# Patient Record
Sex: FEMALE | Race: WHITE | NOT HISPANIC OR LATINO
[De-identification: names, ages, dates, MRNs, and addresses within clinical notes are randomized per-mention and may not be internally consistent; named-entity substitution may affect disease eponyms.]

---

## 2017-08-18 PROBLEM — Z00.00 ENCOUNTER FOR PREVENTIVE HEALTH EXAMINATION: Noted: 2017-08-18

## 2017-09-12 ENCOUNTER — RECORD ABSTRACTING (OUTPATIENT)
Age: 52
End: 2017-09-12

## 2017-09-12 DIAGNOSIS — L40.50 ARTHROPATHIC PSORIASIS, UNSPECIFIED: ICD-10-CM

## 2017-09-12 DIAGNOSIS — I82.629 ACUTE EMBOLISM AND THROMBOSIS OF DEEP VEINS OF UNSPECIFIED UPPER EXTREMITY: ICD-10-CM

## 2017-09-12 DIAGNOSIS — Z86.79 PERSONAL HISTORY OF OTHER DISEASES OF THE CIRCULATORY SYSTEM: ICD-10-CM

## 2017-09-12 DIAGNOSIS — Z85.3 PERSONAL HISTORY OF MALIGNANT NEOPLASM OF BREAST: ICD-10-CM

## 2017-09-20 ENCOUNTER — APPOINTMENT (OUTPATIENT)
Dept: GYNECOLOGIC ONCOLOGY | Facility: CLINIC | Age: 52
End: 2017-09-20
Payer: COMMERCIAL

## 2017-09-20 VITALS
WEIGHT: 154 LBS | HEART RATE: 80 BPM | HEIGHT: 60 IN | BODY MASS INDEX: 30.23 KG/M2 | SYSTOLIC BLOOD PRESSURE: 130 MMHG | DIASTOLIC BLOOD PRESSURE: 70 MMHG

## 2017-09-20 DIAGNOSIS — Z15.01 GENETIC SUSCEPTIBILITY TO MALIGNANT NEOPLASM OF BREAST: ICD-10-CM

## 2017-09-20 DIAGNOSIS — Z78.9 OTHER SPECIFIED HEALTH STATUS: ICD-10-CM

## 2017-09-20 DIAGNOSIS — Z15.02 GENETIC SUSCEPTIBILITY TO MALIGNANT NEOPLASM OF BREAST: ICD-10-CM

## 2017-09-20 DIAGNOSIS — Z87.898 PERSONAL HISTORY OF OTHER SPECIFIED CONDITIONS: ICD-10-CM

## 2017-09-20 DIAGNOSIS — C50.911 MALIGNANT NEOPLASM OF UNSPECIFIED SITE OF RIGHT FEMALE BREAST: ICD-10-CM

## 2017-09-20 DIAGNOSIS — J44.9 CHRONIC OBSTRUCTIVE PULMONARY DISEASE, UNSPECIFIED: ICD-10-CM

## 2017-09-20 DIAGNOSIS — F17.200 NICOTINE DEPENDENCE, UNSPECIFIED, UNCOMPLICATED: ICD-10-CM

## 2017-09-20 DIAGNOSIS — C50.912 MALIGNANT NEOPLASM OF UNSPECIFIED SITE OF RIGHT FEMALE BREAST: ICD-10-CM

## 2017-09-20 PROCEDURE — 99244 OFF/OP CNSLTJ NEW/EST MOD 40: CPT

## 2017-09-20 RX ORDER — MULTIVITAMIN
TABLET ORAL
Refills: 0 | Status: ACTIVE | COMMUNITY

## 2017-09-20 RX ORDER — IVERMECTIN 3 MG/1
TABLET ORAL
Refills: 0 | Status: ACTIVE | COMMUNITY

## 2017-09-20 RX ORDER — CHROMIUM 200 MCG
TABLET ORAL
Refills: 0 | Status: ACTIVE | COMMUNITY

## 2017-09-20 RX ORDER — FOLIC ACID 1 MG/1
1 TABLET ORAL
Qty: 30 | Refills: 0 | Status: ACTIVE | COMMUNITY
Start: 2017-05-15

## 2017-09-20 RX ORDER — EXEMESTANE 25 MG/1
25 TABLET ORAL
Refills: 0 | Status: ACTIVE | COMMUNITY

## 2017-09-20 RX ORDER — FEXOFENADINE HCL 60 MG
CAPSULE ORAL
Refills: 0 | Status: ACTIVE | COMMUNITY

## 2017-09-20 RX ORDER — NYSTATIN 100000 [USP'U]/ML
100000 SUSPENSION ORAL
Qty: 200 | Refills: 0 | Status: ACTIVE | COMMUNITY
Start: 2017-06-13

## 2017-09-20 RX ORDER — MONTELUKAST 10 MG/1
10 TABLET, FILM COATED ORAL
Qty: 30 | Refills: 0 | Status: ACTIVE | COMMUNITY
Start: 2017-06-13

## 2017-09-20 RX ORDER — PROCHLORPERAZINE MALEATE 10 MG/1
10 TABLET ORAL
Qty: 90 | Refills: 0 | Status: ACTIVE | COMMUNITY
Start: 2016-11-16

## 2017-09-20 RX ORDER — APREMILAST 30 MG/1
30 TABLET, FILM COATED ORAL
Refills: 0 | Status: ACTIVE | COMMUNITY

## 2017-09-20 RX ORDER — ALPRAZOLAM 0.25 MG/1
0.25 TABLET ORAL
Qty: 60 | Refills: 0 | Status: ACTIVE | COMMUNITY
Start: 2017-04-25

## 2017-09-21 LAB — HPV HIGH+LOW RISK DNA PNL CVX: NEGATIVE

## 2017-09-26 LAB — CYTOLOGY CVX/VAG DOC THIN PREP: NORMAL

## 2018-03-23 ENCOUNTER — OTHER (OUTPATIENT)
Age: 53
End: 2018-03-23

## 2018-07-27 PROBLEM — Z78.9 ALCOHOL USE: Status: ACTIVE | Noted: 2017-09-20

## 2019-03-17 ENCOUNTER — INPATIENT (INPATIENT)
Facility: HOSPITAL | Age: 54
LOS: 11 days | Discharge: HOME | DRG: 191 | End: 2019-03-29
Attending: INTERNAL MEDICINE | Admitting: INTERNAL MEDICINE
Payer: COMMERCIAL

## 2019-03-17 VITALS
TEMPERATURE: 98 F | HEART RATE: 86 BPM | WEIGHT: 164.91 LBS | RESPIRATION RATE: 18 BRPM | DIASTOLIC BLOOD PRESSURE: 86 MMHG | HEIGHT: 60 IN | SYSTOLIC BLOOD PRESSURE: 128 MMHG | OXYGEN SATURATION: 100 %

## 2019-03-17 DIAGNOSIS — Z90.13 ACQUIRED ABSENCE OF BILATERAL BREASTS AND NIPPLES: Chronic | ICD-10-CM

## 2019-03-17 DIAGNOSIS — Z90.10 ACQUIRED ABSENCE OF UNSPECIFIED BREAST AND NIPPLE: Chronic | ICD-10-CM

## 2019-03-17 DIAGNOSIS — K70.10 ALCOHOLIC HEPATITIS WITHOUT ASCITES: ICD-10-CM

## 2019-03-17 DIAGNOSIS — C50.919 MALIGNANT NEOPLASM OF UNSPECIFIED SITE OF UNSPECIFIED FEMALE BREAST: ICD-10-CM

## 2019-03-17 DIAGNOSIS — R06.02 SHORTNESS OF BREATH: ICD-10-CM

## 2019-03-17 DIAGNOSIS — F10.929 ALCOHOL USE, UNSPECIFIED WITH INTOXICATION, UNSPECIFIED: ICD-10-CM

## 2019-03-17 DIAGNOSIS — L40.9 PSORIASIS, UNSPECIFIED: ICD-10-CM

## 2019-03-17 DIAGNOSIS — D69.6 THROMBOCYTOPENIA, UNSPECIFIED: ICD-10-CM

## 2019-03-17 DIAGNOSIS — Z29.9 ENCOUNTER FOR PROPHYLACTIC MEASURES, UNSPECIFIED: ICD-10-CM

## 2019-03-17 DIAGNOSIS — H10.9 UNSPECIFIED CONJUNCTIVITIS: ICD-10-CM

## 2019-03-17 DIAGNOSIS — E87.1 HYPO-OSMOLALITY AND HYPONATREMIA: ICD-10-CM

## 2019-03-17 DIAGNOSIS — J44.1 CHRONIC OBSTRUCTIVE PULMONARY DISEASE WITH (ACUTE) EXACERBATION: ICD-10-CM

## 2019-03-17 LAB
ALBUMIN SERPL ELPH-MCNC: 3.8 G/DL — SIGNIFICANT CHANGE UP (ref 3.3–5)
ALBUMIN SERPL ELPH-MCNC: 4.1 G/DL — SIGNIFICANT CHANGE UP (ref 3.3–5)
ALP SERPL-CCNC: 108 U/L — SIGNIFICANT CHANGE UP (ref 40–120)
ALP SERPL-CCNC: 110 U/L — SIGNIFICANT CHANGE UP (ref 40–120)
ALT FLD-CCNC: 219 U/L — HIGH (ref 10–45)
ALT FLD-CCNC: 235 U/L — HIGH (ref 10–45)
ANION GAP SERPL CALC-SCNC: 21 MMOL/L — HIGH (ref 5–17)
ANION GAP SERPL CALC-SCNC: 22 MMOL/L — HIGH (ref 5–17)
ANISOCYTOSIS BLD QL: SLIGHT — SIGNIFICANT CHANGE UP
APTT BLD: 29.6 SEC — SIGNIFICANT CHANGE UP (ref 27.5–36.3)
AST SERPL-CCNC: 354 U/L — HIGH (ref 10–40)
AST SERPL-CCNC: 363 U/L — HIGH (ref 10–40)
BASE EXCESS BLDV CALC-SCNC: -1.5 MMOL/L — SIGNIFICANT CHANGE UP (ref -2–2)
BASE EXCESS BLDV CALC-SCNC: 1.3 MMOL/L — SIGNIFICANT CHANGE UP (ref -2–2)
BASOPHILS # BLD AUTO: 0 K/UL — SIGNIFICANT CHANGE UP (ref 0–0.2)
BASOPHILS NFR BLD AUTO: 0.7 % — SIGNIFICANT CHANGE UP (ref 0–2)
BILIRUB SERPL-MCNC: 1.3 MG/DL — HIGH (ref 0.2–1.2)
BILIRUB SERPL-MCNC: 1.3 MG/DL — HIGH (ref 0.2–1.2)
BUN SERPL-MCNC: 4 MG/DL — LOW (ref 7–23)
BUN SERPL-MCNC: 4 MG/DL — LOW (ref 7–23)
CA-I SERPL-SCNC: 0.97 MMOL/L — LOW (ref 1.12–1.3)
CA-I SERPL-SCNC: 0.99 MMOL/L — LOW (ref 1.12–1.3)
CALCIUM SERPL-MCNC: 8.2 MG/DL — LOW (ref 8.4–10.5)
CALCIUM SERPL-MCNC: 8.8 MG/DL — SIGNIFICANT CHANGE UP (ref 8.4–10.5)
CHLORIDE BLDV-SCNC: 93 MMOL/L — LOW (ref 96–108)
CHLORIDE BLDV-SCNC: 95 MMOL/L — LOW (ref 96–108)
CHLORIDE SERPL-SCNC: 83 MMOL/L — LOW (ref 96–108)
CHLORIDE SERPL-SCNC: 89 MMOL/L — LOW (ref 96–108)
CO2 BLDV-SCNC: 25 MMOL/L — SIGNIFICANT CHANGE UP (ref 22–30)
CO2 BLDV-SCNC: 27 MMOL/L — SIGNIFICANT CHANGE UP (ref 22–30)
CO2 SERPL-SCNC: 20 MMOL/L — LOW (ref 22–31)
CO2 SERPL-SCNC: 21 MMOL/L — LOW (ref 22–31)
CREAT SERPL-MCNC: 0.44 MG/DL — LOW (ref 0.5–1.3)
CREAT SERPL-MCNC: 0.48 MG/DL — LOW (ref 0.5–1.3)
EOSINOPHIL # BLD AUTO: 0.2 K/UL — SIGNIFICANT CHANGE UP (ref 0–0.5)
EOSINOPHIL NFR BLD AUTO: 3.1 % — SIGNIFICANT CHANGE UP (ref 0–6)
ETHANOL SERPL-MCNC: 271 MG/DL — HIGH (ref 0–10)
GAS PNL BLDV: 125 MMOL/L — LOW (ref 136–145)
GAS PNL BLDV: 126 MMOL/L — LOW (ref 136–145)
GAS PNL BLDV: SIGNIFICANT CHANGE UP
GLUCOSE BLDV-MCNC: 91 MG/DL — SIGNIFICANT CHANGE UP (ref 70–99)
GLUCOSE BLDV-MCNC: 91 MG/DL — SIGNIFICANT CHANGE UP (ref 70–99)
GLUCOSE SERPL-MCNC: 93 MG/DL — SIGNIFICANT CHANGE UP (ref 70–99)
GLUCOSE SERPL-MCNC: 93 MG/DL — SIGNIFICANT CHANGE UP (ref 70–99)
HCO3 BLDV-SCNC: 24 MMOL/L — SIGNIFICANT CHANGE UP (ref 21–29)
HCO3 BLDV-SCNC: 26 MMOL/L — SIGNIFICANT CHANGE UP (ref 21–29)
HCT VFR BLD CALC: 47.9 % — HIGH (ref 34.5–45)
HCT VFR BLDA CALC: 49 % — SIGNIFICANT CHANGE UP (ref 39–50)
HCT VFR BLDA CALC: 50 % — SIGNIFICANT CHANGE UP (ref 39–50)
HGB BLD CALC-MCNC: 15.9 G/DL — HIGH (ref 11.5–15.5)
HGB BLD CALC-MCNC: 16.5 G/DL — HIGH (ref 11.5–15.5)
HGB BLD-MCNC: 16.4 G/DL — HIGH (ref 11.5–15.5)
INR BLD: 0.89 RATIO — SIGNIFICANT CHANGE UP (ref 0.88–1.16)
LACTATE BLDV-MCNC: 3.4 MMOL/L — HIGH (ref 0.7–2)
LACTATE BLDV-MCNC: 3.6 MMOL/L — HIGH (ref 0.7–2)
LYMPHOCYTES # BLD AUTO: 0.7 K/UL — LOW (ref 1–3.3)
LYMPHOCYTES # BLD AUTO: 13.3 % — SIGNIFICANT CHANGE UP (ref 13–44)
MACROCYTES BLD QL: SIGNIFICANT CHANGE UP
MCHC RBC-ENTMCNC: 34.3 GM/DL — SIGNIFICANT CHANGE UP (ref 32–36)
MCHC RBC-ENTMCNC: 38.4 PG — HIGH (ref 27–34)
MCV RBC AUTO: 112 FL — HIGH (ref 80–100)
MONOCYTES # BLD AUTO: 0.6 K/UL — SIGNIFICANT CHANGE UP (ref 0–0.9)
MONOCYTES NFR BLD AUTO: 11.1 % — SIGNIFICANT CHANGE UP (ref 2–14)
NEUTROPHILS # BLD AUTO: 3.6 K/UL — SIGNIFICANT CHANGE UP (ref 1.8–7.4)
NEUTROPHILS NFR BLD AUTO: 71.9 % — SIGNIFICANT CHANGE UP (ref 43–77)
NT-PROBNP SERPL-SCNC: 47 PG/ML — SIGNIFICANT CHANGE UP (ref 0–300)
PCO2 BLDV: 43 MMHG — SIGNIFICANT CHANGE UP (ref 35–50)
PCO2 BLDV: 43 MMHG — SIGNIFICANT CHANGE UP (ref 35–50)
PH BLDV: 7.36 — SIGNIFICANT CHANGE UP (ref 7.35–7.45)
PH BLDV: 7.4 — SIGNIFICANT CHANGE UP (ref 7.35–7.45)
PLAT MORPH BLD: NORMAL — SIGNIFICANT CHANGE UP
PLATELET # BLD AUTO: 105 K/UL — LOW (ref 150–400)
PO2 BLDV: 87 MMHG — HIGH (ref 25–45)
PO2 BLDV: 96 MMHG — HIGH (ref 25–45)
POTASSIUM BLDV-SCNC: 4 MMOL/L — SIGNIFICANT CHANGE UP (ref 3.5–5.3)
POTASSIUM BLDV-SCNC: 4.3 MMOL/L — SIGNIFICANT CHANGE UP (ref 3.5–5.3)
POTASSIUM SERPL-MCNC: 4.3 MMOL/L — SIGNIFICANT CHANGE UP (ref 3.5–5.3)
POTASSIUM SERPL-MCNC: 4.4 MMOL/L — SIGNIFICANT CHANGE UP (ref 3.5–5.3)
POTASSIUM SERPL-SCNC: 4.3 MMOL/L — SIGNIFICANT CHANGE UP (ref 3.5–5.3)
POTASSIUM SERPL-SCNC: 4.4 MMOL/L — SIGNIFICANT CHANGE UP (ref 3.5–5.3)
PROT SERPL-MCNC: 7.1 G/DL — SIGNIFICANT CHANGE UP (ref 6–8.3)
PROT SERPL-MCNC: 7.3 G/DL — SIGNIFICANT CHANGE UP (ref 6–8.3)
PROTHROM AB SERPL-ACNC: 10.1 SEC — SIGNIFICANT CHANGE UP (ref 10–12.9)
RBC # BLD: 4.27 M/UL — SIGNIFICANT CHANGE UP (ref 3.8–5.2)
RBC # FLD: 12.7 % — SIGNIFICANT CHANGE UP (ref 10.3–14.5)
RBC BLD AUTO: ABNORMAL
SAO2 % BLDV: 95 % — HIGH (ref 67–88)
SAO2 % BLDV: 97 % — HIGH (ref 67–88)
SODIUM SERPL-SCNC: 126 MMOL/L — LOW (ref 135–145)
SODIUM SERPL-SCNC: 130 MMOL/L — LOW (ref 135–145)
TROPONIN T, HIGH SENSITIVITY RESULT: 18 NG/L — SIGNIFICANT CHANGE UP (ref 0–51)
WBC # BLD: 5.1 K/UL — SIGNIFICANT CHANGE UP (ref 3.8–10.5)
WBC # FLD AUTO: 5.1 K/UL — SIGNIFICANT CHANGE UP (ref 3.8–10.5)

## 2019-03-17 PROCEDURE — 93010 ELECTROCARDIOGRAM REPORT: CPT | Mod: NC

## 2019-03-17 PROCEDURE — 99223 1ST HOSP IP/OBS HIGH 75: CPT

## 2019-03-17 PROCEDURE — 71275 CT ANGIOGRAPHY CHEST: CPT | Mod: 26

## 2019-03-17 PROCEDURE — 71045 X-RAY EXAM CHEST 1 VIEW: CPT | Mod: 26

## 2019-03-17 PROCEDURE — 99285 EMERGENCY DEPT VISIT HI MDM: CPT | Mod: 25

## 2019-03-17 RX ORDER — SODIUM CHLORIDE 9 MG/ML
1000 INJECTION INTRAMUSCULAR; INTRAVENOUS; SUBCUTANEOUS ONCE
Qty: 0 | Refills: 0 | Status: COMPLETED | OUTPATIENT
Start: 2019-03-17 | End: 2019-03-17

## 2019-03-17 RX ORDER — NICOTINE POLACRILEX 2 MG
1 GUM BUCCAL DAILY
Qty: 0 | Refills: 0 | Status: DISCONTINUED | OUTPATIENT
Start: 2019-03-17 | End: 2019-03-29

## 2019-03-17 RX ORDER — MONTELUKAST 4 MG/1
10 TABLET, CHEWABLE ORAL DAILY
Qty: 0 | Refills: 0 | Status: DISCONTINUED | OUTPATIENT
Start: 2019-03-17 | End: 2019-03-29

## 2019-03-17 RX ORDER — IPRATROPIUM/ALBUTEROL SULFATE 18-103MCG
3 AEROSOL WITH ADAPTER (GRAM) INHALATION ONCE
Qty: 0 | Refills: 0 | Status: COMPLETED | OUTPATIENT
Start: 2019-03-17 | End: 2019-03-17

## 2019-03-17 RX ORDER — POLYMYXIN B SULF/TRIMETHOPRIM 10000-1/ML
1 DROPS OPHTHALMIC (EYE) EVERY 6 HOURS
Qty: 0 | Refills: 0 | Status: COMPLETED | OUTPATIENT
Start: 2019-03-17 | End: 2019-03-22

## 2019-03-17 RX ORDER — THIAMINE MONONITRATE (VIT B1) 100 MG
250 TABLET ORAL DAILY
Qty: 0 | Refills: 0 | Status: COMPLETED | OUTPATIENT
Start: 2019-03-17 | End: 2019-03-20

## 2019-03-17 RX ORDER — CHOLECALCIFEROL (VITAMIN D3) 125 MCG
2000 CAPSULE ORAL DAILY
Qty: 0 | Refills: 0 | Status: DISCONTINUED | OUTPATIENT
Start: 2019-03-17 | End: 2019-03-29

## 2019-03-17 RX ORDER — BUDESONIDE AND FORMOTEROL FUMARATE DIHYDRATE 160; 4.5 UG/1; UG/1
2 AEROSOL RESPIRATORY (INHALATION)
Qty: 0 | Refills: 0 | Status: DISCONTINUED | OUTPATIENT
Start: 2019-03-17 | End: 2019-03-29

## 2019-03-17 RX ORDER — FOLIC ACID 0.8 MG
1 TABLET ORAL DAILY
Qty: 0 | Refills: 0 | Status: DISCONTINUED | OUTPATIENT
Start: 2019-03-17 | End: 2019-03-29

## 2019-03-17 RX ORDER — POLYMYXIN B SULF/TRIMETHOPRIM 10000-1/ML
1 DROPS OPHTHALMIC (EYE) ONCE
Qty: 0 | Refills: 0 | Status: COMPLETED | OUTPATIENT
Start: 2019-03-17 | End: 2019-03-17

## 2019-03-17 RX ORDER — PROCHLORPERAZINE MALEATE 5 MG
10 TABLET ORAL DAILY
Qty: 0 | Refills: 0 | Status: DISCONTINUED | OUTPATIENT
Start: 2019-03-17 | End: 2019-03-29

## 2019-03-17 RX ORDER — SODIUM CHLORIDE 9 MG/ML
1000 INJECTION INTRAMUSCULAR; INTRAVENOUS; SUBCUTANEOUS
Qty: 0 | Refills: 0 | Status: COMPLETED | OUTPATIENT
Start: 2019-03-17 | End: 2019-03-18

## 2019-03-17 RX ORDER — EXEMESTANE 25 MG/1
25 TABLET, SUGAR COATED ORAL DAILY
Qty: 0 | Refills: 0 | Status: DISCONTINUED | OUTPATIENT
Start: 2019-03-17 | End: 2019-03-29

## 2019-03-17 RX ORDER — IPRATROPIUM/ALBUTEROL SULFATE 18-103MCG
3 AEROSOL WITH ADAPTER (GRAM) INHALATION EVERY 6 HOURS
Qty: 0 | Refills: 0 | Status: DISCONTINUED | OUTPATIENT
Start: 2019-03-17 | End: 2019-03-29

## 2019-03-17 RX ORDER — THIAMINE MONONITRATE (VIT B1) 100 MG
250 TABLET ORAL DAILY
Qty: 0 | Refills: 0 | Status: DISCONTINUED | OUTPATIENT
Start: 2019-03-17 | End: 2019-03-17

## 2019-03-17 RX ADMIN — Medication 1 MILLIGRAM(S): at 19:55

## 2019-03-17 RX ADMIN — Medication 50 MILLIGRAM(S): at 17:46

## 2019-03-17 RX ADMIN — Medication 3 MILLILITER(S): at 14:47

## 2019-03-17 RX ADMIN — Medication 125 MILLIGRAM(S): at 14:47

## 2019-03-17 RX ADMIN — Medication 1 DROP(S): at 17:47

## 2019-03-17 RX ADMIN — SODIUM CHLORIDE 1000 MILLILITER(S): 9 INJECTION INTRAMUSCULAR; INTRAVENOUS; SUBCUTANEOUS at 14:52

## 2019-03-17 RX ADMIN — Medication 3 MILLILITER(S): at 23:09

## 2019-03-17 RX ADMIN — Medication 4 MILLIGRAM(S): at 23:08

## 2019-03-17 RX ADMIN — SODIUM CHLORIDE 1000 MILLILITER(S): 9 INJECTION INTRAMUSCULAR; INTRAVENOUS; SUBCUTANEOUS at 17:40

## 2019-03-17 RX ADMIN — Medication 1 DROP(S): at 23:11

## 2019-03-17 RX ADMIN — Medication 3 MILLILITER(S): at 14:50

## 2019-03-17 NOTE — H&P ADULT - NSICDXPASTMEDICALHX_GEN_ALL_CORE_FT
PAST MEDICAL HISTORY:  Brain aneurysm with clips    Breast cancer     Breast cancer, stage 3     Psoriasis     Psoriasis     RA (rheumatoid arthritis)

## 2019-03-17 NOTE — PATIENT PROFILE ADULT - DOES PATIENT HAVE ADVANCE DIRECTIVE
No order needed for Flu Clinic visit  Patient is scheduled appropriately    Shefali Rolle, 01/09/18, 9:18 AM yes

## 2019-03-17 NOTE — ED PROVIDER NOTE - CARE PLAN
Principal Discharge DX:	Breast cancer, stage 3 Principal Discharge DX:	SOB (shortness of breath)  Secondary Diagnosis:	Hyponatremia  Secondary Diagnosis:	Transaminitis  Secondary Diagnosis:	Alcohol abuse

## 2019-03-17 NOTE — ED ADULT NURSE NOTE - OBJECTIVE STATEMENT
Patient is a 52 y female presenting to the ED via EMS with c/o difficulty breathing for 2 days. Pt A&Ox4. Pt reports difficulty breathing that is constant for 2 days. Patient is a 52 y female presenting to the ED via EMS with c/o difficulty breathing for 2 days. Pt A&Ox4. Pt reports difficulty breathing that is constant for 2 days. Patient's  reports the pt has not eaten for a week and has had generalized weakness for 2 weeks. Pt reports productive cough with clear sputum. Pt pmh of COPD, breast cancer, psoriasis. Pt continues to smoke cigarettes. Pt reports drinking 6 beers this morning. Pt denies chest pain, fever/chills, burning upon urination.  Gross neuro intact. Normal heart sounds S1, S2 heard upon auscultation. Pt displays red rashes spread through out body.

## 2019-03-17 NOTE — ED PROVIDER NOTE - PMH
Brain aneurysm  with clips  Breast cancer    Breast cancer, stage 3    Psoriasis    Psoriasis    RA (rheumatoid arthritis)

## 2019-03-17 NOTE — H&P ADULT - ATTENDING COMMENTS
Care to be assumed by Dr. Canelo Bonds at 8 am.  This patient was assigned to me by the hospitalist in charge; my involvement in this case has consisted of the initial history, physical, chart review, and management plan.  This patient was previously unknown to me.

## 2019-03-17 NOTE — ED PROVIDER NOTE - CLINICAL SUMMARY MEDICAL DECISION MAKING FREE TEXT BOX
54 y old morbidly obese f with a history of breast ca status post bilateral mastectomy ,not on chemo for the last 4 y ,heavy smoker and alcoholic last drink yesterday (6-7drinks per day) ,brought by a  failure to thrive ,not eating or drinking ,very SOB ,No fever or chills ,decling walking because of fatigue and weakness. Looks very unkempt, bilateral bacterial conjunctivitis, dry skin, dirty nails. concern for PE and abn electrolytes. will obtain bloodwork, IV hydration, CTA and admission to the hospital.ZR

## 2019-03-17 NOTE — H&P ADULT - HISTORY OF PRESENT ILLNESS
Pt's son, Laci Milligan returning call to [facility] 54 F PMH stage 3 Bilateral Breast CA on Herceptin/Aromasin, RA, Psoriasis on Otezla, remote Brain Aneurysm s/p Clip in 1988,  Rt Jugular DVT, Catheter related MSSA Bacteremia from [place] in July 2015, ETOH abuse, COPD, p/w dypsnea.     VS: 99.2, 96, 127/67, 19, 96% 3LNC. Labs: no leukocytosis, mild thrombocytopenia, chronic macrocytosis, coags wnl, cmp with hypoNa 126 repeat 130, transaminitis with normal alkp. HST 18, probnp 47. Lactate 3.6 repeat 3.4. ETOH 271.  CXR prelim clear lungs. CTA chest no PE, +severe emphysema. In ER pt received polytrim opthalmic for suspected bacterial conjunctivitis, 2L IVF, duonebs, librium 50 x 1, ativan 1 x 1, and solumedrol 125 x 1 prior to medicine team involvement. 54 F PMH stage 3 Bilateral Breast CA on Herceptin/Aromasin, RA, Psoriasis on Otezla, remote Brain Aneurysm s/p Clip in 1988,  Rt Jugular DVT, Catheter related MSSA Bacteremia from [place] in July 2015, ETOH abuse, COPD, p/w dypsnea. Acute on chronic dyspnea, progressive x 2 wks. +cough +inc white sputum, +wheezing +alvarez +sob at rest. +inc inhaler usage in last 2 wks. Tried to tolerate it but today it became unbearable. Denies cp/f/c/n/v/d/dysuria, denies LE edema but + orthopnea. Pt also endorses drinking 10 beers daily, today thus far has had 6 beers unsure exactly when last drink was. Has not gone a single day without drinking in many years. Current 2 pack/day smoker prior even heavier use. Pt states she is mostly bedbound from copd but not on home o2.  helps her with some ADLs.     VS: 99.2, 96, 127/67, 19, 96% 3LNC. Labs: no leukocytosis, mild thrombocytopenia, chronic macrocytosis, coags wnl, cmp with hypoNa 126 repeat 130, transaminitis with normal alkp. HST 18, probnp 47. Lactate 3.6 repeat 3.4. ETOH 271.  CXR prelim clear lungs. CTA chest no PE, +severe emphysema. In ER pt received polytrim opthalmic for suspected bacterial conjunctivitis, 2L IVF, duonebs, librium 50 x 1, ativan 1 x 1, and solumedrol 125 x 1 prior to medicine team involvement. 54 F PMH stage 3 Bilateral Breast CA on Aromasin, RA, Psoriasis previously on Otezla, remote Brain Aneurysm s/p Clip in 1988,  Rt Jugular DVT, Catheter related MSSA Bacteremia from [place] in July 2015, ETOH abuse, COPD, p/w dypsnea. Acute on chronic dyspnea, progressive x 2 wks. +cough +inc white sputum, +wheezing +alvaerz +sob at rest. +inc inhaler usage in last 2 wks. Tried to tolerate it but today it became unbearable. +dec po intake x 5 days. Denies cp/f/c/n/v/d/dysuria, denies LE edema but + orthopnea. Pt denies abd pain, hematochezia/melena/hematemesis/hematuria/bruising. Pt also endorses drinking 10 beers daily, today thus far has had 6 beers unsure exactly when last drink was. Has not gone a single day without drinking in many years. Current 2 pack/day smoker prior even heavier use. Pt states she is mostly bedbound from copd but not on home o2.  helps her with some ADLs.  Call placed to  [facility].  He confirms above details including etoh hx. No hx of withdrawal, no seizures, no intubations.     VS: 99.2, 96, 127/67, 19, 96% 3LNC. Labs: no leukocytosis, mild thrombocytopenia, chronic macrocytosis, coags wnl, cmp with hypoNa 126 repeat 130, transaminitis with normal alkp. HST 18, probnp 47. Lactate 3.6 repeat 3.4. ETOH 271.  CXR prelim clear lungs. CTA chest no PE, +severe emphysema. In ER pt received polytrim opthalmic for suspected bacterial conjunctivitis, 2L IVF, duonebs, librium 50 x 1, ativan 1 x 1, and solumedrol 125 x 1 prior to medicine team involvement.

## 2019-03-17 NOTE — H&P ADULT - NSHPSOCIALHISTORY_GEN_ALL_CORE
Social History:    Marital Status:  ( x  )    (   ) Single    (   )    (  )   Occupation: retired   Lives with: (  ) alone  (  ) children   (  x) spouse   (  ) parents  (  ) other    Substance Use (street drugs): ( x ) never used  (  ) other:  Tobacco Usage:  (   ) never smoked   (   ) former smoker   (   x) current smoker  (  >80   ) pack year  (        ) last cigarette date  Alcohol Usage: daily 10 beers longest she has gone without is few hours

## 2019-03-17 NOTE — H&P ADULT - ASSESSMENT
54 F PMH stage 3 Bilateral Breast CA on Aromasin, RA, Psoriasis previously on Otezla, remote Brain Aneurysm s/p Clip in 1988,  Rt Jugular DVT, Catheter related MSSA Bacteremia from [place] in July 2015, ETOH abuse, COPD, p/w dypsnea, f/w COPD exacerbation, alcohol intoxication with high risk for withdrawal, hypovolemic hyponatremia in setting of poor PO intake and dehydration, and b/l conjunctivitis presumed bacterial in nature.

## 2019-03-17 NOTE — H&P ADULT - NSICDXPASTSURGICALHX_GEN_ALL_CORE_FT
PAST SURGICAL HISTORY:  Brain aneurysm 1988 two clips    History of modified radical mastectomy of both breasts     S/P bilateral mastectomy

## 2019-03-17 NOTE — H&P ADULT - NSICDXPROBLEM_GEN_ALL_CORE_FT
PROBLEM DIAGNOSES  Problem: COPD exacerbation  Assessment and Plan: -dyspnea, cough, inc sputum, wheezing, inc inhaler usage; CT confirms severe centrilobular emphysema  -s/p nebs, solumedrol 125 x 1  -start solumedrol IV 20 q8  -pt on stiolto at home; can bring in home med. For now, will start symbicort bid  -duoneb atc  -c/w singulair  -pt with multiple abx allergies (PCN, amox, levaquin) for now will hold off of empiric abx for copd exac  -check RVP  -CTA reviewed, no PE  -cigarette cessation education provided. Pt amenable to nicotine patch for now  -sup O2 prn to keep o2 sats 88-92%.  may need to be eval for home o2 on this admission, recommend documenting ambulatory sat off Oxygen in sunrise when able  -Pulm eval in am with Dr. Mansfield, defer to day attending to arrange.     Problem: Bacterial conjunctivitis  Assessment and Plan: ordered for polytrim opthalmic by ER  -c/w drops for now q6 x 5 days  -consider optho eval in am if worsening    Problem: Hyponatremia  Assessment and Plan: -na 126 on admission, s/p 2L IVF uptrended to 130, appropriate response  -suspect hypovolemic hyponatremia given dehydration on exam, dec PO intake  -c/w maintenance IVF 75 cc/hr x 12 hours and reasses, trend bmp      Problem: Alcohol intoxication  Assessment and Plan: -daily 10 beers drinker confirmed by , longest etoh free period few hours, and etoh level in , high risk for withdrawal  -received PO librium 50 x1 and ativan 1 x1 in ER  -start standing ativan taper, ciwa protocol, prn ativan (noted etoh hepatitis on admission so will avoid librium)  -thiamine, folate, MVI  -social work  -fall precautions, aspiration precautions    Problem: Alcoholic hepatitis  Assessment and Plan: -c/w mgt of etoh intoxication/withdrawal risk as noted above  -mild TTP over RUQ/RLQ  -Cristian DF -7.4, good prognosis  -ABIC score 6.46 low risk  -etoh cessation education provided    Problem: Psoriasis  Assessment and Plan: -chronic, now off otezla, in the middle of searching for new rheumatologists per   -consider wound care eval    Problem: Breast cancer  Assessment and Plan: c/w examestane    Problem: Thrombocytopenia  Assessment and Plan: mild, no s/s of bleeding per hx or exam, hgb stable  -monitor counts for now    Problem: Prophylactic measure  Assessment and Plan: -mild thrombocytopenia on admission; temporarily hold pharm vte ppx, start scd for now.

## 2019-03-17 NOTE — H&P ADULT - NSHPLABSRESULTS_GEN_ALL_CORE
Labs personally reviewed  no leukocytosis, mild thrombocytopenia, chronic macrocytosis, coags wnl, cmp with hypoNa 126 repeat 130, transaminitis with normal alkp. HST 18, probnp 47. Lactate 3.6 repeat 3.4. ETOH 271.   Imaging personally reviewed  CXR prelim clear lungs. CTA chest no PE, +severe emphysema.  EKG personally reviewed NSR +TWI V1-V3 apparently new

## 2019-03-17 NOTE — ED ADULT NURSE REASSESSMENT NOTE - NS ED NURSE REASSESS COMMENT FT1
Patient resting comfortably. VSS. Pt reports improvement of breathing. plan of care discussed. safety and comfort measures maintained.

## 2019-03-17 NOTE — ED PROVIDER NOTE - OBJECTIVE STATEMENT
55 y/o female hx of stage 3 Bilateral  Breast CA not currently on treatment, RA, Psoriasis  remote Brain Aneurysm s/p Clip in 1988,  COPD who presents to the ED with her  for cough and SOB. patients  reports she has been refusing to eat or take meds for about a week and she has been so weak he cant get her in to see her pmd. patient endorsing constant sob, not on home O2, + cough and congestion. + chills without objective fever.    pmd- june  onc- chandok 53 y/o female hx of stage 3 Bilateral  Breast CA not currently on treatment, RA, Psoriasis  remote Brain Aneurysm s/p Clip in 1988,  COPD who presents to the ED with her  for cough and SOB. patients  reports she has been refusing to eat or take meds for about a week and she has been so weak he cant get her in to see her pmd. patient endorsing constant sob, not on home O2, + cough and congestion. + chills without objective fever.    pmd- june  david malik at [facility] 53 y/o female hx of stage 3 Bilateral  Breast CA not currently on treatment, RA, Psoriasis  remote Brain Aneurysm s/p Clip in 1988, Etoh abuse - had 6 drinks today (no hx of withdrawl) COPD who presents to the ED with her  for cough and SOB. patients  reports she has been refusing to eat or take meds for about a week and she has been so weak he cant get her in to see her pmd. patient endorsing constant sob, not on home O2, + cough and congestion. + chills without objective fever.     pmd- june  nancy- king at [facility]

## 2019-03-17 NOTE — H&P ADULT - NSHPREVIEWOFSYSTEMS_GEN_ALL_CORE
REVIEW OF SYSTEMS:  CONSTITUTIONAL: +weakness. No fevers. No chills. No weight loss. poor appetite.  EYES: No visual changes. +b/l eye redness and discharge, No eye pain.  ENT: No hearing difficulty. No vertigo. No dysphagia. No Sinusitis/rhinorrhea.  NECK: No pain. No stiffness/rigidity.  CARDIAC: No chest pain. No palpitations.  RESPIRATORY: +cough. +SOB. No hemoptysis.  GASTROINTESTINAL: No abdominal pain. No nausea. No vomiting. No hematemesis. No diarrhea. No constipation. No melena. No hematochezia.  GENITOURINARY: No dysuria. No frequency. No hesitancy. No hematuria.  NEUROLOGICAL: No numbness. No focal weakness. No incontinence. No headache.  BACK: No back pain.  EXTREMITIES: No lower extremity edema. Full ROM.  SKIN: No rashes. No itching. No other lesions.  PSYCHIATRIC: No depression. No anxiety. No SI/HI.  ALLERGIC: No lip swelling. No hives.  All other review of systems is negative unless indicated above.

## 2019-03-18 LAB
ALBUMIN SERPL ELPH-MCNC: 3.5 G/DL — SIGNIFICANT CHANGE UP (ref 3.3–5)
ALBUMIN SERPL ELPH-MCNC: 3.6 G/DL — SIGNIFICANT CHANGE UP (ref 3.3–5)
ALP SERPL-CCNC: 95 U/L — SIGNIFICANT CHANGE UP (ref 40–120)
ALP SERPL-CCNC: 97 U/L — SIGNIFICANT CHANGE UP (ref 40–120)
ALT FLD-CCNC: 191 U/L — HIGH (ref 10–45)
ALT FLD-CCNC: 191 U/L — HIGH (ref 10–45)
ANION GAP SERPL CALC-SCNC: 22 MMOL/L — HIGH (ref 5–17)
ANION GAP SERPL CALC-SCNC: 22 MMOL/L — HIGH (ref 5–17)
AST SERPL-CCNC: 261 U/L — HIGH (ref 10–40)
AST SERPL-CCNC: 263 U/L — HIGH (ref 10–40)
BASOPHILS # BLD AUTO: 0 K/UL — SIGNIFICANT CHANGE UP (ref 0–0.2)
BASOPHILS NFR BLD AUTO: 0 % — SIGNIFICANT CHANGE UP (ref 0–2)
BILIRUB DIRECT SERPL-MCNC: 0.6 MG/DL — HIGH (ref 0–0.2)
BILIRUB INDIRECT FLD-MCNC: 0.6 MG/DL — SIGNIFICANT CHANGE UP (ref 0.2–1)
BILIRUB SERPL-MCNC: 1.1 MG/DL — SIGNIFICANT CHANGE UP (ref 0.2–1.2)
BILIRUB SERPL-MCNC: 1.2 MG/DL — SIGNIFICANT CHANGE UP (ref 0.2–1.2)
BUN SERPL-MCNC: 7 MG/DL — SIGNIFICANT CHANGE UP (ref 7–23)
BUN SERPL-MCNC: 7 MG/DL — SIGNIFICANT CHANGE UP (ref 7–23)
CALCIUM SERPL-MCNC: 8.2 MG/DL — LOW (ref 8.4–10.5)
CALCIUM SERPL-MCNC: 8.3 MG/DL — LOW (ref 8.4–10.5)
CHLORIDE SERPL-SCNC: 92 MMOL/L — LOW (ref 96–108)
CHLORIDE SERPL-SCNC: 93 MMOL/L — LOW (ref 96–108)
CO2 SERPL-SCNC: 17 MMOL/L — LOW (ref 22–31)
CO2 SERPL-SCNC: 18 MMOL/L — LOW (ref 22–31)
CREAT SERPL-MCNC: 0.55 MG/DL — SIGNIFICANT CHANGE UP (ref 0.5–1.3)
CREAT SERPL-MCNC: 0.56 MG/DL — SIGNIFICANT CHANGE UP (ref 0.5–1.3)
EOSINOPHIL # BLD AUTO: 0 K/UL — SIGNIFICANT CHANGE UP (ref 0–0.5)
EOSINOPHIL NFR BLD AUTO: 0 % — SIGNIFICANT CHANGE UP (ref 0–6)
GLUCOSE SERPL-MCNC: 106 MG/DL — HIGH (ref 70–99)
GLUCOSE SERPL-MCNC: 107 MG/DL — HIGH (ref 70–99)
HCT VFR BLD CALC: 42.5 % — SIGNIFICANT CHANGE UP (ref 34.5–45)
HCV AB S/CO SERPL IA: 0.13 S/CO — SIGNIFICANT CHANGE UP (ref 0–0.79)
HCV AB SERPL-IMP: SIGNIFICANT CHANGE UP
HGB BLD-MCNC: 14.4 G/DL — SIGNIFICANT CHANGE UP (ref 11.5–15.5)
LYMPHOCYTES # BLD AUTO: 0.27 K/UL — LOW (ref 1–3.3)
LYMPHOCYTES # BLD AUTO: 4 % — LOW (ref 13–44)
MAGNESIUM SERPL-MCNC: 1.6 MG/DL — SIGNIFICANT CHANGE UP (ref 1.6–2.6)
MAGNESIUM SERPL-MCNC: 1.6 MG/DL — SIGNIFICANT CHANGE UP (ref 1.6–2.6)
MCHC RBC-ENTMCNC: 33.9 GM/DL — SIGNIFICANT CHANGE UP (ref 32–36)
MCHC RBC-ENTMCNC: 37.6 PG — HIGH (ref 27–34)
MCV RBC AUTO: 111 FL — HIGH (ref 80–100)
MONOCYTES # BLD AUTO: 0.62 K/UL — SIGNIFICANT CHANGE UP (ref 0–0.9)
MONOCYTES NFR BLD AUTO: 9 % — SIGNIFICANT CHANGE UP (ref 2–14)
NEUTROPHILS # BLD AUTO: 5.97 K/UL — SIGNIFICANT CHANGE UP (ref 1.8–7.4)
NEUTROPHILS NFR BLD AUTO: 87 % — HIGH (ref 43–77)
PHOSPHATE SERPL-MCNC: 3.8 MG/DL — SIGNIFICANT CHANGE UP (ref 2.5–4.5)
PHOSPHATE SERPL-MCNC: 3.8 MG/DL — SIGNIFICANT CHANGE UP (ref 2.5–4.5)
PLATELET # BLD AUTO: 94 K/UL — LOW (ref 150–400)
POTASSIUM SERPL-MCNC: 4.8 MMOL/L — SIGNIFICANT CHANGE UP (ref 3.5–5.3)
POTASSIUM SERPL-MCNC: 4.9 MMOL/L — SIGNIFICANT CHANGE UP (ref 3.5–5.3)
POTASSIUM SERPL-SCNC: 4.8 MMOL/L — SIGNIFICANT CHANGE UP (ref 3.5–5.3)
POTASSIUM SERPL-SCNC: 4.9 MMOL/L — SIGNIFICANT CHANGE UP (ref 3.5–5.3)
PROT SERPL-MCNC: 6.7 G/DL — SIGNIFICANT CHANGE UP (ref 6–8.3)
PROT SERPL-MCNC: 6.8 G/DL — SIGNIFICANT CHANGE UP (ref 6–8.3)
RAPID RVP RESULT: SIGNIFICANT CHANGE UP
RBC # BLD: 3.83 M/UL — SIGNIFICANT CHANGE UP (ref 3.8–5.2)
RBC # FLD: 13 % — SIGNIFICANT CHANGE UP (ref 10.3–14.5)
SODIUM SERPL-SCNC: 131 MMOL/L — LOW (ref 135–145)
SODIUM SERPL-SCNC: 133 MMOL/L — LOW (ref 135–145)
WBC # BLD: 6.86 K/UL — SIGNIFICANT CHANGE UP (ref 3.8–10.5)
WBC # FLD AUTO: 6.86 K/UL — SIGNIFICANT CHANGE UP (ref 3.8–10.5)

## 2019-03-18 PROCEDURE — 94010 BREATHING CAPACITY TEST: CPT | Mod: 26

## 2019-03-18 RX ADMIN — Medication 20 MILLIGRAM(S): at 06:47

## 2019-03-18 RX ADMIN — Medication 3 MILLILITER(S): at 17:51

## 2019-03-18 RX ADMIN — Medication 102.5 MILLIGRAM(S): at 13:51

## 2019-03-18 RX ADMIN — Medication 20 MILLIGRAM(S): at 22:54

## 2019-03-18 RX ADMIN — Medication 4 MILLIGRAM(S): at 17:49

## 2019-03-18 RX ADMIN — Medication 3 MILLILITER(S): at 23:00

## 2019-03-18 RX ADMIN — Medication 1 TABLET(S): at 13:31

## 2019-03-18 RX ADMIN — BUDESONIDE AND FORMOTEROL FUMARATE DIHYDRATE 2 PUFF(S): 160; 4.5 AEROSOL RESPIRATORY (INHALATION) at 18:07

## 2019-03-18 RX ADMIN — Medication 4 MILLIGRAM(S): at 10:46

## 2019-03-18 RX ADMIN — Medication 4 MILLIGRAM(S): at 03:32

## 2019-03-18 RX ADMIN — BUDESONIDE AND FORMOTEROL FUMARATE DIHYDRATE 2 PUFF(S): 160; 4.5 AEROSOL RESPIRATORY (INHALATION) at 08:39

## 2019-03-18 RX ADMIN — SODIUM CHLORIDE 75 MILLILITER(S): 9 INJECTION INTRAMUSCULAR; INTRAVENOUS; SUBCUTANEOUS at 00:03

## 2019-03-18 RX ADMIN — Medication 1 MILLIGRAM(S): at 13:32

## 2019-03-18 RX ADMIN — Medication 3 MILLILITER(S): at 13:30

## 2019-03-18 RX ADMIN — Medication 2000 UNIT(S): at 13:31

## 2019-03-18 RX ADMIN — EXEMESTANE 25 MILLIGRAM(S): 25 TABLET, SUGAR COATED ORAL at 13:31

## 2019-03-18 RX ADMIN — Medication 20 MILLIGRAM(S): at 13:30

## 2019-03-18 RX ADMIN — Medication 4 MILLIGRAM(S): at 06:47

## 2019-03-18 RX ADMIN — Medication 3 MILLIGRAM(S): at 22:54

## 2019-03-18 RX ADMIN — SODIUM CHLORIDE 75 MILLILITER(S): 9 INJECTION INTRAMUSCULAR; INTRAVENOUS; SUBCUTANEOUS at 13:51

## 2019-03-18 RX ADMIN — Medication 1 PATCH: at 13:31

## 2019-03-18 RX ADMIN — Medication 3 MILLILITER(S): at 03:16

## 2019-03-18 RX ADMIN — Medication 4 MILLIGRAM(S): at 14:11

## 2019-03-18 RX ADMIN — MONTELUKAST 10 MILLIGRAM(S): 4 TABLET, CHEWABLE ORAL at 13:31

## 2019-03-18 RX ADMIN — Medication 10 MILLIGRAM(S): at 13:31

## 2019-03-18 RX ADMIN — Medication 1 DROP(S): at 13:52

## 2019-03-18 RX ADMIN — Medication 1 DROP(S): at 17:51

## 2019-03-18 RX ADMIN — Medication 1 PATCH: at 19:45

## 2019-03-18 NOTE — PROGRESS NOTE ADULT - SUBJECTIVE AND OBJECTIVE BOX
Patient is a 54y old  Female who presents with a chief complaint of copd exacerbation, etoh intoxication (17 Mar 2019 20:33)      INTERVAL HPI/OVERNIGHT EVENTS:    Medications:MEDICATIONS  (STANDING):  ALBUTerol/ipratropium for Nebulization 3 milliLiter(s) Nebulizer every 6 hours  buDESOnide 160 MICROgram(s)/formoterol 4.5 MICROgram(s) Inhaler 2 Puff(s) Inhalation two times a day  cholecalciferol 2000 Unit(s) Oral daily  exemestane 25 milliGRAM(s) Oral daily  folic acid 1 milliGRAM(s) Oral daily  LORazepam     Tablet   Oral   LORazepam     Tablet 4 milliGRAM(s) Oral every 4 hours  LORazepam     Tablet 3 milliGRAM(s) Oral every 4 hours  methylPREDNISolone sodium succinate Injectable 20 milliGRAM(s) IV Push every 8 hours  montelukast 10 milliGRAM(s) Oral daily  multivitamin 1 Tablet(s) Oral daily  nicotine - 21 mG/24Hr(s) Patch 1 patch Transdermal daily  prochlorperazine   Tablet 10 milliGRAM(s) Oral daily  sodium chloride 0.9%. 1000 milliLiter(s) (75 mL/Hr) IV Continuous <Continuous>  thiamine IVPB 250 milliGRAM(s) IV Intermittent daily  trimethoprim/polymyxin Solution 1 Drop(s) Both EYES every 6 hours    MEDICATIONS  (PRN):  LORazepam   Injectable 2 milliGRAM(s) IV Push every 1 hour PRN Symptom-triggered: each CIWA -Ar score 8 or GREATER      Allergies: Allergies    amoxicillin (Anaphylaxis)  Levaquin (Other; Anaphylaxis)  Levaquin (Unknown)  penicillin (Anaphylaxis)  penicillins (Anaphylaxis)    Intolerances          FAMILY HISTORY:  No pertinent family history in first degree relatives  No pertinent family history in first degree relatives        PAST MEDICAL & SURGICAL HISTORY:  Prophylactic measure  Breast cancer  Brain aneurysm: with clips  Psoriasis  RA (rheumatoid arthritis)  Psoriasis  Breast cancer, stage 3  History of modified radical mastectomy of both breasts  S/P bilateral mastectomy  Brain aneurysm: 1988 two clips      REVIEW OF SYSTEMS:  CONSTITUTIONAL: weak  EYES: No eye pain, visual disturbances, or discharge  ENMT:  No difficulty hearing, tinnitus, vertigo; No sinus or throat pain  NECK: No pain or stiffness  BREASTS: No pain, masses, or nipple discharge  RESPIRATORY: cough / sob better   CARDIOVASCULAR: No chest pain, palpitations, dizziness,     GASTROINTESTINAL: No abdominal or epigastric pain. No nausea, vomiting, or hematemesis; No diarrhea or constipation. No melena or hematochezia.  GENITOURINARY: No dysuria, frequency, hematuria, or incontinence  NEUROLOGICAL: No headaches,       HEME/LYMPH: No easy bruising, or bleeding gums  ALLERY AND IMMUNOLOGIC: No hives or eczema    T(C): 37.3 (03-18-19 @ 03:15), Max: 37.4 (03-17-19 @ 22:50)  HR: 100 (03-18-19 @ 06:44) (86 - 101)  BP: 120/72 (03-18-19 @ 06:44) (114/83 - 147/83)  RR: 20 (03-18-19 @ 06:44) (18 - 23)  SpO2: 95% (03-18-19 @ 06:44) (94% - 100%)  Wt(kg): --Vital Signs Last 24 Hrs  T(C): 37.3 (18 Mar 2019 03:15), Max: 37.4 (17 Mar 2019 22:50)  T(F): 99.1 (18 Mar 2019 03:15), Max: 99.3 (17 Mar 2019 22:50)  HR: 100 (18 Mar 2019 06:44) (86 - 101)  BP: 120/72 (18 Mar 2019 06:44) (114/83 - 147/83)  BP(mean): --  RR: 20 (18 Mar 2019 06:44) (18 - 23)  SpO2: 95% (18 Mar 2019 06:44) (94% - 100%)  I&O's Summary      PHYSICAL EXAM:  HEAD:  Atraumatic, Normocephalic  EYES: EOMI, PERRLA, conjunctiva and sclera clear  ENMT: No tonsillar erythema, exudates, or enlargement;   NECK: Supple, No JVD, Normal thyroid  NERVOUS SYSTEM:  Alert & Oriented    Motor Strength 5/5 B/L upper and lower extremities; DTRs 2+ intact and symmetric  CHEST/LUNG: b/l ronchi   HEART: Regular rate and rhythm; No murmurs, rubs, or gallops  ABDOMEN: Soft, Nontender, Nondistended; Bowel sounds present  EXTREMITIES: +psoriatic lesions       Consultant(s) Notes Reviewed:  [x ] YES  [ ] NO  Care Discussed with Consultants/Other Providerscpk [ x] YES  [ ] NO    LABS:                    CBC Full  -  ( 17 Mar 2019 14:56 )  WBC Count : 5.1 K/uL  Hemoglobin : 16.4 g/dL  Hematocrit : 47.9 %  Platelet Count - Automated : 105 K/uL  Mean Cell Volume : 112.0 fl  Mean Cell Hemoglobin : 38.4 pg  Mean Cell Hemoglobin Concentration : 34.3 gm/dL  Auto Neutrophil # : 3.6 K/uL  Auto Lymphocyte # : 0.7 K/uL  Auto Monocyte # : 0.6 K/uL  Auto Eosinophil # : 0.2 K/uL  Auto Basophil # : 0.0 K/uL  Auto Neutrophil % : 71.9 %  Auto Lymphocyte % : 13.3 %  Auto Monocyte % : 11.1 %  Auto Eosinophil % : 3.1 %  Auto Basophil % : 0.7 %      03-18    131<L>  |  92<L>  |  7   ----------------------------<  106<H>  4.8   |  17<L>  |  0.55    Ca    8.2<L>      18 Mar 2019 06:27  Phos  3.8     03-18  Mg     1.6     03-18    TPro  6.7  /  Alb  3.5  /  TBili  1.1  /  DBili  0.6<H>  /  AST  261<H>  /  ALT  191<H>  /  AlkPhos  95  03-18          PT/INR - ( 17 Mar 2019 14:56 )   PT: 10.1 sec;   INR: 0.89 ratio         PTT - ( 17 Mar 2019 14:56 )  PTT:29.6 sec  RADIOLOGY & ADDITIONAL TESTS:    Imaging Personally Reviewed:  [ ] YES  [ ] NO

## 2019-03-18 NOTE — CONSULT NOTE ADULT - ASSESSMENT
Imp:  55 yo female with moderate-severe psoriasis; previously treated with Otezla which helped; has recently run out of the drug.  Now a/w COPD exacerbation    Rec:  Otezla not on formulary at hospital.  Can be restarted as outpatient; -would wait until respiratory status controlled to restart as the drug can cause nausea  Can use topical steroids for psoriasis in the interim; would defer to derm as to best topicals due to facial involvement

## 2019-03-18 NOTE — PROGRESS NOTE ADULT - ASSESSMENT
54 Female w/ PMH stage 3 Bilateral Breast CA on Aromasin, RA, Psoriasis previously on Otezla, remote Brain Aneurysm s/p Clip in 1988,  Rt Jugular DVT, Catheter related MSSA Bacteremia from [place] in July 2015, ETOH abuse, COPD, p/w dypsnea, f/w COPD exacerbation, alcohol intoxication with  risk for withdrawal, hypovolemic hyponatremia in setting of poor PO intake and dehydration, and b/l conjunctivitis presumed bacterial in nature.     Problem: COPD exacerbation   solumedrol IV 20 q8   symbicort bid  -duoneb atc  -c/w singulair  -pt with multiple abx allergies    -CTA reviewed, no PE  -cigarette cessation education provided. Pt amenable to nicotine patch   -sup O2 prn to keep o2 sats 88-92%.   -Pulm eval     Problem: Bacterial conjunctivitis  -c/w drops for now q6 x 5 days    Problem: Hyponatremia  improved   -suspect hypovolemic hyponatremia given dehydration   -c/w maintenance IVF       Problem: Alcohol intoxication  etoh abuse      standing ativan taper, ciwa protocol, prn ativan (noted etoh hepatitis on admission so will avoid librium)  -thiamine, folate, MVI  -social work  -fall precautions, aspiration precautions    : Alcoholic hepatitis  -etoh cessation education provided    : Psoriasis  rheum eval       Problem: Breast cancer  Assessment and Plan: c/w examestane    Problem: Thrombocytopenia  -monitor counts for now

## 2019-03-18 NOTE — CONSULT NOTE ADULT - SUBJECTIVE AND OBJECTIVE BOX
Patient is a 54y old  Female who presents with a chief complaint of copd exacerbation, etoh intoxication (18 Mar 2019 10:13)      HPI:  54 F PMH stage 3 Bilateral Breast CA on Aromasin, RA, Psoriasis previously on Otezla, remote Brain Aneurysm s/p Clip in 1988,  Rt Jugular DVT, Catheter related MSSA Bacteremia from [place] in July 2015, ETOH abuse, COPD, p/w dypsnea. Acute on chronic dyspnea, progressive x 2 wks. +cough +inc white sputum, +wheezing +alvarez +sob at rest. +inc inhaler usage in last 2 wks. Tried to tolerate it but today it became unbearable. +dec po intake x 5 days. Denies cp/f/c/n/v/d/dysuria, denies LE edema but + orthopnea. Pt denies abd pain, hematochezia/melena/hematemesis/hematuria/bruising. Pt also endorses drinking 10 beers daily, today thus far has had 6 beers unsure exactly when last drink was. Has not gone a single day without drinking in many years. Current 2 pack/day smoker prior even heavier use. Pt states she is mostly bedbound from copd but not on home o2.  helps her with some ADLs.  Call placed to  [facility] for collateral.  He confirms above details including etoh hx. No hx of withdrawal, no seizures, no intubations.     VS: 99.2, 96, 127/67, 19, 96% 3LNC. Labs: no leukocytosis, mild thrombocytopenia, chronic macrocytosis, coags wnl, cmp with hypoNa 126 repeat 130, transaminitis with normal alkp. HST 18, probnp 47. Lactate 3.6 repeat 3.4. ETOH 271.  CXR prelim clear lungs. CTA chest no PE, +severe emphysema. In ER pt received polytrim opthalmic for suspected bacterial conjunctivitis, 2L IVF, duonebs, librium 50 x 1, ativan 1 x 1, and solumedrol 125 x 1 prior to medicine team involvement. (17 Mar 2019 20:33)    pt seen in ED. dyspnea stable, occ cough, no chest pain, no n/v/abd pain, no leg pain, no bleeding; pt awake and oriented but minimally answering questions. ROS limited.       PAST MEDICAL & SURGICAL HISTORY:  Prophylactic measure  Breast cancer  Brain aneurysm: with clips  Psoriasis  RA (rheumatoid arthritis)  Psoriasis  Breast cancer, stage 3  History of modified radical mastectomy of both breasts  S/P bilateral mastectomy  Brain aneurysm: 1988 two clips      SOCIAL HISTORY:  Smoking - +   Alcohol - +  Drugs - No drug use  lives with     FAMILY HISTORY:  No pertinent family history in first degree relatives  No pertinent family history in first degree relatives      MEDICATIONS  (STANDING):  ALBUTerol/ipratropium for Nebulization 3 milliLiter(s) Nebulizer every 6 hours  buDESOnide 160 MICROgram(s)/formoterol 4.5 MICROgram(s) Inhaler 2 Puff(s) Inhalation two times a day  cholecalciferol 2000 Unit(s) Oral daily  exemestane 25 milliGRAM(s) Oral daily  folic acid 1 milliGRAM(s) Oral daily  LORazepam     Tablet   Oral   LORazepam     Tablet 4 milliGRAM(s) Oral every 4 hours  LORazepam     Tablet 3 milliGRAM(s) Oral every 4 hours  methylPREDNISolone sodium succinate Injectable 20 milliGRAM(s) IV Push every 8 hours  montelukast 10 milliGRAM(s) Oral daily  multivitamin 1 Tablet(s) Oral daily  nicotine - 21 mG/24Hr(s) Patch 1 patch Transdermal daily  prochlorperazine   Tablet 10 milliGRAM(s) Oral daily  thiamine IVPB 250 milliGRAM(s) IV Intermittent daily  trimethoprim/polymyxin Solution 1 Drop(s) Both EYES every 6 hours    MEDICATIONS  (PRN):  LORazepam   Injectable 2 milliGRAM(s) IV Push every 1 hour PRN Symptom-triggered: each CIWA -Ar score 8 or GREATER      Allergies    amoxicillin (Anaphylaxis)  Levaquin (Other; Anaphylaxis)  Levaquin (Unknown)  penicillin (Anaphylaxis)  penicillins (Anaphylaxis)    Intolerances        Vital Signs Last 24 Hrs  T(C): 37.2 (18 Mar 2019 12:06), Max: 37.4 (17 Mar 2019 22:50)  T(F): 99 (18 Mar 2019 12:06), Max: 99.3 (17 Mar 2019 22:50)  HR: 99 (18 Mar 2019 12:06) (87 - 101)  BP: 131/79 (18 Mar 2019 12:06) (117/71 - 147/83)  BP(mean): --  RR: 19 (18 Mar 2019 12:06) (18 - 23)  SpO2: 93% (18 Mar 2019 12:06) (93% - 97%)    PHYSICAL EXAM  General: adult in NAD, poorly kept  HEENT: clear oropharynx, anicteric sclera, pink conjunctiva  Neck: supple  CV: normal S1/S2   Lungs: decreased BS, +wheeze  Abdomen: soft, +distended, nontender, positive bowel sounds  Ext: no clubbing cyanosis or edema  Skin: + plaques UE and LE  Lymph Nodes: No LAD in axillae, groin, neck  Neuro: alert and oriented X  3    LABS:                          14.4   6.86  )-----------( 94       ( 18 Mar 2019 08:30 )             42.5         Mean Cell Volume : 111.0 fl  Mean Cell Hemoglobin : 37.6 pg  Mean Cell Hemoglobin Concentration : 33.9 gm/dL  Auto Neutrophil # : 5.97 K/uL  Auto Lymphocyte # : 0.27 K/uL  Auto Monocyte # : 0.62 K/uL  Auto Eosinophil # : 0.00 K/uL  Auto Basophil # : 0.00 K/uL  Auto Neutrophil % : 87.0 %  Auto Lymphocyte % : 4.0 %  Auto Monocyte % : 9.0 %  Auto Eosinophil % : 0.0 %  Auto Basophil % : 0.0 %      Serial CBC's  03-18 @ 08:30  Hct-42.5 / Hgb-14.4 / Plat-94 / RBC-3.83 / WBC-6.86  Serial CBC's  03-17 @ 14:56  Hct-47.9 / Hgb-16.4 / Plat-105 / RBC-4.27 / WBC-5.1      03-18    131<L>  |  92<L>  |  7   ----------------------------<  106<H>  4.8   |  17<L>  |  0.55    Ca    8.2<L>      18 Mar 2019 06:27  Phos  3.8     03-18  Mg     1.6     03-18    TPro  6.7  /  Alb  3.5  /  TBili  1.1  /  DBili  0.6<H>  /  AST  261<H>  /  ALT  191<H>  /  AlkPhos  95  03-18      PT/INR - ( 17 Mar 2019 14:56 )   PT: 10.1 sec;   INR: 0.89 ratio         PTT - ( 17 Mar 2019 14:56 )  PTT:29.6 sec        Radiology:    < from: CT Angio Chest w/ IV Cont (03.17.19 @ 16:25) >  IMPRESSION:     No pulmonary embolism.    Severe centrilobular emphysema.

## 2019-03-18 NOTE — CONSULT NOTE ADULT - SUBJECTIVE AND OBJECTIVE BOX
HISTORY OF PRESENT ILLNESS:  53 yo female with h/o COPD, Breast CA, BRCA +, psoriasis.  Admitted for COPD exac.    Has been treated with Otezla by Dr. Dowling; ran out recently.  No current joint pains; active psoriatic rash    PAST MEDICAL & SURGICAL HISTORY:  Prophylactic measure  Breast cancer  Brain aneurysm: with clips  Psoriasis  RA (rheumatoid arthritis)  Psoriasis  Breast cancer, stage 3  History of modified radical mastectomy of both breasts  S/P bilateral mastectomy  Brain aneurysm: 1988 two clips        MEDICATIONS  (STANDING):  ALBUTerol/ipratropium for Nebulization 3 milliLiter(s) Nebulizer every 6 hours  buDESOnide 160 MICROgram(s)/formoterol 4.5 MICROgram(s) Inhaler 2 Puff(s) Inhalation two times a day  cholecalciferol 2000 Unit(s) Oral daily  exemestane 25 milliGRAM(s) Oral daily  folic acid 1 milliGRAM(s) Oral daily  LORazepam     Tablet   Oral   LORazepam     Tablet 3 milliGRAM(s) Oral every 4 hours  methylPREDNISolone sodium succinate Injectable 20 milliGRAM(s) IV Push every 8 hours  montelukast 10 milliGRAM(s) Oral daily  multivitamin 1 Tablet(s) Oral daily  nicotine - 21 mG/24Hr(s) Patch 1 patch Transdermal daily  prochlorperazine   Tablet 10 milliGRAM(s) Oral daily  thiamine IVPB 250 milliGRAM(s) IV Intermittent daily  trimethoprim/polymyxin Solution 1 Drop(s) Both EYES every 6 hours    MEDICATIONS  (PRN):  LORazepam   Injectable 2 milliGRAM(s) IV Push every 1 hour PRN Symptom-triggered: each CIWA -Ar score 8 or GREATER      Allergies    amoxicillin (Anaphylaxis)  Levaquin (Other; Anaphylaxis)  Levaquin (Unknown)  penicillin (Anaphylaxis)  penicillins (Anaphylaxis)    Intolerances        PERTINENT MEDICATION HISTORY:    SOCIAL HISTORY:    FAMILY HISTORY:  No pertinent family history in first degree relatives  No pertinent family history in first degree relatives      Vital Signs Last 24 Hrs  T(C): 36.6 (18 Mar 2019 19:47), Max: 37.6 (18 Mar 2019 15:30)  T(F): 97.9 (18 Mar 2019 19:47), Max: 99.7 (18 Mar 2019 15:30)  HR: 99 (18 Mar 2019 19:47) (94 - 101)  BP: 122/75 (18 Mar 2019 19:47) (117/71 - 147/83)  BP(mean): --  RR: 18 (18 Mar 2019 19:47) (18 - 22)  SpO2: 95% (18 Mar 2019 19:47) (93% - 97%)    Daily     Daily     PHYSICAL EXAM:      Constitutional:  Fair appearing; seen in hallway bed    Eyes:  EOMI    ENMT:    Neck:  supple    Back:  non painful    Respiratory:  on o2 via NC    Gastrointestinal:  abd soft nt, nd, no masses    Extremities:      Neurological:    Skin:  +scaly lesions on face, feet, legs, arms    Lymph Nodes:    Musculoskeletal:  No active synovitis    Psychiatric:  alert, awake, responds to questions, though sometimes goes off topic      LABS:                        14.4   6.86  )-----------( 94       ( 18 Mar 2019 08:30 )             42.5     03-18    131<L>  |  92<L>  |  7   ----------------------------<  106<H>  4.8   |  17<L>  |  0.55    Ca    8.2<L>      18 Mar 2019 06:27  Phos  3.8     03-18  Mg     1.6     03-18    TPro  6.7  /  Alb  3.5  /  TBili  1.1  /  DBili  0.6<H>  /  AST  261<H>  /  ALT  191<H>  /  AlkPhos  95  03-18    PT/INR - ( 17 Mar 2019 14:56 )   PT: 10.1 sec;   INR: 0.89 ratio         PTT - ( 17 Mar 2019 14:56 )  PTT:29.6 sec      RADIOLOGY & ADDITIONAL STUDIES:

## 2019-03-18 NOTE — CONSULT NOTE ADULT - ASSESSMENT
54 F PMH stage 3 Bilateral Breast CA on Aromasin, RA, Psoriasis previously on Otezla, remote Brain Aneurysm s/p Clip in 1988,  Rt Jugular DVT, Catheter related MSSA Bacteremia from [place] in July 2015, ETOH abuse, COPD, p/w dypsnea, ETOH intoxication    #Breast cancer- bilateral breast cancer, ER+/NE+/Her2 equivocal on Left; ER+/NE+/Her - on right; s/p taxotere/carbo/perjeta/herceptin x 3 cycles, s/p bilateral mastectomies 9/2015  - last PET scan 11/2017 negative  - s/p Herceptin, completed 3/2017; on arimidex, with side effects, changed to aromasin 1/2016- continue  - Ca 27.29 slightly increased as outpatient- for PET scan as outpatient  - BRCA +- aware needs oopherectomy    #Thrombocytopenia- likely secondary to ETOH; hep C negative  - coags nl, no evidence of DIC  - monitor    #Psoriasis- previous on Otezla    # COPD exacerbation- on steroids/nebs    #bacterial conjunctivitis- on eye drops    #ETOH abuse/intoxication/hepatitis- on withdrawal protocol; LFTs increased; on folic acid/thiamine        d/w  bedside

## 2019-03-19 DIAGNOSIS — F17.200 NICOTINE DEPENDENCE, UNSPECIFIED, UNCOMPLICATED: ICD-10-CM

## 2019-03-19 DIAGNOSIS — F10.10 ALCOHOL ABUSE, UNCOMPLICATED: ICD-10-CM

## 2019-03-19 DIAGNOSIS — J44.1 CHRONIC OBSTRUCTIVE PULMONARY DISEASE WITH (ACUTE) EXACERBATION: ICD-10-CM

## 2019-03-19 LAB
ALBUMIN SERPL ELPH-MCNC: 3.7 G/DL — SIGNIFICANT CHANGE UP (ref 3.3–5)
ALP SERPL-CCNC: 88 U/L — SIGNIFICANT CHANGE UP (ref 40–120)
ALT FLD-CCNC: 159 U/L — HIGH (ref 10–45)
ANION GAP SERPL CALC-SCNC: 20 MMOL/L — HIGH (ref 5–17)
AST SERPL-CCNC: 183 U/L — HIGH (ref 10–40)
BILIRUB SERPL-MCNC: 1.6 MG/DL — HIGH (ref 0.2–1.2)
BUN SERPL-MCNC: 14 MG/DL — SIGNIFICANT CHANGE UP (ref 7–23)
CALCIUM SERPL-MCNC: 9 MG/DL — SIGNIFICANT CHANGE UP (ref 8.4–10.5)
CHLORIDE SERPL-SCNC: 90 MMOL/L — LOW (ref 96–108)
CO2 SERPL-SCNC: 21 MMOL/L — LOW (ref 22–31)
CREAT SERPL-MCNC: 0.52 MG/DL — SIGNIFICANT CHANGE UP (ref 0.5–1.3)
GLUCOSE SERPL-MCNC: 158 MG/DL — HIGH (ref 70–99)
HCT VFR BLD CALC: 43.4 % — SIGNIFICANT CHANGE UP (ref 34.5–45)
HGB BLD-MCNC: 14.5 G/DL — SIGNIFICANT CHANGE UP (ref 11.5–15.5)
MAGNESIUM SERPL-MCNC: 1.8 MG/DL — SIGNIFICANT CHANGE UP (ref 1.6–2.6)
MCHC RBC-ENTMCNC: 33.4 GM/DL — SIGNIFICANT CHANGE UP (ref 32–36)
MCHC RBC-ENTMCNC: 37.4 PG — HIGH (ref 27–34)
MCV RBC AUTO: 111.9 FL — HIGH (ref 80–100)
PHOSPHATE SERPL-MCNC: 2.5 MG/DL — SIGNIFICANT CHANGE UP (ref 2.5–4.5)
PLATELET # BLD AUTO: 102 K/UL — LOW (ref 150–400)
POTASSIUM SERPL-MCNC: 4.4 MMOL/L — SIGNIFICANT CHANGE UP (ref 3.5–5.3)
POTASSIUM SERPL-SCNC: 4.4 MMOL/L — SIGNIFICANT CHANGE UP (ref 3.5–5.3)
PROT SERPL-MCNC: 6.8 G/DL — SIGNIFICANT CHANGE UP (ref 6–8.3)
RBC # BLD: 3.88 M/UL — SIGNIFICANT CHANGE UP (ref 3.8–5.2)
RBC # FLD: 13.2 % — SIGNIFICANT CHANGE UP (ref 10.3–14.5)
SODIUM SERPL-SCNC: 131 MMOL/L — LOW (ref 135–145)
WBC # BLD: 7.26 K/UL — SIGNIFICANT CHANGE UP (ref 3.8–10.5)
WBC # FLD AUTO: 7.26 K/UL — SIGNIFICANT CHANGE UP (ref 3.8–10.5)

## 2019-03-19 RX ORDER — AZITHROMYCIN 500 MG/1
500 TABLET, FILM COATED ORAL EVERY 24 HOURS
Qty: 0 | Refills: 0 | Status: COMPLETED | OUTPATIENT
Start: 2019-03-20 | End: 2019-03-22

## 2019-03-19 RX ORDER — AZITHROMYCIN 500 MG/1
500 TABLET, FILM COATED ORAL ONCE
Qty: 0 | Refills: 0 | Status: COMPLETED | OUTPATIENT
Start: 2019-03-19 | End: 2019-03-19

## 2019-03-19 RX ORDER — AZITHROMYCIN 500 MG/1
TABLET, FILM COATED ORAL
Qty: 0 | Refills: 0 | Status: COMPLETED | OUTPATIENT
Start: 2019-03-19 | End: 2019-03-23

## 2019-03-19 RX ORDER — HEPARIN SODIUM 5000 [USP'U]/ML
5000 INJECTION INTRAVENOUS; SUBCUTANEOUS EVERY 12 HOURS
Qty: 0 | Refills: 0 | Status: DISCONTINUED | OUTPATIENT
Start: 2019-03-19 | End: 2019-03-29

## 2019-03-19 RX ADMIN — Medication 3 MILLIGRAM(S): at 06:32

## 2019-03-19 RX ADMIN — AZITHROMYCIN 250 MILLIGRAM(S): 500 TABLET, FILM COATED ORAL at 15:45

## 2019-03-19 RX ADMIN — Medication 1 DROP(S): at 12:34

## 2019-03-19 RX ADMIN — Medication 3 MILLILITER(S): at 12:33

## 2019-03-19 RX ADMIN — Medication 1 PATCH: at 12:39

## 2019-03-19 RX ADMIN — Medication 1 DROP(S): at 06:34

## 2019-03-19 RX ADMIN — Medication 1 DROP(S): at 18:57

## 2019-03-19 RX ADMIN — EXEMESTANE 25 MILLIGRAM(S): 25 TABLET, SUGAR COATED ORAL at 12:33

## 2019-03-19 RX ADMIN — BUDESONIDE AND FORMOTEROL FUMARATE DIHYDRATE 2 PUFF(S): 160; 4.5 AEROSOL RESPIRATORY (INHALATION) at 06:32

## 2019-03-19 RX ADMIN — Medication 1 PATCH: at 07:30

## 2019-03-19 RX ADMIN — Medication 3 MILLIGRAM(S): at 15:46

## 2019-03-19 RX ADMIN — Medication 1 DROP(S): at 00:19

## 2019-03-19 RX ADMIN — Medication 1 PATCH: at 12:33

## 2019-03-19 RX ADMIN — BUDESONIDE AND FORMOTEROL FUMARATE DIHYDRATE 2 PUFF(S): 160; 4.5 AEROSOL RESPIRATORY (INHALATION) at 18:56

## 2019-03-19 RX ADMIN — Medication 3 MILLIGRAM(S): at 19:00

## 2019-03-19 RX ADMIN — MONTELUKAST 10 MILLIGRAM(S): 4 TABLET, CHEWABLE ORAL at 12:34

## 2019-03-19 RX ADMIN — Medication 2000 UNIT(S): at 12:33

## 2019-03-19 RX ADMIN — Medication 102.5 MILLIGRAM(S): at 15:45

## 2019-03-19 RX ADMIN — Medication 20 MILLIGRAM(S): at 22:15

## 2019-03-19 RX ADMIN — Medication 10 MILLIGRAM(S): at 12:34

## 2019-03-19 RX ADMIN — Medication 3 MILLILITER(S): at 06:32

## 2019-03-19 RX ADMIN — Medication 1 MILLIGRAM(S): at 12:34

## 2019-03-19 RX ADMIN — Medication 20 MILLIGRAM(S): at 15:46

## 2019-03-19 RX ADMIN — Medication 20 MILLIGRAM(S): at 06:33

## 2019-03-19 RX ADMIN — Medication 3 MILLIGRAM(S): at 02:08

## 2019-03-19 RX ADMIN — Medication 1 TABLET(S): at 12:33

## 2019-03-19 RX ADMIN — Medication 2 MILLIGRAM(S): at 22:16

## 2019-03-19 RX ADMIN — Medication 1 PATCH: at 19:15

## 2019-03-19 RX ADMIN — Medication 3 MILLILITER(S): at 18:56

## 2019-03-19 RX ADMIN — Medication 3 MILLIGRAM(S): at 10:09

## 2019-03-19 RX ADMIN — HEPARIN SODIUM 5000 UNIT(S): 5000 INJECTION INTRAVENOUS; SUBCUTANEOUS at 18:56

## 2019-03-19 NOTE — PHYSICAL THERAPY INITIAL EVALUATION ADULT - IMPAIRMENTS CONTRIBUTING IMPAIRED BED MOBILITY, REHAB EVAL
impaired postural control/cognition/decreased strength/de creased  endurance , sat x  7 min retropulsed mod-min of1 mostly mod ;work on wt shift and balance feet support on floor pt using ue's to support w/ vc 's/decreased flexibility/impaired balance

## 2019-03-19 NOTE — CONSULT NOTE ADULT - NSICDXPROBLEM_GEN_ALL_CORE_FT
PROBLEM DIAGNOSES  Problem: COPD exacerbation  Recommendation: -Keep sp02>88% on supplemental oxygen   -Azithromycin 500mg qd x3 days   -c/w Solumedrol 20mg q8, still bronchospastic   -Duoneb q6  -Symbicort BID   -Check VBG in AM  -No evidence of CO2 retention at this time     Problem: ETOH abuse  Recommendation: Ativan taper for ETOH withdrawl     Problem: Current smoker  Recommendation: Current 2ppd smoker, previously was heavier user  -Nicotine patch 21mg

## 2019-03-19 NOTE — PROGRESS NOTE ADULT - SUBJECTIVE AND OBJECTIVE BOX
Podiatry pager #: [phone]/ [phone]    Patient is a 54y old  Female who presents with a chief complaint of copd exacerbation, etoh intoxication (19 Mar 2019 14:06) podiatry consulted for thickened painful toenails      HPI:  54 F PMH stage 3 Bilateral Breast CA on Aromasin, RA, Psoriasis previously on Otezla, remote Brain Aneurysm s/p Clip in 1988,  Rt Jugular DVT, Catheter related MSSA Bacteremia from [place] in July 2015, ETOH abuse, COPD, p/w dypsnea. Acute on chronic dyspnea, progressive x 2 wks. +cough +inc white sputum, +wheezing +alvarez +sob at rest. +inc inhaler usage in last 2 wks. Tried to tolerate it but today it became unbearable. +dec po intake x 5 days. Denies cp/f/c/n/v/d/dysuria, denies LE edema but + orthopnea. Pt denies abd pain, hematochezia/melena/hematemesis/hematuria/bruising. Pt also endorses drinking 10 beers daily, today thus far has had 6 beers unsure exactly when last drink was. Has not gone a single day without drinking in many years. Current 2 pack/day smoker prior even heavier use. Pt states she is mostly bedbound from copd but not on home o2.  helps her with some ADLs.  Call placed to  [facility] for collateral.  He confirms above details including etoh hx. No hx of withdrawal, no seizures, no intubations.     VS: 99.2, 96, 127/67, 19, 96% 3LNC. Labs: no leukocytosis, mild thrombocytopenia, chronic macrocytosis, coags wnl, cmp with hypoNa 126 repeat 130, transaminitis with normal alkp. HST 18, probnp 47. Lactate 3.6 repeat 3.4. ETOH 271.  CXR prelim clear lungs. CTA chest no PE, +severe emphysema. In ER pt received polytrim opthalmic for suspected bacterial conjunctivitis, 2L IVF, duonebs, librium 50 x 1, ativan 1 x 1, and solumedrol 125 x 1 prior to medicine team involvement. (17 Mar 2019 20:33)      PAST MEDICAL & SURGICAL HISTORY:  Prophylactic measure  Breast cancer  Brain aneurysm: with clips  Psoriasis  RA (rheumatoid arthritis)  Psoriasis  Breast cancer, stage 3  History of modified radical mastectomy of both breasts  S/P bilateral mastectomy  Brain aneurysm: 1988 two clips      MEDICATIONS  (STANDING):  ALBUTerol/ipratropium for Nebulization 3 milliLiter(s) Nebulizer every 6 hours  azithromycin  IVPB      buDESOnide 160 MICROgram(s)/formoterol 4.5 MICROgram(s) Inhaler 2 Puff(s) Inhalation two times a day  cholecalciferol 2000 Unit(s) Oral daily  exemestane 25 milliGRAM(s) Oral daily  folic acid 1 milliGRAM(s) Oral daily  heparin  Injectable 5000 Unit(s) SubCutaneous every 12 hours  LORazepam     Tablet   Oral   LORazepam     Tablet 3 milliGRAM(s) Oral every 4 hours  LORazepam     Tablet 2 milliGRAM(s) Oral every 4 hours  methylPREDNISolone sodium succinate Injectable 20 milliGRAM(s) IV Push every 8 hours  montelukast 10 milliGRAM(s) Oral daily  multivitamin 1 Tablet(s) Oral daily  nicotine - 21 mG/24Hr(s) Patch 1 patch Transdermal daily  prochlorperazine   Tablet 10 milliGRAM(s) Oral daily  thiamine IVPB 250 milliGRAM(s) IV Intermittent daily  trimethoprim/polymyxin Solution 1 Drop(s) Both EYES every 6 hours    MEDICATIONS  (PRN):  LORazepam   Injectable 2 milliGRAM(s) IV Push every 1 hour PRN Symptom-triggered: each CIWA -Ar score 8 or GREATER      Allergies    amoxicillin (Anaphylaxis)  Levaquin (Other; Anaphylaxis)  Levaquin (Unknown)  penicillin (Anaphylaxis)  penicillins (Anaphylaxis)    Intolerances        VITALS:    Vital Signs Last 24 Hrs  T(C): 37 (19 Mar 2019 13:35), Max: 37 (19 Mar 2019 13:35)  T(F): 98.6 (19 Mar 2019 13:35), Max: 98.6 (19 Mar 2019 13:35)  HR: 103 (19 Mar 2019 13:35) (87 - 103)  BP: 151/89 (19 Mar 2019 13:35) (113/69 - 156/95)  BP(mean): --  RR: 18 (19 Mar 2019 13:35) (18 - 20)  SpO2: 93% (19 Mar 2019 13:35) (93% - 98%)    LABS:                          14.5   7.26  )-----------( 102      ( 19 Mar 2019 08:46 )             43.4       03-19    131<L>  |  90<L>  |  14  ----------------------------<  158<H>  4.4   |  21<L>  |  0.52    Ca    9.0      19 Mar 2019 05:55  Phos  2.5     03-19  Mg     1.8     03-19    TPro  6.8  /  Alb  3.7  /  TBili  1.6<H>  /  DBili  x   /  AST  183<H>  /  ALT  159<H>  /  AlkPhos  88  03-19      CAPILLARY BLOOD GLUCOSE              LOWER EXTREMITY PHYSICAL EXAM:    Vasular: DP/PT 1_/4, B/L, CFT <_2 seconds B/L, Temperature gradient _wnl, B/L.   Neuro: Epicritic sensation intact_ to the level of _toes, B/L.  Musculoskeletal/Ortho:  Skin: Dystrophic/brittle/hypertrophic toenails with subungal debris x10. ingrown bilateral hallux nails  Wound #1: Multiple psoriatic scales/patches bilateral lower extremity, no secondary acute inflammation  No ulcerations or clinical signs of cellulitis    RADIOLOGY & ADDITIONAL STUDIES:

## 2019-03-19 NOTE — PROGRESS NOTE ADULT - SUBJECTIVE AND OBJECTIVE BOX
CHIEF COMPLAINT:Patient is a 54y old  Female who presents with a chief complaint of copd exacerbation, etoh intoxication (18 Mar 2019 20:01)    	        PAST MEDICAL & SURGICAL HISTORY:  Prophylactic measure  Breast cancer  Brain aneurysm: with clips  Psoriasis  RA (rheumatoid arthritis)  Psoriasis  Breast cancer, stage 3  History of modified radical mastectomy of both breasts  S/P bilateral mastectomy  Brain aneurysm: 1988 two clips          REVIEW OF SYSTEMS:  CONSTITUTIONAL: agitated at times  EYES: No eye pain, visual disturbances, or discharge  NECK: No pain or stiffness  RESPIRATORY: some cough / sob    CARDIOVASCULAR: No chest pain, palpitations, passing out, dizziness,   GASTROINTESTINAL: No abdominal or epigastric pain. No nausea, vomiting, or hematemesis; No diarrhea or constipation. No melena or hematochezia.  GENITOURINARY: No dysuria, frequency, hematuria, or incontinence  NEUROLOGICAL: No headaches      Medications:  MEDICATIONS  (STANDING):  ALBUTerol/ipratropium for Nebulization 3 milliLiter(s) Nebulizer every 6 hours  buDESOnide 160 MICROgram(s)/formoterol 4.5 MICROgram(s) Inhaler 2 Puff(s) Inhalation two times a day  cholecalciferol 2000 Unit(s) Oral daily  exemestane 25 milliGRAM(s) Oral daily  folic acid 1 milliGRAM(s) Oral daily  LORazepam     Tablet   Oral   LORazepam     Tablet 3 milliGRAM(s) Oral every 4 hours  LORazepam     Tablet 2 milliGRAM(s) Oral every 4 hours  methylPREDNISolone sodium succinate Injectable 20 milliGRAM(s) IV Push every 8 hours  montelukast 10 milliGRAM(s) Oral daily  multivitamin 1 Tablet(s) Oral daily  nicotine - 21 mG/24Hr(s) Patch 1 patch Transdermal daily  prochlorperazine   Tablet 10 milliGRAM(s) Oral daily  thiamine IVPB 250 milliGRAM(s) IV Intermittent daily  trimethoprim/polymyxin Solution 1 Drop(s) Both EYES every 6 hours    MEDICATIONS  (PRN):  LORazepam   Injectable 2 milliGRAM(s) IV Push every 1 hour PRN Symptom-triggered: each CIWA -Ar score 8 or GREATER    	    PHYSICAL EXAM:  T(C): 36.4 (03-19-19 @ 05:30), Max: 37.6 (03-18-19 @ 15:30)  HR: 99 (03-19-19 @ 05:30) (87 - 99)  BP: 153/85 (03-19-19 @ 05:30) (113/69 - 153/85)  RR: 20 (03-19-19 @ 05:30) (18 - 20)  SpO2: 96% (03-19-19 @ 05:30) (93% - 98%)  Wt(kg): --  I&O's Summary    18 Mar 2019 07:01  -  19 Mar 2019 07:00  --------------------------------------------------------  IN: 175 mL / OUT: 0 mL / NET: 175 mL        Appearance: Normal	  HEENT:   Normal oral mucosa, PERRL, EOMI	  Lymphatic: No lymphadenopathy  Cardiovascular: Normal S1 S2, No JVD,   Respiratory: few ronchi   Gastrointestinal:  Soft, Non-tender, + BS	  Skin: psoriatic lesions noted   Neurologic: Non-focal  Extremities: Normal range of motion, No clubbing, cyanosis   edema   Vascular: Peripheral pulses palpable   pvd     TELEMETRY: 	    ECG:  	  RADIOLOGY:  OTHER: 	  	  LABS:	 	    CARDIAC MARKERS:                                14.4   6.86  )-----------( 94       ( 18 Mar 2019 08:30 )             42.5     03-19    131<L>  |  90<L>  |  14  ----------------------------<  158<H>  4.4   |  21<L>  |  0.52    Ca    9.0      19 Mar 2019 05:55  Phos  2.5     03-19  Mg     1.8     03-19    TPro  6.8  /  Alb  3.7  /  TBili  1.6<H>  /  DBili  x   /  AST  183<H>  /  ALT  159<H>  /  AlkPhos  88  03-19    proBNP:   Lipid Profile:   HgA1c:   TSH:

## 2019-03-19 NOTE — PROGRESS NOTE ADULT - SUBJECTIVE AND OBJECTIVE BOX
Chief Complaint: fu    History of Present Illness: "not doing well", no specific complaints- poor historian,  no f/c, no cp, stable dyspnea, stable cough, no n/v/abd pain, no back pain, no bleeding, poor appetite      MEDICATIONS  (STANDING):  ALBUTerol/ipratropium for Nebulization 3 milliLiter(s) Nebulizer every 6 hours  azithromycin  IVPB 500 milliGRAM(s) IV Intermittent once  azithromycin  IVPB      buDESOnide 160 MICROgram(s)/formoterol 4.5 MICROgram(s) Inhaler 2 Puff(s) Inhalation two times a day  cholecalciferol 2000 Unit(s) Oral daily  exemestane 25 milliGRAM(s) Oral daily  folic acid 1 milliGRAM(s) Oral daily  heparin  Injectable 5000 Unit(s) SubCutaneous every 12 hours  LORazepam     Tablet   Oral   LORazepam     Tablet 3 milliGRAM(s) Oral every 4 hours  LORazepam     Tablet 2 milliGRAM(s) Oral every 4 hours  methylPREDNISolone sodium succinate Injectable 20 milliGRAM(s) IV Push every 8 hours  montelukast 10 milliGRAM(s) Oral daily  multivitamin 1 Tablet(s) Oral daily  nicotine - 21 mG/24Hr(s) Patch 1 patch Transdermal daily  prochlorperazine   Tablet 10 milliGRAM(s) Oral daily  thiamine IVPB 250 milliGRAM(s) IV Intermittent daily  trimethoprim/polymyxin Solution 1 Drop(s) Both EYES every 6 hours    MEDICATIONS  (PRN):  LORazepam   Injectable 2 milliGRAM(s) IV Push every 1 hour PRN Symptom-triggered: each CIWA -Ar score 8 or GREATER      Allergies    amoxicillin (Anaphylaxis)  Levaquin (Other; Anaphylaxis)  Levaquin (Unknown)  penicillin (Anaphylaxis)  penicillins (Anaphylaxis)    Intolerances        Vital Signs Last 24 Hrs  T(C): 36.6 (19 Mar 2019 09:25), Max: 37.6 (18 Mar 2019 15:30)  T(F): 97.9 (19 Mar 2019 09:25), Max: 99.7 (18 Mar 2019 15:30)  HR: 90 (19 Mar 2019 10:18) (87 - 99)  BP: 156/95 (19 Mar 2019 10:18) (113/69 - 156/95)  BP(mean): --  RR: 20 (19 Mar 2019 10:18) (18 - 20)  SpO2: 93% (19 Mar 2019 10:18) (93% - 98%)    PHYSICAL EXAM  General: adult in NAD, poorly kept  HEENT: clear oropharynx, anicteric sclera, conjunctiva injected  Neck: supple  CV: normal S1/S2   Lungs: decreased BS  Abdomen: soft non-tender non-distended, positive bowel sounds  Ext: sl edema  Skin: psoriatic patches  Lymph Nodes: No LAD in axillae, groin, neck  Neuro: alert and oriented X 3    LABS:                          14.5   7.26  )-----------( 102      ( 19 Mar 2019 08:46 )             43.4         Mean Cell Volume : 111.9 fl  Mean Cell Hemoglobin : 37.4 pg  Mean Cell Hemoglobin Concentration : 33.4 gm/dL  Auto Neutrophil # : x  Auto Lymphocyte # : x  Auto Monocyte # : x  Auto Eosinophil # : x  Auto Basophil # : x  Auto Neutrophil % : x  Auto Lymphocyte % : x  Auto Monocyte % : x  Auto Eosinophil % : x  Auto Basophil % : x      Serial CBC's  03-19 @ 08:46  Hct-43.4 / Hgb-14.5 / Plat-102 / RBC-3.88 / WBC-7.26  Serial CBC's  03-18 @ 08:30  Hct-42.5 / Hgb-14.4 / Plat-94 / RBC-3.83 / WBC-6.86  Serial CBC's  03-17 @ 14:56  Hct-47.9 / Hgb-16.4 / Plat-105 / RBC-4.27 / WBC-5.1      03-19    131<L>  |  90<L>  |  14  ----------------------------<  158<H>  4.4   |  21<L>  |  0.52    Ca    9.0      19 Mar 2019 05:55  Phos  2.5     03-19  Mg     1.8     03-19    TPro  6.8  /  Alb  3.7  /  TBili  1.6<H>  /  DBili  x   /  AST  183<H>  /  ALT  159<H>  /  AlkPhos  88  03-19      PT/INR - ( 17 Mar 2019 14:56 )   PT: 10.1 sec;   INR: 0.89 ratio         PTT - ( 17 Mar 2019 14:56 )  PTT:29.6 sec                Radiology:

## 2019-03-19 NOTE — PROGRESS NOTE ADULT - ASSESSMENT
Assessment/Plan;    --Onychomycosis/psoriasis bilateral lower extremity    --Aseptic debridemont of mycotic/psoriatic nails x10 with betadine applied  --Continue with psoriatic current management  --recommend continued podiatric outpatient routine foot care  --thank you for consult

## 2019-03-19 NOTE — PROGRESS NOTE ADULT - ASSESSMENT
54 F PMH stage 3 Bilateral Breast CA on Aromasin, RA, Psoriasis previously on Otezla, remote Brain Aneurysm s/p Clip in 1988,  Rt Jugular DVT, Catheter related MSSA Bacteremia from [place] in July 2015, ETOH abuse, COPD, p/w dypsnea, ETOH intoxication    #Breast cancer- bilateral breast cancer, ER+/SC+/Her2 equivocal on Left; ER+/SC+/Her - on right; s/p taxotere/carbo/perjeta/herceptin x 3 cycles, s/p bilateral mastectomies 9/2015  - last PET scan 11/2017 negative  - s/p Herceptin, completed 3/2017; on arimidex, with side effects, changed to aromasin 1/2016- continue Aromasin  - Ca 27.29 slightly increased as outpatient- for PET scan as outpatient  - BRCA +- aware needs oopherectomy    #Thrombocytopenia- likely secondary to ETOH; hep C negative  - coags nl, no evidence of DIC  - monitor, plt slightly improved    #Psoriasis- previous on Otezla, Rheum following    #COPD exacerbation- on steroids/nebs    #bacterial conjunctivitis- improving, on eye drops    #ETOH abuse/intoxication/hepatitis- on withdrawal protocol; LFTs increased; on folic acid/thiamine  - monitor LFTs

## 2019-03-19 NOTE — PHYSICAL THERAPY INITIAL EVALUATION ADULT - IMPAIRMENTS FOUND, PT EVAL
muscle strength/aerobic capacity/endurance gait, locomotion, and balance/muscle strength/on 3 L nc O2/cognitive impairment/aerobic capacity/endurance/ventilation and respiration/gas exchange

## 2019-03-19 NOTE — CONSULT NOTE ADULT - ASSESSMENT
53 y/o F with PMH of COPD-emphysema (oxygen dependent-3L NC), current smoker (100 pack yrs), stage 3 Bilateral Breast CA on Aromasin, RA, Psoriasis previously on Otezla, remote Brain Aneurysm s/p Clip in 1988,  Rt Jugular DVT, Catheter related MSSA Bacteremia from [facility] in July 2015, ETOH abuse, p/w dypsnea. Acute on chronic dyspnea, progressive x 2 wks. Admitted with COPD exacerbation. Currently on 3L NC, states dyspnea improved from admission though still appears dyspneic. Exp wheeze on exam. Yellow/brown sputum per staff. CTA chest with no evidence of PNA, no PE.

## 2019-03-19 NOTE — PHYSICAL THERAPY INITIAL EVALUATION ADULT - DISCHARGE DISPOSITION, PT EVAL
tbd ONCE FUNCTIONAL EVAL CAN BE COMPLETED rehabilitation facility/tbd ONCE FUNCTIONAL EVAL CAN BE COMPLETED; PT recommend suabcute rehab to increase functional mobility m, strength , balance , endurance , fall prevention education continued

## 2019-03-19 NOTE — PHYSICAL THERAPY INITIAL EVALUATION ADULT - IMPAIRMENTS CONTRIBUTING TO GAIT DEVIATIONS, PT EVAL
impaired balance/decreased flexibility/decreased endurance TSE on 3 L nc o2 deyanira Hogue present and observe ; pt return to bed w/ assist/impaired postural control/decreased strength

## 2019-03-19 NOTE — PROGRESS NOTE ADULT - ASSESSMENT
54 Female w/ PMH stage 3 Bilateral Breast CA on Aromasin, RA, Psoriasis previously on Otezla, remote Brain Aneurysm s/p Clip in 1988,  Rt Jugular DVT, Catheter related MSSA Bacteremia from [facility] [place] in July 2015, ETOH abuse, COPD, p/w dypsnea, f/w COPD exacerbation, alcohol intoxication with  risk for withdrawal, hypovolemic hyponatremia in setting of poor PO intake and dehydration, and b/l conjunctivitis presumed bacterial in nature.     Problem: COPD exacerbation   solumedrol IV 20 q8   symbicort bid  -duoneb atc  -c/w singulair  -pt with multiple abx allergies  pulm eval     -CTA reviewed, no PE  -cigarette cessation education provided.  -sup O2 prn to keep o2 sats 88-92%.       Problem: Bacterial conjunctivitis  -c/w drops for now q6 x 5 days    Problem: Hyponatremia  -suspect hypovolemic hyponatremia given dehydration   -c/w maintenance IVF       Problem: Alcohol intoxication  etoh abuse      standing ativan taper, ciwa protocol, prn ativan (noted etoh hepatitis on admission so will avoid librium)  -thiamine, folate, MVI  -social work  -fall precautions, aspiration precautions    : Alcoholic hepatitis  -etoh cessation education provided    : Psoriasis  rheum eval noted      Problem: Breast cancer  Assessment and Plan: c/w examestane  heme/onc eval noted     Problem: Thrombocytopenia  -monitor counts for now  stable

## 2019-03-19 NOTE — PHYSICAL THERAPY INITIAL EVALUATION ADULT - PERTINENT HX OF CURRENT PROBLEM, REHAB EVAL
54 Female w/ PMH stage 3 Bilateral Breast CA on Aromasin, RA, Psoriasis previously on Otezla, remote Brain Aneurysm s/p Clip in 1988,  Rt Jugular DVT, Catheter related MSSA Bacteremia from [facility] [place] in July 2015, ETOH abuse, COPD, p/w dypsnea, f/w COPD exacerbation, alcohol intoxication with  risk for withdrawal, hypovolemic hyponatremia in setting of poor PO intake and dehydration, and b/l conjunctivitis presumed bacterial in nature

## 2019-03-19 NOTE — PHYSICAL THERAPY INITIAL EVALUATION ADULT - ADDITIONAL COMMENTS
pt lives in house steps? , with spouse Wilbur [phone] ; spouse works 4 am to 12 pm ; smoke 2 ppd current and drinks 10 beers a day per pt (has not gone  a day in many yrs w/o a drink ) ; pt walks w/ a rolling walker short distances in home independent per pt ; spouse assist w/ bathing and dressing  has shower chair and  grab bars in shower , has w/c longer distances

## 2019-03-19 NOTE — CONSULT NOTE ADULT - SUBJECTIVE AND OBJECTIVE BOX
PULMONARY CONSULT    HPI: 53 y/o F with PMH of COPD-emphysema (oxygen dependent-3L NC), current smoker (80 pack yrs), stage 3 Bilateral Breast CA on Aromasin, RA, Psoriasis previously on Otezla, remote Brain Aneurysm s/p Clip in 1988,  Rt Jugular DVT, Catheter related MSSA Bacteremia from [facility] [place] in July 2015, ETOH abuse, p/w dypsnea. Acute on chronic dyspnea, progressive x 2 wks. Admitted with COPD exacerbation. Currently on 3L NC, states dyspnea improved from discharge. Still with exp wheeze. Yellow/brown sputum per staff. CTA chest with no evidence of PNA, no PE.       PAST MEDICAL & SURGICAL HISTORY:  Prophylactic measure  Breast cancer  Brain aneurysm: with clips  Psoriasis  RA (rheumatoid arthritis)  Psoriasis  Breast cancer, stage 3  History of modified radical mastectomy of both breasts  S/P bilateral mastectomy  Brain aneurysm: 1988 two clips    Allergies    amoxicillin (Anaphylaxis)  Levaquin (Other; Anaphylaxis)  Levaquin (Unknown)  penicillin (Anaphylaxis)  penicillins (Anaphylaxis)    Intolerances      FAMILY HISTORY:  No pertinent family history in first degree relatives  No pertinent family history in first degree relatives    Social history: Current smoker, 2ppd  80 pack yrs    Review of Systems:  CONSTITUTIONAL: No fever, chills, or fatigue  EYES: No eye pain, visual disturbances, or discharge  ENMT:  No difficulty hearing, tinnitus, vertigo; No sinus or throat pain  NECK: No pain or stiffness  RESPIRATORY: Per above  CARDIOVASCULAR: No chest pain, palpitations, dizziness, or leg swelling  GASTROINTESTINAL: No abdominal or epigastric pain. No nausea, vomiting, or hematemesis; No diarrhea or constipation. No melena or hematochezia.  GENITOURINARY: No dysuria, frequency, hematuria, or incontinence  NEUROLOGICAL: No headaches, memory loss, loss of strength, numbness, or tremors  SKIN: No itching, burning, rashes, or lesions   MUSCULOSKELETAL: No joint pain or swelling; No muscle, back, or extremity pain  PSYCHIATRIC: No depression, anxiety, mood swings, or difficulty sleeping      Medications:  MEDICATIONS  (STANDING):  ALBUTerol/ipratropium for Nebulization 3 milliLiter(s) Nebulizer every 6 hours  azithromycin  IVPB 500 milliGRAM(s) IV Intermittent once  azithromycin  IVPB      buDESOnide 160 MICROgram(s)/formoterol 4.5 MICROgram(s) Inhaler 2 Puff(s) Inhalation two times a day  cholecalciferol 2000 Unit(s) Oral daily  exemestane 25 milliGRAM(s) Oral daily  folic acid 1 milliGRAM(s) Oral daily  heparin  Injectable 5000 Unit(s) SubCutaneous every 12 hours  LORazepam     Tablet   Oral   LORazepam     Tablet 3 milliGRAM(s) Oral every 4 hours  LORazepam     Tablet 2 milliGRAM(s) Oral every 4 hours  methylPREDNISolone sodium succinate Injectable 20 milliGRAM(s) IV Push every 8 hours  montelukast 10 milliGRAM(s) Oral daily  multivitamin 1 Tablet(s) Oral daily  nicotine - 21 mG/24Hr(s) Patch 1 patch Transdermal daily  prochlorperazine   Tablet 10 milliGRAM(s) Oral daily  thiamine IVPB 250 milliGRAM(s) IV Intermittent daily  trimethoprim/polymyxin Solution 1 Drop(s) Both EYES every 6 hours    MEDICATIONS  (PRN):  LORazepam   Injectable 2 milliGRAM(s) IV Push every 1 hour PRN Symptom-triggered: each CIWA -Ar score 8 or GREATER            Vital Signs Last 24 Hrs  T(C): 36.6 (19 Mar 2019 09:25), Max: 37.6 (18 Mar 2019 15:30)  T(F): 97.9 (19 Mar 2019 09:25), Max: 99.7 (18 Mar 2019 15:30)  HR: 90 (19 Mar 2019 10:18) (87 - 99)  BP: 156/95 (19 Mar 2019 10:18) (113/69 - 156/95)  BP(mean): --  RR: 20 (19 Mar 2019 10:18) (18 - 20)  SpO2: 93% (19 Mar 2019 10:18) (93% - 98%) on 3L NC      VBG pH 7.36 03-17 @ 16:57  VBG pCO2 43 03-17 @ 16:57  VBG O2 sat 95 03-17 @ 16:57  VBG lactate 3.4 03-17 @ 16:57  VBG pH 7.40 03-17 @ 14:56  VBG pCO2 43 03-17 @ 14:56  VBG O2 sat 97 03-17 @ 14:56  VBG lactate 3.6 03-17 @ 14:56        03-18 @ 07:01  -  03-19 @ 07:00  --------------------------------------------------------  IN: 175 mL / OUT: 0 mL / NET: 175 mL          LABS:                        14.5   7.26  )-----------( 102      ( 19 Mar 2019 08:46 )             43.4     03-19    131<L>  |  90<L>  |  14  ----------------------------<  158<H>  4.4   |  21<L>  |  0.52    Ca    9.0      19 Mar 2019 05:55  Phos  2.5     03-19  Mg     1.8     03-19    TPro  6.8  /  Alb  3.7  /  TBili  1.6<H>  /  DBili  x   /  AST  183<H>  /  ALT  159<H>  /  AlkPhos  88  03-19          CAPILLARY BLOOD GLUCOSE        PT/INR - ( 17 Mar 2019 14:56 )   PT: 10.1 sec;   INR: 0.89 ratio         PTT - ( 17 Mar 2019 14:56 )  PTT:29.6 sec      Serum Pro-Brain Natriuretic Peptide: 47 pg/mL (03-17-19 @ 14:56)              CULTURES: (if applicable)  RVP: Negative         Physical Examination:    General: No acute distress.      HEENT: Pupils equal, reactive to light.  Symmetric.    PULM: Exp wheeze bilaterally     CVS: S1, S2    ABD: Soft, nondistended, nontender, normoactive bowel sounds, no masses    EXT: No edema, nontender    SKIN: Warm and well perfused, no rashes noted.    NEURO: Alert, oriented, interactive, nonfocal    RADIOLOGY REVIEWED  CTA chest: < from: CT Angio Chest w/ IV Cont (03.17.19 @ 16:25) >  FINDINGS:    LUNGS AND LARGE AIRWAYS: Patent central airways. Severe centrilobular   emphysema. The previous noted right lower lobe nodule is no longer   present.  PLEURA: No pleural effusion.  VESSELS: Good opacification of the pulmonary arterial tree. No pulmonary   embolus. Mildly dilated main pulmonary artery measuring 3.3 cm. Findings   may represent pulmonary arterial hypertension  HEART: Cardiomegaly. No pericardial effusion.  MEDIASTINUM AND CHESTER: No lymphadenopathy.  CHEST WALL AND LOWER NECK: Within normal limits.  VISUALIZED UPPER ABDOMEN: Hepatic steatosis. Small hiatal hernia.  BONES: Within normal limits.    IMPRESSION:     No pulmonary embolism.    Severe centrilobular emphysema.    < end of copied text >

## 2019-03-19 NOTE — PHYSICAL THERAPY INITIAL EVALUATION ADULT - GENERAL OBSERVATIONS, REHAB EVAL
pt received in bed pt reports feeling  SOB on 3 L nc O2 pt pulse ox 89 % then deep breaths increase to 92 % ;pt rn [place]  present and aware ; pt with patches of psoriatic arthritis diffuse all over body ; toe nails long ; + diaper

## 2019-03-20 ENCOUNTER — TRANSCRIPTION ENCOUNTER (OUTPATIENT)
Age: 54
End: 2019-03-20

## 2019-03-20 LAB
BASE EXCESS BLDV CALC-SCNC: 3.9 MMOL/L — HIGH (ref -2–2)
CO2 BLDV-SCNC: 29 MMOL/L — SIGNIFICANT CHANGE UP (ref 22–30)
HCO3 BLDV-SCNC: 28 MMOL/L — SIGNIFICANT CHANGE UP (ref 21–29)
PCO2 BLDV: 40 MMHG — SIGNIFICANT CHANGE UP (ref 35–50)
PH BLDV: 7.46 — HIGH (ref 7.35–7.45)
PO2 BLDV: 94 MMHG — HIGH (ref 25–45)
SAO2 % BLDV: 98 % — HIGH (ref 67–88)

## 2019-03-20 PROCEDURE — 99223 1ST HOSP IP/OBS HIGH 75: CPT | Mod: GC

## 2019-03-20 RX ADMIN — Medication 1 PATCH: at 12:44

## 2019-03-20 RX ADMIN — Medication 2 MILLIGRAM(S): at 02:23

## 2019-03-20 RX ADMIN — Medication 1 MILLIGRAM(S): at 12:44

## 2019-03-20 RX ADMIN — Medication 1 TABLET(S): at 12:44

## 2019-03-20 RX ADMIN — Medication 1 MILLIGRAM(S): at 22:00

## 2019-03-20 RX ADMIN — Medication 20 MILLIGRAM(S): at 22:01

## 2019-03-20 RX ADMIN — Medication 2 MILLIGRAM(S): at 18:07

## 2019-03-20 RX ADMIN — Medication 2000 UNIT(S): at 12:44

## 2019-03-20 RX ADMIN — Medication 1 DROP(S): at 17:37

## 2019-03-20 RX ADMIN — EXEMESTANE 25 MILLIGRAM(S): 25 TABLET, SUGAR COATED ORAL at 12:44

## 2019-03-20 RX ADMIN — BUDESONIDE AND FORMOTEROL FUMARATE DIHYDRATE 2 PUFF(S): 160; 4.5 AEROSOL RESPIRATORY (INHALATION) at 17:37

## 2019-03-20 RX ADMIN — Medication 3 MILLILITER(S): at 12:45

## 2019-03-20 RX ADMIN — HEPARIN SODIUM 5000 UNIT(S): 5000 INJECTION INTRAVENOUS; SUBCUTANEOUS at 06:33

## 2019-03-20 RX ADMIN — Medication 1 DROP(S): at 06:33

## 2019-03-20 RX ADMIN — Medication 1 PATCH: at 07:00

## 2019-03-20 RX ADMIN — Medication 3 MILLILITER(S): at 17:37

## 2019-03-20 RX ADMIN — HEPARIN SODIUM 5000 UNIT(S): 5000 INJECTION INTRAVENOUS; SUBCUTANEOUS at 17:37

## 2019-03-20 RX ADMIN — AZITHROMYCIN 250 MILLIGRAM(S): 500 TABLET, FILM COATED ORAL at 14:12

## 2019-03-20 RX ADMIN — Medication 2 MILLIGRAM(S): at 10:11

## 2019-03-20 RX ADMIN — Medication 1 DROP(S): at 12:44

## 2019-03-20 RX ADMIN — Medication 3 MILLILITER(S): at 00:00

## 2019-03-20 RX ADMIN — Medication 1 PATCH: at 19:15

## 2019-03-20 RX ADMIN — Medication 10 MILLIGRAM(S): at 12:43

## 2019-03-20 RX ADMIN — Medication 1 DROP(S): at 00:00

## 2019-03-20 RX ADMIN — Medication 20 MILLIGRAM(S): at 06:31

## 2019-03-20 RX ADMIN — Medication 1 DROP(S): at 23:50

## 2019-03-20 RX ADMIN — Medication 2 MILLIGRAM(S): at 14:12

## 2019-03-20 RX ADMIN — BUDESONIDE AND FORMOTEROL FUMARATE DIHYDRATE 2 PUFF(S): 160; 4.5 AEROSOL RESPIRATORY (INHALATION) at 06:30

## 2019-03-20 RX ADMIN — MONTELUKAST 10 MILLIGRAM(S): 4 TABLET, CHEWABLE ORAL at 12:44

## 2019-03-20 RX ADMIN — Medication 3 MILLILITER(S): at 23:50

## 2019-03-20 RX ADMIN — Medication 3 MILLILITER(S): at 06:29

## 2019-03-20 RX ADMIN — Medication 20 MILLIGRAM(S): at 14:12

## 2019-03-20 RX ADMIN — Medication 102.5 MILLIGRAM(S): at 12:45

## 2019-03-20 RX ADMIN — Medication 2 MILLIGRAM(S): at 06:32

## 2019-03-20 NOTE — PROGRESS NOTE ADULT - PROBLEM SELECTOR PLAN 1
-Keep sp02>88% on supplemental oxygen, attempt to titrate off  -Azithromycin 500mg qd x3 days   -c/w Solumedrol 20mg q8, likely downgrade to prednisone tomorrow  -Duoneb q6  -Symbicort BID   -No evidence of CO2 retention on VBG

## 2019-03-20 NOTE — PROGRESS NOTE ADULT - SUBJECTIVE AND OBJECTIVE BOX
CHIEF COMPLAINT:Patient is a 54y old  Female who presents with a chief complaint of copd exacerbation, etoh intoxication (19 Mar 2019 18:25)    	        PAST MEDICAL & SURGICAL HISTORY:  Prophylactic measure  Breast cancer  Brain aneurysm: with clips  Psoriasis  RA (rheumatoid arthritis)  Psoriasis  Breast cancer, stage 3  History of modified radical mastectomy of both breasts  S/P bilateral mastectomy  Brain aneurysm: 1988 two clips          REVIEW OF SYSTEMS:  CONSTITUTIONAL: feeling better   EYES: No eye pain,   NECK: No pain or stiffness  RESPIRATORY: less sob / and cough   CARDIOVASCULAR: No chest pain, palpitations, passing out, dizziness,   GASTROINTESTINAL: No abdominal or epigastric pain. No nausea, vomiting, or hematemesis; No diarrhea or constipation. No melena or hematochezia.  GENITOURINARY: No dysuria, frequency, hematuria, or incontinence  NEUROLOGICAL: No headaches,     Medications:  MEDICATIONS  (STANDING):  ALBUTerol/ipratropium for Nebulization 3 milliLiter(s) Nebulizer every 6 hours  azithromycin  IVPB 500 milliGRAM(s) IV Intermittent every 24 hours  azithromycin  IVPB      buDESOnide 160 MICROgram(s)/formoterol 4.5 MICROgram(s) Inhaler 2 Puff(s) Inhalation two times a day  cholecalciferol 2000 Unit(s) Oral daily  exemestane 25 milliGRAM(s) Oral daily  folic acid 1 milliGRAM(s) Oral daily  heparin  Injectable 5000 Unit(s) SubCutaneous every 12 hours  LORazepam     Tablet   Oral   LORazepam     Tablet 2 milliGRAM(s) Oral every 4 hours  LORazepam     Tablet 1 milliGRAM(s) Oral every 4 hours  methylPREDNISolone sodium succinate Injectable 20 milliGRAM(s) IV Push every 8 hours  montelukast 10 milliGRAM(s) Oral daily  multivitamin 1 Tablet(s) Oral daily  nicotine - 21 mG/24Hr(s) Patch 1 patch Transdermal daily  prochlorperazine   Tablet 10 milliGRAM(s) Oral daily  thiamine IVPB 250 milliGRAM(s) IV Intermittent daily  trimethoprim/polymyxin Solution 1 Drop(s) Both EYES every 6 hours    MEDICATIONS  (PRN):  LORazepam   Injectable 2 milliGRAM(s) IV Push every 1 hour PRN Symptom-triggered: each CIWA -Ar score 8 or GREATER    	    PHYSICAL EXAM:  T(C): 36.4 (03-20-19 @ 10:07), Max: 37.1 (03-20-19 @ 01:00)  HR: 91 (03-20-19 @ 10:07) (91 - 105)  BP: 138/90 (03-20-19 @ 10:07) (134/80 - 166/90)  RR: 18 (03-20-19 @ 10:07) (18 - 20)  SpO2: 96% (03-20-19 @ 10:07) (92% - 96%)  Wt(kg): --  I&O's Summary    19 Mar 2019 07:01  -  20 Mar 2019 07:00  --------------------------------------------------------  IN: 670 mL / OUT: 0 mL / NET: 670 mL        Appearance: Normal	  HEENT:   Normal oral mucosa, PERRL, EOMI	  Lymphatic: No lymphadenopathy  Cardiovascular: Normal S1 S2, No JVD, No murmurs,  Respiratory: dec bs  Psychiatry: A & O   Gastrointestinal:  Soft, Non-tender, + BS	  Skin: psoriasis extremities   Neurologic: Non-focal  Extremities: Normal range of motion, No clubbing, cyanosis or edema  Vascular: Peripheral pulses palpable    TELEMETRY: 	    ECG:  	  RADIOLOGY:  OTHER: 	  	  LABS:	 	    CARDIAC MARKERS:                                14.5   7.26  )-----------( 102      ( 19 Mar 2019 08:46 )             43.4     03-19    131<L>  |  90<L>  |  14  ----------------------------<  158<H>  4.4   |  21<L>  |  0.52    Ca    9.0      19 Mar 2019 05:55  Phos  2.5     03-19  Mg     1.8     03-19    TPro  6.8  /  Alb  3.7  /  TBili  1.6<H>  /  DBili  x   /  AST  183<H>  /  ALT  159<H>  /  AlkPhos  88  03-19    proBNP:   Lipid Profile:   HgA1c:   TSH:

## 2019-03-20 NOTE — CONSULT NOTE ADULT - SUBJECTIVE AND OBJECTIVE BOX
HPI:  55 y/o F h/o stage III breast CA, chronic psoriasis previously on Otezla as outpatient (now off for past 1.5 months), COPD, and EtOH abuse admitted for COPD exacerbation. Derm consulted for management of psoriasis while inpatient. No h/o joint disease. Previously on topicals w/o significant improvement.       PAST MEDICAL & SURGICAL HISTORY:  Prophylactic measure  Breast cancer  Brain aneurysm: with clips  Psoriasis  RA (rheumatoid arthritis)  Psoriasis  Breast cancer, stage 3  History of modified radical mastectomy of both breasts  S/P bilateral mastectomy  Brain aneurysm: 1988 two clips      REVIEW OF SYSTEMS    General: no fevers/chills, no lethargy	    Skin/Breast: see HPI  	  Ophthalmologic: no eye pain or change in vision  	  ENMT: no dysphagia or change in hearing    Respiratory and Thorax: no SOB or cough  	  Cardiovascular: no palpitations or chest pain    Gastrointestinal: no abdominal pain or blood in stool     Genitourinary: no dysuria or frequency    Musculoskeletal: no joint pains    Neurological: no weakness, numbness , or tingling    MEDICATIONS  (STANDING):  ALBUTerol/ipratropium for Nebulization 3 milliLiter(s) Nebulizer every 6 hours  azithromycin  IVPB 500 milliGRAM(s) IV Intermittent every 24 hours  azithromycin  IVPB      buDESOnide 160 MICROgram(s)/formoterol 4.5 MICROgram(s) Inhaler 2 Puff(s) Inhalation two times a day  cholecalciferol 2000 Unit(s) Oral daily  exemestane 25 milliGRAM(s) Oral daily  folic acid 1 milliGRAM(s) Oral daily  heparin  Injectable 5000 Unit(s) SubCutaneous every 12 hours  LORazepam     Tablet   Oral   LORazepam     Tablet 1 milliGRAM(s) Oral every 4 hours  methylPREDNISolone sodium succinate Injectable 20 milliGRAM(s) IV Push every 8 hours  montelukast 10 milliGRAM(s) Oral daily  multivitamin 1 Tablet(s) Oral daily  nicotine - 21 mG/24Hr(s) Patch 1 patch Transdermal daily  prochlorperazine   Tablet 10 milliGRAM(s) Oral daily  trimethoprim/polymyxin Solution 1 Drop(s) Both EYES every 6 hours    MEDICATIONS  (PRN):  LORazepam   Injectable 2 milliGRAM(s) IV Push every 1 hour PRN Symptom-triggered: each CIWA -Ar score 8 or GREATER      Allergies    amoxicillin (Anaphylaxis)  Levaquin (Other; Anaphylaxis)  Levaquin (Unknown)  penicillin (Anaphylaxis)  penicillins (Anaphylaxis)    Intolerances        SOCIAL HISTORY:  endorses drinking 10 beers daily, today thus far has had 6 beers unsure exactly when last drink was. Has not gone a single day without drinking in many years. Current 2 pack/day smoker prior even heavier use.    FAMILY HISTORY:  No pertinent family history in first degree relatives  No pertinent family history in first degree relatives      Vital Signs Last 24 Hrs  T(C): 36.7 (20 Mar 2019 18:01), Max: 37.1 (20 Mar 2019 01:00)  T(F): 98 (20 Mar 2019 18:01), Max: 98.8 (20 Mar 2019 01:00)  HR: 93 (20 Mar 2019 18:01) (90 - 105)  BP: 134/87 (20 Mar 2019 18:01) (121/75 - 166/90)  BP(mean): --  RR: 18 (20 Mar 2019 18:01) (18 - 20)  SpO2: 96% (20 Mar 2019 18:01) (92% - 96%)    PHYSICAL EXAM:     The patient was alert and oriented X 3, well nourished, and in no  apparent distress.  OP showed no ulcerations  There was no visible lymphadenopathy.  Conjunctiva were non injected  There was no clubbing or edema of extremities.  The scalp, hair, face, eyebrows, lips, OP, neck, chest, back,   extremities X 4, nails were examined.  There was no hyperhidrosis or bromhidrosis.    Of note on skin exam:   - scattered erythematous plaques w/ fine scale: scalp, forehead, extensor extremities (~5-10% BSA)    LABS:                        14.5   7.26  )-----------( 102      ( 19 Mar 2019 08:46 )             43.4     03-19    131<L>  |  90<L>  |  14  ----------------------------<  158<H>  4.4   |  21<L>  |  0.52    Ca    9.0      19 Mar 2019 05:55  Phos  2.5     03-19  Mg     1.8     03-19    TPro  6.8  /  Alb  3.7  /  TBili  1.6<H>  /  DBili  x   /  AST  183<H>  /  ALT  159<H>  /  AlkPhos  88  03-19          RADIOLOGY & ADDITIONAL STUDIES:

## 2019-03-20 NOTE — DISCHARGE NOTE PROVIDER - NSDCQMCOGNITION_NEU_ALL_CORE
Difficulty concentrating/Difficulty making decisions Difficulty making decisions/No difficulties/Difficulty concentrating

## 2019-03-20 NOTE — DISCHARGE NOTE PROVIDER - CARE PROVIDERS DIRECT ADDRESSES
,DirectAddress_Unknown,DirectAddress_Unknown ,DirectAddress_Unknown,DirectAddress_Unknown,DirectAddress_Unknown ,DirectAddress_Unknown,DirectAddress_Unknown,[email or web address]

## 2019-03-20 NOTE — PROGRESS NOTE ADULT - ASSESSMENT
54 Female w/ PMH stage 3 Bilateral Breast CA on Aromasin, RA, Psoriasis previously on Otezla, remote Brain Aneurysm s/p Clip in 1988,  Rt Jugular DVT, Catheter related MSSA Bacteremia from [place] in July 2015, ETOH abuse, COPD, p/w dypsnea, f/w COPD exacerbation, alcohol intoxication with  risk for withdrawal, hypovolemic hyponatremia in setting of poor PO intake and dehydration, and b/l conjunctivitis presumed bacterial in nature.     Problem: COPD exacerbation   solumedrol as per pulm    symbicort   -duoneb atc  -c/w singulair  -pt with multiple abx allergies  pulm eval noted    -CTA reviewed, no PE  -cigarette cessation education provided.  -sup O2 prn to keep o2 sats 88-92%.       Problem: Bacterial conjunctivitis  -c/w drops for now q6    Problem: Hyponatremia  -suspect hypovolemic hyponatremia given dehydration   -c/w maintenance IVF       Problem: Alcohol intoxication  etoh abuse       ativan taper, ciwa protocol, prn ativan (noted etoh hepatitis on admission so will avoid librium)  mental status better   -thiamine, folate, MVI  -social work  -fall precautions, aspiration precautions    : Alcoholic hepatitis  -etoh cessation education provided    : Psoriasis  rheum eval noted      Problem: Breast cancer  Assessment and Plan: c/w examestane  heme/onc eval noted     Problem: Thrombocytopenia  -monitor counts for now  stable

## 2019-03-20 NOTE — DISCHARGE NOTE PROVIDER - HOSPITAL COURSE
54 Female w/ PMH stage 3 Bilateral Breast CA on Aromasin, RA, Psoriasis previously on Otezla, remote Brain Aneurysm s/p Clip in 1988,  Rt Jugular DVT, Catheter related MSSA Bacteremia from [place] in July 2015, ETOH abuse, COPD, p/w dypsnea, f/w COPD exacerbation, alcohol intoxication with  risk for withdrawal, hypovolemic hyponatremia in setting of poor PO intake and dehydration, and b/l conjunctivitis presumed bacterial in nature.      solumedrol as per pulm      symbicort     -duoneb atc    -c/w singulair    -pt with multiple abx allergies    pulm eval noted    CTA reviewed, no PE    -cigarette cessation education provided.    -sup O2 prn to keep o2 sats 88-92%.      Bacterial conjunctivitis    -c/w drops for now q6     Hyponatremia    -suspect hypovolemic hyponatremia given dehydration     -c/w maintenance IVF      Alcohol intoxication    etoh abuse     ativan taper, ciwa protocol, prn ativan (noted etoh hepatitis on admissi 54 Female w/ PMH stage 3 Bilateral Breast CA on Aromasin, RA, Psoriasis previously on Otezla, remote Brain Aneurysm s/p Clip in 1988,  Rt Jugular DVT, Catheter related MSSA Bacteremia from [facility] [place] in July 2015, ETOH abuse, COPD, p/w dypsnea, f/w COPD exacerbation, alcohol intoxication with  risk for withdrawal, hypovolemic hyponatremia in setting of poor PO intake and dehydration, and b/l conjunctivitis presumed bacterial in nature.      seen by pulmonary/hematology; steroid tapering as pulmonary.    CTA reviewed, no PE    -cigarette cessation education provided.    -sup O2 prn to keep o2 sats 88-92%.      Bacterial conjunctivitis- treatment with eye drops.    - Hyponatremia suspect hypovolemic hyponatremia given dehydration     -     Alcohol intoxication    etoh abuse     ativan taper, wa protocol, -stable    seen by GI for abnormal liver function tests- Hepatic steatosis on US abdomen 54 Female w/ PMH stage 3 Bilateral Breast CA on Aromasin, RA, Psoriasis previously on Otezla, remote Brain Aneurysm s/p Clip in 1988,  Rt Jugular DVT, Catheter related MSSA Bacteremia from [facility] [place] in July 2015, ETOH abuse, COPD, p/w dypsnea, f/w COPD exacerbation, alcohol intoxication with  risk for withdrawal, hypovolemic hyponatremia in setting of poor PO intake and dehydration, and b/l conjunctivitis presumed bacterial in nature.      seen by pulmonary/hematology; steroid tapering as pulmonary. follow up with your pulmonary doctor as outpatient.    CTA reviewed, no PE    -cigarette cessation education provided.    -supplemental Oxygen 2 liter per minute  to keep o2 sats 88-92%.      Bacterial conjunctivitis- treatment with eye drops.    - Hyponatremia suspect hypovolemic hyponatremia given dehydration - resolved.    -Alcohol intoxication, etoh abuse ;stable    seen by GI for abnormal liver function tests- Hepatic steatosis on US abdomen 54 Female w/ PMH stage 3 Bilateral Breast CA on Aromasin, RA, Psoriasis previously on Otezla, remote Brain Aneurysm s/p Clip in 1988,  Rt Jugular DVT, Catheter related MSSA Bacteremia from [facility] Port in July 2015, ETOH abuse, COPD, p/w dypsnea, f/w COPD exacerbation, alcohol intoxication with  risk for withdrawal, hypovolemic hyponatremia in setting of poor PO intake and dehydration, and b/l conjunctivitis presumed bacterial in nature.      seen by pulmonary/hematology; steroid tapering as pulmonary. follow up with your pulmonary doctor as outpatient.    CTA reviewed, no PE    -cigarette cessation education provided.    -supplemental Oxygen 2 liter per minute  to keep o2 sats 88-92%.      Bacterial conjunctivitis- treatment with eye drops.    - Hyponatremia suspect hypovolemic hyponatremia given dehydration - resolved.    -Alcohol intoxication, etoh abuse ;stable    seen by GI for abnormal liver function tests- Hepatic steatosis on US abdomen    follow up with your pulmonary MD/ rheumatology/hem oncology as outpatient.

## 2019-03-20 NOTE — CONSULT NOTE ADULT - ASSESSMENT
# Plaque psoriasis  Previously on Otezla, now off for past 1-2 months. ~5-10% BSA w/ nail involvement  - while admitted, would start triamcinolone 0.1% ointment BID to affected areas on body (avoid face, armpits, groin)  - start clobetasol 0.05% solution BID to scalp  - gentle skin care w/ Vaseline or Aquaphor  - management after d/c per her outpatient providers # Plaque psoriasis  Previously on Otezla, now off for past 1-2 months. ~5-10% BSA w/ nail involvement. Psoriasis likely exacerbated by chronic EtOH use.   - while admitted, would start triamcinolone 0.1% ointment BID to affected areas on body (avoid face, armpits, groin)  - start clobetasol 0.05% solution BID to scalp  - gentle skin care w/ Vaseline or Aquaphor  - management after d/c per her outpatient providers # Plaque psoriasis  Previously on Otezla, now off for past 1-2 months. ~5-10% BSA w/ nail involvement. Psoriasis likely exacerbated by chronic EtOH use.   - while admitted, would start triamcinolone 0.1% ointment BID to affected areas on body (avoid face, armpits, groin)  - start clobetasol 0.05% solution BID to scalp (if not on formulary start fluocinonide solution BID)  - gentle skin care w/ Vaseline or Aquaphor  - management after d/c per her outpatient providers # Plaque psoriasis, moderate to severe  Previously on Otezla, now off for past 1-2 months.     pt's psoriasis requires systemic treatment. topicals will be of limited efficacy.     - while admitted, would start triamcinolone 0.1% ointment BID to affected areas on body (avoid face, armpits, groin)  - start clobetasol 0.05% solution BID to scalp (if not on formulary start fluocinonide solution BID)  - gentle skin care w/ Vaseline or Aquaphor  - management after d/c per her outpatient providers

## 2019-03-20 NOTE — DISCHARGE NOTE PROVIDER - CARE PROVIDER_API CALL
Goldberg, Avram Z (MD)  Internal Medicine; Rheumatology  [address] [facility], [address]  [place] [address]  Phone: [phone]  Fax: [phone]  Follow Up Time:     Juan Diego Del Rio)  Critical Care Medicine  [address] [facility] [address]  [place] [address]  Phone: [phone]  Fax: [phone]  Follow Up Time: Goldberg, Avram Z (MD)  Internal Medicine; Rheumatology  [address] [facility], [address]  [place] [address]  Phone: [phone]  Fax: [phone]  Follow Up Time:     Junior Mansfield)  Critical Care Medicine; Internal Medicine; Pulmonary Disease  [address] [facility], [address]  [place] [address]  Phone: [phone]  Fax: [phone]  Follow Up Time: Goldberg, Avram Z (MD)  Internal Medicine; Rheumatology  [address] [facility], [address]  [place] [address]  Phone: [phone]  Fax: [phone]  Follow Up Time:     Junior Mansfield)  Critical Care Medicine; Internal Medicine; Pulmonary Disease  [address] [facility], [address]  [place] [address]  Phone: [phone]  Fax: [phone]  Follow Up Time:     ENOCH Dowling)  Internal Medicine; Rheumatology  [address] [place] [address]  Phone: [phone]  Fax: [phone]  Follow Up Time: Junior Mansfield)  Critical Care Medicine; Internal Medicine; Pulmonary Disease  [address] [facility], [address]  [place] [address]  Phone: [phone]  Fax: [phone]  Follow Up Time: 1 week    ENOCH Dowling)  Internal Medicine; Rheumatology  [address] [place] [address]  Phone: [phone]  Fax: [phone]  Follow Up Time: 1 week    Madai Hurt)  Hematology; Medical Oncology  [address] [facility] [address]  [place] [address]  Phone: [phone]  Fax: [phone]  Follow Up Time:

## 2019-03-20 NOTE — CONSULT NOTE ADULT - ATTENDING COMMENTS
pt seen and examined  smoking cessation advised  pt states feeling better  less sob    Assessment   Acute on chronic COPD exacerbation  Active nicotine and ETOH use      Plan  steroids, nebs, abx  nasal canula o2 to maintain sat >90%  PT   OOB  smoking cessation advised  nicotine patch
I have seen and evaluated the patient. I agree with findings and plan as discussed.

## 2019-03-20 NOTE — DISCHARGE NOTE PROVIDER - PROVIDER TOKENS
PROVIDER:[TOKEN:[[id number]]],PROVIDER:[TOKEN:[[id number]]] PROVIDER:[TOKEN:[[id number]]],PROVIDER:[TOKEN:[[id number]]] PROVIDER:[TOKEN:[[id number]]],PROVIDER:[TOKEN:[[id number]]],PROVIDER:[TOKEN:[[id number]]] PROVIDER:[TOKEN:[[id number]],FOLLOWUP:[1 week]],PROVIDER:[TOKEN:[[id number]],FOLLOWUP:[1 week]],PROVIDER:[TOKEN:[[id number]]]

## 2019-03-20 NOTE — PROGRESS NOTE ADULT - SUBJECTIVE AND OBJECTIVE BOX
Follow-up Pulm Progress Note    No new respiratory events overnight.  Denies SOB/CP.   Dyspnea is improved  98% on 3L NC    Medications:  MEDICATIONS  (STANDING):  ALBUTerol/ipratropium for Nebulization 3 milliLiter(s) Nebulizer every 6 hours  azithromycin  IVPB 500 milliGRAM(s) IV Intermittent every 24 hours  azithromycin  IVPB      buDESOnide 160 MICROgram(s)/formoterol 4.5 MICROgram(s) Inhaler 2 Puff(s) Inhalation two times a day  cholecalciferol 2000 Unit(s) Oral daily  exemestane 25 milliGRAM(s) Oral daily  folic acid 1 milliGRAM(s) Oral daily  heparin  Injectable 5000 Unit(s) SubCutaneous every 12 hours  LORazepam     Tablet   Oral   LORazepam     Tablet 2 milliGRAM(s) Oral every 4 hours  LORazepam     Tablet 1 milliGRAM(s) Oral every 4 hours  methylPREDNISolone sodium succinate Injectable 20 milliGRAM(s) IV Push every 8 hours  montelukast 10 milliGRAM(s) Oral daily  multivitamin 1 Tablet(s) Oral daily  nicotine - 21 mG/24Hr(s) Patch 1 patch Transdermal daily  prochlorperazine   Tablet 10 milliGRAM(s) Oral daily  trimethoprim/polymyxin Solution 1 Drop(s) Both EYES every 6 hours    MEDICATIONS  (PRN):  LORazepam   Injectable 2 milliGRAM(s) IV Push every 1 hour PRN Symptom-triggered: each CIWA -Ar score 8 or GREATER          Vital Signs Last 24 Hrs  T(C): 36.5 (20 Mar 2019 14:10), Max: 37.1 (20 Mar 2019 01:00)  T(F): 97.7 (20 Mar 2019 14:10), Max: 98.8 (20 Mar 2019 01:00)  HR: 95 (20 Mar 2019 14:10) (90 - 105)  BP: 121/75 (20 Mar 2019 14:10) (121/75 - 166/90)  BP(mean): --  RR: 18 (20 Mar 2019 14:10) (18 - 20)  SpO2: 95% (20 Mar 2019 14:10) (92% - 96%) on 3L NC      VBG pH 7.46 03-20 @ 08:46    VBG pCO2 40 03-20 @ 08:46    VBG O2 sat 98 03-20 @ 08:46    VBG lactate -- 03-20 @ 08:46      03-19 @ 07:01  -  03-20 @ 07:00  --------------------------------------------------------  IN: 670 mL / OUT: 0 mL / NET: 670 mL          LABS:                        14.5   7.26  )-----------( 102      ( 19 Mar 2019 08:46 )             43.4     03-19    131<L>  |  90<L>  |  14  ----------------------------<  158<H>  4.4   |  21<L>  |  0.52    Ca    9.0      19 Mar 2019 05:55  Phos  2.5     03-19  Mg     1.8     03-19    TPro  6.8  /  Alb  3.7  /  TBili  1.6<H>  /  DBili  x   /  AST  183<H>  /  ALT  159<H>  /  AlkPhos  88  03-19          CAPILLARY BLOOD GLUCOSE              Serum Pro-Brain Natriuretic Peptide: 47 pg/mL (03-17-19 @ 14:56)            Physical Examination:  PULM: Exp wheeze bilaterally, improved from yesterday   CVS: S1, S2 heard    RADIOLOGY REVIEWED  CTA chest: < from: CT Angio Chest w/ IV Cont (03.17.19 @ 16:25) >  FINDINGS:    LUNGS AND LARGE AIRWAYS: Patent central airways. Severe centrilobular   emphysema. The previous noted right lower lobe nodule is no longer   present.  PLEURA: No pleural effusion.  VESSELS: Good opacification of the pulmonary arterial tree. No pulmonary   embolus. Mildly dilated main pulmonary artery measuring 3.3 cm. Findings   may represent pulmonary arterial hypertension  HEART: Cardiomegaly. No pericardial effusion.  MEDIASTINUM AND CHESTER: No lymphadenopathy.  CHEST WALL AND LOWER NECK: Within normal limits.  VISUALIZED UPPER ABDOMEN: Hepatic steatosis. Small hiatal hernia.  BONES: Within normal limits.    IMPRESSION:     No pulmonary embolism.    Severe centrilobular emphysema.    < end of copied text >

## 2019-03-21 DIAGNOSIS — F05 DELIRIUM DUE TO KNOWN PHYSIOLOGICAL CONDITION: ICD-10-CM

## 2019-03-21 DIAGNOSIS — R50.9 FEVER, UNSPECIFIED: ICD-10-CM

## 2019-03-21 LAB
ALBUMIN SERPL ELPH-MCNC: 3.3 G/DL — SIGNIFICANT CHANGE UP (ref 3.3–5)
ALP SERPL-CCNC: 75 U/L — SIGNIFICANT CHANGE UP (ref 40–120)
ALT FLD-CCNC: 159 U/L — HIGH (ref 10–45)
ANION GAP SERPL CALC-SCNC: 15 MMOL/L — SIGNIFICANT CHANGE UP (ref 5–17)
ANISOCYTOSIS BLD QL: SLIGHT — SIGNIFICANT CHANGE UP
AST SERPL-CCNC: 165 U/L — HIGH (ref 10–40)
BASOPHILS # BLD AUTO: 0.01 K/UL — SIGNIFICANT CHANGE UP (ref 0–0.2)
BASOPHILS NFR BLD AUTO: 0.1 % — SIGNIFICANT CHANGE UP (ref 0–2)
BILIRUB SERPL-MCNC: 1.5 MG/DL — HIGH (ref 0.2–1.2)
BUN SERPL-MCNC: 15 MG/DL — SIGNIFICANT CHANGE UP (ref 7–23)
CALCIUM SERPL-MCNC: 9 MG/DL — SIGNIFICANT CHANGE UP (ref 8.4–10.5)
CHLORIDE SERPL-SCNC: 91 MMOL/L — LOW (ref 96–108)
CO2 SERPL-SCNC: 26 MMOL/L — SIGNIFICANT CHANGE UP (ref 22–31)
CREAT SERPL-MCNC: 0.52 MG/DL — SIGNIFICANT CHANGE UP (ref 0.5–1.3)
EOSINOPHIL # BLD AUTO: 0.01 K/UL — SIGNIFICANT CHANGE UP (ref 0–0.5)
EOSINOPHIL NFR BLD AUTO: 0.1 % — SIGNIFICANT CHANGE UP (ref 0–6)
GLUCOSE SERPL-MCNC: 126 MG/DL — HIGH (ref 70–99)
HCT VFR BLD CALC: 43.4 % — SIGNIFICANT CHANGE UP (ref 34.5–45)
HGB BLD-MCNC: 14.8 G/DL — SIGNIFICANT CHANGE UP (ref 11.5–15.5)
IMM GRANULOCYTES NFR BLD AUTO: 1 % — SIGNIFICANT CHANGE UP (ref 0–1.5)
LYMPHOCYTES # BLD AUTO: 0.61 K/UL — LOW (ref 1–3.3)
LYMPHOCYTES # BLD AUTO: 8.6 % — LOW (ref 13–44)
MACROCYTES BLD QL: SLIGHT — SIGNIFICANT CHANGE UP
MANUAL SMEAR VERIFICATION: SIGNIFICANT CHANGE UP
MCHC RBC-ENTMCNC: 34.1 GM/DL — SIGNIFICANT CHANGE UP (ref 32–36)
MCHC RBC-ENTMCNC: 38 PG — HIGH (ref 27–34)
MCV RBC AUTO: 111.6 FL — HIGH (ref 80–100)
MONOCYTES # BLD AUTO: 0.9 K/UL — SIGNIFICANT CHANGE UP (ref 0–0.9)
MONOCYTES NFR BLD AUTO: 12.7 % — SIGNIFICANT CHANGE UP (ref 2–14)
NEUTROPHILS # BLD AUTO: 5.49 K/UL — SIGNIFICANT CHANGE UP (ref 1.8–7.4)
NEUTROPHILS NFR BLD AUTO: 77.5 % — HIGH (ref 43–77)
PLAT MORPH BLD: NORMAL — SIGNIFICANT CHANGE UP
PLATELET # BLD AUTO: 131 K/UL — LOW (ref 150–400)
POTASSIUM SERPL-MCNC: 4.1 MMOL/L — SIGNIFICANT CHANGE UP (ref 3.5–5.3)
POTASSIUM SERPL-SCNC: 4.1 MMOL/L — SIGNIFICANT CHANGE UP (ref 3.5–5.3)
PROT SERPL-MCNC: 6.2 G/DL — SIGNIFICANT CHANGE UP (ref 6–8.3)
RAPID RVP RESULT: SIGNIFICANT CHANGE UP
RBC # BLD: 3.89 M/UL — SIGNIFICANT CHANGE UP (ref 3.8–5.2)
RBC # FLD: 12.7 % — SIGNIFICANT CHANGE UP (ref 10.3–14.5)
RBC BLD AUTO: ABNORMAL
SODIUM SERPL-SCNC: 132 MMOL/L — LOW (ref 135–145)
WBC # BLD: 7.09 K/UL — SIGNIFICANT CHANGE UP (ref 3.8–10.5)
WBC # FLD AUTO: 7.09 K/UL — SIGNIFICANT CHANGE UP (ref 3.8–10.5)

## 2019-03-21 PROCEDURE — 71045 X-RAY EXAM CHEST 1 VIEW: CPT | Mod: 26

## 2019-03-21 RX ORDER — DOCUSATE SODIUM 100 MG
100 CAPSULE ORAL
Qty: 0 | Refills: 0 | Status: DISCONTINUED | OUTPATIENT
Start: 2019-03-21 | End: 2019-03-29

## 2019-03-21 RX ORDER — SENNA PLUS 8.6 MG/1
2 TABLET ORAL AT BEDTIME
Qty: 0 | Refills: 0 | Status: DISCONTINUED | OUTPATIENT
Start: 2019-03-21 | End: 2019-03-29

## 2019-03-21 RX ORDER — FLUOCINONIDE/EMOLLIENT BASE 0.05 %
1 CREAM (GRAM) TOPICAL EVERY 12 HOURS
Qty: 0 | Refills: 0 | Status: DISCONTINUED | OUTPATIENT
Start: 2019-03-21 | End: 2019-03-29

## 2019-03-21 RX ORDER — POLYETHYLENE GLYCOL 3350 17 G/17G
17 POWDER, FOR SOLUTION ORAL DAILY
Qty: 0 | Refills: 0 | Status: DISCONTINUED | OUTPATIENT
Start: 2019-03-21 | End: 2019-03-29

## 2019-03-21 RX ADMIN — BUDESONIDE AND FORMOTEROL FUMARATE DIHYDRATE 2 PUFF(S): 160; 4.5 AEROSOL RESPIRATORY (INHALATION) at 17:59

## 2019-03-21 RX ADMIN — Medication 1 DROP(S): at 12:18

## 2019-03-21 RX ADMIN — Medication 20 MILLIGRAM(S): at 13:52

## 2019-03-21 RX ADMIN — Medication 3 MILLILITER(S): at 12:17

## 2019-03-21 RX ADMIN — Medication 1 APPLICATION(S): at 18:00

## 2019-03-21 RX ADMIN — AZITHROMYCIN 250 MILLIGRAM(S): 500 TABLET, FILM COATED ORAL at 12:17

## 2019-03-21 RX ADMIN — Medication 1 PATCH: at 12:16

## 2019-03-21 RX ADMIN — EXEMESTANE 25 MILLIGRAM(S): 25 TABLET, SUGAR COATED ORAL at 12:57

## 2019-03-21 RX ADMIN — Medication 100 MILLIGRAM(S): at 17:59

## 2019-03-21 RX ADMIN — Medication 3 MILLILITER(S): at 06:39

## 2019-03-21 RX ADMIN — Medication 1 MILLIGRAM(S): at 10:19

## 2019-03-21 RX ADMIN — Medication 1 MILLIGRAM(S): at 06:38

## 2019-03-21 RX ADMIN — Medication 10 MILLIGRAM(S): at 12:57

## 2019-03-21 RX ADMIN — Medication 3 MILLILITER(S): at 17:59

## 2019-03-21 RX ADMIN — Medication 1 APPLICATION(S): at 06:41

## 2019-03-21 RX ADMIN — MONTELUKAST 10 MILLIGRAM(S): 4 TABLET, CHEWABLE ORAL at 12:17

## 2019-03-21 RX ADMIN — Medication 1 APPLICATION(S): at 09:19

## 2019-03-21 RX ADMIN — BUDESONIDE AND FORMOTEROL FUMARATE DIHYDRATE 2 PUFF(S): 160; 4.5 AEROSOL RESPIRATORY (INHALATION) at 06:40

## 2019-03-21 RX ADMIN — Medication 20 MILLIGRAM(S): at 06:39

## 2019-03-21 RX ADMIN — SENNA PLUS 2 TABLET(S): 8.6 TABLET ORAL at 21:26

## 2019-03-21 RX ADMIN — Medication 30 MILLIGRAM(S): at 17:59

## 2019-03-21 RX ADMIN — HEPARIN SODIUM 5000 UNIT(S): 5000 INJECTION INTRAVENOUS; SUBCUTANEOUS at 17:59

## 2019-03-21 RX ADMIN — Medication 2000 UNIT(S): at 12:17

## 2019-03-21 RX ADMIN — Medication 1 PATCH: at 07:36

## 2019-03-21 RX ADMIN — Medication 1 MILLIGRAM(S): at 13:51

## 2019-03-21 RX ADMIN — POLYETHYLENE GLYCOL 3350 17 GRAM(S): 17 POWDER, FOR SOLUTION ORAL at 12:18

## 2019-03-21 RX ADMIN — Medication 1 DROP(S): at 18:00

## 2019-03-21 RX ADMIN — HEPARIN SODIUM 5000 UNIT(S): 5000 INJECTION INTRAVENOUS; SUBCUTANEOUS at 06:40

## 2019-03-21 RX ADMIN — Medication 1 MILLIGRAM(S): at 01:54

## 2019-03-21 RX ADMIN — Medication 1 MILLIGRAM(S): at 17:59

## 2019-03-21 RX ADMIN — Medication 1 MILLIGRAM(S): at 12:17

## 2019-03-21 RX ADMIN — Medication 1 PATCH: at 20:30

## 2019-03-21 RX ADMIN — Medication 1 TABLET(S): at 12:17

## 2019-03-21 RX ADMIN — Medication 1 DROP(S): at 06:40

## 2019-03-21 NOTE — PROGRESS NOTE ADULT - ASSESSMENT
55 y/o F with PMH of COPD-emphysema (oxygen dependent-3L NC), current smoker (100 pack yrs), stage 3 Bilateral Breast CA on Aromasin, RA, Psoriasis previously on Otezla, remote Brain Aneurysm s/p Clip in 1988,  Rt Jugular DVT, Catheter related MSSA Bacteremia from [place] in July 2015, ETOH abuse, p/w dypsnea. Acute on chronic dyspnea, progressive x 2 wks. Admitted with COPD exacerbation. Currently on 3L NC, states dyspnea improved from admission though still appears dyspneic. Exp wheeze on exam. Yellow/brown sputum per staff. CTA chest with no evidence of PNA, no PE.

## 2019-03-21 NOTE — BEHAVIORAL HEALTH ASSESSMENT NOTE - NSBHCHARTREVIEWVS_PSY_A_CORE FT
Vital Signs Last 24 Hrs  T(C): 36.6 (21 Mar 2019 13:49), Max: 37.6 (21 Mar 2019 01:15)  T(F): 97.9 (21 Mar 2019 13:49), Max: 99.7 (21 Mar 2019 01:15)  HR: 101 (21 Mar 2019 13:49) (88 - 101)  BP: 129/81 (21 Mar 2019 13:49) (121/66 - 134/87)  BP(mean): --  RR: 18 (21 Mar 2019 13:49) (18 - 18)  SpO2: 92% (21 Mar 2019 13:49) (92% - 100%)

## 2019-03-21 NOTE — BEHAVIORAL HEALTH ASSESSMENT NOTE - CASE SUMMARY
Pt. was interviewed with MsBlanca Amado. Pt. was superficially cooperative, was not able to appreciate the risk of going home, she was delusional about her  bringing a female and cheating on her int he backroom in her presence. She does not have capacity to refuse going to rehab.

## 2019-03-21 NOTE — BEHAVIORAL HEALTH ASSESSMENT NOTE - NSBHCHARTREVIEWLAB_PSY_A_CORE FT
14.8   7.09  )-----------( 131      ( 21 Mar 2019 10:16 )             43.4     03-21    132<L>  |  91<L>  |  15  ----------------------------<  126<H>  4.1   |  26  |  0.52    Ca    9.0      21 Mar 2019 07:45    TPro  6.2  /  Alb  3.3  /  TBili  1.5<H>  /  DBili  x   /  AST  165<H>  /  ALT  159<H>  /  AlkPhos  75  03-21

## 2019-03-21 NOTE — BEHAVIORAL HEALTH ASSESSMENT NOTE - AXIS III
stage 3 Bilateral Breast CA on Aromasin, RA, Psoriasis previously on Otezla, remote Brain Aneurysm s/p Clip in 1988,  Rt Jugular DVT, Catheter related MSSA Bacteremia from [place] in July 2015, ETOH abuse, COPD

## 2019-03-21 NOTE — BEHAVIORAL HEALTH ASSESSMENT NOTE - RISK ASSESSMENT
Risk factors: acute/chronic medical issues, active substance abuse, chronic pain, not receiving treatment, psychosis     Protective factors: no current SIIP/HIIP, no h/o SA/SIB, no h/o psych admissions, no access to weapons, no psychosis, engaged in work or school,  domiciled, intact marriage, social supports, positive therapeutic relationship    Overall, pt is a low risk of harm to self/others and does not require psychiatric admission for safety and stabilization.

## 2019-03-21 NOTE — PROGRESS NOTE ADULT - SUBJECTIVE AND OBJECTIVE BOX
CHIEF COMPLAINT:Patient is a 54y old  Female who presents with a chief complaint of copd exacerbation, etoh intoxication (20 Mar 2019 19:56)    	        PAST MEDICAL & SURGICAL HISTORY:  Prophylactic measure  Breast cancer  Brain aneurysm: with clips  Psoriasis  RA (rheumatoid arthritis)  Psoriasis  Breast cancer, stage 3  History of modified radical mastectomy of both breasts  S/P bilateral mastectomy  Brain aneurysm: 1988 two clips          REVIEW OF SYSTEMS:  CONSTITUTIONAL:feels better overall  EYES: No eye pain, visual disturbances, or discharge  NECK: No pain or stiffness  RESPIRATORY: less sob / cough   CARDIOVASCULAR: No chest pain, palpitations, passing out, dizziness,  GASTROINTESTINAL: No abdominal or epigastric pain. No nausea, vomiting, or hematemesis; No diarrhea or constipation. No melena or hematochezia.  GENITOURINARY: No dysuria, frequency, hematuria, or incontinence  NEUROLOGICAL: No headaches,     Medications:  MEDICATIONS  (STANDING):  ALBUTerol/ipratropium for Nebulization 3 milliLiter(s) Nebulizer every 6 hours  azithromycin  IVPB 500 milliGRAM(s) IV Intermittent every 24 hours  azithromycin  IVPB      buDESOnide 160 MICROgram(s)/formoterol 4.5 MICROgram(s) Inhaler 2 Puff(s) Inhalation two times a day  cholecalciferol 2000 Unit(s) Oral daily  docusate sodium 100 milliGRAM(s) Oral two times a day  exemestane 25 milliGRAM(s) Oral daily  fluocinonide 0.05% Solution 1 Application(s) Topical every 12 hours  folic acid 1 milliGRAM(s) Oral daily  heparin  Injectable 5000 Unit(s) SubCutaneous every 12 hours  LORazepam     Tablet   Oral   LORazepam     Tablet 1 milliGRAM(s) Oral every 4 hours  methylPREDNISolone sodium succinate Injectable 20 milliGRAM(s) IV Push every 8 hours  montelukast 10 milliGRAM(s) Oral daily  multivitamin 1 Tablet(s) Oral daily  nicotine - 21 mG/24Hr(s) Patch 1 patch Transdermal daily  polyethylene glycol 3350 17 Gram(s) Oral daily  prochlorperazine   Tablet 10 milliGRAM(s) Oral daily  senna 2 Tablet(s) Oral at bedtime  triamcinolone 0.1% Ointment 1 Application(s) Topical every 12 hours  trimethoprim/polymyxin Solution 1 Drop(s) Both EYES every 6 hours    MEDICATIONS  (PRN):  LORazepam   Injectable 2 milliGRAM(s) IV Push every 1 hour PRN Symptom-triggered: each CIWA -Ar score 8 or GREATER    	    PHYSICAL EXAM:  T(C): 36.8 (03-21-19 @ 05:00), Max: 37.6 (03-21-19 @ 01:15)  HR: 88 (03-21-19 @ 05:00) (88 - 96)  BP: 121/66 (03-21-19 @ 05:00) (121/66 - 134/87)  RR: 18 (03-21-19 @ 05:00) (18 - 18)  SpO2: 99% (03-21-19 @ 05:00) (95% - 100%)  Wt(kg): --  I&O's Summary    20 Mar 2019 07:01  -  21 Mar 2019 07:00  --------------------------------------------------------  IN: 950 mL / OUT: 0 mL / NET: 950 mL        Appearance: Normal	  HEENT:   Normal oral mucosa, PERRL, EOMI	  Lymphatic: No lymphadenopathy  Cardiovascular: Normal S1 S2, No JVD, No murmurs,   Respiratory:  dec bs   Psychiatry: A & O  Gastrointestinal:  Soft, Non-tender, + BS	  Skin: psoriatic lesions   Neurologic: Non-focal  Extremities: Normal range of motion, No clubbing, cyanosis  Vascular: Peripheral pulses palpable     TELEMETRY: 	    ECG:  	  RADIOLOGY:  OTHER: 	  	  LABS:	 	    CARDIAC MARKERS:                                14.8   7.09  )-----------( x        ( 21 Mar 2019 10:16 )             43.4     03-21    132<L>  |  91<L>  |  15  ----------------------------<  126<H>  4.1   |  26  |  0.52    Ca    9.0      21 Mar 2019 07:45    TPro  6.2  /  Alb  3.3  /  TBili  1.5<H>  /  DBili  x   /  AST  165<H>  /  ALT  159<H>  /  AlkPhos  75  03-21    proBNP:   Lipid Profile:   HgA1c:   TSH:

## 2019-03-21 NOTE — PROGRESS NOTE ADULT - ASSESSMENT
54 Female w/ PMH stage 3 Bilateral Breast CA on Aromasin, RA, Psoriasis previously on Otezla, remote Brain Aneurysm s/p Clip in 1988,  Rt Jugular DVT, Catheter related MSSA Bacteremia from [place] in July 2015, ETOH abuse, COPD, p/w dypsnea, f/w COPD exacerbation, alcohol intoxication with  risk for withdrawal, hypovolemic hyponatremia in setting of poor PO intake and dehydration, and b/l conjunctivitis presumed bacterial in nature.     Problem: COPD exacerbation   solumedrol as per pulm   change to po today    symbicort   -duoneb atc  -c/w singulair  -pt with multiple abx allergies  pulm f/u    -CTA reviewed, no PE  -cigarette cessation education provided.  -sup O2 prn to keep o2 sats 88-92%.       Problem: Bacterial conjunctivitis  -c/w drops for now q6    Problem: Hyponatremia better        Problem: Alcohol intoxication  etoh abuse       ativan taper, ciwa protocol, prn ativan (noted etoh hepatitis on admission so will avoid librium)  mental status better   -thiamine, folate, MVI  -social work  -fall precautions, aspiration precautions    : Alcoholic hepatitis  -etoh cessation education provided    : Psoriasis  rheum eval noted      Problem: Breast cancer  Assessment and Plan: c/w examestane  heme/onc eval noted     Problem: Thrombocytopenia better  -monitor counts for now  stable   rehab planning

## 2019-03-21 NOTE — BEHAVIORAL HEALTH ASSESSMENT NOTE - NSBHSUICPROTECTFACT_PSY_A_CORE
Identifies reasons for living/Supportive social network or family/Positive therapeutic relationships

## 2019-03-21 NOTE — PROGRESS NOTE ADULT - PROBLEM SELECTOR PLAN 2
-Keep sp02>88% on supplemental oxygen, attempt to titrate off  -Azithromycin 500mg qd x3 days   -c/w Solumedrol 20mg q8  -Duoneb q6  -Symbicort BID   -No evidence of CO2 retention on VBG

## 2019-03-21 NOTE — BEHAVIORAL HEALTH ASSESSMENT NOTE - NSBHCHARTREVIEWINVESTIGATE_PSY_A_CORE FT
< from: 12 Lead ECG (03.17.19 @ 13:48) >      Ventricular Rate 87 BPM    Atrial Rate 87 BPM    P-R Interval 114 ms    QRS Duration 90 ms    Q-T Interval 394 ms    QTC Calculation(Bezet) 474 ms    P Axis 79 degrees    R Axis 73 degrees    T Axis 73 degrees    Diagnosis Line NORMAL SINUS RHYTHM  NORMAL ECG    Confirmed by ATTENDING, ED ([id number]),  KATHY KIDD ([id number]) on 3/18/2019 4:35:20 AM    < end of copied text >

## 2019-03-21 NOTE — PROGRESS NOTE ADULT - SUBJECTIVE AND OBJECTIVE BOX
Follow-up Pulm Progress Note    Feels worse today, c/o HA, nausea  Low grade temp 37.8 groin-refusing rectal temp  more dyspneic   sounds more congested       Medications:  MEDICATIONS  (STANDING):  ALBUTerol/ipratropium for Nebulization 3 milliLiter(s) Nebulizer every 6 hours  azithromycin  IVPB 500 milliGRAM(s) IV Intermittent every 24 hours  azithromycin  IVPB      buDESOnide 160 MICROgram(s)/formoterol 4.5 MICROgram(s) Inhaler 2 Puff(s) Inhalation two times a day  cholecalciferol 2000 Unit(s) Oral daily  docusate sodium 100 milliGRAM(s) Oral two times a day  exemestane 25 milliGRAM(s) Oral daily  fluocinonide 0.05% Solution 1 Application(s) Topical every 12 hours  folic acid 1 milliGRAM(s) Oral daily  heparin  Injectable 5000 Unit(s) SubCutaneous every 12 hours  LORazepam     Tablet   Oral   LORazepam     Tablet 1 milliGRAM(s) Oral every 4 hours  methylPREDNISolone sodium succinate Injectable 20 milliGRAM(s) IV Push every 8 hours  montelukast 10 milliGRAM(s) Oral daily  multivitamin 1 Tablet(s) Oral daily  nicotine - 21 mG/24Hr(s) Patch 1 patch Transdermal daily  polyethylene glycol 3350 17 Gram(s) Oral daily  prochlorperazine   Tablet 10 milliGRAM(s) Oral daily  senna 2 Tablet(s) Oral at bedtime  triamcinolone 0.1% Ointment 1 Application(s) Topical every 12 hours  trimethoprim/polymyxin Solution 1 Drop(s) Both EYES every 6 hours    MEDICATIONS  (PRN):  LORazepam   Injectable 2 milliGRAM(s) IV Push every 1 hour PRN Symptom-triggered: each CIWA -Ar score 8 or GREATER          Vital Signs Last 24 Hrs  T(C): 36.8 (21 Mar 2019 05:00), Max: 37.6 (21 Mar 2019 01:15)  T(F): 98.2 (21 Mar 2019 05:00), Max: 99.7 (21 Mar 2019 01:15)  HR: 88 (21 Mar 2019 05:00) (88 - 96)  BP: 121/66 (21 Mar 2019 05:00) (121/66 - 134/87)  BP(mean): --  RR: 18 (21 Mar 2019 05:00) (18 - 18)  SpO2: 99% (21 Mar 2019 05:00) (95% - 100%) on 2L NC      VBG pH 7.46 03-20 @ 08:46    VBG pCO2 40 03-20 @ 08:46    VBG O2 sat 98 03-20 @ 08:46    VBG lactate -- 03-20 @ 08:46      03-20 @ 07:01  -  03-21 @ 07:00  --------------------------------------------------------  IN: 950 mL / OUT: 0 mL / NET: 950 mL          LABS:                        14.8   7.09  )-----------( x        ( 21 Mar 2019 10:16 )             43.4     03-21    132<L>  |  91<L>  |  15  ----------------------------<  126<H>  4.1   |  26  |  0.52    Ca    9.0      21 Mar 2019 07:45    TPro  6.2  /  Alb  3.3  /  TBili  1.5<H>  /  DBili  x   /  AST  165<H>  /  ALT  159<H>  /  AlkPhos  75  03-21          CAPILLARY BLOOD GLUCOSE          Physical Examination:  PULM: Rhonchi bilaterally   CVS: S1, S2 heard    RADIOLOGY REVIEWED  CTA chest: < from: CT Angio Chest w/ IV Cont (03.17.19 @ 16:25) >  FINDINGS:    LUNGS AND LARGE AIRWAYS: Patent central airways. Severe centrilobular   emphysema. The previous noted right lower lobe nodule is no longer   present.  PLEURA: No pleural effusion.  VESSELS: Good opacification of the pulmonary arterial tree. No pulmonary   embolus. Mildly dilated main pulmonary artery measuring 3.3 cm. Findings   may represent pulmonary arterial hypertension  HEART: Cardiomegaly. No pericardial effusion.  MEDIASTINUM AND CHESTER: No lymphadenopathy.  CHEST WALL AND LOWER NECK: Within normal limits.  VISUALIZED UPPER ABDOMEN: Hepatic steatosis. Small hiatal hernia.  BONES: Within normal limits.    IMPRESSION:     No pulmonary embolism.    Severe centrilobular emphysema.    < end of copied text >

## 2019-03-21 NOTE — BEHAVIORAL HEALTH ASSESSMENT NOTE - SUMMARY
55 y/o  female, no children, previously worked as a , domiciled in a private residence in [place] with her  with PPHx of unspecified anxiety d/o (is prescribed Xanax y her oncologist), no inpatient psychiatric hospitalizations, no SA/SIB, substance abuse significant for daily etoh (drinks 1 10-12pack beer per day), no h/o complicated withdrawals, or DTs, with PMHx of stage 3 Bilateral Breast CA on Aromasin, RA, Psoriasis previously on Otezla, remote Brain Aneurysm s/p Clip in 1988,  Rt Jugular DVT, Catheter related MSSA Bacteremia from [place] in July 2015, ETOH abuse, COPD, p/w dyspnea, admitted to medical service for management of CHF exacerbation.  Psychiatry consulted to assess patient's capacity to make decisions regarding her discharge planning.  Patient seen and evaluated, awake and alert, oriented x 3, unable to recall last 3 [place] Presidents, only able to perform 1/3 word recall, inattentive during interview, at times confabulating.  Patient endorsing paranoia that her spouse is having an affair on her, states that "I heard them having sex in the back room."  Denies S/H/I/I/P.  Initially refusing to go to subacute rehab, when informed about the risk/benefits of gong home and not going to [facility], is able to repeat back the risk benefits explained, and superficially agrees to go to rehab tomorrow, then stating she is tired and doesn't feel well and closes her eyes.  Patient unable to have appropriate reasoning and appreciation for her treatment team's recommendations to go to rehab and how it applies to her, and has not consistently communicated her choice.  Patient does not have capacity to participate in discharge planning.

## 2019-03-21 NOTE — BEHAVIORAL HEALTH ASSESSMENT NOTE - HPI (INCLUDE ILLNESS QUALITY, SEVERITY, DURATION, TIMING, CONTEXT, MODIFYING FACTORS, ASSOCIATED SIGNS AND SYMPTOMS)
53 y/o  female, no children, previously worked as a , domiciled in a private residence in [place] with her  with PPHx of unspecified anxiety d/o (is prescribed Xanax y her oncologist), no inpatient psychiatric hospitalizations, no SA/SIB, substance abuse significant for daily etoh (drinks 1 10-12pack beer per day), no h/o complicated withdrawals, or DTs, with PMHx of stage 3 Bilateral Breast CA on Aromasin, RA, Psoriasis previously on Otezla, remote Brain Aneurysm s/p Clip in 1988,  Rt Jugular DVT, Catheter related MSSA Bacteremia from [place] in July 2015, ETOH abuse, COPD, p/w dyspnea, admitted to medical service for management of CHF exacerbation.  Psychiatry consulted to assess patient's capacity to make decisions regarding her discharge planning.  Patient seen and evaluated, awake and alert, oriented to person, place ([place]) time (March 21, 2019), able to spell WORLD backwards, only able to perform 1/3 word recall, attention is impaired, at times confabulating.  Patient reports having been in the hospital for the last 2 weeks "because I was drinking and smoking too much."  States she had drank 10 beers prior to admission to the hospital, states she drinks this amount on a daily basis.  Denies any h/o of complicated withdrawals from etoh, or DTs, denies ever seeking treatment for her alcohol use.  Denies h/o ever seeing a psychiatrist, denies h/o SA/SIB, denies use of any other illicit substances aside from alcohol.  Denies S/H/I/I/P, A/V/H, reports delusion that her  is having an affair on her "and that I heard them having sex in the back room."  Reports that  has not come to see in her in hospital for the last several days.  Reports she suffers from anxiety and that she is prescribed Xanax 0.25mg po prn bid by her oncologist, reports taking it daily.  Denies worsening depression.  Patient initially states not wanting to go the physical rehab, states she wants to go home and that "my  can help me at home."  When patient is explained the risk/benefits of returning to her home and not going to [facility], is superficially able to restate the risk/benefit and later agrees that she "will go to rehab tomorrow."  During this time patient is reports she is tired and doesn't feel well and wants to rest.  With patient's permission spoke with her  Wilbur Harmon, who reports that patient has never seen a psychiatrist, states that she does take Xanax for anxiety which is prescribed by her oncologist Dr. Garcia.  Reports that patient drinks a 12 pack of beer daily, reports she has never had any complicated withdrawal symptoms or DTs.  Denies use of any other substances, He reports he has concerns for patient's safety, reports patient has not endorsed feelings of suicidality.  Denies patient has access to guns at home.  Reports at baseline, patient is oriented x 3, however states while in the hospital has found her at times to be more confused.  He reports a previous hospitalization where patient was having visual hallucinations, seeing bugs crawling on the walls.  Reports patient had these same hallucinations earlier on during this admission.  Denies patient is paranoid or has endorsed any delusions. 55 y/o  female, no children, previously worked as a , domiciled in a private residence in [place] with her  with PPHx of unspecified anxiety d/o (is prescribed Xanax y her oncologist), no inpatient psychiatric hospitalizations, no SA/SIB, substance abuse significant for daily etoh (drinks 1 10-12pack beer per day), no h/o complicated withdrawals, or DTs, with PMHx of stage 3 Bilateral Breast CA on Aromasin, RA, Psoriasis previously on Otezla, remote Brain Aneurysm s/p Clip in 1988,  Rt Jugular DVT, Catheter related MSSA Bacteremia from [place] in July 2015, ETOH abuse, COPD, p/w dyspnea, admitted to medical service for management of CHF exacerbation.  Psychiatry consulted to assess patient's capacity to make decisions regarding her discharge planning.  Patient seen and evaluated, awake and alert, oriented to person, place ([place]) time (March 21, 2019), able to spell WORLD backwards, only able to perform 1/3 word recall, attention is impaired, at times confabulating.  Patient reports having been in the hospital for the last 2 weeks "because I was drinking and smoking too much."  States she had drank 10 beers prior to admission to the hospital, states she drinks this amount on a daily basis.  Denies any h/o of complicated withdrawals from etoh, or DTs, denies ever seeking treatment for her alcohol use.  Denies h/o ever seeing a psychiatrist, denies h/o SA/SIB, denies use of any other illicit substances aside from alcohol.  Denies S/H/I/I/P, A/V/H, reports delusion that her  is having an affair on her "and that I heard them having sex in the back room."  Reports that  has not come to see in her in hospital for the last several days.  Reports she suffers from anxiety and that she is prescribed Xanax 0.25mg po prn bid by her oncologist, reports taking it daily.  Denies worsening depression.  Patient initially states not wanting to go the physical rehab, states she wants to go home and that "my  can help me at home."  When patient is explained the risk/benefits of returning to her home and not going to [facility], is superficially able to restate the risk/benefit and later agrees that she "will go to rehab tomorrow."  During this time patient is reports she is tired and doesn't feel well and wants to rest.  With patient's permission spoke with her  Wilbur Harmon, who reports that patient has never seen a psychiatrist, states that she does take Xanax for anxiety which is prescribed by her oncologist Dr. Garcia.  Reports that patient drinks a 12 pack of beer daily, reports she has never had any complicated withdrawal symptoms or DTs.  Denies use of any other substances, He reports he has concerns for patient's safety, reports patient has not endorsed feelings of suicidality.  Denies patient has access to guns at home.  Reports at baseline, patient is oriented x 3, however states while in the hospital has found her at times to be more confused.  He reports a previous hospitalization where patient was having visual hallucinations, seeing bugs crawling on the walls.  Reports patient had these same hallucinations earlier on during this admission.  Denies patient is paranoid or has endorsed any delusions.  [facility] reference # [id number]

## 2019-03-22 RX ADMIN — Medication 1 MILLIGRAM(S): at 18:23

## 2019-03-22 RX ADMIN — Medication 1 DROP(S): at 18:24

## 2019-03-22 RX ADMIN — Medication 3 MILLILITER(S): at 22:18

## 2019-03-22 RX ADMIN — Medication 1 PATCH: at 13:14

## 2019-03-22 RX ADMIN — Medication 1 MILLIGRAM(S): at 05:23

## 2019-03-22 RX ADMIN — AZITHROMYCIN 250 MILLIGRAM(S): 500 TABLET, FILM COATED ORAL at 13:11

## 2019-03-22 RX ADMIN — Medication 30 MILLIGRAM(S): at 18:24

## 2019-03-22 RX ADMIN — HEPARIN SODIUM 5000 UNIT(S): 5000 INJECTION INTRAVENOUS; SUBCUTANEOUS at 05:24

## 2019-03-22 RX ADMIN — HEPARIN SODIUM 5000 UNIT(S): 5000 INJECTION INTRAVENOUS; SUBCUTANEOUS at 18:24

## 2019-03-22 RX ADMIN — Medication 30 MILLIGRAM(S): at 05:26

## 2019-03-22 RX ADMIN — POLYETHYLENE GLYCOL 3350 17 GRAM(S): 17 POWDER, FOR SOLUTION ORAL at 13:13

## 2019-03-22 RX ADMIN — Medication 1 PATCH: at 19:00

## 2019-03-22 RX ADMIN — Medication 1 DROP(S): at 00:00

## 2019-03-22 RX ADMIN — BUDESONIDE AND FORMOTEROL FUMARATE DIHYDRATE 2 PUFF(S): 160; 4.5 AEROSOL RESPIRATORY (INHALATION) at 05:24

## 2019-03-22 RX ADMIN — Medication 1 PATCH: at 13:12

## 2019-03-22 RX ADMIN — Medication 1 APPLICATION(S): at 05:26

## 2019-03-22 RX ADMIN — MONTELUKAST 10 MILLIGRAM(S): 4 TABLET, CHEWABLE ORAL at 13:13

## 2019-03-22 RX ADMIN — Medication 1 TABLET(S): at 13:12

## 2019-03-22 RX ADMIN — Medication 1 APPLICATION(S): at 18:24

## 2019-03-22 RX ADMIN — Medication 2000 UNIT(S): at 13:12

## 2019-03-22 RX ADMIN — Medication 1 DROP(S): at 05:25

## 2019-03-22 RX ADMIN — BUDESONIDE AND FORMOTEROL FUMARATE DIHYDRATE 2 PUFF(S): 160; 4.5 AEROSOL RESPIRATORY (INHALATION) at 18:23

## 2019-03-22 RX ADMIN — Medication 3 MILLILITER(S): at 13:11

## 2019-03-22 RX ADMIN — Medication 3 MILLILITER(S): at 05:23

## 2019-03-22 RX ADMIN — Medication 1 DROP(S): at 13:13

## 2019-03-22 RX ADMIN — SENNA PLUS 2 TABLET(S): 8.6 TABLET ORAL at 22:18

## 2019-03-22 RX ADMIN — Medication 1 PATCH: at 07:42

## 2019-03-22 RX ADMIN — Medication 3 MILLILITER(S): at 18:23

## 2019-03-22 RX ADMIN — Medication 100 MILLIGRAM(S): at 05:24

## 2019-03-22 RX ADMIN — Medication 1 MILLIGRAM(S): at 13:12

## 2019-03-22 RX ADMIN — Medication 10 MILLIGRAM(S): at 13:13

## 2019-03-22 RX ADMIN — Medication 1 APPLICATION(S): at 18:25

## 2019-03-22 RX ADMIN — Medication 100 MILLIGRAM(S): at 18:23

## 2019-03-22 RX ADMIN — EXEMESTANE 25 MILLIGRAM(S): 25 TABLET, SUGAR COATED ORAL at 13:13

## 2019-03-22 RX ADMIN — Medication 1 APPLICATION(S): at 05:25

## 2019-03-22 NOTE — PROGRESS NOTE ADULT - SUBJECTIVE AND OBJECTIVE BOX
CHIEF COMPLAINT:Patient is a 54y old  Female who presents with a chief complaint of copd exacerbation, etoh intoxication (21 Mar 2019 13:17)    	        PAST MEDICAL & SURGICAL HISTORY:  Prophylactic measure  Breast cancer  Brain aneurysm: with clips  Psoriasis  RA (rheumatoid arthritis)  Psoriasis  Breast cancer, stage 3  History of modified radical mastectomy of both breasts  S/P bilateral mastectomy  Brain aneurysm: 1988 two clips          REVIEW OF SYSTEMS:  CONSTITUTIONAL: No fever, weight loss, or fatigue  EYES: No eye pain, visual disturbances, or discharge  NECK: No pain or stiffness  RESPIRATORY: No cough, wheezing, chills or hemoptysis; No Shortness of Breath  CARDIOVASCULAR: No chest pain, palpitations, passing out, dizziness, or leg swelling  GASTROINTESTINAL: No abdominal or epigastric pain. No nausea, vomiting, or hematemesis; No diarrhea or constipation. No melena or hematochezia.  GENITOURINARY: No dysuria, frequency, hematuria, or incontinence  NEUROLOGICAL: No headaches, memory loss, loss of strength, numbness, or tremors  SKIN: No itching, burning, rashes, or lesions   LYMPH Nodes: No enlarged glands  ENDOCRINE: No heat or cold intolerance; No hair loss  MUSCULOSKELETAL: No joint pain or swelling; No muscle, back, or extremity pain    Medications:  MEDICATIONS  (STANDING):  ALBUTerol/ipratropium for Nebulization 3 milliLiter(s) Nebulizer every 6 hours  azithromycin  IVPB 500 milliGRAM(s) IV Intermittent every 24 hours  azithromycin  IVPB      buDESOnide 160 MICROgram(s)/formoterol 4.5 MICROgram(s) Inhaler 2 Puff(s) Inhalation two times a day  cholecalciferol 2000 Unit(s) Oral daily  docusate sodium 100 milliGRAM(s) Oral two times a day  exemestane 25 milliGRAM(s) Oral daily  fluocinonide 0.05% Solution 1 Application(s) Topical every 12 hours  folic acid 1 milliGRAM(s) Oral daily  heparin  Injectable 5000 Unit(s) SubCutaneous every 12 hours  LORazepam     Tablet   Oral   LORazepam     Tablet 1 milliGRAM(s) Oral every 12 hours  montelukast 10 milliGRAM(s) Oral daily  multivitamin 1 Tablet(s) Oral daily  nicotine - 21 mG/24Hr(s) Patch 1 patch Transdermal daily  polyethylene glycol 3350 17 Gram(s) Oral daily  predniSONE   Tablet 30 milliGRAM(s) Oral two times a day  prochlorperazine   Tablet 10 milliGRAM(s) Oral daily  senna 2 Tablet(s) Oral at bedtime  triamcinolone 0.1% Ointment 1 Application(s) Topical every 12 hours  trimethoprim/polymyxin Solution 1 Drop(s) Both EYES every 6 hours    MEDICATIONS  (PRN):  LORazepam   Injectable 2 milliGRAM(s) IV Push every 1 hour PRN Symptom-triggered: each CIWA -Ar score 8 or GREATER    	    PHYSICAL EXAM:  T(C): 36.6 (03-22-19 @ 04:36), Max: 37.1 (03-21-19 @ 16:34)  HR: 90 (03-22-19 @ 04:36) (90 - 103)  BP: 119/78 (03-22-19 @ 04:36) (111/75 - 138/92)  RR: 18 (03-22-19 @ 04:36) (18 - 20)  SpO2: 95% (03-22-19 @ 04:36) (92% - 97%)  Wt(kg): --  I&O's Summary    21 Mar 2019 07:01  -  22 Mar 2019 07:00  --------------------------------------------------------  IN: 420 mL / OUT: 0 mL / NET: 420 mL        Appearance: Normal	  HEENT:   Normal oral mucosa, PERRL, EOMI	  Lymphatic: No lymphadenopathy  Cardiovascular: Normal S1 S2, No JVD, No murmurs, No edema  Respiratory: Lungs clear to auscultation	  Psychiatry: A & O x 3, Mood & affect appropriate  Gastrointestinal:  Soft, Non-tender, + BS	  Skin: No rashes, No ecchymoses, No cyanosis	  Neurologic: Non-focal  Extremities: Normal range of motion, No clubbing, cyanosis or edema  Vascular: Peripheral pulses palpable 2+ bilaterally    TELEMETRY: 	    ECG:  	  RADIOLOGY:  OTHER: 	  	  LABS:	 	    CARDIAC MARKERS:                                14.8   7.09  )-----------( 131      ( 21 Mar 2019 10:16 )             43.4     03-21    132<L>  |  91<L>  |  15  ----------------------------<  126<H>  4.1   |  26  |  0.52    Ca    9.0      21 Mar 2019 07:45    TPro  6.2  /  Alb  3.3  /  TBili  1.5<H>  /  DBili  x   /  AST  165<H>  /  ALT  159<H>  /  AlkPhos  75  03-21    proBNP:   Lipid Profile:   HgA1c:   TSH: CHIEF COMPLAINT:Patient is a 54y old  Female who presents with a chief complaint of copd exacerbation, etoh intoxication (21 Mar 2019 13:17)    	        PAST MEDICAL & SURGICAL HISTORY:  Prophylactic measure  Breast cancer  Brain aneurysm: with clips  Psoriasis  RA (rheumatoid arthritis)  Psoriasis  Breast cancer, stage 3  History of modified radical mastectomy of both breasts  S/P bilateral mastectomy  Brain aneurysm: 1988 two clips          REVIEW OF SYSTEMS:  CONSTITUTIONAL: feels better  EYES: No eye pain, visual disturbances, or discharge  NECK: No pain or stiffness  RESPIRATORY: dec sob  CARDIOVASCULAR: No chest pain, palpitations, passing out, dizziness,   GASTROINTESTINAL: No abdominal or epigastric pain. No nausea, vomiting, or hematemesis; No diarrhea or constipation. No melena or hematochezia.  GENITOURINARY: No dysuria, frequency, hematuria, or incontinence  NEUROLOGICAL: No headaches,  Medications:  MEDICATIONS  (STANDING):  ALBUTerol/ipratropium for Nebulization 3 milliLiter(s) Nebulizer every 6 hours  azithromycin  IVPB 500 milliGRAM(s) IV Intermittent every 24 hours  azithromycin  IVPB      buDESOnide 160 MICROgram(s)/formoterol 4.5 MICROgram(s) Inhaler 2 Puff(s) Inhalation two times a day  cholecalciferol 2000 Unit(s) Oral daily  docusate sodium 100 milliGRAM(s) Oral two times a day  exemestane 25 milliGRAM(s) Oral daily  fluocinonide 0.05% Solution 1 Application(s) Topical every 12 hours  folic acid 1 milliGRAM(s) Oral daily  heparin  Injectable 5000 Unit(s) SubCutaneous every 12 hours  LORazepam     Tablet   Oral   LORazepam     Tablet 1 milliGRAM(s) Oral every 12 hours  montelukast 10 milliGRAM(s) Oral daily  multivitamin 1 Tablet(s) Oral daily  nicotine - 21 mG/24Hr(s) Patch 1 patch Transdermal daily  polyethylene glycol 3350 17 Gram(s) Oral daily  predniSONE   Tablet 30 milliGRAM(s) Oral two times a day  prochlorperazine   Tablet 10 milliGRAM(s) Oral daily  senna 2 Tablet(s) Oral at bedtime  triamcinolone 0.1% Ointment 1 Application(s) Topical every 12 hours  trimethoprim/polymyxin Solution 1 Drop(s) Both EYES every 6 hours    MEDICATIONS  (PRN):  LORazepam   Injectable 2 milliGRAM(s) IV Push every 1 hour PRN Symptom-triggered: each CIWA -Ar score 8 or GREATER    	    PHYSICAL EXAM:  T(C): 36.6 (03-22-19 @ 04:36), Max: 37.1 (03-21-19 @ 16:34)  HR: 90 (03-22-19 @ 04:36) (90 - 103)  BP: 119/78 (03-22-19 @ 04:36) (111/75 - 138/92)  RR: 18 (03-22-19 @ 04:36) (18 - 20)  SpO2: 95% (03-22-19 @ 04:36) (92% - 97%)  Wt(kg): --  I&O's Summary    21 Mar 2019 07:01  -  22 Mar 2019 07:00  --------------------------------------------------------  IN: 420 mL / OUT: 0 mL / NET: 420 mL        Appearance: Normal	  HEENT:   Normal oral mucosa, PERRL, EOMI	  Lymphatic: No lymphadenopathy  Cardiovascular: Normal S1 S2, No JVD,   Respiratory: dec bs   Psychiatry: A & O x 3, Mood & affect appropriate  Gastrointestinal:  Soft, Non-tender, + BS	  Skin: psoriasis  ext remities  Neurologic: Non-focal  Extremities: Normal range of motion, No clubbing, cyanosis or edema  Vascular: Peripheral pulses palpable    TELEMETRY: 	    ECG:  	  RADIOLOGY:  OTHER: 	  	  LABS:	 	    CARDIAC MARKERS:                                14.8   7.09  )-----------( 131      ( 21 Mar 2019 10:16 )             43.4     03-21    132<L>  |  91<L>  |  15  ----------------------------<  126<H>  4.1   |  26  |  0.52    Ca    9.0      21 Mar 2019 07:45    TPro  6.2  /  Alb  3.3  /  TBili  1.5<H>  /  DBili  x   /  AST  165<H>  /  ALT  159<H>  /  AlkPhos  75  03-21    proBNP:   Lipid Profile:   HgA1c:   TSH:

## 2019-03-22 NOTE — PROGRESS NOTE ADULT - ASSESSMENT
54 F PMH stage 3 Bilateral Breast CA on Aromasin, RA, Psoriasis previously on Otezla, remote Brain Aneurysm s/p Clip in 1988,  Rt Jugular DVT, Catheter related MSSA Bacteremia from [place] in July 2015, ETOH abuse, COPD, p/w dypsnea, ETOH intoxication    #Breast cancer- bilateral breast cancer, ER+/FL+/Her2 equivocal on Left; ER+/FL+/Her - on right; s/p taxotere/carbo/perjeta/herceptin x 3 cycles, s/p bilateral mastectomies 9/2015  - last PET scan 11/2017 negative  - s/p Herceptin, completed 3/2017; on arimidex, with side effects, changed to aromasin 1/2016- continue Aromasin  - Ca 27.29 slightly increased as outpatient- for PET scan as outpatient  - BRCA +- aware needs oopherectomy    #Thrombocytopenia- likely secondary to ETOH; hep C negative  - coags nl, no evidence of DIC  - monitor, plt improved to 131K on 3/21    #Psoriasis- previous on Otezla, Rheum following    #COPD exacerbation- on steroids/nebs  - RVP negative  - Pulmonary following    #bacterial conjunctivitis- improving, on eye drops    #ETOH abuse/intoxication/hepatitis- on withdrawal protocol; LFTs increased; on folic acid/thiamine  - monitor LFTs- improving

## 2019-03-22 NOTE — PROGRESS NOTE ADULT - PROBLEM SELECTOR PLAN 2
-Keep sp02>88% on supplemental oxygen, attempt to titrate off  -Azithromycin 500mg qd x3 days   -c/w Solumedrol 20mg q8  -Duoneb q6  -Symbicort BID   -No evidence of CO2 retention on VBG Smoking cessation advised  -Nicotine patch 21mg

## 2019-03-22 NOTE — PROGRESS NOTE ADULT - PROBLEM SELECTOR PLAN 1
Low grade temp, c/o HA, nausea, worsening dyspnea, sounds more congested  -Check CXR  -Check RVP -Keep sp02>88% on supplemental oxygen, attempt to titrate off  -d/c Azithromycin  -Prednisone 30mg BID, will taper slowly  -Duoneb q6  -Symbicort BID   -No evidence of CO2 retention on VBG  -d/c planning to [facility]

## 2019-03-22 NOTE — PROGRESS NOTE ADULT - SUBJECTIVE AND OBJECTIVE BOX
Chief Complaint: fu    History of Present Illness: no f/c, +dyspnea, still with productive cough, no n/v/abd pain, poor appetite, +weakness, no bleeding, no leg pain      MEDICATIONS  (STANDING):  ALBUTerol/ipratropium for Nebulization 3 milliLiter(s) Nebulizer every 6 hours  buDESOnide 160 MICROgram(s)/formoterol 4.5 MICROgram(s) Inhaler 2 Puff(s) Inhalation two times a day  cholecalciferol 2000 Unit(s) Oral daily  docusate sodium 100 milliGRAM(s) Oral two times a day  exemestane 25 milliGRAM(s) Oral daily  fluocinonide 0.05% Solution 1 Application(s) Topical every 12 hours  folic acid 1 milliGRAM(s) Oral daily  heparin  Injectable 5000 Unit(s) SubCutaneous every 12 hours  LORazepam     Tablet   Oral   LORazepam     Tablet 1 milliGRAM(s) Oral every 12 hours  montelukast 10 milliGRAM(s) Oral daily  multivitamin 1 Tablet(s) Oral daily  nicotine - 21 mG/24Hr(s) Patch 1 patch Transdermal daily  polyethylene glycol 3350 17 Gram(s) Oral daily  predniSONE   Tablet 30 milliGRAM(s) Oral two times a day  prochlorperazine   Tablet 10 milliGRAM(s) Oral daily  senna 2 Tablet(s) Oral at bedtime  triamcinolone 0.1% Ointment 1 Application(s) Topical every 12 hours  trimethoprim/polymyxin Solution 1 Drop(s) Both EYES every 6 hours    MEDICATIONS  (PRN):  LORazepam   Injectable 2 milliGRAM(s) IV Push every 1 hour PRN Symptom-triggered: each CIWA -Ar score 8 or GREATER      Allergies    amoxicillin (Anaphylaxis)  Levaquin (Other; Anaphylaxis)  Levaquin (Unknown)  penicillin (Anaphylaxis)  penicillins (Anaphylaxis)    Intolerances        Vital Signs Last 24 Hrs  T(C): 36.6 (22 Mar 2019 13:12), Max: 37.1 (21 Mar 2019 16:34)  T(F): 97.8 (22 Mar 2019 13:12), Max: 98.7 (21 Mar 2019 16:34)  HR: 100 (22 Mar 2019 13:12) (90 - 103)  BP: 153/94 (22 Mar 2019 13:12) (111/75 - 153/94)  BP(mean): --  RR: 18 (22 Mar 2019 13:12) (18 - 20)  SpO2: 93% (22 Mar 2019 13:12) (93% - 97%)    PHYSICAL EXAM  General: adult in NAD  HEENT: clear oropharynx, anicteric sclera, pink conjunctiva  Neck: supple  CV: normal S1/S2   Lungs: decreased BS  Abdomen: soft non-tender non-distended,  positive bowel sounds  Ext: no clubbing cyanosis or edema  Skin: no rashes and no petechiae  Lymph Nodes: No LAD in axillae, groin, neck  Neuro: lethargic but oriented x 3 and answering questions, no focal deficits    LABS:                          14.8   7.09  )-----------( 131      ( 21 Mar 2019 10:16 )             43.4         Mean Cell Volume : 111.6 fl  Mean Cell Hemoglobin : 38.0 pg  Mean Cell Hemoglobin Concentration : 34.1 gm/dL  Auto Neutrophil # : 5.49 K/uL  Auto Lymphocyte # : 0.61 K/uL  Auto Monocyte # : 0.90 K/uL  Auto Eosinophil # : 0.01 K/uL  Auto Basophil # : 0.01 K/uL  Auto Neutrophil % : 77.5 %  Auto Lymphocyte % : 8.6 %  Auto Monocyte % : 12.7 %  Auto Eosinophil % : 0.1 %  Auto Basophil % : 0.1 %      Serial CBC's  03-21 @ 10:16  Hct-43.4 / Hgb-14.8 / Plat-131 / RBC-3.89 / WBC-7.09  Serial CBC's  03-19 @ 08:46  Hct-43.4 / Hgb-14.5 / Plat-102 / RBC-3.88 / WBC-7.26      03-21    132<L>  |  91<L>  |  15  ----------------------------<  126<H>  4.1   |  26  |  0.52    Ca    9.0      21 Mar 2019 07:45    TPro  6.2  /  Alb  3.3  /  TBili  1.5<H>  /  DBili  x   /  AST  165<H>  /  ALT  159<H>  /  AlkPhos  75  03-21                      Radiology:

## 2019-03-22 NOTE — PROGRESS NOTE ADULT - SUBJECTIVE AND OBJECTIVE BOX
Follow-up Pulm Progress Note    No new respiratory events overnight.  Denies SOB/CP.   90% on RA  96% on 2L NC    Medications:  MEDICATIONS  (STANDING):  ALBUTerol/ipratropium for Nebulization 3 milliLiter(s) Nebulizer every 6 hours  buDESOnide 160 MICROgram(s)/formoterol 4.5 MICROgram(s) Inhaler 2 Puff(s) Inhalation two times a day  cholecalciferol 2000 Unit(s) Oral daily  docusate sodium 100 milliGRAM(s) Oral two times a day  exemestane 25 milliGRAM(s) Oral daily  fluocinonide 0.05% Solution 1 Application(s) Topical every 12 hours  folic acid 1 milliGRAM(s) Oral daily  heparin  Injectable 5000 Unit(s) SubCutaneous every 12 hours  LORazepam     Tablet   Oral   LORazepam     Tablet 1 milliGRAM(s) Oral every 12 hours  montelukast 10 milliGRAM(s) Oral daily  multivitamin 1 Tablet(s) Oral daily  nicotine - 21 mG/24Hr(s) Patch 1 patch Transdermal daily  polyethylene glycol 3350 17 Gram(s) Oral daily  predniSONE   Tablet 30 milliGRAM(s) Oral two times a day  prochlorperazine   Tablet 10 milliGRAM(s) Oral daily  senna 2 Tablet(s) Oral at bedtime  triamcinolone 0.1% Ointment 1 Application(s) Topical every 12 hours  trimethoprim/polymyxin Solution 1 Drop(s) Both EYES every 6 hours    MEDICATIONS  (PRN):  LORazepam   Injectable 2 milliGRAM(s) IV Push every 1 hour PRN Symptom-triggered: each CIWA -Ar score 8 or GREATER          Vital Signs Last 24 Hrs  T(C): 36.6 (22 Mar 2019 13:12), Max: 37.1 (21 Mar 2019 16:34)  T(F): 97.8 (22 Mar 2019 13:12), Max: 98.7 (21 Mar 2019 16:34)  HR: 100 (22 Mar 2019 13:12) (90 - 103)  BP: 153/94 (22 Mar 2019 13:12) (111/75 - 153/94)  BP(mean): --  RR: 18 (22 Mar 2019 13:12) (18 - 20)  SpO2: 93% (22 Mar 2019 13:12) (93% - 97%) on 2L NC          03-21 @ 07:01  -  03-22 @ 07:00  --------------------------------------------------------  IN: 420 mL / OUT: 0 mL / NET: 420 mL          LABS:                        14.8   7.09  )-----------( 131      ( 21 Mar 2019 10:16 )             43.4     03-21    132<L>  |  91<L>  |  15  ----------------------------<  126<H>  4.1   |  26  |  0.52    Ca    9.0      21 Mar 2019 07:45    TPro  6.2  /  Alb  3.3  /  TBili  1.5<H>  /  DBili  x   /  AST  165<H>  /  ALT  159<H>  /  AlkPhos  75  03-21            Physical Examination:  PULM: Forced exp wheeze, clears with cough   CVS: S1, S2 heard    RADIOLOGY REVIEWED  CXR: Grossly clear

## 2019-03-22 NOTE — PROGRESS NOTE ADULT - ASSESSMENT
54 Female w/ PMH stage 3 Bilateral Breast CA on Aromasin, RA, Psoriasis previously on Otezla, remote Brain Aneurysm s/p Clip in 1988,  Rt Jugular DVT, Catheter related MSSA Bacteremia from [place] in July 2015, ETOH abuse, COPD, p/w dypsnea, f/w COPD exacerbation, alcohol intoxication with  risk for withdrawal, hypovolemic hyponatremia in setting of poor PO intake and dehydration, and b/l conjunctivitis presumed bacterial in nature.     Problem: COPD exacerbation   solumedrol as per pulm   changed  to po    symbicort   -duoneb atc  -c/w singulair  -pt with multiple abx allergies  pulm f/u    -CTA reviewed, no PE  -cigarette cessation education provided.  -sup O2 prn to keep o2 sats 88-92%.       Problem: Bacterial conjunctivitis  -c/w drops for now q6    Problem: Hyponatremia  stable        Problem: Alcohol intoxication  etoh abuse      , ciwa protocol, prn ativan (noted etoh hepatitis on admission so will avoid librium)  mental status better   -thiamine, folate, MVI  -social work  -fall precautions, aspiration precautions    : Alcoholic hepatitis  -etoh cessation education provided    : Psoriasis  rheum eval noted  derm noted      Problem: Breast cancer  Assessment and Plan: c/w examestane  heme/onc eval noted     Problem: Thrombocytopenia better  -monitor counts for now  stable   rehab planning

## 2019-03-23 LAB
ALBUMIN SERPL ELPH-MCNC: 3.5 G/DL — SIGNIFICANT CHANGE UP (ref 3.3–5)
ALP SERPL-CCNC: 67 U/L — SIGNIFICANT CHANGE UP (ref 40–120)
ALT FLD-CCNC: 214 U/L — HIGH (ref 10–45)
ANION GAP SERPL CALC-SCNC: 15 MMOL/L — SIGNIFICANT CHANGE UP (ref 5–17)
AST SERPL-CCNC: 171 U/L — HIGH (ref 10–40)
BILIRUB SERPL-MCNC: 1.3 MG/DL — HIGH (ref 0.2–1.2)
BUN SERPL-MCNC: 18 MG/DL — SIGNIFICANT CHANGE UP (ref 7–23)
CALCIUM SERPL-MCNC: 9.4 MG/DL — SIGNIFICANT CHANGE UP (ref 8.4–10.5)
CHLORIDE SERPL-SCNC: 91 MMOL/L — LOW (ref 96–108)
CO2 SERPL-SCNC: 28 MMOL/L — SIGNIFICANT CHANGE UP (ref 22–31)
CREAT SERPL-MCNC: 0.56 MG/DL — SIGNIFICANT CHANGE UP (ref 0.5–1.3)
GLUCOSE SERPL-MCNC: 111 MG/DL — HIGH (ref 70–99)
POTASSIUM SERPL-MCNC: 4.5 MMOL/L — SIGNIFICANT CHANGE UP (ref 3.5–5.3)
POTASSIUM SERPL-SCNC: 4.5 MMOL/L — SIGNIFICANT CHANGE UP (ref 3.5–5.3)
PROT SERPL-MCNC: 6.3 G/DL — SIGNIFICANT CHANGE UP (ref 6–8.3)
SODIUM SERPL-SCNC: 134 MMOL/L — LOW (ref 135–145)

## 2019-03-23 RX ORDER — ALPRAZOLAM 0.25 MG
0.25 TABLET ORAL ONCE
Qty: 0 | Refills: 0 | Status: DISCONTINUED | OUTPATIENT
Start: 2019-03-23 | End: 2019-03-23

## 2019-03-23 RX ADMIN — Medication 1 APPLICATION(S): at 17:44

## 2019-03-23 RX ADMIN — Medication 1 PATCH: at 11:45

## 2019-03-23 RX ADMIN — Medication 30 MILLIGRAM(S): at 05:48

## 2019-03-23 RX ADMIN — Medication 100 MILLIGRAM(S): at 05:48

## 2019-03-23 RX ADMIN — Medication 3 MILLILITER(S): at 11:42

## 2019-03-23 RX ADMIN — Medication 3 MILLILITER(S): at 05:48

## 2019-03-23 RX ADMIN — HEPARIN SODIUM 5000 UNIT(S): 5000 INJECTION INTRAVENOUS; SUBCUTANEOUS at 17:45

## 2019-03-23 RX ADMIN — HEPARIN SODIUM 5000 UNIT(S): 5000 INJECTION INTRAVENOUS; SUBCUTANEOUS at 05:48

## 2019-03-23 RX ADMIN — Medication 1 PATCH: at 20:35

## 2019-03-23 RX ADMIN — Medication 1 MILLIGRAM(S): at 11:43

## 2019-03-23 RX ADMIN — Medication 1 TABLET(S): at 11:43

## 2019-03-23 RX ADMIN — Medication 30 MILLIGRAM(S): at 17:45

## 2019-03-23 RX ADMIN — Medication 2000 UNIT(S): at 11:43

## 2019-03-23 RX ADMIN — Medication 10 MILLIGRAM(S): at 11:43

## 2019-03-23 RX ADMIN — EXEMESTANE 25 MILLIGRAM(S): 25 TABLET, SUGAR COATED ORAL at 11:43

## 2019-03-23 RX ADMIN — Medication 0.25 MILLIGRAM(S): at 01:19

## 2019-03-23 RX ADMIN — Medication 1 APPLICATION(S): at 05:47

## 2019-03-23 RX ADMIN — Medication 1 PATCH: at 07:25

## 2019-03-23 RX ADMIN — Medication 1 PATCH: at 11:43

## 2019-03-23 RX ADMIN — Medication 3 MILLILITER(S): at 17:45

## 2019-03-23 RX ADMIN — Medication 3 MILLILITER(S): at 23:09

## 2019-03-23 RX ADMIN — Medication 1 APPLICATION(S): at 05:48

## 2019-03-23 RX ADMIN — MONTELUKAST 10 MILLIGRAM(S): 4 TABLET, CHEWABLE ORAL at 11:43

## 2019-03-23 RX ADMIN — BUDESONIDE AND FORMOTEROL FUMARATE DIHYDRATE 2 PUFF(S): 160; 4.5 AEROSOL RESPIRATORY (INHALATION) at 17:44

## 2019-03-23 RX ADMIN — BUDESONIDE AND FORMOTEROL FUMARATE DIHYDRATE 2 PUFF(S): 160; 4.5 AEROSOL RESPIRATORY (INHALATION) at 05:48

## 2019-03-23 RX ADMIN — POLYETHYLENE GLYCOL 3350 17 GRAM(S): 17 POWDER, FOR SOLUTION ORAL at 11:43

## 2019-03-23 NOTE — PROGRESS NOTE ADULT - SUBJECTIVE AND OBJECTIVE BOX
CHIEF COMPLAINT:Patient is a 54y old  Female who presents with a chief complaint of copd exacerbation, etoh intoxication (22 Mar 2019 14:51)    	        PAST MEDICAL & SURGICAL HISTORY:  Prophylactic measure  Breast cancer  Brain aneurysm: with clips  Psoriasis  RA (rheumatoid arthritis)  Psoriasis  Breast cancer, stage 3  History of modified radical mastectomy of both breasts  S/P bilateral mastectomy  Brain aneurysm: 1988 two clips          REVIEW OF SYSTEMS:  CONSTITUTIONAL: feels better overall  EYES: No eye pain, visual disturbances, or discharge  NECK: No pain or stiffness  RESPIRATORY:  dec cough and sob  CARDIOVASCULAR: No chest pain, palpitations, passing out, dizziness,  GASTROINTESTINAL: No abdominal or epigastric pain. No nausea, vomiting, or hematemesis; No diarrhea or constipation. No melena or hematochezia.  GENITOURINARY: No dysuria, frequency, hematuria, or incontinence  NEUROLOGICAL: No headaches,     Medications:  MEDICATIONS  (STANDING):  ALBUTerol/ipratropium for Nebulization 3 milliLiter(s) Nebulizer every 6 hours  buDESOnide 160 MICROgram(s)/formoterol 4.5 MICROgram(s) Inhaler 2 Puff(s) Inhalation two times a day  cholecalciferol 2000 Unit(s) Oral daily  docusate sodium 100 milliGRAM(s) Oral two times a day  exemestane 25 milliGRAM(s) Oral daily  fluocinonide 0.05% Solution 1 Application(s) Topical every 12 hours  folic acid 1 milliGRAM(s) Oral daily  heparin  Injectable 5000 Unit(s) SubCutaneous every 12 hours  montelukast 10 milliGRAM(s) Oral daily  multivitamin 1 Tablet(s) Oral daily  nicotine - 21 mG/24Hr(s) Patch 1 patch Transdermal daily  polyethylene glycol 3350 17 Gram(s) Oral daily  predniSONE   Tablet 30 milliGRAM(s) Oral two times a day  prochlorperazine   Tablet 10 milliGRAM(s) Oral daily  senna 2 Tablet(s) Oral at bedtime  triamcinolone 0.1% Ointment 1 Application(s) Topical every 12 hours    MEDICATIONS  (PRN):  LORazepam   Injectable 2 milliGRAM(s) IV Push every 1 hour PRN Symptom-triggered: each CIWA -Ar score 8 or GREATER    	    PHYSICAL EXAM:  T(C): 36.9 (03-23-19 @ 04:59), Max: 37.4 (03-23-19 @ 00:44)  HR: 80 (03-23-19 @ 04:59) (80 - 100)  BP: 127/77 (03-23-19 @ 04:59) (127/77 - 156/99)  RR: 18 (03-23-19 @ 04:59) (18 - 18)  SpO2: 96% (03-23-19 @ 04:59) (93% - 97%)  Wt(kg): --  I&O's Summary    22 Mar 2019 07:01  -  23 Mar 2019 07:00  --------------------------------------------------------  IN: 730 mL / OUT: 0 mL / NET: 730 mL        Appearance: Normal	  HEENT:   Normal oral mucosa, PERRL, EOMI	  Lymphatic: No lymphadenopathy  Cardiovascular: Normal S1 S2, No JVD, No murmurs,  Respiratory:  dec bs   Psychiatry: A & O   Gastrointestinal:  Soft, Non-tender, + BS	  Skin: +psoriasis lesions 	  Neurologic: Non-focal  Extremities: Normal range of motion, No clubbing, cyanosis     Vascular: Peripheral pulses palpable     TELEMETRY: 	    ECG:  	  RADIOLOGY:  OTHER: 	  	  LABS:	 	    CARDIAC MARKERS:            03-23    134<L>  |  91<L>  |  18  ----------------------------<  111<H>  4.5   |  28  |  0.56    Ca    9.4      23 Mar 2019 07:44    TPro  6.3  /  Alb  3.5  /  TBili  1.3<H>  /  DBili  x   /  AST  171<H>  /  ALT  214<H>  /  AlkPhos  67  03-23    proBNP:   Lipid Profile:   HgA1c:   TSH:

## 2019-03-23 NOTE — PROGRESS NOTE ADULT - ASSESSMENT
54 Female w/ PMH stage 3 Bilateral Breast CA on Aromasin, RA, Psoriasis previously on Otezla, remote Brain Aneurysm s/p Clip in 1988,  Rt Jugular DVT, Catheter related MSSA Bacteremia from [place] in July 2015, ETOH abuse, COPD, p/w dypsnea, f/w COPD exacerbation, alcohol intoxication with  risk for withdrawal, hypovolemic hyponatremia in setting of poor PO intake and dehydration, and b/l conjunctivitis presumed bacterial in nature.     Problem: COPD exacerbation  po prednisone    symbicort   -duoneb   -c/w pulm meds   -pt with multiple abx allergies  pulm f/unoted    -CTA reviewed, no PE  -cigarette cessation education provided.  -sup O2 prn to keep o2 sats 88-92%.       Problem: Bacterial conjunctivitis  s/p drops    Problem: Hyponatremia  stable        Problem: Alcohol intoxication  etoh abuse      , ciwa protocol, prn ativan (noted etoh hepatitis on admission so will avoid librium)  mental status better   -thiamine, folate, MVI  -social work  -fall precautions, aspiration precautions    : Alcoholic hepatitis  -etoh cessation education provided    : Psoriasis  rheum eval noted  derm noted      Problem: Breast cancer  Assessment and Plan: c/w examestane  heme/onc eval noted     Problem: Thrombocytopenia better  -monitor counts for now  stable   rehab planning

## 2019-03-23 NOTE — CHART NOTE - NSCHARTNOTEFT_GEN_A_CORE
Notified by RN for anxiety. Patient seen and examined at the bedside. Lying in bed in NAD. Pt is on CIWA protocol, score has been 0-2. CIWA @ 1:10 was 1 for mild anxiety. VSS. Denies HA, lightheadedness, N/V, tactile, visual, auditory disturbances. Unremarkable physical exam. Reviewed I-STOP, pt 's on Xanax 0.25mg BID. Will give Xanax 0.25mg x 1. Will continue to closely monitor clinical status and vitals.     Mady Colon PA-C Notified by RN for anxiety. Patient seen and examined at the bedside. Lying in bed in NAD. Pt is on CIWA protocol, score has been 0-2. CIWA @ 1:10 was 1 for mild anxiety. VSS. Denies HA, lightheadedness, N/V, CP, SOB, tactile, visual, auditory disturbances. Unremarkable physical exam. Reviewed I-STOP, pt 's on Xanax 0.25mg BID. Will give Xanax 0.25mg x 1. Will continue to closely monitor clinical status and vitals.     Mady Colon PA-C Notified by RN for anxiety. Patient seen and examined at the bedside. Lying in bed in NAD. Pt is on CIWA protocol, score has been 0-2. CIWA @ 1:10 was 1 for mild anxiety. VSS. Denies HA, lightheadedness, N/V, CP, SOB, tactile, visual, auditory disturbances. Reviewed I-STOP, pt 's on Xanax 0.25mg BID. Will give Xanax 0.25mg x 1. Will continue to closely monitor clinical status and vitals.     Mady Colon PA-C

## 2019-03-24 DIAGNOSIS — R94.5 ABNORMAL RESULTS OF LIVER FUNCTION STUDIES: ICD-10-CM

## 2019-03-24 LAB
ANION GAP SERPL CALC-SCNC: 14 MMOL/L — SIGNIFICANT CHANGE UP (ref 5–17)
BUN SERPL-MCNC: 22 MG/DL — SIGNIFICANT CHANGE UP (ref 7–23)
CALCIUM SERPL-MCNC: 9.5 MG/DL — SIGNIFICANT CHANGE UP (ref 8.4–10.5)
CHLORIDE SERPL-SCNC: 94 MMOL/L — LOW (ref 96–108)
CO2 SERPL-SCNC: 26 MMOL/L — SIGNIFICANT CHANGE UP (ref 22–31)
CREAT SERPL-MCNC: 0.57 MG/DL — SIGNIFICANT CHANGE UP (ref 0.5–1.3)
GLUCOSE SERPL-MCNC: 110 MG/DL — HIGH (ref 70–99)
HCT VFR BLD CALC: 44 % — SIGNIFICANT CHANGE UP (ref 34.5–45)
HGB BLD-MCNC: 14.9 G/DL — SIGNIFICANT CHANGE UP (ref 11.5–15.5)
MCHC RBC-ENTMCNC: 33.9 GM/DL — SIGNIFICANT CHANGE UP (ref 32–36)
MCHC RBC-ENTMCNC: 37.7 PG — HIGH (ref 27–34)
MCV RBC AUTO: 111.4 FL — HIGH (ref 80–100)
PLATELET # BLD AUTO: 209 K/UL — SIGNIFICANT CHANGE UP (ref 150–400)
POTASSIUM SERPL-MCNC: 4.3 MMOL/L — SIGNIFICANT CHANGE UP (ref 3.5–5.3)
POTASSIUM SERPL-SCNC: 4.3 MMOL/L — SIGNIFICANT CHANGE UP (ref 3.5–5.3)
RBC # BLD: 3.95 M/UL — SIGNIFICANT CHANGE UP (ref 3.8–5.2)
RBC # FLD: 12.6 % — SIGNIFICANT CHANGE UP (ref 10.3–14.5)
SODIUM SERPL-SCNC: 134 MMOL/L — LOW (ref 135–145)
WBC # BLD: 6.68 K/UL — SIGNIFICANT CHANGE UP (ref 3.8–10.5)
WBC # FLD AUTO: 6.68 K/UL — SIGNIFICANT CHANGE UP (ref 3.8–10.5)

## 2019-03-24 RX ADMIN — Medication 1 PATCH: at 08:12

## 2019-03-24 RX ADMIN — Medication 1 APPLICATION(S): at 17:10

## 2019-03-24 RX ADMIN — Medication 1 PATCH: at 19:16

## 2019-03-24 RX ADMIN — Medication 1 APPLICATION(S): at 06:14

## 2019-03-24 RX ADMIN — Medication 1 MILLIGRAM(S): at 11:34

## 2019-03-24 RX ADMIN — POLYETHYLENE GLYCOL 3350 17 GRAM(S): 17 POWDER, FOR SOLUTION ORAL at 11:34

## 2019-03-24 RX ADMIN — Medication 3 MILLILITER(S): at 17:10

## 2019-03-24 RX ADMIN — HEPARIN SODIUM 5000 UNIT(S): 5000 INJECTION INTRAVENOUS; SUBCUTANEOUS at 17:10

## 2019-03-24 RX ADMIN — Medication 1 PATCH: at 11:34

## 2019-03-24 RX ADMIN — Medication 1 APPLICATION(S): at 06:15

## 2019-03-24 RX ADMIN — MONTELUKAST 10 MILLIGRAM(S): 4 TABLET, CHEWABLE ORAL at 11:34

## 2019-03-24 RX ADMIN — Medication 3 MILLILITER(S): at 06:16

## 2019-03-24 RX ADMIN — Medication 1 PATCH: at 11:35

## 2019-03-24 RX ADMIN — BUDESONIDE AND FORMOTEROL FUMARATE DIHYDRATE 2 PUFF(S): 160; 4.5 AEROSOL RESPIRATORY (INHALATION) at 17:10

## 2019-03-24 RX ADMIN — Medication 30 MILLIGRAM(S): at 17:10

## 2019-03-24 RX ADMIN — Medication 10 MILLIGRAM(S): at 11:33

## 2019-03-24 RX ADMIN — Medication 3 MILLILITER(S): at 11:34

## 2019-03-24 RX ADMIN — EXEMESTANE 25 MILLIGRAM(S): 25 TABLET, SUGAR COATED ORAL at 11:34

## 2019-03-24 RX ADMIN — BUDESONIDE AND FORMOTEROL FUMARATE DIHYDRATE 2 PUFF(S): 160; 4.5 AEROSOL RESPIRATORY (INHALATION) at 06:16

## 2019-03-24 RX ADMIN — Medication 2000 UNIT(S): at 11:33

## 2019-03-24 RX ADMIN — HEPARIN SODIUM 5000 UNIT(S): 5000 INJECTION INTRAVENOUS; SUBCUTANEOUS at 06:19

## 2019-03-24 RX ADMIN — Medication 30 MILLIGRAM(S): at 06:13

## 2019-03-24 RX ADMIN — Medication 1 TABLET(S): at 11:34

## 2019-03-24 RX ADMIN — Medication 3 MILLILITER(S): at 23:55

## 2019-03-24 NOTE — PROGRESS NOTE ADULT - ASSESSMENT
54 Female w/ PMH stage 3 Bilateral Breast CA on Aromasin, RA, Psoriasis previously on Otezla, remote Brain Aneurysm s/p Clip in 1988,  Rt Jugular DVT, Catheter related MSSA Bacteremia from [place] in July 2015, ETOH abuse, COPD, p/w dypsnea, f/w COPD exacerbation, alcohol intoxication with  risk for withdrawal, hypovolemic hyponatremia in setting of poor PO intake and dehydration, and b/l conjunctivitis presumed bacterial in nature.     Problem: COPD exacerbation  po prednisone    symbicort   -duoneb   -c/w pulm meds   pulm f/unoted    -CTA reviewed, no PE  -cigarette cessation education provided.  -sup O2 prn to keep o2 sats 88-92%.       Problem: Bacterial conjunctivitis  s/p drops    Problem: Hyponatremia  stable        Problem: Alcohol intoxication  etoh abuse      , ciwa protocol, prn ativan (noted etoh hepatitis on admission so will avoid librium)  mental status better   -thiamine, folate, MVI  -social work  -fall precautions, aspiration precautions    : Alcoholic hepatitis  -etoh cessation education provided      lfts higher  check abd sono  gi eval      : Psoriasis  rheum eval noted  derm noted      Problem: Breast cancer  Assessment and Plan: c/w examestane  heme/onc eval noted     Problem: Thrombocytopenia stable  -monitor counts for now  stable   rehab planning

## 2019-03-24 NOTE — CONSULT NOTE ADULT - REASON FOR ADMISSION
copd exacerbation, etoh intoxication

## 2019-03-24 NOTE — CONSULT NOTE ADULT - PROBLEM SELECTOR RECOMMENDATION 9
Multi factorial, on presentation they are consistent with ETOH hepatitis, apperas to have resolved now consisted with Parenchymal disease ( fatty liver)  abd ultrasound  Hep B panel, AFP  monitor LFT's    discuss with NP

## 2019-03-24 NOTE — PROGRESS NOTE ADULT - SUBJECTIVE AND OBJECTIVE BOX
CHIEF COMPLAINT:Patient is a 54y old  Female who presents with a chief complaint of copd exacerbation, etoh intoxication (23 Mar 2019 10:28)    	        PAST MEDICAL & SURGICAL HISTORY:  Prophylactic measure  Breast cancer  Brain aneurysm: with clips  Psoriasis  RA (rheumatoid arthritis)  Psoriasis  Breast cancer, stage 3  History of modified radical mastectomy of both breasts  S/P bilateral mastectomy  Brain aneurysm: 1988 two clips          REVIEW OF SYSTEMS:  CONSTITUTIONAL: feels better overall  EYES: No eye pain, visual disturbances, or discharge  NECK: No pain or stiffness  RESPIRATORY: dec sob  and cough   CARDIOVASCULAR: No chest pain, palpitations, passing out, dizziness,  GASTROINTESTINAL: No abdominal or epigastric pain. No nausea, vomiting, or hematemesis; No diarrhea or constipation. No melena or hematochezia.  GENITOURINARY: No dysuria, frequency, hematuria, or incontinence  NEUROLOGICAL: No headaches,     Medications:  MEDICATIONS  (STANDING):  ALBUTerol/ipratropium for Nebulization 3 milliLiter(s) Nebulizer every 6 hours  buDESOnide 160 MICROgram(s)/formoterol 4.5 MICROgram(s) Inhaler 2 Puff(s) Inhalation two times a day  cholecalciferol 2000 Unit(s) Oral daily  docusate sodium 100 milliGRAM(s) Oral two times a day  exemestane 25 milliGRAM(s) Oral daily  fluocinonide 0.05% Solution 1 Application(s) Topical every 12 hours  folic acid 1 milliGRAM(s) Oral daily  heparin  Injectable 5000 Unit(s) SubCutaneous every 12 hours  montelukast 10 milliGRAM(s) Oral daily  multivitamin 1 Tablet(s) Oral daily  nicotine - 21 mG/24Hr(s) Patch 1 patch Transdermal daily  polyethylene glycol 3350 17 Gram(s) Oral daily  predniSONE   Tablet 30 milliGRAM(s) Oral two times a day  prochlorperazine   Tablet 10 milliGRAM(s) Oral daily  senna 2 Tablet(s) Oral at bedtime  triamcinolone 0.1% Ointment 1 Application(s) Topical every 12 hours    MEDICATIONS  (PRN):  LORazepam   Injectable 2 milliGRAM(s) IV Push every 1 hour PRN Symptom-triggered: each CIWA -Ar score 8 or GREATER    	    PHYSICAL EXAM:  T(C): 36.6 (03-24-19 @ 06:12), Max: 37.1 (03-23-19 @ 11:46)  HR: 77 (03-24-19 @ 06:12) (77 - 95)  BP: 128/82 (03-24-19 @ 06:12) (123/80 - 136/88)  RR: 17 (03-24-19 @ 06:12) (17 - 18)  SpO2: 96% (03-24-19 @ 06:12) (93% - 96%)  Wt(kg): --  I&O's Summary    23 Mar 2019 07:01  -  24 Mar 2019 07:00  --------------------------------------------------------  IN: 655 mL / OUT: 0 mL / NET: 655 mL        Appearance: Normal	  HEENT:   Normal oral mucosa, PERRL, EOMI	  Lymphatic: No lymphadenopathy  Cardiovascular: Normal S1 S2, No JVD, No murmurs,   Respiratory: dec bs   Psychiatry: A & O x 3,  Gastrointestinal:  Soft, Non-tender, + BS	  Skin: psoriatic lesions   Neurologic: Non-focal  Extremities: Normal range of motion, No clubbing, cyanosis   Vascular: Peripheral pulses palpable     TELEMETRY: 	    ECG:  	  RADIOLOGY:  OTHER: 	  	  LABS:	 	    CARDIAC MARKERS:            03-24    134<L>  |  94<L>  |  22  ----------------------------<  110<H>  4.3   |  26  |  0.57    Ca    9.5      24 Mar 2019 07:26    TPro  6.3  /  Alb  3.5  /  TBili  1.3<H>  /  DBili  x   /  AST  171<H>  /  ALT  214<H>  /  AlkPhos  67  03-23    proBNP:   Lipid Profile:   HgA1c:   TSH:

## 2019-03-25 LAB
AFP-TM SERPL-MCNC: 4.5 NG/ML — SIGNIFICANT CHANGE UP
ALBUMIN SERPL ELPH-MCNC: 3.4 G/DL — SIGNIFICANT CHANGE UP (ref 3.3–5)
ALP SERPL-CCNC: 65 U/L — SIGNIFICANT CHANGE UP (ref 40–120)
ALT FLD-CCNC: 284 U/L — HIGH (ref 10–45)
AST SERPL-CCNC: 172 U/L — HIGH (ref 10–40)
BILIRUB DIRECT SERPL-MCNC: 0.3 MG/DL — HIGH (ref 0–0.2)
BILIRUB INDIRECT FLD-MCNC: 1 MG/DL — SIGNIFICANT CHANGE UP (ref 0.2–1)
BILIRUB SERPL-MCNC: 1.3 MG/DL — HIGH (ref 0.2–1.2)
HBV CORE AB SER-ACNC: SIGNIFICANT CHANGE UP
HBV SURFACE AB SER-ACNC: SIGNIFICANT CHANGE UP
HBV SURFACE AG SER-ACNC: SIGNIFICANT CHANGE UP
PROT SERPL-MCNC: 6.5 G/DL — SIGNIFICANT CHANGE UP (ref 6–8.3)

## 2019-03-25 PROCEDURE — 76700 US EXAM ABDOM COMPLETE: CPT | Mod: 26

## 2019-03-25 RX ORDER — PANTOPRAZOLE SODIUM 20 MG/1
40 TABLET, DELAYED RELEASE ORAL
Qty: 0 | Refills: 0 | Status: DISCONTINUED | OUTPATIENT
Start: 2019-03-25 | End: 2019-03-29

## 2019-03-25 RX ADMIN — Medication 1 PATCH: at 11:20

## 2019-03-25 RX ADMIN — EXEMESTANE 25 MILLIGRAM(S): 25 TABLET, SUGAR COATED ORAL at 12:45

## 2019-03-25 RX ADMIN — HEPARIN SODIUM 5000 UNIT(S): 5000 INJECTION INTRAVENOUS; SUBCUTANEOUS at 05:15

## 2019-03-25 RX ADMIN — BUDESONIDE AND FORMOTEROL FUMARATE DIHYDRATE 2 PUFF(S): 160; 4.5 AEROSOL RESPIRATORY (INHALATION) at 19:42

## 2019-03-25 RX ADMIN — Medication 10 MILLIGRAM(S): at 12:45

## 2019-03-25 RX ADMIN — Medication 1 APPLICATION(S): at 17:51

## 2019-03-25 RX ADMIN — Medication 3 MILLILITER(S): at 17:51

## 2019-03-25 RX ADMIN — Medication 1 APPLICATION(S): at 05:17

## 2019-03-25 RX ADMIN — Medication 2000 UNIT(S): at 12:45

## 2019-03-25 RX ADMIN — Medication 30 MILLIGRAM(S): at 05:15

## 2019-03-25 RX ADMIN — Medication 1 APPLICATION(S): at 17:50

## 2019-03-25 RX ADMIN — Medication 1 PATCH: at 12:42

## 2019-03-25 RX ADMIN — Medication 3 MILLILITER(S): at 05:17

## 2019-03-25 RX ADMIN — Medication 1 PATCH: at 19:00

## 2019-03-25 RX ADMIN — BUDESONIDE AND FORMOTEROL FUMARATE DIHYDRATE 2 PUFF(S): 160; 4.5 AEROSOL RESPIRATORY (INHALATION) at 05:16

## 2019-03-25 RX ADMIN — Medication 3 MILLILITER(S): at 12:42

## 2019-03-25 RX ADMIN — HEPARIN SODIUM 5000 UNIT(S): 5000 INJECTION INTRAVENOUS; SUBCUTANEOUS at 17:51

## 2019-03-25 RX ADMIN — MONTELUKAST 10 MILLIGRAM(S): 4 TABLET, CHEWABLE ORAL at 12:46

## 2019-03-25 RX ADMIN — Medication 1 TABLET(S): at 12:46

## 2019-03-25 RX ADMIN — Medication 1 MILLIGRAM(S): at 12:46

## 2019-03-25 NOTE — PROGRESS NOTE ADULT - PROBLEM SELECTOR PLAN 1
-Keep sp02>88% on supplemental oxygen  -Prednisone taper as outlined   -Start Protonix daily for PUD ppx while on steroids   -Duoneb q6  -Symbicort BID   -No objection to d/c planning to [facility] from pulm perspective  -f/u with pulmonary as outpt after rehab   -d/c planning to [facility]

## 2019-03-25 NOTE — PROGRESS NOTE ADULT - SUBJECTIVE AND OBJECTIVE BOX
CHIEF COMPLAINT:Patient is a 54y old  Female who presents with a chief complaint of copd exacerbation, etoh intoxication (25 Mar 2019 11:10)    	        PAST MEDICAL & SURGICAL HISTORY:  Prophylactic measure  Breast cancer  Brain aneurysm: with clips  Psoriasis  RA (rheumatoid arthritis)  Psoriasis  Breast cancer, stage 3  History of modified radical mastectomy of both breasts  S/P bilateral mastectomy  Brain aneurysm: 1988 two clips          REVIEW OF SYSTEMS:  CONSTITUTIONAL: feels better overall   EYES: No eye pain, visual disturbances, or discharge  NECK: No pain or stiffness  RESPIRATORY:dec cough and sob  CARDIOVASCULAR: No chest pain, palpitations, passing out, dizziness,  GASTROINTESTINAL: No abdominal or epigastric pain. No nausea, vomiting, or hematemesis; No diarrhea or constipation. No melena or hematochezia.  GENITOURINARY: No dysuria, frequency, hematuria, or incontinence  NEUROLOGICAL: No headaches,    Medications:  MEDICATIONS  (STANDING):  ALBUTerol/ipratropium for Nebulization 3 milliLiter(s) Nebulizer every 6 hours  buDESOnide 160 MICROgram(s)/formoterol 4.5 MICROgram(s) Inhaler 2 Puff(s) Inhalation two times a day  cholecalciferol 2000 Unit(s) Oral daily  docusate sodium 100 milliGRAM(s) Oral two times a day  exemestane 25 milliGRAM(s) Oral daily  fluocinonide 0.05% Solution 1 Application(s) Topical every 12 hours  folic acid 1 milliGRAM(s) Oral daily  heparin  Injectable 5000 Unit(s) SubCutaneous every 12 hours  montelukast 10 milliGRAM(s) Oral daily  multivitamin 1 Tablet(s) Oral daily  nicotine - 21 mG/24Hr(s) Patch 1 patch Transdermal daily  pantoprazole    Tablet 40 milliGRAM(s) Oral before breakfast  polyethylene glycol 3350 17 Gram(s) Oral daily  predniSONE   Tablet 40 milliGRAM(s) Oral daily  predniSONE   Tablet   Oral   prochlorperazine   Tablet 10 milliGRAM(s) Oral daily  senna 2 Tablet(s) Oral at bedtime  triamcinolone 0.1% Ointment 1 Application(s) Topical every 12 hours    MEDICATIONS  (PRN):    	    PHYSICAL EXAM:  T(C): 36.6 (03-25-19 @ 05:13), Max: 37.1 (03-24-19 @ 19:44)  HR: 74 (03-25-19 @ 05:13) (74 - 84)  BP: 124/78 (03-25-19 @ 05:13) (122/80 - 138/82)  RR: 17 (03-25-19 @ 05:13) (17 - 18)  SpO2: 94% (03-25-19 @ 05:13) (93% - 94%)  Wt(kg): --  I&O's Summary    24 Mar 2019 07:01  -  25 Mar 2019 07:00  --------------------------------------------------------  IN: 1060 mL / OUT: 750 mL / NET: 310 mL    25 Mar 2019 07:01  -  25 Mar 2019 11:42  --------------------------------------------------------  IN: 0 mL / OUT: 200 mL / NET: -200 mL        Appearance: Normal	  HEENT:   Normal oral mucosa, PERRL, EOMI	  Lymphatic: No lymphadenopathy  Cardiovascular: Normal S1 S2, No JVD, No murmurs,   Respiratory: dec bs   Gastrointestinal:  Soft, Non-tender, + BS	  Skin: psoriatic lesions   Neurologic: Non-focal  Extremities: Normal range of motion, No clubbing, cyanosis or edema  Vascular: Peripheral pulses palpable 2+ bilaterally    TELEMETRY: 	    ECG:  	  RADIOLOGY:  OTHER: 	  	  LABS:	 	    CARDIAC MARKERS:                                14.9   6.68  )-----------( 209      ( 24 Mar 2019 10:02 )             44.0     03-24    134<L>  |  94<L>  |  22  ----------------------------<  110<H>  4.3   |  26  |  0.57    Ca    9.5      24 Mar 2019 07:26    TPro  6.5  /  Alb  3.4  /  TBili  1.3<H>  /  DBili  0.3<H>  /  AST  172<H>  /  ALT  284<H>  /  AlkPhos  65  03-25    proBNP:   Lipid Profile:   HgA1c:   TSH:

## 2019-03-25 NOTE — PROGRESS NOTE ADULT - SUBJECTIVE AND OBJECTIVE BOX
INTERVAL HPI/OVERNIGHT EVENTS:    MEDICATIONS  (STANDING):  ALBUTerol/ipratropium for Nebulization 3 milliLiter(s) Nebulizer every 6 hours  buDESOnide 160 MICROgram(s)/formoterol 4.5 MICROgram(s) Inhaler 2 Puff(s) Inhalation two times a day  cholecalciferol 2000 Unit(s) Oral daily  docusate sodium 100 milliGRAM(s) Oral two times a day  exemestane 25 milliGRAM(s) Oral daily  fluocinonide 0.05% Solution 1 Application(s) Topical every 12 hours  folic acid 1 milliGRAM(s) Oral daily  heparin  Injectable 5000 Unit(s) SubCutaneous every 12 hours  montelukast 10 milliGRAM(s) Oral daily  multivitamin 1 Tablet(s) Oral daily  nicotine - 21 mG/24Hr(s) Patch 1 patch Transdermal daily  polyethylene glycol 3350 17 Gram(s) Oral daily  predniSONE   Tablet 30 milliGRAM(s) Oral two times a day  prochlorperazine   Tablet 10 milliGRAM(s) Oral daily  senna 2 Tablet(s) Oral at bedtime  triamcinolone 0.1% Ointment 1 Application(s) Topical every 12 hours    MEDICATIONS  (PRN):      Allergies    amoxicillin (Anaphylaxis)  Levaquin (Other; Anaphylaxis)  Levaquin (Unknown)  penicillin (Anaphylaxis)  penicillins (Anaphylaxis)    Intolerances            PHYSICAL EXAM:   Vital Signs:  Vital Signs Last 24 Hrs  T(C): 36.6 (25 Mar 2019 05:13), Max: 37.1 (24 Mar 2019 19:44)  T(F): 97.8 (25 Mar 2019 05:13), Max: 98.7 (24 Mar 2019 19:44)  HR: 74 (25 Mar 2019 05:13) (74 - 84)  BP: 124/78 (25 Mar 2019 05:13) (122/80 - 138/82)  BP(mean): --  RR: 17 (25 Mar 2019 05:13) (17 - 18)  SpO2: 94% (25 Mar 2019 05:13) (93% - 94%)  Daily     Daily     GENERAL:  no distress  HEENT:  NC/AT,  anicteric  CHEST:  some wheezing  HEART:  Regular rhythm  ABDOMEN:  Soft, non-tender, bowel sounds present,   EXTEREMITIES:  no edema      LABS:                        14.9   6.68  )-----------( 209      ( 24 Mar 2019 10:02 )             44.0     03-24    134<L>  |  94<L>  |  22  ----------------------------<  110<H>  4.3   |  26  |  0.57    Ca    9.5      24 Mar 2019 07:26    TPro  6.5  /  Alb  3.4  /  TBili  1.3<H>  /  DBili  0.3<H>  /  AST  172<H>  /  ALT  284<H>  /  AlkPhos  65  03-25          RADIOLOGY & ADDITIONAL TESTS:

## 2019-03-25 NOTE — PROGRESS NOTE ADULT - SUBJECTIVE AND OBJECTIVE BOX
Follow-up Pulm Progress Note    No new respiratory events overnight  Dyspnea with exertion  97% on 3L NC    Medications:  MEDICATIONS  (STANDING):  ALBUTerol/ipratropium for Nebulization 3 milliLiter(s) Nebulizer every 6 hours  buDESOnide 160 MICROgram(s)/formoterol 4.5 MICROgram(s) Inhaler 2 Puff(s) Inhalation two times a day  cholecalciferol 2000 Unit(s) Oral daily  docusate sodium 100 milliGRAM(s) Oral two times a day  exemestane 25 milliGRAM(s) Oral daily  fluocinonide 0.05% Solution 1 Application(s) Topical every 12 hours  folic acid 1 milliGRAM(s) Oral daily  heparin  Injectable 5000 Unit(s) SubCutaneous every 12 hours  montelukast 10 milliGRAM(s) Oral daily  multivitamin 1 Tablet(s) Oral daily  nicotine - 21 mG/24Hr(s) Patch 1 patch Transdermal daily  pantoprazole    Tablet 40 milliGRAM(s) Oral before breakfast  polyethylene glycol 3350 17 Gram(s) Oral daily  predniSONE   Tablet   Oral   prochlorperazine   Tablet 10 milliGRAM(s) Oral daily  senna 2 Tablet(s) Oral at bedtime  triamcinolone 0.1% Ointment 1 Application(s) Topical every 12 hours    MEDICATIONS  (PRN):          Vital Signs Last 24 Hrs  T(C): 36.6 (25 Mar 2019 05:13), Max: 37.1 (24 Mar 2019 19:44)  T(F): 97.8 (25 Mar 2019 05:13), Max: 98.7 (24 Mar 2019 19:44)  HR: 74 (25 Mar 2019 05:13) (74 - 84)  BP: 124/78 (25 Mar 2019 05:13) (122/80 - 138/82)  BP(mean): --  RR: 17 (25 Mar 2019 05:13) (17 - 18)  SpO2: 94% (25 Mar 2019 05:13) (93% - 94%) on 3L NC          03-24 @ 07:01  -  03-25 @ 07:00  --------------------------------------------------------  IN: 1060 mL / OUT: 750 mL / NET: 310 mL          LABS:                        14.9   6.68  )-----------( 209      ( 24 Mar 2019 10:02 )             44.0     03-24    134<L>  |  94<L>  |  22  ----------------------------<  110<H>  4.3   |  26  |  0.57    Ca    9.5      24 Mar 2019 07:26    TPro  6.5  /  Alb  3.4  /  TBili  1.3<H>  /  DBili  0.3<H>  /  AST  172<H>  /  ALT  284<H>  /  AlkPhos  65  03-25    Physical Examination:  PULM: Decreased BS   CVS: S1, S2 heard    RADIOLOGY REVIEWED  CT chest: < from: CT Angio Chest w/ IV Cont (03.17.19 @ 16:25) >    FINDINGS:    LUNGS AND LARGE AIRWAYS: Patent central airways. Severe centrilobular   emphysema. The previous noted right lower lobe nodule is no longer   present.  PLEURA: No pleural effusion.  VESSELS: Good opacification of the pulmonary arterial tree. No pulmonary   embolus. Mildly dilated main pulmonary artery measuring 3.3 cm. Findings   may represent pulmonary arterial hypertension  HEART: Cardiomegaly. No pericardial effusion.  MEDIASTINUM AND CHESTER: No lymphadenopathy.  CHEST WALL AND LOWER NECK: Within normal limits.  VISUALIZED UPPER ABDOMEN: Hepatic steatosis. Small hiatal hernia.  BONES: Within normal limits.    IMPRESSION:     No pulmonary embolism.    Severe centrilobular emphysema.    < end of copied text >

## 2019-03-26 DIAGNOSIS — J43.9 EMPHYSEMA, UNSPECIFIED: ICD-10-CM

## 2019-03-26 DIAGNOSIS — R09.02 HYPOXEMIA: ICD-10-CM

## 2019-03-26 RX ADMIN — Medication 1 MILLIGRAM(S): at 12:38

## 2019-03-26 RX ADMIN — Medication 1 APPLICATION(S): at 18:05

## 2019-03-26 RX ADMIN — EXEMESTANE 25 MILLIGRAM(S): 25 TABLET, SUGAR COATED ORAL at 12:37

## 2019-03-26 RX ADMIN — Medication 1 APPLICATION(S): at 05:29

## 2019-03-26 RX ADMIN — Medication 1 PATCH: at 07:25

## 2019-03-26 RX ADMIN — Medication 3 MILLILITER(S): at 00:01

## 2019-03-26 RX ADMIN — Medication 1 APPLICATION(S): at 18:04

## 2019-03-26 RX ADMIN — Medication 1 PATCH: at 12:37

## 2019-03-26 RX ADMIN — POLYETHYLENE GLYCOL 3350 17 GRAM(S): 17 POWDER, FOR SOLUTION ORAL at 12:37

## 2019-03-26 RX ADMIN — BUDESONIDE AND FORMOTEROL FUMARATE DIHYDRATE 2 PUFF(S): 160; 4.5 AEROSOL RESPIRATORY (INHALATION) at 05:29

## 2019-03-26 RX ADMIN — Medication 10 MILLIGRAM(S): at 12:37

## 2019-03-26 RX ADMIN — Medication 3 MILLILITER(S): at 10:23

## 2019-03-26 RX ADMIN — Medication 1 TABLET(S): at 12:38

## 2019-03-26 RX ADMIN — HEPARIN SODIUM 5000 UNIT(S): 5000 INJECTION INTRAVENOUS; SUBCUTANEOUS at 05:30

## 2019-03-26 RX ADMIN — Medication 3 MILLILITER(S): at 23:25

## 2019-03-26 RX ADMIN — Medication 3 MILLILITER(S): at 18:03

## 2019-03-26 RX ADMIN — Medication 100 MILLIGRAM(S): at 05:30

## 2019-03-26 RX ADMIN — Medication 1 PATCH: at 12:41

## 2019-03-26 RX ADMIN — HEPARIN SODIUM 5000 UNIT(S): 5000 INJECTION INTRAVENOUS; SUBCUTANEOUS at 18:03

## 2019-03-26 RX ADMIN — Medication 1 APPLICATION(S): at 05:28

## 2019-03-26 RX ADMIN — Medication 1 PATCH: at 19:00

## 2019-03-26 RX ADMIN — BUDESONIDE AND FORMOTEROL FUMARATE DIHYDRATE 2 PUFF(S): 160; 4.5 AEROSOL RESPIRATORY (INHALATION) at 18:03

## 2019-03-26 RX ADMIN — Medication 2000 UNIT(S): at 12:38

## 2019-03-26 RX ADMIN — SENNA PLUS 2 TABLET(S): 8.6 TABLET ORAL at 21:46

## 2019-03-26 RX ADMIN — Medication 40 MILLIGRAM(S): at 05:33

## 2019-03-26 RX ADMIN — PANTOPRAZOLE SODIUM 40 MILLIGRAM(S): 20 TABLET, DELAYED RELEASE ORAL at 06:15

## 2019-03-26 RX ADMIN — Medication 3 MILLILITER(S): at 05:29

## 2019-03-26 RX ADMIN — Medication 100 MILLIGRAM(S): at 18:04

## 2019-03-26 RX ADMIN — MONTELUKAST 10 MILLIGRAM(S): 4 TABLET, CHEWABLE ORAL at 12:38

## 2019-03-26 NOTE — PROGRESS NOTE ADULT - PROBLEM SELECTOR PLAN 2
-Keep sp02>88% on supplemental oxygen  -Prednisone taper as outlined   -Start Protonix daily for PUD ppx while on steroids   -Duoneb q6  -Symbicort BID   -f/u with pulmonary as outpt in 2 weeks

## 2019-03-26 NOTE — PROGRESS NOTE ADULT - PROBLEM SELECTOR PLAN 1
2nd COPD/emphysema  -Keep sp02>88% on supplemental oxygen  -Will need oxygen set up for home, hypoxia documented above

## 2019-03-26 NOTE — PROGRESS NOTE ADULT - SUBJECTIVE AND OBJECTIVE BOX
Patient is a 54y old  Female who presents with a chief complaint of copd exacerbation, etoh intoxication (26 Mar 2019 12:07)    Coverage for Dr. Canelo Bonds   SUBJECTIVE / OVERNIGHT EVENTS: Comfortable without new complaints.   Review of Systems  chest pain no  palpitations no  sob no  nausea no  headache no    MEDICATIONS  (STANDING):  ALBUTerol/ipratropium for Nebulization 3 milliLiter(s) Nebulizer every 6 hours  buDESOnide 160 MICROgram(s)/formoterol 4.5 MICROgram(s) Inhaler 2 Puff(s) Inhalation two times a day  cholecalciferol 2000 Unit(s) Oral daily  docusate sodium 100 milliGRAM(s) Oral two times a day  exemestane 25 milliGRAM(s) Oral daily  fluocinonide 0.05% Solution 1 Application(s) Topical every 12 hours  folic acid 1 milliGRAM(s) Oral daily  heparin  Injectable 5000 Unit(s) SubCutaneous every 12 hours  montelukast 10 milliGRAM(s) Oral daily  multivitamin 1 Tablet(s) Oral daily  nicotine - 21 mG/24Hr(s) Patch 1 patch Transdermal daily  pantoprazole    Tablet 40 milliGRAM(s) Oral before breakfast  polyethylene glycol 3350 17 Gram(s) Oral daily  predniSONE   Tablet 40 milliGRAM(s) Oral daily  predniSONE   Tablet   Oral   prochlorperazine   Tablet 10 milliGRAM(s) Oral daily  senna 2 Tablet(s) Oral at bedtime  triamcinolone 0.1% Ointment 1 Application(s) Topical every 12 hours    MEDICATIONS  (PRN):      Vital Signs Last 24 Hrs  T(C): 36.6 (26 Mar 2019 10:16), Max: 36.7 (25 Mar 2019 19:33)  T(F): 97.9 (26 Mar 2019 10:16), Max: 98.1 (26 Mar 2019 09:52)  HR: 95 (26 Mar 2019 11:00) (81 - 97)  BP: 137/91 (26 Mar 2019 11:00) (126/75 - 162/89)  BP(mean): --  RR: 20 (26 Mar 2019 15:50) (18 - 20)  SpO2: 86% (26 Mar 2019 15:50) (86% - 94%)    PHYSICAL EXAM:  GENERAL: NAD, well-developed  HEAD:  Atraumatic, Normocephalic  EYES: EOMI, PERRLA, conjunctiva and sclera clear  NECK: Supple, No JVD  CHEST/LUNG: Clear to auscultation bilaterally; No wheeze  HEART: Regular rate and rhythm; No murmurs, rubs, or gallops  ABDOMEN: Soft, Nontender, Nondistended; Bowel sounds present  EXTREMITIES:  2+ Peripheral Pulses, No clubbing, cyanosis, or edema  PSYCH: AAOx3  NEUROLOGY: non-focal  SKIN: No rashes or lesions    LABS:        TPro  6.5  /  Alb  3.4  /  TBili  1.3<H>  /  DBili  0.3<H>  /  AST  172<H>  /  ALT  284<H>  /  AlkPhos  65  03-25                RADIOLOGY & ADDITIONAL TESTS:    Imaging Personally Reviewed:    Consultant(s) Notes Reviewed:      Care Discussed with Consultants/Other Providers:

## 2019-03-26 NOTE — PROGRESS NOTE ADULT - ASSESSMENT
54 Female w/ PMH stage 3 Bilateral Breast CA on Aromasin, RA, Psoriasis previously on Otezla, remote Brain Aneurysm s/p Clip in 1988,  Rt Jugular DVT, Catheter related MSSA Bacteremia from [place] in July 2015, ETOH abuse, COPD, p/w dypsnea, f/w COPD exacerbation, alcohol intoxication with  risk for withdrawal, hypovolemic hyponatremia in setting of poor PO intake and dehydration, and b/l conjunctivitis presumed bacterial in nature.     Problem: COPD exacerbation  po prednisone w/ taper as per pulm   -c/w pulm meds   pulm f/unoted    -CTA reviewed, no PE  -cigarette cessation education provided.  -sup O2 prn to keep o2 sats 88-92%.       Problem: Bacterial conjunctivitis  s/p drops    Problem: Hyponatremia  stable        Problem: Alcohol intoxication  etoh abuse     mental status better   -thiamine, folate, MVI  -social work  -fall precautions, aspiration precautions    : Alcoholic hepatitis  -etoh cessation education provided      lfts higher  gi eval noted       : Psoriasis  rheum eval noted  derm noted      Problem: Breast cancer  Assessment and Plan: c/w examestane  heme/onc eval noted     Problem: Thrombocytopenia stable  -monitor counts for now  stable   rehab planning   Medically stable.   Jean Meyer MD pager [phone]

## 2019-03-26 NOTE — PROGRESS NOTE ADULT - SUBJECTIVE AND OBJECTIVE BOX
INTERVAL HPI/OVERNIGHT EVENTS:  resting comfortable anxious to go to rehab    MEDICATIONS  (STANDING):  ALBUTerol/ipratropium for Nebulization 3 milliLiter(s) Nebulizer every 6 hours  buDESOnide 160 MICROgram(s)/formoterol 4.5 MICROgram(s) Inhaler 2 Puff(s) Inhalation two times a day  cholecalciferol 2000 Unit(s) Oral daily  docusate sodium 100 milliGRAM(s) Oral two times a day  exemestane 25 milliGRAM(s) Oral daily  fluocinonide 0.05% Solution 1 Application(s) Topical every 12 hours  folic acid 1 milliGRAM(s) Oral daily  heparin  Injectable 5000 Unit(s) SubCutaneous every 12 hours  montelukast 10 milliGRAM(s) Oral daily  multivitamin 1 Tablet(s) Oral daily  nicotine - 21 mG/24Hr(s) Patch 1 patch Transdermal daily  pantoprazole    Tablet 40 milliGRAM(s) Oral before breakfast  polyethylene glycol 3350 17 Gram(s) Oral daily  predniSONE   Tablet 40 milliGRAM(s) Oral daily  predniSONE   Tablet   Oral   prochlorperazine   Tablet 10 milliGRAM(s) Oral daily  senna 2 Tablet(s) Oral at bedtime  triamcinolone 0.1% Ointment 1 Application(s) Topical every 12 hours    MEDICATIONS  (PRN):      Allergies    amoxicillin (Anaphylaxis)  Levaquin (Other; Anaphylaxis)  Levaquin (Unknown)  penicillin (Anaphylaxis)  penicillins (Anaphylaxis)    Intolerances            PHYSICAL EXAM:   Vital Signs:  Vital Signs Last 24 Hrs  T(C): 36.3 (26 Mar 2019 05:27), Max: 36.7 (25 Mar 2019 19:33)  T(F): 97.4 (26 Mar 2019 05:27), Max: 98 (25 Mar 2019 19:33)  HR: 90 (26 Mar 2019 05:27) (86 - 99)  BP: 162/89 (26 Mar 2019 05:27) (126/75 - 162/89)  BP(mean): --  RR: 18 (26 Mar 2019 05:27) (18 - 20)  SpO2: 93% (26 Mar 2019 05:27) (90% - 95%)  Daily     Daily     GENERAL:  no distress  HEENT:  NC/AT,  anicteric  CHEST:  breath sounds clear  HEART:  Regular rhythm  ABDOMEN:  Soft, non-tender, bowel sounds present,                     no signs of chronic liver disease  EXTEREMITIES:  no edema      LABS:                        14.9   6.68  )-----------( 209      ( 24 Mar 2019 10:02 )             44.0         TPro  6.5  /  Alb  3.4  /  TBili  1.3<H>  /  DBili  0.3<H>  /  AST  172<H>  /  ALT  284<H>  /  AlkPhos  65  03-25          RADIOLOGY & ADDITIONAL TESTS:

## 2019-03-26 NOTE — PROGRESS NOTE ADULT - I WAS PHYSICALLY PRESENT FOR THE KEY PORTIONS OF THE EVALUATION AND MANAGEMENT (E/M) SERVICE PROVIDED.  I AGREE WITH THE ABOVE HISTORY, PHYSICAL, AND PLAN WHICH I HAVE REVIEWED AND EDITED WHERE APPROPRIATE
Telephone Encounter by Giovana Alarcon Statement Selected

## 2019-03-26 NOTE — PROGRESS NOTE ADULT - SUBJECTIVE AND OBJECTIVE BOX
Follow-up Pulm Progress Note    No new respiratory events overnight.  Denies SOB/CP.   87% on RA at rest  95% on 2L NC at rest  93% on 2L NC with ambulation     Medications:  MEDICATIONS  (STANDING):  ALBUTerol/ipratropium for Nebulization 3 milliLiter(s) Nebulizer every 6 hours  buDESOnide 160 MICROgram(s)/formoterol 4.5 MICROgram(s) Inhaler 2 Puff(s) Inhalation two times a day  cholecalciferol 2000 Unit(s) Oral daily  docusate sodium 100 milliGRAM(s) Oral two times a day  exemestane 25 milliGRAM(s) Oral daily  fluocinonide 0.05% Solution 1 Application(s) Topical every 12 hours  folic acid 1 milliGRAM(s) Oral daily  heparin  Injectable 5000 Unit(s) SubCutaneous every 12 hours  montelukast 10 milliGRAM(s) Oral daily  multivitamin 1 Tablet(s) Oral daily  nicotine - 21 mG/24Hr(s) Patch 1 patch Transdermal daily  pantoprazole    Tablet 40 milliGRAM(s) Oral before breakfast  polyethylene glycol 3350 17 Gram(s) Oral daily  predniSONE   Tablet 40 milliGRAM(s) Oral daily  predniSONE   Tablet   Oral   prochlorperazine   Tablet 10 milliGRAM(s) Oral daily  senna 2 Tablet(s) Oral at bedtime  triamcinolone 0.1% Ointment 1 Application(s) Topical every 12 hours    MEDICATIONS  (PRN):          Vital Signs Last 24 Hrs  T(C): 36.6 (26 Mar 2019 10:16), Max: 36.7 (25 Mar 2019 19:33)  T(F): 97.9 (26 Mar 2019 10:16), Max: 98.1 (26 Mar 2019 09:52)  HR: 95 (26 Mar 2019 11:00) (81 - 99)  BP: 137/91 (26 Mar 2019 11:00) (126/75 - 162/89)  BP(mean): --  RR: 18 (26 Mar 2019 11:00) (18 - 20)  SpO2: 94% (26 Mar 2019 11:00) (90% - 94%) on 2L NC          03-25 @ 07:01  -  03-26 @ 07:00  --------------------------------------------------------  IN: 960 mL / OUT: 751 mL / NET: 209 mL          LABS:        TPro  6.5  /  Alb  3.4  /  TBili  1.3<H>  /  DBili  0.3<H>  /  AST  172<H>  /  ALT  284<H>  /  AlkPhos  65  03-25          Physical Examination:  PULM: Decreased BS throughout   CVS: S1, S2 heard    RADIOLOGY REVIEWED  CTA chest: < from: CT Angio Chest w/ IV Cont (03.17.19 @ 16:25) >  FINDINGS:    LUNGS AND LARGE AIRWAYS: Patent central airways. Severe centrilobular   emphysema. The previous noted right lower lobe nodule is no longer   present.  PLEURA: No pleural effusion.  VESSELS: Good opacification of the pulmonary arterial tree. No pulmonary   embolus. Mildly dilated main pulmonary artery measuring 3.3 cm. Findings   may represent pulmonary arterial hypertension  HEART: Cardiomegaly. No pericardial effusion.  MEDIASTINUM AND CHESTER: No lymphadenopathy.  CHEST WALL AND LOWER NECK: Within normal limits.  VISUALIZED UPPER ABDOMEN: Hepatic steatosis. Small hiatal hernia.  BONES: Within normal limits.    IMPRESSION:     No pulmonary embolism.    Severe centrilobular emphysema.    < end of copied text >

## 2019-03-27 RX ORDER — PANTOPRAZOLE SODIUM 20 MG/1
1 TABLET, DELAYED RELEASE ORAL
Qty: 0 | Refills: 0 | DISCHARGE
Start: 2019-03-27

## 2019-03-27 RX ORDER — ALPRAZOLAM 0.25 MG
0.25 TABLET ORAL ONCE
Qty: 0 | Refills: 0 | Status: DISCONTINUED | OUTPATIENT
Start: 2019-03-27 | End: 2019-03-27

## 2019-03-27 RX ORDER — ALPRAZOLAM 0.25 MG
0.25 TABLET ORAL
Qty: 0 | Refills: 0 | Status: DISCONTINUED | OUTPATIENT
Start: 2019-03-27 | End: 2019-03-29

## 2019-03-27 RX ORDER — SENNA PLUS 8.6 MG/1
2 TABLET ORAL
Qty: 0 | Refills: 0 | DISCHARGE
Start: 2019-03-27

## 2019-03-27 RX ORDER — POLYETHYLENE GLYCOL 3350 17 G/17G
17 POWDER, FOR SOLUTION ORAL
Qty: 0 | Refills: 0 | DISCHARGE
Start: 2019-03-27

## 2019-03-27 RX ORDER — FLUOCINONIDE/EMOLLIENT BASE 0.05 %
1 CREAM (GRAM) TOPICAL
Qty: 0 | Refills: 0 | DISCHARGE
Start: 2019-03-27

## 2019-03-27 RX ORDER — DOCUSATE SODIUM 100 MG
1 CAPSULE ORAL
Qty: 0 | Refills: 0 | DISCHARGE
Start: 2019-03-27

## 2019-03-27 RX ORDER — IPRATROPIUM/ALBUTEROL SULFATE 18-103MCG
3 AEROSOL WITH ADAPTER (GRAM) INHALATION
Qty: 120 | Refills: 0
Start: 2019-03-27 | End: 2019-04-25

## 2019-03-27 RX ORDER — NICOTINE POLACRILEX 2 MG
1 GUM BUCCAL
Qty: 0 | Refills: 0 | DISCHARGE
Start: 2019-03-27

## 2019-03-27 RX ADMIN — BUDESONIDE AND FORMOTEROL FUMARATE DIHYDRATE 2 PUFF(S): 160; 4.5 AEROSOL RESPIRATORY (INHALATION) at 05:54

## 2019-03-27 RX ADMIN — Medication 1 APPLICATION(S): at 17:19

## 2019-03-27 RX ADMIN — Medication 3 MILLILITER(S): at 05:54

## 2019-03-27 RX ADMIN — Medication 1 PATCH: at 12:07

## 2019-03-27 RX ADMIN — Medication 1 PATCH: at 19:29

## 2019-03-27 RX ADMIN — EXEMESTANE 25 MILLIGRAM(S): 25 TABLET, SUGAR COATED ORAL at 13:21

## 2019-03-27 RX ADMIN — Medication 1 APPLICATION(S): at 08:12

## 2019-03-27 RX ADMIN — Medication 1 APPLICATION(S): at 05:54

## 2019-03-27 RX ADMIN — Medication 3 MILLILITER(S): at 12:49

## 2019-03-27 RX ADMIN — Medication 1 PATCH: at 12:49

## 2019-03-27 RX ADMIN — Medication 0.25 MILLIGRAM(S): at 17:16

## 2019-03-27 RX ADMIN — Medication 1 APPLICATION(S): at 17:18

## 2019-03-27 RX ADMIN — Medication 1 MILLIGRAM(S): at 12:49

## 2019-03-27 RX ADMIN — Medication 10 MILLIGRAM(S): at 12:49

## 2019-03-27 RX ADMIN — Medication 0.25 MILLIGRAM(S): at 22:49

## 2019-03-27 RX ADMIN — HEPARIN SODIUM 5000 UNIT(S): 5000 INJECTION INTRAVENOUS; SUBCUTANEOUS at 17:17

## 2019-03-27 RX ADMIN — Medication 3 MILLILITER(S): at 22:49

## 2019-03-27 RX ADMIN — Medication 2000 UNIT(S): at 12:50

## 2019-03-27 RX ADMIN — Medication 1 TABLET(S): at 12:49

## 2019-03-27 RX ADMIN — Medication 3 MILLILITER(S): at 17:17

## 2019-03-27 RX ADMIN — MONTELUKAST 10 MILLIGRAM(S): 4 TABLET, CHEWABLE ORAL at 12:50

## 2019-03-27 RX ADMIN — Medication 40 MILLIGRAM(S): at 05:55

## 2019-03-27 RX ADMIN — BUDESONIDE AND FORMOTEROL FUMARATE DIHYDRATE 2 PUFF(S): 160; 4.5 AEROSOL RESPIRATORY (INHALATION) at 17:17

## 2019-03-27 RX ADMIN — PANTOPRAZOLE SODIUM 40 MILLIGRAM(S): 20 TABLET, DELAYED RELEASE ORAL at 06:00

## 2019-03-27 RX ADMIN — HEPARIN SODIUM 5000 UNIT(S): 5000 INJECTION INTRAVENOUS; SUBCUTANEOUS at 05:55

## 2019-03-27 RX ADMIN — Medication 100 MILLIGRAM(S): at 05:55

## 2019-03-27 NOTE — PROGRESS NOTE ADULT - ASSESSMENT
54 Female w/ PMH stage 3 Bilateral Breast CA on Aromasin, RA, Psoriasis previously on Otezla, remote Brain Aneurysm s/p Clip in 1988,  Rt Jugular DVT, Catheter related MSSA Bacteremia from [place] in July 2015, ETOH abuse, COPD, p/w dypsnea, f/w COPD exacerbation, alcohol intoxication with  risk for withdrawal, hypovolemic hyponatremia in setting of poor PO intake and dehydration, and b/l conjunctivitis presumed bacterial in nature.     Problem: COPD exacerbation  po prednisone w/ taper as per pulm   -c/w pulm meds   pulm f/u noted    -CTA reviewed, no PE  -cigarette cessation education provided.  -sup O2 prn to keep o2 sats 88-92%.       Problem: Bacterial conjunctivitis  s/p drops    Problem: Hyponatremia  stable        Problem: Alcohol intoxication  etoh abuse     mental status better   -thiamine, folate, MVI  -social work  -fall precautions, aspiration precautions    : Alcoholic hepatitis  -etoh cessation education provided  - follow LFT      lfts higher  gi eval noted       : Psoriasis  rheum eval noted  derm noted      Problem: Breast cancer  Assessment and Plan: c/w examestane  heme/onc eval noted     Problem: Thrombocytopenia stable  -monitor counts for now  stable     DCP in progress    Medically stable.     Jean Meyer MD pager [phone]

## 2019-03-27 NOTE — PROGRESS NOTE ADULT - PROBLEM SELECTOR PLAN 2
-Keep sp02>88% on supplemental oxygen  -Prednisone taper as outlined   -Start Protonix daily for PUD ppx while on steroids   -Duoneb q6  -d/c on Stiolto (home med) and Pulmicort BID  -f/u with pulmonary as outpt in 2 weeks

## 2019-03-27 NOTE — PROGRESS NOTE ADULT - PROBLEM SELECTOR PLAN 1
2nd COPD/emphysema  -Keep sp02>88% on supplemental oxygen  -Oxygen set up for home  -d/c planning for home

## 2019-03-27 NOTE — PROGRESS NOTE ADULT - SUBJECTIVE AND OBJECTIVE BOX
Patient is a 54y old  Female who presents with a chief complaint of copd exacerbation, etoh intoxication (27 Mar 2019 15:23)    Coverage for Dr. Canelo Bonds   SUBJECTIVE / OVERNIGHT EVENTS: No new complaints.   Review of Systems  chest pain no  palpitations no  sob no  nausea no  headache no    MEDICATIONS  (STANDING):  ALBUTerol/ipratropium for Nebulization 3 milliLiter(s) Nebulizer every 6 hours  ALPRAZolam 0.25 milliGRAM(s) Oral two times a day  buDESOnide 160 MICROgram(s)/formoterol 4.5 MICROgram(s) Inhaler 2 Puff(s) Inhalation two times a day  cholecalciferol 2000 Unit(s) Oral daily  docusate sodium 100 milliGRAM(s) Oral two times a day  exemestane 25 milliGRAM(s) Oral daily  fluocinonide 0.05% Solution 1 Application(s) Topical every 12 hours  folic acid 1 milliGRAM(s) Oral daily  heparin  Injectable 5000 Unit(s) SubCutaneous every 12 hours  montelukast 10 milliGRAM(s) Oral daily  multivitamin 1 Tablet(s) Oral daily  nicotine - 21 mG/24Hr(s) Patch 1 patch Transdermal daily  pantoprazole    Tablet 40 milliGRAM(s) Oral before breakfast  polyethylene glycol 3350 17 Gram(s) Oral daily  predniSONE   Tablet 40 milliGRAM(s) Oral daily  predniSONE   Tablet   Oral   prochlorperazine   Tablet 10 milliGRAM(s) Oral daily  senna 2 Tablet(s) Oral at bedtime  triamcinolone 0.1% Ointment 1 Application(s) Topical every 12 hours    MEDICATIONS  (PRN):      Vital Signs Last 24 Hrs  T(C): 36.8 (27 Mar 2019 12:02), Max: 36.8 (27 Mar 2019 12:02)  T(F): 98.3 (27 Mar 2019 12:02), Max: 98.3 (27 Mar 2019 12:02)  HR: 82 (27 Mar 2019 12:02) (82 - 89)  BP: 131/85 (27 Mar 2019 12:02) (131/85 - 143/89)  BP(mean): --  RR: 20 (27 Mar 2019 12:02) (18 - 20)  SpO2: 92% (27 Mar 2019 12:02) (89% - 97%)    PHYSICAL EXAM:  GENERAL: NAD, well-developed  HEAD:  Atraumatic, Normocephalic  EYES: EOMI, PERRLA, conjunctiva and sclera clear  NECK: Supple, No JVD  CHEST/LUNG: Clear to auscultation bilaterally; No wheeze  HEART: Regular rate and rhythm; No murmurs, rubs, or gallops  ABDOMEN: Soft, Nontender, Nondistended; Bowel sounds present  EXTREMITIES:  2+ Peripheral Pulses, No clubbing, cyanosis, or edema  PSYCH: AAOx3  NEUROLOGY: non-focal  SKIN: No rashes or lesions    LABS:                      RADIOLOGY & ADDITIONAL TESTS:    Imaging Personally Reviewed:    Consultant(s) Notes Reviewed:      Care Discussed with Consultants/Other Providers:

## 2019-03-28 LAB
ALBUMIN SERPL ELPH-MCNC: 3.6 G/DL — SIGNIFICANT CHANGE UP (ref 3.3–5)
ALP SERPL-CCNC: 60 U/L — SIGNIFICANT CHANGE UP (ref 40–120)
ALT FLD-CCNC: 355 U/L — HIGH (ref 10–45)
ANION GAP SERPL CALC-SCNC: 14 MMOL/L — SIGNIFICANT CHANGE UP (ref 5–17)
AST SERPL-CCNC: 144 U/L — HIGH (ref 10–40)
BILIRUB DIRECT SERPL-MCNC: 0.5 MG/DL — HIGH (ref 0–0.2)
BILIRUB INDIRECT FLD-MCNC: 1 MG/DL — SIGNIFICANT CHANGE UP (ref 0.2–1)
BILIRUB SERPL-MCNC: 1.5 MG/DL — HIGH (ref 0.2–1.2)
BUN SERPL-MCNC: 20 MG/DL — SIGNIFICANT CHANGE UP (ref 7–23)
CALCIUM SERPL-MCNC: 9.5 MG/DL — SIGNIFICANT CHANGE UP (ref 8.4–10.5)
CHLORIDE SERPL-SCNC: 96 MMOL/L — SIGNIFICANT CHANGE UP (ref 96–108)
CO2 SERPL-SCNC: 26 MMOL/L — SIGNIFICANT CHANGE UP (ref 22–31)
CREAT SERPL-MCNC: 0.55 MG/DL — SIGNIFICANT CHANGE UP (ref 0.5–1.3)
GLUCOSE SERPL-MCNC: 128 MG/DL — HIGH (ref 70–99)
HCT VFR BLD CALC: 43.8 % — SIGNIFICANT CHANGE UP (ref 34.5–45)
HGB BLD-MCNC: 14.8 G/DL — SIGNIFICANT CHANGE UP (ref 11.5–15.5)
MCHC RBC-ENTMCNC: 33.8 GM/DL — SIGNIFICANT CHANGE UP (ref 32–36)
MCHC RBC-ENTMCNC: 37.5 PG — HIGH (ref 27–34)
MCV RBC AUTO: 110.9 FL — HIGH (ref 80–100)
PLATELET # BLD AUTO: 259 K/UL — SIGNIFICANT CHANGE UP (ref 150–400)
POTASSIUM SERPL-MCNC: 3.6 MMOL/L — SIGNIFICANT CHANGE UP (ref 3.5–5.3)
POTASSIUM SERPL-SCNC: 3.6 MMOL/L — SIGNIFICANT CHANGE UP (ref 3.5–5.3)
PROT SERPL-MCNC: 6.4 G/DL — SIGNIFICANT CHANGE UP (ref 6–8.3)
RBC # BLD: 3.95 M/UL — SIGNIFICANT CHANGE UP (ref 3.8–5.2)
RBC # FLD: 12.2 % — SIGNIFICANT CHANGE UP (ref 10.3–14.5)
SODIUM SERPL-SCNC: 136 MMOL/L — SIGNIFICANT CHANGE UP (ref 135–145)
WBC # BLD: 8.98 K/UL — SIGNIFICANT CHANGE UP (ref 3.8–10.5)
WBC # FLD AUTO: 8.98 K/UL — SIGNIFICANT CHANGE UP (ref 3.8–10.5)

## 2019-03-28 RX ADMIN — BUDESONIDE AND FORMOTEROL FUMARATE DIHYDRATE 2 PUFF(S): 160; 4.5 AEROSOL RESPIRATORY (INHALATION) at 06:15

## 2019-03-28 RX ADMIN — Medication 40 MILLIGRAM(S): at 06:16

## 2019-03-28 RX ADMIN — HEPARIN SODIUM 5000 UNIT(S): 5000 INJECTION INTRAVENOUS; SUBCUTANEOUS at 06:16

## 2019-03-28 RX ADMIN — PANTOPRAZOLE SODIUM 40 MILLIGRAM(S): 20 TABLET, DELAYED RELEASE ORAL at 06:15

## 2019-03-28 RX ADMIN — Medication 1 PATCH: at 10:14

## 2019-03-28 RX ADMIN — Medication 3 MILLILITER(S): at 17:57

## 2019-03-28 RX ADMIN — Medication 2000 UNIT(S): at 12:22

## 2019-03-28 RX ADMIN — Medication 1 MILLIGRAM(S): at 12:24

## 2019-03-28 RX ADMIN — Medication 1 PATCH: at 12:23

## 2019-03-28 RX ADMIN — Medication 3 MILLILITER(S): at 06:16

## 2019-03-28 RX ADMIN — Medication 3 MILLILITER(S): at 23:06

## 2019-03-28 RX ADMIN — Medication 1 APPLICATION(S): at 17:57

## 2019-03-28 RX ADMIN — Medication 1 PATCH: at 20:14

## 2019-03-28 RX ADMIN — Medication 1 APPLICATION(S): at 06:18

## 2019-03-28 RX ADMIN — Medication 100 MILLIGRAM(S): at 06:15

## 2019-03-28 RX ADMIN — EXEMESTANE 25 MILLIGRAM(S): 25 TABLET, SUGAR COATED ORAL at 12:22

## 2019-03-28 RX ADMIN — Medication 10 MILLIGRAM(S): at 12:22

## 2019-03-28 RX ADMIN — HEPARIN SODIUM 5000 UNIT(S): 5000 INJECTION INTRAVENOUS; SUBCUTANEOUS at 17:57

## 2019-03-28 RX ADMIN — Medication 0.25 MILLIGRAM(S): at 10:13

## 2019-03-28 RX ADMIN — Medication 1 PATCH: at 12:00

## 2019-03-28 RX ADMIN — BUDESONIDE AND FORMOTEROL FUMARATE DIHYDRATE 2 PUFF(S): 160; 4.5 AEROSOL RESPIRATORY (INHALATION) at 17:56

## 2019-03-28 RX ADMIN — Medication 0.25 MILLIGRAM(S): at 21:14

## 2019-03-28 RX ADMIN — Medication 3 MILLILITER(S): at 12:22

## 2019-03-28 RX ADMIN — MONTELUKAST 10 MILLIGRAM(S): 4 TABLET, CHEWABLE ORAL at 12:21

## 2019-03-28 RX ADMIN — Medication 1 TABLET(S): at 12:21

## 2019-03-28 NOTE — PROGRESS NOTE ADULT - ASSESSMENT
54 Female w/ PMH stage 3 Bilateral Breast CA on Aromasin, RA, Psoriasis previously on Otezla, remote Brain Aneurysm s/p Clip in 1988,  Rt Jugular DVT, Catheter related MSSA Bacteremia from [place] in July 2015, ETOH abuse, COPD, p/w dypsnea, f/w COPD exacerbation, alcohol intoxication with  risk for withdrawal, hypovolemic hyponatremia in setting of poor PO intake and dehydration, and b/l conjunctivitis presumed bacterial in nature.     Problem: COPD exacerbation  po prednisone w/ taper as per pulm   -c/w pulm meds   pulm f/u noted  -cigarette cessation education provided.  -sup O2 prn to keep o2 sats 88-92%.       Problem: Bacterial conjunctivitis  s/p drops    Problem: Hyponatremia  stable    Problem: Alcohol intoxication  etoh abuse     mental status better   -thiamine, folate, MVI  -social work  -fall precautions, aspiration precautions    : Alcoholic hepatitis  -etoh cessation education provided  - follow LFT      lfts higher  gi eval noted       : Psoriasis  rheum eval noted  derm noted      Problem: Breast cancer  Assessment and Plan: c/w examestane  heme/onc follow    Problem: Thrombocytopenia stable  -monitor counts for now  stable     DCP in progress home.    Medically stable.     Jean Meyer MD pager [phone]

## 2019-03-28 NOTE — DIETITIAN INITIAL EVALUATION ADULT. - OTHER INFO
Patient seen for length of stay. Per RN patient is not eating, however pt states she only didn't eat breakfast because of a "bad dream". pat receiving ensure 2 daily. Revire of current weight 178lbs show increase, but states she alvarez

## 2019-03-28 NOTE — PROGRESS NOTE ADULT - SUBJECTIVE AND OBJECTIVE BOX
Patient is a 54y old  Female who presents with a chief complaint of copd exacerbation, etoh intoxication (28 Mar 2019 12:31)    Coverage for Dr. Canelo Bonds   SUBJECTIVE / OVERNIGHT EVENTS: No new complaints.   Review of Systems  chest pain no  palpitations no  sob no  nausea no  headache no    MEDICATIONS  (STANDING):  ALBUTerol/ipratropium for Nebulization 3 milliLiter(s) Nebulizer every 6 hours  ALPRAZolam 0.25 milliGRAM(s) Oral two times a day  buDESOnide 160 MICROgram(s)/formoterol 4.5 MICROgram(s) Inhaler 2 Puff(s) Inhalation two times a day  cholecalciferol 2000 Unit(s) Oral daily  docusate sodium 100 milliGRAM(s) Oral two times a day  exemestane 25 milliGRAM(s) Oral daily  fluocinonide 0.05% Solution 1 Application(s) Topical every 12 hours  folic acid 1 milliGRAM(s) Oral daily  heparin  Injectable 5000 Unit(s) SubCutaneous every 12 hours  montelukast 10 milliGRAM(s) Oral daily  multivitamin 1 Tablet(s) Oral daily  nicotine - 21 mG/24Hr(s) Patch 1 patch Transdermal daily  pantoprazole    Tablet 40 milliGRAM(s) Oral before breakfast  polyethylene glycol 3350 17 Gram(s) Oral daily  predniSONE   Tablet 40 milliGRAM(s) Oral daily  predniSONE   Tablet   Oral   prochlorperazine   Tablet 10 milliGRAM(s) Oral daily  senna 2 Tablet(s) Oral at bedtime  triamcinolone 0.1% Ointment 1 Application(s) Topical every 12 hours    MEDICATIONS  (PRN):      Vital Signs Last 24 Hrs  T(C): 37 (28 Mar 2019 10:42), Max: 37 (28 Mar 2019 10:42)  T(F): 98.6 (28 Mar 2019 10:42), Max: 98.6 (28 Mar 2019 10:42)  HR: 89 (28 Mar 2019 10:42) (86 - 92)  BP: 128/89 (28 Mar 2019 10:42) (128/81 - 139/86)  BP(mean): --  RR: 18 (28 Mar 2019 10:42) (18 - 19)  SpO2: 94% (28 Mar 2019 10:42) (94% - 95%)    PHYSICAL EXAM:  GENERAL: NAD, well-developed  HEAD:  Atraumatic, Normocephalic  EYES: EOMI, PERRLA, conjunctiva and sclera clear  NECK: Supple, No JVD  CHEST/LUNG: Clear to auscultation bilaterally; No wheeze  HEART: Regular rate and rhythm; No murmurs, rubs, or gallops  ABDOMEN: Soft, Nontender, Nondistended; Bowel sounds present  EXTREMITIES:  2+ Peripheral Pulses, No clubbing, cyanosis, or edema  PSYCH: AAOx3  NEUROLOGY: non-focal  SKIN: No rashes or lesions    LABS:                        14.8   8.98  )-----------( 259      ( 28 Mar 2019 09:32 )             43.8     03-28    136  |  96  |  20  ----------------------------<  128<H>  3.6   |  26  |  0.55    Ca    9.5      28 Mar 2019 07:14    TPro  6.4  /  Alb  3.6  /  TBili  1.5<H>  /  DBili  0.5<H>  /  AST  144<H>  /  ALT  355<H>  /  AlkPhos  60  03-28                RADIOLOGY & ADDITIONAL TESTS:    Imaging Personally Reviewed:    Consultant(s) Notes Reviewed:      Care Discussed with Consultants/Other Providers:

## 2019-03-28 NOTE — PROGRESS NOTE ADULT - SUBJECTIVE AND OBJECTIVE BOX
Follow-up Pulm Progress Note    No new respiratory events overnight.  Denies SOB/CP.   95% on 3L NC    Medications:  MEDICATIONS  (STANDING):  ALBUTerol/ipratropium for Nebulization 3 milliLiter(s) Nebulizer every 6 hours  ALPRAZolam 0.25 milliGRAM(s) Oral two times a day  buDESOnide 160 MICROgram(s)/formoterol 4.5 MICROgram(s) Inhaler 2 Puff(s) Inhalation two times a day  cholecalciferol 2000 Unit(s) Oral daily  docusate sodium 100 milliGRAM(s) Oral two times a day  exemestane 25 milliGRAM(s) Oral daily  fluocinonide 0.05% Solution 1 Application(s) Topical every 12 hours  folic acid 1 milliGRAM(s) Oral daily  heparin  Injectable 5000 Unit(s) SubCutaneous every 12 hours  montelukast 10 milliGRAM(s) Oral daily  multivitamin 1 Tablet(s) Oral daily  nicotine - 21 mG/24Hr(s) Patch 1 patch Transdermal daily  pantoprazole    Tablet 40 milliGRAM(s) Oral before breakfast  polyethylene glycol 3350 17 Gram(s) Oral daily  predniSONE   Tablet 40 milliGRAM(s) Oral daily  predniSONE   Tablet   Oral   prochlorperazine   Tablet 10 milliGRAM(s) Oral daily  senna 2 Tablet(s) Oral at bedtime  triamcinolone 0.1% Ointment 1 Application(s) Topical every 12 hours    MEDICATIONS  (PRN):          Vital Signs Last 24 Hrs  T(C): 36.9 (28 Mar 2019 04:32), Max: 36.9 (28 Mar 2019 04:32)  T(F): 98.5 (28 Mar 2019 04:32), Max: 98.5 (28 Mar 2019 04:32)  HR: 89 (28 Mar 2019 10:42) (86 - 92)  BP: 128/89 (28 Mar 2019 10:42) (128/81 - 139/86)  BP(mean): --  RR: 18 (28 Mar 2019 10:42) (18 - 19)  SpO2: 94% (28 Mar 2019 10:42) (94% - 95%) on 3L NC          03-27 @ 07:01  -  03-28 @ 07:00  --------------------------------------------------------  IN: 590 mL / OUT: 300 mL / NET: 290 mL          LABS:                        14.8   8.98  )-----------( 259      ( 28 Mar 2019 09:32 )             43.8     03-28    136  |  96  |  20  ----------------------------<  128<H>  3.6   |  26  |  0.55    Ca    9.5      28 Mar 2019 07:14    TPro  6.4  /  Alb  3.6  /  TBili  1.5<H>  /  DBili  0.5<H>  /  AST  144<H>  /  ALT  355<H>  /  AlkPhos  60  03-28          CAPILLARY BLOOD GLUCOSE        Physical Examination:  PULM: Decreased BS bilaterally   CVS: S1, S2 heard    RADIOLOGY REVIEWED  CTA chest: < from: CT Angio Chest w/ IV Cont (03.17.19 @ 16:25) >    FINDINGS:    LUNGS AND LARGE AIRWAYS: Patent central airways. Severe centrilobular   emphysema. The previous noted right lower lobe nodule is no longer   present.  PLEURA: No pleural effusion.  VESSELS: Good opacification of the pulmonary arterial tree. No pulmonary   embolus. Mildly dilated main pulmonary artery measuring 3.3 cm. Findings   may represent pulmonary arterial hypertension  HEART: Cardiomegaly. No pericardial effusion.  MEDIASTINUM AND CHESTER: No lymphadenopathy.  CHEST WALL AND LOWER NECK: Within normal limits.  VISUALIZED UPPER ABDOMEN: Hepatic steatosis. Small hiatal hernia.  BONES: Within normal limits.    IMPRESSION:     No pulmonary embolism.    Severe centrilobular emphysema.    < end of copied text >

## 2019-03-28 NOTE — DIETITIAN INITIAL EVALUATION ADULT. - ENERGY NEEDS
Ht 5'0"    lbs   BMI= 32.2 54 Female w/ PMH stage 3 Bilateral Breast CA on Aromasin, RA, Psoriasis previously on Otezla, remote Brain Aneurysm s/p Clip in 1988,  Rt Jugular DVT, Catheter related MSSA Bacteremia from [place] in July 2015, ETOH abuse, COPD, p/w dypsnea, f/w COPD exacerbation, alcohol intoxication with  risk for withdrawal, hypovolemic hyponatremia in setting of poor PO intake and dehydration, and b/l conjunctivitis presumed bacterial in nature.

## 2019-03-29 ENCOUNTER — TRANSCRIPTION ENCOUNTER (OUTPATIENT)
Age: 54
End: 2019-03-29

## 2019-03-29 VITALS
HEART RATE: 85 BPM | TEMPERATURE: 98 F | RESPIRATION RATE: 18 BRPM | DIASTOLIC BLOOD PRESSURE: 82 MMHG | OXYGEN SATURATION: 96 % | SYSTOLIC BLOOD PRESSURE: 131 MMHG

## 2019-03-29 PROCEDURE — 83605 ASSAY OF LACTIC ACID: CPT

## 2019-03-29 PROCEDURE — 85610 PROTHROMBIN TIME: CPT

## 2019-03-29 PROCEDURE — 93005 ELECTROCARDIOGRAM TRACING: CPT

## 2019-03-29 PROCEDURE — 83735 ASSAY OF MAGNESIUM: CPT

## 2019-03-29 PROCEDURE — 83880 ASSAY OF NATRIURETIC PEPTIDE: CPT

## 2019-03-29 PROCEDURE — 94010 BREATHING CAPACITY TEST: CPT

## 2019-03-29 PROCEDURE — 87798 DETECT AGENT NOS DNA AMP: CPT

## 2019-03-29 PROCEDURE — 94640 AIRWAY INHALATION TREATMENT: CPT

## 2019-03-29 PROCEDURE — 76700 US EXAM ABDOM COMPLETE: CPT

## 2019-03-29 PROCEDURE — 86706 HEP B SURFACE ANTIBODY: CPT

## 2019-03-29 PROCEDURE — 71275 CT ANGIOGRAPHY CHEST: CPT

## 2019-03-29 PROCEDURE — 96374 THER/PROPH/DIAG INJ IV PUSH: CPT

## 2019-03-29 PROCEDURE — 87340 HEPATITIS B SURFACE AG IA: CPT

## 2019-03-29 PROCEDURE — 84484 ASSAY OF TROPONIN QUANT: CPT

## 2019-03-29 PROCEDURE — 82105 ALPHA-FETOPROTEIN SERUM: CPT

## 2019-03-29 PROCEDURE — 85027 COMPLETE CBC AUTOMATED: CPT

## 2019-03-29 PROCEDURE — 80048 BASIC METABOLIC PNL TOTAL CA: CPT

## 2019-03-29 PROCEDURE — 82803 BLOOD GASES ANY COMBINATION: CPT

## 2019-03-29 PROCEDURE — 84295 ASSAY OF SERUM SODIUM: CPT

## 2019-03-29 PROCEDURE — 97116 GAIT TRAINING THERAPY: CPT

## 2019-03-29 PROCEDURE — 97162 PT EVAL MOD COMPLEX 30 MIN: CPT

## 2019-03-29 PROCEDURE — 84100 ASSAY OF PHOSPHORUS: CPT

## 2019-03-29 PROCEDURE — 87581 M.PNEUMON DNA AMP PROBE: CPT

## 2019-03-29 PROCEDURE — 85014 HEMATOCRIT: CPT

## 2019-03-29 PROCEDURE — 97110 THERAPEUTIC EXERCISES: CPT

## 2019-03-29 PROCEDURE — 80053 COMPREHEN METABOLIC PANEL: CPT

## 2019-03-29 PROCEDURE — 86704 HEP B CORE ANTIBODY TOTAL: CPT

## 2019-03-29 PROCEDURE — 87633 RESP VIRUS 12-25 TARGETS: CPT

## 2019-03-29 PROCEDURE — 71045 X-RAY EXAM CHEST 1 VIEW: CPT

## 2019-03-29 PROCEDURE — 87486 CHLMYD PNEUM DNA AMP PROBE: CPT

## 2019-03-29 PROCEDURE — 80076 HEPATIC FUNCTION PANEL: CPT

## 2019-03-29 PROCEDURE — 82947 ASSAY GLUCOSE BLOOD QUANT: CPT

## 2019-03-29 PROCEDURE — 82435 ASSAY OF BLOOD CHLORIDE: CPT

## 2019-03-29 PROCEDURE — 85730 THROMBOPLASTIN TIME PARTIAL: CPT

## 2019-03-29 PROCEDURE — 97530 THERAPEUTIC ACTIVITIES: CPT

## 2019-03-29 PROCEDURE — 80307 DRUG TEST PRSMV CHEM ANLYZR: CPT

## 2019-03-29 PROCEDURE — 84132 ASSAY OF SERUM POTASSIUM: CPT

## 2019-03-29 PROCEDURE — 86803 HEPATITIS C AB TEST: CPT

## 2019-03-29 PROCEDURE — 82330 ASSAY OF CALCIUM: CPT

## 2019-03-29 PROCEDURE — 99285 EMERGENCY DEPT VISIT HI MDM: CPT | Mod: 25

## 2019-03-29 RX ORDER — NICOTINE POLACRILEX 2 MG
1 GUM BUCCAL
Qty: 30 | Refills: 0
Start: 2019-03-29 | End: 2019-04-27

## 2019-03-29 RX ADMIN — Medication 2000 UNIT(S): at 13:14

## 2019-03-29 RX ADMIN — Medication 1 MILLIGRAM(S): at 13:15

## 2019-03-29 RX ADMIN — MONTELUKAST 10 MILLIGRAM(S): 4 TABLET, CHEWABLE ORAL at 13:15

## 2019-03-29 RX ADMIN — Medication 1 PATCH: at 13:14

## 2019-03-29 RX ADMIN — Medication 3 MILLILITER(S): at 05:10

## 2019-03-29 RX ADMIN — HEPARIN SODIUM 5000 UNIT(S): 5000 INJECTION INTRAVENOUS; SUBCUTANEOUS at 05:10

## 2019-03-29 RX ADMIN — Medication 3 MILLILITER(S): at 13:15

## 2019-03-29 RX ADMIN — PANTOPRAZOLE SODIUM 40 MILLIGRAM(S): 20 TABLET, DELAYED RELEASE ORAL at 05:12

## 2019-03-29 RX ADMIN — Medication 10 MILLIGRAM(S): at 13:15

## 2019-03-29 RX ADMIN — Medication 1 TABLET(S): at 13:15

## 2019-03-29 RX ADMIN — BUDESONIDE AND FORMOTEROL FUMARATE DIHYDRATE 2 PUFF(S): 160; 4.5 AEROSOL RESPIRATORY (INHALATION) at 05:10

## 2019-03-29 RX ADMIN — Medication 1 PATCH: at 05:17

## 2019-03-29 RX ADMIN — Medication 40 MILLIGRAM(S): at 05:10

## 2019-03-29 RX ADMIN — Medication 0.25 MILLIGRAM(S): at 09:51

## 2019-03-29 RX ADMIN — Medication 1 PATCH: at 05:11

## 2019-03-29 RX ADMIN — Medication 1 APPLICATION(S): at 05:10

## 2019-03-29 RX ADMIN — EXEMESTANE 25 MILLIGRAM(S): 25 TABLET, SUGAR COATED ORAL at 13:15

## 2019-03-29 NOTE — PROGRESS NOTE ADULT - PROVIDER SPECIALTY LIST ADULT
Gastroenterology
Gastroenterology
Heme/Onc
Internal Medicine
Podiatry
Pulmonology
Heme/Onc
Heme/Onc
Pulmonology

## 2019-03-29 NOTE — PROGRESS NOTE ADULT - SUBJECTIVE AND OBJECTIVE BOX
Follow-up Pulm Progress Note    No new respiratory events overnight.  Denies SOB/CP.   95% on 3L NC    Medications:  MEDICATIONS  (STANDING):  ALBUTerol/ipratropium for Nebulization 3 milliLiter(s) Nebulizer every 6 hours  ALPRAZolam 0.25 milliGRAM(s) Oral two times a day  buDESOnide 160 MICROgram(s)/formoterol 4.5 MICROgram(s) Inhaler 2 Puff(s) Inhalation two times a day  cholecalciferol 2000 Unit(s) Oral daily  docusate sodium 100 milliGRAM(s) Oral two times a day  exemestane 25 milliGRAM(s) Oral daily  fluocinonide 0.05% Solution 1 Application(s) Topical every 12 hours  folic acid 1 milliGRAM(s) Oral daily  heparin  Injectable 5000 Unit(s) SubCutaneous every 12 hours  montelukast 10 milliGRAM(s) Oral daily  multivitamin 1 Tablet(s) Oral daily  nicotine - 21 mG/24Hr(s) Patch 1 patch Transdermal daily  pantoprazole    Tablet 40 milliGRAM(s) Oral before breakfast  polyethylene glycol 3350 17 Gram(s) Oral daily  predniSONE   Tablet 40 milliGRAM(s) Oral daily  predniSONE   Tablet   Oral   prochlorperazine   Tablet 10 milliGRAM(s) Oral daily  senna 2 Tablet(s) Oral at bedtime  triamcinolone 0.1% Ointment 1 Application(s) Topical every 12 hours    MEDICATIONS  (PRN):          Vital Signs Last 24 Hrs  T(C): 36.7 (29 Mar 2019 05:08), Max: 36.7 (29 Mar 2019 05:08)  T(F): 98 (29 Mar 2019 05:08), Max: 98 (29 Mar 2019 05:08)  HR: 80 (29 Mar 2019 05:08) (80 - 90)  BP: 143/90 (29 Mar 2019 05:08) (125/81 - 143/90)  BP(mean): --  RR: 17 (29 Mar 2019 05:08) (17 - 18)  SpO2: 94% (29 Mar 2019 05:08) (94% - 97%) on 3L NC          03-28 @ 07:01  -  03-29 @ 07:00  --------------------------------------------------------  IN: 960 mL / OUT: 400 mL / NET: 560 mL          LABS:                        14.8   8.98  )-----------( 259      ( 28 Mar 2019 09:32 )             43.8     03-28    136  |  96  |  20  ----------------------------<  128<H>  3.6   |  26  |  0.55    Ca    9.5      28 Mar 2019 07:14    TPro  6.4  /  Alb  3.6  /  TBili  1.5<H>  /  DBili  0.5<H>  /  AST  144<H>  /  ALT  355<H>  /  AlkPhos  60  03-28              Physical Examination:  PULM: Decreased BS at bases  CVS: S1, S2 heard    RADIOLOGY REVIEWED  CTA chest: < from: CT Angio Chest w/ IV Cont (03.17.19 @ 16:25) >  FINDINGS:    LUNGS AND LARGE AIRWAYS: Patent central airways. Severe centrilobular   emphysema. The previous noted right lower lobe nodule is no longer   present.  PLEURA: No pleural effusion.  VESSELS: Good opacification of the pulmonary arterial tree. No pulmonary   embolus. Mildly dilated main pulmonary artery measuring 3.3 cm. Findings   may represent pulmonary arterial hypertension  HEART: Cardiomegaly. No pericardial effusion.  MEDIASTINUM AND CHESTER: No lymphadenopathy.  CHEST WALL AND LOWER NECK: Within normal limits.  VISUALIZED UPPER ABDOMEN: Hepatic steatosis. Small hiatal hernia.  BONES: Within normal limits.    IMPRESSION:     No pulmonary embolism.    Severe centrilobular emphysema.    < end of copied text >

## 2019-03-29 NOTE — DISCHARGE NOTE NURSING/CASE MANAGEMENT/SOCIAL WORK - NSDCDPATPORTLINK_GEN_ALL_CORE
You can access the [facility], offered by [facility], by registering with the following website: [email or web address]

## 2019-03-29 NOTE — PROGRESS NOTE ADULT - REASON FOR ADMISSION
copd exacerbation, etoh intoxication

## 2019-03-29 NOTE — CHART NOTE - NSCHARTNOTEFT_GEN_A_CORE
follow up- Asked by attending to discharge patient home with home care; discussed discharge medication/follow up; per SW, oxygen and home care arrangement is done;   Gabriella Coreas(NP)  [facility], [phone]

## 2019-03-29 NOTE — PROGRESS NOTE ADULT - SUBJECTIVE AND OBJECTIVE BOX
CHIEF COMPLAINT:Patient is a 54y old  Female who presents with a chief complaint of copd exacerbation, etoh intoxication (29 Mar 2019 10:43)    	        PAST MEDICAL & SURGICAL HISTORY:  Prophylactic measure  Breast cancer  Brain aneurysm: with clips  Psoriasis  RA (rheumatoid arthritis)  Psoriasis  Breast cancer, stage 3  History of modified radical mastectomy of both breasts  S/P bilateral mastectomy  Brain aneurysm: 1988 two clips          REVIEW OF SYSTEMS:  CONSTITUTIONAL: No fever, weight loss, or fatigue  EYES: No eye pain, visual disturbances, or discharge  NECK: No pain or stiffness  RESPIRATORY: No cough, wheezing, chills or hemoptysis; No Shortness of Breath  CARDIOVASCULAR: No chest pain, palpitations, passing out, dizziness, or leg swelling  GASTROINTESTINAL: No abdominal or epigastric pain. No nausea, vomiting, or hematemesis; No diarrhea or constipation. No melena or hematochezia.  GENITOURINARY: No dysuria, frequency, hematuria, or incontinence  NEUROLOGICAL: No headaches,     Medications:  MEDICATIONS  (STANDING):  ALBUTerol/ipratropium for Nebulization 3 milliLiter(s) Nebulizer every 6 hours  ALPRAZolam 0.25 milliGRAM(s) Oral two times a day  buDESOnide 160 MICROgram(s)/formoterol 4.5 MICROgram(s) Inhaler 2 Puff(s) Inhalation two times a day  cholecalciferol 2000 Unit(s) Oral daily  docusate sodium 100 milliGRAM(s) Oral two times a day  exemestane 25 milliGRAM(s) Oral daily  fluocinonide 0.05% Solution 1 Application(s) Topical every 12 hours  folic acid 1 milliGRAM(s) Oral daily  heparin  Injectable 5000 Unit(s) SubCutaneous every 12 hours  montelukast 10 milliGRAM(s) Oral daily  multivitamin 1 Tablet(s) Oral daily  nicotine - 21 mG/24Hr(s) Patch 1 patch Transdermal daily  pantoprazole    Tablet 40 milliGRAM(s) Oral before breakfast  polyethylene glycol 3350 17 Gram(s) Oral daily  predniSONE   Tablet 40 milliGRAM(s) Oral daily  predniSONE   Tablet   Oral   prochlorperazine   Tablet 10 milliGRAM(s) Oral daily  senna 2 Tablet(s) Oral at bedtime  triamcinolone 0.1% Ointment 1 Application(s) Topical every 12 hours    MEDICATIONS  (PRN):    	    PHYSICAL EXAM:  T(C): 36.7 (03-29-19 @ 05:08), Max: 36.7 (03-29-19 @ 05:08)  HR: 80 (03-29-19 @ 05:08) (80 - 90)  BP: 143/90 (03-29-19 @ 05:08) (125/81 - 143/90)  RR: 17 (03-29-19 @ 05:08) (17 - 18)  SpO2: 94% (03-29-19 @ 05:08) (94% - 97%)  Wt(kg): --  I&O's Summary    28 Mar 2019 07:01  -  29 Mar 2019 07:00  --------------------------------------------------------  IN: 960 mL / OUT: 400 mL / NET: 560 mL        Appearance: Normal	  HEENT:   Normal oral mucosa, PERRL, EOMI	  Lymphatic: No lymphadenopathy  Cardiovascular: Normal S1 S2, No JVD, No murmurs, No edema  Respiratory:   dec bs   Psychiatry: A & O x 3,  Gastrointestinal:  Soft, Non-tender, + BS	  Skin: psoriatic lesions noted   Neurologic: Non-focal  Extremities: Normal range of motion, No clubbing, cyanosis or edema  Vascular: Peripheral pulses palpable 2+ bilaterally    TELEMETRY: 	    ECG:  	  RADIOLOGY:  OTHER: 	  	  LABS:	 	    CARDIAC MARKERS:                                14.8   8.98  )-----------( 259      ( 28 Mar 2019 09:32 )             43.8     03-28    136  |  96  |  20  ----------------------------<  128<H>  3.6   |  26  |  0.55    Ca    9.5      28 Mar 2019 07:14    TPro  6.4  /  Alb  3.6  /  TBili  1.5<H>  /  DBili  0.5<H>  /  AST  144<H>  /  ALT  355<H>  /  AlkPhos  60  03-28    proBNP:   Lipid Profile:   HgA1c:   TSH:

## 2019-03-29 NOTE — PROGRESS NOTE ADULT - PROBLEM SELECTOR PLAN 2
-Keep sp02>88% on supplemental oxygen  -Prednisone taper as outlined   -Protonix daily for PUD ppx while on steroids   -Duoneb q6 PRN  -d/c on Stiolto (home med) and Pulmicort BID  -f/u with pulmonary as outpt in 2 weeks

## 2019-03-29 NOTE — PROGRESS NOTE ADULT - ASSESSMENT
54 F PMH stage 3 Bilateral Breast CA on Aromasin, RA, Psoriasis previously on Otezla, remote Brain Aneurysm s/p Clip in 1988,  Rt Jugular DVT, Catheter related MSSA Bacteremia from [place] in July 2015, ETOH abuse, COPD, p/w dypsnea, ETOH intoxication    #Breast cancer- bilateral breast cancer, ER+/SC+/Her2 equivocal on Left; ER+/SC+/Her - on right; s/p taxotere/carbo/perjeta/herceptin x 3 cycles, s/p bilateral mastectomies 9/2015  - last PET scan 11/2017 negative  - s/p Herceptin, completed 3/2017; on arimidex, with side effects, changed to aromasin 1/2016- continue Aromasin  - Ca 27.29 slightly increased as outpatient- for PET scan as outpatient  - BRCA +- aware needs oopherectomy    #Thrombocytopenia- likely secondary to ETOH; hep C negative  - coags nl, no evidence of DIC  - platelets now normalized    #Psoriasis- previous on Otezla, Rheum following    #COPD exacerbation- on steroids/nebs  - RVP negative  - Pulmonary following    #bacterial conjunctivitis- improved, s/p eye drops    #ETOH abuse/intoxication/hepatitis- on withdrawal protocol; LFTs increased; on folic acid/thiamine  - us with hepatic steatosis; GI following      DC planning per Medicine  will Fu with Dr. Hurt as outpatient

## 2019-03-29 NOTE — PROGRESS NOTE ADULT - ASSESSMENT
54 Female w/ PMH stage 3 Bilateral Breast CA on Aromasin, RA, Psoriasis previously on Otezla, remote Brain Aneurysm s/p Clip in 1988,  Rt Jugular DVT, Catheter related MSSA Bacteremia from [place] in July 2015, ETOH abuse, COPD, p/w dypsnea, f/w COPD exacerbation, alcohol intoxication with  risk for withdrawal, hypovolemic hyponatremia in setting of poor PO intake and dehydration, and b/l conjunctivitis presumed bacterial in nature.     Problem: COPD exacerbation  po prednisone w/ taper as per pulm   -c/w pulm meds   pulm f/u noted  -cigarette cessation education provided.  -sup O2 prn       Problem: Bacterial conjunctivitis  s/p drops    Problem: Hyponatremia  stable    Problem: Alcohol intoxication  etoh abuse     mental status improved  -thiamine, folate, MVI  -social work      : Alcoholic hepatitis  -etoh cessation education provided  - follow LFT  sono neg for obstruction        : Psoriasis  rheum eval noted  derm noted      Problem: Breast cancer  Assessment and Plan: c/w examestane  heme/onc follow    Problem: Thrombocytopenia stable  -monitor counts for now  stable     DCP in progress home.    Medically stable.

## 2019-03-29 NOTE — PROGRESS NOTE ADULT - SUBJECTIVE AND OBJECTIVE BOX
Chief Complaint: fu    History of Present Illness: feeling better; still with cough but improved, denies dyspnea, no chest pain, no n/v/abd pain, no bleeding, +weakness, no leg pain      MEDICATIONS  (STANDING):  ALBUTerol/ipratropium for Nebulization 3 milliLiter(s) Nebulizer every 6 hours  ALPRAZolam 0.25 milliGRAM(s) Oral two times a day  buDESOnide 160 MICROgram(s)/formoterol 4.5 MICROgram(s) Inhaler 2 Puff(s) Inhalation two times a day  cholecalciferol 2000 Unit(s) Oral daily  docusate sodium 100 milliGRAM(s) Oral two times a day  exemestane 25 milliGRAM(s) Oral daily  fluocinonide 0.05% Solution 1 Application(s) Topical every 12 hours  folic acid 1 milliGRAM(s) Oral daily  heparin  Injectable 5000 Unit(s) SubCutaneous every 12 hours  montelukast 10 milliGRAM(s) Oral daily  multivitamin 1 Tablet(s) Oral daily  nicotine - 21 mG/24Hr(s) Patch 1 patch Transdermal daily  pantoprazole    Tablet 40 milliGRAM(s) Oral before breakfast  polyethylene glycol 3350 17 Gram(s) Oral daily  predniSONE   Tablet 40 milliGRAM(s) Oral daily  predniSONE   Tablet   Oral   prochlorperazine   Tablet 10 milliGRAM(s) Oral daily  senna 2 Tablet(s) Oral at bedtime  triamcinolone 0.1% Ointment 1 Application(s) Topical every 12 hours    MEDICATIONS  (PRN):      Allergies    amoxicillin (Anaphylaxis)  Levaquin (Other; Anaphylaxis)  Levaquin (Unknown)  penicillin (Anaphylaxis)  penicillins (Anaphylaxis)    Intolerances        Vital Signs Last 24 Hrs  T(C): 36.7 (29 Mar 2019 08:55), Max: 36.7 (29 Mar 2019 05:08)  T(F): 98.1 (29 Mar 2019 08:55), Max: 98.1 (29 Mar 2019 08:55)  HR: 85 (29 Mar 2019 08:55) (80 - 90)  BP: 131/82 (29 Mar 2019 08:55) (125/81 - 143/90)  BP(mean): --  RR: 18 (29 Mar 2019 08:55) (17 - 18)  SpO2: 96% (29 Mar 2019 08:55) (94% - 97%)    PHYSICAL EXAM  General: adult in NAD  HEENT: clear oropharynx, anicteric sclera, pink conjunctiva, conjunctival injection resolved  Neck: supple  CV: normal S1/S2   Lungs: decreased BS  Abdomen: soft non-tender obese positive bowel sounds  Ext: no clubbing cyanosis or edema  Skin: no rashes and no petechiae, psoriatic plaques improved  Lymph Nodes: No LAD in axillae, groin, neck  Neuro: alert and oriented X 3, no focal deficits    LABS:                          14.8   8.98  )-----------( 259      ( 28 Mar 2019 09:32 )             43.8         Mean Cell Volume : 110.9 fl  Mean Cell Hemoglobin : 37.5 pg  Mean Cell Hemoglobin Concentration : 33.8 gm/dL  Auto Neutrophil # : x  Auto Lymphocyte # : x  Auto Monocyte # : x  Auto Eosinophil # : x  Auto Basophil # : x  Auto Neutrophil % : x  Auto Lymphocyte % : x  Auto Monocyte % : x  Auto Eosinophil % : x  Auto Basophil % : x      Serial CBC's  03-28 @ 09:32  Hct-43.8 / Hgb-14.8 / Plat-259 / RBC-3.95 / WBC-8.98      03-28    136  |  96  |  20  ----------------------------<  128<H>  3.6   |  26  |  0.55    Ca    9.5      28 Mar 2019 07:14    TPro  6.4  /  Alb  3.6  /  TBili  1.5<H>  /  DBili  0.5<H>  /  AST  144<H>  /  ALT  355<H>  /  AlkPhos  60  03-28                      Radiology:

## 2019-10-03 ENCOUNTER — INPATIENT (INPATIENT)
Facility: HOSPITAL | Age: 54
LOS: 6 days | Discharge: HOME | DRG: 190 | End: 2019-10-10
Attending: INTERNAL MEDICINE | Admitting: INTERNAL MEDICINE
Payer: COMMERCIAL

## 2019-10-03 VITALS
OXYGEN SATURATION: 100 % | HEART RATE: 93 BPM | SYSTOLIC BLOOD PRESSURE: 141 MMHG | DIASTOLIC BLOOD PRESSURE: 86 MMHG | WEIGHT: 149.91 LBS | RESPIRATION RATE: 20 BRPM | TEMPERATURE: 98 F

## 2019-10-03 DIAGNOSIS — L40.9 PSORIASIS, UNSPECIFIED: ICD-10-CM

## 2019-10-03 DIAGNOSIS — F41.9 ANXIETY DISORDER, UNSPECIFIED: ICD-10-CM

## 2019-10-03 DIAGNOSIS — Z29.9 ENCOUNTER FOR PROPHYLACTIC MEASURES, UNSPECIFIED: ICD-10-CM

## 2019-10-03 DIAGNOSIS — J44.1 CHRONIC OBSTRUCTIVE PULMONARY DISEASE WITH (ACUTE) EXACERBATION: ICD-10-CM

## 2019-10-03 DIAGNOSIS — Z90.13 ACQUIRED ABSENCE OF BILATERAL BREASTS AND NIPPLES: Chronic | ICD-10-CM

## 2019-10-03 DIAGNOSIS — J44.9 CHRONIC OBSTRUCTIVE PULMONARY DISEASE, UNSPECIFIED: ICD-10-CM

## 2019-10-03 DIAGNOSIS — F10.239 ALCOHOL DEPENDENCE WITH WITHDRAWAL, UNSPECIFIED: ICD-10-CM

## 2019-10-03 DIAGNOSIS — Z90.10 ACQUIRED ABSENCE OF UNSPECIFIED BREAST AND NIPPLE: Chronic | ICD-10-CM

## 2019-10-03 DIAGNOSIS — R79.89 OTHER SPECIFIED ABNORMAL FINDINGS OF BLOOD CHEMISTRY: ICD-10-CM

## 2019-10-03 DIAGNOSIS — C50.919 MALIGNANT NEOPLASM OF UNSPECIFIED SITE OF UNSPECIFIED FEMALE BREAST: ICD-10-CM

## 2019-10-03 DIAGNOSIS — E87.1 HYPO-OSMOLALITY AND HYPONATREMIA: ICD-10-CM

## 2019-10-03 LAB
ALBUMIN SERPL ELPH-MCNC: 4.3 G/DL — SIGNIFICANT CHANGE UP (ref 3.3–5)
ALBUMIN SERPL ELPH-MCNC: 4.7 G/DL — SIGNIFICANT CHANGE UP (ref 3.3–5)
ALP SERPL-CCNC: 62 U/L — SIGNIFICANT CHANGE UP (ref 40–120)
ALP SERPL-CCNC: 68 U/L — SIGNIFICANT CHANGE UP (ref 40–120)
ALT FLD-CCNC: 137 U/L — HIGH (ref 10–45)
ALT FLD-CCNC: 152 U/L — HIGH (ref 10–45)
ANION GAP SERPL CALC-SCNC: 22 MMOL/L — HIGH (ref 5–17)
ANION GAP SERPL CALC-SCNC: 25 MMOL/L — HIGH (ref 5–17)
AST SERPL-CCNC: 239 U/L — HIGH (ref 10–40)
AST SERPL-CCNC: 281 U/L — HIGH (ref 10–40)
BASOPHILS # BLD AUTO: 0.02 K/UL — SIGNIFICANT CHANGE UP (ref 0–0.2)
BASOPHILS NFR BLD AUTO: 0.4 % — SIGNIFICANT CHANGE UP (ref 0–2)
BILIRUB SERPL-MCNC: 1.3 MG/DL — HIGH (ref 0.2–1.2)
BILIRUB SERPL-MCNC: 1.3 MG/DL — HIGH (ref 0.2–1.2)
BUN SERPL-MCNC: <4 MG/DL — LOW (ref 7–23)
BUN SERPL-MCNC: <4 MG/DL — LOW (ref 7–23)
CALCIUM SERPL-MCNC: 8.8 MG/DL — SIGNIFICANT CHANGE UP (ref 8.4–10.5)
CALCIUM SERPL-MCNC: 9.2 MG/DL — SIGNIFICANT CHANGE UP (ref 8.4–10.5)
CHLORIDE SERPL-SCNC: 82 MMOL/L — LOW (ref 96–108)
CHLORIDE SERPL-SCNC: 86 MMOL/L — LOW (ref 96–108)
CO2 SERPL-SCNC: 20 MMOL/L — LOW (ref 22–31)
CO2 SERPL-SCNC: 23 MMOL/L — SIGNIFICANT CHANGE UP (ref 22–31)
CREAT SERPL-MCNC: 0.38 MG/DL — LOW (ref 0.5–1.3)
CREAT SERPL-MCNC: 0.39 MG/DL — LOW (ref 0.5–1.3)
EOSINOPHIL # BLD AUTO: 0.01 K/UL — SIGNIFICANT CHANGE UP (ref 0–0.5)
EOSINOPHIL NFR BLD AUTO: 0.2 % — SIGNIFICANT CHANGE UP (ref 0–6)
GAS PNL BLDV: SIGNIFICANT CHANGE UP
GAS PNL BLDV: SIGNIFICANT CHANGE UP
GLUCOSE SERPL-MCNC: 89 MG/DL — SIGNIFICANT CHANGE UP (ref 70–99)
GLUCOSE SERPL-MCNC: 94 MG/DL — SIGNIFICANT CHANGE UP (ref 70–99)
HCT VFR BLD CALC: 36.5 % — SIGNIFICANT CHANGE UP (ref 34.5–45)
HGB BLD-MCNC: 12.7 G/DL — SIGNIFICANT CHANGE UP (ref 11.5–15.5)
IMM GRANULOCYTES NFR BLD AUTO: 0.4 % — SIGNIFICANT CHANGE UP (ref 0–1.5)
LACTATE SERPL-SCNC: 8.2 MMOL/L — CRITICAL HIGH (ref 0.7–2)
LYMPHOCYTES # BLD AUTO: 0.59 K/UL — LOW (ref 1–3.3)
LYMPHOCYTES # BLD AUTO: 11 % — LOW (ref 13–44)
MAGNESIUM SERPL-MCNC: 1.4 MG/DL — LOW (ref 1.6–2.6)
MCHC RBC-ENTMCNC: 34.8 GM/DL — SIGNIFICANT CHANGE UP (ref 32–36)
MCHC RBC-ENTMCNC: 34.8 PG — HIGH (ref 27–34)
MCV RBC AUTO: 100 FL — SIGNIFICANT CHANGE UP (ref 80–100)
MONOCYTES # BLD AUTO: 0.35 K/UL — SIGNIFICANT CHANGE UP (ref 0–0.9)
MONOCYTES NFR BLD AUTO: 6.6 % — SIGNIFICANT CHANGE UP (ref 2–14)
NEUTROPHILS # BLD AUTO: 4.35 K/UL — SIGNIFICANT CHANGE UP (ref 1.8–7.4)
NEUTROPHILS NFR BLD AUTO: 81.4 % — HIGH (ref 43–77)
NRBC # BLD: 0 /100 WBCS — SIGNIFICANT CHANGE UP (ref 0–0)
NT-PROBNP SERPL-SCNC: 55 PG/ML — SIGNIFICANT CHANGE UP (ref 0–300)
PHOSPHATE SERPL-MCNC: 3 MG/DL — SIGNIFICANT CHANGE UP (ref 2.5–4.5)
PLATELET # BLD AUTO: 134 K/UL — LOW (ref 150–400)
POTASSIUM SERPL-MCNC: 4.4 MMOL/L — SIGNIFICANT CHANGE UP (ref 3.5–5.3)
POTASSIUM SERPL-MCNC: 4.5 MMOL/L — SIGNIFICANT CHANGE UP (ref 3.5–5.3)
POTASSIUM SERPL-SCNC: 4.4 MMOL/L — SIGNIFICANT CHANGE UP (ref 3.5–5.3)
POTASSIUM SERPL-SCNC: 4.5 MMOL/L — SIGNIFICANT CHANGE UP (ref 3.5–5.3)
PROT SERPL-MCNC: 6.9 G/DL — SIGNIFICANT CHANGE UP (ref 6–8.3)
PROT SERPL-MCNC: 7.5 G/DL — SIGNIFICANT CHANGE UP (ref 6–8.3)
RAPID RVP RESULT: SIGNIFICANT CHANGE UP
RBC # BLD: 3.65 M/UL — LOW (ref 3.8–5.2)
RBC # FLD: 12.9 % — SIGNIFICANT CHANGE UP (ref 10.3–14.5)
SODIUM SERPL-SCNC: 127 MMOL/L — LOW (ref 135–145)
SODIUM SERPL-SCNC: 131 MMOL/L — LOW (ref 135–145)
TROPONIN T, HIGH SENSITIVITY RESULT: 18 NG/L — SIGNIFICANT CHANGE UP (ref 0–51)
WBC # BLD: 5.34 K/UL — SIGNIFICANT CHANGE UP (ref 3.8–10.5)
WBC # FLD AUTO: 5.34 K/UL — SIGNIFICANT CHANGE UP (ref 3.8–10.5)

## 2019-10-03 PROCEDURE — 99285 EMERGENCY DEPT VISIT HI MDM: CPT

## 2019-10-03 PROCEDURE — 71045 X-RAY EXAM CHEST 1 VIEW: CPT | Mod: 26

## 2019-10-03 PROCEDURE — 99223 1ST HOSP IP/OBS HIGH 75: CPT

## 2019-10-03 PROCEDURE — 71275 CT ANGIOGRAPHY CHEST: CPT | Mod: 26

## 2019-10-03 PROCEDURE — 93010 ELECTROCARDIOGRAM REPORT: CPT | Mod: NC

## 2019-10-03 RX ORDER — TIOTROPIUM BROMIDE AND OLODATEROL 3.124; 2.736 UG/1; UG/1
2 SPRAY, METERED RESPIRATORY (INHALATION) DAILY
Refills: 0 | Status: DISCONTINUED | OUTPATIENT
Start: 2019-10-03 | End: 2019-10-04

## 2019-10-03 RX ORDER — THIAMINE MONONITRATE (VIT B1) 100 MG
100 TABLET ORAL DAILY
Refills: 0 | Status: DISCONTINUED | OUTPATIENT
Start: 2019-10-03 | End: 2019-10-10

## 2019-10-03 RX ORDER — MAGNESIUM SULFATE 500 MG/ML
2 VIAL (ML) INJECTION ONCE
Refills: 0 | Status: COMPLETED | OUTPATIENT
Start: 2019-10-03 | End: 2019-10-03

## 2019-10-03 RX ORDER — SENNA PLUS 8.6 MG/1
2 TABLET ORAL AT BEDTIME
Refills: 0 | Status: DISCONTINUED | OUTPATIENT
Start: 2019-10-03 | End: 2019-10-10

## 2019-10-03 RX ORDER — SODIUM CHLORIDE 9 MG/ML
1000 INJECTION INTRAMUSCULAR; INTRAVENOUS; SUBCUTANEOUS ONCE
Refills: 0 | Status: COMPLETED | OUTPATIENT
Start: 2019-10-03 | End: 2019-10-03

## 2019-10-03 RX ORDER — SODIUM CHLORIDE 9 MG/ML
1000 INJECTION, SOLUTION INTRAVENOUS ONCE
Refills: 0 | Status: COMPLETED | OUTPATIENT
Start: 2019-10-03 | End: 2019-10-03

## 2019-10-03 RX ORDER — DOCUSATE SODIUM 100 MG
100 CAPSULE ORAL
Refills: 0 | Status: DISCONTINUED | OUTPATIENT
Start: 2019-10-03 | End: 2019-10-10

## 2019-10-03 RX ORDER — MONTELUKAST 4 MG/1
10 TABLET, CHEWABLE ORAL DAILY
Refills: 0 | Status: DISCONTINUED | OUTPATIENT
Start: 2019-10-03 | End: 2019-10-10

## 2019-10-03 RX ORDER — ALPRAZOLAM 0.25 MG
0.25 TABLET ORAL THREE TIMES A DAY
Refills: 0 | Status: DISCONTINUED | OUTPATIENT
Start: 2019-10-03 | End: 2019-10-03

## 2019-10-03 RX ORDER — PROCHLORPERAZINE MALEATE 5 MG
10 TABLET ORAL DAILY
Refills: 0 | Status: DISCONTINUED | OUTPATIENT
Start: 2019-10-03 | End: 2019-10-10

## 2019-10-03 RX ORDER — FOLIC ACID 0.8 MG
1 TABLET ORAL DAILY
Refills: 0 | Status: DISCONTINUED | OUTPATIENT
Start: 2019-10-03 | End: 2019-10-10

## 2019-10-03 RX ORDER — CHOLECALCIFEROL (VITAMIN D3) 125 MCG
2000 CAPSULE ORAL DAILY
Refills: 0 | Status: DISCONTINUED | OUTPATIENT
Start: 2019-10-03 | End: 2019-10-10

## 2019-10-03 RX ORDER — IPRATROPIUM/ALBUTEROL SULFATE 18-103MCG
3 AEROSOL WITH ADAPTER (GRAM) INHALATION EVERY 6 HOURS
Refills: 0 | Status: DISCONTINUED | OUTPATIENT
Start: 2019-10-03 | End: 2019-10-04

## 2019-10-03 RX ORDER — IPRATROPIUM/ALBUTEROL SULFATE 18-103MCG
3 AEROSOL WITH ADAPTER (GRAM) INHALATION ONCE
Refills: 0 | Status: COMPLETED | OUTPATIENT
Start: 2019-10-03 | End: 2019-10-03

## 2019-10-03 RX ORDER — PANTOPRAZOLE SODIUM 20 MG/1
40 TABLET, DELAYED RELEASE ORAL
Refills: 0 | Status: DISCONTINUED | OUTPATIENT
Start: 2019-10-03 | End: 2019-10-10

## 2019-10-03 RX ORDER — SODIUM CHLORIDE 9 MG/ML
1000 INJECTION INTRAMUSCULAR; INTRAVENOUS; SUBCUTANEOUS
Refills: 0 | Status: DISCONTINUED | OUTPATIENT
Start: 2019-10-03 | End: 2019-10-10

## 2019-10-03 RX ORDER — ALPRAZOLAM 0.25 MG
1 TABLET ORAL
Qty: 0 | Refills: 0 | DISCHARGE

## 2019-10-03 RX ADMIN — SODIUM CHLORIDE 1000 MILLILITER(S): 9 INJECTION, SOLUTION INTRAVENOUS at 19:17

## 2019-10-03 RX ADMIN — SODIUM CHLORIDE 75 MILLILITER(S): 9 INJECTION INTRAMUSCULAR; INTRAVENOUS; SUBCUTANEOUS at 23:00

## 2019-10-03 RX ADMIN — SODIUM CHLORIDE 1000 MILLILITER(S): 9 INJECTION INTRAMUSCULAR; INTRAVENOUS; SUBCUTANEOUS at 21:48

## 2019-10-03 RX ADMIN — Medication 3 MILLILITER(S): at 20:37

## 2019-10-03 RX ADMIN — Medication 3 MILLILITER(S): at 19:01

## 2019-10-03 RX ADMIN — Medication 25 MILLIGRAM(S): at 19:18

## 2019-10-03 RX ADMIN — Medication 50 GRAM(S): at 23:00

## 2019-10-03 RX ADMIN — Medication 125 MILLIGRAM(S): at 19:19

## 2019-10-03 RX ADMIN — Medication 2 MILLIGRAM(S): at 21:20

## 2019-10-03 RX ADMIN — Medication 3 MILLILITER(S): at 22:59

## 2019-10-03 RX ADMIN — Medication 0.25 MILLIGRAM(S): at 19:47

## 2019-10-03 NOTE — H&P ADULT - PROBLEM SELECTOR PLAN 5
Patient with alcohol abuse and on chronic benzo  high risk for withdrawal  c/w CIWA protocol  c/w IVF  thiamine/folate  will start ativan taper for now  hold home xanax Likely due to dehydration and/or beer potomania  monitor BMP closely  c/w gentle hydration for now  check urine lytes

## 2019-10-03 NOTE — H&P ADULT - NSICDXPASTMEDICALHX_GEN_ALL_CORE_FT
PAST MEDICAL HISTORY:  Brain aneurysm with clips    Breast cancer     Breast cancer, stage 3     Prophylactic measure     Psoriasis     Psoriasis     RA (rheumatoid arthritis)

## 2019-10-03 NOTE — ED PROVIDER NOTE - PHYSICAL EXAMINATION
General:  NAD  HEENT: pupils equal and reactive, normal oropharynx, normal external ears bilaterally   Cardiac: RRR  Resp: Poor air movement bl  Abd: soft, nontender, nondistended, normoactive bowel sounds  : no CVA tenderness  Neuro: Moving all extremities  Skin:  normal color for race

## 2019-10-03 NOTE — ED ADULT NURSE REASSESSMENT NOTE - NS ED NURSE REASSESS COMMENT FT1
Report received from SANNA QUINTANILLA. Pt resting in bed, NAD noted. VSS. Awaiting bed assignment. Safety maintained at all times, bed in lowest position, call bell in reach. Will continue to monitor closely.

## 2019-10-03 NOTE — H&P ADULT - NSHPPHYSICALEXAM_GEN_ALL_CORE
PHYSICAL EXAM:  Vital Signs Last 24 Hrs  T(C): 36.7 (10-03-19 @ 21:24)  T(F): 98 (10-03-19 @ 21:24), Max: 98 (10-03-19 @ 16:25)  HR: 105 (10-03-19 @ 21:24) (91 - 105)  BP: 118/65 (10-03-19 @ 21:24)  BP(mean): --  RR: 18 (10-03-19 @ 21:24) (18 - 20)  SpO2: 100% (10-03-19 @ 21:24) (100% - 100%)  Wt(kg): --    Constitutional: NAD, awake and alert, appears anxious  EYES: EOMI  ENT:  Normal Hearing, no tonsillar exudates   Neck: Soft and supple, No JVD  Lungs: Breath sounds are clear bilaterally, No wheezing, rales or rhonchi  Heart: S1 and S2, regular rate and rhythm, + Murmurs, no gallops or rubs  Abdomen: Bowel Sounds present, soft, nontender, nondistended, no guarding, no rebound  Extremities: No cyanosis or clubbing; warm to touch  Vascular: 2+ peripheral pulses lower ex  Neurological: A/O x 3, no focal deficits  Musculoskeletal: 5/5 strength b/l upper and lower extremities  Skin: No rashes  Psych: no depression or anhedonia  HEME: no bruises, no nose bleeds

## 2019-10-03 NOTE — ED PROVIDER NOTE - OBJECTIVE STATEMENT
hx taken from old chart  3/19  54 Female w/ PMH stage 3 Bilateral Breast CA on Aromasin, RA, Psoriasis previously on Otezla, remote Brain Aneurysm s/p Clip in 1988,  Rt Jugular DVT, Catheter related MSSA Bacteremia from [place] in July 2015, ETOH abuse, COPD, p/w dyspnea, f/w COPD exacerbation, alcohol intoxication with  risk for withdrawal, hypovolemic hyponatremia in setting of poor PO intake and dehydration, had neg CTA for PE 54 Female w/ PMH stage 3 Bilateral Breast CA on Aromasin, RA, Psoriasis previously on Otezla, remote Brain Aneurysm s/p Clip in 1988,  Rt Jugular DVT 4 years ago denies being on blood thinners now, ETOH abuse, COPD, p/w dyspnea, f/w COPD exacerbation, alcohol abuse, had neg CTA for PE pw cc of SOB    Few days of SOB, has been coughing w/ productive pheglm however not increased amoutn does not smell worse. 3L O2 24/7 baseline unchanged. No F/C. More SOB now then a week ago with decreased exercise tolerance. No CP. Denies cardiac hx. Fell out of bed this AM and fell on shoulder denies head trauma or LOC. Denies any cancer now.   No recent car rides or flights recently  Last alcohol - 2-3 shots of scotch and 2-3 beers/daily, denies any issues or shaking when stopping drink, last drink was this morning.  Meds: 54 Female w/ PMH stage 3 Bilateral Breast CA on Aromasin, RA, Psoriasis previously on Otezla, remote Brain Aneurysm s/p Clip in 1988,  Rt Jugular DVT 4 years ago denies being on blood thinners now, ETOH abuse, COPD, p/w dyspnea, f/w COPD exacerbation, alcohol abuse, had neg CTA for PE pw cc of SOB    Few days of SOB, has been coughing w/ productive pheglm however not increased amount does not smell worse. 3L O2 24/7 baseline unchanged. No F/C. More SOB now then a week ago with decreased exercise tolerance. No CP. Denies cardiac hx. Fell out of bed this AM and fell on shoulder denies head trauma or LOC. Denies any cancer now.   No recent car rides or flights recently  Last alcohol - 2-3 shots of scotch and 2-3 beers/daily, denies any issues or shaking when stopping drink, last drink was this morning.    ALLERGIES: penicillin, Levoquin 54 Female w/ PMH stage 3 Bilateral Breast CA on Aromasin, RA, Psoriasis previously on Otezla, remote Brain Aneurysm s/p Clip in 1988,  Rt Jugular DVT 4 years ago denies being on blood thinners now, ETOH abuse, COPD, p/w dyspnea, f/w COPD exacerbation, alcohol abuse, had neg CTA for PE pw cc of SOB    Few days of SOB, has been coughing w/ productive phlegm however not increased amount does not smell worse. 3L O2 24/7 baseline unchanged. No F/C. More SOB now then a week ago with decreased exercise tolerance. No CP. Denies cardiac hx. Fell out of bed this AM and fell on shoulder denies head trauma or LOC. Denies any cancer now.   No recent car rides or flights recently  Last alcohol - 2-3 shots of scotch and 2-3 beers/daily, denies any issues or shaking when stopping drink, last drink was this morning.    PCP is Dr. Jacob Medina  Meds: Alprazolam TID, enaloprist, Nebulizers, singular, Aromasin, compazine  ALLERGIES: penicillin, Levoquin

## 2019-10-03 NOTE — H&P ADULT - PROBLEM SELECTOR PLAN 1
Patient with dyspnea, likely COPD exacerbation, currently not wheezing  CTA negative for PE, no obvious infiltrate  no fever, no leukocytosis, no worsening cough  hold off abx  c/w duonebs around the clock  c/w stiolto for now (confirm home med)  s/p IV solumedrol 125mg in ED; will start patient on solumedrol 20mg q8h for now

## 2019-10-03 NOTE — ED PROVIDER NOTE - CARE PLAN
Principal Discharge DX:	Dyspnea Principal Discharge DX:	COPD (chronic obstructive pulmonary disease)

## 2019-10-03 NOTE — ED ADULT NURSE NOTE - OBJECTIVE STATEMENT
55 yo presents to the ED from home. A&Ox4 by EMS c/o SOB. history of COPD, on home O2, reports worsening SOB x 2 days. denies CP. reports persistent cough x "a while" with white phlegm. denies fever, chills. PMH of stage 3 Bilateral Breast CA s/p bilateral mastectomy on Aromasin, RA, Psoriasis previously on Otezla, remote Brain Aneurysm s/p Clip in 1988,  Rt Jugular DVT, Catheter related MSSA Bacteremia from [place] in July 2015, ETOH abuse, COPD. last drink was this AM, drinks usually 3 shots of scotch per day. denies any alc withdrawal symptoms.

## 2019-10-03 NOTE — H&P ADULT - PROBLEM SELECTOR PLAN 3
Likely due to albuterol  no signs of sepsis, no signs of end organ damage  will continue to trend  c/w gentle IV fluid for now Likely due to albuterol and liver dysfunction  no signs of sepsis, no signs of end organ damage  septic work up  will continue to trend  c/w gentle IV fluid for now Patient with alcohol abuse and on chronic benzo  high risk for withdrawal  c/w CIWA protocol  c/w IVF  thiamine/folate  will start ativan taper for now  hold home xanax

## 2019-10-03 NOTE — ED ADULT NURSE NOTE - NSIMPLEMENTINTERV_GEN_ALL_ED
Implemented All Fall Risk Interventions:  [place] to call system. Call bell, personal items and telephone within reach. Instruct patient to call for assistance. Room bathroom lighting operational. Non-slip footwear when patient is off stretcher. Physically safe environment: no spills, clutter or unnecessary equipment. Stretcher in lowest position, wheels locked, appropriate side rails in place. Provide visual cue, wrist band, yellow gown, etc. Monitor gait and stability. Monitor for mental status changes and reorient to person, place, and time. Review medications for side effects contributing to fall risk. Reinforce activity limits and safety measures with patient and family.

## 2019-10-03 NOTE — H&P ADULT - ATTENDING COMMENTS
Patient assigned to me by night hospitalist in charge for management and care for patient for this evening only. Care to be resumed by day hospitalist (Dr. Groves) in the morning and thereafter.     Patient's care rendered on 10/03/19 at 9PM.      I was physically present for the key portions of the evaluation and management (E/M) service provided.  I agree with the above history, physical, and plan which I have reviewed and edited where appropriate.     Plan discussed with Patient, RN,  [place].

## 2019-10-03 NOTE — ED PROVIDER NOTE - PMH
Brain aneurysm  with clips  Breast cancer    Breast cancer, stage 3    Prophylactic measure    Psoriasis    Psoriasis    RA (rheumatoid arthritis)

## 2019-10-03 NOTE — H&P ADULT - ASSESSMENT
53 yo female with PMH of stage 3 breast cancer on aromasin, RA, Psoriasis on Otezla, brain aneurysm, s/p clip 1988, right jugular DVT, Catheter related MSSA bacteremia 2015,  ETOH abuse, COPD on oxygen presents here with dyspnea

## 2019-10-03 NOTE — ED PROVIDER NOTE - CLINICAL SUMMARY MEDICAL DECISION MAKING FREE TEXT BOX
54 Female w/ PMH stage 3 Bilateral Breast CA on Aromasin, RA, Psoriasis previously on Otezla, remote Brain Aneurysm s/p Clip in 1988,  Rt Jugular DVT 4 years ago denies being on blood thinners now, ETOH abuse, COPD, p/w dyspnea, f/w COPD exacerbation, alcohol abuse, had neg CTA for PE pw cc of SOB.   hx of DVT now with increasing SOB, sedentary at baseline concern for PE, in addition concern for PNA vs other lung etiology will get CTA, labs, due to  trop/ekg, reassess 54 Female  with multiple medical issues came in with increase dyspnea  concern for PNA vs other lung etiology PE ,had neg CTA in 3/2019 ,pt is sedentary concern for PE  will get CTA, labs, due to  trop/ekg, Chest xray and ECG , reassess   [facility] 54 Female  with multiple medical issues came in with increase dyspnea  concern for PNA vs other lung etiology PE ,had neg CTA in 3/2019 ,pt is sedentary concern for PE  also hs of alcoholism last drink today couple of h ago ,started to shake ,will star CIWA ,librium   will get CTA, labs, due to  trop/ekg, Chest xray and ECG  admission [facility]

## 2019-10-03 NOTE — ED ADULT NURSE REASSESSMENT NOTE - NS ED NURSE REASSESS COMMENT FT1
pt refusing rectal temp. MD Harrison made aware. MD states that pt does not need rectal temp any longer. order DC.

## 2019-10-03 NOTE — H&P ADULT - PROBLEM SELECTOR PLAN 7
Likely due to dehydration and/or beer potomania  monitor BMP closely  c/w gentle hydration for now  check urine lytes Likely due to albuterol and liver dysfunction  no signs of sepsis, no signs of end organ damage  septic work up  will continue to trend  c/w gentle IV fluid for now

## 2019-10-03 NOTE — H&P ADULT - NSHPLABSRESULTS_GEN_ALL_CORE
Labs personally reviewed:                          12.7   5.34  )-----------( 134      ( 03 Oct 2019 18:38 )             36.5     10-03    127<L>  |  82<L>  |  <4<L>  ----------------------------<  89  4.5   |  23  |  0.39<L>    Ca    9.2      03 Oct 2019 18:38    TPro  7.5  /  Alb  4.7  /  TBili  1.3<H>  /  DBili  x   /  AST  281<H>  /  ALT  152<H>  /  AlkPhos  68  10-03        LIVER FUNCTIONS - ( 03 Oct 2019 18:38 )  Alb: 4.7 g/dL / Pro: 7.5 g/dL / ALK PHOS: 68 U/L / ALT: 152 U/L / AST: 281 U/L / GGT: x               CAPILLARY BLOOD GLUCOSE          Imaging:  CXR personally reviewed: no focal opacity  CTA chest reviewed: ?right base opacity    EKG personally reviewed: nsr, no acute st changes

## 2019-10-03 NOTE — ED PROVIDER NOTE - NS ED ROS FT
CONSTITUTIONAL: No fevers, no chills  Eyes: No vision changes  Cardiovascular: No Chest pain  Respiratory: +SOB  Gastrointestinal: No n/v/d, no abd pain  Genitourinary: no dysuria, no hematuria  SKIN: no rashes.  NEURO: no headache, no weakness or numbness  PSYCHIATRIC: no known mental health issues.

## 2019-10-03 NOTE — H&P ADULT - NSHPREVIEWOFSYSTEMS_GEN_ALL_CORE
CONSTITUTIONAL: lethargic, no fever, chills  EYES/ENT: No visual changes;  No dysphagia  NECK: No pain or stiffness  RESPIRATORY: cough w/white sputum (chronic)  CARDIOVASCULAR: No chest pain or palpitations; No lower extremity edema  EXTREMITIES: no le edema, cyanosis, clubbing  MUSCULOSKELETAL: no joint pain, swelling  GASTROINTESTINAL: No abdominal or epigastric pain. No nausea, vomiting, or hematemesis; No diarrhea or constipation. No melena or hematochezia.  BACK: No back pain  GENITOURINARY: No dysuria, frequency or hematuria  NEUROLOGICAL: No numbness or weakness  SKIN: +psoriasis  PSYCH: +anxiety  All other review of systems is negative unless indicated above.

## 2019-10-03 NOTE — CHART NOTE - NSCHARTNOTEFT_GEN_A_CORE
Reference #: [id number]    Others' Prescriptions  Patient Name:	Em Harmon	YOB: 1965  Address:	[address] [place] [address]	Sex:	Female  Rx Written	Rx Dispensed	Drug	Quantity	Days Supply	Prescriber Name  09/09/2019	09/10/2019	alprazolam 0.25 mg tablet	90	30	Florinda, Jacob QUINTANILLA MD  08/08/2019	08/10/2019	alprazolam 0.25 mg tablet	90	30	Florinda, Jacob QUINTANILLA MD  07/08/2019	07/09/2019	alprazolam 0.25 mg tablet	90	30	Florinda, Jacob QUINTANILLA MD  06/10/2019	06/11/2019	alprazolam 0.25 mg tablet	90	30	Florinda, Jacob QUINTANILLA MD  05/10/2019	05/11/2019	alprazolam 0.25 mg tablet	90	30	Florinda, Jacob QUINTANILLA MD  04/08/2019	04/09/2019	alprazolam 0.25 mg tablet	90	30	Florinda, Jacob QUINTANILLA MD  02/20/2019	02/20/2019	alprazolam 0.25 mg tablet	60	30	[place], Madai G  01/17/2019	01/19/2019	alprazolam 0.25 mg tablet	60	30	RoniCherise marroquin NP  12/19/2018	12/20/2018	alprazolam 0.25 mg tablet	60	30	RoniCherise marroquin NP  11/21/2018	11/22/2018	alprazolam 0.25 mg tablet	60	30	Javier Adan DO  10/24/2018	10/25/2018	alprazolam 0.25 mg tablet	60	30	Javier Adan DO

## 2019-10-03 NOTE — H&P ADULT - HISTORY OF PRESENT ILLNESS
55 yo female with PMH of stage 3 breast cancer on aromasin, RA, Psoriasis previously on Otezla, brain aneurysm, s/p clip 1988, right jugular DVT, ETOH abuse, COPD presents here with dyspnea. 53 yo female with PMH of stage 3 breast cancer on aromasin, RA, Psoriasis previously on Otezla, brain aneurysm, s/p clip 1988, right jugular DVT, Catheter related MSSA bacteremia 2015,  ETOH abuse, COPD presents here with dyspnea.      Patient was admitted in March for SOB and alcohol intoxication. 53 yo female with PMH of stage 3 breast cancer on aromasin, RA, Psoriasis on Otezla, brain aneurysm, s/p clip 1988, right jugular DVT, Catheter related MSSA bacteremia 2015,  ETOH abuse, COPD on oxygen presents here with dyspnea.  Patient states that she has been very lethargic.  She has also been dyspneic at rest and with minimal activities.  She has cough with white mucous, but that is not new for her.  At home she has been using nebulizers without much improvement.  She denies any fever, chills, nausea or vomiting.  Denies any GI or  symptoms.  Denies any sick contact or recent travel.      Patient was admitted in March for SOB and alcohol intoxication.  She was treated with steroid taper and ativan taper.  At home she drinks 2-3 shots of scotch and "lotta" beer.  Last drink was this morning.  Denies any previous withdrawals. During my interview, she is shaking a lot, but both patient and  states that she does that very often.  At home she is on xanax for anxiety.  Currently denies any headache.  Denies any hallucination.

## 2019-10-04 DIAGNOSIS — K70.10 ALCOHOLIC HEPATITIS WITHOUT ASCITES: ICD-10-CM

## 2019-10-04 DIAGNOSIS — F10.230 ALCOHOL DEPENDENCE WITH WITHDRAWAL, UNCOMPLICATED: ICD-10-CM

## 2019-10-04 PROBLEM — Z29.9 ENCOUNTER FOR PROPHYLACTIC MEASURES, UNSPECIFIED: Chronic | Status: ACTIVE | Noted: 2019-03-17

## 2019-10-04 LAB
ALBUMIN SERPL ELPH-MCNC: 4.2 G/DL — SIGNIFICANT CHANGE UP (ref 3.3–5)
ALBUMIN SERPL ELPH-MCNC: 4.2 G/DL — SIGNIFICANT CHANGE UP (ref 3.3–5)
ALP SERPL-CCNC: 58 U/L — SIGNIFICANT CHANGE UP (ref 40–120)
ALP SERPL-CCNC: 60 U/L — SIGNIFICANT CHANGE UP (ref 40–120)
ALT FLD-CCNC: 126 U/L — HIGH (ref 10–45)
ALT FLD-CCNC: 129 U/L — HIGH (ref 10–45)
AMMONIA BLD-MCNC: 32 UMOL/L — SIGNIFICANT CHANGE UP (ref 11–55)
ANION GAP SERPL CALC-SCNC: 14 MMOL/L — SIGNIFICANT CHANGE UP (ref 5–17)
ANION GAP SERPL CALC-SCNC: 25 MMOL/L — HIGH (ref 5–17)
ANION GAP SERPL CALC-SCNC: 27 MMOL/L — HIGH (ref 5–17)
APPEARANCE UR: ABNORMAL
APTT BLD: 25.3 SEC — LOW (ref 27.5–36.3)
AST SERPL-CCNC: 189 U/L — HIGH (ref 10–40)
AST SERPL-CCNC: 202 U/L — HIGH (ref 10–40)
BACTERIA # UR AUTO: ABNORMAL
BASE EXCESS BLDA CALC-SCNC: -6.8 MMOL/L — LOW (ref -2–2)
BASE EXCESS BLDV CALC-SCNC: 4.7 MMOL/L — HIGH (ref -2–2)
BASOPHILS # BLD AUTO: 0 K/UL — SIGNIFICANT CHANGE UP (ref 0–0.2)
BASOPHILS NFR BLD AUTO: 0 % — SIGNIFICANT CHANGE UP (ref 0–2)
BILIRUB DIRECT SERPL-MCNC: 0.3 MG/DL — HIGH (ref 0–0.2)
BILIRUB INDIRECT FLD-MCNC: 0.8 MG/DL — SIGNIFICANT CHANGE UP (ref 0.2–1)
BILIRUB SERPL-MCNC: 1 MG/DL — SIGNIFICANT CHANGE UP (ref 0.2–1.2)
BILIRUB SERPL-MCNC: 1.1 MG/DL — SIGNIFICANT CHANGE UP (ref 0.2–1.2)
BILIRUB UR-MCNC: NEGATIVE — SIGNIFICANT CHANGE UP
BUN SERPL-MCNC: 8 MG/DL — SIGNIFICANT CHANGE UP (ref 7–23)
BUN SERPL-MCNC: <4 MG/DL — LOW (ref 7–23)
BUN SERPL-MCNC: <4 MG/DL — LOW (ref 7–23)
CA-I SERPL-SCNC: 1.08 MMOL/L — LOW (ref 1.12–1.3)
CALCIUM SERPL-MCNC: 7.9 MG/DL — LOW (ref 8.4–10.5)
CALCIUM SERPL-MCNC: 8.3 MG/DL — LOW (ref 8.4–10.5)
CALCIUM SERPL-MCNC: 8.8 MG/DL — SIGNIFICANT CHANGE UP (ref 8.4–10.5)
CHLORIDE BLDV-SCNC: 97 MMOL/L — SIGNIFICANT CHANGE UP (ref 96–108)
CHLORIDE SERPL-SCNC: 91 MMOL/L — LOW (ref 96–108)
CHLORIDE SERPL-SCNC: 92 MMOL/L — LOW (ref 96–108)
CHLORIDE SERPL-SCNC: 94 MMOL/L — LOW (ref 96–108)
CO2 BLDA-SCNC: 19 MMOL/L — LOW (ref 22–30)
CO2 BLDV-SCNC: 29 MMOL/L — SIGNIFICANT CHANGE UP (ref 22–30)
CO2 SERPL-SCNC: 16 MMOL/L — LOW (ref 22–31)
CO2 SERPL-SCNC: 17 MMOL/L — LOW (ref 22–31)
CO2 SERPL-SCNC: 28 MMOL/L — SIGNIFICANT CHANGE UP (ref 22–31)
COLOR SPEC: YELLOW — SIGNIFICANT CHANGE UP
CREAT ?TM UR-MCNC: 45 MG/DL — SIGNIFICANT CHANGE UP
CREAT SERPL-MCNC: 0.43 MG/DL — LOW (ref 0.5–1.3)
CREAT SERPL-MCNC: 0.43 MG/DL — LOW (ref 0.5–1.3)
CREAT SERPL-MCNC: 0.51 MG/DL — SIGNIFICANT CHANGE UP (ref 0.5–1.3)
CRP SERPL-MCNC: 0.45 MG/DL — HIGH (ref 0–0.4)
DIFF PNL FLD: ABNORMAL
EOSINOPHIL # BLD AUTO: 0 K/UL — SIGNIFICANT CHANGE UP (ref 0–0.5)
EOSINOPHIL NFR BLD AUTO: 0 % — SIGNIFICANT CHANGE UP (ref 0–6)
EPI CELLS # UR: 1 — SIGNIFICANT CHANGE UP
ERYTHROCYTE [SEDIMENTATION RATE] IN BLOOD: 20 MM/HR — SIGNIFICANT CHANGE UP (ref 0–20)
GAS PNL BLDV: 129 MMOL/L — LOW (ref 135–145)
GAS PNL BLDV: SIGNIFICANT CHANGE UP
GAS PNL BLDV: SIGNIFICANT CHANGE UP
GLUCOSE BLDV-MCNC: 144 MG/DL — HIGH (ref 70–99)
GLUCOSE SERPL-MCNC: 144 MG/DL — HIGH (ref 70–99)
GLUCOSE SERPL-MCNC: 149 MG/DL — HIGH (ref 70–99)
GLUCOSE SERPL-MCNC: 151 MG/DL — HIGH (ref 70–99)
GLUCOSE UR QL: NEGATIVE — SIGNIFICANT CHANGE UP
HCG SERPL-ACNC: <2 MIU/ML — SIGNIFICANT CHANGE UP
HCO3 BLDA-SCNC: 18 MMOL/L — LOW (ref 21–29)
HCO3 BLDV-SCNC: 28 MMOL/L — SIGNIFICANT CHANGE UP (ref 21–29)
HCT VFR BLD CALC: 34.2 % — LOW (ref 34.5–45)
HCT VFR BLDA CALC: 36 % — LOW (ref 39–50)
HGB BLD CALC-MCNC: 11.5 G/DL — SIGNIFICANT CHANGE UP (ref 11.5–15.5)
HGB BLD-MCNC: 11.3 G/DL — LOW (ref 11.5–15.5)
HYALINE CASTS # UR AUTO: 0 /LPF — SIGNIFICANT CHANGE UP (ref 0–7)
INR BLD: 0.9 RATIO — SIGNIFICANT CHANGE UP (ref 0.88–1.16)
KETONES UR-MCNC: ABNORMAL
LACTATE BLDV-MCNC: 2.7 MMOL/L — HIGH (ref 0.7–2)
LACTATE BLDV-MCNC: 8.8 MMOL/L — CRITICAL HIGH (ref 0.7–2)
LACTATE SERPL-SCNC: 8.8 MMOL/L — CRITICAL HIGH (ref 0.7–2)
LACTATE SERPL-SCNC: 9.6 MMOL/L — CRITICAL HIGH (ref 0.7–2)
LEUKOCYTE ESTERASE UR-ACNC: ABNORMAL
LIDOCAIN IGE QN: 19 U/L — SIGNIFICANT CHANGE UP (ref 7–60)
LYMPHOCYTES # BLD AUTO: 0.13 K/UL — LOW (ref 1–3.3)
LYMPHOCYTES # BLD AUTO: 4.3 % — LOW (ref 13–44)
MAGNESIUM SERPL-MCNC: 2 MG/DL — SIGNIFICANT CHANGE UP (ref 1.6–2.6)
MAGNESIUM SERPL-MCNC: 2.4 MG/DL — SIGNIFICANT CHANGE UP (ref 1.6–2.6)
MCHC RBC-ENTMCNC: 33 GM/DL — SIGNIFICANT CHANGE UP (ref 32–36)
MCHC RBC-ENTMCNC: 34.6 PG — HIGH (ref 27–34)
MCV RBC AUTO: 104.6 FL — HIGH (ref 80–100)
MONOCYTES # BLD AUTO: 0.03 K/UL — SIGNIFICANT CHANGE UP (ref 0–0.9)
MONOCYTES NFR BLD AUTO: 0.9 % — LOW (ref 2–14)
NEUTROPHILS # BLD AUTO: 2.89 K/UL — SIGNIFICANT CHANGE UP (ref 1.8–7.4)
NEUTROPHILS NFR BLD AUTO: 93.9 % — HIGH (ref 43–77)
NITRITE UR-MCNC: NEGATIVE — SIGNIFICANT CHANGE UP
OSMOLALITY SERPL: 305 MOSMOL/KG — HIGH (ref 275–300)
OSMOLALITY UR: 394 MOS/KG — SIGNIFICANT CHANGE UP (ref 300–900)
PCO2 BLDA: 35 MMHG — SIGNIFICANT CHANGE UP (ref 32–46)
PCO2 BLDV: 36 MMHG — SIGNIFICANT CHANGE UP (ref 35–50)
PH BLDA: 7.33 — LOW (ref 7.35–7.45)
PH BLDV: 7.5 — HIGH (ref 7.35–7.45)
PH UR: 6 — SIGNIFICANT CHANGE UP (ref 5–8)
PHOSPHATE SERPL-MCNC: 3.4 MG/DL — SIGNIFICANT CHANGE UP (ref 2.5–4.5)
PHOSPHATE SERPL-MCNC: 3.5 MG/DL — SIGNIFICANT CHANGE UP (ref 2.5–4.5)
PLATELET # BLD AUTO: 114 K/UL — LOW (ref 150–400)
PO2 BLDA: 100 MMHG — SIGNIFICANT CHANGE UP (ref 74–108)
PO2 BLDV: 58 MMHG — HIGH (ref 25–45)
POTASSIUM BLDV-SCNC: 4.5 MMOL/L — SIGNIFICANT CHANGE UP (ref 3.5–5.3)
POTASSIUM SERPL-MCNC: 3.8 MMOL/L — SIGNIFICANT CHANGE UP (ref 3.5–5.3)
POTASSIUM SERPL-MCNC: 4.2 MMOL/L — SIGNIFICANT CHANGE UP (ref 3.5–5.3)
POTASSIUM SERPL-MCNC: 5.2 MMOL/L — SIGNIFICANT CHANGE UP (ref 3.5–5.3)
POTASSIUM SERPL-SCNC: 3.8 MMOL/L — SIGNIFICANT CHANGE UP (ref 3.5–5.3)
POTASSIUM SERPL-SCNC: 4.2 MMOL/L — SIGNIFICANT CHANGE UP (ref 3.5–5.3)
POTASSIUM SERPL-SCNC: 5.2 MMOL/L — SIGNIFICANT CHANGE UP (ref 3.5–5.3)
PROCALCITONIN SERPL-MCNC: 0.08 NG/ML — SIGNIFICANT CHANGE UP (ref 0.02–0.1)
PROCALCITONIN SERPL-MCNC: 0.17 NG/ML — HIGH (ref 0.02–0.1)
PROT SERPL-MCNC: 6.6 G/DL — SIGNIFICANT CHANGE UP (ref 6–8.3)
PROT SERPL-MCNC: 6.7 G/DL — SIGNIFICANT CHANGE UP (ref 6–8.3)
PROT UR-MCNC: ABNORMAL
PROTHROM AB SERPL-ACNC: 10.2 SEC — SIGNIFICANT CHANGE UP (ref 10–12.9)
RBC # BLD: 3.27 M/UL — LOW (ref 3.8–5.2)
RBC # FLD: 13.3 % — SIGNIFICANT CHANGE UP (ref 10.3–14.5)
RBC CASTS # UR COMP ASSIST: 5 /HPF — HIGH (ref 0–4)
SAO2 % BLDA: 97 % — HIGH (ref 92–96)
SAO2 % BLDV: 92 % — HIGH (ref 67–88)
SODIUM SERPL-SCNC: 133 MMOL/L — LOW (ref 135–145)
SODIUM SERPL-SCNC: 135 MMOL/L — SIGNIFICANT CHANGE UP (ref 135–145)
SODIUM SERPL-SCNC: 136 MMOL/L — SIGNIFICANT CHANGE UP (ref 135–145)
SODIUM UR-SCNC: 67 MMOL/L — SIGNIFICANT CHANGE UP
SP GR SPEC: 1.02 — SIGNIFICANT CHANGE UP (ref 1.01–1.02)
UROBILINOGEN FLD QL: NEGATIVE — SIGNIFICANT CHANGE UP
WBC # BLD: 3.05 K/UL — LOW (ref 3.8–10.5)
WBC # FLD AUTO: 3.05 K/UL — LOW (ref 3.8–10.5)
WBC UR QL: 3 /HPF — SIGNIFICANT CHANGE UP (ref 0–5)

## 2019-10-04 PROCEDURE — 99223 1ST HOSP IP/OBS HIGH 75: CPT | Mod: GC

## 2019-10-04 PROCEDURE — 99233 SBSQ HOSP IP/OBS HIGH 50: CPT

## 2019-10-04 PROCEDURE — 90792 PSYCH DIAG EVAL W/MED SRVCS: CPT

## 2019-10-04 PROCEDURE — 99255 IP/OBS CONSLTJ NEW/EST HI 80: CPT

## 2019-10-04 RX ORDER — HYDROCORTISONE 1 %
1 OINTMENT (GRAM) TOPICAL
Refills: 0 | Status: DISCONTINUED | OUTPATIENT
Start: 2019-10-04 | End: 2019-10-10

## 2019-10-04 RX ORDER — IPRATROPIUM/ALBUTEROL SULFATE 18-103MCG
3 AEROSOL WITH ADAPTER (GRAM) INHALATION EVERY 6 HOURS
Refills: 0 | Status: DISCONTINUED | OUTPATIENT
Start: 2019-10-04 | End: 2019-10-10

## 2019-10-04 RX ORDER — BUDESONIDE, MICRONIZED 100 %
0.5 POWDER (GRAM) MISCELLANEOUS EVERY 12 HOURS
Refills: 0 | Status: DISCONTINUED | OUTPATIENT
Start: 2019-10-04 | End: 2019-10-10

## 2019-10-04 RX ORDER — SODIUM CHLORIDE 9 MG/ML
1000 INJECTION, SOLUTION INTRAVENOUS ONCE
Refills: 0 | Status: COMPLETED | OUTPATIENT
Start: 2019-10-04 | End: 2019-10-04

## 2019-10-04 RX ORDER — ALPRAZOLAM 0.25 MG
0.25 TABLET ORAL EVERY 8 HOURS
Refills: 0 | Status: DISCONTINUED | OUTPATIENT
Start: 2019-10-04 | End: 2019-10-10

## 2019-10-04 RX ORDER — HEPARIN SODIUM 5000 [USP'U]/ML
5000 INJECTION INTRAVENOUS; SUBCUTANEOUS EVERY 12 HOURS
Refills: 0 | Status: DISCONTINUED | OUTPATIENT
Start: 2019-10-04 | End: 2019-10-10

## 2019-10-04 RX ORDER — ALPRAZOLAM 0.25 MG
0.5 TABLET ORAL ONCE
Refills: 0 | Status: DISCONTINUED | OUTPATIENT
Start: 2019-10-04 | End: 2019-10-04

## 2019-10-04 RX ORDER — LEVALBUTEROL 1.25 MG/.5ML
0.31 SOLUTION, CONCENTRATE RESPIRATORY (INHALATION) EVERY 6 HOURS
Refills: 0 | Status: DISCONTINUED | OUTPATIENT
Start: 2019-10-04 | End: 2019-10-04

## 2019-10-04 RX ORDER — FLUOCINONIDE/EMOLLIENT BASE 0.05 %
1 CREAM (GRAM) TOPICAL AT BEDTIME
Refills: 0 | Status: DISCONTINUED | OUTPATIENT
Start: 2019-10-04 | End: 2019-10-10

## 2019-10-04 RX ADMIN — SODIUM CHLORIDE 1000 MILLILITER(S): 9 INJECTION, SOLUTION INTRAVENOUS at 00:50

## 2019-10-04 RX ADMIN — Medication 1 APPLICATION(S): at 21:11

## 2019-10-04 RX ADMIN — Medication 2 MILLIGRAM(S): at 14:20

## 2019-10-04 RX ADMIN — Medication 2 MILLIGRAM(S): at 03:04

## 2019-10-04 RX ADMIN — Medication 3 MILLILITER(S): at 22:17

## 2019-10-04 RX ADMIN — Medication 1 TABLET(S): at 13:20

## 2019-10-04 RX ADMIN — Medication 20 MILLIGRAM(S): at 06:47

## 2019-10-04 RX ADMIN — Medication 100 MILLIGRAM(S): at 00:11

## 2019-10-04 RX ADMIN — Medication 2 MILLIGRAM(S): at 10:27

## 2019-10-04 RX ADMIN — Medication 1 APPLICATION(S): at 23:59

## 2019-10-04 RX ADMIN — Medication 10 MILLIGRAM(S): at 13:13

## 2019-10-04 RX ADMIN — Medication 2 MILLIGRAM(S): at 06:47

## 2019-10-04 RX ADMIN — HEPARIN SODIUM 5000 UNIT(S): 5000 INJECTION INTRAVENOUS; SUBCUTANEOUS at 17:20

## 2019-10-04 RX ADMIN — Medication 2 MILLIGRAM(S): at 00:11

## 2019-10-04 RX ADMIN — Medication 3 MILLILITER(S): at 00:11

## 2019-10-04 RX ADMIN — Medication 4 MILLIGRAM(S): at 09:14

## 2019-10-04 RX ADMIN — Medication 0.25 MILLIGRAM(S): at 21:01

## 2019-10-04 RX ADMIN — Medication 2 MILLIGRAM(S): at 17:19

## 2019-10-04 RX ADMIN — Medication 0.25 MILLIGRAM(S): at 14:20

## 2019-10-04 RX ADMIN — Medication 3 MILLILITER(S): at 15:42

## 2019-10-04 RX ADMIN — Medication 20 MILLIGRAM(S): at 17:20

## 2019-10-04 RX ADMIN — Medication 2000 UNIT(S): at 13:19

## 2019-10-04 RX ADMIN — SODIUM CHLORIDE 75 MILLILITER(S): 9 INJECTION INTRAMUSCULAR; INTRAVENOUS; SUBCUTANEOUS at 21:16

## 2019-10-04 RX ADMIN — MONTELUKAST 10 MILLIGRAM(S): 4 TABLET, CHEWABLE ORAL at 13:13

## 2019-10-04 RX ADMIN — Medication 50 GRAM(S): at 00:49

## 2019-10-04 RX ADMIN — Medication 0.5 MILLIGRAM(S): at 17:19

## 2019-10-04 RX ADMIN — LEVALBUTEROL 0.31 MILLIGRAM(S): 1.25 SOLUTION, CONCENTRATE RESPIRATORY (INHALATION) at 06:52

## 2019-10-04 RX ADMIN — Medication 1.5 MILLIGRAM(S): at 21:12

## 2019-10-04 RX ADMIN — Medication 100 MILLIGRAM(S): at 13:13

## 2019-10-04 RX ADMIN — Medication 0.5 MILLIGRAM(S): at 08:41

## 2019-10-04 RX ADMIN — LEVALBUTEROL 0.31 MILLIGRAM(S): 1.25 SOLUTION, CONCENTRATE RESPIRATORY (INHALATION) at 13:25

## 2019-10-04 RX ADMIN — Medication 1 MILLIGRAM(S): at 13:19

## 2019-10-04 NOTE — CONSULT NOTE ADULT - ASSESSMENT
54 year old female with PMH of stage 3 breast cancer on aromasin, RA, Psoriasis on Otezla, brain aneurysm, s/p clip 1988, right jugular DVT, Catheter related MSSA bacteremia 2015,  ETOH abuse, COPD on oxygen presents here with dyspnea.    1. Dyspnea   likely secondary to pulm disesae, COPD exacerbation   cv stable. no decomp chf on exam. pro bnp wnl, hs trop negative   no evidence of acs   CTA chest negative for PE, + severe emphysema   management  per pulm / med   can obtain echo to eval LV fx     2. Sinus tachycardia   likley secondary to withdrawal, dehydration, anxiety, copd exacerbation  no evidence of acs , asymptomatic  echo to eval LV fx     3 med f/u, CIWA  protocol     dvt ppx 54 year old female with PMH of stage 3 breast cancer on aromasin, RA, Psoriasis on Otezla, brain aneurysm, s/p clip 1988, right jugular DVT, Catheter related MSSA bacteremia 2015,  ETOH abuse, COPD on oxygen presents here with dyspnea.    1. Dyspnea   likely secondary to pulm disesae, COPD exacerbation   cv stable. no decomp chf on exam. pro bnp wnl, hs trop negative   no evidence of acs   CTA chest negative for PE, + severe emphysema   management  per pulm / med   can obtain echo to eval LV fx     2. Sinus tachycardia   likley secondary to withdrawal, dehydration, anxiety, copd exacerbation  no evidence of acs , asymptomatic  echo to eval LV fx     3 med f/u, CIWA  protocol     4. Elevated lactic acid level  work up per ID/ med     dvt ppx

## 2019-10-04 NOTE — CONSULT NOTE ADULT - ATTENDING COMMENTS
Patient seen and examined, agree with the above assessment and plan by ABBIE Pink.  54 year old female with PMH of stage 3 breast cancer on aromasin, RA, Psoriasis on Otezla, brain aneurysm, s/p clip 1988, right jugular DVT, Catheter related MSSA bacteremia 2015,  ETOH abuse, COPD on oxygen presents here with dyspnea.  Symptoms likely related to advanced COPD  Pulm eval  Low suspicion for chf  CIWA protocol for ETOH abuse
Patient seen and examined.  Agree with resident note as above.  Patient with hx as noted who presents with COPD exacerbation and is noted to have a rising lactate.  No evidence of occult ischemia/heart failure, is well resuscitated with stable BP and no signs of sepsis.  Alert and comfortable.  Does however have underlying liver synthetic dysfunction and is getting a lot of lactate this evening.  Suspect this is the source.  Recs as above and I have edited as appropriate- please limit albuterol if possible and expect a slow decrement in her lactate value.  Would not infuse any more LR.
Pt is a 53 yo WF with h/o COPD, Breast Ca, remote brain aneurysm s/p clip  and ETOH abuse who presented 2 to COPD exacerbation initially Rx'd with Duoneb + Solumedrol. MICU initially consulted 2 to elevated lactate and COPD exacerbation; pt did not require ICU level of care. MICU reconsulted 2 to agitation and elevated CIWA score. Pt breathing comfortably with slight tremors and intact MS. Admitting dx: 1) COPD exacerbation 2) Elevated lactate may be multi-factorial in nature from Albuterol +/- tremors +/- decreased liver clearance 3) ETOH withdrawal 4) ETOH Hepatitis    Resp: Cont pt's Nebs +/- Steroids  ID: +/- Zithromax for COPD exacerbation  CVS: Trend lactate  FEN: Daily Thiamine, MVI and Folate/ Cont ivf   GI: Trend LFTs  Neuro/Psych: Titrate Benzo to pt's symptoms/CIWA  Social: Currently pt does not require to be managed in the ICU/ If pt starts to require more frequent and increasing doses of Benzos please re-consult

## 2019-10-04 NOTE — CHART NOTE - NSCHARTNOTEFT_GEN_A_CORE
Interval events    CC: Uptrending lactic    Lactic 6.4->, 7.7->8.8->9.6->    Vital Signs Last 24 Hrs  T(C): 36.8 (04 Oct 2019 00:14), Max: 36.8 (04 Oct 2019 00:14)  T(F): 98.3 (04 Oct 2019 00:14), Max: 98.3 (04 Oct 2019 00:14)  HR: 113 (04 Oct 2019 03:01) (91 - 113)  BP: 121/77 (04 Oct 2019 03:01) (118/65 - 146/67)  BP(mean): --  RR: 20 (04 Oct 2019 03:01) (18 - 20)  SpO2: 97% (04 Oct 2019 03:01) (97% - 100%)                          12.7   5.34  )-----------( 134      ( 03 Oct 2019 18:38 )             36.5   Basic Metabolic Panel (10.04.19 @ 03:27)    Sodium, Serum: 135 mmol/L    Potassium, Serum: 3.8 mmol/L    Chloride, Serum: 92 mmol/L    Carbon Dioxide, Serum: 16 mmol/L    Anion Gap, Serum: 27 mmol/L    Blood Urea Nitrogen, Serum: <4: TEST REPEATED. mg/dL    Creatinine, Serum: 0.43 mg/dL    Glucose, Serum: 149 mg/dL    Calcium, Total Serum: 8.3 mg/dL      Blood Gas Profile - Venous (09.08.16 @ 12:38)    pH, Venous: 7.38    pCO2, Venous: 48 mmHg    pO2, Venous: 72 mmHg    HCO3, Venous: 28 mmol/L    Base Excess, Venous: 2.3 mmol/L    Oxygen Saturation, Venous: 94 %    Total CO2, Venous: 29 mmol/L    Physical exam  Constitutional: NAD, awake and alert, appears anxious  	EYES: EOMI  	ENT:  Normal Hearing, no tonsillar exudates   	Neck: Soft and supple, No JVD  	Lungs: Breath sounds are clear bilaterally, No wheezing, rales or rhonchi  	Heart: S1 and S2, regular rate and rhythm, + Murmurs, no gallops or rubs  	Abdomen: Bowel Sounds present, soft, nontender, nondistended, no guarding, no rebound  	Extremities: No cyanosis or clubbing; warm to touch  	Vascular: 2+ peripheral pulses lower extremities  	Neurological: A/O x 3, no focal deficits  	Musculoskeletal: 5/5 strength b/l upper and lower extremities  	Skin: No rashes  	53 yo female with PMH of stage 3 breast cancer on aromasin, RA, Psoriasis on Otezla, brain aneurysm, s/p clip 1988, right jugular DVT, Catheter related MSSA bacteremia 2015,  ETOH abuse, COPD on oxygen presents here with dyspnea  patient admitted ith COPD exacerbation and ETOH  Lactic uptrending    Worsening lactic acidosis  No s/s of sepsis/Hypoperfusion/End organ damage  Afebrile  Abdominal exam benign  S/P 4L IV fluid boluses  ;queenie;y type B lactic acidosis from duoneb/ liverfailure. not clearing lactic  Duoneb changed to Xopenex in setting of tachy  CT A/P ordered  MICU consult called  Awaited official MICU recommendations  Will trend lactic after 8 hours per ICU recommendation  ABG ordered in setting of uptrening serum co2  Patient voiding incontinent  Discussed with Admitting Hospitalist   Will closely follow    Estrellita Camejo [facility]  [id number] Interval events    CC: Uptrending lactic    Lactic 6.4->, 7.7->8.8->9.6->    Vital Signs Last 24 Hrs  T(C): 36.8 (04 Oct 2019 00:14), Max: 36.8 (04 Oct 2019 00:14)  T(F): 98.3 (04 Oct 2019 00:14), Max: 98.3 (04 Oct 2019 00:14)  HR: 113 (04 Oct 2019 03:01) (91 - 113)  BP: 121/77 (04 Oct 2019 03:01) (118/65 - 146/67)  BP(mean): --  RR: 20 (04 Oct 2019 03:01) (18 - 20)  SpO2: 97% (04 Oct 2019 03:01) (97% - 100%)                          12.7   5.34  )-----------( 134      ( 03 Oct 2019 18:38 )             36.5   Basic Metabolic Panel (10.04.19 @ 03:27)    Sodium, Serum: 135 mmol/L    Potassium, Serum: 3.8 mmol/L    Chloride, Serum: 92 mmol/L    Carbon Dioxide, Serum: 16 mmol/L    Anion Gap, Serum: 27 mmol/L    Blood Urea Nitrogen, Serum: <4: TEST REPEATED. mg/dL    Creatinine, Serum: 0.43 mg/dL    Glucose, Serum: 149 mg/dL    Calcium, Total Serum: 8.3 mg/dL      Blood Gas Profile - Venous (09.08.16 @ 12:38)    pH, Venous: 7.38    pCO2, Venous: 48 mmHg    pO2, Venous: 72 mmHg    HCO3, Venous: 28 mmol/L    Base Excess, Venous: 2.3 mmol/L    Oxygen Saturation, Venous: 94 %    Total CO2, Venous: 29 mmol/L    Physical exam  Constitutional: NAD, awake and alert, appears anxious  	EYES: EOMI  	ENT:  Normal Hearing, no tonsillar exudates   	Neck: Soft and supple, No JVD  	Lungs: Breath sounds are clear bilaterally, No wheezing, rales or rhonchi  	Heart: S1 and S2, regular rate and rhythm, + Murmurs, no gallops or rubs  	Abdomen: Bowel Sounds present, soft, nontender, nondistended, no guarding, no rebound  	Extremities: No cyanosis or clubbing; warm to touch  	Vascular: 2+ peripheral pulses lower extremities  	Neurological: A/O x 3, no focal deficits  	Musculoskeletal: 5/5 strength b/l upper and lower extremities  	Skin: No rashes  	55 yo female with PMH of stage 3 breast cancer on aromasin, RA, Psoriasis on Otezla, brain aneurysm, s/p clip 1988, right jugular DVT, Catheter related MSSA bacteremia 2015,  ETOH abuse, COPD on oxygen presents here with dyspnea  patient admitted ith COPD exacerbation and ETOH  Lactic uptrending    Worsening lactic acidosis  No s/s of sepsis/Hypoperfusion/End organ damage  Afebrile  Abdominal exam benign  S/P 4L IV fluid boluses  ;queenie;y type B lactic acidosis from duoneb/ liverfailure. not clearing lactic  Duoneb changed to Xopenex in setting of tachy  CT A/P ordered, per patient she menopaused in her late 40's  Pregancy profile ordered  MICU consult called  Awaited official MICU recommendations  Will trend lactic after 8 hours per ICU recommendation  ABG ordered in setting of uptrening serum co2  Patient voiding incontinent  Discussed with Admitting Hospitalist   Will closely follow    Estrellita Camejo [facility] [place]  [id number]

## 2019-10-04 NOTE — CONSULT NOTE ADULT - SUBJECTIVE AND OBJECTIVE BOX
CARDIOLOGY CONSULT - Dr. Nj         HPI:  53 yo female with PMH of stage 3 breast cancer on aromasin, RA, Psoriasis on Otezla, brain aneurysm, s/p clip 1988, right jugular DVT, Catheter related MSSA bacteremia 2015,  ETOH abuse, COPD on oxygen presents here with dyspnea.  Patient states that she has been very lethargic. She reports dyspneic at rest and with minimal activities.  She has cough with white mucous, At home she has been using nebulizers without much improvement.  She denies any associated chest pain , palps, fever, chills, nausea or vomiting.  Denies any GI or  symptoms.  Denies any sick contact or recent travel.   Patient was admitted in March for SOB and alcohol intoxication.  She was treated with steroid taper and ativan taper.  She reports active ETOH abuse.  Denies any previous withdrawals.   She denies hx of CHF, CAD, MI, or valvular disease. Echo from sep 2016 with minimal MR, EF 61%, nl overall LV sys fx. MICU consult noted  for elevated lactate , elevated CIWA score and COPD exacerbation ROS otherwise negative.     PAST MEDICAL & SURGICAL HISTORY:  Prophylactic measure  Breast cancer  Brain aneurysm: with clips  Psoriasis  RA (rheumatoid arthritis)  Psoriasis  Breast cancer, stage 3  History of modified radical mastectomy of both breasts  S/P bilateral mastectomy  Brain aneurysm: 1988 two clips          PREVIOUS DIAGNOSTIC TESTING:    [ ] Echocardiogram:  < from: Transthoracic Echocardiogram (09.14.16 @ 11:22) >  EF (Shoemaker Method): 61 %Doppler Peak Velocity (m/sec):  AoV=1.0  ------------------------------------------------------------------------  Observations:  Mitral Valve: Grossly normal mitral valve structure.  Minimal mitral regurgitation.  Aortic Valve/Aorta: Aortic valve not well visualized;  probably normal. No aortic valve regurgitation seen.  Aortic root: 3.1 cm at the sinuses of Valsalva.  Left Atrium: Left atrium not well visualized, probably  normal in size.  Left Ventricle: Normal overall left ventricular systolic  function. The global longitudinal strain is low normal at  -17.4%, primarily due to low values in the septal wall.  HR  77 and blood pressure 143/84 (performed with Natalio EPIQ).   Abnormal septal motion present. This may be the result of  abnormal right heart size and/or function.  Normal left  ventricular internal dimensions and wall thicknesses. Mild  diastolic dysfunction (Stage I).  Right Heart: Right atrium not well visualized. The right  ventricle is not well visualized. On limited views, appears  enlarged.  Tricuspid valve not well visualized. Pulmonic  valve not well visualized.  Pericardium/Pleura: No pericardial effusion.  Hemodynamic: Estimated right atrial pressure is 3 mm Hg.  ------------------------------------------------------------------------  Conclusions:  Suboptimal image quality.  1. Normal left ventricular internal dimensions and wall  thicknesses.  2. Normal overall left ventricular systolic function. The  global longitudinal strain is low normal at -17.4%,  primarily due to low values in the septal wall.  HR 77 and  blood pressure 143/84 (performed with Natalio EPIQ).  Abnormal septal motion present. This may be the result of  abnormal right heart size and/or function.  3. Mild diastolic dysfunction (Stage I).  4. The right ventricle is not well visualized. On limited  views,appears enlarged.  5. No pericardial effusion.  Technically difficult study, cardiac MRI may be beneficial  for evaluation of right heart volumes and function and to  follow left ventricular size and function.  ***Compared with echocardiogram of 6/30/2015, no  significant changes noted (global longitudinal strain not  performed).  ------------------------------------------------------------------------  Confirmed on  9/14/2016 - 16:44:00 by Nicole Blanco M.D.  ------------------------------------------------------------------------    < end of copied text >    [ ]  Catheterization:    [ ] Stress Test:  	        MEDICATIONS:  Home Medications:  ALPRAZolam 0.25 mg oral tablet: 1 tab(s) orally 3 times a day (03 Oct 2019 21:39)  docusate sodium 100 mg oral capsule: 1 cap(s) orally 2 times a day (03 Oct 2019 21:39)  exemestane 25 mg oral tablet: 1 tab(s) orally once a day (03 Oct 2019 21:39)  folic acid 1 mg oral tablet: 1 tab(s) orally once a day (03 Oct 2019 21:39)   Psoriasis 2% topical ointment: Apply topically to affected area , As Needed (03 Oct 2019 21:39)  montelukast 10 mg oral tablet: 1 tab(s) orally once a day (03 Oct 2019 21:39)  Multiple Vitamins oral tablet: 1 tab(s) orally once a day (03 Oct 2019 21:39)  Otezla 30 mg oral tablet: 1 tab(s) orally 2 times a day (03 Oct 2019 21:39)  pantoprazole 40 mg oral delayed release tablet: 1 tab(s) orally once a day (before a meal) (03 Oct 2019 21:39)  polyethylene glycol 3350 oral powder for reconstitution: 17 gram(s) orally once a day (03 Oct 2019 21:39)  prochlorperazine 10 mg oral tablet: 1 tab(s) orally once a day (03 Oct 2019 21:39)  senna oral tablet: 2 tab(s) orally once a day (at bedtime) (03 Oct 2019 21:39)  Stiolto Respimat 60 ACT 2.5 mcg-2.5 mcg/inh inhalation aerosol: 2 puff(s) inhaled once a day (03 Oct 2019 21:39)  Ventolin HFA 90 mcg/inh inhalation aerosol: 2 puff(s) inhaled every 6 hours, As Needed (03 Oct 2019 21:39)  Vitamin D3 2000 intl units oral tablet: 1 tab(s) orally once a day (03 Oct 2019 21:39)      MEDICATIONS  (STANDING):  ALPRAZolam 0.25 milliGRAM(s) Oral every 8 hours  cholecalciferol 2000 Unit(s) Oral daily  docusate sodium 100 milliGRAM(s) Oral two times a day  folic acid 1 milliGRAM(s) Oral daily  heparin  Injectable 5000 Unit(s) SubCutaneous every 12 hours  levalbuterol Inhalation 0.31 milliGRAM(s) Inhalation every 6 hours  LORazepam   Injectable 1.5 milliGRAM(s) IV Push every 4 hours  LORazepam   Injectable   IV Push   LORazepam   Injectable 2 milliGRAM(s) IV Push every 4 hours  montelukast 10 milliGRAM(s) Oral daily  multivitamin 1 Tablet(s) Oral daily  multivitamin 1 Tablet(s) Oral daily  pantoprazole    Tablet 40 milliGRAM(s) Oral before breakfast  prochlorperazine   Tablet 10 milliGRAM(s) Oral daily  senna 2 Tablet(s) Oral at bedtime  sodium chloride 0.9%. 1000 milliLiter(s) (75 mL/Hr) IV Continuous <Continuous>  thiamine 100 milliGRAM(s) Oral daily  tiotropium 2.5 MICROgram(s)/olodaterol 2.5 MICROgram(s) (STIOLTO) Inhaler 2 Puff(s) Inhalation daily      FAMILY HISTORY:  FH: CHF (congestive heart failure): Mother      SOCIAL HISTORY:    [ ] Non-smoker  [ x] Former Smoker quit 7 to 8 months ago  [ x] Alcohols 2-3 shots of scotch daily/ beer     Allergies    amoxicillin (Anaphylaxis)  Levaquin (Other; Anaphylaxis)  Levaquin (Unknown)  penicillin (Anaphylaxis)  penicillins (Anaphylaxis)    Intolerances    	    REVIEW OF SYSTEMS:  CONSTITUTIONAL: No fever, weight loss, or fatigue  EYES: No eye pain, visual disturbances, or discharge  ENMT:  No difficulty hearing, tinnitus, vertigo; No sinus or throat pain  NECK: No pain or stiffness  RESPIRATORY: see hpi   CARDIOVASCULAR: No chest pain, palpitations, passing out, dizziness, or leg swelling  GASTROINTESTINAL: No abdominal or epigastric pain. No nausea, vomiting, or hematemesis; No diarrhea or constipation. No melena or hematochezia.  GENITOURINARY: No dysuria, frequency, hematuria, or incontinence  NEUROLOGICAL: No headaches, memory loss, loss of strength, numbness, or tremors  SKIN: No itching, burning, rashes, or lesions   	    [ x] All others negative	  [ ] Unable to obtain    PHYSICAL EXAM:  T(C): 37.2 (10-04-19 @ 13:01), Max: 37.2 (10-04-19 @ 13:01)  HR: 108 (10-04-19 @ 13:01) (91 - 116)  BP: 121/93 (10-04-19 @ 13:01) (114/74 - 146/67)  RR: 27 (10-04-19 @ 13:01) (18 - 29)  SpO2: 98% (10-04-19 @ 13:01) (95% - 100%)  Wt(kg): --  I&O's Summary      Appearance: Normal	  Psychiatry: A & O x 3, Mood & affect appropriate  HEENT:   Normal oral mucosa, PERRL, EOMI	  Lymphatic: No lymphadenopathy  Cardiovascular: Normal S1 S2,RRR  Respiratory:  exp wheezing/ diminished    Gastrointestinal:  Soft, Non-tender, + BS	  Skin: No rashes, No ecchymoses, No cyanosis	  Neurologic: Non-focal  Extremities: trace edema le     TELEMETRY: sinus tachycardia Hr 101	    ECG: nsr low voltage hr 84  	  RADIOLOGY:    < from: CT Angio Chest w/ IV Cont (10.03.19 @ 18:40) >    FINDINGS:    LUNGS AND AIRWAYS: Patent central airways.  Severe emphysema. Dependent   changes.    PLEURA: No pleural effusion.    MEDIASTINUM AND CHESTER: No lymphadenopathy.    VESSELS: Good opacification the pulmonary arterial tree. No pulmonary   embolism.    HEART: Heart size is normal. No pericardial effusion.    CHEST WALL AND LOWER NECK: Within normal limits.    VISUALIZED UPPER ABDOMEN: Small hiatal hernia. Hepatic steatosis.    BONES: Within normal limits.    IMPRESSION:     No pulmonary embolism.    Severe emphysema.      < end of copied text >  OTHER: 	  	  LABS:	 	    CARDIAC MARKERS:  Troponin T, High Sensitivity Result: 18 ng/L (10-03 @ 18:38)                                  11.3   3.05  )-----------( 114      ( 04 Oct 2019 08:24 )             34.2     10-04    133<L>  |  91<L>  |  <4<L>  ----------------------------<  151<H>  4.2   |  17<L>  |  0.43<L>    Ca    7.9<L>      04 Oct 2019 05:57  Phos  3.4     10-04  Mg     2.0     10-04    TPro  6.7  /  Alb  4.2  /  TBili  1.0  /  DBili  x   /  AST  189<H>  /  ALT  129<H>  /  AlkPhos  58  10-04    PT/INR - ( 04 Oct 2019 05:57 )   PT: 10.2 sec;   INR: 0.90 ratio         PTT - ( 04 Oct 2019 05:57 )  PTT:25.3 sec  proBNP: Serum Pro-Brain Natriuretic Peptide: 55 pg/mL (10-03 @ 21:22)    Lipid Profile:   HgA1c:   TSH:

## 2019-10-04 NOTE — CONSULT NOTE ADULT - ASSESSMENT
ASSESSMENT:    chronic hypoxic respiratory failure due to severe COPD/emphysema with worsening dyspnea due to a COPD exacerbation - there is no evidence of pneumonia, pulmonary edema, pleural effusions or pulmonary emboli on the CTA of the chest    anion gap metabolic acidosis is likely related to a lactic acidosis related to albuterol use, hepatitis and starvation ketosis in the setting of chronic alcohol abuse    pancytopenia - bone marrow suppression from alcohol use    breast cancer s/p bilateral mastectomy    rheumatoid and psoriatic arthritis on Otezla - immunocompromised host    brain aneurysm s/p clipping    PLAN/RECOMMENDATIONS:    oxygen supplementation to keep saturation greater than 92%  solumedrol 20mg IVP q6h  albuterol/atrovent nebs q6h  pulmicort 0.5mg nebs q12h  singulair  watch for and treat alcohol withdrawal  Otezla/lidex solution to scalp/hydrocortisone cream to face  DVT prophylaxis    Thank you for the courtesy of this referral. Plan of care discussed with the patient at bedside.    Joss Franks MD, [facility]  [phone]  Pulmonary Medicine

## 2019-10-04 NOTE — BEHAVIORAL HEALTH ASSESSMENT NOTE - CASE SUMMARY
53 y/o  female, no children, previously worked as a , domiciled in a private residence in [place] with her  with PPHx of unspecified anxiety d/o (is prescribed Xanax by her oncologist), no inpatient psychiatric hospitalizations, no SA/SIB, substance abuse significant for daily ETOH (reports drinking 3 shots and 3 beers daily), no h/o complicated withdrawals, or DTs, with PMHx of stage 3 Bilateral Breast CA on Aromasin, RA, Psoriasis previously on Otezla, remote Brain Aneurysm s/p Clip  presenting with COPD exacerbation, anxiety symptoms endorses to be episodic shortness of breath without any other additional anxiety symptoms. Pt has no interest in psychotropic trials or psychiatric/substance treatment.

## 2019-10-04 NOTE — BEHAVIORAL HEALTH ASSESSMENT NOTE - NSBHCHARTREVIEWLAB_PSY_A_CORE FT
CBC Full  -  ( 04 Oct 2019 08:24 )  WBC Count : 3.05 K/uL  RBC Count : 3.27 M/uL  Hemoglobin : 11.3 g/dL  Hematocrit : 34.2 %  Platelet Count - Automated : 114 K/uL  Mean Cell Volume : 104.6 fl  Mean Cell Hemoglobin : 34.6 pg  Mean Cell Hemoglobin Concentration : 33.0 gm/dL  Auto Neutrophil # : 2.89 K/uL  Auto Lymphocyte # : 0.13 K/uL  Auto Monocyte # : 0.03 K/uL  Auto Eosinophil # : 0.00 K/uL  Auto Basophil # : 0.00 K/uL  Auto Neutrophil % : 93.9 %  Auto Lymphocyte % : 4.3 %  Auto Monocyte % : 0.9 %  Auto Eosinophil % : 0.0 %  Auto Basophil % : 0.0 %    10-04    133<L>  |  91<L>  |  <4<L>  ----------------------------<  151<H>  4.2   |  17<L>  |  0.43<L>    Ca    7.9<L>      04 Oct 2019 05:57  Phos  3.4     10-  Mg     2.0     10-04    TPro  6.7  /  Alb  4.2  /  TBili  1.0  /  DBili  x   /  AST  189<H>  /  ALT  129<H>  /  AlkPhos  58  1004    LIVER FUNCTIONS - ( 04 Oct 2019 05:57 )  Alb: 4.2 g/dL / Pro: 6.7 g/dL / ALK PHOS: 58 U/L / ALT: 129 U/L / AST: 189 U/L / GGT: x           Urinalysis Basic - ( 04 Oct 2019 07:35 )    Color: Yellow / Appearance: Slightly Turbid / S.019 / pH: x  Gluc: x / Ketone: Moderate  / Bili: Negative / Urobili: Negative   Blood: x / Protein: 30 mg/dL / Nitrite: Negative   Leuk Esterase: Small / RBC: 5 /hpf / WBC 3 /HPF   Sq Epi: x / Non Sq Epi: 1 / Bacteria: Many

## 2019-10-04 NOTE — CONSULT NOTE ADULT - ASSESSMENT
54 year old female PMH of stage 3 breast cancer on aromasin, RA, Psoriasis on Otezla, brain aneurysm, s/p clip 1988, right jugular DVT, Catheter related MSSA bacteremia 2015,  ETOH abuse, COPD on oxygen who presented to [facility] on 10/3 with dyspnea. In the ED afebrile, normotensive but tachycardic to >110. WBC on presentation of 5.34 with 81.4% PMN. ALT of 152, AST of 281, T Bili of 1.3, Alk Phos of 68. Anion gap of 22. Lactic acid of 6.4. U/A with 3 WBC. RVP negative.     Elevated lactic acidosis may be from COPD exacerbation being treated with albuterol + tremors + decreased clearance of lactic acidosis. If continued uptrend in lactic acidosis or clinical deterioration would cover broadly with antibiotics pending blood cultures. CT Chest without evidence of pneumonia. Abdomen appears benign on examination. Skin is only possible source of infection (if any) given significant plaques and extensive excoriation.    Overall, Lactic acidosis, Tachycardia, Transaminitis, COPD Exacerbation    --Recommend Procalcitonin  --Recommend repeat Lactic acid now  --If further increase in lactic acidosis, fever or clinical deterioration recommend Vancomycin 1.25g IV Q12H (trough prior to fourth dose), Aztreonam 2g IV Q8H, Flagyl 500 IV Q12H.   --Consider RUQ US given transaminitis    I will continue to follow. Please feel free to contact me with any further questions.    Bernard Gerber M.D.  [facility] Division of Infectious Disease  8AM-5PM: Pager Number [phone]  After Hours (or if no response): Please contact the Infectious Diseases Office at [phone] 54 year old female PMH of stage 3 breast cancer on aromasin, RA, Psoriasis on Otezla, brain aneurysm, s/p clip 1988, right jugular DVT, Catheter related MSSA bacteremia 2015,  ETOH abuse, COPD on oxygen who presented to [facility] on 10/3 with dyspnea. In the ED afebrile, normotensive but tachycardic to >110. WBC on presentation of 5.34 with 81.4% PMN. ALT of 152, AST of 281, T Bili of 1.3, Alk Phos of 68. Anion gap of 22. Lactic acid of 6.4. U/A with 3 WBC. RVP negative.     Elevated lactic acidosis may be from COPD exacerbation being treated with albuterol + tremors + decreased clearance of lactic acidosis. If continued uptrend in lactic acidosis or clinical deterioration would cover broadly with antibiotics pending blood cultures. CT Chest without evidence of pneumonia. Abdomen appears benign on examination. Skin is only possible source of infection (if any) given significant plaques and extensive excoriation.    Overall, Lactic acidosis, Tachycardia, Transaminitis, COPD Exacerbation    --Recommend Procalcitonin  --Recommend repeat Lactic acid now  --If further increase in lactic acidosis, fever or clinical deterioration recommend Vancomycin 1.25g IV Q12H (trough prior to fourth dose), Aztreonam 2g IV Q8H, Flagyl 500 IV Q12H.   --Consider RUQ US given transaminitis    I will be away over this upcoming weekend. Please contact the Infectious Diseases Office with any further questions or concerns.     One of my colleagues will see the patient in follow up this weekend.     Bernard Gerber M.D.  [facility] Division of Infectious Disease  8AM-5PM: Pager Number [phone]  After Hours (or if no response): Please contact the Infectious Diseases Office at [phone]

## 2019-10-04 NOTE — BEHAVIORAL HEALTH ASSESSMENT NOTE - NSBHCONSULTRECOMMENDOTHER_PSY_A_CORE FT
1. Patient known to psychiatry service and was agitated previously during acute withdrawal.  Psych PRNs: Haldol 1-2mg po/iv prn q6 for severe agitation (monitor qtc <500ms on ekg)    2. CIWA protocol  PRN: Ativan 2mg IV q1h PRN symptoms of alcohol withdrawal including HR>120, SBP>140, DBP>100, tremor, diaphoresis, hallucinations, anxiety (2 out of 7 sxs)    3. Consider tapering off her standing home benzos due patient's respiratory status and COPD  4. SW for substance abuse referral resources.  OP psych f/u at [facility]: [phone]. 1. Patient known to psychiatry service and was agitated previously during acute withdrawal.  Psych PRNs: Haldol 1-2mg po/iv prn q6 for severe agitation (monitor qtc <500ms on ekg)    2.  for substance abuse referral resources.  OP psych f/u at [facility]: [phone]. 1. Patient known to psychiatry service and was agitated previously during acute withdrawal.  Psych PRNs: Haldol 5 mg po/iv prn q6 for severe agitation (monitor qtc <500ms on ekg)    2.  for substance abuse referral resources.  OP psych f/u at [facility]: [phone].

## 2019-10-04 NOTE — CONSULT NOTE ADULT - SUBJECTIVE AND OBJECTIVE BOX
CHIEF COMPLAINT: SOB    HPI:  54 yr old female with PMHx of bilateral breast Ca Stage 3 on exemestane, s/p bilat mastectomies, COPD/Emphysema on 3 liter N/C home O2, endorsed quick smoking 7 to 8 months ago, ETOH abuse (pt endorses 3- 4 oz shots of scotch and 3-12 oz beers per day), last drink 10/3/19 at 0900. Hx also includes psoriasis on otezla, brain aneurysm s/p clip , Rt jugular vein DVT, mediport catheter related MSSA             PAST MEDICAL & SURGICAL HISTORY:  Prophylactic measure  Breast cancer  Brain aneurysm: with clips  Psoriasis  RA (rheumatoid arthritis)  Psoriasis  Breast cancer, stage 3  History of modified radical mastectomy of both breasts  S/P bilateral mastectomy  Brain aneurysm: 1988 two clips      FAMILY HISTORY:  FH: CHF (congestive heart failure): Mother      SOCIAL HISTORY:  Smoking: [ ] Never Smoked [ ] Former Smoker (__ packs x ___ years) [ ] Current Smoker  (__ packs x ___ years)  Substance Use: [ ] Never Used [ ] Used ____  EtOH Use:  Marital Status: [ ] Single [ ]  [ ]  [ ]   Sexual History:   Occupation:  Recent Travel:  Country of Birth:  Advance Directives:    Allergies    amoxicillin (Anaphylaxis)  Levaquin (Other; Anaphylaxis)  Levaquin (Unknown)  penicillin (Anaphylaxis)  penicillins (Anaphylaxis)    Intolerances        HOME MEDICATIONS:    REVIEW OF SYSTEMS:      OBJECTIVE:  ICU Vital Signs Last 24 Hrs  T(C): 36.9 (04 Oct 2019 06:32), Max: 36.9 (04 Oct 2019 06:32)  T(F): 98.4 (04 Oct 2019 06:32), Max: 98.4 (04 Oct 2019 06:32)  HR: 112 (04 Oct 2019 09:17) (91 - 116)  BP: 141/71 (04 Oct 2019 09:17) (118/65 - 146/67)  BP(mean): --  ABP: --  ABP(mean): --  RR: 26 (04 Oct 2019 09:17) (18 - 26)  SpO2: 98% (04 Oct 2019 09:17) (96% - 100%)        CAPILLARY BLOOD GLUCOSE          PHYSICAL EXAM:        HOSPITAL MEDICATIONS:  MEDICATIONS  (STANDING):  ALPRAZolam 0.25 milliGRAM(s) Oral every 8 hours  cholecalciferol 2000 Unit(s) Oral daily  docusate sodium 100 milliGRAM(s) Oral two times a day  folic acid 1 milliGRAM(s) Oral daily  levalbuterol Inhalation 0.31 milliGRAM(s) Inhalation every 6 hours  LORazepam   Injectable 1.5 milliGRAM(s) IV Push every 4 hours  LORazepam   Injectable   IV Push   LORazepam   Injectable 2 milliGRAM(s) IV Push every 4 hours  montelukast 10 milliGRAM(s) Oral daily  multivitamin 1 Tablet(s) Oral daily  multivitamin 1 Tablet(s) Oral daily  pantoprazole    Tablet 40 milliGRAM(s) Oral before breakfast  prochlorperazine   Tablet 10 milliGRAM(s) Oral daily  senna 2 Tablet(s) Oral at bedtime  sodium chloride 0.9%. 1000 milliLiter(s) (75 mL/Hr) IV Continuous <Continuous>  thiamine 100 milliGRAM(s) Oral daily  tiotropium 2.5 MICROgram(s)/olodaterol 2.5 MICROgram(s) (STIOLTO) Inhaler 2 Puff(s) Inhalation daily    MEDICATIONS  (PRN):      LABS:                        11.3   3.05  )-----------( 114      ( 04 Oct 2019 08:24 )             34.2     Hgb Trend: 11.3<--, 12.7<--  10    133<L>  |  91<L>  |  <4<L>  ----------------------------<  151<H>  4.2   |  17<L>  |  0.43<L>    Ca    7.9<L>      04 Oct 2019 05:57  Phos  3.4     10  Mg     2.0     10-04    TPro  6.7  /  Alb  4.2  /  TBili  1.0  /  DBili  x   /  AST  189<H>  /  ALT  129<H>  /  AlkPhos  58  10-04    Creatinine Trend: 0.43<--, 0.43<--, 0.38<--, 0.39<--  PT/INR - ( 04 Oct 2019 05:57 )   PT: 10.2 sec;   INR: 0.90 ratio         PTT - ( 04 Oct 2019 05:57 )  PTT:25.3 sec  Urinalysis Basic - ( 04 Oct 2019 07:35 )    Color: Yellow / Appearance: Slightly Turbid / S.019 / pH: x  Gluc: x / Ketone: Moderate  / Bili: Negative / Urobili: Negative   Blood: x / Protein: 30 mg/dL / Nitrite: Negative   Leuk Esterase: Small / RBC: 5 /hpf / WBC 3 /HPF   Sq Epi: x / Non Sq Epi: 1 / Bacteria: Many      Arterial Blood Gas:  10-04 @ 05:52  7.33/35/100/18/97/-6.8  ABG lactate: --    Venous Blood Gas:  10-04 @ 05:52  --/--/--/--/--  VBG Lactate: 8.8  Venous Blood Gas:  10-03 @ 21:22  7.36/42/58/23/85  VBG Lactate: 7.7  Venous Blood Gas:  10-03 @ 18:38  7.37/46/107//97  VBG Lactate: 6.4      MICROBIOLOGY:     RADIOLOGY:  [ ] Reviewed and interpreted by me    EKG:        [facility] (ext. [id number]) CHIEF COMPLAINT: SOB    HPI:  54 yr old female with PMHx of bilateral breast Ca Stage 3 on exemestane, s/p bilat mastectomies, COPD/Emphysema on 3 liter N/C home O2, endorsed quick smoking 7 to 8 months ago, ETOH abuse (pt endorses 3- 4 oz shots of scotch and 3-12 oz beers per day), last drink 10/3/19 at 0900. Hx also includes psoriasis on otezla, brain aneurysm s/p clip , Rt jugular vein DVT, mediport catheter related MSSA , recent hospitalization 3/2019 for COPD exacerbation and EtOH withdrawal who presented 10/3/19 from home with c/o sob at rest without relief from self medicated nebulizer therapy. In E.D. pt found to have COPD exacerbation received duoneb threrapy, solumedrol 125 mg IV. Lab work noted for transaminitis, elevated lactate of 8-9 (currently 8.8 from 9.6). Pt received        Pt has been admitted to medicine, currently remains in E.D. awaiting bed.       Pt has             PAST MEDICAL & SURGICAL HISTORY:  Prophylactic measure  Breast cancer  Brain aneurysm: with clips  Psoriasis  RA (rheumatoid arthritis)  Psoriasis  Breast cancer, stage 3  History of modified radical mastectomy of both breasts  S/P bilateral mastectomy  Brain aneurysm:  two clips      FAMILY HISTORY:  FH: CHF (congestive heart failure): Mother      SOCIAL HISTORY:  Smoking: [ ] Never Smoked [ ] Former Smoker (__ packs x ___ years) [ ] Current Smoker  (__ packs x ___ years)  Substance Use: [ ] Never Used [ ] Used ____  EtOH Use:  Marital Status: [ ] Single [ ]  [ ]  [ ]   Sexual History:   Occupation:  Recent Travel:  Country of Birth:  Advance Directives:    Allergies    amoxicillin (Anaphylaxis)  Levaquin (Other; Anaphylaxis)  Levaquin (Unknown)  penicillin (Anaphylaxis)  penicillins (Anaphylaxis)    Intolerances        HOME MEDICATIONS:    REVIEW OF SYSTEMS:      OBJECTIVE:  ICU Vital Signs Last 24 Hrs  T(C): 36.9 (04 Oct 2019 06:32), Max: 36.9 (04 Oct 2019 06:32)  T(F): 98.4 (04 Oct 2019 06:32), Max: 98.4 (04 Oct 2019 06:32)  HR: 112 (04 Oct 2019 09:17) (91 - 116)  BP: 141/71 (04 Oct 2019 09:17) (118/65 - 146/67)  BP(mean): --  ABP: --  ABP(mean): --  RR: 26 (04 Oct 2019 09:17) (18 - 26)  SpO2: 98% (04 Oct 2019 09:17) (96% - 100%)        CAPILLARY BLOOD GLUCOSE          PHYSICAL EXAM:        HOSPITAL MEDICATIONS:  MEDICATIONS  (STANDING):  ALPRAZolam 0.25 milliGRAM(s) Oral every 8 hours  cholecalciferol 2000 Unit(s) Oral daily  docusate sodium 100 milliGRAM(s) Oral two times a day  folic acid 1 milliGRAM(s) Oral daily  levalbuterol Inhalation 0.31 milliGRAM(s) Inhalation every 6 hours  LORazepam   Injectable 1.5 milliGRAM(s) IV Push every 4 hours  LORazepam   Injectable   IV Push   LORazepam   Injectable 2 milliGRAM(s) IV Push every 4 hours  montelukast 10 milliGRAM(s) Oral daily  multivitamin 1 Tablet(s) Oral daily  multivitamin 1 Tablet(s) Oral daily  pantoprazole    Tablet 40 milliGRAM(s) Oral before breakfast  prochlorperazine   Tablet 10 milliGRAM(s) Oral daily  senna 2 Tablet(s) Oral at bedtime  sodium chloride 0.9%. 1000 milliLiter(s) (75 mL/Hr) IV Continuous <Continuous>  thiamine 100 milliGRAM(s) Oral daily  tiotropium 2.5 MICROgram(s)/olodaterol 2.5 MICROgram(s) (STIOLTO) Inhaler 2 Puff(s) Inhalation daily    MEDICATIONS  (PRN):      LABS:                        11.3   3.05  )-----------( 114      ( 04 Oct 2019 08:24 )             34.2     Hgb Trend: 11.3<--, 12.7<--  10-04    133<L>  |  91<L>  |  <4<L>  ----------------------------<  151<H>  4.2   |  17<L>  |  0.43<L>    Ca    7.9<L>      04 Oct 2019 05:57  Phos  3.4     10-  Mg     2.0     10-    TPro  6.7  /  Alb  4.2  /  TBili  1.0  /  DBili  x   /  AST  189<H>  /  ALT  129<H>  /  AlkPhos  58  10-04    Creatinine Trend: 0.43<--, 0.43<--, 0.38<--, 0.39<--  PT/INR - ( 04 Oct 2019 05:57 )   PT: 10.2 sec;   INR: 0.90 ratio         PTT - ( 04 Oct 2019 05:57 )  PTT:25.3 sec  Urinalysis Basic - ( 04 Oct 2019 07:35 )    Color: Yellow / Appearance: Slightly Turbid / S.019 / pH: x  Gluc: x / Ketone: Moderate  / Bili: Negative / Urobili: Negative   Blood: x / Protein: 30 mg/dL / Nitrite: Negative   Leuk Esterase: Small / RBC: 5 /hpf / WBC 3 /HPF   Sq Epi: x / Non Sq Epi: 1 / Bacteria: Many      Arterial Blood Gas:  10-04 @ 05:52  7.33/35/100/18/97/-6.8  ABG lactate: --    Venous Blood Gas:  10-04 @ 05:52  --/--/--/--/--  VBG Lactate: 8.8  Venous Blood Gas:  10-03 @ 21:22  7.36/42/58/23/85  VBG Lactate: 7.7  Venous Blood Gas:  10-03 @ 18:38  7.37/46/107//97  VBG Lactate: 6.4      MICROBIOLOGY:     RADIOLOGY:  [ ] Reviewed and interpreted by me    EKG:        [facility] (ext. [id number]) CHIEF COMPLAINT: SOB    HPI:  54 yr old female with PMHx of bilateral breast Ca Stage 3 on exemestane, s/p bilat mastectomies, COPD/Emphysema on 3 liter N/C home O2, endorsed quit smoking 7 to 8 months ago, ETOH abuse (pt endorses 3- 4 oz shots of scotch and 3-12 oz beers per day), last drink 10/3/19 at 0900. Hx also includes psoriasis on otezla, brain aneurysm s/p clip , Rt jugular vein DVT, mediport catheter related MSSA , recent hospitalization 3/2019 for COPD exacerbation and EtOH withdrawal who presented evening of 10/3/19 from home with c/o sob at rest without relief from self medicated nebulizer therapy. In E.D. pt found to have COPD exacerbation in setting of EtOH withdrawal. In addition Lab work noted for transaminitis, elevated lactate of 8-9 (currently 8.8 from 9.6). Pt received duoneb therapy ,solumedrol 125 mg IV, 4 liters NS IV bolus       Pt received an initial MICU consult for elevated lactate and COPD exacerbation. As pt vital signs stable, AOx3, without signs of sepsis                PAST MEDICAL & SURGICAL HISTORY:  Prophylactic measure  Breast cancer  Brain aneurysm: with clips  Psoriasis  RA (rheumatoid arthritis)  Psoriasis  Breast cancer, stage 3  History of modified radical mastectomy of both breasts  S/P bilateral mastectomy  Brain aneurysm:  two clips      FAMILY HISTORY:  FH: CHF (congestive heart failure): Mother      SOCIAL HISTORY:  Smoking: [ ] Never Smoked [ ] Former Smoker (__ packs x ___ years) [ ] Current Smoker  (__ packs x ___ years)  Substance Use: [ ] Never Used [ ] Used ____  EtOH Use:  Marital Status: [ ] Single [ ]  [ ]  [ ]   Sexual History:   Occupation:  Recent Travel:  Country of Birth:  Advance Directives:    Allergies    amoxicillin (Anaphylaxis)  Levaquin (Other; Anaphylaxis)  Levaquin (Unknown)  penicillin (Anaphylaxis)  penicillins (Anaphylaxis)    Intolerances        HOME MEDICATIONS:    REVIEW OF SYSTEMS:      OBJECTIVE:  ICU Vital Signs Last 24 Hrs  T(C): 36.9 (04 Oct 2019 06:32), Max: 36.9 (04 Oct 2019 06:32)  T(F): 98.4 (04 Oct 2019 06:32), Max: 98.4 (04 Oct 2019 06:32)  HR: 112 (04 Oct 2019 09:17) (91 - 116)  BP: 141/71 (04 Oct 2019 09:17) (118/65 - 146/67)  BP(mean): --  ABP: --  ABP(mean): --  RR: 26 (04 Oct 2019 09:17) (18 - 26)  SpO2: 98% (04 Oct 2019 09:17) (96% - 100%)        CAPILLARY BLOOD GLUCOSE          PHYSICAL EXAM:        HOSPITAL MEDICATIONS:  MEDICATIONS  (STANDING):  ALPRAZolam 0.25 milliGRAM(s) Oral every 8 hours  cholecalciferol 2000 Unit(s) Oral daily  docusate sodium 100 milliGRAM(s) Oral two times a day  folic acid 1 milliGRAM(s) Oral daily  levalbuterol Inhalation 0.31 milliGRAM(s) Inhalation every 6 hours  LORazepam   Injectable 1.5 milliGRAM(s) IV Push every 4 hours  LORazepam   Injectable   IV Push   LORazepam   Injectable 2 milliGRAM(s) IV Push every 4 hours  montelukast 10 milliGRAM(s) Oral daily  multivitamin 1 Tablet(s) Oral daily  multivitamin 1 Tablet(s) Oral daily  pantoprazole    Tablet 40 milliGRAM(s) Oral before breakfast  prochlorperazine   Tablet 10 milliGRAM(s) Oral daily  senna 2 Tablet(s) Oral at bedtime  sodium chloride 0.9%. 1000 milliLiter(s) (75 mL/Hr) IV Continuous <Continuous>  thiamine 100 milliGRAM(s) Oral daily  tiotropium 2.5 MICROgram(s)/olodaterol 2.5 MICROgram(s) (STIOLTO) Inhaler 2 Puff(s) Inhalation daily    MEDICATIONS  (PRN):      LABS:                        11.3   3.05  )-----------( 114      ( 04 Oct 2019 08:24 )             34.2     Hgb Trend: 11.3<--, 12.7<--  10-04    133<L>  |  91<L>  |  <4<L>  ----------------------------<  151<H>  4.2   |  17<L>  |  0.43<L>    Ca    7.9<L>      04 Oct 2019 05:57  Phos  3.4     10-04  Mg     2.0     10-04    TPro  6.7  /  Alb  4.2  /  TBili  1.0  /  DBili  x   /  AST  189<H>  /  ALT  129<H>  /  AlkPhos  58  10-04    Creatinine Trend: 0.43<--, 0.43<--, 0.38<--, 0.39<--  PT/INR - ( 04 Oct 2019 05:57 )   PT: 10.2 sec;   INR: 0.90 ratio         PTT - ( 04 Oct 2019 05:57 )  PTT:25.3 sec  Urinalysis Basic - ( 04 Oct 2019 07:35 )    Color: Yellow / Appearance: Slightly Turbid / S.019 / pH: x  Gluc: x / Ketone: Moderate  / Bili: Negative / Urobili: Negative   Blood: x / Protein: 30 mg/dL / Nitrite: Negative   Leuk Esterase: Small / RBC: 5 /hpf / WBC 3 /HPF   Sq Epi: x / Non Sq Epi: 1 / Bacteria: Many      Arterial Blood Gas:  10-04 @ 05:52  7.33/35/100/18/97/-6.8  ABG lactate: --    Venous Blood Gas:  10-04 @ 05:52  --/--/--/--/--  VBG Lactate: 8.8  Venous Blood Gas:  10-03 @ 21:22  7.36/42/58/23/85  VBG Lactate: 7.7  Venous Blood Gas:  10-03 @ 18:38  7.37/46/107/26/97  VBG Lactate: 6.4      MICROBIOLOGY:     RADIOLOGY:  [ ] Reviewed and interpreted by me    EKG:        [facility] (ext. [id number]) CHIEF COMPLAINT: SOB    HPI:  54 yr old female with PMHx of bilateral breast Ca Stage 3 on exemestane, s/p bilat mastectomies, COPD/Emphysema on 3 liter N/C home O2, endorsed quit smoking 7 to 8 months ago, ETOH abuse (pt endorses 3- 4 oz shots of scotch and 3-12 oz beers per day), last drink 10/3/19 at 0900. Hx also includes psoriasis on otezla, brain aneurysm s/p clip , Rt jugular vein DVT, mediport catheter related MSSA , recent hospitalization 3/2019 for COPD exacerbation and EtOH withdrawal who presented evening of 10/3/19 from home with c/o sob at rest without relief from self medicated nebulizer therapy. In E.D. pt found to have COPD exacerbation in setting of EtOH withdrawal. In addition Lab work noted for transaminitis, elevated lactate of 8-9 (currently 8.8 from 9.6). Pt received duoneb therapy ,solumedrol 125 mg IV, 4 liters NS IV bolus. In addition received 25 mg po librium, 0.5 mg xanax, total of 6 mg ativan from initial presentation to approx 0600 this a.m. (10/4/19)      Pt received an initial MICU consult early this a.m. (10/4/19) for elevated lactate and COPD exacerbation. As pt vital signs stable, AOx3, without signs of sepsis and lactate felt to be due to high dose albuterol tx/B lactate and liver dysfunction was found not to be candidate for ICU admission.        Pt is currently re consulted for agitation and elevated CIWA score.                PAST MEDICAL & SURGICAL HISTORY:  Prophylactic measure  Breast cancer  Brain aneurysm: with clips  Psoriasis  RA (rheumatoid arthritis)  Psoriasis  Breast cancer, stage 3  History of modified radical mastectomy of both breasts  S/P bilateral mastectomy  Brain aneurysm:  two clips      FAMILY HISTORY:  FH: CHF (congestive heart failure): Mother      SOCIAL HISTORY:  Smoking: [ ] Never Smoked [ ] Former Smoker (__ packs x ___ years) [ ] Current Smoker  (__ packs x ___ years)  Substance Use: [ ] Never Used [ ] Used ____  EtOH Use:  Marital Status: [ ] Single [ ]  [ ]  [ ]   Sexual History:   Occupation:  Recent Travel:  Country of Birth:  Advance Directives:    Allergies    amoxicillin (Anaphylaxis)  Levaquin (Other; Anaphylaxis)  Levaquin (Unknown)  penicillin (Anaphylaxis)  penicillins (Anaphylaxis)    Intolerances        HOME MEDICATIONS:    REVIEW OF SYSTEMS:      OBJECTIVE:  ICU Vital Signs Last 24 Hrs  T(C): 36.9 (04 Oct 2019 06:32), Max: 36.9 (04 Oct 2019 06:32)  T(F): 98.4 (04 Oct 2019 06:32), Max: 98.4 (04 Oct 2019 06:32)  HR: 112 (04 Oct 2019 09:17) (91 - 116)  BP: 141/71 (04 Oct 2019 09:17) (118/65 - 146/67)  BP(mean): --  ABP: --  ABP(mean): --  RR: 26 (04 Oct 2019 09:17) (18 - 26)  SpO2: 98% (04 Oct 2019 09:17) (96% - 100%)        CAPILLARY BLOOD GLUCOSE          PHYSICAL EXAM:        HOSPITAL MEDICATIONS:  MEDICATIONS  (STANDING):  ALPRAZolam 0.25 milliGRAM(s) Oral every 8 hours  cholecalciferol 2000 Unit(s) Oral daily  docusate sodium 100 milliGRAM(s) Oral two times a day  folic acid 1 milliGRAM(s) Oral daily  levalbuterol Inhalation 0.31 milliGRAM(s) Inhalation every 6 hours  LORazepam   Injectable 1.5 milliGRAM(s) IV Push every 4 hours  LORazepam   Injectable   IV Push   LORazepam   Injectable 2 milliGRAM(s) IV Push every 4 hours  montelukast 10 milliGRAM(s) Oral daily  multivitamin 1 Tablet(s) Oral daily  multivitamin 1 Tablet(s) Oral daily  pantoprazole    Tablet 40 milliGRAM(s) Oral before breakfast  prochlorperazine   Tablet 10 milliGRAM(s) Oral daily  senna 2 Tablet(s) Oral at bedtime  sodium chloride 0.9%. 1000 milliLiter(s) (75 mL/Hr) IV Continuous <Continuous>  thiamine 100 milliGRAM(s) Oral daily  tiotropium 2.5 MICROgram(s)/olodaterol 2.5 MICROgram(s) (STIOLTO) Inhaler 2 Puff(s) Inhalation daily    MEDICATIONS  (PRN):      LABS:                        11.3   3.05  )-----------( 114      ( 04 Oct 2019 08:24 )             34.2     Hgb Trend: 11.3<--, 12.7<--  10    133<L>  |  91<L>  |  <4<L>  ----------------------------<  151<H>  4.2   |  17<L>  |  0.43<L>    Ca    7.9<L>      04 Oct 2019 05:57  Phos  3.4     10-04  Mg     2.0     10-04    TPro  6.7  /  Alb  4.2  /  TBili  1.0  /  DBili  x   /  AST  189<H>  /  ALT  129<H>  /  AlkPhos  58  10-04    Creatinine Trend: 0.43<--, 0.43<--, 0.38<--, 0.39<--  PT/INR - ( 04 Oct 2019 05:57 )   PT: 10.2 sec;   INR: 0.90 ratio         PTT - ( 04 Oct 2019 05:57 )  PTT:25.3 sec  Urinalysis Basic - ( 04 Oct 2019 07:35 )    Color: Yellow / Appearance: Slightly Turbid / S.019 / pH: x  Gluc: x / Ketone: Moderate  / Bili: Negative / Urobili: Negative   Blood: x / Protein: 30 mg/dL / Nitrite: Negative   Leuk Esterase: Small / RBC: 5 /hpf / WBC 3 /HPF   Sq Epi: x / Non Sq Epi: 1 / Bacteria: Many      Arterial Blood Gas:  10-04 @ 05:52  7.33/35/100/18/97/-6.8  ABG lactate: --    Venous Blood Gas:  10-04 @ 05:52  --/--/--/--/--  VBG Lactate: 8.8  Venous Blood Gas:  10-03 @ 21:22  7.36/42/58/23/85  VBG Lactate: 7.7  Venous Blood Gas:  10-03 @ 18:38  7.37/46/107//97  VBG Lactate: 6.4      MICROBIOLOGY:     RADIOLOGY:  [ ] Reviewed and interpreted by me    EKG:        [facility] (ext. [id number]) CHIEF COMPLAINT: SOB    HPI:  54 yr old female with PMHx of bilateral breast Ca Stage 3 on exemestane, s/p bilat mastectomies, COPD/Emphysema on 3 liter N/C home O2, endorsed quit smoking 7 to 8 months ago, ETOH abuse (pt endorses 3- 4 oz shots of scotch and 3-12 oz beers per day), last drink 10/3/19 at 0900. Hx also includes psoriasis on otezla, brain aneurysm s/p clip , Rt jugular vein DVT, mediport catheter related MSSA , recent hospitalization 3/2019 for COPD exacerbation and EtOH withdrawal who presented evening of 10/3/19 from home with c/o sob at rest without relief from self medicated nebulizer therapy. In E.D. pt found to have COPD exacerbation in setting of EtOH withdrawal. In addition Lab work noted for transaminitis, elevated lactate of 8-9 (currently 8.8 from 9.6). Pt received duoneb therapy ,solumedrol 125 mg IV, 4 liters NS IV bolus. In addition received 25 mg po librium, 0.5 mg xanax, total of 6 mg ativan from initial presentation to approx 0600 this a.m. (10/4/19)      Pt received an initial MICU consult early this a.m. (10/4/19) for elevated lactate and COPD exacerbation. As pt vital signs stable, AOx3, without signs of sepsis and lactate felt to be due to high dose albuterol tx/B lactate and liver dysfunction was found not to be candidate for ICU admission.        Pt is currently re consulted for agitation and elevated CIWA score.                PAST MEDICAL & SURGICAL HISTORY:  Prophylactic measure  Breast cancer  Brain aneurysm: with clips  Psoriasis  RA (rheumatoid arthritis)  Psoriasis  Breast cancer, stage 3  History of modified radical mastectomy of both breasts  S/P bilateral mastectomy  Brain aneurysm:  two clips      FAMILY HISTORY:  FH: CHF (congestive heart failure): Mother      SOCIAL HISTORY:  Smoking: [ ] Never Smoked [xx ] Former Smoker (__ packs x ___ years) [ ] Current Smoker  (__ packs x ___ years)  Substance Use: [xxx ] Never Used [ ] Used ____  EtOH Use: yes, see note  Marital Status: [ ] Single [xxx ]  [ ]  [ ]   Sexual History:   Occupation:  Recent Travel:  Country of Birth:  Advance Directives:    Allergies    amoxicillin (Anaphylaxis)  Levaquin (Other; Anaphylaxis)  Levaquin (Unknown)  penicillin (Anaphylaxis)  penicillins (Anaphylaxis)    Intolerances        HOME MEDICATIONS:    REVIEW OF SYSTEMS:    CONSTITUTIONAL:           No weakness, fevers or chills  EYES/ENT:           No visual changes;  No vertigo or throat pain   NECK:            No pain or stiffness  RESPIRATORY:            + cough (chronic),without wheezing, hemoptysis; +shortness of breath  CARDIOVASCULAR:            No chest pain or palpitations  GASTROINTESTINAL:           No abdominal or epigastric pain. No nausea, vomiting, or hematemesis; No diarrhea or constipation. No melena or hematochezia.  GENITOURINARY:            No dysuria, frequency or hematuria  NEUROLOGICAL:            No numbness or weakness  SKIN:            No pruritis , rashes      OBJECTIVE:  ICU Vital Signs Last 24 Hrs  T(C): 36.9 (04 Oct 2019 06:32), Max: 36.9 (04 Oct 2019 06:32)  T(F): 98.4 (04 Oct 2019 06:32), Max: 98.4 (04 Oct 2019 06:32)  HR: 112 (04 Oct 2019 09:17) (91 - 116)  BP: 141/71 (04 Oct 2019 09:17) (118/65 - 146/67)  BP(mean): --  ABP: --  ABP(mean): --  RR: 26 (04 Oct 2019 09:17) (18 - 26)  SpO2: 98% (04 Oct 2019 09:17) (96% - 100%)        CAPILLARY BLOOD GLUCOSE    VITAL SIGNS AT TIME OF CONSULT  BP:   141/71    ; HR:  114 (sinus tach)      ; RR: 22       ; SPO2:   98 (3 liters N/C)     ; Temp:        PHYSICAL EXAM:  GENERAL: NAD, slight obese, dry scaling/flacky scalp  HEAD:  Atraumatic, Normocephalic  EYES: EOMI, Pupils unequal, Rt 3 mm Lt 2 mm, reactive to light, conjunctiva and sclera clear, O.S. lid slight erythema without exudate  ENMT: No oropharyngeal exudates, erythema or lesions,  tongue dry, intact  NECK: Supple, no cervical lymphadenopathy, no JVD  NERVOUS SYSTEM: Alert & Oriented X3, CN II-XII intact, Moves all extremities  ; Upper extremities 4 /5; Lower extremities 4/5, full sensation to light touch , 3/4 asterixes   CHEST/LUNG: Breath sounds bilaterally, able to take deep breaths spontaneously and upon command  No crackles, rhonchi, wheezing, or rubs, diminished in lower lung fields   HEART: cardiac monitor  sinus tach without ectopy  ; S1/S2 No murmurs, rubs, or gallops  ABDOMEN: Soft, Nontender, slight distended; Bowel sounds present, Bladder non distended, non palpable  EXTREMITIES: Pulses palpable radial pulses 2+ bilat, DP/PT 1+/1+ bilat, + clubbing, without cyanosis. Digits warm to touch with good cap refill < 3 secs  SKIN: warm, dry, intact, normal color, no rash or abnormal lesions, without palpable nodes. skin flaking of scalp      HOSPITAL MEDICATIONS:  MEDICATIONS  (STANDING):  ALPRAZolam 0.25 milliGRAM(s) Oral every 8 hours  cholecalciferol 2000 Unit(s) Oral daily  docusate sodium 100 milliGRAM(s) Oral two times a day  folic acid 1 milliGRAM(s) Oral daily  levalbuterol Inhalation 0.31 milliGRAM(s) Inhalation every 6 hours  LORazepam   Injectable 1.5 milliGRAM(s) IV Push every 4 hours  LORazepam   Injectable   IV Push   LORazepam   Injectable 2 milliGRAM(s) IV Push every 4 hours  montelukast 10 milliGRAM(s) Oral daily  multivitamin 1 Tablet(s) Oral daily  multivitamin 1 Tablet(s) Oral daily  pantoprazole    Tablet 40 milliGRAM(s) Oral before breakfast  prochlorperazine   Tablet 10 milliGRAM(s) Oral daily  senna 2 Tablet(s) Oral at bedtime  sodium chloride 0.9%. 1000 milliLiter(s) (75 mL/Hr) IV Continuous <Continuous>  thiamine 100 milliGRAM(s) Oral daily  tiotropium 2.5 MICROgram(s)/olodaterol 2.5 MICROgram(s) (STIOLTO) Inhaler 2 Puff(s) Inhalation daily    MEDICATIONS  (PRN):      LABS:                        11.3   3.05  )-----------( 114      ( 04 Oct 2019 08:24 )             34.2     Hgb Trend: 11.3<--, 12.7<--  10-04    133<L>  |  91<L>  |  <4<L>  ----------------------------<  151<H>  4.2   |  17<L>  |  0.43<L>    Ca    7.9<L>      04 Oct 2019 05:57  Phos  3.4     10-04  Mg     2.0     10-04    TPro  6.7  /  Alb  4.2  /  TBili  1.0  /  DBili  x   /  AST  189<H>  /  ALT  129<H>  /  AlkPhos  58  10-04    Creatinine Trend: 0.43<--, 0.43<--, 0.38<--, 0.39<--  PT/INR - ( 04 Oct 2019 05:57 )   PT: 10.2 sec;   INR: 0.90 ratio         PTT - ( 04 Oct 2019 05:57 )  PTT:25.3 sec  Urinalysis Basic - ( 04 Oct 2019 07:35 )    Color: Yellow / Appearance: Slightly Turbid / S.019 / pH: x  Gluc: x / Ketone: Moderate  / Bili: Negative / Urobili: Negative   Blood: x / Protein: 30 mg/dL / Nitrite: Negative   Leuk Esterase: Small / RBC: 5 /hpf / WBC 3 /HPF   Sq Epi: x / Non Sq Epi: 1 / Bacteria: Many      Arterial Blood Gas:  10-04 @ 05:52  7.33/35/100/18/97/-6.8  ABG lactate: --    Venous Blood Gas:  10-04 @ 05:52  --/--/--/--/--  VBG Lactate: 8.8  Venous Blood Gas:  10-03 @ 21:22  7.36/42/58/23/85  VBG Lactate: 7.7  Venous Blood Gas:  10-03 @ 18:38  7.37/46/107//97  VBG Lactate: 6.4      MICROBIOLOGY:     RADIOLOGY:  [ ] Reviewed and interpreted by me    EKG:        [facility] (ext. [id number]) CHIEF COMPLAINT: SOB    HPI:  54 yr old female with PMHx of bilateral breast Ca Stage 3 on exemestane, s/p bilat mastectomies, COPD/Emphysema on 3 liter N/C home O2, endorsed quit smoking 7 to 8 months ago, ETOH abuse (pt endorses 3- 4 oz shots of scotch and 3-12 oz beers per day), last drink 10/3/19 at 0900. Hx also includes psoriasis on otezla, brain aneurysm s/p clip , Rt jugular vein DVT, mediport catheter related MSSA , recent hospitalization 3/2019 for COPD exacerbation and EtOH withdrawal who presented evening of 10/3/19 from home with c/o sob at rest without relief from self medicated nebulizer therapy. In E.D. pt found to have COPD exacerbation in setting of EtOH withdrawal. In addition Lab work noted for transaminitis, elevated lactate of 8-9 (currently 8.8 from 9.6). Pt received duoneb therapy ,solumedrol 125 mg IV, 4 liters NS IV bolus. In addition received 25 mg po librium, 0.5 mg xanax, total of 6 mg ativan from initial presentation to approx 0600 this a.m. (10/4/19)      Pt received an initial MICU consult early this a.m. (10/4/19) for elevated lactate and COPD exacerbation. As pt vital signs stable, AOx3, without signs of sepsis and lactate felt to be due to high dose albuterol tx/B lactate and liver dysfunction was found not to be candidate for ICU admission.        Pt is currently re consulted for agitation and elevated CIWA score.                PAST MEDICAL & SURGICAL HISTORY:  Prophylactic measure  Breast cancer  Brain aneurysm: with clips  Psoriasis  RA (rheumatoid arthritis)  Psoriasis  Breast cancer, stage 3  History of modified radical mastectomy of both breasts  S/P bilateral mastectomy  Brain aneurysm:  two clips      FAMILY HISTORY:  FH: CHF (congestive heart failure): Mother      SOCIAL HISTORY:  Smoking: [ ] Never Smoked [xx ] Former Smoker (__ packs x ___ years) [ ] Current Smoker  (__ packs x ___ years)  Substance Use: [xxx ] Never Used [ ] Used ____  EtOH Use: yes, see note  Marital Status: [ ] Single [xxx ]  [ ]  [ ]   Sexual History:   Occupation:  Recent Travel:  Country of Birth:  Advance Directives:    Allergies    amoxicillin (Anaphylaxis)  Levaquin (Other; Anaphylaxis)  Levaquin (Unknown)  penicillin (Anaphylaxis)  penicillins (Anaphylaxis)    Intolerances        HOME MEDICATIONS:    REVIEW OF SYSTEMS:    CONSTITUTIONAL:           No weakness, fevers or chills  EYES/ENT:           No visual changes;  No vertigo or throat pain   NECK:            No pain or stiffness  RESPIRATORY:            + cough (chronic),without wheezing, hemoptysis; +shortness of breath  CARDIOVASCULAR:            No chest pain or palpitations  GASTROINTESTINAL:           No abdominal or epigastric pain. No nausea, vomiting, or hematemesis; No diarrhea or constipation. No melena or hematochezia.  GENITOURINARY:            No dysuria, frequency or hematuria  NEUROLOGICAL:            No numbness or weakness  SKIN:            No pruritis , rashes      OBJECTIVE:  ICU Vital Signs Last 24 Hrs  T(C): 36.9 (04 Oct 2019 06:32), Max: 36.9 (04 Oct 2019 06:32)  T(F): 98.4 (04 Oct 2019 06:32), Max: 98.4 (04 Oct 2019 06:32)  HR: 112 (04 Oct 2019 09:17) (91 - 116)  BP: 141/71 (04 Oct 2019 09:17) (118/65 - 146/67)  BP(mean): --  ABP: --  ABP(mean): --  RR: 26 (04 Oct 2019 09:17) (18 - 26)  SpO2: 98% (04 Oct 2019 09:17) (96% - 100%)        CAPILLARY BLOOD GLUCOSE    VITAL SIGNS AT TIME OF CONSULT  BP:   141/71    ; HR:  114 (sinus tach)      ; RR: 22       ; SPO2:   98 (3 liters N/C)     ; Temp:        PHYSICAL EXAM:  GENERAL: NAD, slight obese, dry scaling/flacky scalp  HEAD:  Atraumatic, Normocephalic  EYES: EOMI, Pupils unequal, Rt 3 mm Lt 2 mm, reactive to light, conjunctiva and sclera clear, O.S. lid slight erythema without exudate  ENMT: No oropharyngeal exudates, erythema or lesions,  tongue dry, intact  NECK: Supple, no cervical lymphadenopathy, no JVD  NERVOUS SYSTEM: Alert & Oriented X3, speech clear coherent  makes needs known well and appropriately, follows commands well and appropriately. CN II-XII intact, Moves all extremities  ; Upper extremities 4 /5; Lower extremities 4/5, full sensation to light touch , 3/4 asterixes   CHEST/LUNG: Breath sounds bilaterally, able to take deep breaths spontaneously and upon command  No crackles, rhonchi, wheezing, or rubs, diminished in lower lung fields   HEART: cardiac monitor  sinus tach without ectopy  ; S1/S2 No murmurs, rubs, or gallops  ABDOMEN: Soft, Nontender, slight distended; Bowel sounds present, Bladder non distended, non palpable  EXTREMITIES: Pulses palpable radial pulses 2+ bilat, DP/PT 1+/1+ bilat, + clubbing, without cyanosis. Digits warm to touch with good cap refill < 3 secs  SKIN: warm, dry, intact, normal color, no rash or abnormal lesions, without palpable nodes. skin flaking of scalp      HOSPITAL MEDICATIONS:  MEDICATIONS  (STANDING):  ALPRAZolam 0.25 milliGRAM(s) Oral every 8 hours  cholecalciferol 2000 Unit(s) Oral daily  docusate sodium 100 milliGRAM(s) Oral two times a day  folic acid 1 milliGRAM(s) Oral daily  levalbuterol Inhalation 0.31 milliGRAM(s) Inhalation every 6 hours  LORazepam   Injectable 1.5 milliGRAM(s) IV Push every 4 hours  LORazepam   Injectable   IV Push   LORazepam   Injectable 2 milliGRAM(s) IV Push every 4 hours  montelukast 10 milliGRAM(s) Oral daily  multivitamin 1 Tablet(s) Oral daily  multivitamin 1 Tablet(s) Oral daily  pantoprazole    Tablet 40 milliGRAM(s) Oral before breakfast  prochlorperazine   Tablet 10 milliGRAM(s) Oral daily  senna 2 Tablet(s) Oral at bedtime  sodium chloride 0.9%. 1000 milliLiter(s) (75 mL/Hr) IV Continuous <Continuous>  thiamine 100 milliGRAM(s) Oral daily  tiotropium 2.5 MICROgram(s)/olodaterol 2.5 MICROgram(s) (STIOLTO) Inhaler 2 Puff(s) Inhalation daily    MEDICATIONS  (PRN):      LABS:                        11.3   3.05  )-----------( 114      ( 04 Oct 2019 08:24 )             34.2     Hgb Trend: 11.3<--, 12.7<--  10-04    133<L>  |  91<L>  |  <4<L>  ----------------------------<  151<H>  4.2   |  17<L>  |  0.43<L>    Ca    7.9<L>      04 Oct 2019 05:57  Phos  3.4     10-04  Mg     2.0     10-04    TPro  6.7  /  Alb  4.2  /  TBili  1.0  /  DBili  x   /  AST  189<H>  /  ALT  129<H>  /  AlkPhos  58  10-04    Creatinine Trend: 0.43<--, 0.43<--, 0.38<--, 0.39<--  PT/INR - ( 04 Oct 2019 05:57 )   PT: 10.2 sec;   INR: 0.90 ratio         PTT - ( 04 Oct 2019 05:57 )  PTT:25.3 sec  Urinalysis Basic - ( 04 Oct 2019 07:35 )    Color: Yellow / Appearance: Slightly Turbid / S.019 / pH: x  Gluc: x / Ketone: Moderate  / Bili: Negative / Urobili: Negative   Blood: x / Protein: 30 mg/dL / Nitrite: Negative   Leuk Esterase: Small / RBC: 5 /hpf / WBC 3 /HPF   Sq Epi: x / Non Sq Epi: 1 / Bacteria: Many      Arterial Blood Gas:  10-04 @ 05:52  7.33/35/100/18/97/-6.8  ABG lactate: --    Venous Blood Gas:  10-04 @ 05:52  --/--/--/--/--  VBG Lactate: 8.8  Venous Blood Gas:  10-03 @ 21:22  7.36/42/58/23/85  VBG Lactate: 7.7  Venous Blood Gas:  10-03 @ 18:38  7.37/46/107/26/97  VBG Lactate: 6.4      MICROBIOLOGY:     RADIOLOGY:  [ ] Reviewed and interpreted by me    EKG:        [facility] (ext. [id number])

## 2019-10-04 NOTE — BEHAVIORAL HEALTH ASSESSMENT NOTE - NSBHCHARTREVIEWINVESTIGATE_PSY_A_CORE FT
< from: 12 Lead ECG (10.03.19 @ 16:38) >  Ventricular Rate 84 BPM  Atrial Rate 84 BPM  P-R Interval 114 ms  QRS Duration 84 ms  Q-T Interval 378 ms  QTC Calculation(Bezet) 446 ms  P Axis 45 degrees  R Axis 50 degrees  T Axis 60 degrees  Diagnosis Line NORMAL SINUS RHYTHM  LOW VOLTAGE QRS  BORDERLINE ECG  Confirmed by ATTENDING, ED ([id number]),  JOSE LUIS BORRERO ([id number]) on 10/4/2019 10:36:16 AM  < end of copied text >

## 2019-10-04 NOTE — CONSULT NOTE ADULT - ASSESSMENT
54F with PMH of stage 3 breast cancer on aromasin, RA, Psoriasis on Otezla, brain aneurysm, s/p clip 1988, chronic ETOH abuse, COPD on home O2, who initially presented to the ED with dyspnea in setting COPD exacerbation as well as alcohol withdrawal found to have elevate lactate level to 8-9.    #Lactic acidemia  Elevated lactate to 8-9 without acidemia or clinical evidence of hypoperfusion and benign abdominal exam, likely elevated in setting of nebulizer treatments as well as poor hepatic clearance 2/2 alcoholic hepatitis  - s/p adequate fluid resuscitation, use fluid judiciously   - minimize nebulizer use as possible  - trend no more than q12, expect slow decrease  - consider abdominal imaging    #COPD exacerbation on home O2  Currently saturating well on 3L, no hypercarbia and no mental status changes  - s/p solumedrol 125, can continue 40 mg daily for total of 5 days  - Xray without overt consolidation  - continue home inhalers  - judicious use of nebulizer treatments    #Alcohol withdrawal  Initial CIWA of 5  - c/w CIWA protocol with ativan taper 54F with PMH of stage 3 breast cancer on aromasin, RA, Psoriasis on Otezla, brain aneurysm, s/p clip 1988, chronic ETOH abuse, COPD on home O2, who initially presented to the ED with dyspnea in setting COPD exacerbation as well as alcohol withdrawal found to have elevate lactate level to 8-9.    #Lactic acidemia  Elevated lactate to 8-9 without acidemia or clinical evidence of hypoperfusion and benign abdominal exam, likely elevated in setting of nebulizer treatments as well as poor hepatic clearance 2/2 alcoholic hepatitis  - s/p adequate fluid resuscitation, use fluid judiciously   - minimize nebulizer use as possible  - no more LR fluids  - trend no more than q12, expect slow decrease  - consider abdominal imaging    #COPD exacerbation on home O2  Currently saturating well on 3L, no hypercarbia and no mental status changes  - s/p solumedrol 125, can continue 40 mg daily for total of 5 days  - Xray without overt consolidation  - continue home inhalers  - judicious use of nebulizer treatments    #Alcohol withdrawal  Initial CIWA of 5  - c/w CIWA protocol with ativan taper

## 2019-10-04 NOTE — BEHAVIORAL HEALTH ASSESSMENT NOTE - NSBHCHARTREVIEWVS_PSY_A_CORE FT
Vital Signs Last 24 Hrs  T(C): 37 (04 Oct 2019 12:05), Max: 37.1 (04 Oct 2019 10:29)  T(F): 98.6 (04 Oct 2019 12:05), Max: 98.8 (04 Oct 2019 10:29)  HR: 108 (04 Oct 2019 12:05) (91 - 116)  BP: 114/74 (04 Oct 2019 12:05) (114/74 - 146/67)  BP(mean): --  RR: 29 (04 Oct 2019 12:05) (18 - 29)  SpO2: 98% (04 Oct 2019 12:05) (95% - 100%)

## 2019-10-04 NOTE — CONSULT NOTE ADULT - SUBJECTIVE AND OBJECTIVE BOX
Patient is a 54y old  Female who presents with a chief complaint of shortness of breath (04 Oct 2019 13:26)    HPI:    54 year old female PMH of stage 3 breast cancer on aromasin, RA, Psoriasis on Otezla, brain aneurysm, s/p clip , right jugular DVT, Catheter related MSSA bacteremia ,  ETOH abuse, COPD on oxygen presents here with dyspnea.  Patient states that she has been very lethargic.  She has also been dyspneic at rest and with minimal activities.  She has cough with white mucous, but that is not new for her.  At home she has been using nebulizers without much improvement.  She denies any fever, chills, nausea or vomiting.  Denies any GI or  symptoms.  Denies any sick contact or recent travel.      Of note, admitted in March for SOB and alcohol intoxication.  She was treated with steroid taper and ativan taper.  At home she drinks 2-3 shots of scotch and "lotta" beer.  Last drink was this morning.  Denies any previous withdrawals. During my interview, she is shaking a lot, but both patient and  states that she does that very often.  At home she is on xanax for anxiety.  Currently denies any headache.  Denies any hallucination. (03 Oct 2019 20:26)     In the ED afebrile, normotensive but tachycardic to >110. WBC on presentation of 5.34 with 81.4% PMN. ALT of 152, AST of 281, T Bili of 1.3, Alk Phos of 68. Anion gap of 22. Lactic acid of 6.4. U/A with 3 WBC. RVP negative.     prior hospital charts reviewed [ x  ]  primary team notes reviewed [ x ]  other consultant notes reviewed [ x ]    PAST MEDICAL & SURGICAL HISTORY:  Prophylactic measure  Breast cancer  Brain aneurysm: with clips  Psoriasis  RA (rheumatoid arthritis)  Psoriasis  Breast cancer, stage 3  History of modified radical mastectomy of both breasts  S/P bilateral mastectomy  Brain aneurysm:  two clips      Allergies  amoxicillin (Anaphylaxis)  Levaquin (Other; Anaphylaxis)  Levaquin (Unknown)  penicillin (Anaphylaxis)  penicillins (Anaphylaxis)    ANTIMICROBIALS (past 90 days)  MEDICATIONS  (STANDING):      ANTIMICROBIALS:      OTHER MEDS: MEDICATIONS  (STANDING):  ALBUTerol/ipratropium for Nebulization 3 every 6 hours  ALPRAZolam 0.25 every 8 hours  buDESOnide    Inhalation Suspension 0.5 every 12 hours  docusate sodium 100 two times a day  heparin  Injectable 5000 every 12 hours  LORazepam   Injectable 1.5 every 4 hours  LORazepam   Injectable    LORazepam   Injectable 2 every 4 hours  methylPREDNISolone sodium succinate Injectable 20 every 6 hours  montelukast 10 daily  pantoprazole    Tablet 40 before breakfast  prochlorperazine   Tablet 10 daily  senna 2 at bedtime    SOCIAL HISTORY:  Quit smoking 8 years ago. Prior smoking history for 40 years at 2 PPD. Drinks 6-8 alcoholic drinks per day with last drink on day of admission.     FAMILY HISTORY:  FH: CHF (congestive heart failure): Mother    REVIEW OF SYSTEMS  [  ] ROS unobtainable because:    [  ] All other systems negative except as noted below:	    Constitutional:  [ ] fever [ ] chills  [ ] weight loss  [ ] weakness  Skin:  [ ] rash [ ] phlebitis	  Eyes: [ ] icterus [ ] pain  [ ] discharge	  ENMT: [ ] sore throat  [ ] thrush [ ] ulcers [ ] exudates  Respiratory: [ ] dyspnea [ ] hemoptysis [ ] cough [ ] sputum	  Cardiovascular:  [ ] chest pain [ ] palpitations [ ] edema	  Gastrointestinal:  [ ] nausea [ ] vomiting [ ] diarrhea [ ] constipation [ ] pain	  Genitourinary:  [ ] dysuria [ ] frequency [ ] hematuria [ ] discharge [ ] flank pain  [ ] incontinence  Musculoskeletal:  [ ] myalgias [ ] arthralgias [ ] arthritis  [ ] back pain  Neurological:  [ ] headache [ ] seizures  [ ] confusion/altered mental status  Psychiatric:  [ ] anxiety [ ] depression	  Hematology/Lymphatics:  [ ] lymphadenopathy  Endocrine:  [ ] adrenal [ ] thyroid  Allergic/Immunologic:	 [ ] transplant [ ] seasonal    Vital Signs Last 24 Hrs  T(F): 99 (10-04-19 @ 14:04), Max: 99 (10-04-19 @ 13:01)  Vital Signs Last 24 Hrs  HR: 102 (10-04-19 @ 14:04) (91 - 116)  BP: 139/81 (10-04-19 @ 14:04) (114/74 - 146/67)  RR: 28 (10-04-19 @ 14:04)  SpO2: 97% (10-04-19 @ 14:04) (95% - 100%)  Wt(kg): --    PHYSICAL EXAMINATION:  General: Alert and Awake, NAD  HEENT: PERRL, EOMI, No subconjunctival hemorrhages, Oropharynx Clear, MMM  Neck: Supple, No SHAHID  Cardiac: RRR, No M/R/G  Resp: CTAB, No Wh/Rh/Ra  Abdomen: NBS, NT/ND, No HSM, No rigidity or guarding  MSK: No LE edema. No stigmata of IE. No evidence of phlebitis. No evidence of synovitis.  : No snyder  Skin: No rashes or lesions. Skin is warm and dry to the touch.   Neuro: Alert and Awake. CN 2-12 Grossly intact. Moves all four extremities spontaneously.  Psych: Calm, Pleasant, Cooperative                          11.3   3.05  )-----------( 114      ( 04 Oct 2019 08:24 )             34.2     10-    133<L>  |  91<L>  |  <4<L>  ----------------------------<  151<H>  4.2   |  17<L>  |  0.43<L>    Ca    7.9<L>      04 Oct 2019 05:57  Phos  3.4     10-  Mg     2.0     10-    TPro  6.7  /  Alb  4.2  /  TBili  1.0  /  DBili  x   /  AST  189<H>  /  ALT  129<H>  /  AlkPhos  58  10-    Urinalysis Basic - ( 04 Oct 2019 07:35 )    Color: Yellow / Appearance: Slightly Turbid / S.019 / pH: x  Gluc: x / Ketone: Moderate  / Bili: Negative / Urobili: Negative   Blood: x / Protein: 30 mg/dL / Nitrite: Negative   Leuk Esterase: Small / RBC: 5 /hpf / WBC 3 /HPF   Sq Epi: x / Non Sq Epi: 1 / Bacteria: Many    MICROBIOLOGY:    Rapid RVP Result: NotDetec (10-03 @ 19:47)    RADIOLOGY:  imaging below personally reviewed    EXAM:  CT ANGIO CHEST (W)AW IC                        PROCEDURE DATE:  10/03/2019    No pulmonary embolism.  Severe emphysema. Patient is a 54y old  Female who presents with a chief complaint of shortness of breath (04 Oct 2019 13:26)    HPI:    54 year old female PMH of stage 3 breast cancer on aromasin, RA, Psoriasis on Otezla, brain aneurysm, s/p clip , right jugular DVT, Catheter related MSSA bacteremia ,  ETOH abuse, COPD on oxygen presents here with dyspnea.  Patient states that she has been very lethargic.  She has also been dyspneic at rest and with minimal activities.  She has cough with white mucous, but that is not new for her.  At home she has been using nebulizers without much improvement.  She denies any fever, chills, nausea or vomiting.  Denies any GI or  symptoms.  Denies any sick contact or recent travel.      Of note, admitted in March for SOB and alcohol intoxication.  She was treated with steroid taper and ativan taper.  At home she drinks 2-3 shots of scotch and "lotta" beer.  Last drink was this morning.  Denies any previous withdrawals. During my interview, she is shaking a lot, but both patient and  states that she does that very often.  At home she is on xanax for anxiety.  Currently denies any headache.  Denies any hallucination. (03 Oct 2019 20:26)     In the ED afebrile, normotensive but tachycardic to >110. WBC on presentation of 5.34 with 81.4% PMN. ALT of 152, AST of 281, T Bili of 1.3, Alk Phos of 68. Anion gap of 22. Lactic acid of 6.4. U/A with 3 WBC. RVP negative.     prior hospital charts reviewed [ x  ]  primary team notes reviewed [ x ]  other consultant notes reviewed [ x ]    PAST MEDICAL & SURGICAL HISTORY:  Prophylactic measure  Breast cancer  Brain aneurysm: with clips  Psoriasis  RA (rheumatoid arthritis)  Psoriasis  Breast cancer, stage 3  History of modified radical mastectomy of both breasts  S/P bilateral mastectomy  Brain aneurysm:  two clips      Allergies  amoxicillin (Anaphylaxis)  Levaquin (Other; Anaphylaxis)  Levaquin (Unknown)  penicillin (Anaphylaxis)  penicillins (Anaphylaxis)    ANTIMICROBIALS (past 90 days)  MEDICATIONS  (STANDING):      ANTIMICROBIALS:      OTHER MEDS: MEDICATIONS  (STANDING):  ALBUTerol/ipratropium for Nebulization 3 every 6 hours  ALPRAZolam 0.25 every 8 hours  buDESOnide    Inhalation Suspension 0.5 every 12 hours  docusate sodium 100 two times a day  heparin  Injectable 5000 every 12 hours  LORazepam   Injectable 1.5 every 4 hours  LORazepam   Injectable    LORazepam   Injectable 2 every 4 hours  methylPREDNISolone sodium succinate Injectable 20 every 6 hours  montelukast 10 daily  pantoprazole    Tablet 40 before breakfast  prochlorperazine   Tablet 10 daily  senna 2 at bedtime    SOCIAL HISTORY:  Quit smoking 8 years ago. Prior smoking history for 40 years at 2 PPD. Drinks 6-8 alcoholic drinks per day with last drink on day of admission. Denies drug use. Denies recent travel     FAMILY HISTORY:  FH: CHF (congestive heart failure): Mother  CHF in the father    REVIEW OF SYSTEMS  [  ] ROS unobtainable because:    [ x ] All other systems negative except as noted below:	    Constitutional:  [ ] fever [ ] chills  [ ] weight loss  [ ] weakness  Skin:  [x ] rash [ ] phlebitis	  Eyes: [ ] icterus [ ] pain  [ ] discharge	  ENMT: [ ] sore throat  [ ] thrush [ ] ulcers [ ] exudates  Respiratory: [x ] dyspnea [ ] hemoptysis [ ] cough [ ] sputum	  Cardiovascular:  [ ] chest pain [ ] palpitations [ ] edema	  Gastrointestinal:  [ ] nausea [ ] vomiting [ ] diarrhea [ ] constipation [ ] pain	  Genitourinary:  [ ] dysuria [ ] frequency [ ] hematuria [ ] discharge [ ] flank pain  [ ] incontinence  Musculoskeletal:  [ ] myalgias [ ] arthralgias [ ] arthritis  [ ] back pain  Neurological:  [ ] headache [ ] seizures  [ ] confusion/altered mental status  Psychiatric:  [ ] anxiety [ ] depression	  Hematology/Lymphatics:  [ ] lymphadenopathy  Endocrine:  [ ] adrenal [ ] thyroid  Allergic/Immunologic:	 [ ] transplant [ ] seasonal    Vital Signs Last 24 Hrs  T(F): 99 (10-04-19 @ 14:04), Max: 99 (10-04-19 @ 13:01)  Vital Signs Last 24 Hrs  HR: 102 (10-04-19 @ 14:04) (91 - 116)  BP: 139/81 (10-04-19 @ 14:04) (114/74 - 146/67)  RR: 28 (10-04-19 @ 14:04)  SpO2: 97% (10-04-19 @ 14:04) (95% - 100%)  Wt(kg): --    PHYSICAL EXAMINATION:  General: Alert and Awake, NAD  HEENT: PERRL, EOMI, No subconjunctival hemorrhages, Oropharynx Clear, MMM  Neck: Supple, No SHAHID  Cardiac: RRR, No M/R/G  Resp: CTAB, No Wh/Rh/Ra  Abdomen: NBS, NT/ND, No HSM, No rigidity or guarding  MSK: No LE edema. No stigmata of IE. No evidence of phlebitis. No evidence of synovitis.  : No snyder  Skin: Scaling rash on the scalp. Hyperpigmented plaques on lower back with excoriations. Hyperpigmented plaques on the legs and feet. No area of erythema or drainage concerning for cellulitis. Skin is warm and dry to the touch.   Neuro: Alert and Awake. CN 2-12 Grossly intact. Moves all four extremities spontaneously.  Psych: Calm, Pleasant, Cooperative                          11.3   3.05  )-----------( 114      ( 04 Oct 2019 08:24 )             34.2     10-04    133<L>  |  91<L>  |  <4<L>  ----------------------------<  151<H>  4.2   |  17<L>  |  0.43<L>    Ca    7.9<L>      04 Oct 2019 05:57  Phos  3.4     10-  Mg     2.0     10-    TPro  6.7  /  Alb  4.2  /  TBili  1.0  /  DBili  x   /  AST  189<H>  /  ALT  129<H>  /  AlkPhos  58  10-04    Urinalysis Basic - ( 04 Oct 2019 07:35 )    Color: Yellow / Appearance: Slightly Turbid / S.019 / pH: x  Gluc: x / Ketone: Moderate  / Bili: Negative / Urobili: Negative   Blood: x / Protein: 30 mg/dL / Nitrite: Negative   Leuk Esterase: Small / RBC: 5 /hpf / WBC 3 /HPF   Sq Epi: x / Non Sq Epi: 1 / Bacteria: Many    MICROBIOLOGY:    Rapid RVP Result: [place] (10-03 @ 19:47)    RADIOLOGY:  imaging below personally reviewed    EXAM:  CT ANGIO CHEST (W)AW IC                        PROCEDURE DATE:  10/03/2019    No pulmonary embolism.  Severe emphysema.

## 2019-10-04 NOTE — ED ADULT NURSE REASSESSMENT NOTE - NS ED NURSE REASSESS COMMENT FT1
Called pharmacy again for xopenex, said inhaler is waiting to be sent. Will administer as soon as available.

## 2019-10-04 NOTE — CHART NOTE - NSCHARTNOTEFT_GEN_A_CORE
FREDY CHOI    Chief c/o: increase CIWA SCORE       Vital Signs:  Vital Signs Last 24 Hrs  T(C): 36.9 (04 Oct 2019 06:32), Max: 36.9 (04 Oct 2019 06:32)  T(F): 98.4 (04 Oct 2019 06:32), Max: 98.4 (04 Oct 2019 06:32)  HR: 116 (04 Oct 2019 08:30) (91 - 116)  BP: 139/97 (04 Oct 2019 08:30) (118/65 - 146/67)  BP(mean): --  RR: 24 (04 Oct 2019 08:30) (18 - 24)  SpO2: 98% (04 Oct 2019 08:30) (96% - 100%)    Labs:                        11.3   3.05  )-----------( 114      ( 04 Oct 2019 08:24 )             34.2     CBC Full  -  ( 04 Oct 2019 08:24 )  WBC Count : 3.05 K/uL  RBC Count : 3.27 M/uL  Hemoglobin : 11.3 g/dL  Hematocrit : 34.2 %  Platelet Count - Automated : 114 K/uL  Mean Cell Volume : 104.6 fl  Mean Cell Hemoglobin : 34.6 pg  Mean Cell Hemoglobin Concentration : 33.0 gm/dL  Auto Neutrophil # : x  Auto Lymphocyte # : x  Auto Monocyte # : x  Auto Eosinophil # : x  Auto Basophil # : x  Auto Neutrophil % : x  Auto Lymphocyte % : x  Auto Monocyte % : x  Auto Eosinophil % : x  Auto Basophil % : x    10-04    133<L>  |  91<L>  |  <4<L>  ----------------------------<  151<H>  4.2   |  17<L>  |  0.43<L>    Ca    7.9<L>      04 Oct 2019 05:57  Phos  3.4     10-04  Mg     2.0     10-04    TPro  6.7  /  Alb  4.2  /  TBili  1.0  /  DBili  x   /  AST  189<H>  /  ALT  129<H>  /  AlkPhos  58  10-04        LIVER FUNCTIONS - ( 04 Oct 2019 05:57 )  Alb: 4.2 g/dL / Pro: 6.7 g/dL / ALK PHOS: 58 U/L / ALT: 129 U/L / AST: 189 U/L / GGT: x           ABG - ( 04 Oct 2019 05:52 )  pH, Arterial: 7.33  pH, Blood: x     /  pCO2: 35    /  pO2: 100   / HCO3: 18    / Base Excess: -6.8  /  SaO2: 97                Adult Advanced Hemodynamics Last 24 Hrs  CVP(mm Hg): --  CVP(cm H2O): --  CO: --  CI: --  PA: --  PA(mean): --  PCWP: --  SVR: --  SVRI: --  PVR: --  PVRI: --  PT/INR - ( 04 Oct 2019 05:57 )   PT: 10.2 sec;   INR: 0.90 ratio         PTT - ( 04 Oct 2019 05:57 )  PTT:25.3 sec    Physical Exam:  PHYSICAL EXAM:      Constitutional:    Eyes:    ENMT:    Neck:    Breasts:    Back:    Respiratory:    Cardiovascular:    Gastrointestinal:    Genitourinary:    Rectal:    Extremities:    Vascular:    Neurological:    Skin:    Lymph Nodes:    Musculoskeletal:    Psychiatric:        Assesment / Plan: FREDY JIMBO    Chief c/o: increase CIWA SCORE 18 elevated Lact level      Vital Signs:  Vital Signs Last 24 Hrs  T(C): 36.9 (04 Oct 2019 06:32), Max: 36.9 (04 Oct 2019 06:32)  T(F): 98.4 (04 Oct 2019 06:32), Max: 98.4 (04 Oct 2019 06:32)  HR: 116 (04 Oct 2019 08:30) (91 - 116)  BP: 139/97 (04 Oct 2019 08:30) (118/65 - 146/67)  BP(mean): --  RR: 24 (04 Oct 2019 08:30) (18 - 24)  SpO2: 98% (04 Oct 2019 08:30) (96% - 100%)    Labs:                        11.3   3.05  )-----------( 114      ( 04 Oct 2019 08:24 )             34.2     CBC Full  -  ( 04 Oct 2019 08:24 )  WBC Count : 3.05 K/uL  RBC Count : 3.27 M/uL  Hemoglobin : 11.3 g/dL  Hematocrit : 34.2 %  Platelet Count - Automated : 114 K/uL  Mean Cell Volume : 104.6 fl  Mean Cell Hemoglobin : 34.6 pg  Mean Cell Hemoglobin Concentration : 33.0 gm/dL  Auto Neutrophil # : x  Auto Lymphocyte # : x  Auto Monocyte # : x  Auto Eosinophil # : x  Auto Basophil # : x  Auto Neutrophil % : x  Auto Lymphocyte % : x  Auto Monocyte % : x  Auto Eosinophil % : x  Auto Basophil % : x    10-04    133<L>  |  91<L>  |  <4<L>  ----------------------------<  151<H>  4.2   |  17<L>  |  0.43<L>    Ca    7.9<L>      04 Oct 2019 05:57  Phos  3.4     10-04  Mg     2.0     10-04    TPro  6.7  /  Alb  4.2  /  TBili  1.0  /  DBili  x   /  AST  189<H>  /  ALT  129<H>  /  AlkPhos  58  10-04        LIVER FUNCTIONS - ( 04 Oct 2019 05:57 )  Alb: 4.2 g/dL / Pro: 6.7 g/dL / ALK PHOS: 58 U/L / ALT: 129 U/L / AST: 189 U/L / GGT: x           ABG - ( 04 Oct 2019 05:52 )  pH, Arterial: 7.33  pH, Blood: x     /  pCO2: 35    /  pO2: 100   / HCO3: 18    / Base Excess: -6.8  /  SaO2: 97                   Physical Exam:  PHYSICAL EXAM:      Constitutional: lethargic, alert, orient x 3           Assesment / Plan: 54F with PMH of stage 3 breast cancer on aromasin, RA, Psoriasis on Otezla, brain aneurysm, s/p clip 1988, chronic ETOH abuse, COPD on home O2, who initially presented to the ED with dyspnea in setting COPD exacerbation as well as alcohol withdrawal found to have elevate lactate level to 8.8 despite of IV hydration FREDY JIMBO    Chief c/o: increase CIWA SCORE 18 elevated Lact level      Vital Signs:  Vital Signs Last 24 Hrs  T(C): 36.9 (04 Oct 2019 06:32), Max: 36.9 (04 Oct 2019 06:32)  T(F): 98.4 (04 Oct 2019 06:32), Max: 98.4 (04 Oct 2019 06:32)  HR: 116 (04 Oct 2019 08:30) (91 - 116)  BP: 139/97 (04 Oct 2019 08:30) (118/65 - 146/67)  BP(mean): --  RR: 24 (04 Oct 2019 08:30) (18 - 24)  SpO2: 98% (04 Oct 2019 08:30) (96% - 100%)    Labs:                        11.3   3.05  )-----------( 114      ( 04 Oct 2019 08:24 )             34.2     CBC Full  -  ( 04 Oct 2019 08:24 )  WBC Count : 3.05 K/uL  RBC Count : 3.27 M/uL  Hemoglobin : 11.3 g/dL  Hematocrit : 34.2 %  Platelet Count - Automated : 114 K/uL  Mean Cell Volume : 104.6 fl  Mean Cell Hemoglobin : 34.6 pg  Mean Cell Hemoglobin Concentration : 33.0 gm/dL  Auto Neutrophil # : x  Auto Lymphocyte # : x  Auto Monocyte # : x  Auto Eosinophil # : x  Auto Basophil # : x  Auto Neutrophil % : x  Auto Lymphocyte % : x  Auto Monocyte % : x  Auto Eosinophil % : x  Auto Basophil % : x    10-04    133<L>  |  91<L>  |  <4<L>  ----------------------------<  151<H>  4.2   |  17<L>  |  0.43<L>    Ca    7.9<L>      04 Oct 2019 05:57  Phos  3.4     10-04  Mg     2.0     10-04    TPro  6.7  /  Alb  4.2  /  TBili  1.0  /  DBili  x   /  AST  189<H>  /  ALT  129<H>  /  AlkPhos  58  10-04        LIVER FUNCTIONS - ( 04 Oct 2019 05:57 )  Alb: 4.2 g/dL / Pro: 6.7 g/dL / ALK PHOS: 58 U/L / ALT: 129 U/L / AST: 189 U/L / GGT: x           ABG - ( 04 Oct 2019 05:52 )  pH, Arterial: 7.33  pH, Blood: x     /  pCO2: 35    /  pO2: 100   / HCO3: 18    / Base Excess: -6.8  /  SaO2: 97                   Physical Exam:  PHYSICAL EXAM:      Constitutional: lethargic, alert, orient x 3           Assesment / Plan: 54F with PMH of stage 3 breast cancer on aromasin, RA, Psoriasis on Otezla, brain aneurysm, s/p clip 1988, chronic ETOH abuse, COPD on home O2, who initially presented to the ED with dyspnea in setting COPD exacerbation as well as alcohol withdrawal found to have elevate lactate level to 8.8 despite of IV hydration and ciwa score 18    severe Etoh withdrawal -  Ciwa 18 Micu reconsulted  Ativan 4mg IV as per Micu  elevated lact level possible decrease Hepatic clear ence VS m- worsening breast CA-  VS dehydration vs infection  CT abd P  plan D/w Dr Yaneli Akins RPA-c

## 2019-10-04 NOTE — PROGRESS NOTE ADULT - ASSESSMENT
54 yr old female with  Hx significant for COPD, EtOH abuse, Breast Ca, remote brain aneurysm s/p clip who presented with COPD exacerbation initially treated with duoneb and steroid tx  received [facility] consult for elevated lactate and COPD exacerbation. and elevated CIWA score    -Pt with hx of EtOH abuse with last drink 10/3/19 at 0900  -neuro checks q 4 hrs and prn   -CIWA score q 2 hrs  -as pt on chronic benzo therapy (alprazolam 0.25 mg po q 8 hr) would continue    -Pt with unequal pupils, pt with hx of brain aneurysm s/p clips. pt without focal changes  ct head  neuro eval    - Pt with COPD on home O2  -Xopenex  therapy q 6 hrs and prn for sob/wheezes  -tiotropium inhalers q day  -singulair 10 mg po qd  -supplemental O2 - titrate to maintain SPO2 > 88%  pulm eval   tachycardia  -Last documented TTE 2016 (mild diastolic dysfunction)  echo  cards eval     -diet as tolerated  -strict I & O's -   ppi     transaminitis sec to etoh abuse

## 2019-10-04 NOTE — CHART NOTE - NSCHARTNOTEFT_GEN_A_CORE
Called by lab for lactate 8.8.   Lactate uptrending from 6.6 --> 7.7 --> 8.8   Pt without source of sepsis, no signs of hypoperfusion, no end organ damage.   In setting of lactic acidosis, sepsis w/u initiated,   f/u blood cultures, urine culture, monitor for fever,  Pt tachycardic to 110s-120s, likely due to EtOH , dehydration,   duoneb exacerbating HR,   Duoneb changed to levalbuterol  Continue IVF  Trend lactate.     Dr. Hayes aware, will update with latest lactate.  Will d/w Dr. Groves in am.     Hannah Llamas ANP-BC  [id number]

## 2019-10-04 NOTE — CONSULT NOTE ADULT - SUBJECTIVE AND OBJECTIVE BOX
[facility] - [facility] - CONSULT NOTE    HPI: 54 year old gentlewoman, current smoker and daily alcohol user, followed by Dr. Junior Mansfield of our practice for chronic hypoxic respiratory failure due to COPD/emphysema. She also has a history of breast cancer s/p bilateral mastectomies maintained on Aromasin, rheumatoid arthritis and psoriasis previously on Otezla, remote brain aneurysm requiring clipping and a right jugular vein DVT. She was admitted in March for alcohol withdrawal and a COPD exacerbation. Her last drink was yesterday at 9am. She comes to the ER with shortness of breath associated with cough, chest congestion and wheeze. She was noted to have elevated LFTs, anion gap metabolic acidosis and elevated lactate. She has been given aggressive IV fluid resuscitation. She has received librium, ativan and xanax    PMHX:  Mediport related MSSA bactermia -   Breast cancer  Brain aneurysm  Psoriasis  RA (rheumatoid arthritis)  Right jugular vein DVT        PSHX:  Bilateral mastectomy  Brain aneurysm clipping    FAMILY HISTORY:  FH: CHF (congestive heart failure): Mother      SOCIAL HISTORY:  former smoker - stopped ~ 6 months ago    Pulmonary Medications:   levalbuterol Inhalation 0.31 milliGRAM(s) Inhalation every 6 hours  montelukast 10 milliGRAM(s) Oral daily  tiotropium 2.5 MICROgram(s)/olodaterol 2.5 MICROgram(s) (STIOLTO) Inhaler 2 Puff(s) Inhalation daily      Antimicrobials:      Cardiology:      Other:  ALPRAZolam 0.25 milliGRAM(s) Oral every 8 hours  cholecalciferol 2000 Unit(s) Oral daily  docusate sodium 100 milliGRAM(s) Oral two times a day  folic acid 1 milliGRAM(s) Oral daily  heparin  Injectable 5000 Unit(s) SubCutaneous every 12 hours  LORazepam   Injectable 1.5 milliGRAM(s) IV Push every 4 hours  LORazepam   Injectable   IV Push   LORazepam   Injectable 2 milliGRAM(s) IV Push every 4 hours  multivitamin 1 Tablet(s) Oral daily  multivitamin 1 Tablet(s) Oral daily  pantoprazole    Tablet 40 milliGRAM(s) Oral before breakfast  prochlorperazine   Tablet 10 milliGRAM(s) Oral daily  senna 2 Tablet(s) Oral at bedtime  sodium chloride 0.9%. 1000 milliLiter(s) IV Continuous <Continuous>  thiamine 100 milliGRAM(s) Oral daily      Prn:  MEDICATIONS  (PRN):      Allergies    amoxicillin (Anaphylaxis)  Levaquin (Other; Anaphylaxis)  Levaquin (Unknown)  penicillin (Anaphylaxis)  penicillins (Anaphylaxis)    HOME MEDICATIONS: see  H & P    REVIEW OF SYSTEMS:  Constitutional: As per HPI  HEENT: Within normal limits  CV: As per HPI  Resp: As per HPI  GI: Within normal limits   : Within normal limits  Musculoskeletal: Within normal limits  Skin: Within normal limits  Neurological: Within normal limits  Psychiatric: Within normal limits  Endocrine: Within normal limits  Hematologic/Lymphatic: Within normal limits  Allergic/Immunologic: Within normal limits    [x] All other systems negative    OBJECTIVE:    PHYSICAL EXAM:  ICU Vital Signs Last 24 Hrs  T(C): 37.2 (04 Oct 2019 13:01), Max: 37.2 (04 Oct 2019 13:01)  T(F): 99 (04 Oct 2019 13:01), Max: 99 (04 Oct 2019 13:01)  HR: 108 (04 Oct 2019 13:01) (91 - 116)  BP: 121/93 (04 Oct 2019 13:01) (114/74 - 146/67)  BP(mean): --  ABP: --  ABP(mean): --  RR: 27 (04 Oct 2019 13:01) (18 - 29)  SpO2: 98% (04 Oct 2019 13:01) (95% - 100%)    General: Awake. Alert. Cooperative. No distress. Appears stated age 	  HEENT:   Atraumatic. Normocephalic. Anicteric. Normal oral mucosa. PERRL. EOMI.  Neck: Supple. Trachea midline. Thyroid without enlargement/tenderness/nodules. No carotid bruit. No JVD.	  Cardiovascular: Regular rate and rhythm. S1 S2 normal. No murmurs, rubs or gallops.  Respiratory: Respirations unlabored. Clear to auscultation and percussion bilaterally. No curvature.  Abdomen: Soft. Non-tender. Non-distended. No organomegaly. No masses. Normal bowel sounds.  Extremities: Warm to touch. No clubbing or cyanosis. No pedal edema.  Pulses: 2+ peripheral pulses all extremities.	  Skin: Normal skin color. No rashes or lesions. No ecchymoses. No cyanosis. Warm to touch.  Lymph Nodes: Cervical, supraclavicular and axillary nodes normal  Neurological: Motor and sensory examination equal and normal. A and O x 3  Psychiatry: Appropriate mood and affect.      LABS:                          11.3   3.05  )-----------( 114      ( 04 Oct 2019 08:24 )             34.2     CBC    WBC  3.05 <==, 5.34 <==    Hemoglobin  11.3 <<==, 12.7 <<==    Hematocrit  34.2 <==, 36.5 <==    Platelets  114 <==, 134 <==      133<L>  |  91<L>  |  <4<L>  ----------------------------<  151<H>    10-04  4.2   |  17<L>  |  0.43<L>    LYTES    sodium  133 <==, 135 <==, 131 <==, 127 <==    potassium   4.2 <==, 3.8 <==, 4.4 <==, 4.5 <==    chloride  91 <==, 92 <==, 86 <==, 82 <==    carbon dioxide  17 <==, 16 <==, 20 <==, 23 <==    =============================================================================================  RENAL FUNCTION:    Creatinine:   0.43  <<==, 0.43  <<==, 0.38  <<==, 0.39  <<==    BUN:   <4 <==, <4 <==, <4 <==, <4 <==    ============================================================================================    calcium   7.9 <==, 8.3 <==, 8.8 <==, 9.2 <==    phos   3.4 <==, 3.5 <==, 3.0 <==    mag   2.0 <==, 2.4 <==, 1.4 <==    ============================================================================================  LFTs    AST:   189 <== , 202 <== , 239 <== , 281 <==     ALT:  129  <== , 126  <== , 137  <== , 152  <==     AP:  58  <=, 60  <=, 62  <=, 68  <=    Bili:  1.0  <=, 1.1  <=, 1.3  <=, 1.3  <=    PT/INR - ( 04 Oct 2019 05:57 )   PT: 10.2 sec;   INR: 0.90 ratio       PTT - ( 04 Oct 2019 05:57 )  PTT:25.3 sec    Venous Blood Gas:  10-04 @ 05:52  --/--/--/--/--  VBG Lactate: 8.8    Venous Blood Gas:  10-03 @ 21:22  7.36/42/58/23/85  VBG Lactate: 7.7    Venous Blood Gas:  10-03 @ 18:38  7.37/46/107/26/97  VBG Lactate: 6.4    ABG - ( 04 Oct 2019 05:52 )  pH: 7.33  /  pCO2: 35    /  pO2: 100   / HCO3: 18    / Base Excess: -6.8  /  SaO2: 97                Procalcitonin, Serum: 0.08 ng/mL (10-04 @ 05:57)    Serum Pro-Brain Natriuretic Peptide: 55 pg/mL (10-03 @ 21:22)      CARDIAC MARKERS ( 03 Oct 2019 18:38 )  CPK x     /CKMB x     /CKMB Units x        troponin 18 ng/L        MICROBIOLOGY:   Urinalysis Basic - ( 04 Oct 2019 07:35 )    Color: Yellow / Appearance: Slightly Turbid / S.019 / pH: x  Gluc: x / Ketone: Moderate  / Bili: Negative / Urobili: Negative   Blood: x / Protein: 30 mg/dL / Nitrite: Negative   Leuk Esterase: Small / RBC: 5 /hpf / WBC 3 /HPF   Sq Epi: x / Non Sq Epi: 1 / Bacteria: Many        RADIOLOGY:  [x ] Chest radiographs reviewed and interpreted by me [facility] - [facility] - CONSULT NOTE    HPI: 54 year old gentlewoman, current smoker and daily alcohol user, followed by Dr. Junior Mansfield of our practice for chronic hypoxic respiratory failure due to COPD/emphysema. She is basically homebound but is unable to state why she is unable to walk. She also has a history of breast cancer s/p bilateral mastectomies maintained on Aromasin, rheumatoid arthritis and psoriasis on Otezla, remote brain aneurysm requiring clipping and a right jugular vein DVT. She was admitted in March for alcohol withdrawal and a COPD exacerbation. Her last drink was yesterday at 9am. She comes to the ER with worsening of her chronic shortness of breath associated with cough, chest congestion and wheeze. She has no fevers, chills or sweats. No chest pain/pressure or palpitations. She was noted to have elevated LFTs, anion gap metabolic acidosis and elevated lactate. She was felt not to be a candidate for the MICU. She has been given aggressive IV fluid resuscitation. She has received librium, ativan and xanax. She is awake and alert. Asked to evaluate    PMHX:  Mediport related MSSA bactermia -   Breast cancer  Brain aneurysm  Psoriasis  RA (rheumatoid arthritis)  Right jugular vein DVT    PSHX:  Bilateral mastectomy  Brain aneurysm clipping -     FAMILY HISTORY:  Mother - CHF      SOCIAL HISTORY:  former smoker - stopped ~ 6 months ago    Pulmonary Medications:   levalbuterol Inhalation 0.31 milliGRAM(s) Inhalation every 6 hours  montelukast 10 milliGRAM(s) Oral daily  tiotropium 2.5 MICROgram(s)/olodaterol 2.5 MICROgram(s) (STIOLTO) Inhaler 2 Puff(s) Inhalation daily      Antimicrobials:      Cardiology:      Other:  ALPRAZolam 0.25 milliGRAM(s) Oral every 8 hours  cholecalciferol 2000 Unit(s) Oral daily  docusate sodium 100 milliGRAM(s) Oral two times a day  folic acid 1 milliGRAM(s) Oral daily  heparin  Injectable 5000 Unit(s) SubCutaneous every 12 hours  LORazepam   Injectable 1.5 milliGRAM(s) IV Push every 4 hours  LORazepam   Injectable   IV Push   LORazepam   Injectable 2 milliGRAM(s) IV Push every 4 hours  multivitamin 1 Tablet(s) Oral daily  multivitamin 1 Tablet(s) Oral daily  pantoprazole    Tablet 40 milliGRAM(s) Oral before breakfast  prochlorperazine   Tablet 10 milliGRAM(s) Oral daily  senna 2 Tablet(s) Oral at bedtime  sodium chloride 0.9%. 1000 milliLiter(s) IV Continuous <Continuous>  thiamine 100 milliGRAM(s) Oral daily      Prn:  MEDICATIONS  (PRN):      Allergies    amoxicillin (Anaphylaxis)  Levaquin (Other; Anaphylaxis)  Levaquin (Unknown)  penicillin (Anaphylaxis)  penicillins (Anaphylaxis)    HOME MEDICATIONS: see  H & P    REVIEW OF SYSTEMS:  Constitutional: As per HPI  HEENT: Within normal limits  CV: As per HPI  Resp: As per HPI  GI: Within normal limits   : Within normal limits  Musculoskeletal: Within normal limits  Skin: Within normal limits  Neurological: Within normal limits  Psychiatric: Within normal limits  Endocrine: Within normal limits  Hematologic/Lymphatic: Within normal limits  Allergic/Immunologic: Within normal limits    [x] All other systems negative    OBJECTIVE:    PHYSICAL EXAM:  ICU Vital Signs Last 24 Hrs  T(C): 37.2 (04 Oct 2019 13:01), Max: 37.2 (04 Oct 2019 13:01)  T(F): 99 (04 Oct 2019 13:01), Max: 99 (04 Oct 2019 13:01)  HR: 108 (04 Oct 2019 13:01) (91 - 116)  BP: 121/93 (04 Oct 2019 13:01) (114/74 - 146/67)  BP(mean): --  ABP: --  ABP(mean): --  RR: 27 (04 Oct 2019 13:01) (18 - 29)  SpO2: 98% (04 Oct 2019 13:01) (95% - 100%) on 2lpm nasal canula    General: Awake. Alert. Cooperative. No distress. Appears stated age 	  HEENT: Atraumatic. Normocephalic. Anicteric. Normal oral mucosa. PERRL. EOMI. Psoriatic plaque on scalp.  Neck: Supple. Trachea midline. Thyroid without enlargement/tenderness/nodules. No carotid bruit. No JVD.	  Cardiovascular: Regular rate and rhythm. S1 S2 normal. No murmurs, rubs or gallops.  Respiratory: Respirations unlabored. Decreased breath sounds throughout. Wheeze with forced expiration. No curvature.  Abdomen: Soft. Non-tender. Non-distended. No organomegaly. No masses. Normal bowel sounds. Obese.  Extremities: Warm to touch. No clubbing or cyanosis. No pedal edema.  Pulses: 2+ peripheral pulses all extremities.	  Skin: Venous stasis changes on both lower extremities. Multiple areas of skin hyperpigmentation at areas of prior plaque.  Lymph Nodes: Cervical, supraclavicular and axillary nodes normal  Neurological: Motor and sensory examination equal and normal. A and O x 3  Psychiatry: Appropriate mood and affect.      LABS:                          11.3   3.05  )-----------( 114      ( 04 Oct 2019 08:24 )             34.2     CBC    WBC  3.05 <==, 5.34 <==    Hemoglobin  11.3 <<==, 12.7 <<==    Hematocrit  34.2 <==, 36.5 <==    Platelets  114 <==, 134 <==      133<L>  |  91<L>  |  <4<L>  ----------------------------<  151<H>    10-04  4.2   |  17<L>  |  0.43<L>    LYTES    sodium  133 <==, 135 <==, 131 <==, 127 <==    potassium   4.2 <==, 3.8 <==, 4.4 <==, 4.5 <==    chloride  91 <==, 92 <==, 86 <==, 82 <==    carbon dioxide  17 <==, 16 <==, 20 <==, 23 <==    =============================================================================================  RENAL FUNCTION:    Creatinine:   0.43  <<==, 0.43  <<==, 0.38  <<==, 0.39  <<==    BUN:   <4 <==, <4 <==, <4 <==, <4 <==    ============================================================================================    calcium   7.9 <==, 8.3 <==, 8.8 <==, 9.2 <==    phos   3.4 <==, 3.5 <==, 3.0 <==    mag   2.0 <==, 2.4 <==, 1.4 <==    ============================================================================================  LFTs    AST:   189 <== , 202 <== , 239 <== , 281 <==     ALT:  129  <== , 126  <== , 137  <== , 152  <==     AP:  58  <=, 60  <=, 62  <=, 68  <=    Bili:  1.0  <=, 1.1  <=, 1.3  <=, 1.3  <=    PT/INR - ( 04 Oct 2019 05:57 )   PT: 10.2 sec;   INR: 0.90 ratio       PTT - ( 04 Oct 2019 05:57 )  PTT:25.3 sec    Venous Blood Gas:  10-04 @ 05:52  --/--/--/--/--  VBG Lactate: 8.8    Venous Blood Gas:  10-03 @ 21:22  7.36/42/58/23/85  VBG Lactate: 7.7    Venous Blood Gas:  10-03 @ 18:38  7.37/46/107/26/97  VBG Lactate: 6.4    ABG - ( 04 Oct 2019 05:52 )  pH: 7.33  /  pCO2: 35    /  pO2: 100   / HCO3: 18    / Base Excess: -6.8  /  SaO2: 97        Procalcitonin, Serum: 0.08 ng/mL (10-04 @ 05:57)    Serum Pro-Brain Natriuretic Peptide: 55 pg/mL (10-03 @ 21:22)    CARDIAC MARKERS ( 03 Oct 2019 18:38 )  CPK x     /CKMB x     /CKMB Units x        troponin 18 ng/L    MICROBIOLOGY:     Rapid Respiratory Viral Panel (10.03.19 @ 19:47)    Rapid RVP Result: NotDete: This Respiratory Panel uses polymerase chain reaction (PCR) to detect for  adenovirus; coronavirus (HKU1, NL63, 229E, OC43); human metapneumovirus  (hMPV); human enterovirus/rhinovirus (Entero/RV); influenza A; influenza  A/H1; influenza A/H3; influenza A/H1-2009; influenza B; parainfluenza  viruses 1, 2, 3, 4; respiratory syncytial virus; Mycoplasma pneumoniae;  and Chlamydophila pneumoniae.    Urinalysis Basic - ( 04 Oct 2019 07:35 )    Color: Yellow / Appearance: Slightly Turbid / S.019 / pH: x  Gluc: x / Ketone: Moderate  / Bili: Negative / Urobili: Negative   Blood: x / Protein: 30 mg/dL / Nitrite: Negative   Leuk Esterase: Small / RBC: 5 /hpf / WBC 3 /HPF   Sq Epi: x / Non Sq Epi: 1 / Bacteria: Many    RADIOLOGY:  [x ] Chest radiographs reviewed and interpreted by me    EXAM:  XR CHEST AP OR PA 1V                          PROCEDURE DATE:  10/03/2019      INTERPRETATION:    CLINICAL INFORMATION: Shortness of breath    EXAM: Chest X-ray, AP View    COMPARISON: Chest x-ray 3/21/2019    FINDINGS:    The lungs are clear.    Heart size cannot be accurately assessed in this projection.    No acute osseous findings.      IMPRESSION:     Clear lungs.      PREETI HANSEN M.D., RADIOLOGY RESIDENT  This document has been electronically signed.  MIRI NORRIS M.D., ATTENDING RADIOLOGIST  This document has been electronically signed. Oct  4 2019  8:57AM  ---------------------------------------------------------------------------------------------------------------    EXAM:  CT ANGIO CHEST (W)AW IC                          PROCEDURE DATE:  10/03/2019      INTERPRETATION:  CLINICAL INFORMATION: Shortness of breath. Prior DVT.     COMPARISON: CT chest from 3/17/2019.    PROCEDURE:   CT Angiography of the Chest.  90 ml of Omnipaque 350 was injected intravenously. 10 ml were discarded.  Sagittal and coronal reformats were performed as well as 3D (MIP)   reconstructions.      FINDINGS:    LUNGS AND AIRWAYS: Patent central airways.  Severe emphysema. Dependent   changes.    PLEURA: No pleural effusion.    MEDIASTINUM AND CHESTER: No lymphadenopathy.    VESSELS: Good opacification the pulmonary arterial tree. No pulmonary   embolism.    HEART: Heart size is normal. No pericardial effusion.    CHEST WALL AND LOWER NECK: Within normal limits.    VISUALIZED UPPER ABDOMEN: Small hiatal hernia. Hepatic steatosis.    BONES: Within normal limits.    IMPRESSION:     No pulmonary embolism.    Severe emphysema.    MARYA CODY M.D., RADIOLOGY RESIDENT  This document has been electronically signed.  JUWAN ROONEY M.D., ATTENDING RADIOLOGIST  This document has been electronically signed. Oct  3 2019  7:29PM  ---------------------------------------------------------------------------------------------------------------

## 2019-10-04 NOTE — BEHAVIORAL HEALTH ASSESSMENT NOTE - RISK ASSESSMENT
Low Acute Suicide Risk Risk factors: acute/chronic medical issues, active substance abuse, chronic pain, not receiving substance treatment    Protective factors: no current SIIP/HIIP, no h/o SA/SIB, no h/o psych admissions, no access to weapons, no psychosis, domiciled, intact marriage, social supports, positive therapeutic relationship    Overall, pt is a low risk of harm to self/others and does not require psychiatric admission for safety and stabilization.

## 2019-10-04 NOTE — BEHAVIORAL HEALTH ASSESSMENT NOTE - NSBHREFERDETAILS_PSY_A_CORE_FT
Subjective:       Patient ID: Mau Livingston Jr. is a 76 y.o. male.    Chief Complaint:: Melena (follow up 6 mo )    HPI 2/2019:  since last visit, is only required treatment of chronic prostatitis with 3 weeks of Cipro and resolution of his elevated PSA from 10 down to 1. He has not followed up with urology because he was waiting for them to call him but he has having no symptoms at this time. He was treated for an upper respiratory infection with Augmentin in October through an urgent care in [place] with resolution. He was evaluated by his cardiologist for dyspnea on exertion and found to have aortic stenosis, underwent an angiogram and was referred to Dr. Yemi billingsley for consideration of a TAPVR which he was felt to be too high risk for because of history of immunodeficiency and MRSA colonization. He declined to pursue traditional surgical aortic valve replacement at this time.   He has been attending the cancer center once a month for his IV Ig infusion. He is finally running out of veins and is interested in a Port-A-Cath.    6/27/19:  Tender lesion left forearm for 2-3 days. Recalls no trauma but there are bruises in the area. He has been doing very little since he had dyspnea on exertion from aortic stenosis and the procedure on June 19.  Had 2 spells where he felt lightheaded and then nauseated (could not vomit because of hiatal hernia surgery) and winded and had dysequilibrium. No palpitations. No syncope but was close. Lasted about 30-45 min the first time and then he came to the ED here at [facility]. ED doctor thought it was from the aortic stenosis. Both were prior to his TAVR, none since. Had the TAVR 6/19. He has not required antibiotics for any staph skin infections or well over 7 months. He is receiving IV immunoglobulin every month and did receive his last infusion today. The trough level of IgG is perfect at 900+    7/8/19: called this am to report that the left arm lesion was worse. The  doxycycline did not do any good. He feels it is bigger. It is very tender. He then revealed that he had been having green tarry stools per day for the last 4-1/2 days with epigastric pain, history of ulceration, on Plavix and aspirin 6 pound weight loss last 10 days. He had spoken to his cardiologist's office and they advised to stop aspirin and continue Plavix. He did not into his gastroenterologist until July 10. He is weaker, frustrated and discouraged. He does not feel orthostatic, presyncopal nor does he have any dyspnea on exertion or angina..    8/7/19: was hospitalized at the time of last visit for concern of GI bleeding.  He did not have to receive a blood transfusion but he did require upper endoscopy twice to cauterize AVMs.  He also had a colonoscopy.  He he was readmitted a few days later with volume depletion after 2 episodes of orthostatic syncope.          And he has not yet had the capsule endoscopy.  He is back on his Plavix  Had left arm lesion widely resected which was a carcinoma.  He is on Keflex prophylactically  Yesterday he felt winded and weak and diaphoretic after walking across the yard, had hard,  fast heart pounding for 30 minutes(HR90). No pleurisy but mild SOB. His blood pressure at home then was 150/80ish. He had an angiogram last fall with no blockages. He will stop at cardiologist office(Dr. Stoll) today after leaving here. BP today was 102/70 and he was not orthostatic He is trying to drink enough and he is not taking lasix. Was not associated with hunger as he had just had a boost and banana moon pie prior to that episode. Takes 2 metformin per day for prediabetes .  He does not have an Accu-Chek  Due for IVIG in 2 weeks. No problems with portacath.     Review of patient's allergies indicates:   Allergen Reactions    Lipitor [atorvastatin] Other (See Comments)     Didn't feel well    Reglan [metoclopramide hcl] Anaphylaxis and Other (See Comments)    Crestor  "[rosuvastatin]      myalgia    Metoclopramide Other (See Comments)     "attacks central nervous system and makes me jerk all over the place"     Past Medical History:   Diagnosis Date    Acute Prostatitis 5/17/16     Am RXing Cipro 500 Mg Bid For 21 Days With U/A And UCx Now.   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    Stomach and duodenum    BPH (benign prostatic hyperplasia)     Common variable immunodeficiency     Diabetes mellitus, type 2     Erosive esophagitis     Full dentures     Gastritis     Gastropathy 8/19/2015    REACTIVE     GERD (gastroesophageal reflux disease)     History of blood clots     LEFT LEG 20 YRS AGO    History of MRSA infection     Hypertension     Pneumonia     11-12    Prostatitis 9/21/2012    Renal stone     Wears glasses      Past Surgical History:   Procedure Laterality Date    APPENDECTOMY      CARDIAC CATHETERIZATION      x3    CHOLECYSTECTOMY      ESOPHAGOGASTRODUODENOSCOPY  03/09/2017    HERNIA REPAIR Right     JOINT REPLACEMENT Left     x2    KNEE SCOPE Left     x2    LITHOTRIPSY, ESWL Left 11/27/2012    [facility] OR    NECK SURGERY      fusion and bone graft    NISSEN FUNDOPLICATION      X2    Peripheral angiography N/A 6/19/2019    [facility] at [facility] CATH LAB    PORTACATH PLACEMENT Left 06/2019    [facility]    PTA, Femoral Artery  6/19/2019    Performed by [facility] CATH LAB    Replacement-valve-aortic N/A 6/19/2019    Per[facility] by [facility]lely [facility] at [facility] CATH LAB    right hand ring finger surgery  11/2017    splinter removal    SHOULDER SURGERY Right     x2    Stent, Femoral Artery  6/19/2019    Per[facility] at [facility] CATH LAB    ulner nerve Right 06/2018    carpel tunnel release     Social History     Tobacco Use    Smoking status: Former Smoker     Packs/day: 2.00     Years: " "18.00     Pack years: 36.00     Last attempt to quit: 1972     Years since quittin.7    Smokeless tobacco: Never Used   Substance Use Topics    Alcohol use: No     Social History     Occupational History    Not on file     Family History   Problem Relation Age of Onset    Hypertension Mother     Stroke Mother     Heart disease Father     Lung cancer Father     Diabetes type II Father     Urolithiasis Neg Hx     Prostate cancer Neg Hx     Kidney cancer Neg Hx          Review of Systems    Constitutional: No fever, chills, sweats, and his endurance is improving since his TAVR    Eyes:  No change in vision    ENT:  No mouth soreness    Cardiovascular:  See HPI    Respiratory:  See HPI  Gastrointestinal:  Appetite is fair, he is gaining weight he is drinking boost, no blood per rectum, no emesis of blood  Genitourinary:      Musculoskeletal:  No acute arthritis,    Integumentary:  Left arm lesion has been widely excised the incision Is doing well     Neurological:  Alert, cranial nerves intact, speech normal, gait normal    Psychiatric:  Mood is improved but he is concerned about what happened yesterday    Endocrine:   Lymphatic: receiving IVIG without difficulty    VAD: portacath left chest has made life easier    Objective:      Blood pressure 102/70, pulse 67, height 5' 9" (1.753 m), weight 83.5 kg (184 lb), SpO2 96 %. Body mass index is 27.17 kg/m².  Physical Exam      General: Alert and attentive, cooperative and uncomfortable    Eyes:  anicteric, extraocular movements are intact    Neck:  supple    ENT:  Tympanic membranes are normal external auditory canals are patent no oral or pharyngeal lesions    Cardiovascular: no gallop or rub.   systolic    Respiratory:  Clear, not tachypneic    Gastrointestinal:   Nontender, nondistended bowel sounds positive  Genitourinary:   Integumentary:         19: the lesion is minimally different. It is still very tender, firm, non fluctuant with no " cellulitlis. Its lack of response to the antibiotic concerned about a malignancy of the skin or a granulomatous lesion  08/07/2019:  There is now a 2-3 inch incision in the area of the previous skin lesion which is coapted well, no cellulitis  Vascular:  No peripheral edema    Musculoskeletal:  Ambulatory, no acute, arthritis, cellulitis    Lymphatic:     Neurological: Normal LOC,      Psychiatric: Normal mood, speech,  Demeanor,      Wound:  Left arm incision is doing well  VAD:  Left upper chest Port-A-Cath is not accessed there is no redness, tenderness, swelling       Recent Diagnostics:  [facility]   Last immunoglobulin level was done in 2/2019, 995, [facility]  LAST HGB 11.7     Assessment and Plan:           Hypogammaglobulinemia    AV malformation of gastrointestinal tract    Pre-diabetes  -     POCT Glucose, Hand-Held Device    FARIA (dyspnea on exertion)    Skin cancer    Other orders  -     blood sugar diagnostic Strp; 1 strip by Misc.(Non-Drug; Combo Route) route 3 (three) times daily before meals.  Dispense: 100 strip; Refill: 11      Continue IVIG monthly  Return in 6 months    Blood glucose monitor and strips have been prescribed  Check your blood sugar before meals and record the numbers  If you are running consistently less than 140, you can reduce the metformin to once a day before your largest meal.  Further adjustments should come from your primary MD  If you feel weak, check your blood sugar  This note was created using Dragon voice recognition software that occasionally misinterpreted phrases or words.   depression, anxiety, alcohol addiction

## 2019-10-04 NOTE — CONSULT NOTE ADULT - SUBJECTIVE AND OBJECTIVE BOX
CHIEF COMPLAINT:    HPI:  54F with PMH of stage 3 breast cancer on aromasin, RA, Psoriasis on Otezla, brain aneurysm, s/p clip 1988, chronic ETOH abuse, COPD on home O2, who initially presented to the ED with dyspnea. She endorses dyspnea with exertion as well as productive cough. Prior 40 pack year smoking hx, but no current smoking. She drink 3 shots of hard liquor as well as several beers, last drink yesterday morning. . She denies any fever, chills, nausea or vomiting., chest pain. For shortness of breath, she tried several round of albuterol at home with no relief prompting ED visit. Here in ED, patient was found to be with COPD exacerbation given 5 rounds of duoneb, solumedrol 125. She was also found to be in alcohol withdrawal started on CIWA, initial CIWA of 5 and started on ativan. On labwork, patient also found to have elevated transaminases, consistent with likely alcohol hepatitis. In addtion, patient found to have elevated lactic acidosis to 8-9. She otherwise denies abdominal pain, currently passing gas, and normal brown stools. Overall received 4L fluids.       PAST MEDICAL & SURGICAL HISTORY:  Prophylactic measure  Breast cancer  Brain aneurysm: with clips  Psoriasis  RA (rheumatoid arthritis)  Psoriasis  Breast cancer, stage 3  History of modified radical mastectomy of both breasts  S/P bilateral mastectomy  Brain aneurysm:  two clips      FAMILY HISTORY:  FH: CHF (congestive heart failure): Mother      SOCIAL HISTORY:  Smokin pack year  EtOH Use: daily alcohol use as above    Allergies    amoxicillin (Anaphylaxis)  Levaquin (Other; Anaphylaxis)  Levaquin (Unknown)  penicillin (Anaphylaxis)  penicillins (Anaphylaxis)    Intolerances        HOME MEDICATIONS:    REVIEW OF SYSTEMS:  Constitutional:   Eyes:  ENT:  CV:  Resp:  GI:  :  MSK:  Integumentary:  Neurological:  Psychiatric:  Endocrine:  Hematologic/Lymphatic:  Allergic/Immunologic:  [x ] All other systems negative  [ ] Unable to assess ROS because ________    OBJECTIVE:  ICU Vital Signs Last 24 Hrs  T(C): 36.8 (04 Oct 2019 00:14), Max: 36.8 (04 Oct 2019 00:14)  T(F): 98.3 (04 Oct 2019 00:14), Max: 98.3 (04 Oct 2019 00:14)  HR: 113 (04 Oct 2019 03:01) (91 - 113)  BP: 121/77 (04 Oct 2019 03:01) (118/65 - 146/67)  BP(mean): --  ABP: --  ABP(mean): --  RR: 20 (04 Oct 2019 03:01) (18 - 20)  SpO2: 97% (04 Oct 2019 03:01) (97% - 100%)        CAPILLARY BLOOD GLUCOSE          PHYSICAL EXAM:  General: Awake, alert, oriented X 3.   HEENT: Atraumatic, normocephalic.   Neck: No JVD. No carotid bruit.   Respiratory: mild wheeze throughout  Cardiovascular: S1 S2 normal.  Abdomen: Soft, protuberant, non-tender, non-distended. No organomegaly.  Extremities: Warm to touch. No edema. Good capillary refill  Skin: + scalp psoariasis  Neurological: No focal deficits. + mild tremors  Psychiatry: Appropriate mood and affect.      HOSPITAL MEDICATIONS:  MEDICATIONS  (STANDING):  cholecalciferol 2000 Unit(s) Oral daily  docusate sodium 100 milliGRAM(s) Oral two times a day  folic acid 1 milliGRAM(s) Oral daily  levalbuterol Inhalation 0.31 milliGRAM(s) Inhalation every 6 hours  LORazepam   Injectable 1.5 milliGRAM(s) IV Push every 4 hours  LORazepam   Injectable   IV Push   LORazepam   Injectable 2 milliGRAM(s) IV Push every 4 hours  methylPREDNISolone sodium succinate Injectable 20 milliGRAM(s) IV Push every 8 hours  montelukast 10 milliGRAM(s) Oral daily  multivitamin 1 Tablet(s) Oral daily  multivitamin 1 Tablet(s) Oral daily  pantoprazole    Tablet 40 milliGRAM(s) Oral before breakfast  prochlorperazine   Tablet 10 milliGRAM(s) Oral daily  senna 2 Tablet(s) Oral at bedtime  sodium chloride 0.9%. 1000 milliLiter(s) (75 mL/Hr) IV Continuous <Continuous>  thiamine 100 milliGRAM(s) Oral daily  tiotropium 2.5 MICROgram(s)/olodaterol 2.5 MICROgram(s) (STIOLTO) Inhaler 2 Puff(s) Inhalation daily    MEDICATIONS  (PRN):      LABS:                        12.7   5.34  )-----------( 134      ( 03 Oct 2019 18:38 )             36.5     10-04    135  |  92<L>  |  <4<L>  ----------------------------<  149<H>  3.8   |  16<L>  |  0.43<L>    Ca    8.3<L>      04 Oct 2019 03:27  Phos  3.5     10-  Mg     2.4     10-04    TPro  6.6  /  Alb  4.2  /  TBili  1.1  /  DBili  0.3<H>  /  AST  202<H>  /  ALT  126<H>  /  AlkPhos  60  10-04    Lactate, Blood: 9.6 mmol/L (10.04.19 @ 04:11)  Lactate, Blood (10.04.19 @ 00:28)    Lactate, Blood: 8.8 mmol/L            Venous Blood Gas:  10-03 @ 21:22  7.36/42/58/23/85  VBG Lactate: 7.7  Venous Blood Gas:  10-03 @ 18:38  7.37/46/107//  VBG Lactate: 6.4      MICROBIOLOGY:     RADIOLOGY:  [ ] Reviewed and interpreted by me    EKG: NSR no STT changes CHIEF COMPLAINT:    HPI:  54F with PMH of stage 3 breast cancer on aromasin, RA, Psoriasis on Otezla, brain aneurysm, s/p clip 1988, chronic ETOH abuse, COPD on home O2, who initially presented to the ED with dyspnea. She endorses dyspnea with exertion as well as productive cough. Prior 40 pack year smoking hx, but no current smoking. She drink 3 shots of hard liquor as well as several beers, last drink yesterday morning. . She denies any fever, chills, nausea or vomiting., chest pain. For shortness of breath, she tried several round of albuterol at home with no relief prompting ED visit. Here in ED, patient was found to be with COPD exacerbation given 5 rounds of duoneb, solumedrol 125. She was also found to be in alcohol withdrawal started on CIWA, initial CIWA of 5 and started on ativan. On labwork, patient also found to have elevated transaminases, consistent with likely alcohol hepatitis. In addtion, patient found to have elevated lactic acidosis to 8-9. She otherwise denies abdominal pain, currently passing gas, and normal brown stools. Overall received 4L fluids.       PAST MEDICAL & SURGICAL HISTORY:  Prophylactic measure  Breast cancer  Brain aneurysm: with clips  Psoriasis  RA (rheumatoid arthritis)  Psoriasis  Breast cancer, stage 3  History of modified radical mastectomy of both breasts  S/P bilateral mastectomy  Brain aneurysm:  two clips      FAMILY HISTORY:  FH: CHF (congestive heart failure): Mother      SOCIAL HISTORY:  Smokin pack year  EtOH Use: daily alcohol use as above    Allergies    amoxicillin (Anaphylaxis)  Levaquin (Other; Anaphylaxis)  Levaquin (Unknown)  penicillin (Anaphylaxis)  penicillins (Anaphylaxis)    REVIEW OF SYSTEMS:  Constitutional:   Eyes:  ENT:  CV:  Resp:  GI:  :  MSK:  Integumentary:  Neurological:  Psychiatric:  Endocrine:  Hematologic/Lymphatic:  Allergic/Immunologic:  [x ] All other systems negative  [ ] Unable to assess ROS because ________    OBJECTIVE:  ICU Vital Signs Last 24 Hrs  T(C): 36.8 (04 Oct 2019 00:14), Max: 36.8 (04 Oct 2019 00:14)  T(F): 98.3 (04 Oct 2019 00:14), Max: 98.3 (04 Oct 2019 00:14)  HR: 113 (04 Oct 2019 03:01) (91 - 113)  BP: 121/77 (04 Oct 2019 03:01) (118/65 - 146/67)  RR: 20 (04 Oct 2019 03:01) (18 - 20)  SpO2: 97% (04 Oct 2019 03:01) (97% - 100%      PHYSICAL EXAM:  General: Awake, alert, oriented X 3.   HEENT: Atraumatic, normocephalic.   Neck: No JVD. No carotid bruit.   Respiratory: mild wheeze throughout  Cardiovascular: S1 S2 normal.  Abdomen: Soft, protuberant, non-tender, non-distended. No organomegaly.  Extremities: Warm to touch. No edema. Good capillary refill  Skin: + scalp psoariasis  Neurological: No focal deficits. + mild tremors  Psychiatry: Appropriate mood and affect.      HOSPITAL MEDICATIONS:  MEDICATIONS  (STANDING):  cholecalciferol 2000 Unit(s) Oral daily  docusate sodium 100 milliGRAM(s) Oral two times a day  folic acid 1 milliGRAM(s) Oral daily  levalbuterol Inhalation 0.31 milliGRAM(s) Inhalation every 6 hours  LORazepam   Injectable 1.5 milliGRAM(s) IV Push every 4 hours  LORazepam   Injectable   IV Push   LORazepam   Injectable 2 milliGRAM(s) IV Push every 4 hours  methylPREDNISolone sodium succinate Injectable 20 milliGRAM(s) IV Push every 8 hours  montelukast 10 milliGRAM(s) Oral daily  multivitamin 1 Tablet(s) Oral daily  multivitamin 1 Tablet(s) Oral daily  pantoprazole    Tablet 40 milliGRAM(s) Oral before breakfast  prochlorperazine   Tablet 10 milliGRAM(s) Oral daily  senna 2 Tablet(s) Oral at bedtime  sodium chloride 0.9%. 1000 milliLiter(s) (75 mL/Hr) IV Continuous <Continuous>  thiamine 100 milliGRAM(s) Oral daily  tiotropium 2.5 MICROgram(s)/olodaterol 2.5 MICROgram(s) (STIOLTO) Inhaler 2 Puff(s) Inhalation daily    MEDICATIONS  (PRN):      LABS:                        12.7   5.34  )-----------( 134      ( 03 Oct 2019 18:38 )             36.5     10-04    135  |  92<L>  |  <4<L>  ----------------------------<  149<H>  3.8   |  16<L>  |  0.43<L>    Ca    8.3<L>      04 Oct 2019 03:27  Phos  3.5     10-  Mg     2.4     10-04    TPro  6.6  /  Alb  4.2  /  TBili  1.1  /  DBili  0.3<H>  /  AST  202<H>  /  ALT  126<H>  /  AlkPhos  60  10-04    Lactate, Blood: 9.6 mmol/L (10. @ 04:11)  Lactate, Blood (10. @ 00:28)    Lactate, Blood: 8.8 mmol/L      Venous Blood Gas:  10-03 @ 21:22  7.36/42/58/23/85  VBG Lactate: 7.7  Venous Blood Gas:  10-03 @ 18:38  7.37/46/107/26/97  VBG Lactate: 6.4    RADIOLOGY:  [x ] Reviewed and interpreted by me  < from: CT Angio Chest w/ IV Cont (10.03.19 @ 18:40) >  LUNGS AND AIRWAYS: Patent central airways.  Severe emphysema. Dependent   changes.    PLEURA: No pleural effusion.    MEDIASTINUM AND CHESTER: No lymphadenopathy.    VESSELS: Good opacification the pulmonary arterial tree. No pulmonary   embolism.    HEART: Heart size is normal. No pericardial effusion.    CHEST WALL AND LOWER NECK: Within normal limits.    VISUALIZED UPPER ABDOMEN: Small hiatal hernia. Hepatic steatosis.    BONES: Within normal limits.    IMPRESSION:     No pulmonary embolism.    Severe emphysema.    < end of copied text >        EKG: NSR no STT changes

## 2019-10-04 NOTE — BEHAVIORAL HEALTH ASSESSMENT NOTE - SUMMARY
55 y/o  female, no children, previously worked as a , domiciled in a private residence in [place] with her  with PPHx of unspecified anxiety d/o (is prescribed Xanax by her oncologist), no inpatient psychiatric hospitalizations, no SA/SIB, substance abuse significant for daily ETOH (reports drinking 3 shots and 3 beers daily), no h/o complicated withdrawals, or DTs, with PMHx of stage 3 Bilateral Breast CA on Aromasin, RA, Psoriasis previously on Otezla, remote Brain Aneurysm s/p Clip in 1988, Rt Jugular DVT, Catheter related MSSA Bacteremia from [place] in July 2015, COPD, p/w dyspnea, admitted to medical service for management of COPD exacerbation in setting of EtOH withdrawal. Psych CL consulted for medication management.  On assessment by psych CL, patient states she is not interested in psychotropic medications and is only "maybe" interested in alcohol rehab. Patient denying all symptoms of depression and anxiety. Her report of panic attacks may be more consistent with medical illnesses rather than psychiatric disease. Patient psychiatrically cleared: A&O x3, does not meet criteria for inpatient psychiatric hospitalization. Recommend c/w medical management of patient. 55 y/o  female, no children, previously worked as a , domiciled in a private residence in [place] with her  with PPHx of unspecified anxiety d/o (is prescribed Xanax by her oncologist), no inpatient psychiatric hospitalizations, no SA/SIB, substance abuse significant for daily ETOH (reports drinking 3 shots and 3 beers daily), no h/o complicated withdrawals, or DTs, with PMHx of stage 3 Bilateral Breast CA on Aromasin, RA, Psoriasis previously on Otezla, remote Brain Aneurysm s/p Clip in 1988, Rt Jugular DVT, Catheter related MSSA Bacteremia from [place] in July 2015, COPD, p/w dyspnea, admitted to medical service for management of COPD exacerbation in setting of EtOH withdrawal. Psych CL consulted for medication management.  On assessment by psych CL, patient states she is not interested in psychotropic medications or treatment and is only "maybe" interested in alcohol rehab. Patient denying all symptoms of depression and anxiety. Her report of panic attacks may be more consistent with medical illnesses rather than psychiatric disease. Patient psychiatrically cleared: A&O x3, does not meet criteria for inpatient psychiatric hospitalization. Recommend c/w medical management of patient.

## 2019-10-04 NOTE — CONSULT NOTE ADULT - CONSULT REASON
SOB  PMH of stage 3 breast cancer on aromasin, RA, Psoriasis on Otezla, brain aneurysm, s/p clip 1988, right jugular DVT, Catheter related MSSA bacteremia 2015,  ETOH abuse, COPD on oxygen presents here with dyspnea. SOB  PMH of stage 3 breast cancer on aromasin, RA, Psoriasis on Otezla, brain aneurysm, s/p clip 1988, right jugular DVT, Catheter related MSSA bacteremia 2015,  ETOH abuse, COPD on oxygen

## 2019-10-04 NOTE — ED ADULT NURSE REASSESSMENT NOTE - NS ED NURSE REASSESS COMMENT FT1
Pt alert. awake and orientedX4. Pt refused car scan of abdomen. CIWA 18. BRANDEN Florian notified. MICU consult initiated but denies. Pt without any respiratory distress. Emotional support offered.

## 2019-10-04 NOTE — BEHAVIORAL HEALTH ASSESSMENT NOTE - HPI (INCLUDE ILLNESS QUALITY, SEVERITY, DURATION, TIMING, CONTEXT, MODIFYING FACTORS, ASSOCIATED SIGNS AND SYMPTOMS)
55 y/o  female, no children, previously worked as a , domiciled in a private residence in [place] with her  with PPHx of unspecified anxiety d/o (is prescribed Xanax by her oncologist), no inpatient psychiatric hospitalizations, no SA/SIB, substance abuse significant for daily ETOH (reports drinking 3 shots and 3 beers daily), no h/o complicated withdrawals, or DTs, with PMHx of stage 3 Bilateral Breast CA on Aromasin, RA, Psoriasis previously on Otezla, remote Brain Aneurysm s/p Clip in 1988, Rt Jugular DVT, Catheter related MSSA Bacteremia from [place] in July 2015, COPD, p/w dyspnea, admitted to medical service for management of COPD exacerbation in setting of EtOH withdrawal. Psych CL consulted for medication management.    On interview, patient A&O x4, states "I'm not depressed." When asked if she is anxious most of the time, she says, "no." When asked about panic attacks, patient says, "yes," and describes episodes of SOB with "shaking." She denies all other symptoms of panic attack and says these "panic attacks" are non-distinguishable to her from COPD exacerbations. She says she isn't interested in psychiatric care, psychotropic medications or alcohol treatment. She explains she "tried an antidepressant once and I didn't like it." She said she would "maybe" like to quit drinking. Denies SI/HI/AVH.

## 2019-10-05 LAB
ANION GAP SERPL CALC-SCNC: 16 MMOL/L — SIGNIFICANT CHANGE UP (ref 5–17)
BUN SERPL-MCNC: 12 MG/DL — SIGNIFICANT CHANGE UP (ref 7–23)
CALCIUM SERPL-MCNC: 8.7 MG/DL — SIGNIFICANT CHANGE UP (ref 8.4–10.5)
CHLORIDE SERPL-SCNC: 90 MMOL/L — LOW (ref 96–108)
CO2 SERPL-SCNC: 26 MMOL/L — SIGNIFICANT CHANGE UP (ref 22–31)
CREAT SERPL-MCNC: 0.52 MG/DL — SIGNIFICANT CHANGE UP (ref 0.5–1.3)
CULTURE RESULTS: SIGNIFICANT CHANGE UP
GLUCOSE SERPL-MCNC: 126 MG/DL — HIGH (ref 70–99)
HCT VFR BLD CALC: 33.2 % — LOW (ref 34.5–45)
HGB BLD-MCNC: 10.9 G/DL — LOW (ref 11.5–15.5)
MCHC RBC-ENTMCNC: 32.8 GM/DL — SIGNIFICANT CHANGE UP (ref 32–36)
MCHC RBC-ENTMCNC: 34.9 PG — HIGH (ref 27–34)
MCV RBC AUTO: 106.4 FL — HIGH (ref 80–100)
PLATELET # BLD AUTO: 115 K/UL — LOW (ref 150–400)
POTASSIUM SERPL-MCNC: 5.1 MMOL/L — SIGNIFICANT CHANGE UP (ref 3.5–5.3)
POTASSIUM SERPL-SCNC: 5.1 MMOL/L — SIGNIFICANT CHANGE UP (ref 3.5–5.3)
RBC # BLD: 3.12 M/UL — LOW (ref 3.8–5.2)
RBC # FLD: 13.4 % — SIGNIFICANT CHANGE UP (ref 10.3–14.5)
SODIUM SERPL-SCNC: 132 MMOL/L — LOW (ref 135–145)
SPECIMEN SOURCE: SIGNIFICANT CHANGE UP
WBC # BLD: 7.79 K/UL — SIGNIFICANT CHANGE UP (ref 3.8–10.5)
WBC # FLD AUTO: 7.79 K/UL — SIGNIFICANT CHANGE UP (ref 3.8–10.5)

## 2019-10-05 PROCEDURE — 99232 SBSQ HOSP IP/OBS MODERATE 35: CPT

## 2019-10-05 RX ADMIN — Medication 3 MILLILITER(S): at 17:04

## 2019-10-05 RX ADMIN — Medication 1.5 MILLIGRAM(S): at 02:19

## 2019-10-05 RX ADMIN — Medication 10 MILLIGRAM(S): at 12:47

## 2019-10-05 RX ADMIN — HEPARIN SODIUM 5000 UNIT(S): 5000 INJECTION INTRAVENOUS; SUBCUTANEOUS at 17:03

## 2019-10-05 RX ADMIN — Medication 0.25 MILLIGRAM(S): at 06:18

## 2019-10-05 RX ADMIN — SENNA PLUS 2 TABLET(S): 8.6 TABLET ORAL at 22:13

## 2019-10-05 RX ADMIN — Medication 2000 UNIT(S): at 12:48

## 2019-10-05 RX ADMIN — Medication 0.5 MILLIGRAM(S): at 09:11

## 2019-10-05 RX ADMIN — Medication 1 APPLICATION(S): at 22:13

## 2019-10-05 RX ADMIN — Medication 0.25 MILLIGRAM(S): at 14:52

## 2019-10-05 RX ADMIN — Medication 1.5 MILLIGRAM(S): at 17:04

## 2019-10-05 RX ADMIN — Medication 1 APPLICATION(S): at 06:09

## 2019-10-05 RX ADMIN — HEPARIN SODIUM 5000 UNIT(S): 5000 INJECTION INTRAVENOUS; SUBCUTANEOUS at 06:03

## 2019-10-05 RX ADMIN — Medication 1 MILLIGRAM(S): at 12:48

## 2019-10-05 RX ADMIN — Medication 100 MILLIGRAM(S): at 12:47

## 2019-10-05 RX ADMIN — Medication 20 MILLIGRAM(S): at 06:03

## 2019-10-05 RX ADMIN — Medication 40 MILLIGRAM(S): at 17:06

## 2019-10-05 RX ADMIN — MONTELUKAST 10 MILLIGRAM(S): 4 TABLET, CHEWABLE ORAL at 12:48

## 2019-10-05 RX ADMIN — Medication 1 APPLICATION(S): at 17:14

## 2019-10-05 RX ADMIN — Medication 1 TABLET(S): at 12:48

## 2019-10-05 RX ADMIN — Medication 20 MILLIGRAM(S): at 00:00

## 2019-10-05 RX ADMIN — Medication 20 MILLIGRAM(S): at 12:48

## 2019-10-05 RX ADMIN — Medication 1.5 MILLIGRAM(S): at 14:52

## 2019-10-05 RX ADMIN — Medication 3 MILLILITER(S): at 06:03

## 2019-10-05 RX ADMIN — Medication 1 MILLIGRAM(S): at 22:13

## 2019-10-05 RX ADMIN — Medication 3 MILLILITER(S): at 12:48

## 2019-10-05 RX ADMIN — Medication 0.25 MILLIGRAM(S): at 22:13

## 2019-10-05 RX ADMIN — Medication 100 MILLIGRAM(S): at 17:03

## 2019-10-05 RX ADMIN — SODIUM CHLORIDE 75 MILLILITER(S): 9 INJECTION INTRAMUSCULAR; INTRAVENOUS; SUBCUTANEOUS at 09:11

## 2019-10-05 RX ADMIN — Medication 0.5 MILLIGRAM(S): at 17:03

## 2019-10-05 RX ADMIN — Medication 3 MILLILITER(S): at 23:17

## 2019-10-05 RX ADMIN — Medication 1.5 MILLIGRAM(S): at 09:12

## 2019-10-05 RX ADMIN — Medication 1.5 MILLIGRAM(S): at 06:18

## 2019-10-05 RX ADMIN — PANTOPRAZOLE SODIUM 40 MILLIGRAM(S): 20 TABLET, DELAYED RELEASE ORAL at 06:03

## 2019-10-05 RX ADMIN — Medication 2 MILLIGRAM(S): at 18:59

## 2019-10-05 NOTE — PROGRESS NOTE ADULT - ASSESSMENT
54 year old female PMH of stage 3 breast cancer on aromasin, RA, Psoriasis on Otezla, brain aneurysm, s/p clip 1988, right jugular DVT, Catheter related MSSA bacteremia 2015,  ETOH abuse, COPD on oxygen who presented to [facility] on 10/3 with dyspnea. In the ED afebrile, normotensive but tachycardic to >110. WBC on presentation of 5.34 with 81.4% PMN. ALT of 152, AST of 281, T Bili of 1.3, Alk Phos of 68. Anion gap of 22. Lactic acid of 6.4. U/A with 3 WBC. RVP negative.     Elevated lactic acidosis may be from COPD exacerbation being treated with albuterol + tremors + decreased clearance of lactic acidosis. If continued uptrend in lactic acidosis or clinical deterioration would cover broadly with antibiotics pending blood cultures. CT Chest without evidence of pneumonia. Abdomen appears benign on examination. Skin is only possible source of infection (if any) given significant plaques and extensive excoriation.    Overall, Lactic acidosis, Tachycardia, Transaminitis, COPD Exacerbation    --Procalcitonin not c/w sepsis  --Repeat Lactic acid declining to 2s  --If fever or clinical deterioration recommend Vancomycin 1.25g IV Q12H (trough prior to fourth dose), Aztreonam 2g IV Q8H, Flagyl 500 IV Q12H.   --Consider RUQ US given transaminitis     Please contact the Infectious Diseases Office with any further questions or concerns, [phone].     Timbo Corbett MD  pager [phone]  office [phone]

## 2019-10-05 NOTE — PROGRESS NOTE ADULT - ASSESSMENT
54 year old female with PMH of stage 3 breast cancer on aromasin, RA, Psoriasis on Otezla, brain aneurysm, s/p clip 1988, right jugular DVT, Catheter related MSSA bacteremia 2015,  ETOH abuse, COPD on oxygen presents here with dyspnea.    1. Dyspnea   likely secondary to pulm disesae, COPD exacerbation   cv stable. no decomp chf on exam. pro bnp wnl, hs trop negative   no evidence of acs   CTA chest negative for PE, + severe emphysema   management  per pulm / med   can obtain echo to eval LV fx     2. Sinus tachycardia   likley secondary to withdrawal, dehydration, anxiety, copd exacerbation  no evidence of acs , asymptomatic  echo to eval LV fx     3 med f/u, CIWA  protocol     4. Elevated lactic acid level  work up per ID/ med     dvt ppx

## 2019-10-05 NOTE — PROGRESS NOTE ADULT - ASSESSMENT
54 yr old female with  Hx significant for COPD, EtOH abuse, Breast Ca, remote brain aneurysm s/p clip who presented with COPD exacerbation initially treated with duoneb and steroid tx  received MICU consult for elevated lactate and COPD exacerbation. and elevated CIWA score    -Pt with hx of EtOH abuse with last drink 10/3/19 at 0900  -neuro checks q 4 hrs and prn   -CIWA score q 2 hrs  -as pt on chronic benzo therapy  to continue     neuro eval appreciated  no acute issues     - Pt with COPD on home O2  -Xopenex  therapy q 6 hrs and prn for sob/wheezes  -tiotropium inhalers q day  -singulair 10 mg po qd  -supplemental O2 - titrate to maintain SPO2 > 88%  tachycardia   -Last documented TTE 2016 (mild diastolic dysfunction)  echo  cards eval noted    -diet as tolerated  -strict I & O's -   ppi     transaminitis sec to etoh abuse  if no improvement   ruq sono     id eval noted  lactate improved  no active id issue at present

## 2019-10-05 NOTE — PROGRESS NOTE ADULT - ASSESSMENT
ASSESSMENT:    chronic hypoxic respiratory failure due to severe COPD/emphysema with worsening dyspnea due to a COPD exacerbation and anxiety - there is no evidence of pneumonia, pulmonary edema, pleural effusions or pulmonary emboli on the CTA of the chest    LFT abnormalities - alcoholic hepatitis    pancytopenia - bone marrow suppression from alcohol use    breast cancer s/p bilateral mastectomy    rheumatoid and psoriatic arthritis on Otezla - immunocompromised host    brain aneurysm s/p clipping    PLAN/RECOMMENDATIONS:    oxygen supplementation to keep saturation greater than 92%  change IV to oral steroids - prednisone 40mg daily x 3 days  albuterol/atrovent nebs q6h  pulmicort 0.5mg nebs q12h  singulair  watch for and treat alcohol withdrawal  thiamine/MVI/folate  Otezla/lidex solution to scalp/hydrocortisone cream to face  DVT prophylaxis - SQ heparin  GI prophylaxis - protonix    Will follow with you. Plan of care discussed with the patient at bedside and her RN.    Joss Franks MD, [facility]  [phone]  Pulmonary Medicine

## 2019-10-05 NOTE — CONSULT NOTE ADULT - SUBJECTIVE AND OBJECTIVE BOX
Admitting Diagnosis:  Chronic obstructive pulmonary disease (J44.9): CHRONIC OBSTRUCTIVE PULMONARY DISEASE, UNSPECIFIED      HPI:  This is a 54y year old Female with the below past medical history who presents with the chief complaint of SOB, copd exacerbation. also etoh abuse, last drink 1 week ago, takes routine benzo's and on CIWA protocol. denies seizures related to etoh in past. hx of brain aneurysm 1988 s/p clip. stable no issues related to this. denies pain, headache, focal weakness or sensory changes, deneis vision changes, speech changes or falls. denies confusion or cognitive impairement. hx breast CA on aromasin, Hx of psoriasis on Otezla.       Past Medical History:  Prophylactic measure (Z29.9)  Breast cancer (C50.919)  Brain aneurysm (I67.1): with clips  Psoriasis (L40.9)  RA (rheumatoid arthritis) (M06.9)  Psoriasis (696.1)  Breast cancer, stage 3 (174.9)      Past Surgical History:  History of modified radical mastectomy of both breasts (Z90.10)  S/P bilateral mastectomy (Z90.13)  Brain aneurysm (437.3): 1988 two clips  No significant past surgical history ([id number])      Social History:  No toxic habits    Family History:  FAMILY HISTORY:  FH: CHF (congestive heart failure): Mother      Allergies:  amoxicillin (Anaphylaxis)  Levaquin (Other; Anaphylaxis)  Levaquin (Unknown)  penicillin (Anaphylaxis)  penicillins (Anaphylaxis)      ROS:  Constitutional: Patient offers no complaints of fevers or significant weight loss  Ears, Nose, Mouth and Throat: The patient presents with no abnormalities of the head, ears, eyes, nose or throat  Skin: Patient offers no concerns of new rashes or lesions  Respiratory: The patient presents with no abnormalities of the respiratory tract  Cardiovascular: The patient presents with no cardiac abnormalities  Gastrointestinal: The patient presents with no abnormalities of the GI system  Genitourinary: The patient presents with no dysuria, hematuria or frequent urination  Neurological: See HPI  Endocrine: Patient offers no complaints of excessive thirst, urination, or heat/cold intolerance    Advanced care planning reviewed and noted in the chart.    Medications:  ALBUTerol/ipratropium for Nebulization 3 milliLiter(s) Nebulizer every 6 hours  ALPRAZolam 0.25 milliGRAM(s) Oral every 8 hours  buDESOnide    Inhalation Suspension 0.5 milliGRAM(s) Inhalation every 12 hours  cholecalciferol 2000 Unit(s) Oral daily  docusate sodium 100 milliGRAM(s) Oral two times a day  fluocinonide 0.05% Solution 1 Application(s) Topical at bedtime  folic acid 1 milliGRAM(s) Oral daily  heparin  Injectable 5000 Unit(s) SubCutaneous every 12 hours  hydrocortisone 1% Cream 1 Application(s) Topical two times a day  LORazepam   Injectable 1.5 milliGRAM(s) IV Push every 4 hours  LORazepam   Injectable 1 milliGRAM(s) IV Push every 4 hours  LORazepam   Injectable   IV Push   methylPREDNISolone sodium succinate Injectable 20 milliGRAM(s) IV Push every 6 hours  montelukast 10 milliGRAM(s) Oral daily  multivitamin 1 Tablet(s) Oral daily  multivitamin 1 Tablet(s) Oral daily  pantoprazole    Tablet 40 milliGRAM(s) Oral before breakfast  prochlorperazine   Tablet 10 milliGRAM(s) Oral daily  senna 2 Tablet(s) Oral at bedtime  sodium chloride 0.9%. 1000 milliLiter(s) IV Continuous <Continuous>  thiamine 100 milliGRAM(s) Oral daily      Labs:  CBC Full  -  ( 04 Oct 2019 08:24 )  WBC Count : 3.05 K/uL  RBC Count : 3.27 M/uL  Hemoglobin : 11.3 g/dL  Hematocrit : 34.2 %  Platelet Count - Automated : 114 K/uL  Mean Cell Volume : 104.6 fl  Mean Cell Hemoglobin : 34.6 pg  Mean Cell Hemoglobin Concentration : 33.0 gm/dL  Auto Neutrophil # : 2.89 K/uL  Auto Lymphocyte # : 0.13 K/uL  Auto Monocyte # : 0.03 K/uL  Auto Eosinophil # : 0.00 K/uL  Auto Basophil # : 0.00 K/uL  Auto Neutrophil % : 93.9 %  Auto Lymphocyte % : 4.3 %  Auto Monocyte % : 0.9 %  Auto Eosinophil % : 0.0 %  Auto Basophil % : 0.0 %    10-05    132<L>  |  90<L>  |  12  ----------------------------<  126<H>  5.1   |  26  |  0.52    Ca    8.7      05 Oct 2019 06:07  Phos  3.4     10-04  Mg     2.0     10-04    TPro  6.7  /  Alb  4.2  /  TBili  1.0  /  DBili  x   /  AST  189<H>  /  ALT  129<H>  /  AlkPhos  58  10-04    CAPILLARY BLOOD GLUCOSE        LIVER FUNCTIONS - ( 04 Oct 2019 05:57 )  Alb: 4.2 g/dL / Pro: 6.7 g/dL / ALK PHOS: 58 U/L / ALT: 129 U/L / AST: 189 U/L / GGT: x           PT/INR - ( 04 Oct 2019 05:57 )   PT: 10.2 sec;   INR: 0.90 ratio         PTT - ( 04 Oct 2019 05:57 )  PTT:25.3 sec  Urinalysis Basic - ( 04 Oct 2019 07:35 )    Color: Yellow / Appearance: Slightly Turbid / S.019 / pH: x  Gluc: x / Ketone: Moderate  / Bili: Negative / Urobili: Negative   Blood: x / Protein: 30 mg/dL / Nitrite: Negative   Leuk Esterase: Small / RBC: 5 /hpf / WBC 3 /HPF   Sq Epi: x / Non Sq Epi: 1 / Bacteria: Many      Female    Vitals:  Vital Signs Last 24 Hrs  T(C): 36.8 (05 Oct 2019 08:39), Max: 37.2 (04 Oct 2019 13:01)  T(F): 98.3 (05 Oct 2019 08:39), Max: 99 (04 Oct 2019 13:01)  HR: 93 (05 Oct 2019 08:39) (88 - 115)  BP: 153/81 (05 Oct 2019 08:39) (114/74 - 164/88)  BP(mean): --  RR: 18 (05 Oct 2019 08:39) (18 - 29)  SpO2: 97% (05 Oct 2019 08:39) (95% - 100%)    NEUROLOGICAL EXAM:    Mental status: Awake, alert, and in no apparent distress. Oriented to person, place and time. Language function is normal. Recent memory, digit span and concentration were normal.     Cranial Nerves: Pupils were equal, no asymmetry noted. , round, reactive to light. Extraocular movements were intact. Visual field were full. Fundoscopic exam was deferred. Facial sensation was intact to light touch. There was no facial asymmetry. The palate was upgoing symmetrically and tongue was midline. Hearing acuity was intact to finger rub AU. Shoulder shrug was full bilaterally    Motor exam: Bulk and tone were normal. Strength was 5/5 in all four extremities. Fine finger movements were symmetric and normal. There was no pronator drift    Reflexes: 2+ in the bilateral upper extremities. 2+ in the bilateral lower extremities. Toes were downgoing bilaterally.     Sensation: Intact to light touch, temperature, vibration and proprioception.     Coordination: Finger-nose-finger and heel-to-shin was without dysmetria.  some tremulousness in feet b/l upon intention. no other abnormal movements observed, no asterixis    Gait: deferred.

## 2019-10-05 NOTE — CONSULT NOTE ADULT - ASSESSMENT
This is a 54y year old Female with the below past medical history who presents with the chief complaint of SOB, copd exacerbation. also etoh abuse, last drink 1 week ago, takes routine benzo's and on CIWA protocol. denies seizures related to etoh in past. hx of brain aneurysm 1988 s/p clip. stable no issues related to this. denies pain, headache, focal weakness or sensory changes, deneis vision changes, speech changes or falls. denies confusion or cognitive impairement. hx breast CA on aromasin, Hx of psoriasis on Otezla.     nonfocal normal neuro exam    do not appreciated asymmetric pupils on exam or any other localizing findings or focality  -ct head pending, suspect stable and low yield. aneurysm clip hx of brain was remote 1988    ciwa protocol , observe for alcohol withdrawal / DTs , no evidence of issue at this time. on benzo's routinely for anxiety    OOB as tolerated , fall precautions    dvt prophylaxis    d/w patient bedside

## 2019-10-05 NOTE — PROGRESS NOTE ADULT - SUBJECTIVE AND OBJECTIVE BOX
CC: + dyspnea, no cp    TELEMETRY: nsr    PHYSICAL EXAM:    T(C): 37 (10-05-19 @ 06:15), Max: 37.2 (10-04-19 @ 13:01)  HR: 93 (10-05-19 @ 06:15) (88 - 116)  BP: 125/73 (10-05-19 @ 06:15) (114/74 - 164/88)  RR: 18 (10-05-19 @ 06:15) (18 - 29)  SpO2: 97% (10-05-19 @ 06:15) (95% - 100%)  Wt(kg): --  I&O's Summary    04 Oct 2019 07:01  -  05 Oct 2019 07:00  --------------------------------------------------------  IN: 220 mL / OUT: 550 mL / NET: -330 mL        Appearance: Normal	  Cardiovascular: Normal S1 S2,RRR, No JVD, No murmurs  Respiratory: dec bs b/l	  Gastrointestinal:  Soft, Non-tender, + BS	  Extremities: Normal range of motion, No clubbing, cyanosis or edema  Vascular: Peripheral pulses palpable 2+ bilaterally     LABS:	 	                          11.3   3.05  )-----------( 114      ( 04 Oct 2019 08:24 )             34.2     10-05    132<L>  |  90<L>  |  12  ----------------------------<  126<H>  5.1   |  26  |  0.52    Ca    8.7      05 Oct 2019 06:07  Phos  3.4     10-04  Mg     2.0     10-04    TPro  6.7  /  Alb  4.2  /  TBili  1.0  /  DBili  x   /  AST  189<H>  /  ALT  129<H>  /  AlkPhos  58  10-04      PT/INR - ( 04 Oct 2019 05:57 )   PT: 10.2 sec;   INR: 0.90 ratio         PTT - ( 04 Oct 2019 05:57 )  PTT:25.3 sec    CARDIAC MARKERS:      MEDICATIONS  (STANDING):  ALBUTerol/ipratropium for Nebulization 3 milliLiter(s) Nebulizer every 6 hours  ALPRAZolam 0.25 milliGRAM(s) Oral every 8 hours  buDESOnide    Inhalation Suspension 0.5 milliGRAM(s) Inhalation every 12 hours  cholecalciferol 2000 Unit(s) Oral daily  docusate sodium 100 milliGRAM(s) Oral two times a day  fluocinonide 0.05% Solution 1 Application(s) Topical at bedtime  folic acid 1 milliGRAM(s) Oral daily  heparin  Injectable 5000 Unit(s) SubCutaneous every 12 hours  hydrocortisone 1% Cream 1 Application(s) Topical two times a day  LORazepam   Injectable 1.5 milliGRAM(s) IV Push every 4 hours  LORazepam   Injectable 1 milliGRAM(s) IV Push every 4 hours  LORazepam   Injectable   IV Push   methylPREDNISolone sodium succinate Injectable 20 milliGRAM(s) IV Push every 6 hours  montelukast 10 milliGRAM(s) Oral daily  multivitamin 1 Tablet(s) Oral daily  multivitamin 1 Tablet(s) Oral daily  pantoprazole    Tablet 40 milliGRAM(s) Oral before breakfast  prochlorperazine   Tablet 10 milliGRAM(s) Oral daily  senna 2 Tablet(s) Oral at bedtime  sodium chloride 0.9%. 1000 milliLiter(s) (75 mL/Hr) IV Continuous <Continuous>  thiamine 100 milliGRAM(s) Oral daily

## 2019-10-05 NOTE — PROGRESS NOTE ADULT - SUBJECTIVE AND OBJECTIVE BOX
Patient is a 54y old  Female who presents with a chief complaint of shortness of breath (05 Oct 2019 07:09)    Being followed by ID for COPD exacerbation    Interval history:  Afebrile off antibiotics  No acute events      ROS:  Dyspnea  No CP  No N/V/D./abd pain  No other complaints      Antimicrobials:        Vital Signs Last 24 Hrs  T(C): 37 (10-05-19 @ 06:15), Max: 37.2 (10-04-19 @ 13:01)  T(F): 98.6 (10-05-19 @ 06:15), Max: 99 (10-04-19 @ 13:01)  HR: 93 (10-05-19 @ 06:15) (88 - 116)  BP: 125/73 (10-05-19 @ 06:15) (114/74 - 164/88)  BP(mean): --  RR: 18 (10-05-19 @ 06:15) (18 - 29)  SpO2: 97% (10-05-19 @ 06:15) (95% - 100%)    Physical Exam:    Constitutional well preserved, NAD    HEENT EOMI, No pallor or icterus    No oral exudate or erythema    Neck supple no JVD or LN    Chest clear to auscultation, scattered wheezing    CVS S1 S2 WNl No murmur or rub or gallop    Abd soft BS normal No tenderness no masses    Ext No cyanosis clubbing or edema, hyperpigmented plaques with scaling    IV site no erythema tenderness or discharge    Joints no swelling or LOM    CNS AAO X 3 non focal    Lab Data:                          11.3   3.05  )-----------( 114      ( 04 Oct 2019 08:24 )             34.2       10-05    132<L>  |  90<L>  |  12  ----------------------------<  126<H>  5.1   |  26  |  0.52    Ca    8.7      05 Oct 2019 06:07  Phos  3.4     10-04  Mg     2.0     10-04    TPro  6.7  /  Alb  4.2  /  TBili  1.0  /  DBili  x   /  AST  189<H>  /  ALT  129<H>  /  AlkPhos  58  10-04        .Urine  10-04-19   >=3 organisms. Probable collection contamination.  --  --      .Blood  10-04-19   No growth to date.  --  --    < from: CT Angio Chest w/ IV Cont (10.03.19 @ 18:40) >    EXAM:  CT ANGIO CHEST (W)AW IC                            PROCEDURE DATE:  10/03/2019            INTERPRETATION:  CLINICAL INFORMATION: Shortness of breath. Prior DVT.     COMPARISON: CT chest from 3/17/2019.    PROCEDURE:   CT Angiography of the Chest.  90 ml of Omnipaque 350 was injected intravenously. 10 ml were discarded.  Sagittal and coronal reformats were performed as well as 3D (MIP)   reconstructions.      FINDINGS:    LUNGS AND AIRWAYS: Patent central airways.  Severe emphysema. Dependent   changes.    PLEURA: No pleural effusion.    MEDIASTINUM AND CHESTER: No lymphadenopathy.    VESSELS: Good opacification the pulmonary arterial tree. No pulmonary   embolism.    HEART: Heart size is normal. No pericardial effusion.    CHEST WALL AND LOWER NECK: Within normal limits.    VISUALIZED UPPER ABDOMEN: Small hiatal hernia. Hepatic steatosis.    BONES: Within normal limits.    IMPRESSION:     No pulmonary embolism.    Severe emphysema.    < end of copied text >

## 2019-10-05 NOTE — PROGRESS NOTE ADULT - SUBJECTIVE AND OBJECTIVE BOX
CHIEF COMPLAINT:Patient is a 54y old  Female who presents with a chief complaint of shortness of breath (05 Oct 2019 08:46)    	        PAST MEDICAL & SURGICAL HISTORY:  Prophylactic measure  Breast cancer  Brain aneurysm: with clips  Psoriasis  RA (rheumatoid arthritis)  Psoriasis  Breast cancer, stage 3  History of modified radical mastectomy of both breasts  S/P bilateral mastectomy  Brain aneurysm: 1988 two clips          REVIEW OF SYSTEMS:  fells better  EYES: No eye pain, visual disturbances, or discharge  NECK: No pain or stiffness  RESPIRATORY: No cough, wheezing, chills or hemoptysis; No Shortness of Breath  CARDIOVASCULAR: No chest pain, palpitations, passing out, dizziness,   GASTROINTESTINAL: No abdominal or epigastric pain. No nausea, vomiting, or hematemesis;   GENITOURINARY: No dysuria, frequency, hematuria, or incontinence  NEUROLOGICAL: No headaches,    Medications:  MEDICATIONS  (STANDING):  ALBUTerol/ipratropium for Nebulization 3 milliLiter(s) Nebulizer every 6 hours  ALPRAZolam 0.25 milliGRAM(s) Oral every 8 hours  buDESOnide    Inhalation Suspension 0.5 milliGRAM(s) Inhalation every 12 hours  cholecalciferol 2000 Unit(s) Oral daily  docusate sodium 100 milliGRAM(s) Oral two times a day  fluocinonide 0.05% Solution 1 Application(s) Topical at bedtime  folic acid 1 milliGRAM(s) Oral daily  heparin  Injectable 5000 Unit(s) SubCutaneous every 12 hours  hydrocortisone 1% Cream 1 Application(s) Topical two times a day  LORazepam   Injectable 1.5 milliGRAM(s) IV Push every 4 hours  LORazepam   Injectable 1 milliGRAM(s) IV Push every 4 hours  LORazepam   Injectable   IV Push   methylPREDNISolone sodium succinate Injectable 20 milliGRAM(s) IV Push every 6 hours  montelukast 10 milliGRAM(s) Oral daily  multivitamin 1 Tablet(s) Oral daily  multivitamin 1 Tablet(s) Oral daily  pantoprazole    Tablet 40 milliGRAM(s) Oral before breakfast  prochlorperazine   Tablet 10 milliGRAM(s) Oral daily  senna 2 Tablet(s) Oral at bedtime  sodium chloride 0.9%. 1000 milliLiter(s) (75 mL/Hr) IV Continuous <Continuous>  thiamine 100 milliGRAM(s) Oral daily    MEDICATIONS  (PRN):    	    PHYSICAL EXAM:  T(C): 36.8 (10-05-19 @ 08:39), Max: 37.2 (10-04-19 @ 13:01)  HR: 93 (10-05-19 @ 08:39) (88 - 115)  BP: 153/81 (10-05-19 @ 08:39) (114/74 - 164/88)  RR: 18 (10-05-19 @ 08:39) (18 - 29)  SpO2: 97% (10-05-19 @ 08:39) (95% - 100%)  Wt(kg): --  I&O's Summary    04 Oct 2019 07:01  -  05 Oct 2019 07:00  --------------------------------------------------------  IN: 220 mL / OUT: 550 mL / NET: -330 mL        Appearance: Normal	  HEENT:   Normal oral mucosa, PERRL, EOMI	  Lymphatic: No lymphadenopathy  Cardiovascular: Normal S1 S2, No JVD,   Respiratory: dec bs   Psychiatry: A & O x 3,  Gastrointestinal:  Soft, Non-tender, + BS	  Neurologic: Non-focal  Extremities: Normal range of motion, No clubbing, cyanosis   Vascular: Peripheral pulses palpable     TELEMETRY: 	    ECG:  	  RADIOLOGY:  OTHER: 	  	  LABS:	 	    CARDIAC MARKERS:                                11.3   3.05  )-----------( 114      ( 04 Oct 2019 08:24 )             34.2     10-05    132<L>  |  90<L>  |  12  ----------------------------<  126<H>  5.1   |  26  |  0.52    Ca    8.7      05 Oct 2019 06:07  Phos  3.4     10-04  Mg     2.0     10-04    TPro  6.7  /  Alb  4.2  /  TBili  1.0  /  DBili  x   /  AST  189<H>  /  ALT  129<H>  /  AlkPhos  58  10-04    proBNP:   Lipid Profile:   HgA1c:   TSH:

## 2019-10-05 NOTE — PROGRESS NOTE ADULT - SUBJECTIVE AND OBJECTIVE BOX
[facility] - [facility] - PROGRESS NOTE    CHIEF COMPLAINT: chronic hypoxic respiratory failure; COPD exacerbation; emphysema; current smoker; alcohol withdrawal    INTERVAL HISTORY: short of breath despite oxygen saturation of 98% on a nasal canula; decreased cough, chest congestion and wheeze; anxious and mildly tremulous; no fevers, chills or sweats; no chest pain/pressure or palpitations; resolved lactic acidosis    REVIEW OF SYSTEMS:  Constitutional: As per interval history  HEENT: Within normal limits  CV: As per interval history  Resp: As per interval history  GI: Within normal limits   : Within normal limits  Musculoskeletal: Within normal limits  Skin: Within normal limits  Neurological: Within normal limits  Psychiatric: Within normal limits  Endocrine: Within normal limits  Hematologic/Lymphatic: Within normal limits  Allergic/Immunologic: Within normal limits      MEDICATIONS:     Pulmonary "  ALBUTerol/ipratropium for Nebulization 3 milliLiter(s) Nebulizer every 6 hours  buDESOnide    Inhalation Suspension 0.5 milliGRAM(s) Inhalation every 12 hours  montelukast 10 milliGRAM(s) Oral daily    Anti-microbials:    Cardiovascular:    Other:  ALPRAZolam 0.25 milliGRAM(s) Oral every 8 hours  cholecalciferol 2000 Unit(s) Oral daily  docusate sodium 100 milliGRAM(s) Oral two times a day  fluocinonide 0.05% Solution 1 Application(s) Topical at bedtime  folic acid 1 milliGRAM(s) Oral daily  heparin  Injectable 5000 Unit(s) SubCutaneous every 12 hours  hydrocortisone 1% Cream 1 Application(s) Topical two times a day  LORazepam   Injectable 1.5 milliGRAM(s) IV Push every 4 hours  LORazepam   Injectable 1 milliGRAM(s) IV Push every 4 hours  LORazepam   Injectable   IV Push   methylPREDNISolone sodium succinate Injectable 20 milliGRAM(s) IV Push every 6 hours  multivitamin 1 Tablet(s) Oral daily  multivitamin 1 Tablet(s) Oral daily  pantoprazole    Tablet 40 milliGRAM(s) Oral before breakfast  prochlorperazine   Tablet 10 milliGRAM(s) Oral daily  senna 2 Tablet(s) Oral at bedtime  sodium chloride 0.9%. 1000 milliLiter(s) IV Continuous <Continuous>  thiamine 100 milliGRAM(s) Oral daily    MEDICATIONS  (PRN):        OBJECTIVE:    I&O's Detail    04 Oct 2019 07:01  -  05 Oct 2019 07:00  --------------------------------------------------------  IN:    Oral Fluid: 220 mL  Total IN: 220 mL    OUT:    Voided: 550 mL  Total OUT: 550 mL    Total NET: -330 mL      05 Oct 2019 07:01  -  05 Oct 2019 15:25  --------------------------------------------------------  IN:  Total IN: 0 mL    OUT:    Voided: 600 mL  Total OUT: 600 mL    Total NET: -600 mL      PHYSICAL EXAM:       ICU Vital Signs Last 24 Hrs  T(C): 36.9 (05 Oct 2019 12:54), Max: 37.1 (04 Oct 2019 17:08)  T(F): 98.4 (05 Oct 2019 12:54), Max: 98.8 (04 Oct 2019 17:08)  HR: 109 (05 Oct 2019 12:54) (88 - 109)  BP: 154/95 (05 Oct 2019 12:54) (125/73 - 164/88)  BP(mean): --  ABP: --  ABP(mean): --  RR: 20 (05 Oct 2019 12:54) (18 - 27)  SpO2: 96% (05 Oct 2019 12:54) (96% - 100%) on 2lpm nasal canula     General: Awake. Alert. Cooperative. No distress. Appears stated age. Obese	  HEENT: Atraumatic. Normocephalic. Anicteric. Normal oral mucosa. PERRL. EOMI. Psoriatic plaque on scalp. Mallampati class IV airway.  Neck: Supple. Trachea midline. Thyroid without enlargement/tenderness/nodules. No carotid bruit. No JVD. Short and wide.	  Cardiovascular: Regular rate and rhythm. S1 S2 normal. No murmurs, rubs or gallops.  Respiratory: Respirations unlabored. Decreased breath sounds throughout. No wheeze. No curvature.  Abdomen: Soft. Non-tender. Non-distended. No organomegaly. No masses. Normal bowel sounds. Obese.  Extremities: Warm to touch. No clubbing or cyanosis. No pedal edema.  Pulses: 2+ peripheral pulses all extremities.	  Skin: Venous stasis changes on both lower extremities. Multiple areas of skin hyperpigmentation at areas of prior plaque.  Lymph Nodes: Cervical, supraclavicular and axillary nodes normal  Neurological: Motor and sensory examination equal and normal. A and O x 3  Psychiatry: Appropriate mood and affect.    LABS:                          10.9   7.79  )-----------( 115      ( 05 Oct 2019 10:19 )             33.2     CBC    WBC  7.79 <==, 3.05 <==, 5.34 <==    Hemoglobin  10.9 <<==, 11.3 <<==, 12.7 <<==    Hematocrit  33.2 <==, 34.2 <==, 36.5 <==    Platelets  115 <==, 114 <==, 134 <==      132<L>  |  90<L>  |  12  ----------------------------<  126<H>    10-05  5.1   |  26  |  0.52      LYTES    sodium  132 <==, 136 <==, 133 <==, 135 <==, 131 <==, 127 <==    potassium   5.1 <==, 5.2 <==, 4.2 <==, 3.8 <==, 4.4 <==, 4.5 <==    chloride  90 <==, 94 <==, 91 <==, 92 <==, 86 <==, 82 <==    carbon dioxide  26 <==, 28 <==, 17 <==, 16 <==, 20 <==, 23 <==    =============================================================================================  RENAL FUNCTION:    Creatinine:   0.52  <<==, 0.51  <<==, 0.43  <<==, 0.43  <<==, 0.38  <<==, 0.39  <<==    BUN:   12 <==, 8 <==, <4 <==, <4 <==, <4 <==, <4 <==    ============================================================================================    calcium   8.7 <==, 8.8 <==, 7.9 <==, 8.3 <==, 8.8 <==, 9.2 <==    phos   3.4 <==, 3.5 <==, 3.0 <==    mag   2.0 <==, 2.4 <==, 1.4 <==    ============================================================================================  LFTs    AST:   189 <== , 202 <== , 239 <== , 281 <==     ALT:  129  <== , 126  <== , 137  <== , 152  <==     AP:  58  <=, 60  <=, 62  <=, 68  <=    Bili:  1.0  <=, 1.1  <=, 1.3  <=, 1.3  <=      PT/INR - ( 04 Oct 2019 05:57 )   PT: 10.2 sec;   INR: 0.90 ratio       PTT - ( 04 Oct 2019 05:57 )  PTT:25.3 sec    Venous Blood Gas:  10-04 @ 16:12  7.50/36/58/28/92  VBG Lactate: 2.7    Venous Blood Gas:  10-04 @ 05:52  --/--/--/--/--  VBG Lactate: 8.8    Venous Blood Gas:  10-03 @ 21:22  7.36/42/58/23/85  VBG Lactate: 7.7    Venous Blood Gas:  10-03 @ 18:38  7.37/46/107/26/97  VBG Lactate: 6.4    ABG - ( 04 Oct 2019 05:52 )  pH: 7.33  /  pCO2: 35    /  pO2: 100   / HCO3: 18    / Base Excess: -6.8  /  SaO2: 97        Procalcitonin, Serum: 0.17 ng/mL (10-04 @ 18:37)    Procalcitonin, Serum: 0.08 ng/mL (10-04 @ 05:57)    Serum Pro-Brain Natriuretic Peptide: 55 pg/mL (10-03 @ 21:22)    CARDIAC MARKERS ( 03 Oct 2019 18:38 )  CPK x     /CKMB x     /CKMB Units x        troponin 18 ng/L      MICROBIOLOGY:     Rapid Respiratory Viral Panel (10.03.19 @ 19:47)    Rapid RVP Result: NotDetec: This Respiratory Panel uses polymerase chain reaction (PCR) to detect for  adenovirus; coronavirus (HKU1, NL63, 229E, OC43); human metapneumovirus  (hMPV); human enterovirus/rhinovirus (Entero/RV); influenza A; influenza  A/H1; influenza A/H3; influenza A/H1-2009; influenza B; parainfluenza  viruses 1, 2, 3, 4; respiratory syncytial virus; Mycoplasma pneumoniae;  and Chlamydophila pneumoniae.    Urinalysis Basic - ( 04 Oct 2019 07:35 )    Color: Yellow / Appearance: Slightly Turbid / S.019 / pH: x  Gluc: x / Ketone: Moderate  / Bili: Negative / Urobili: Negative   Blood: x / Protein: 30 mg/dL / Nitrite: Negative   Leuk Esterase: Small / RBC: 5 /hpf / WBC 3 /HPF   Sq Epi: x / Non Sq Epi: 1 / Bacteria: Many    Culture - Urine (10.04.19 @ 10:27)    Specimen Source: .Urine    Culture Results:   >=3 organisms. Probable collection contamination.    Culture - Blood (10.04.19 @ 05:25)    Specimen Source: .Blood    Culture Results:   No growth to date.    Culture - Blood (10.04.19 @ 05:25)    Specimen Source: .Blood    Culture Results:   No growth to date.    RADIOLOGY:  [x] Chest radiographs reviewed and interpreted by blanca ROMANO:  XR CHEST AP OR PA 1V                          PROCEDURE DATE:  10/03/2019      INTERPRETATION:    CLINICAL INFORMATION: Shortness of breath    EXAM: Chest X-ray, AP View    COMPARISON: Chest x-ray 3/21/2019    FINDINGS:    The lungs are clear.    Heart size cannot be accurately assessed in this projection.    No acute osseous findings.      IMPRESSION:     Clear lungs.      PREETI HANSEN M.D., RADIOLOGY RESIDENT  This document has been electronically signed.  MIRI NORRIS M.D., ATTENDING RADIOLOGIST  This document has been electronically signed. Oct  4 2019  8:57AM  ---------------------------------------------------------------------------------------------------------------    EXAM:  CT ANGIO CHEST (W)AW IC                          PROCEDURE DATE:  10/03/2019      INTERPRETATION:  CLINICAL INFORMATION: Shortness of breath. Prior DVT.     COMPARISON: CT chest from 3/17/2019.    PROCEDURE:   CT Angiography of the Chest.  90 ml of Omnipaque 350 was injected intravenously. 10 ml were discarded.  Sagittal and coronal reformats were performed as well as 3D (MIP)   reconstructions.      FINDINGS:    LUNGS AND AIRWAYS: Patent central airways.  Severe emphysema. Dependent   changes.    PLEURA: No pleural effusion.    MEDIASTINUM AND CHESTER: No lymphadenopathy.    VESSELS: Good opacification the pulmonary arterial tree. No pulmonary   embolism.    HEART: Heart size is normal. No pericardial effusion.    CHEST WALL AND LOWER NECK: Within normal limits.    VISUALIZED UPPER ABDOMEN: Small hiatal hernia. Hepatic steatosis.    BONES: Within normal limits.    IMPRESSION:     No pulmonary embolism.    Severe emphysema.    MARYA CODY M.D., RADIOLOGY RESIDENT  This document has been electronically signed.  JUWAN ROONEY M.D., ATTENDING RADIOLOGIST  This document has been electronically signed. Oct  3 2019  7:29PM  ---------------------------------------------------------------------------------------------------------------

## 2019-10-06 LAB
ALBUMIN SERPL ELPH-MCNC: 3.8 G/DL — SIGNIFICANT CHANGE UP (ref 3.3–5)
ALP SERPL-CCNC: 40 U/L — SIGNIFICANT CHANGE UP (ref 40–120)
ALT FLD-CCNC: 87 U/L — HIGH (ref 10–45)
ANION GAP SERPL CALC-SCNC: 13 MMOL/L — SIGNIFICANT CHANGE UP (ref 5–17)
AST SERPL-CCNC: 139 U/L — HIGH (ref 10–40)
BILIRUB DIRECT SERPL-MCNC: 0.4 MG/DL — HIGH (ref 0–0.2)
BILIRUB INDIRECT FLD-MCNC: 1.4 MG/DL — HIGH (ref 0.2–1)
BILIRUB SERPL-MCNC: 1.8 MG/DL — HIGH (ref 0.2–1.2)
BUN SERPL-MCNC: 14 MG/DL — SIGNIFICANT CHANGE UP (ref 7–23)
CALCIUM SERPL-MCNC: 8.8 MG/DL — SIGNIFICANT CHANGE UP (ref 8.4–10.5)
CHLORIDE SERPL-SCNC: 92 MMOL/L — LOW (ref 96–108)
CO2 SERPL-SCNC: 26 MMOL/L — SIGNIFICANT CHANGE UP (ref 22–31)
CREAT SERPL-MCNC: 0.5 MG/DL — SIGNIFICANT CHANGE UP (ref 0.5–1.3)
GLUCOSE SERPL-MCNC: 100 MG/DL — HIGH (ref 70–99)
HCT VFR BLD CALC: 29.9 % — LOW (ref 34.5–45)
HGB BLD-MCNC: 9.6 G/DL — LOW (ref 11.5–15.5)
LACTATE SERPL-SCNC: 0.9 MMOL/L — SIGNIFICANT CHANGE UP (ref 0.7–2)
MCHC RBC-ENTMCNC: 32.1 GM/DL — SIGNIFICANT CHANGE UP (ref 32–36)
MCHC RBC-ENTMCNC: 33.8 PG — SIGNIFICANT CHANGE UP (ref 27–34)
MCV RBC AUTO: 105.3 FL — HIGH (ref 80–100)
PLATELET # BLD AUTO: 104 K/UL — LOW (ref 150–400)
POTASSIUM SERPL-MCNC: 4.3 MMOL/L — SIGNIFICANT CHANGE UP (ref 3.5–5.3)
POTASSIUM SERPL-SCNC: 4.3 MMOL/L — SIGNIFICANT CHANGE UP (ref 3.5–5.3)
PROT SERPL-MCNC: 6 G/DL — SIGNIFICANT CHANGE UP (ref 6–8.3)
RBC # BLD: 2.84 M/UL — LOW (ref 3.8–5.2)
RBC # FLD: 13.2 % — SIGNIFICANT CHANGE UP (ref 10.3–14.5)
SODIUM SERPL-SCNC: 131 MMOL/L — LOW (ref 135–145)
WBC # BLD: 5.75 K/UL — SIGNIFICANT CHANGE UP (ref 3.8–10.5)
WBC # FLD AUTO: 5.75 K/UL — SIGNIFICANT CHANGE UP (ref 3.8–10.5)

## 2019-10-06 RX ADMIN — Medication 0.5 MILLIGRAM(S): at 05:53

## 2019-10-06 RX ADMIN — Medication 1 MILLIGRAM(S): at 17:24

## 2019-10-06 RX ADMIN — Medication 3 MILLILITER(S): at 17:24

## 2019-10-06 RX ADMIN — Medication 40 MILLIGRAM(S): at 05:54

## 2019-10-06 RX ADMIN — Medication 100 MILLIGRAM(S): at 11:23

## 2019-10-06 RX ADMIN — HEPARIN SODIUM 5000 UNIT(S): 5000 INJECTION INTRAVENOUS; SUBCUTANEOUS at 05:53

## 2019-10-06 RX ADMIN — Medication 1 MILLIGRAM(S): at 05:57

## 2019-10-06 RX ADMIN — Medication 0.25 MILLIGRAM(S): at 13:36

## 2019-10-06 RX ADMIN — Medication 100 MILLIGRAM(S): at 17:24

## 2019-10-06 RX ADMIN — SODIUM CHLORIDE 75 MILLILITER(S): 9 INJECTION INTRAMUSCULAR; INTRAVENOUS; SUBCUTANEOUS at 05:57

## 2019-10-06 RX ADMIN — Medication 1 MILLIGRAM(S): at 09:30

## 2019-10-06 RX ADMIN — SODIUM CHLORIDE 75 MILLILITER(S): 9 INJECTION INTRAMUSCULAR; INTRAVENOUS; SUBCUTANEOUS at 09:30

## 2019-10-06 RX ADMIN — Medication 0.5 MILLIGRAM(S): at 17:24

## 2019-10-06 RX ADMIN — Medication 10 MILLIGRAM(S): at 11:23

## 2019-10-06 RX ADMIN — MONTELUKAST 10 MILLIGRAM(S): 4 TABLET, CHEWABLE ORAL at 11:23

## 2019-10-06 RX ADMIN — Medication 100 MILLIGRAM(S): at 05:53

## 2019-10-06 RX ADMIN — Medication 0.5 MILLIGRAM(S): at 21:02

## 2019-10-06 RX ADMIN — PANTOPRAZOLE SODIUM 40 MILLIGRAM(S): 20 TABLET, DELAYED RELEASE ORAL at 05:53

## 2019-10-06 RX ADMIN — Medication 1 TABLET(S): at 11:23

## 2019-10-06 RX ADMIN — Medication 2000 UNIT(S): at 11:23

## 2019-10-06 RX ADMIN — Medication 3 MILLILITER(S): at 05:53

## 2019-10-06 RX ADMIN — Medication 1 APPLICATION(S): at 17:29

## 2019-10-06 RX ADMIN — Medication 0.25 MILLIGRAM(S): at 21:01

## 2019-10-06 RX ADMIN — Medication 0.25 MILLIGRAM(S): at 05:57

## 2019-10-06 RX ADMIN — Medication 1 MILLIGRAM(S): at 13:36

## 2019-10-06 RX ADMIN — Medication 1 APPLICATION(S): at 21:01

## 2019-10-06 RX ADMIN — Medication 3 MILLILITER(S): at 23:00

## 2019-10-06 RX ADMIN — Medication 1 MILLIGRAM(S): at 11:23

## 2019-10-06 RX ADMIN — Medication 1 MILLIGRAM(S): at 02:11

## 2019-10-06 RX ADMIN — Medication 1 APPLICATION(S): at 05:58

## 2019-10-06 RX ADMIN — HEPARIN SODIUM 5000 UNIT(S): 5000 INJECTION INTRAVENOUS; SUBCUTANEOUS at 17:24

## 2019-10-06 RX ADMIN — Medication 3 MILLILITER(S): at 11:23

## 2019-10-06 NOTE — PROGRESS NOTE ADULT - ASSESSMENT
54 yr old female with  Hx significant for COPD, EtOH abuse, Breast Ca, remote brain aneurysm s/p clip who presented with COPD exacerbation initially treated with duoneb and steroid tx  received MICU consult for elevated lactate and COPD exacerbation. and elevated CIWA score    -Pt with hx of EtOH abuse with last drink 10/3/19 at 0900  -neuro checks  -CIWA protocol  -as pt on chronic benzo therapy  to continue     neuro eval appreciated  no acute issues     - Pt with COPD on home O2  c/w pulm meds  -supplemental O2 - titrate to maintain SPO2 > 88%  tachycardia improved  -Last documented TTE 2016 (mild diastolic dysfunction)  echo  cards eval noted    -diet as tolerated  -strict I & O's -   ppi     transaminitis sec to etoh abuse  improved  ruq sono     id eval noted  lactate improved  no active id issue at present

## 2019-10-06 NOTE — PROGRESS NOTE ADULT - ASSESSMENT
This is a 54y year old Female with the below past medical history who presents with the chief complaint of SOB, copd exacerbation. also etoh abuse, last drink 1 week ago, takes routine benzo's and on CIWA protocol. denies seizures related to etoh in past. hx of brain aneurysm 1988 s/p clip. stable no issues related to this. denies pain, headache, focal weakness or sensory changes, deneis vision changes, speech changes or falls. denies confusion or cognitive impairement. hx breast CA on aromasin, Hx of psoriasis on Otezla.     nonfocal normal neuro exam    do not appreciated asymmetric pupils on exam or any other localizing findings or focality  -ct head pending, suspect stable and low yield. aneurysm clip hx of brain was remote 1988  -patient refused ct head study and states will not do it.    [facility] protocol , observe for alcohol withdrawal / DTs , no evidence of issue at this time. on benzo's routinely for anxiety    OOB as tolerated , fall precautions    dvt prophylaxis    d/w patient bedside    no furhter neurodiagnostics inpatient indicated,   please call back with any questions

## 2019-10-06 NOTE — PROGRESS NOTE ADULT - SUBJECTIVE AND OBJECTIVE BOX
CC: no new complaints    TELEMETRY: nsr    PHYSICAL EXAM:    T(C): 36.8 (10-06-19 @ 04:18), Max: 37.2 (10-05-19 @ 17:15)  HR: 93 (10-06-19 @ 04:18) (93 - 111)  BP: 144/83 (10-06-19 @ 04:18) (144/83 - 157/89)  RR: 18 (10-06-19 @ 04:18) (18 - 20)  SpO2: 99% (10-06-19 @ 04:18) (96% - 100%)  Wt(kg): --  I&O's Summary    04 Oct 2019 07:01  -  05 Oct 2019 07:00  --------------------------------------------------------  IN: 220 mL / OUT: 550 mL / NET: -330 mL    05 Oct 2019 07:01  -  06 Oct 2019 06:59  --------------------------------------------------------  IN: 2185 mL / OUT: 1150 mL / NET: 1035 mL        Appearance: Normal	  Cardiovascular: Normal S1 S2,RRR, No JVD, No murmurs  Respiratory: Lungs clear to auscultation	  Gastrointestinal:  Soft, Non-tender, + BS	  Extremities: Normal range of motion, No clubbing, cyanosis or edema  Vascular: Peripheral pulses palpable 2+ bilaterally     LABS:	 	                          10.9   7.79  )-----------( 115      ( 05 Oct 2019 10:19 )             33.2     10-06    131<L>  |  92<L>  |  14  ----------------------------<  100<H>  4.3   |  26  |  0.50    Ca    8.8      06 Oct 2019 05:51    TPro  6.0  /  Alb  3.8  /  TBili  1.8<H>  /  DBili  x   /  AST  139<H>  /  ALT  87<H>  /  AlkPhos  40  10-06          CARDIAC MARKERS:

## 2019-10-06 NOTE — PROGRESS NOTE ADULT - SUBJECTIVE AND OBJECTIVE BOX
CHIEF COMPLAINT:Patient is a 54y old  Female who presents with a chief complaint of shortness of breath (06 Oct 2019 08:28)    	        PAST MEDICAL & SURGICAL HISTORY:  Prophylactic measure  Breast cancer  Brain aneurysm: with clips  Psoriasis  RA (rheumatoid arthritis)  Psoriasis  Breast cancer, stage 3  History of modified radical mastectomy of both breasts  S/P bilateral mastectomy  Brain aneurysm: 1988 two clips          REVIEW OF SYSTEMS:  CONSTITUTIONAL: No fever, weight loss, or fatigue  EYES: No eye pain, visual disturbances, or discharge  NECK: No pain or stiffness  RESPIRATORY: No cough, wheezing, chills or hemoptysis; No Shortness of Breath  CARDIOVASCULAR: No chest pain, palpitations, passing out, dizziness, or leg swelling  GASTROINTESTINAL: No abdominal or epigastric pain. No nausea, vomiting, or hematemesis; No diarrhea or constipation. No melena or hematochezia.  GENITOURINARY: No dysuria, frequency, hematuria, or incontinence  NEUROLOGICAL: No headaches, memory loss, loss of strength, numbness, or tremors  SKIN: No itching, burning, rashes, or lesions   LYMPH Nodes: No enlarged glands  ENDOCRINE: No heat or cold intolerance; No hair loss  MUSCULOSKELETAL: No joint pain or swelling; No muscle, back, or extremity pain    Medications:  MEDICATIONS  (STANDING):  ALBUTerol/ipratropium for Nebulization 3 milliLiter(s) Nebulizer every 6 hours  ALPRAZolam 0.25 milliGRAM(s) Oral every 8 hours  buDESOnide    Inhalation Suspension 0.5 milliGRAM(s) Inhalation every 12 hours  cholecalciferol 2000 Unit(s) Oral daily  docusate sodium 100 milliGRAM(s) Oral two times a day  fluocinonide 0.05% Solution 1 Application(s) Topical at bedtime  folic acid 1 milliGRAM(s) Oral daily  heparin  Injectable 5000 Unit(s) SubCutaneous every 12 hours  hydrocortisone 1% Cream 1 Application(s) Topical two times a day  LORazepam   Injectable 1 milliGRAM(s) IV Push every 4 hours  LORazepam   Injectable 0.5 milliGRAM(s) IV Push every 4 hours  LORazepam   Injectable   IV Push   montelukast 10 milliGRAM(s) Oral daily  multivitamin 1 Tablet(s) Oral daily  multivitamin 1 Tablet(s) Oral daily  pantoprazole    Tablet 40 milliGRAM(s) Oral before breakfast  predniSONE   Tablet 40 milliGRAM(s) Oral daily  prochlorperazine   Tablet 10 milliGRAM(s) Oral daily  senna 2 Tablet(s) Oral at bedtime  sodium chloride 0.9%. 1000 milliLiter(s) (75 mL/Hr) IV Continuous <Continuous>  thiamine 100 milliGRAM(s) Oral daily    MEDICATIONS  (PRN):  LORazepam   Injectable 2 milliGRAM(s) IV Push every 2 hours PRN Symptom-triggered: each CIWA -Ar score 8 or GREATER    	    PHYSICAL EXAM:  T(C): 36.7 (10-06-19 @ 08:16), Max: 37.2 (10-05-19 @ 17:15)  HR: 95 (10-06-19 @ 11:30) (81 - 111)  BP: 143/88 (10-06-19 @ 11:30) (130/80 - 157/89)  RR: 20 (10-06-19 @ 11:30) (18 - 20)  SpO2: 99% (10-06-19 @ 11:30) (96% - 100%)  Wt(kg): --  I&O's Summary    05 Oct 2019 07:01  -  06 Oct 2019 07:00  --------------------------------------------------------  IN: 2185 mL / OUT: 1150 mL / NET: 1035 mL        Appearance: Normal	  HEENT:   Normal oral mucosa, PERRL, EOMI	  Lymphatic: No lymphadenopathy  Cardiovascular: Normal S1 S2, No JVD, No murmurs, No edema  Respiratory: Lungs clear to auscultation	  Psychiatry: A & O x 3, Mood & affect appropriate  Gastrointestinal:  Soft, Non-tender, + BS	  Skin: No rashes, No ecchymoses, No cyanosis	  Neurologic: Non-focal  Extremities: Normal range of motion, No clubbing, cyanosis or edema  Vascular: Peripheral pulses palpable 2+ bilaterally    TELEMETRY: 	    ECG:  	  RADIOLOGY:  OTHER: 	  	  LABS:	 	    CARDIAC MARKERS:                                9.6    5.75  )-----------( 104      ( 06 Oct 2019 09:10 )             29.9     10-06    131<L>  |  92<L>  |  14  ----------------------------<  100<H>  4.3   |  26  |  0.50    Ca    8.8      06 Oct 2019 05:51    TPro  6.0  /  Alb  3.8  /  TBili  1.8<H>  /  DBili  0.4<H>  /  AST  139<H>  /  ALT  87<H>  /  AlkPhos  40  10-06    proBNP:   Lipid Profile:   HgA1c:   TSH: CHIEF COMPLAINT:Patient is a 54y old  Female who presents with a chief complaint of shortness of breath (06 Oct 2019 08:28)    	        PAST MEDICAL & SURGICAL HISTORY:  Prophylactic measure  Breast cancer  Brain aneurysm: with clips  Psoriasis  RA (rheumatoid arthritis)  Psoriasis  Breast cancer, stage 3  History of modified radical mastectomy of both breasts  S/P bilateral mastectomy  Brain aneurysm: 1988 two clips          REVIEW OF SYSTEMS:  no new complaints  refusing testing at times   no cp or sob  no vomiting   no abd pain     Medications:  MEDICATIONS  (STANDING):  ALBUTerol/ipratropium for Nebulization 3 milliLiter(s) Nebulizer every 6 hours  ALPRAZolam 0.25 milliGRAM(s) Oral every 8 hours  buDESOnide    Inhalation Suspension 0.5 milliGRAM(s) Inhalation every 12 hours  cholecalciferol 2000 Unit(s) Oral daily  docusate sodium 100 milliGRAM(s) Oral two times a day  fluocinonide 0.05% Solution 1 Application(s) Topical at bedtime  folic acid 1 milliGRAM(s) Oral daily  heparin  Injectable 5000 Unit(s) SubCutaneous every 12 hours  hydrocortisone 1% Cream 1 Application(s) Topical two times a day  LORazepam   Injectable 1 milliGRAM(s) IV Push every 4 hours  LORazepam   Injectable 0.5 milliGRAM(s) IV Push every 4 hours  LORazepam   Injectable   IV Push   montelukast 10 milliGRAM(s) Oral daily  multivitamin 1 Tablet(s) Oral daily  multivitamin 1 Tablet(s) Oral daily  pantoprazole    Tablet 40 milliGRAM(s) Oral before breakfast  predniSONE   Tablet 40 milliGRAM(s) Oral daily  prochlorperazine   Tablet 10 milliGRAM(s) Oral daily  senna 2 Tablet(s) Oral at bedtime  sodium chloride 0.9%. 1000 milliLiter(s) (75 mL/Hr) IV Continuous <Continuous>  thiamine 100 milliGRAM(s) Oral daily    MEDICATIONS  (PRN):  LORazepam   Injectable 2 milliGRAM(s) IV Push every 2 hours PRN Symptom-triggered: each CIWA -Ar score 8 or GREATER    	    PHYSICAL EXAM:  T(C): 36.7 (10-06-19 @ 08:16), Max: 37.2 (10-05-19 @ 17:15)  HR: 95 (10-06-19 @ 11:30) (81 - 111)  BP: 143/88 (10-06-19 @ 11:30) (130/80 - 157/89)  RR: 20 (10-06-19 @ 11:30) (18 - 20)  SpO2: 99% (10-06-19 @ 11:30) (96% - 100%)  Wt(kg): --  I&O's Summary    05 Oct 2019 07:01  -  06 Oct 2019 07:00  --------------------------------------------------------  IN: 2185 mL / OUT: 1150 mL / NET: 1035 mL        HEENT:   Normal oral mucosa, PERRL, EOMI	  Lymphatic: No lymphadenopathy  Cardiovascular: Normal S1 S2, No JVD,   Respiratory: Lungs clear to auscultation	  Psychiatry: A & O  Gastrointestinal:  Soft, Non-tender, + BS	  Skin: No rashes, No ecchymoses, No cyanosis	  Neurologic: Non-focal  Extremities: dec rom  Vascular: Peripheral pulses palpable    TELEMETRY: 	    ECG:  	  RADIOLOGY:  OTHER: 	  	  LABS:	 	    CARDIAC MARKERS:                                9.6    5.75  )-----------( 104      ( 06 Oct 2019 09:10 )             29.9     10-06    131<L>  |  92<L>  |  14  ----------------------------<  100<H>  4.3   |  26  |  0.50    Ca    8.8      06 Oct 2019 05:51    TPro  6.0  /  Alb  3.8  /  TBili  1.8<H>  /  DBili  0.4<H>  /  AST  139<H>  /  ALT  87<H>  /  AlkPhos  40  10-06    proBNP:   Lipid Profile:   HgA1c:   TSH:

## 2019-10-06 NOTE — PROGRESS NOTE ADULT - ASSESSMENT
ASSESSMENT:    chronic hypoxic respiratory failure due to severe COPD/emphysema with worsening dyspnea due to a COPD exacerbation and anxiety - there is no evidence of pneumonia, pulmonary edema, pleural effusions or pulmonary emboli on the CTA of the chest    LFT abnormalities - alcoholic hepatitis    pancytopenia - bone marrow suppression from alcohol use    breast cancer s/p bilateral mastectomy    rheumatoid and psoriatic arthritis on Otezla - immunocompromised host    brain aneurysm s/p clipping    PLAN/RECOMMENDATIONS:    oxygen supplementation to keep saturation greater than 92%  prednisone 40mg daily x 3 days  albuterol/atrovent nebs q6h  pulmicort 0.5mg nebs q12h  singulair  watch for and treat alcohol withdrawal  thiamine/MVI/folate  Otezla/lidex solution to scalp/hydrocortisone cream to face  DVT prophylaxis - SQ heparin  GI prophylaxis - protonix    Will follow with you. Plan of care discussed with the patient at bedside and her RN.    Joss Franks MD, [facility]  [phone]  Pulmonary Medicine

## 2019-10-06 NOTE — PROGRESS NOTE ADULT - SUBJECTIVE AND OBJECTIVE BOX
[facility] - PROGRESS NOTE    CHIEF COMPLAINT: chronic hypoxic respiratory failure; COPD exacerbation; emphysema; current smoker; alcohol withdrawal    INTERVAL HISTORY: still claiming to be short of breath despite oxygen saturation of 98% on a nasal canula; no cough, sputum production, chest congestion or wheeze; anxious and mildly tremulous; slow mentation; no fevers, chills or sweats; no chest pain/pressure or palpitations; resolved lactic acidosis    REVIEW OF SYSTEMS:  Constitutional: As per interval history  HEENT: Within normal limits  CV: As per interval history  Resp: As per interval history  GI: Within normal limits   : Within normal limits  Musculoskeletal: Within normal limits  Skin: Within normal limits  Neurological: Within normal limits  Psychiatric: Within normal limits  Endocrine: Within normal limits  Hematologic/Lymphatic: Within normal limits  Allergic/Immunologic: Within normal limits    MEDICATIONS:     Pulmonary "  ALBUTerol/ipratropium for Nebulization 3 milliLiter(s) Nebulizer every 6 hours  buDESOnide    Inhalation Suspension 0.5 milliGRAM(s) Inhalation every 12 hours  montelukast 10 milliGRAM(s) Oral daily    Anti-microbials:    Cardiovascular:    Other:  ALPRAZolam 0.25 milliGRAM(s) Oral every 8 hours  cholecalciferol 2000 Unit(s) Oral daily  docusate sodium 100 milliGRAM(s) Oral two times a day  fluocinonide 0.05% Solution 1 Application(s) Topical at bedtime  folic acid 1 milliGRAM(s) Oral daily  heparin  Injectable 5000 Unit(s) SubCutaneous every 12 hours  hydrocortisone 1% Cream 1 Application(s) Topical two times a day  LORazepam   Injectable 1 milliGRAM(s) IV Push every 4 hours  LORazepam   Injectable 0.5 milliGRAM(s) IV Push every 4 hours  LORazepam   Injectable   IV Push   multivitamin 1 Tablet(s) Oral daily  multivitamin 1 Tablet(s) Oral daily  pantoprazole    Tablet 40 milliGRAM(s) Oral before breakfast  predniSONE   Tablet 40 milliGRAM(s) Oral daily  prochlorperazine   Tablet 10 milliGRAM(s) Oral daily  senna 2 Tablet(s) Oral at bedtime  sodium chloride 0.9%. 1000 milliLiter(s) IV Continuous <Continuous>  thiamine 100 milliGRAM(s) Oral daily    MEDICATIONS  (PRN):  LORazepam   Injectable 2 milliGRAM(s) IV Push every 2 hours PRN Symptom-triggered: each CIWA -Ar score 8 or GREATER        OBJECTIVE:    I&O's Detail    05 Oct 2019 07:01  -  06 Oct 2019 07:00  --------------------------------------------------------  IN:    Oral Fluid: 460 mL    sodium chloride 0.9%.: 1725 mL  Total IN: 2185 mL    OUT:    Voided: 1150 mL  Total OUT: 1150 mL    Total NET: 1035 mL    PHYSICAL EXAM:       ICU Vital Signs Last 24 Hrs  T(C): 36.7 (06 Oct 2019 08:16), Max: 37.2 (05 Oct 2019 17:15)  T(F): 98.1 (06 Oct 2019 08:16), Max: 98.9 (05 Oct 2019 17:15)  HR: 95 (06 Oct 2019 11:30) (81 - 111)  BP: 143/88 (06 Oct 2019 11:30) (130/80 - 157/89)  BP(mean): --  ABP: --  ABP(mean): --  RR: 20 (06 Oct 2019 11:30) (18 - 20)  SpO2: 99% (06 Oct 2019 11:30) (98% - 100%) on 2lpm nasal canula     General: Awake. Alert. Cooperative. No distress. Appears stated age. Obese. Slow mentation.	  HEENT: Atraumatic. Normocephalic. Anicteric. Normal oral mucosa. PERRL. EOMI. Psoriatic plaque on scalp. Mallampati class IV airway.  Neck: Supple. Trachea midline. Thyroid without enlargement/tenderness/nodules. No carotid bruit. No JVD. Short and wide.	  Cardiovascular: Regular rate and rhythm. S1 S2 normal. No murmurs, rubs or gallops.  Respiratory: Respirations unlabored. Decreased breath sounds throughout. No wheeze. No curvature.  Abdomen: Soft. Non-tender. Non-distended. No organomegaly. No masses. Normal bowel sounds. Obese.  Extremities: Warm to touch. No clubbing or cyanosis. No pedal edema.  Pulses: 2+ peripheral pulses all extremities.	  Skin: Venous stasis changes on both lower extremities. Multiple areas of skin hyperpigmentation at areas of prior plaque.  Lymph Nodes: Cervical, supraclavicular and axillary nodes normal  Neurological: Motor and sensory examination equal and normal. A and O x 3  Psychiatry: Flat affect    LABS:                          9.6    5.75  )-----------( 104      ( 06 Oct 2019 09:10 )             29.9     CBC    WBC  5.75 <==, 7.79 <==, 3.05 <==, 5.34 <==    Hemoglobin  9.6 <<==, 10.9 <<==, 11.3 <<==, 12.7 <<==    Hematocrit  29.9 <==, 33.2 <==, 34.2 <==, 36.5 <==    Platelets  104 <==, 115 <==, 114 <==, 134 <==      131<L>  |  92<L>  |  14  ----------------------------<  100<H>    10-06  4.3   |  26  |  0.50      LYTES    sodium  131 <==, 132 <==, 136 <==, 133 <==, 135 <==, 131 <==, 127 <==    potassium   4.3 <==, 5.1 <==, 5.2 <==, 4.2 <==, 3.8 <==, 4.4 <==, 4.5 <==    chloride  92 <==, 90 <==, 94 <==, 91 <==, 92 <==, 86 <==, 82 <==    carbon dioxide  26 <==, 26 <==, 28 <==, 17 <==, 16 <==, 20 <==, 23 <==    =============================================================================================  RENAL FUNCTION:    Creatinine:   0.50  <<==, 0.52  <<==, 0.51  <<==, 0.43  <<==, 0.43  <<==, 0.38  <<==, 0.39  <<==    BUN:   14 <==, 12 <==, 8 <==, <4 <==, <4 <==, <4 <==, <4 <==    ============================================================================================    calcium   8.8 <==, 8.7 <==, 8.8 <==, 7.9 <==, 8.3 <==, 8.8 <==, 9.2 <==    phos   3.4 <==, 3.5 <==, 3.0 <==    mag   2.0 <==, 2.4 <==, 1.4 <==    ============================================================================================  LFTs    AST:   139 <== , 189 <== , 202 <== , 239 <== , 281 <==     ALT:  87  <== , 129  <== , 126  <== , 137  <== , 152  <==     AP:  40  <=, 58  <=, 60  <=, 62  <=, 68  <=    Bili:  1.8  <=, 1.0  <=, 1.1  <=, 1.3  <=, 1.3  <=      PT/INR - ( 04 Oct 2019 05:57 )   PT: 10.2 sec;   INR: 0.90 ratio      Venous Blood Gas:  10-04 @ 16:12  7.50/36/58/28/92  VBG Lactate: 2.7    Venous Blood Gas:  10-04 @ 05:52  --/--/--/--/--  VBG Lactate: 8.8    Venous Blood Gas:  10-03 @ 21:22  7.36/42/58/23/85  VBG Lactate: 7.7    Venous Blood Gas:  10-03 @ 18:38  7.37/46/107/26/97  VBG Lactate: 6.4    ABG - ( 04 Oct 2019 05:52 )  pH: 7.33  /  pCO2: 35    /  pO2: 100   / HCO3: 18    / Base Excess: -6.8  /  SaO2: 97        Procalcitonin, Serum: 0.17 ng/mL (10-04 @ 18:37)    Procalcitonin, Serum: 0.08 ng/mL (10-04 @ 05:57)    Serum Pro-Brain Natriuretic Peptide: 55 pg/mL (10-03 @ 21:22)    CARDIAC MARKERS ( 03 Oct 2019 18:38 )  CPK x     /CKMB x     /CKMB Units x        troponin 18 ng/L      MICROBIOLOGY:     Rapid Respiratory Viral Panel (10.03.19 @ 19:47)    Rapid RVP Result: NotDetec: This Respiratory Panel uses polymerase chain reaction (PCR) to detect for  adenovirus; coronavirus (HKU1, NL63, 229E, OC43); human metapneumovirus  (hMPV); human enterovirus/rhinovirus (Entero/RV); influenza A; influenza  A/H1; influenza A/H3; influenza A/H1-2009; influenza B; parainfluenza  viruses 1, 2, 3, 4; respiratory syncytial virus; Mycoplasma pneumoniae;  and Chlamydophila pneumoniae.    Urinalysis Basic - ( 04 Oct 2019 07:35 )    Color: Yellow / Appearance: Slightly Turbid / S.019 / pH: x  Gluc: x / Ketone: Moderate  / Bili: Negative / Urobili: Negative   Blood: x / Protein: 30 mg/dL / Nitrite: Negative   Leuk Esterase: Small / RBC: 5 /hpf / WBC 3 /HPF   Sq Epi: x / Non Sq Epi: 1 / Bacteria: Many    Culture - Urine (10.04.19 @ 10:27)    Specimen Source: .Urine    Culture Results:   >=3 organisms. Probable collection contamination.    Culture - Blood (10.04.19 @ 05:25)    Specimen Source: .Blood    Culture Results:   No growth to date.    Culture - Blood (10.04.19 @ 05:25)    Specimen Source: .Blood    Culture Results:   No growth to date.    RADIOLOGY:  [x] Chest radiographs reviewed and interpreted by blanca ROMANO:  XR CHEST AP OR PA 1V                          PROCEDURE DATE:  10/03/2019      INTERPRETATION:    CLINICAL INFORMATION: Shortness of breath    EXAM: Chest X-ray, AP View    COMPARISON: Chest x-ray 3/21/2019    FINDINGS:    The lungs are clear.    Heart size cannot be accurately assessed in this projection.    No acute osseous findings.      IMPRESSION:     Clear lungs.      PREETI HANSEN M.D., RADIOLOGY RESIDENT  This document has been electronically signed.  MIRI NORRIS M.D., ATTENDING RADIOLOGIST  This document has been electronically signed. Oct  4 2019  8:57AM  ---------------------------------------------------------------------------------------------------------------    EXAM:  CT ANGIO CHEST (W)AW IC                          PROCEDURE DATE:  10/03/2019      INTERPRETATION:  CLINICAL INFORMATION: Shortness of breath. Prior DVT.     COMPARISON: CT chest from 3/17/2019.    PROCEDURE:   CT Angiography of the Chest.  90 ml of Omnipaque 350 was injected intravenously. 10 ml were discarded.  Sagittal and coronal reformats were performed as well as 3D (MIP)   reconstructions.      FINDINGS:    LUNGS AND AIRWAYS: Patent central airways.  Severe emphysema. Dependent   changes.    PLEURA: No pleural effusion.    MEDIASTINUM AND CHESTER: No lymphadenopathy.    VESSELS: Good opacification the pulmonary arterial tree. No pulmonary   embolism.    HEART: Heart size is normal. No pericardial effusion.    CHEST WALL AND LOWER NECK: Within normal limits.    VISUALIZED UPPER ABDOMEN: Small hiatal hernia. Hepatic steatosis.    BONES: Within normal limits.    IMPRESSION:     No pulmonary embolism.    Severe emphysema.    MARYA CODY M.D., RADIOLOGY RESIDENT  This document has been electronically signed.  JUWAN ROONEY M.D., ATTENDING RADIOLOGIST  This document has been electronically signed. Oct  3 2019  7:29PM  ---------------------------------------------------------------------------------------------------------------

## 2019-10-06 NOTE — PROGRESS NOTE ADULT - SUBJECTIVE AND OBJECTIVE BOX
Admitting Diagnosis:  Chronic obstructive pulmonary disease (J44.9): CHRONIC OBSTRUCTIVE PULMONARY DISEASE, UNSPECIFIED      HPI:  This is a 54y year old Female with the below past medical history who presents with the chief complaint of SOB, copd exacerbation. also etoh abuse, last drink 1 week ago, takes routine benzo's and on CIWA protocol. denies seizures related to etoh in past. hx of brain aneurysm 1988 s/p clip. stable no issues related to this. denies pain, headache, focal weakness or sensory changes, deneis vision changes, speech changes or falls. denies confusion or cognitive impairement. hx breast CA on aromasin, Hx of psoriasis on Otezla.     no new complaints. patient states she is refusing ct head ordered yesterday. she states "i will not comply, going home tonight"    Past Medical History:  Prophylactic measure (Z29.9)  Breast cancer (C50.919)  Brain aneurysm (I67.1): with clips  Psoriasis (L40.9)  RA (rheumatoid arthritis) (M06.9)  Psoriasis (696.1)  Breast cancer, stage 3 (174.9)      Past Surgical History:  History of modified radical mastectomy of both breasts (Z90.10)  S/P bilateral mastectomy (Z90.13)  Brain aneurysm (437.3): 1988 two clips  No significant past surgical history ([id number])      Social History:  No toxic habits    Family History:  FAMILY HISTORY:  FH: CHF (congestive heart failure): Mother      Allergies:  amoxicillin (Anaphylaxis)  Levaquin (Other; Anaphylaxis)  Levaquin (Unknown)  penicillin (Anaphylaxis)  penicillins (Anaphylaxis)      ROS:  Constitutional: Patient offers no complaints of fevers or significant weight loss  Ears, Nose, Mouth and Throat: The patient presents with no abnormalities of the head, ears, eyes, nose or throat  Skin: Patient offers no concerns of new rashes or lesions  Respiratory: The patient presents with no abnormalities of the respiratory tract  Cardiovascular: The patient presents with no cardiac abnormalities  Gastrointestinal: The patient presents with no abnormalities of the GI system  Genitourinary: The patient presents with no dysuria, hematuria or frequent urination  Neurological: See HPI  Endocrine: Patient offers no complaints of excessive thirst, urination, or heat/cold intolerance    Advanced care planning reviewed and noted in the chart.    Medications:  ALBUTerol/ipratropium for Nebulization 3 milliLiter(s) Nebulizer every 6 hours  ALPRAZolam 0.25 milliGRAM(s) Oral every 8 hours  buDESOnide    Inhalation Suspension 0.5 milliGRAM(s) Inhalation every 12 hours  cholecalciferol 2000 Unit(s) Oral daily  docusate sodium 100 milliGRAM(s) Oral two times a day  fluocinonide 0.05% Solution 1 Application(s) Topical at bedtime  folic acid 1 milliGRAM(s) Oral daily  heparin  Injectable 5000 Unit(s) SubCutaneous every 12 hours  hydrocortisone 1% Cream 1 Application(s) Topical two times a day  LORazepam   Injectable 1.5 milliGRAM(s) IV Push every 4 hours  LORazepam   Injectable 1 milliGRAM(s) IV Push every 4 hours  LORazepam   Injectable   IV Push   methylPREDNISolone sodium succinate Injectable 20 milliGRAM(s) IV Push every 6 hours  montelukast 10 milliGRAM(s) Oral daily  multivitamin 1 Tablet(s) Oral daily  multivitamin 1 Tablet(s) Oral daily  pantoprazole    Tablet 40 milliGRAM(s) Oral before breakfast  prochlorperazine   Tablet 10 milliGRAM(s) Oral daily  senna 2 Tablet(s) Oral at bedtime  sodium chloride 0.9%. 1000 milliLiter(s) IV Continuous <Continuous>  thiamine 100 milliGRAM(s) Oral daily      Labs:  CBC Full  -  ( 04 Oct 2019 08:24 )  WBC Count : 3.05 K/uL  RBC Count : 3.27 M/uL  Hemoglobin : 11.3 g/dL  Hematocrit : 34.2 %  Platelet Count - Automated : 114 K/uL  Mean Cell Volume : 104.6 fl  Mean Cell Hemoglobin : 34.6 pg  Mean Cell Hemoglobin Concentration : 33.0 gm/dL  Auto Neutrophil # : 2.89 K/uL  Auto Lymphocyte # : 0.13 K/uL  Auto Monocyte # : 0.03 K/uL  Auto Eosinophil # : 0.00 K/uL  Auto Basophil # : 0.00 K/uL  Auto Neutrophil % : 93.9 %  Auto Lymphocyte % : 4.3 %  Auto Monocyte % : 0.9 %  Auto Eosinophil % : 0.0 %  Auto Basophil % : 0.0 %    1005    132<L>  |  90<L>  |  12  ----------------------------<  126<H>  5.1   |  26  |  0.52    Ca    8.7      05 Oct 2019 06:07  Phos  3.4     10-04  Mg     2.0     10-04    TPro  6.7  /  Alb  4.2  /  TBili  1.0  /  DBili  x   /  AST  189<H>  /  ALT  129<H>  /  AlkPhos  58  10-04    CAPILLARY BLOOD GLUCOSE        LIVER FUNCTIONS - ( 04 Oct 2019 05:57 )  Alb: 4.2 g/dL / Pro: 6.7 g/dL / ALK PHOS: 58 U/L / ALT: 129 U/L / AST: 189 U/L / GGT: x           PT/INR - ( 04 Oct 2019 05:57 )   PT: 10.2 sec;   INR: 0.90 ratio         PTT - ( 04 Oct 2019 05:57 )  PTT:25.3 sec  Urinalysis Basic - ( 04 Oct 2019 07:35 )    Color: Yellow / Appearance: Slightly Turbid / S.019 / pH: x  Gluc: x / Ketone: Moderate  / Bili: Negative / Urobili: Negative   Blood: x / Protein: 30 mg/dL / Nitrite: Negative   Leuk Esterase: Small / RBC: 5 /hpf / WBC 3 /HPF   Sq Epi: x / Non Sq Epi: 1 / Bacteria: Many      Female    Vitals:  Vital Signs Last 24 Hrs  T(C): 36.8 (05 Oct 2019 08:39), Max: 37.2 (04 Oct 2019 13:01)  T(F): 98.3 (05 Oct 2019 08:39), Max: 99 (04 Oct 2019 13:01)  HR: 93 (05 Oct 2019 08:39) (88 - 115)  BP: 153/81 (05 Oct 2019 08:39) (114/74 - 164/88)  BP(mean): --  RR: 18 (05 Oct 2019 08:39) (18 - 29)  SpO2: 97% (05 Oct 2019 08:39) (95% - 100%)    NEUROLOGICAL EXAM:    Mental status: Asleep, awakens easily to voice, alert, and in no apparent distress. Oriented to person, place and time. Language function is normal. Recent memory, digit span and concentration were normal.     Cranial Nerves: Pupils were equal, no asymmetry noted. , round, reactive to light. Extraocular movements were intact. Visual field were full. Facial sensation was intact to light touch. There was no facial asymmetry. The palate was upgoing symmetrically and tongue was midline. Hearing acuity was intact to finger rub AU. Shoulder shrug was full bilaterally    Motor exam: Bulk and tone were normal. Strength was 5/5 in all four extremities. Fine finger movements were symmetric and normal. There was no pronator drift    Reflexes: 2+ in the bilateral upper extremities. 2+ in the bilateral lower extremities. Toes were downgoing bilaterally.     Sensation: Intact to light touch, temperature, vibration and proprioception.     Coordination: Finger-nose-finger and heel-to-shin was without dysmetria.  some tremulousness in feet b/l upon intention. no other abnormal movements observed, no asterixis    Gait: deferred.

## 2019-10-07 LAB
ALBUMIN SERPL ELPH-MCNC: 3.7 G/DL — SIGNIFICANT CHANGE UP (ref 3.3–5)
ALP SERPL-CCNC: 39 U/L — LOW (ref 40–120)
ALT FLD-CCNC: 231 U/L — HIGH (ref 10–45)
ANION GAP SERPL CALC-SCNC: 13 MMOL/L — SIGNIFICANT CHANGE UP (ref 5–17)
AST SERPL-CCNC: 337 U/L — HIGH (ref 10–40)
BILIRUB SERPL-MCNC: 1.8 MG/DL — HIGH (ref 0.2–1.2)
BUN SERPL-MCNC: 9 MG/DL — SIGNIFICANT CHANGE UP (ref 7–23)
CALCIUM SERPL-MCNC: 8.2 MG/DL — LOW (ref 8.4–10.5)
CHLORIDE SERPL-SCNC: 90 MMOL/L — LOW (ref 96–108)
CO2 SERPL-SCNC: 25 MMOL/L — SIGNIFICANT CHANGE UP (ref 22–31)
CREAT SERPL-MCNC: 0.45 MG/DL — LOW (ref 0.5–1.3)
GLUCOSE SERPL-MCNC: 120 MG/DL — HIGH (ref 70–99)
HCT VFR BLD CALC: 30.8 % — LOW (ref 34.5–45)
HGB BLD-MCNC: 10.3 G/DL — LOW (ref 11.5–15.5)
MCHC RBC-ENTMCNC: 33.4 GM/DL — SIGNIFICANT CHANGE UP (ref 32–36)
MCHC RBC-ENTMCNC: 35 PG — HIGH (ref 27–34)
MCV RBC AUTO: 104.8 FL — HIGH (ref 80–100)
PLATELET # BLD AUTO: 108 K/UL — LOW (ref 150–400)
POTASSIUM SERPL-MCNC: 3.8 MMOL/L — SIGNIFICANT CHANGE UP (ref 3.5–5.3)
POTASSIUM SERPL-SCNC: 3.8 MMOL/L — SIGNIFICANT CHANGE UP (ref 3.5–5.3)
PROT SERPL-MCNC: 5.9 G/DL — LOW (ref 6–8.3)
RBC # BLD: 2.94 M/UL — LOW (ref 3.8–5.2)
RBC # FLD: 13.1 % — SIGNIFICANT CHANGE UP (ref 10.3–14.5)
SODIUM SERPL-SCNC: 128 MMOL/L — LOW (ref 135–145)
WBC # BLD: 4.35 K/UL — SIGNIFICANT CHANGE UP (ref 3.8–10.5)
WBC # FLD AUTO: 4.35 K/UL — SIGNIFICANT CHANGE UP (ref 3.8–10.5)

## 2019-10-07 PROCEDURE — 93306 TTE W/DOPPLER COMPLETE: CPT | Mod: 26

## 2019-10-07 PROCEDURE — 99232 SBSQ HOSP IP/OBS MODERATE 35: CPT

## 2019-10-07 RX ADMIN — Medication 1 APPLICATION(S): at 17:20

## 2019-10-07 RX ADMIN — SODIUM CHLORIDE 75 MILLILITER(S): 9 INJECTION INTRAMUSCULAR; INTRAVENOUS; SUBCUTANEOUS at 08:35

## 2019-10-07 RX ADMIN — Medication 1 MILLIGRAM(S): at 08:33

## 2019-10-07 RX ADMIN — Medication 0.5 MILLIGRAM(S): at 17:20

## 2019-10-07 RX ADMIN — Medication 100 MILLIGRAM(S): at 11:05

## 2019-10-07 RX ADMIN — Medication 3 MILLILITER(S): at 17:20

## 2019-10-07 RX ADMIN — HEPARIN SODIUM 5000 UNIT(S): 5000 INJECTION INTRAVENOUS; SUBCUTANEOUS at 05:05

## 2019-10-07 RX ADMIN — Medication 1 TABLET(S): at 08:33

## 2019-10-07 RX ADMIN — Medication 0.5 MILLIGRAM(S): at 08:33

## 2019-10-07 RX ADMIN — Medication 1 APPLICATION(S): at 21:02

## 2019-10-07 RX ADMIN — Medication 1 APPLICATION(S): at 06:21

## 2019-10-07 RX ADMIN — PANTOPRAZOLE SODIUM 40 MILLIGRAM(S): 20 TABLET, DELAYED RELEASE ORAL at 05:04

## 2019-10-07 RX ADMIN — Medication 0.5 MILLIGRAM(S): at 05:06

## 2019-10-07 RX ADMIN — Medication 10 MILLIGRAM(S): at 08:33

## 2019-10-07 RX ADMIN — Medication 2000 UNIT(S): at 08:33

## 2019-10-07 RX ADMIN — SODIUM CHLORIDE 75 MILLILITER(S): 9 INJECTION INTRAMUSCULAR; INTRAVENOUS; SUBCUTANEOUS at 22:44

## 2019-10-07 RX ADMIN — Medication 0.5 MILLIGRAM(S): at 13:56

## 2019-10-07 RX ADMIN — HEPARIN SODIUM 5000 UNIT(S): 5000 INJECTION INTRAVENOUS; SUBCUTANEOUS at 17:20

## 2019-10-07 RX ADMIN — MONTELUKAST 10 MILLIGRAM(S): 4 TABLET, CHEWABLE ORAL at 08:32

## 2019-10-07 RX ADMIN — Medication 0.5 MILLIGRAM(S): at 05:04

## 2019-10-07 RX ADMIN — Medication 3 MILLILITER(S): at 11:04

## 2019-10-07 RX ADMIN — Medication 0.5 MILLIGRAM(S): at 02:16

## 2019-10-07 RX ADMIN — Medication 40 MILLIGRAM(S): at 05:04

## 2019-10-07 RX ADMIN — Medication 0.25 MILLIGRAM(S): at 05:04

## 2019-10-07 RX ADMIN — Medication 0.25 MILLIGRAM(S): at 13:56

## 2019-10-07 RX ADMIN — Medication 3 MILLILITER(S): at 06:21

## 2019-10-07 RX ADMIN — Medication 0.25 MILLIGRAM(S): at 21:02

## 2019-10-07 NOTE — CONSULT NOTE ADULT - CONSULT REQUESTED DATE/TIME
04-Oct-2019 08:45
04-Oct-2019 05:43
04-Oct-2019 13:20
04-Oct-2019 13:27
04-Oct-2019 14:54
05-Oct-2019 08:46
07-Oct-2019

## 2019-10-07 NOTE — CONSULT NOTE ADULT - SUBJECTIVE AND OBJECTIVE BOX
Chief Complaint:  Patient is a 54y old  Female who presents with a chief complaint of shortness of breath (07 Oct 2019 18:30)      HPI: Asked to evaluate this 54 yr old female do to high LFT's patient with PMHx significant for COPD, EtOH abuse, Breast Ca, remote brain aneurysm s/p clip who presented with COPD exacerbation initially treated with duoneb and steroid tx  received MICU consult for elevated lactate and COPD exacerbation. and elevated CIWA score        Allergies:  amoxicillin (Anaphylaxis)  Levaquin (Other; Anaphylaxis)  Levaquin (Unknown)  penicillin (Anaphylaxis)  penicillins (Anaphylaxis)      Medications:  ALBUTerol/ipratropium for Nebulization 3 milliLiter(s) Nebulizer every 6 hours  ALPRAZolam 0.25 milliGRAM(s) Oral every 8 hours  buDESOnide    Inhalation Suspension 0.5 milliGRAM(s) Inhalation every 12 hours  cholecalciferol 2000 Unit(s) Oral daily  docusate sodium 100 milliGRAM(s) Oral two times a day  fluocinonide 0.05% Solution 1 Application(s) Topical at bedtime  folic acid 1 milliGRAM(s) Oral daily  heparin  Injectable 5000 Unit(s) SubCutaneous every 12 hours  hydrocortisone 1% Cream 1 Application(s) Topical two times a day  LORazepam   Injectable 2 milliGRAM(s) IV Push every 2 hours PRN  LORazepam   Injectable   IV Push   montelukast 10 milliGRAM(s) Oral daily  multivitamin 1 Tablet(s) Oral daily  multivitamin 1 Tablet(s) Oral daily  pantoprazole    Tablet 40 milliGRAM(s) Oral before breakfast  predniSONE   Tablet 40 milliGRAM(s) Oral daily  prochlorperazine   Tablet 10 milliGRAM(s) Oral daily  senna 2 Tablet(s) Oral at bedtime  sodium chloride 0.9%. 1000 milliLiter(s) IV Continuous <Continuous>  thiamine 100 milliGRAM(s) Oral daily      PMHX/PSHX:  Prophylactic measure  Breast cancer  Brain aneurysm  Psoriasis  RA (rheumatoid arthritis)  Psoriasis  Breast cancer, stage 3  History of modified radical mastectomy of both breasts  S/P bilateral mastectomy  Brain aneurysm  No significant past surgical history      Family history:  FH: CHF (congestive heart failure)  No pertinent family history in first degree relatives  No pertinent family history in first degree relatives      Social History:     ROS:     General:  No wt loss, fevers, chills, night sweats, fatigue,   Eyes:  Good vision, no reported pain  ENT:  No sore throat, pain, runny nose, dysphagia  CV:  No pain, palpitations, hypo/hypertension  Resp:  dyspnea with wheezing  GI:  No pain, No nausea, No vomiting, No diarrhea, No constipation, No weight loss,   :  No pain, bleeding, incontinence, nocturia  Muscle:  No pain, weakness  Neuro:  No weakness, tingling, memory problems  Psych:  No fatigue, insomnia, mood problems, depression    PHYSICAL EXAM:   Vital Signs:  Vital Signs Last 24 Hrs  T(C): 36.9 (07 Oct 2019 20:09), Max: 37.2 (07 Oct 2019 08:29)  T(F): 98.5 (07 Oct 2019 20:09), Max: 98.9 (07 Oct 2019 08:29)  HR: 104 (07 Oct 2019 20:09) (84 - 104)  BP: 153/94 (07 Oct 2019 20:09) (130/87 - 153/94)  BP(mean): --  RR: 21 (07 Oct 2019 20:09) (19 - 21)  SpO2: 94% (07 Oct 2019 20:09) (94% - 99%)  Daily     Daily     GENERAL: well-nourished, in mild respiratory distress  HEENT:  NC/AT,  conjunctivae clear and pink,   CHEST:  decrease Breath sound  HEART:  Regular rhythm, S1, S2,   ABDOMEN:  Soft, non-tender, non-distended, bowel sounds present,   EXTEREMITIES:  no cyanosis,clubbing or edema  NEURO:  Alert, oriented, no asterixis,   LABS:                        10.3   4.35  )-----------( 108      ( 07 Oct 2019 08:46 )             30.8     10-07    128<L>  |  90<L>  |  9   ----------------------------<  120<H>  3.8   |  25  |  0.45<L>    Ca    8.2<L>      07 Oct 2019 06:04    TPro  5.9<L>  /  Alb  3.7  /  TBili  1.8<H>  /  DBili  x   /  AST  337<H>  /  ALT  231<H>  /  AlkPhos  39<L>  10-07    LIVER FUNCTIONS - ( 07 Oct 2019 06:04 )  Alb: 3.7 g/dL / Pro: 5.9 g/dL / ALK PHOS: 39 U/L / ALT: 231 U/L / AST: 337 U/L / GGT: x                   Imaging:

## 2019-10-07 NOTE — PROGRESS NOTE ADULT - ASSESSMENT
ASSESSMENT:    chronic hypoxic respiratory failure due to severe COPD/emphysema with worsening dyspnea due to a COPD exacerbation and anxiety - there is no evidence of pneumonia, pulmonary edema, pleural effusions or pulmonary emboli on the CTA of the chest    worsening LFT abnormalities - alcoholic hepatitis    pancytopenia - bone marrow suppression from alcohol use    breast cancer s/p bilateral mastectomy    rheumatoid and psoriatic arthritis on Otezla - immunocompromised host    brain aneurysm s/p clipping    PLAN/RECOMMENDATIONS:    oxygen supplementation to keep saturation greater than 92%  prednisone 40mg daily x 3 days  albuterol/atrovent nebs q6h  pulmicort 0.5mg nebs q12h  singulair  watch for and treat alcohol withdrawal  thiamine/MVI/folate  Otezla/lidex solution to scalp/hydrocortisone cream to face  DVT prophylaxis - SQ heparin  GI prophylaxis - protonix  bowel regiman    Will follow with you. Plan of care discussed with the patient at bedside and her RN.    Joss Franks MD, [facility]  [phone]  Pulmonary Medicine

## 2019-10-07 NOTE — PHYSICAL THERAPY INITIAL EVALUATION ADULT - ADDITIONAL COMMENTS
Pt lives in private home with , few steps to enter. Pt states she requires assist to enter home and immediately sits in wheelchair when needed. Pt ambulates in home with RW. has shower chair and commode.

## 2019-10-07 NOTE — PROGRESS NOTE ADULT - ASSESSMENT
54 year old female with PMH of stage 3 breast cancer on aromasin, RA, Psoriasis on Otezla, brain aneurysm, s/p clip 1988, right jugular DVT, Catheter related MSSA bacteremia 2015,  ETOH abuse, COPD on oxygen presents here with dyspnea.    1. Dyspnea   likely secondary to pulm disease, COPD exacerbation   cv stable. no decomp chf on exam. pro bnp wnl, hs trop negative   no evidence of acs   CTA chest negative for PE, + severe emphysema   management  per pulm / med   can obtain echo to eval LV fx     2. Sinus tachycardia   likley secondary to withdrawal, dehydration, anxiety, copd exacerbation  no evidence of acs , asymptomatic  echo to eval LV fx     3 med f/u, CIWA  protocol     4. Elevated lactic acid level  work up per ID/ med     dvt ppx

## 2019-10-07 NOTE — PROGRESS NOTE ADULT - SUBJECTIVE AND OBJECTIVE BOX
[facility] - PROGRESS NOTE    CHIEF COMPLAINT: chronic hypoxic respiratory failure; COPD exacerbation; emphysema; current smoker; alcohol withdrawal    INTERVAL HISTORY: still receiving ativan for persistent withdrawal symptoms - anxious and mildly tremulous; claiming to be short of breath despite oxygen saturation of 98% on a nasal canula; no cough, sputum production, chest congestion or wheeze; slow mentation; no fevers, chills or sweats; no chest pain/pressure or palpitations; resolved lactic acidosis    REVIEW OF SYSTEMS:  Constitutional: As per interval history  HEENT: Within normal limits  CV: As per interval history  Resp: As per interval history  GI: Within normal limits   : Within normal limits  Musculoskeletal: Within normal limits  Skin: Within normal limits  Neurological: Within normal limits  Psychiatric: Within normal limits  Endocrine: Within normal limits  Hematologic/Lymphatic: Within normal limits  Allergic/Immunologic: Within normal limits      MEDICATIONS:     Pulmonary "  ALBUTerol/ipratropium for Nebulization 3 milliLiter(s) Nebulizer every 6 hours  buDESOnide    Inhalation Suspension 0.5 milliGRAM(s) Inhalation every 12 hours  montelukast 10 milliGRAM(s) Oral daily    Anti-microbials:    Cardiovascular:    Other:  ALPRAZolam 0.25 milliGRAM(s) Oral every 8 hours  cholecalciferol 2000 Unit(s) Oral daily  docusate sodium 100 milliGRAM(s) Oral two times a day  fluocinonide 0.05% Solution 1 Application(s) Topical at bedtime  folic acid 1 milliGRAM(s) Oral daily  heparin  Injectable 5000 Unit(s) SubCutaneous every 12 hours  hydrocortisone 1% Cream 1 Application(s) Topical two times a day  LORazepam   Injectable 0.5 milliGRAM(s) IV Push every 4 hours  LORazepam   Injectable   IV Push   multivitamin 1 Tablet(s) Oral daily  multivitamin 1 Tablet(s) Oral daily  pantoprazole    Tablet 40 milliGRAM(s) Oral before breakfast  predniSONE   Tablet 40 milliGRAM(s) Oral daily  prochlorperazine   Tablet 10 milliGRAM(s) Oral daily  senna 2 Tablet(s) Oral at bedtime  sodium chloride 0.9%. 1000 milliLiter(s) IV Continuous <Continuous>  thiamine 100 milliGRAM(s) Oral daily    MEDICATIONS  (PRN):  LORazepam   Injectable 2 milliGRAM(s) IV Push every 2 hours PRN Symptom-triggered: each CIWA -Ar score 8 or GREATER        OBJECTIVE:    I&O's Detail    06 Oct 2019 07:01  -  07 Oct 2019 07:00  --------------------------------------------------------  IN:    Oral Fluid: 720 mL    sodium chloride 0.9%.: 1800 mL  Total IN: 2520 mL    OUT:    Voided: 3500 mL  Total OUT: 3500 mL    Total NET: -980 mL      07 Oct 2019 07:01  -  07 Oct 2019 14:08  --------------------------------------------------------  IN:    Oral Fluid: 630 mL    sodium chloride 0.9%.: 525 mL  Total IN: 1155 mL    OUT:  Total OUT: 0 mL    Total NET: 1155 mL    PHYSICAL EXAM:       ICU Vital Signs Last 24 Hrs  T(C): 37.1 (07 Oct 2019 11:05), Max: 37.2 (07 Oct 2019 08:29)  T(F): 98.8 (07 Oct 2019 11:05), Max: 98.9 (07 Oct 2019 08:29)  HR: 92 (07 Oct 2019 11:05) (84 - 92)  BP: 133/84 (07 Oct 2019 11:05) (129/82 - 149/88)  BP(mean): --  ABP: --  ABP(mean): --  RR: 20 (07 Oct 2019 11:05) (19 - 20)  SpO2: 98% (07 Oct 2019 11:05) (96% - 99%) on 3lpm nasal canula     General: Awake. Alert. Cooperative. No distress. Appears stated age. Obese. Slow mentation.	  HEENT: Atraumatic. Normocephalic. Anicteric. Normal oral mucosa. PERRL. EOMI. Improving psoriatic plaque on scalp. Mallampati class IV airway.  Neck: Supple. Trachea midline. Thyroid without enlargement/tenderness/nodules. No carotid bruit. No JVD. Short and wide.	  Cardiovascular: Regular rate and rhythm. S1 S2 normal. No murmurs, rubs or gallops.  Respiratory: Respirations unlabored. Decreased breath sounds throughout. No wheeze. No curvature.  Abdomen: Soft. Non-tender. Non-distended. No organomegaly. No masses. Normal bowel sounds. Obese.  Extremities: Warm to touch. No clubbing or cyanosis. No pedal edema.  Pulses: 2+ peripheral pulses all extremities.	  Skin: Venous stasis changes on both lower extremities. Multiple areas of skin hyperpigmentation at areas of prior plaque.  Lymph Nodes: Cervical, supraclavicular and axillary nodes normal  Neurological: Motor and sensory examination equal and normal. A and O x 3  Psychiatry: Flat affect      LABS:                          10.3   4.35  )-----------( 108      ( 07 Oct 2019 08:46 )             30.8     CBC    WBC  4.35 <==, 5.75 <==, 7.79 <==, 3.05 <==, 5.34 <==    Hemoglobin  10.3 <<==, 9.6 <<==, 10.9 <<==, 11.3 <<==, 12.7 <<==    Hematocrit  30.8 <==, 29.9 <==, 33.2 <==, 34.2 <==, 36.5 <==    Platelets  108 <==, 104 <==, 115 <==, 114 <==, 134 <==      128<L>  |  90<L>  |  9   ----------------------------<  120<H>    10-07  3.8   |  25  |  0.45<L>      LYTES    sodium  128 <==, 131 <==, 132 <==, 136 <==, 133 <==, 135 <==, 131 <==    potassium   3.8 <==, 4.3 <==, 5.1 <==, 5.2 <==, 4.2 <==, 3.8 <==, 4.4 <==    chloride  90 <==, 92 <==, 90 <==, 94 <==, 91 <==, 92 <==, 86 <==    carbon dioxide  25 <==, 26 <==, 26 <==, 28 <==, 17 <==, 16 <==, 20 <==    =============================================================================================  RENAL FUNCTION:    Creatinine:   0.45  <<==, 0.50  <<==, 0.52  <<==, 0.51  <<==, 0.43  <<==, 0.43  <<==, 0.38  <<==    BUN:   9 <==, 14 <==, 12 <==, 8 <==, <4 <==, <4 <==, <4 <==    ============================================================================================    calcium   8.2 <==, 8.8 <==, 8.7 <==, 8.8 <==, 7.9 <==, 8.3 <==, 8.8 <==    phos   3.4 <==, 3.5 <==, 3.0 <==    mag   2.0 <==, 2.4 <==, 1.4 <==    ============================================================================================  LFTs    AST:   337 <== , 139 <== , 189 <== , 202 <== , 239 <== , 281 <==     ALT:  231  <== , 87  <== , 129  <== , 126  <== , 137  <== , 152  <==     AP:  39  <=, 40  <=, 58  <=, 60  <=, 62  <=, 68  <=    Bili:  1.8  <=, 1.8  <=, 1.0  <=, 1.1  <=, 1.3  <=, 1.3  <=    PT/INR - ( 04 Oct 2019 05:57 )   PT: 10.2 sec;   INR: 0.90 ratio      Venous Blood Gas:  10-04 @ 16:12  7.50/36/58/28/92  VBG Lactate: 2.7    Venous Blood Gas:  10-04 @ 05:52  --/--/--/--/--  VBG Lactate: 8.8    Venous Blood Gas:  10-03 @ 21:22  7.36/42/58/23/85  VBG Lactate: 7.7    Venous Blood Gas:  10-03 @ 18:38  7.37/46/107/26/97  VBG Lactate: 6.4    ABG - ( 04 Oct 2019 05:52 )  pH: 7.33  /  pCO2: 35    /  pO2: 100   / HCO3: 18    / Base Excess: -6.8  /  SaO2: 97        Procalcitonin, Serum: 0.17 ng/mL (10-04 @ 18:37)    Procalcitonin, Serum: 0.08 ng/mL (10-04 @ 05:57)    Serum Pro-Brain Natriuretic Peptide: 55 pg/mL (10-03 @ 21:22)    CARDIAC MARKERS ( 03 Oct 2019 18:38 )  CPK x     /CKMB x     /CKMB Units x        troponin 18 ng/L      MICROBIOLOGY:     Rapid Respiratory Viral Panel (10.03.19 @ 19:47)    Rapid RVP Result: NotDetec: This Respiratory Panel uses polymerase chain reaction (PCR) to detect for  adenovirus; coronavirus (HKU1, NL63, 229E, OC43); human metapneumovirus  (hMPV); human enterovirus/rhinovirus (Entero/RV); influenza A; influenza  A/H1; influenza A/H3; influenza A/H1-2009; influenza B; parainfluenza  viruses 1, 2, 3, 4; respiratory syncytial virus; Mycoplasma pneumoniae;  and Chlamydophila pneumoniae.    Urinalysis Basic - ( 04 Oct 2019 07:35 )    Color: Yellow / Appearance: Slightly Turbid / S.019 / pH: x  Gluc: x / Ketone: Moderate  / Bili: Negative / Urobili: Negative   Blood: x / Protein: 30 mg/dL / Nitrite: Negative   Leuk Esterase: Small / RBC: 5 /hpf / WBC 3 /HPF   Sq Epi: x / Non Sq Epi: 1 / Bacteria: Many    Culture - Urine (10.04.19 @ 10:27)    Specimen Source: .Urine    Culture Results:   >=3 organisms. Probable collection contamination.    Culture - Blood (10.04.19 @ 05:25)    Specimen Source: .Blood    Culture Results:   No growth to date.    Culture - Blood (10.04.19 @ 05:25)    Specimen Source: .Blood    Culture Results:   No growth to date.    RADIOLOGY:  [x] Chest radiographs reviewed and interpreted by me    ROSALINA:  XR CHEST AP OR PA 1V                          PROCEDURE DATE:  10/03/2019      INTERPRETATION:    CLINICAL INFORMATION: Shortness of breath    EXAM: Chest X-ray, AP View    COMPARISON: Chest x-ray 3/21/2019    FINDINGS:    The lungs are clear.    Heart size cannot be accurately assessed in this projection.    No acute osseous findings.      IMPRESSION:     Clear lungs.      PREETI HANSEN M.D., RADIOLOGY RESIDENT  This document has been electronically signed.  MIRI NORRIS M.D., ATTENDING RADIOLOGIST  This document has been electronically signed. Oct  4 2019  8:57AM  ---------------------------------------------------------------------------------------------------------------    EXAM:  CT ANGIO CHEST (W)AW IC                          PROCEDURE DATE:  10/03/2019      INTERPRETATION:  CLINICAL INFORMATION: Shortness of breath. Prior DVT.     COMPARISON: CT chest from 3/17/2019.    PROCEDURE:   CT Angiography of the Chest.  90 ml of Omnipaque 350 was injected intravenously. 10 ml were discarded.  Sagittal and coronal reformats were performed as well as 3D (MIP)   reconstructions.      FINDINGS:    LUNGS AND AIRWAYS: Patent central airways.  Severe emphysema. Dependent   changes.    PLEURA: No pleural effusion.    MEDIASTINUM AND CHESTER: No lymphadenopathy.    VESSELS: Good opacification the pulmonary arterial tree. No pulmonary   embolism.    HEART: Heart size is normal. No pericardial effusion.    CHEST WALL AND LOWER NECK: Within normal limits.    VISUALIZED UPPER ABDOMEN: Small hiatal hernia. Hepatic steatosis.    BONES: Within normal limits.    IMPRESSION:     No pulmonary embolism.    Severe emphysema.    MARYA CODY M.D., RADIOLOGY RESIDENT  This document has been electronically signed.  JUWAN ROONEY M.D., ATTENDING RADIOLOGIST  This document has been electronically signed. Oct  3 2019  7:29PM  ---------------------------------------------------------------------------------------------------------------

## 2019-10-07 NOTE — PHYSICAL THERAPY INITIAL EVALUATION ADULT - PRECAUTIONS/LIMITATIONS, REHAB EVAL
She has also been dyspneic at rest and with minimal activities.  She has cough with white mucous, but that is not new for her.  At home she has been using nebulizers without much improvement. Pt was admitted in March for SOB and alcohol intoxication.  She was treated with steroid taper and ativan taper.  At home she drinks 2-3 shots of scotch and "lotta" beer.  Last drink was this morning. During my interview, she is shaking a lot, but both pt and  states that she does that very often.  At home she is on xanax for anxiety.   CTAngio: No pulmonary embolism. Severe emphysema. CXR: (-)

## 2019-10-07 NOTE — PROGRESS NOTE ADULT - SUBJECTIVE AND OBJECTIVE BOX
Follow Up:  Lactic acidosis    Interval History: Continued cough and shortness of breath. No chills or fevers. No N/V/D.     REVIEW OF SYSTEMS  [  ] ROS unobtainable because:    [  ] All other systems negative except as noted below    Constitutional:  [ ] fever [ ] chills  [ ] weight loss  [ ] weakness  Skin:  [ ] rash [ ] phlebitis	  Eyes: [ ] icterus [ ] pain  [ ] discharge	  ENMT: [ ] sore throat  [ ] thrush [ ] ulcers [ ] exudates  Respiratory: [x ] dyspnea [ ] hemoptysis [ x] cough [ ] sputum	  Cardiovascular:  [ ] chest pain [ ] palpitations [ ] edema	  Gastrointestinal:  [ ] nausea [ ] vomiting [ ] diarrhea [ ] constipation [ ] pain	  Genitourinary:  [ ] dysuria [ ] frequency [ ] hematuria [ ] discharge [ ] flank pain  [ ] incontinence  Musculoskeletal:  [ ] myalgias [ ] arthralgias [ ] arthritis  [ ] back pain  Neurological:  [ ] headache [ ] seizures  [ ] confusion/altered mental status    Allergies  amoxicillin (Anaphylaxis)  Levaquin (Other; Anaphylaxis)  Levaquin (Unknown)  penicillin (Anaphylaxis)  penicillins (Anaphylaxis)        ANTIMICROBIALS:      OTHER MEDS:  MEDICATIONS  (STANDING):  ALBUTerol/ipratropium for Nebulization 3 every 6 hours  ALPRAZolam 0.25 every 8 hours  buDESOnide    Inhalation Suspension 0.5 every 12 hours  docusate sodium 100 two times a day  heparin  Injectable 5000 every 12 hours  LORazepam   Injectable 2 every 2 hours PRN  LORazepam   Injectable    montelukast 10 daily  pantoprazole    Tablet 40 before breakfast  predniSONE   Tablet 40 daily  prochlorperazine   Tablet 10 daily  senna 2 at bedtime      Vital Signs Last 24 Hrs  T(C): 36.8 (07 Oct 2019 15:06), Max: 37.2 (07 Oct 2019 08:29)  T(F): 98.3 (07 Oct 2019 15:06), Max: 98.9 (07 Oct 2019 08:29)  HR: 100 (07 Oct 2019 15:06) (84 - 100)  BP: 130/87 (07 Oct 2019 15:06) (130/87 - 149/88)  BP(mean): --  RR: 20 (07 Oct 2019 15:06) (19 - 20)  SpO2: 97% (07 Oct 2019 15:06) (96% - 99%)    PHYSICAL EXAMINATION:  General: Alert and Awake, NAD  HEENT: PERRL, EOMI, No subconjunctival hemorrhages, Oropharynx Clear, MMM  Neck: Supple, No SHAHID  Cardiac: RRR, No M/R/G  Resp: CTAB, No Wh/Rh/Ra  Abdomen: NBS, NT/ND, No HSM, No rigidity or guarding  MSK: No LE edema. No stigmata of IE. No evidence of phlebitis. No evidence of synovitis.  : No snyder  Skin: Scaling rash on the scalp. Hyperpigmented plaques on lower back with excoriations. Hyperpigmented plaques on the legs and feet. No area of erythema or drainage concerning for cellulitis. Skin is warm and dry to the touch.   Neuro: Alert and Awake. CN 2-12 Grossly intact. Moves all four extremities spontaneously.  Psych: Calm, Pleasant, Cooperative                          10.3   4.35  )-----------( 108      ( 07 Oct 2019 08:46 )             30.8       10-07    128<L>  |  90<L>  |  9   ----------------------------<  120<H>  3.8   |  25  |  0.45<L>    Ca    8.2<L>      07 Oct 2019 06:04    TPro  5.9<L>  /  Alb  3.7  /  TBili  1.8<H>  /  DBili  x   /  AST  337<H>  /  ALT  231<H>  /  AlkPhos  39<L>  10-07          MICROBIOLOGY:  v  .Urine  10-04-19   >=3 organisms. Probable collection contamination.  --  --      .Blood  10-04-19   No growth to date.  --  --    Rapid RVP Result: [employer] (10-03 @ 19:47)    RADIOLOGY:    EXAM:  CT ANGIO CHEST (W)AW IC                        PROCEDURE DATE:  10/03/2019    No pulmonary embolism.  Severe emphysema.

## 2019-10-07 NOTE — PHYSICAL THERAPY INITIAL EVALUATION ADULT - PERTINENT HX OF CURRENT PROBLEM, REHAB EVAL
53y/o F with PMH of stage 3 breast cancer on aromasin, RA, Psoriasis on Otezla, brain aneurysm, s/p clip 1988, R jugular DVT, Catheter related MSSA bacteremia 2015,  ETOH abuse, COPD on oxygen p/w dyspnea.  Pt states that she has been very lethargic.

## 2019-10-07 NOTE — CONSULT NOTE ADULT - PROBLEM SELECTOR RECOMMENDATION 9
-neuro checks  -WA protocol  -cont. benzo therapy for now   - cont. with Thiamine and multivatim  -cont. with pantoprazole   - monitor LFT's  will follow with you

## 2019-10-07 NOTE — PROGRESS NOTE ADULT - ASSESSMENT
54 yr old female with  Hx significant for COPD, EtOH abuse, Breast Ca, remote brain aneurysm s/p clip who presented with COPD exacerbation initially treated with duoneb and steroid tx  received MICU consult for elevated lactate and COPD exacerbation. and elevated CIWA score    -Pt with hx of EtOH abuse with last drink 10/3/19 at 0900  -neuro checks  -CIWA protocol  -as pt on chronic benzo therapy  to continue   no signs of withdrawal    neuro eval appreciated  no acute issues     - Pt with COPD on home O2  c/w pulm meds  -supplemental O2 - titrate to maintain SPO2 > 88%  tachycardia improved  -Last documented TTE 2016 (mild diastolic dysfunction)  echo  cards eval noted    -diet as tolerated  -strict I & O's -   ppi     transaminitis sec to etoh abuse  elevated  ruq sono   gi eval     id eval noted  lactate improved  no active id issue at present

## 2019-10-07 NOTE — PROGRESS NOTE ADULT - SUBJECTIVE AND OBJECTIVE BOX
CARDIOLOGY FOLLOW UP - Dr. Nj    CC no cp/sob       PHYSICAL EXAM:  T(C): 37.2 (10-07-19 @ 08:29), Max: 37.2 (10-07-19 @ 08:29)  HR: 90 (10-07-19 @ 08:29) (84 - 95)  BP: 145/89 (10-07-19 @ 08:29) (129/82 - 149/88)  RR: 20 (10-07-19 @ 08:29) (19 - 20)  SpO2: 96% (10-07-19 @ 08:29) (96% - 99%)  Wt(kg): --  I&O's Summary    06 Oct 2019 07:01  -  07 Oct 2019 07:00  --------------------------------------------------------  IN: 2520 mL / OUT: 3500 mL / NET: -980 mL    07 Oct 2019 07:01  -  07 Oct 2019 11:00  --------------------------------------------------------  IN: 540 mL / OUT: 0 mL / NET: 540 mL        Appearance: Normal	  Cardiovascular: Normal S1 S2,RRR, No JVD, No murmurs  Respiratory: Lungs clear to auscultation	  Gastrointestinal:  Soft, Non-tender, + BS	  Extremities: Normal range of motion, No clubbing, cyanosis or edema        MEDICATIONS  (STANDING):  ALBUTerol/ipratropium for Nebulization 3 milliLiter(s) Nebulizer every 6 hours  ALPRAZolam 0.25 milliGRAM(s) Oral every 8 hours  buDESOnide    Inhalation Suspension 0.5 milliGRAM(s) Inhalation every 12 hours  cholecalciferol 2000 Unit(s) Oral daily  docusate sodium 100 milliGRAM(s) Oral two times a day  fluocinonide 0.05% Solution 1 Application(s) Topical at bedtime  folic acid 1 milliGRAM(s) Oral daily  heparin  Injectable 5000 Unit(s) SubCutaneous every 12 hours  hydrocortisone 1% Cream 1 Application(s) Topical two times a day  LORazepam   Injectable 0.5 milliGRAM(s) IV Push every 4 hours  LORazepam   Injectable   IV Push   montelukast 10 milliGRAM(s) Oral daily  multivitamin 1 Tablet(s) Oral daily  multivitamin 1 Tablet(s) Oral daily  pantoprazole    Tablet 40 milliGRAM(s) Oral before breakfast  predniSONE   Tablet 40 milliGRAM(s) Oral daily  prochlorperazine   Tablet 10 milliGRAM(s) Oral daily  senna 2 Tablet(s) Oral at bedtime  sodium chloride 0.9%. 1000 milliLiter(s) (75 mL/Hr) IV Continuous <Continuous>  thiamine 100 milliGRAM(s) Oral daily      TELEMETRY: NSR   ECG:  	  RADIOLOGY:   DIAGNOSTIC TESTING:  [ ] Echocardiogram:  [ ]  Catheterization:  [ ] Stress Test:    OTHER: 	    LABS:	 	                                10.3   4.35  )-----------( 108      ( 07 Oct 2019 08:46 )             30.8     10-07    128<L>  |  90<L>  |  9   ----------------------------<  120<H>  3.8   |  25  |  0.45<L>    Ca    8.2<L>      07 Oct 2019 06:04    TPro  5.9<L>  /  Alb  3.7  /  TBili  1.8<H>  /  DBili  x   /  AST  337<H>  /  ALT  231<H>  /  AlkPhos  39<L>  10-07

## 2019-10-07 NOTE — PROGRESS NOTE ADULT - SUBJECTIVE AND OBJECTIVE BOX
CHIEF COMPLAINT:Patient is a 54y old  Female who presents with a chief complaint of shortness of breath (07 Oct 2019 11:00)    	        PAST MEDICAL & SURGICAL HISTORY:  Prophylactic measure  Breast cancer  Brain aneurysm: with clips  Psoriasis  RA (rheumatoid arthritis)  Psoriasis  Breast cancer, stage 3  History of modified radical mastectomy of both breasts  S/P bilateral mastectomy  Brain aneurysm: 1988 two clips          REVIEW OF SYSTEMS:  feels  better overall  refuses cat scan  EYES: No eye pain, visual disturbances, or discharge  NECK: No pain or stiffness  RESPIRATORY: No cough, wheezing, chills or hemoptysis; No Shortness of Breath  CARDIOVASCULAR: No chest pain, palpitations, passing out, dizziness,   GASTROINTESTINAL: No abdominal or epigastric pain. No nausea, vomiting, or hematemesis;   GENITOURINARY: No dysuria, frequency, hematuria, or incontinence  NEUROLOGICAL: No headaches,   Medications:  MEDICATIONS  (STANDING):  ALBUTerol/ipratropium for Nebulization 3 milliLiter(s) Nebulizer every 6 hours  ALPRAZolam 0.25 milliGRAM(s) Oral every 8 hours  buDESOnide    Inhalation Suspension 0.5 milliGRAM(s) Inhalation every 12 hours  cholecalciferol 2000 Unit(s) Oral daily  docusate sodium 100 milliGRAM(s) Oral two times a day  fluocinonide 0.05% Solution 1 Application(s) Topical at bedtime  folic acid 1 milliGRAM(s) Oral daily  heparin  Injectable 5000 Unit(s) SubCutaneous every 12 hours  hydrocortisone 1% Cream 1 Application(s) Topical two times a day  LORazepam   Injectable 0.5 milliGRAM(s) IV Push every 4 hours  LORazepam   Injectable   IV Push   montelukast 10 milliGRAM(s) Oral daily  multivitamin 1 Tablet(s) Oral daily  multivitamin 1 Tablet(s) Oral daily  pantoprazole    Tablet 40 milliGRAM(s) Oral before breakfast  predniSONE   Tablet 40 milliGRAM(s) Oral daily  prochlorperazine   Tablet 10 milliGRAM(s) Oral daily  senna 2 Tablet(s) Oral at bedtime  sodium chloride 0.9%. 1000 milliLiter(s) (75 mL/Hr) IV Continuous <Continuous>  thiamine 100 milliGRAM(s) Oral daily    MEDICATIONS  (PRN):  LORazepam   Injectable 2 milliGRAM(s) IV Push every 2 hours PRN Symptom-triggered: each CIWA -Ar score 8 or GREATER    	    PHYSICAL EXAM:  T(C): 37.1 (10-07-19 @ 11:05), Max: 37.2 (10-07-19 @ 08:29)  HR: 92 (10-07-19 @ 11:05) (84 - 95)  BP: 133/84 (10-07-19 @ 11:05) (129/82 - 149/88)  RR: 20 (10-07-19 @ 11:05) (19 - 20)  SpO2: 98% (10-07-19 @ 11:05) (96% - 99%)  Wt(kg): --  I&O's Summary    06 Oct 2019 07:01  -  07 Oct 2019 07:00  --------------------------------------------------------  IN: 2520 mL / OUT: 3500 mL / NET: -980 mL    07 Oct 2019 07:01  -  07 Oct 2019 11:22  --------------------------------------------------------  IN: 540 mL / OUT: 0 mL / NET: 540 mL        Appearance: Normal	  HEENT:   Normal oral mucosa, PERRL, EOMI	  Lymphatic: No lymphadenopathy  Cardiovascular: Normal S1 S2, No JVD,   Respiratory: Lungs clear to auscultation	  Psychiatry: A & O x 3, Mood & affect appropriate  Gastrointestinal:  Soft, Non-tender, + BS	  Skin: No rashes, No ecchymoses, No cyanosis	  Neurologic: Non-focal motor equal b/l sensory intact  Extremities: Normal range of motion, No clubbing, cyanosis or edema  Vascular: Peripheral pulses palpable    TELEMETRY: 	    ECG:  	  RADIOLOGY:  OTHER: 	  	  LABS:	 	    CARDIAC MARKERS:                                10.3   4.35  )-----------( 108      ( 07 Oct 2019 08:46 )             30.8     10-07    128<L>  |  90<L>  |  9   ----------------------------<  120<H>  3.8   |  25  |  0.45<L>    Ca    8.2<L>      07 Oct 2019 06:04    TPro  5.9<L>  /  Alb  3.7  /  TBili  1.8<H>  /  DBili  x   /  AST  337<H>  /  ALT  231<H>  /  AlkPhos  39<L>  10-07    proBNP:   Lipid Profile:   HgA1c:   TSH:

## 2019-10-07 NOTE — PHYSICAL THERAPY INITIAL EVALUATION ADULT - CRITERIA FOR SKILLED THERAPEUTIC INTERVENTIONS
predicted duration of therapy intervention/anticipated equipment needs at discharge/risk reduction/prevention/rehab potential/impairments found/functional limitations in following categories/therapy frequency/anticipated discharge recommendation

## 2019-10-07 NOTE — PROGRESS NOTE ADULT - ASSESSMENT
54 year old female PMH of stage 3 breast cancer on aromasin, RA, Psoriasis on Otezla, brain aneurysm, s/p clip 1988, right jugular DVT, Catheter related MSSA bacteremia 2015,  ETOH abuse, COPD on oxygen who presented to [facility] on 10/3 with dyspnea. In the ED afebrile, normotensive but tachycardic to >110. WBC on presentation of 5.34 with 81.4% PMN. ALT of 152, AST of 281, T Bili of 1.3, Alk Phos of 68. Anion gap of 22. Lactic acid of 6.4. U/A with 3 WBC. RVP negative.     Lactic acidosis on presentation may be from COPD exacerbation treated with albuterol + tremors + decreased clearance of lactic acidosis.     CT Chest without evidence of pneumonia.   Abdomen appears benign on examination.   Blood cultures with NGTD in 72 hours  Procalcitonin negative    Overall, Lactic acidosis, Tachycardia, Transaminitis, COPD Exacerbation    --Continue to monitor off of antibiotics  --Management of COPD exacerbation per pulm and primary team   --Consider RUQ US given transaminitis    I will sign off at this time. Please feel free to contact me with any further questions or concerns.    Bernard Gerber M.D.  [facility] Division of Infectious Disease  8AM-5PM: Pager Number [phone]  After Hours (or if no response): Please contact the Infectious Diseases Office at [phone]

## 2019-10-08 LAB
ALBUMIN SERPL ELPH-MCNC: 3.8 G/DL — SIGNIFICANT CHANGE UP (ref 3.3–5)
ALP SERPL-CCNC: 43 U/L — SIGNIFICANT CHANGE UP (ref 40–120)
ALT FLD-CCNC: 287 U/L — HIGH (ref 10–45)
ANION GAP SERPL CALC-SCNC: 14 MMOL/L — SIGNIFICANT CHANGE UP (ref 5–17)
AST SERPL-CCNC: 301 U/L — HIGH (ref 10–40)
BILIRUB SERPL-MCNC: 1.5 MG/DL — HIGH (ref 0.2–1.2)
BUN SERPL-MCNC: 9 MG/DL — SIGNIFICANT CHANGE UP (ref 7–23)
CALCIUM SERPL-MCNC: 8.7 MG/DL — SIGNIFICANT CHANGE UP (ref 8.4–10.5)
CHLORIDE SERPL-SCNC: 91 MMOL/L — LOW (ref 96–108)
CO2 SERPL-SCNC: 27 MMOL/L — SIGNIFICANT CHANGE UP (ref 22–31)
CREAT SERPL-MCNC: 0.5 MG/DL — SIGNIFICANT CHANGE UP (ref 0.5–1.3)
GLUCOSE SERPL-MCNC: 103 MG/DL — HIGH (ref 70–99)
HCT VFR BLD CALC: 32.7 % — LOW (ref 34.5–45)
HGB BLD-MCNC: 11 G/DL — LOW (ref 11.5–15.5)
MCHC RBC-ENTMCNC: 33.6 GM/DL — SIGNIFICANT CHANGE UP (ref 32–36)
MCHC RBC-ENTMCNC: 34.6 PG — HIGH (ref 27–34)
MCV RBC AUTO: 102.8 FL — HIGH (ref 80–100)
PLATELET # BLD AUTO: 146 K/UL — LOW (ref 150–400)
POTASSIUM SERPL-MCNC: 3.6 MMOL/L — SIGNIFICANT CHANGE UP (ref 3.5–5.3)
POTASSIUM SERPL-SCNC: 3.6 MMOL/L — SIGNIFICANT CHANGE UP (ref 3.5–5.3)
PROT SERPL-MCNC: 6.3 G/DL — SIGNIFICANT CHANGE UP (ref 6–8.3)
RBC # BLD: 3.18 M/UL — LOW (ref 3.8–5.2)
RBC # FLD: 13 % — SIGNIFICANT CHANGE UP (ref 10.3–14.5)
SODIUM SERPL-SCNC: 132 MMOL/L — LOW (ref 135–145)
WBC # BLD: 4.49 K/UL — SIGNIFICANT CHANGE UP (ref 3.8–10.5)
WBC # FLD AUTO: 4.49 K/UL — SIGNIFICANT CHANGE UP (ref 3.8–10.5)

## 2019-10-08 PROCEDURE — 76700 US EXAM ABDOM COMPLETE: CPT | Mod: 26

## 2019-10-08 RX ADMIN — Medication 2000 UNIT(S): at 10:54

## 2019-10-08 RX ADMIN — Medication 1 MILLIGRAM(S): at 10:54

## 2019-10-08 RX ADMIN — HEPARIN SODIUM 5000 UNIT(S): 5000 INJECTION INTRAVENOUS; SUBCUTANEOUS at 06:37

## 2019-10-08 RX ADMIN — Medication 3 MILLILITER(S): at 00:41

## 2019-10-08 RX ADMIN — SODIUM CHLORIDE 75 MILLILITER(S): 9 INJECTION INTRAMUSCULAR; INTRAVENOUS; SUBCUTANEOUS at 10:55

## 2019-10-08 RX ADMIN — PANTOPRAZOLE SODIUM 40 MILLIGRAM(S): 20 TABLET, DELAYED RELEASE ORAL at 06:36

## 2019-10-08 RX ADMIN — Medication 3 MILLILITER(S): at 17:07

## 2019-10-08 RX ADMIN — HEPARIN SODIUM 5000 UNIT(S): 5000 INJECTION INTRAVENOUS; SUBCUTANEOUS at 17:07

## 2019-10-08 RX ADMIN — Medication 1 TABLET(S): at 10:54

## 2019-10-08 RX ADMIN — Medication 0.25 MILLIGRAM(S): at 21:12

## 2019-10-08 RX ADMIN — Medication 0.5 MILLIGRAM(S): at 17:07

## 2019-10-08 RX ADMIN — Medication 1 APPLICATION(S): at 17:07

## 2019-10-08 RX ADMIN — Medication 40 MILLIGRAM(S): at 06:37

## 2019-10-08 RX ADMIN — Medication 0.25 MILLIGRAM(S): at 11:32

## 2019-10-08 RX ADMIN — MONTELUKAST 10 MILLIGRAM(S): 4 TABLET, CHEWABLE ORAL at 10:54

## 2019-10-08 RX ADMIN — Medication 3 MILLILITER(S): at 23:34

## 2019-10-08 RX ADMIN — Medication 3 MILLILITER(S): at 10:55

## 2019-10-08 RX ADMIN — Medication 1 APPLICATION(S): at 06:38

## 2019-10-08 RX ADMIN — Medication 0.5 MILLIGRAM(S): at 07:17

## 2019-10-08 RX ADMIN — Medication 1 APPLICATION(S): at 21:11

## 2019-10-08 RX ADMIN — Medication 0.25 MILLIGRAM(S): at 02:29

## 2019-10-08 RX ADMIN — Medication 100 MILLIGRAM(S): at 10:54

## 2019-10-08 RX ADMIN — Medication 10 MILLIGRAM(S): at 10:54

## 2019-10-08 RX ADMIN — Medication 0.5 MILLIGRAM(S): at 06:36

## 2019-10-08 NOTE — PROGRESS NOTE ADULT - SUBJECTIVE AND OBJECTIVE BOX
CHIEF COMPLAINT:Patient is a 54y old  Female who presents with a chief complaint of shortness of breath (08 Oct 2019 10:35)    	        PAST MEDICAL & SURGICAL HISTORY:  Prophylactic measure  Breast cancer  Brain aneurysm: with clips  Psoriasis  RA (rheumatoid arthritis)  Psoriasis  Breast cancer, stage 3  History of modified radical mastectomy of both breasts  S/P bilateral mastectomy  Brain aneurysm: 1988 two clips          REVIEW OF SYSTEMS:  feels ok   EYES: No eye pain,   NECK: No pain or stiffness  RESPIRATORY: breathing ok   CARDIOVASCULAR: No chest pain, palpitations, passing out, dizzines  GASTROINTESTINAL: No abdominal or epigastric pain. No nausea, vomiting, or hematemesis; No diarrhea or constipation  GENITOURINARY: No dysuria, frequency, hematuria, or incontinence  NEUROLOGICAL: No headaches, memory loss, loss of strength, no tremors    MUSCULOSKELETAL: No joint pain or swelling; No muscle, back, or extremity pain    Medications:  MEDICATIONS  (STANDING):  ALBUTerol/ipratropium for Nebulization 3 milliLiter(s) Nebulizer every 6 hours  ALPRAZolam 0.25 milliGRAM(s) Oral every 8 hours  buDESOnide    Inhalation Suspension 0.5 milliGRAM(s) Inhalation every 12 hours  cholecalciferol 2000 Unit(s) Oral daily  docusate sodium 100 milliGRAM(s) Oral two times a day  fluocinonide 0.05% Solution 1 Application(s) Topical at bedtime  folic acid 1 milliGRAM(s) Oral daily  heparin  Injectable 5000 Unit(s) SubCutaneous every 12 hours  hydrocortisone 1% Cream 1 Application(s) Topical two times a day  LORazepam   Injectable 0.5 milliGRAM(s) IV Push every 12 hours  LORazepam   Injectable   IV Push   montelukast 10 milliGRAM(s) Oral daily  multivitamin 1 Tablet(s) Oral daily  multivitamin 1 Tablet(s) Oral daily  pantoprazole    Tablet 40 milliGRAM(s) Oral before breakfast  prochlorperazine   Tablet 10 milliGRAM(s) Oral daily  senna 2 Tablet(s) Oral at bedtime  sodium chloride 0.9%. 1000 milliLiter(s) (75 mL/Hr) IV Continuous <Continuous>  thiamine 100 milliGRAM(s) Oral daily    MEDICATIONS  (PRN):  LORazepam   Injectable 2 milliGRAM(s) IV Push every 2 hours PRN Symptom-triggered: each CIWA -Ar score 8 or GREATER    	    PHYSICAL EXAM:  T(C): 37.2 (10-08-19 @ 11:28), Max: 37.4 (10-08-19 @ 08:17)  HR: 78 (10-08-19 @ 11:28) (77 - 104)  BP: 138/86 (10-08-19 @ 11:28) (130/87 - 153/94)  RR: 18 (10-08-19 @ 11:28) (18 - 21)  SpO2: 97% (10-08-19 @ 11:28) (94% - 100%)  Wt(kg): --  I&O's Summary    07 Oct 2019 07:01  -  08 Oct 2019 07:00  --------------------------------------------------------  IN: 2440 mL / OUT: 2900 mL / NET: -460 mL    08 Oct 2019 07:01  -  08 Oct 2019 12:50  --------------------------------------------------------  IN: 885 mL / OUT: 0 mL / NET: 885 mL        Appearance: Normal	  HEENT:   Normal oral mucosa, PERRL, EOMI	  Lymphatic: No lymphadenopathy  Cardiovascular: Normal S1 S2, No JVD, No murmurs, No edema  Respiratory: Lungs clear to auscultation	  Psychiatry: A & O x 3,   Gastrointestinal:  Soft, Non-tender, + BS	  Skin: No rashes, No ecchymoses, No cyanosis	  Neurologic: Non-focal  Extremities: Normal range of motion, No clubbing, cyanosis or edema  Vascular: Peripheral pulses palpable 2+ bilaterally    TELEMETRY: 	    ECG:  	  RADIOLOGY:  OTHER: 	  	  LABS:	 	    CARDIAC MARKERS:                                11.0   4.49  )-----------( 146      ( 08 Oct 2019 09:49 )             32.7     10-08    132<L>  |  91<L>  |  9   ----------------------------<  103<H>  3.6   |  27  |  0.50    Ca    8.7      08 Oct 2019 07:08    TPro  6.3  /  Alb  3.8  /  TBili  1.5<H>  /  DBili  x   /  AST  301<H>  /  ALT  287<H>  /  AlkPhos  43  10-08    proBNP:   Lipid Profile:   HgA1c:   TSH:

## 2019-10-08 NOTE — PROGRESS NOTE ADULT - ASSESSMENT
54 yr old female with  Hx significant for COPD, EtOH abuse, Breast Ca, remote brain aneurysm s/p clip who presented with COPD exacerbation initially treated with duoneb and steroid tx  received MICU consult for elevated lactate and COPD exacerbation. and elevated CIWA score    -Pt with hx of EtOH abuse with last drink 10/3/19 at 0900  -neuro checks  -CIWA protocol  -as pt on chronic benzo therapy  to continue   no signs of withdrawal    neuro eval appreciated  no acute issues     - Pt with COPD on home O2  c/w pulm meds  -supplemental O2 - titrate to maintain SPO2 > 88%  tachycardia improved  -Last documented TTE 2016 (mild diastolic dysfunction)  echo  cards eval noted    -diet as tolerated  -strict I & O's -   ppi     transaminitis sec to etoh abuse  elevated  ruq sono   gi eval noted    id eval noted  lactate improved  no active id issue at present   d/c planning if sono neg and lfts remain stable   discussed w/

## 2019-10-08 NOTE — PROGRESS NOTE ADULT - ASSESSMENT
54 year old female with PMH of stage 3 breast cancer on aromasin, RA, Psoriasis on Otezla, brain aneurysm, s/p clip 1988, right jugular DVT, Catheter related MSSA bacteremia 2015,  ETOH abuse, COPD on oxygen presents here with dyspnea.    1. Dyspnea   likely secondary to pulm disease, COPD exacerbation   cv stable. no decomp chf on exam. pro bnp wnl, hs trop negative   no evidence of acs   CTA chest negative for PE, + severe emphysema   management  per pulm / med   echo with grossly normal LV function     2. Sinus tachycardia -resolved   likley secondary to withdrawal, dehydration, anxiety, copd exacerbation  no evidence of acs , asymptomatic  echo with grossly normal lv function     3 med f/u, CIWA  protocol     4. Elevated lactic acid level  work up per ID/ med     dvt ppx

## 2019-10-08 NOTE — PROGRESS NOTE ADULT - SUBJECTIVE AND OBJECTIVE BOX
[facility] - PROGRESS NOTE    CHIEF COMPLAINT: chronic hypoxic respiratory failure; COPD exacerbation; emphysema; current smoker; alcohol withdrawal    INTERVAL HISTORY: still receiving ativan for persistent withdrawal symptoms - anxious and mildly tremulous; claiming to be short of breath despite oxygen saturation of 98% on a nasal canula; no cough, sputum production, chest congestion or wheeze; slow mentation; no fevers, chills or sweats; no chest pain/pressure or palpitations; resolved lactic acidosis; no eager to get out of bed;  wants placement in a long term care facility    REVIEW OF SYSTEMS:  Constitutional: As per interval history  HEENT: Within normal limits  CV: As per interval history  Resp: As per interval history  GI: Within normal limits   : Within normal limits  Musculoskeletal: Within normal limits  Skin: Within normal limits  Neurological: slow mentation  Psychiatric: Within normal limits  Endocrine: Within normal limits  Hematologic/Lymphatic: Within normal limits  Allergic/Immunologic: Within normal limits      MEDICATIONS:     Pulmonary "  ALBUTerol/ipratropium for Nebulization 3 milliLiter(s) Nebulizer every 6 hours  buDESOnide    Inhalation Suspension 0.5 milliGRAM(s) Inhalation every 12 hours  montelukast 10 milliGRAM(s) Oral daily    Anti-microbials:    Cardiovascular:    Other:  ALPRAZolam 0.25 milliGRAM(s) Oral every 8 hours  cholecalciferol 2000 Unit(s) Oral daily  docusate sodium 100 milliGRAM(s) Oral two times a day  fluocinonide 0.05% Solution 1 Application(s) Topical at bedtime  folic acid 1 milliGRAM(s) Oral daily  heparin  Injectable 5000 Unit(s) SubCutaneous every 12 hours  hydrocortisone 1% Cream 1 Application(s) Topical two times a day  LORazepam   Injectable 0.5 milliGRAM(s) IV Push every 12 hours  LORazepam   Injectable   IV Push   multivitamin 1 Tablet(s) Oral daily  multivitamin 1 Tablet(s) Oral daily  pantoprazole    Tablet 40 milliGRAM(s) Oral before breakfast  prochlorperazine   Tablet 10 milliGRAM(s) Oral daily  senna 2 Tablet(s) Oral at bedtime  sodium chloride 0.9%. 1000 milliLiter(s) IV Continuous <Continuous>  thiamine 100 milliGRAM(s) Oral daily    MEDICATIONS  (PRN):  LORazepam   Injectable 2 milliGRAM(s) IV Push every 2 hours PRN Symptom-triggered: each CIWA -Ar score 8 or GREATER        OBJECTIVE:    I&O's Detail    07 Oct 2019 07:01  -  08 Oct 2019 07:00  --------------------------------------------------------  IN:    Oral Fluid: 1390 mL    sodium chloride 0.9%.: 975 mL  Total IN: 2365 mL    OUT:    Voided: 2900 mL  Total OUT: 2900 mL    Total NET: -535 mL      PHYSICAL EXAM:       ICU Vital Signs Last 24 Hrs  T(C): 37.4 (08 Oct 2019 08:17), Max: 37.4 (08 Oct 2019 08:17)  T(F): 99.3 (08 Oct 2019 08:17), Max: 99.3 (08 Oct 2019 08:17)  HR: 77 (08 Oct 2019 08:17) (77 - 104)  BP: 150/90 (08 Oct 2019 08:17) (130/87 - 153/94)  BP(mean): --  ABP: --  ABP(mean): --  RR: 19 (08 Oct 2019 08:17) (18 - 21)  SpO2: 100% (08 Oct 2019 08:17) (94% - 100%) on 2lpm nasal canula     General: Awake. Alert. Cooperative. No distress. Appears stated age. Obese. Slow mentation.	  HEENT: Atraumatic. Normocephalic. Anicteric. Normal oral mucosa. PERRL. EOMI. Improving psoriatic plaque on scalp. Mallampati class IV airway.  Neck: Supple. Trachea midline. Thyroid without enlargement/tenderness/nodules. No carotid bruit. No JVD. Short and wide.	  Cardiovascular: Regular rate and rhythm. S1 S2 normal. No murmurs, rubs or gallops.  Respiratory: Respirations unlabored. Decreased breath sounds throughout. No wheeze. No curvature.  Abdomen: Soft. Non-tender. Non-distended. No organomegaly. No masses. Normal bowel sounds. Obese.  Extremities: Warm to touch. No clubbing or cyanosis. No pedal edema.  Pulses: 2+ peripheral pulses all extremities.	  Skin: Venous stasis changes on both lower extremities. Multiple areas of skin hyperpigmentation at areas of prior plaque.  Lymph Nodes: Cervical, supraclavicular and axillary nodes normal  Neurological: Motor and sensory examination equal and normal. A and O x 3  Psychiatry: Flat affect      LABS:                          11.0   4.49  )-----------( 146      ( 08 Oct 2019 09:49 )             32.7     CBC    WBC  4.49 <==, 4.35 <==, 5.75 <==, 7.79 <==, 3.05 <==, 5.34 <==    Hemoglobin  11.0 <<==, 10.3 <<==, 9.6 <<==, 10.9 <<==, 11.3 <<==, 12.7 <<==    Hematocrit  32.7 <==, 30.8 <==, 29.9 <==, 33.2 <==, 34.2 <==, 36.5 <==    Platelets  146 <==, 108 <==, 104 <==, 115 <==, 114 <==, 134 <==      132<L>  |  91<L>  |  9   ----------------------------<  103<H>    10-08  3.6   |  27  |  0.50      LYTES    sodium  132 <==, 128 <==, 131 <==, 132 <==, 136 <==, 133 <==, 135 <==    potassium   3.6 <==, 3.8 <==, 4.3 <==, 5.1 <==, 5.2 <==, 4.2 <==, 3.8 <==    chloride  91 <==, 90 <==, 92 <==, 90 <==, 94 <==, 91 <==, 92 <==    carbon dioxide  27 <==, 25 <==, 26 <==, 26 <==, 28 <==, 17 <==, 16 <==    =============================================================================================  RENAL FUNCTION:    Creatinine:   0.50  <<==, 0.45  <<==, 0.50  <<==, 0.52  <<==, 0.51  <<==, 0.43  <<==, 0.43  <<==    BUN:   9 <==, 9 <==, 14 <==, 12 <==, 8 <==, <4 <==, <4 <==    ============================================================================================    calcium   8.7 <==, 8.2 <==, 8.8 <==, 8.7 <==, 8.8 <==, 7.9 <==, 8.3 <==    phos   3.4 <==, 3.5 <==, 3.0 <==    mag   2.0 <==, 2.4 <==, 1.4 <==    ============================================================================================  LFTs    AST:   301 <== , 337 <== , 139 <== , 189 <== , 202 <== , 239 <== , 281 <==     ALT:  287  <== , 231  <== , 87  <== , 129  <== , 126  <== , 137  <== , 152  <==     AP:  43  <=, 39  <=, 40  <=, 58  <=, 60  <=, 62  <=, 68  <=    Bili:  1.5  <=, 1.8  <=, 1.8  <=, 1.0  <=, 1.1  <=, 1.3  <=, 1.3  <=    PT/INR - ( 04 Oct 2019 05:57 )   PT: 10.2 sec;   INR: 0.90 ratio      Venous Blood Gas:  10-04 @ 16:12  7.50/36/58/28/92  VBG Lactate: 2.7    Venous Blood Gas:  10-04 @ 05:52  --/--/--/--/--  VBG Lactate: 8.8    Venous Blood Gas:  10-03 @ 21:22  7.36/42/58/23/85  VBG Lactate: 7.7    Venous Blood Gas:  10-03 @ 18:38  7.37/46/107/26/97  VBG Lactate: 6.4    ABG - ( 04 Oct 2019 05:52 )  pH: 7.33  /  pCO2: 35    /  pO2: 100   / HCO3: 18    / Base Excess: -6.8  /  SaO2: 97        Procalcitonin, Serum: 0.17 ng/mL (10-04 @ 18:37)    Procalcitonin, Serum: 0.08 ng/mL (10-04 @ 05:57)    Serum Pro-Brain Natriuretic Peptide: 55 pg/mL (10-03 @ 21:22)    CARDIAC MARKERS ( 03 Oct 2019 18:38 )  CPK x     /CKMB x     /CKMB Units x        troponin 18 ng/L    MICROBIOLGY:    Rapid Respiratory Viral Panel (10.03. @ 19:47)    Rapid RVP Result: NotDetec: This Respiratory Panel uses polymerase chain reaction (PCR) to detect for  adenovirus; coronavirus (HKU1, NL63, 229E, OC43); human metapneumovirus  (hMPV); human enterovirus/rhinovirus (Entero/RV); influenza A; influenza  A/H1; influenza A/H3; influenza A/H1-2009; influenza B; parainfluenza  viruses 1, 2, 3, 4; respiratory syncytial virus; Mycoplasma pneumoniae;  and Chlamydophila pneumoniae.    Urinalysis Basic - ( 04 Oct 2019 07:35 )    Color: Yellow / Appearance: Slightly Turbid / S.019 / pH: x  Gluc: x / Ketone: Moderate  / Bili: Negative / Urobili: Negative   Blood: x / Protein: 30 mg/dL / Nitrite: Negative   Leuk Esterase: Small / RBC: 5 /hpf / WBC 3 /HPF   Sq Epi: x / Non Sq Epi: 1 / Bacteria: Many    Culture - Urine (10.04.19 @ 10:27)    Specimen Source: .Urine    Culture Results:   >=3 organisms. Probable collection contamination.    Culture - Blood (10.04.19 @ 05:25)    Specimen Source: .Blood    Culture Results:   No growth to date.    Culture - Blood (10.04.19 @ 05:25)    Specimen Source: .Blood    Culture Results:   No growth to date.    RADIOLOGY:  [x] Chest radiographs reviewed and interpreted by blanca ROMANO:  XR CHEST AP OR PA 1V                          PROCEDURE DATE:  10/03/2019      INTERPRETATION:    CLINICAL INFORMATION: Shortness of breath    EXAM: Chest X-ray, AP View    COMPARISON: Chest x-ray 3/21/2019    FINDINGS:    The lungs are clear.    Heart size cannot be accurately assessed in this projection.    No acute osseous findings.      IMPRESSION:     Clear lungs.      PREETI HANSEN M.D., RADIOLOGY RESIDENT  This document has been electronically signed.  MIRI NORRIS M.D., ATTENDING RADIOLOGIST  This document has been electronically signed. Oct  4 2019  8:57AM  ---------------------------------------------------------------------------------------------------------------    EXAM:  CT ANGIO CHEST (W)AW IC                          PROCEDURE DATE:  10/03/2019      INTERPRETATION:  CLINICAL INFORMATION: Shortness of breath. Prior DVT.     COMPARISON: CT chest from 3/17/2019.    PROCEDURE:   CT Angiography of the Chest.  90 ml of Omnipaque 350 was injected intravenously. 10 ml were discarded.  Sagittal and coronal reformats were performed as well as 3D (MIP)   reconstructions.      FINDINGS:    LUNGS AND AIRWAYS: Patent central airways.  Severe emphysema. Dependent   changes.    PLEURA: No pleural effusion.    MEDIASTINUM AND CHESTER: No lymphadenopathy.    VESSELS: Good opacification the pulmonary arterial tree. No pulmonary   embolism.    HEART: Heart size is normal. No pericardial effusion.    CHEST WALL AND LOWER NECK: Within normal limits.    VISUALIZED UPPER ABDOMEN: Small hiatal hernia. Hepatic steatosis.    BONES: Within normal limits.    IMPRESSION:     No pulmonary embolism.    Severe emphysema.    MARYA CODY M.D., RADIOLOGY RESIDENT  This document has been electronically signed.  JUWAN ROONEY M.D., ATTENDING RADIOLOGIST  This document has been electronically signed. Oct  3 2019  7:29PM  ---------------------------------------------------------------------------------------------------------------

## 2019-10-08 NOTE — PROGRESS NOTE ADULT - ASSESSMENT
ASSESSMENT:    chronic hypoxic respiratory failure due to severe COPD/emphysema - resolved bronchospasm - there is no evidence of pneumonia, pulmonary edema, pleural effusions or pulmonary emboli on the CTA of the chest    worsening LFT abnormalities - alcoholic hepatitis    pancytopenia - bone marrow suppression from alcohol use    breast cancer s/p bilateral mastectomy    rheumatoid and psoriatic arthritis on Otezla - immunocompromised host    brain aneurysm s/p clipping    PLAN/RECOMMENDATIONS:    oxygen supplementation to keep saturation greater than 92%  has completed a course of prednisone  albuterol/atrovent nebs q6h  pulmicort 0.5mg nebs q12h  singulair  watch for and treat alcohol withdrawal  thiamine/MVI/folate  Otezla/lidex solution to scalp/hydrocortisone cream to face  DVT prophylaxis - SQ heparin  GI prophylaxis - protonix  bowel regiman  right upper quadrant ultrasound    Will follow with you. Plan of care discussed with the patient at bedside and the dedicated floor NP.    Joss Franks MD, [facility]  [phone]  Pulmonary Medicine ASSESSMENT:    chronic hypoxic respiratory failure due to severe COPD/emphysema - resolved bronchospasm - there is no evidence of pneumonia, pulmonary edema, pleural effusions or pulmonary emboli on the CTA of the chest    worsening LFT abnormalities - alcoholic hepatitis    pancytopenia - bone marrow suppression from alcohol use    breast cancer s/p bilateral mastectomy    rheumatoid and psoriatic arthritis on Otezla - immunocompromised host    brain aneurysm s/p clipping    PLAN/RECOMMENDATIONS:    oxygen supplementation to keep saturation greater than 92%  has completed a course of prednisone  observe off antibiotics  albuterol/atrovent nebs q6h  pulmicort 0.5mg nebs q12h  singulair  watch for and treat alcohol withdrawal  thiamine/MVI/folate  Otezla/lidex solution to scalp/hydrocortisone cream to face  DVT prophylaxis - SQ heparin  GI prophylaxis - protonix  bowel regiman  right upper quadrant ultrasound    Will follow with you. Plan of care discussed with the patient at bedside and the dedicated floor NP.    Joss Franks MD, [facility]  [phone]  Pulmonary Medicine

## 2019-10-08 NOTE — PROGRESS NOTE ADULT - SUBJECTIVE AND OBJECTIVE BOX
INTERVAL HPI/OVERNIGHT EVENTS: patient much more alert and breathing better                               has tolerated her meals well          MEDICATIONS  (STANDING):  ALBUTerol/ipratropium for Nebulization 3 milliLiter(s) Nebulizer every 6 hours  ALPRAZolam 0.25 milliGRAM(s) Oral every 8 hours  buDESOnide    Inhalation Suspension 0.5 milliGRAM(s) Inhalation every 12 hours  cholecalciferol 2000 Unit(s) Oral daily  docusate sodium 100 milliGRAM(s) Oral two times a day  fluocinonide 0.05% Solution 1 Application(s) Topical at bedtime  folic acid 1 milliGRAM(s) Oral daily  heparin  Injectable 5000 Unit(s) SubCutaneous every 12 hours  hydrocortisone 1% Cream 1 Application(s) Topical two times a day  LORazepam   Injectable 0.5 milliGRAM(s) IV Push every 12 hours  LORazepam   Injectable   IV Push   montelukast 10 milliGRAM(s) Oral daily  multivitamin 1 Tablet(s) Oral daily  multivitamin 1 Tablet(s) Oral daily  pantoprazole    Tablet 40 milliGRAM(s) Oral before breakfast  prochlorperazine   Tablet 10 milliGRAM(s) Oral daily  senna 2 Tablet(s) Oral at bedtime  sodium chloride 0.9%. 1000 milliLiter(s) (75 mL/Hr) IV Continuous <Continuous>  thiamine 100 milliGRAM(s) Oral daily    MEDICATIONS  (PRN):  LORazepam   Injectable 2 milliGRAM(s) IV Push every 2 hours PRN Symptom-triggered: each CIWA -Ar score 8 or GREATER      Allergies    amoxicillin (Anaphylaxis)  Levaquin (Other; Anaphylaxis)  Levaquin (Unknown)  penicillin (Anaphylaxis)  penicillins (Anaphylaxis)    Intolerances            PHYSICAL EXAM:   Vital Signs:  Vital Signs Last 24 Hrs  T(C): 37.2 (08 Oct 2019 17:04), Max: 37.4 (08 Oct 2019 08:17)  T(F): 98.9 (08 Oct 2019 17:04), Max: 99.3 (08 Oct 2019 08:17)  HR: 96 (08 Oct 2019 17:04) (77 - 104)  BP: 149/84 (08 Oct 2019 17:04) (138/86 - 153/94)  BP(mean): --  RR: 20 (08 Oct 2019 17:04) (18 - 21)  SpO2: 98% (08 Oct 2019 17:04) (91% - 100%)  Daily     Daily     GENERAL:  no distress  HEENT:  NC/AT,  anicteric  CHEST:  breath sounds clear  HEART:  Regular rhythm  ABDOMEN:  Soft, non-tender, bowel sounds present,   EXTEREMITIES:  no edema      LABS:                        11.0   4.49  )-----------( 146      ( 08 Oct 2019 09:49 )             32.7     10-08    132<L>  |  91<L>  |  9   ----------------------------<  103<H>  3.6   |  27  |  0.50    Ca    8.7      08 Oct 2019 07:08    TPro  6.3  /  Alb  3.8  /  TBili  1.5<H>  /  DBili  x   /  AST  301<H>  /  ALT  287<H>  /  AlkPhos  43  10-08          RADIOLOGY & ADDITIONAL TESTS:

## 2019-10-08 NOTE — PROGRESS NOTE ADULT - SUBJECTIVE AND OBJECTIVE BOX
CARDIOLOGY FOLLOW UP - Dr. Nj    CC no cp/sob       PHYSICAL EXAM:  T(C): 37.4 (10-08-19 @ 08:17), Max: 37.4 (10-08-19 @ 08:17)  HR: 77 (10-08-19 @ 08:17) (77 - 104)  BP: 150/90 (10-08-19 @ 08:17) (130/87 - 153/94)  RR: 19 (10-08-19 @ 08:17) (18 - 21)  SpO2: 100% (10-08-19 @ 08:17) (94% - 100%)  Wt(kg): --  I&O's Summary    07 Oct 2019 07:01  -  08 Oct 2019 07:00  --------------------------------------------------------  IN: 2365 mL / OUT: 2900 mL / NET: -535 mL        Appearance: Normal	  Cardiovascular: Normal S1 S2,RRR, No JVD, No murmurs  Respiratory: Lungs clear to auscultation	  Gastrointestinal:  Soft, Non-tender, + BS	  Extremities: Normal range of motion, No clubbing, cyanosis or edema        MEDICATIONS  (STANDING):  ALBUTerol/ipratropium for Nebulization 3 milliLiter(s) Nebulizer every 6 hours  ALPRAZolam 0.25 milliGRAM(s) Oral every 8 hours  buDESOnide    Inhalation Suspension 0.5 milliGRAM(s) Inhalation every 12 hours  cholecalciferol 2000 Unit(s) Oral daily  docusate sodium 100 milliGRAM(s) Oral two times a day  fluocinonide 0.05% Solution 1 Application(s) Topical at bedtime  folic acid 1 milliGRAM(s) Oral daily  heparin  Injectable 5000 Unit(s) SubCutaneous every 12 hours  hydrocortisone 1% Cream 1 Application(s) Topical two times a day  LORazepam   Injectable 0.5 milliGRAM(s) IV Push every 12 hours  LORazepam   Injectable   IV Push   montelukast 10 milliGRAM(s) Oral daily  multivitamin 1 Tablet(s) Oral daily  multivitamin 1 Tablet(s) Oral daily  pantoprazole    Tablet 40 milliGRAM(s) Oral before breakfast  prochlorperazine   Tablet 10 milliGRAM(s) Oral daily  senna 2 Tablet(s) Oral at bedtime  sodium chloride 0.9%. 1000 milliLiter(s) (75 mL/Hr) IV Continuous <Continuous>  thiamine 100 milliGRAM(s) Oral daily      TELEMETRY: NSR 	    ECG:  	  RADIOLOGY:   DIAGNOSTIC TESTING:  [ ] Echocardiogram: < from: TTE with Doppler (w/Cont) (10.07.19 @ 15:16) >  Conclusions:  1. Normal left ventricular internal dimensions and wall  thicknesses.  2. Endocardium not well visualized; grossly normal left  ventricular systolic function. Endocardial visualization  enhanced with intravenous injection of Ultrasonic Enhancing  Agent (Definity). Unable to perfrom strain imaging due to  limited clinical windows.    < end of copied text >    [ ]  Catheterization:  [ ] Stress Test:    OTHER: 	    LABS:	 	                                11.0   4.49  )-----------( 146      ( 08 Oct 2019 09:49 )             32.7     10-08    132<L>  |  91<L>  |  9   ----------------------------<  103<H>  3.6   |  27  |  0.50    Ca    8.7      08 Oct 2019 07:08    TPro  6.3  /  Alb  3.8  /  TBili  1.5<H>  /  DBili  x   /  AST  301<H>  /  ALT  287<H>  /  AlkPhos  43  10-08

## 2019-10-09 ENCOUNTER — TRANSCRIPTION ENCOUNTER (OUTPATIENT)
Age: 54
End: 2019-10-09

## 2019-10-09 LAB
ALBUMIN SERPL ELPH-MCNC: 3.6 G/DL — SIGNIFICANT CHANGE UP (ref 3.3–5)
ALP SERPL-CCNC: 44 U/L — SIGNIFICANT CHANGE UP (ref 40–120)
ALT FLD-CCNC: 315 U/L — HIGH (ref 10–45)
ANION GAP SERPL CALC-SCNC: 16 MMOL/L — SIGNIFICANT CHANGE UP (ref 5–17)
AST SERPL-CCNC: 290 U/L — HIGH (ref 10–40)
BILIRUB SERPL-MCNC: 1.4 MG/DL — HIGH (ref 0.2–1.2)
BUN SERPL-MCNC: 11 MG/DL — SIGNIFICANT CHANGE UP (ref 7–23)
CALCIUM SERPL-MCNC: 8.9 MG/DL — SIGNIFICANT CHANGE UP (ref 8.4–10.5)
CHLORIDE SERPL-SCNC: 91 MMOL/L — LOW (ref 96–108)
CO2 SERPL-SCNC: 26 MMOL/L — SIGNIFICANT CHANGE UP (ref 22–31)
CREAT SERPL-MCNC: 0.5 MG/DL — SIGNIFICANT CHANGE UP (ref 0.5–1.3)
CULTURE RESULTS: SIGNIFICANT CHANGE UP
CULTURE RESULTS: SIGNIFICANT CHANGE UP
GLUCOSE SERPL-MCNC: 112 MG/DL — HIGH (ref 70–99)
HCT VFR BLD CALC: 31.6 % — LOW (ref 34.5–45)
HGB BLD-MCNC: 10.7 G/DL — LOW (ref 11.5–15.5)
MCHC RBC-ENTMCNC: 33.9 GM/DL — SIGNIFICANT CHANGE UP (ref 32–36)
MCHC RBC-ENTMCNC: 34.9 PG — HIGH (ref 27–34)
MCV RBC AUTO: 102.9 FL — HIGH (ref 80–100)
PLATELET # BLD AUTO: 150 K/UL — SIGNIFICANT CHANGE UP (ref 150–400)
POTASSIUM SERPL-MCNC: 3.5 MMOL/L — SIGNIFICANT CHANGE UP (ref 3.5–5.3)
POTASSIUM SERPL-SCNC: 3.5 MMOL/L — SIGNIFICANT CHANGE UP (ref 3.5–5.3)
PROT SERPL-MCNC: 6.1 G/DL — SIGNIFICANT CHANGE UP (ref 6–8.3)
RBC # BLD: 3.07 M/UL — LOW (ref 3.8–5.2)
RBC # FLD: 12.8 % — SIGNIFICANT CHANGE UP (ref 10.3–14.5)
SODIUM SERPL-SCNC: 133 MMOL/L — LOW (ref 135–145)
SPECIMEN SOURCE: SIGNIFICANT CHANGE UP
SPECIMEN SOURCE: SIGNIFICANT CHANGE UP
WBC # BLD: 5.41 K/UL — SIGNIFICANT CHANGE UP (ref 3.8–10.5)
WBC # FLD AUTO: 5.41 K/UL — SIGNIFICANT CHANGE UP (ref 3.8–10.5)

## 2019-10-09 RX ORDER — ALPRAZOLAM 0.25 MG
0.25 TABLET ORAL ONCE
Refills: 0 | Status: DISCONTINUED | OUTPATIENT
Start: 2019-10-09 | End: 2019-10-09

## 2019-10-09 RX ADMIN — HEPARIN SODIUM 5000 UNIT(S): 5000 INJECTION INTRAVENOUS; SUBCUTANEOUS at 06:13

## 2019-10-09 RX ADMIN — Medication 3 MILLILITER(S): at 15:41

## 2019-10-09 RX ADMIN — Medication 3 MILLILITER(S): at 21:23

## 2019-10-09 RX ADMIN — Medication 1 APPLICATION(S): at 06:13

## 2019-10-09 RX ADMIN — SODIUM CHLORIDE 75 MILLILITER(S): 9 INJECTION INTRAMUSCULAR; INTRAVENOUS; SUBCUTANEOUS at 20:45

## 2019-10-09 RX ADMIN — Medication 0.25 MILLIGRAM(S): at 20:44

## 2019-10-09 RX ADMIN — Medication 0.5 MILLIGRAM(S): at 06:10

## 2019-10-09 RX ADMIN — Medication 0.5 MILLIGRAM(S): at 18:03

## 2019-10-09 RX ADMIN — Medication 0.5 MILLIGRAM(S): at 06:12

## 2019-10-09 RX ADMIN — MONTELUKAST 10 MILLIGRAM(S): 4 TABLET, CHEWABLE ORAL at 11:56

## 2019-10-09 RX ADMIN — Medication 1 MILLIGRAM(S): at 11:56

## 2019-10-09 RX ADMIN — SODIUM CHLORIDE 75 MILLILITER(S): 9 INJECTION INTRAMUSCULAR; INTRAVENOUS; SUBCUTANEOUS at 07:34

## 2019-10-09 RX ADMIN — Medication 0.25 MILLIGRAM(S): at 13:35

## 2019-10-09 RX ADMIN — Medication 1 APPLICATION(S): at 20:44

## 2019-10-09 RX ADMIN — Medication 10 MILLIGRAM(S): at 11:56

## 2019-10-09 RX ADMIN — Medication 0.25 MILLIGRAM(S): at 03:48

## 2019-10-09 RX ADMIN — HEPARIN SODIUM 5000 UNIT(S): 5000 INJECTION INTRAVENOUS; SUBCUTANEOUS at 18:03

## 2019-10-09 RX ADMIN — Medication 100 MILLIGRAM(S): at 11:56

## 2019-10-09 RX ADMIN — Medication 3 MILLILITER(S): at 09:37

## 2019-10-09 RX ADMIN — Medication 1 TABLET(S): at 11:56

## 2019-10-09 RX ADMIN — Medication 2000 UNIT(S): at 11:56

## 2019-10-09 RX ADMIN — Medication 1 APPLICATION(S): at 18:03

## 2019-10-09 RX ADMIN — Medication 3 MILLILITER(S): at 06:10

## 2019-10-09 RX ADMIN — Medication 0.25 MILLIGRAM(S): at 06:12

## 2019-10-09 RX ADMIN — PANTOPRAZOLE SODIUM 40 MILLIGRAM(S): 20 TABLET, DELAYED RELEASE ORAL at 06:11

## 2019-10-09 NOTE — PROGRESS NOTE ADULT - SUBJECTIVE AND OBJECTIVE BOX
CHIEF COMPLAINT:Patient is a 54y old  Female who presents with a chief complaint of shortness of breath (09 Oct 2019 11:04)    	        PAST MEDICAL & SURGICAL HISTORY:  Prophylactic measure  Breast cancer  Brain aneurysm: with clips  Psoriasis  RA (rheumatoid arthritis)  Psoriasis  Breast cancer, stage 3  History of modified radical mastectomy of both breasts  S/P bilateral mastectomy  Brain aneurysm: 1988 two clips          REVIEW OF SYSTEMS:  feels ok   EYES: No eye pain, visual disturbances, or discharge  NECK: No pain or stiffness  RESPIRATORY: No cough, wheezing, chills or hemoptysis; No Shortness of Breath  CARDIOVASCULAR: No chest pain, palpitations, passing out, dizziness, or leg swelling  GASTROINTESTINAL: No abdominal or epigastric pain. No nausea, vomiting, or hematemesis; No diarrhea or constipation. No melena or hematochezia.  GENITOURINARY: No dysuria, da    Medications:  MEDICATIONS  (STANDING):  ALBUTerol/ipratropium for Nebulization 3 milliLiter(s) Nebulizer every 6 hours  ALPRAZolam 0.25 milliGRAM(s) Oral every 8 hours  buDESOnide    Inhalation Suspension 0.5 milliGRAM(s) Inhalation every 12 hours  cholecalciferol 2000 Unit(s) Oral daily  docusate sodium 100 milliGRAM(s) Oral two times a day  fluocinonide 0.05% Solution 1 Application(s) Topical at bedtime  folic acid 1 milliGRAM(s) Oral daily  heparin  Injectable 5000 Unit(s) SubCutaneous every 12 hours  hydrocortisone 1% Cream 1 Application(s) Topical two times a day  LORazepam   Injectable 0.5 milliGRAM(s) IV Push every 12 hours  LORazepam   Injectable   IV Push   montelukast 10 milliGRAM(s) Oral daily  multivitamin 1 Tablet(s) Oral daily  multivitamin 1 Tablet(s) Oral daily  pantoprazole    Tablet 40 milliGRAM(s) Oral before breakfast  prochlorperazine   Tablet 10 milliGRAM(s) Oral daily  senna 2 Tablet(s) Oral at bedtime  sodium chloride 0.9%. 1000 milliLiter(s) (75 mL/Hr) IV Continuous <Continuous>  thiamine 100 milliGRAM(s) Oral daily    MEDICATIONS  (PRN):  LORazepam   Injectable 2 milliGRAM(s) IV Push every 2 hours PRN Symptom-triggered: each CIWA -Ar score 8 or GREATER    	    PHYSICAL EXAM:  T(C): 36.5 (10-09-19 @ 04:14), Max: 37.2 (10-08-19 @ 11:28)  HR: 90 (10-09-19 @ 04:14) (74 - 101)  BP: 136/82 (10-09-19 @ 04:14) (128/86 - 149/84)  RR: 18 (10-09-19 @ 04:14) (18 - 20)  SpO2: 100% (10-09-19 @ 04:14) (91% - 100%)  Wt(kg): --  I&O's Summary    08 Oct 2019 07:01  -  09 Oct 2019 07:00  --------------------------------------------------------  IN: 1460 mL / OUT: 1700 mL / NET: -240 mL    09 Oct 2019 07:01  -  09 Oct 2019 11:18  --------------------------------------------------------  IN: 240 mL / OUT: 0 mL / NET: 240 mL        Appearance: Normal	  HEENT:   Normal oral mucosa, PERRL, EOMI	  Lymphatic: No lymphadenopathy  Cardiovascular: Normal S1 S2, No JVD,  Respiratory:dec bs 	  Psychiatry: A & O  Gastrointestinal:  Soft, Non-tender, + BS	  Neurologic: Non-focal  Extremities: Normal range of motion, No clubbing, cyanosis or edema  Vascular: Peripheral pulses palpable 2+ bilaterally    TELEMETRY: 	    ECG:  	  RADIOLOGY:  OTHER: 	  	  LABS:	 	    CARDIAC MARKERS:                                10.7   5.41  )-----------( 150      ( 09 Oct 2019 08:20 )             31.6     10-09    133<L>  |  91<L>  |  11  ----------------------------<  112<H>  3.5   |  26  |  0.50    Ca    8.9      09 Oct 2019 06:17    TPro  6.1  /  Alb  3.6  /  TBili  1.4<H>  /  DBili  x   /  AST  290<H>  /  ALT  315<H>  /  AlkPhos  44  10-09    proBNP:   Lipid Profile:   HgA1c:   TSH:

## 2019-10-09 NOTE — PROGRESS NOTE ADULT - SUBJECTIVE AND OBJECTIVE BOX
CARDIOLOGY FOLLOW UP - Dr. Nj    CC feels better, sob improving   no cp      PHYSICAL EXAM:  T(C): 36.5 (10-09-19 @ 04:14), Max: 37.2 (10-08-19 @ 11:28)  HR: 90 (10-09-19 @ 04:14) (74 - 101)  BP: 136/82 (10-09-19 @ 04:14) (128/86 - 149/84)  RR: 18 (10-09-19 @ 04:14) (18 - 20)  SpO2: 100% (10-09-19 @ 04:14) (91% - 100%)  Wt(kg): --  I&O's Summary    08 Oct 2019 07:01  -  09 Oct 2019 07:00  --------------------------------------------------------  IN: 1460 mL / OUT: 1700 mL / NET: -240 mL    09 Oct 2019 07:01  -  09 Oct 2019 11:04  --------------------------------------------------------  IN: 240 mL / OUT: 0 mL / NET: 240 mL        Appearance: Normal	  Cardiovascular: Normal S1 S2,RRR, No JVD, No murmurs  Respiratory: Lungs clear to auscultation	  Gastrointestinal:  Soft, Non-tender, + BS	  Extremities: Normal range of motion, No clubbing, cyanosis or edema        MEDICATIONS  (STANDING):  ALBUTerol/ipratropium for Nebulization 3 milliLiter(s) Nebulizer every 6 hours  ALPRAZolam 0.25 milliGRAM(s) Oral every 8 hours  buDESOnide    Inhalation Suspension 0.5 milliGRAM(s) Inhalation every 12 hours  cholecalciferol 2000 Unit(s) Oral daily  docusate sodium 100 milliGRAM(s) Oral two times a day  fluocinonide 0.05% Solution 1 Application(s) Topical at bedtime  folic acid 1 milliGRAM(s) Oral daily  heparin  Injectable 5000 Unit(s) SubCutaneous every 12 hours  hydrocortisone 1% Cream 1 Application(s) Topical two times a day  LORazepam   Injectable 0.5 milliGRAM(s) IV Push every 12 hours  LORazepam   Injectable   IV Push   montelukast 10 milliGRAM(s) Oral daily  multivitamin 1 Tablet(s) Oral daily  multivitamin 1 Tablet(s) Oral daily  pantoprazole    Tablet 40 milliGRAM(s) Oral before breakfast  prochlorperazine   Tablet 10 milliGRAM(s) Oral daily  senna 2 Tablet(s) Oral at bedtime  sodium chloride 0.9%. 1000 milliLiter(s) (75 mL/Hr) IV Continuous <Continuous>  thiamine 100 milliGRAM(s) Oral daily      TELEMETRY: NSR 	    ECG:  	  RADIOLOGY:   DIAGNOSTIC TESTING:  [ ] Echocardiogram:  [ ]  Catheterization:  [ ] Stress Test:    OTHER: 	    LABS:	 	    Troponin T, High Sensitivity Result: 18 ng/L [0 - 51] (10-03 @ 18:38)                          10.7   5.41  )-----------( 150      ( 09 Oct 2019 08:20 )             31.6     10-09    133<L>  |  91<L>  |  11  ----------------------------<  112<H>  3.5   |  26  |  0.50    Ca    8.9      09 Oct 2019 06:17    TPro  6.1  /  Alb  3.6  /  TBili  1.4<H>  /  DBili  x   /  AST  290<H>  /  ALT  315<H>  /  AlkPhos  44  10-09

## 2019-10-09 NOTE — DISCHARGE NOTE PROVIDER - HOSPITAL COURSE
54 yr old female with  Hx significant for COPD, EtOH abuse, Breast Ca, remote brain aneurysm s/p clip who presented with COPD exacerbation initially treated with duoneb and steroid tx  received MICU consult for elevated lactate and COPD exacerbation and elevated CIWA score. Pt with hx of EtOH abuse with last drink 10/3/19 at 0900, neuro stable, no signs of withdrawal, completed CIWA taper. Pt also with hx of COPD on home O2, supplemental O2 while admitted to maintain SPO2 >88%. Elevated lactate may be 2/2 COPD exacerbation. Blood cultures with NGTD in 72 hours. Procalcitonin negative. Pt also noted to have transaminitis, likely 2/2 alcohol abuse. RUQ sono negative for obstruction. Pt is stable for discharge home with close GI follow up.

## 2019-10-09 NOTE — PROGRESS NOTE ADULT - ASSESSMENT
54 yr old female with  Hx significant for COPD, EtOH abuse, Breast Ca, remote brain aneurysm s/p clip who presented with COPD exacerbation initially treated with duoneb and steroid tx  received MICU consult for elevated lactate and COPD exacerbation. and elevated CIWA score    -Pt with hx of EtOH abuse with last drink 10/3/19 at 0900  -neuro  stable   no signs of withdrawal    neuro eval appreciated  no acute issues     - Pt with COPD on home O2  c/w pulm meds  -supplemental O2 - titrate to maintain SPO2 > 88%  tachycardia improved  -Last documented TTE 2016 (mild diastolic dysfunction)  cards f.u noted    -diet as tolerated  -strict I & O's -   ppi     transaminitis sec to etoh abuse  elevated  ruq sono neg for obstruction  fatty liver   gi eval noted    id eval noted  lactate improved  no active id issue at present   d/c planning   discussed w/

## 2019-10-09 NOTE — DISCHARGE NOTE PROVIDER - CARE PROVIDER_API CALL
Jaime Steen)  Gastroenterology  [address] [place] [address]  Phone: [phone]  Fax: [phone]  Follow Up Time: 2 weeks

## 2019-10-09 NOTE — DISCHARGE NOTE PROVIDER - NSDCCPCAREPLAN_GEN_ALL_CORE_FT
PRINCIPAL DISCHARGE DIAGNOSIS  Diagnosis: COPD (chronic obstructive pulmonary disease)  Assessment and Plan of Treatment: Call your Health Care provider upon arrival home to make a follow up appointment within one week.  Take all inhalers as prescribed by your Health Care Provider.  Take steroids as prescribed by your Health Care Provider.  If your cough increases infrequency and severity and/or you have shortness of breath or increased shortness of breath call your Health Care Provider.  If you develop fever, chills, night sweats, malaise, and/or change in mental status call your Health care Provider.  Nutrition is very important.  Eat small frequent meals.  Increase your activity as tolerated.  Do not stay in bed all day        SECONDARY DISCHARGE DIAGNOSES  Diagnosis: Alcohol withdrawal syndrome  Assessment and Plan of Treatment: Completed Ativan taper    Diagnosis: Transaminitis  Assessment and Plan of Treatment: Elevated LFTs 2/2 alcohol abuse  Follow up outpatient with GI

## 2019-10-10 ENCOUNTER — TRANSCRIPTION ENCOUNTER (OUTPATIENT)
Age: 54
End: 2019-10-10

## 2019-10-10 VITALS
SYSTOLIC BLOOD PRESSURE: 139 MMHG | RESPIRATION RATE: 18 BRPM | HEART RATE: 89 BPM | OXYGEN SATURATION: 99 % | DIASTOLIC BLOOD PRESSURE: 83 MMHG

## 2019-10-10 PROCEDURE — 97162 PT EVAL MOD COMPLEX 30 MIN: CPT

## 2019-10-10 PROCEDURE — 83935 ASSAY OF URINE OSMOLALITY: CPT

## 2019-10-10 PROCEDURE — 85730 THROMBOPLASTIN TIME PARTIAL: CPT

## 2019-10-10 PROCEDURE — 82330 ASSAY OF CALCIUM: CPT

## 2019-10-10 PROCEDURE — 82570 ASSAY OF URINE CREATININE: CPT

## 2019-10-10 PROCEDURE — 82803 BLOOD GASES ANY COMBINATION: CPT

## 2019-10-10 PROCEDURE — 84300 ASSAY OF URINE SODIUM: CPT

## 2019-10-10 PROCEDURE — 87486 CHLMYD PNEUM DNA AMP PROBE: CPT

## 2019-10-10 PROCEDURE — 93005 ELECTROCARDIOGRAM TRACING: CPT

## 2019-10-10 PROCEDURE — 85610 PROTHROMBIN TIME: CPT

## 2019-10-10 PROCEDURE — 87581 M.PNEUMON DNA AMP PROBE: CPT

## 2019-10-10 PROCEDURE — 86140 C-REACTIVE PROTEIN: CPT

## 2019-10-10 PROCEDURE — 80048 BASIC METABOLIC PNL TOTAL CA: CPT

## 2019-10-10 PROCEDURE — 76700 US EXAM ABDOM COMPLETE: CPT

## 2019-10-10 PROCEDURE — 84100 ASSAY OF PHOSPHORUS: CPT

## 2019-10-10 PROCEDURE — 94640 AIRWAY INHALATION TREATMENT: CPT

## 2019-10-10 PROCEDURE — 84145 PROCALCITONIN (PCT): CPT

## 2019-10-10 PROCEDURE — 84132 ASSAY OF SERUM POTASSIUM: CPT

## 2019-10-10 PROCEDURE — 83690 ASSAY OF LIPASE: CPT

## 2019-10-10 PROCEDURE — 83880 ASSAY OF NATRIURETIC PEPTIDE: CPT

## 2019-10-10 PROCEDURE — 80053 COMPREHEN METABOLIC PANEL: CPT

## 2019-10-10 PROCEDURE — 71045 X-RAY EXAM CHEST 1 VIEW: CPT

## 2019-10-10 PROCEDURE — 96374 THER/PROPH/DIAG INJ IV PUSH: CPT | Mod: XU

## 2019-10-10 PROCEDURE — 81001 URINALYSIS AUTO W/SCOPE: CPT

## 2019-10-10 PROCEDURE — 87040 BLOOD CULTURE FOR BACTERIA: CPT

## 2019-10-10 PROCEDURE — 85652 RBC SED RATE AUTOMATED: CPT

## 2019-10-10 PROCEDURE — 82565 ASSAY OF CREATININE: CPT

## 2019-10-10 PROCEDURE — 82435 ASSAY OF BLOOD CHLORIDE: CPT

## 2019-10-10 PROCEDURE — 87633 RESP VIRUS 12-25 TARGETS: CPT

## 2019-10-10 PROCEDURE — 71275 CT ANGIOGRAPHY CHEST: CPT

## 2019-10-10 PROCEDURE — 84702 CHORIONIC GONADOTROPIN TEST: CPT

## 2019-10-10 PROCEDURE — 84295 ASSAY OF SERUM SODIUM: CPT

## 2019-10-10 PROCEDURE — 82947 ASSAY GLUCOSE BLOOD QUANT: CPT

## 2019-10-10 PROCEDURE — 87798 DETECT AGENT NOS DNA AMP: CPT

## 2019-10-10 PROCEDURE — 83605 ASSAY OF LACTIC ACID: CPT

## 2019-10-10 PROCEDURE — 80076 HEPATIC FUNCTION PANEL: CPT

## 2019-10-10 PROCEDURE — 87086 URINE CULTURE/COLONY COUNT: CPT

## 2019-10-10 PROCEDURE — 85027 COMPLETE CBC AUTOMATED: CPT

## 2019-10-10 PROCEDURE — 85014 HEMATOCRIT: CPT

## 2019-10-10 PROCEDURE — 99285 EMERGENCY DEPT VISIT HI MDM: CPT | Mod: 25

## 2019-10-10 PROCEDURE — C8929: CPT

## 2019-10-10 PROCEDURE — 83930 ASSAY OF BLOOD OSMOLALITY: CPT

## 2019-10-10 PROCEDURE — 82140 ASSAY OF AMMONIA: CPT

## 2019-10-10 PROCEDURE — 83735 ASSAY OF MAGNESIUM: CPT

## 2019-10-10 PROCEDURE — 84484 ASSAY OF TROPONIN QUANT: CPT

## 2019-10-10 RX ADMIN — Medication 3 MILLILITER(S): at 05:19

## 2019-10-10 RX ADMIN — Medication 100 MILLIGRAM(S): at 11:18

## 2019-10-10 RX ADMIN — Medication 2000 UNIT(S): at 11:18

## 2019-10-10 RX ADMIN — Medication 1 MILLIGRAM(S): at 11:18

## 2019-10-10 RX ADMIN — Medication 1 APPLICATION(S): at 05:19

## 2019-10-10 RX ADMIN — Medication 1 TABLET(S): at 11:18

## 2019-10-10 RX ADMIN — Medication 0.25 MILLIGRAM(S): at 13:20

## 2019-10-10 RX ADMIN — HEPARIN SODIUM 5000 UNIT(S): 5000 INJECTION INTRAVENOUS; SUBCUTANEOUS at 05:19

## 2019-10-10 RX ADMIN — Medication 3 MILLILITER(S): at 11:17

## 2019-10-10 RX ADMIN — Medication 0.5 MILLIGRAM(S): at 05:19

## 2019-10-10 RX ADMIN — Medication 10 MILLIGRAM(S): at 11:18

## 2019-10-10 RX ADMIN — PANTOPRAZOLE SODIUM 40 MILLIGRAM(S): 20 TABLET, DELAYED RELEASE ORAL at 05:15

## 2019-10-10 RX ADMIN — MONTELUKAST 10 MILLIGRAM(S): 4 TABLET, CHEWABLE ORAL at 11:18

## 2019-10-10 RX ADMIN — Medication 0.25 MILLIGRAM(S): at 05:19

## 2019-10-10 NOTE — PROGRESS NOTE ADULT - SUBJECTIVE AND OBJECTIVE BOX
CHIEF COMPLAINT:Patient is a 54y old  Female who presents with a chief complaint of shortness of breath (09 Oct 2019 13:25)    	        PAST MEDICAL & SURGICAL HISTORY:  Prophylactic measure  Breast cancer  Brain aneurysm: with clips  Psoriasis  RA (rheumatoid arthritis)  Psoriasis  Breast cancer, stage 3  History of modified radical mastectomy of both breasts  S/P bilateral mastectomy  Brain aneurysm: 1988 two clips          REVIEW OF SYSTEMS:  feels better  EYES: No eye pain, visual disturbances, or discharge  NECK: No pain or stiffness  RESPIRATORY: No cough, wheezing, chills or hemoptysis; No Shortness of Breath  CARDIOVASCULAR: No chest pain, palpitations, passing out, dizziness,   GASTROINTESTINAL: No abdominal or epigastric pain. No nausea, vomiting, or hematemesis; No diarrhea or constipation.  GENITOURINARY: No dysuria, frequency, hematuria, or incontinence  NEUROLOGICAL: No headaches,     Medications:  MEDICATIONS  (STANDING):  ALBUTerol/ipratropium for Nebulization 3 milliLiter(s) Nebulizer every 6 hours  ALPRAZolam 0.25 milliGRAM(s) Oral every 8 hours  buDESOnide    Inhalation Suspension 0.5 milliGRAM(s) Inhalation every 12 hours  cholecalciferol 2000 Unit(s) Oral daily  docusate sodium 100 milliGRAM(s) Oral two times a day  fluocinonide 0.05% Solution 1 Application(s) Topical at bedtime  folic acid 1 milliGRAM(s) Oral daily  heparin  Injectable 5000 Unit(s) SubCutaneous every 12 hours  hydrocortisone 1% Cream 1 Application(s) Topical two times a day  montelukast 10 milliGRAM(s) Oral daily  multivitamin 1 Tablet(s) Oral daily  multivitamin 1 Tablet(s) Oral daily  pantoprazole    Tablet 40 milliGRAM(s) Oral before breakfast  prochlorperazine   Tablet 10 milliGRAM(s) Oral daily  senna 2 Tablet(s) Oral at bedtime  sodium chloride 0.9%. 1000 milliLiter(s) (75 mL/Hr) IV Continuous <Continuous>  thiamine 100 milliGRAM(s) Oral daily    MEDICATIONS  (PRN):  LORazepam   Injectable 2 milliGRAM(s) IV Push every 2 hours PRN Symptom-triggered: each CIWA -Ar score 8 or GREATER    	    PHYSICAL EXAM:  T(C): 36.8 (10-10-19 @ 08:20), Max: 36.9 (10-09-19 @ 11:55)  HR: 84 (10-10-19 @ 08:20) (74 - 96)  BP: 127/85 (10-10-19 @ 08:20) (127/85 - 151/83)  RR: 16 (10-10-19 @ 08:20) (16 - 19)  SpO2: 98% (10-10-19 @ 08:20) (96% - 100%)  Wt(kg): --  I&O's Summary    09 Oct 2019 07:01  -  10 Oct 2019 07:00  --------------------------------------------------------  IN: 2175 mL / OUT: 4800 mL / NET: -2625 mL        Appearance: Normal	  HEENT:   Normal oral mucosa, PERRL, EOMI	  Lymphatic: No lymphadenopathy  Cardiovascular: Normal S1 S2, No JVD, No murmurs, No edema  Respiratory: Lungs clear to auscultation	  Psychiatry: A & O x 3,   Gastrointestinal:  Soft, Non-tender, + BS	  	  Neurologic: Non-focal  Extremities: Normal range of motion, No clubbing, cyanosis or edema  Vascular: Peripheral pulses palpable 2+ bilaterally    TELEMETRY: 	    ECG:  	  RADIOLOGY:  OTHER: 	  	  LABS:	 	    CARDIAC MARKERS:                                10.7   5.41  )-----------( 150      ( 09 Oct 2019 08:20 )             31.6     10-09    133<L>  |  91<L>  |  11  ----------------------------<  112<H>  3.5   |  26  |  0.50    Ca    8.9      09 Oct 2019 06:17    TPro  6.1  /  Alb  3.6  /  TBili  1.4<H>  /  DBili  x   /  AST  290<H>  /  ALT  315<H>  /  AlkPhos  44  10-09    proBNP:   Lipid Profile:   HgA1c:   TSH:

## 2019-10-10 NOTE — PROGRESS NOTE ADULT - SUBJECTIVE AND OBJECTIVE BOX
[facility] - [facility] [place] - PROGRESS NOTE    CHIEF COMPLAINT: chronic hypoxic respiratory failure; COPD exacerbation; emphysema; current smoker; alcohol withdrawal    INTERVAL HISTORY: much more awake and alert; no further agitation, confusion or tremors; eager to go home; no shortness of breath on a nasal canula although has only been out of bed and into the chair without much ambulation; no cough, sputum production, chest congestion or wheeze; no fevers, chills or sweats; no chest pain/pressure or palpitations; resolved lactic acidosis; LFTs remain elevated;    REVIEW OF SYSTEMS:  Constitutional: As per interval history  HEENT: Within normal limits  CV: As per interval history  Resp: As per interval history  GI: Within normal limits   : Within normal limits  Musculoskeletal: Within normal limits  Skin: Within normal limits  Neurological: slow mentation  Psychiatric: Within normal limits  Endocrine: Within normal limits  Hematologic/Lymphatic: Within normal limits  Allergic/Immunologic: Within normal limits    MEDICATIONS:     Pulmonary "  ALBUTerol/ipratropium for Nebulization 3 milliLiter(s) Nebulizer every 6 hours  buDESOnide    Inhalation Suspension 0.5 milliGRAM(s) Inhalation every 12 hours  montelukast 10 milliGRAM(s) Oral daily    Anti-microbials:    Cardiovascular:    Other:  ALPRAZolam 0.25 milliGRAM(s) Oral every 8 hours  cholecalciferol 2000 Unit(s) Oral daily  docusate sodium 100 milliGRAM(s) Oral two times a day  fluocinonide 0.05% Solution 1 Application(s) Topical at bedtime  folic acid 1 milliGRAM(s) Oral daily  heparin  Injectable 5000 Unit(s) SubCutaneous every 12 hours  hydrocortisone 1% Cream 1 Application(s) Topical two times a day  multivitamin 1 Tablet(s) Oral daily  multivitamin 1 Tablet(s) Oral daily  pantoprazole    Tablet 40 milliGRAM(s) Oral before breakfast  prochlorperazine   Tablet 10 milliGRAM(s) Oral daily  senna 2 Tablet(s) Oral at bedtime  thiamine 100 milliGRAM(s) Oral daily    MEDICATIONS  (PRN):  LORazepam   Injectable 2 milliGRAM(s) IV Push every 2 hours PRN Symptom-triggered: each CIWA -Ar score 8 or GREATER      OBJECTIVE:    I&O's Detail    09 Oct 2019 07:01  -  10 Oct 2019 07:00  --------------------------------------------------------  IN:    Oral Fluid: 750 mL    sodium chloride 0.9%: 1425 mL  Total IN: 2175 mL    OUT:    Voided: 4800 mL  Total OUT: 4800 mL    Total NET: -2625 mL    PHYSICAL EXAM:       ICU Vital Signs Last 24 Hrs  T(C): 36.8 (10 Oct 2019 08:20), Max: 36.9 (09 Oct 2019 11:55)  T(F): 98.3 (10 Oct 2019 08:20), Max: 98.5 (10 Oct 2019 00:21)  HR: 84 (10 Oct 2019 08:20) (74 - 96)  BP: 127/85 (10 Oct 2019 08:20) (127/85 - 151/83)  BP(mean): --  ABP: --  ABP(mean): --  RR: 16 (10 Oct 2019 08:20) (16 - 19)  SpO2: 98% (10 Oct 2019 08:20) (96% - 100%) on 2lpm nasal canula     General: Awake. Alert. Cooperative. No distress. Appears stated age. Obese. Slow mentation.	  HEENT: Atraumatic. Normocephalic. Anicteric. Normal oral mucosa. PERRL. EOMI. Improving psoriatic plaque on scalp. Mallampati class IV airway.  Neck: Supple. Trachea midline. Thyroid without enlargement/tenderness/nodules. No carotid bruit. No JVD. Short and wide.	  Cardiovascular: Regular rate and rhythm. S1 S2 normal. No murmurs, rubs or gallops.  Respiratory: Respirations unlabored. Decreased breath sounds throughout. No wheeze. No curvature.  Abdomen: Soft. Non-tender. Non-distended. No organomegaly. No masses. Normal bowel sounds. Obese.  Extremities: Warm to touch. No clubbing or cyanosis. No pedal edema.  Pulses: 2+ peripheral pulses all extremities.	  Skin: Venous stasis changes on both lower extremities. Multiple areas of skin hyperpigmentation at areas of prior plaque.  Lymph Nodes: Cervical, supraclavicular and axillary nodes normal  Neurological: Motor and sensory examination equal and normal. A and O x 3  Psychiatry: Flat affect    LABS:                          10.7   5.41  )-----------( 150      ( 09 Oct 2019 08:20 )             31.6     CBC    WBC  5.41 <==, 4.49 <==, 4.35 <==, 5.75 <==, 7.79 <==, 3.05 <==, 5.34 <==    Hemoglobin  10.7 <<==, 11.0 <<==, 10.3 <<==, 9.6 <<==, 10.9 <<==, 11.3 <<==, 12.7 <<==    Hematocrit  31.6 <==, 32.7 <==, 30.8 <==, 29.9 <==, 33.2 <==, 34.2 <==, 36.5 <==    Platelets  150 <==, 146 <==, 108 <==, 104 <==, 115 <==, 114 <==, 134 <==      133<L>  |  91<L>  |  11  ----------------------------<  112<H>    10-09  3.5   |  26  |  0.50      LYTES    sodium  133 <==, 132 <==, 128 <==, 131 <==, 132 <==, 136 <==, 133 <==    potassium   3.5 <==, 3.6 <==, 3.8 <==, 4.3 <==, 5.1 <==, 5.2 <==, 4.2 <==    chloride  91 <==, 91 <==, 90 <==, 92 <==, 90 <==, 94 <==, 91 <==    carbon dioxide  26 <==, 27 <==, 25 <==, 26 <==, 26 <==, 28 <==, 17 <==    =============================================================================================  RENAL FUNCTION:    Creatinine:   0.50  <<==, 0.50  <<==, 0.45  <<==, 0.50  <<==, 0.52  <<==, 0.51  <<==, 0.43  <<==    BUN:   11 <==, 9 <==, 9 <==, 14 <==, 12 <==, 8 <==, <4 <==    ============================================================================================    calcium   8.9 <==, 8.7 <==, 8.2 <==, 8.8 <==, 8.7 <==, 8.8 <==, 7.9 <==    phos   3.4 <==, 3.5 <==, 3.0 <==    mag   2.0 <==, 2.4 <==, 1.4 <==    ============================================================================================  LFTs    AST:   290 <== , 301 <== , 337 <== , 139 <== , 189 <== , 202 <== , 239 <==     ALT:  315  <== , 287  <== , 231  <== , 87  <== , 129  <== , 126  <== , 137  <==     AP:  44  <=, 43  <=, 39  <=, 40  <=, 58  <=, 60  <=, 62  <=    Bili:  1.4  <=, 1.5  <=, 1.8  <=, 1.8  <=, 1.0  <=, 1.1  <=, 1.3  <=    Venous Blood Gas:  10-04 @ 16:12  7.50/36/58/28/92  VBG Lactate: 2.7    Venous Blood Gas:  10-04 @ 05:52  --/--/--/--/--  VBG Lactate: 8.8    Venous Blood Gas:  10-03 @ 21:22  7.36/42/58/23/85  VBG Lactate: 7.7    Venous Blood Gas:  10-03 @ 18:38  7.37/46/107/26/97  VBG Lactate: 6.4    ABG - ( 04 Oct 2019 05:52 )  pH: 7.33  /  pCO2: 35    /  pO2: 100   / HCO3: 18    / Base Excess: -6.8  /  SaO2: 97        Procalcitonin, Serum: 0.17 ng/mL (10-04 @ 18:37)    Procalcitonin, Serum: 0.08 ng/mL (10-04 @ 05:57)    Serum Pro-Brain Natriuretic Peptide: 55 pg/mL (10-03 @ 21:22)    CARDIAC MARKERS ( 03 Oct 2019 18:38 )  CPK x     /CKMB x     /CKMB Units x        troponin 18 ng/L    MICROBIOLGY:    Rapid Respiratory Viral Panel (10.03.19 @ 19:47)    Rapid RVP Result: NotDetec: This Respiratory Panel uses polymerase chain reaction (PCR) to detect for  adenovirus; coronavirus (HKU1, NL63, 229E, OC43); human metapneumovirus  (hMPV); human enterovirus/rhinovirus (Entero/RV); influenza A; influenza  A/H1; influenza A/H3; influenza A/H1-2009; influenza B; parainfluenza  viruses 1, 2, 3, 4; respiratory syncytial virus; Mycoplasma pneumoniae;  and Chlamydophila pneumoniae.    Urinalysis Basic - ( 04 Oct 2019 07:35 )    Color: Yellow / Appearance: Slightly Turbid / S.019 / pH: x  Gluc: x / Ketone: Moderate  / Bili: Negative / Urobili: Negative   Blood: x / Protein: 30 mg/dL / Nitrite: Negative   Leuk Esterase: Small / RBC: 5 /hpf / WBC 3 /HPF   Sq Epi: x / Non Sq Epi: 1 / Bacteria: Many    Culture - Urine (10.04.19 @ 10:27)    Specimen Source: .Urine    Culture Results:   >=3 organisms. Probable collection contamination.    Culture - Blood (10.04.19 @ 05:25)    Specimen Source: .Blood    Culture Results:   No growth to date.    Culture - Blood (10.04.19 @ 05:25)    Specimen Source: .Blood    Culture Results:   No growth to date.    RADIOLOGY:  [x] Chest radiographs reviewed and interpreted by blanca ROMANO:  XR CHEST AP OR PA 1V                          PROCEDURE DATE:  10/03/2019      INTERPRETATION:    CLINICAL INFORMATION: Shortness of breath    EXAM: Chest X-ray, AP View    COMPARISON: Chest x-ray 3/21/2019    FINDINGS:    The lungs are clear.    Heart size cannot be accurately assessed in this projection.    No acute osseous findings.      IMPRESSION:     Clear lungs.      PREETI HANSEN M.D., RADIOLOGY RESIDENT  This document has been electronically signed.  MIRI NORRIS M.D., ATTENDING RADIOLOGIST  This document has been electronically signed. Oct  4 2019  8:57AM  ---------------------------------------------------------------------------------------------------------------    EXAM:  CT ANGIO CHEST (W)AW IC                          PROCEDURE DATE:  10/03/2019      INTERPRETATION:  CLINICAL INFORMATION: Shortness of breath. Prior DVT.     COMPARISON: CT chest from 3/17/2019.    PROCEDURE:   CT Angiography of the Chest.  90 ml of Omnipaque 350 was injected intravenously. 10 ml were discarded.  Sagittal and coronal reformats were performed as well as 3D (MIP)   reconstructions.      FINDINGS:    LUNGS AND AIRWAYS: Patent central airways.  Severe emphysema. Dependent   changes.    PLEURA: No pleural effusion.    MEDIASTINUM AND CHESTER: No lymphadenopathy.    VESSELS: Good opacification the pulmonary arterial tree. No pulmonary   embolism.    HEART: Heart size is normal. No pericardial effusion.    CHEST WALL AND LOWER NECK: Within normal limits.    VISUALIZED UPPER ABDOMEN: Small hiatal hernia. Hepatic steatosis.    BONES: Within normal limits.    IMPRESSION:     No pulmonary embolism.    Severe emphysema.    MARYA CODY M.D., RADIOLOGY RESIDENT  This document has been electronically signed.  JUWAN ROONEY M.D., ATTENDING RADIOLOGIST  This document has been electronically signed. Oct  3 2019  7:29PM  ---------------------------------------------------------------------------------------------------------------    EXAM:  US ABDOMEN COMPLETE                          PROCEDURE DATE:  10/08/2019      INTERPRETATION:  CLINICAL INFORMATION: Elevated LFTs, EtOH abuse    COMPARISON: Abdominal ultrasound from 3/25/2019    TECHNIQUE: Sonography of the abdomen.     FINDINGS:    Liver: Increased echogenicity, compatible with hepatic steatosis. No   focal lesions. Smooth contour. Right hepatic lobe measures 15.3 cm.    Bile ducts: Normal caliber. Common bile duct measures 4 mm.     Gallbladder: Within normal limits.        Pancreas: Visualized portions are within normal limits.    Spleen: 9.7 cm. Within normal limits.    Right kidney: 10.2 cm. No hydronephrosis.    Left kidney: 11.5 cm.  No hydronephrosis.    Ascites: None.    Aorta and IVC: Visualized portions are within normal limits.    IMPRESSION:     Hepatic steatosis. Otherwise normal abdominal ultrasound.    TALON SAPP M.D., RADIOLOGY RESIDENT  This document has been electronically signed.  GABRIELLE ECHOLS M.D., ATTENDING RADIOLOGIST  This document has been electronically signed. Oct  8 2019  2:04PM  --------------------------------------------------------------------------------------------------------------- [facility] - [facility] - PROGRESS NOTE    CHIEF COMPLAINT: chronic hypoxic respiratory failure; COPD exacerbation; emphysema; current smoker; alcohol withdrawal    INTERVAL HISTORY: much more awake and alert; no further agitation, confusion or tremors; eager to go home; no shortness of breath on a nasal canula although has only been out of bed and into the chair without much ambulation; no cough, sputum production, chest congestion or wheeze; no fevers, chills or sweats; no chest pain/pressure or palpitations; resolved lactic acidosis; LFTs remain elevated;    REVIEW OF SYSTEMS:  Constitutional: As per interval history  HEENT: Within normal limits  CV: As per interval history  Resp: As per interval history  GI: Within normal limits   : Within normal limits  Musculoskeletal: Within normal limits  Skin: Within normal limits  Neurological: slow mentation  Psychiatric: Within normal limits  Endocrine: Within normal limits  Hematologic/Lymphatic: Within normal limits  Allergic/Immunologic: Within normal limits    MEDICATIONS:     Pulmonary "  ALBUTerol/ipratropium for Nebulization 3 milliLiter(s) Nebulizer every 6 hours  buDESOnide    Inhalation Suspension 0.5 milliGRAM(s) Inhalation every 12 hours  montelukast 10 milliGRAM(s) Oral daily    Anti-microbials:    Cardiovascular:    Other:  ALPRAZolam 0.25 milliGRAM(s) Oral every 8 hours  cholecalciferol 2000 Unit(s) Oral daily  docusate sodium 100 milliGRAM(s) Oral two times a day  fluocinonide 0.05% Solution 1 Application(s) Topical at bedtime  folic acid 1 milliGRAM(s) Oral daily  heparin  Injectable 5000 Unit(s) SubCutaneous every 12 hours  hydrocortisone 1% Cream 1 Application(s) Topical two times a day  multivitamin 1 Tablet(s) Oral daily  multivitamin 1 Tablet(s) Oral daily  pantoprazole    Tablet 40 milliGRAM(s) Oral before breakfast  prochlorperazine   Tablet 10 milliGRAM(s) Oral daily  senna 2 Tablet(s) Oral at bedtime  thiamine 100 milliGRAM(s) Oral daily    MEDICATIONS  (PRN):  LORazepam   Injectable 2 milliGRAM(s) IV Push every 2 hours PRN Symptom-triggered: each CIWA -Ar score 8 or GREATER      OBJECTIVE:    I&O's Detail    09 Oct 2019 07:01  -  10 Oct 2019 07:00  --------------------------------------------------------  IN:    Oral Fluid: 750 mL    sodium chloride 0.9%: 1425 mL  Total IN: 2175 mL    OUT:    Voided: 4800 mL  Total OUT: 4800 mL    Total NET: -2625 mL    PHYSICAL EXAM:       ICU Vital Signs Last 24 Hrs  T(C): 36.8 (10 Oct 2019 08:20), Max: 36.9 (09 Oct 2019 11:55)  T(F): 98.3 (10 Oct 2019 08:20), Max: 98.5 (10 Oct 2019 00:21)  HR: 84 (10 Oct 2019 08:20) (74 - 96)  BP: 127/85 (10 Oct 2019 08:20) (127/85 - 151/83)  BP(mean): --  ABP: --  ABP(mean): --  RR: 16 (10 Oct 2019 08:20) (16 - 19)  SpO2: 98% (10 Oct 2019 08:20) (96% - 100%) on 2lpm nasal canula     General: Awake. Alert. Cooperative. No distress. Appears stated age. Obese. Slow mentation.	  HEENT: Atraumatic. Normocephalic. Anicteric. Normal oral mucosa. PERRL. EOMI. Improving psoriatic plaque on scalp. Mallampati class IV airway.  Neck: Supple. Trachea midline. Thyroid without enlargement/tenderness/nodules. No carotid bruit. No JVD. Short and wide.	  Cardiovascular: Regular rate and rhythm. S1 S2 normal. No murmurs, rubs or gallops.  Respiratory: Respirations unlabored. Decreased breath sounds throughout. No wheeze. No curvature.  Abdomen: Soft. Non-tender. Non-distended. No organomegaly. No masses. Normal bowel sounds. Obese.  Extremities: Warm to touch. No clubbing or cyanosis. No pedal edema.  Pulses: 2+ peripheral pulses all extremities.	  Skin: Venous stasis changes on both lower extremities. Multiple areas of skin hyperpigmentation at areas of prior plaque.  Lymph Nodes: Cervical, supraclavicular and axillary nodes normal  Neurological: Motor and sensory examination equal and normal. A and O x 3  Psychiatry: Flat affect    LABS:                          10.7   5.41  )-----------( 150      ( 09 Oct 2019 08:20 )             31.6     CBC    WBC  5.41 <==, 4.49 <==, 4.35 <==, 5.75 <==, 7.79 <==, 3.05 <==, 5.34 <==    Hemoglobin  10.7 <<==, 11.0 <<==, 10.3 <<==, 9.6 <<==, 10.9 <<==, 11.3 <<==, 12.7 <<==    Hematocrit  31.6 <==, 32.7 <==, 30.8 <==, 29.9 <==, 33.2 <==, 34.2 <==, 36.5 <==    Platelets  150 <==, 146 <==, 108 <==, 104 <==, 115 <==, 114 <==, 134 <==      133<L>  |  91<L>  |  11  ----------------------------<  112<H>    10-09  3.5   |  26  |  0.50      LYTES    sodium  133 <==, 132 <==, 128 <==, 131 <==, 132 <==, 136 <==, 133 <==    potassium   3.5 <==, 3.6 <==, 3.8 <==, 4.3 <==, 5.1 <==, 5.2 <==, 4.2 <==    chloride  91 <==, 91 <==, 90 <==, 92 <==, 90 <==, 94 <==, 91 <==    carbon dioxide  26 <==, 27 <==, 25 <==, 26 <==, 26 <==, 28 <==, 17 <==    =============================================================================================  RENAL FUNCTION:    Creatinine:   0.50  <<==, 0.50  <<==, 0.45  <<==, 0.50  <<==, 0.52  <<==, 0.51  <<==, 0.43  <<==    BUN:   11 <==, 9 <==, 9 <==, 14 <==, 12 <==, 8 <==, <4 <==    ============================================================================================    calcium   8.9 <==, 8.7 <==, 8.2 <==, 8.8 <==, 8.7 <==, 8.8 <==, 7.9 <==    phos   3.4 <==, 3.5 <==, 3.0 <==    mag   2.0 <==, 2.4 <==, 1.4 <==    ============================================================================================  LFTs    AST:   290 <== , 301 <== , 337 <== , 139 <== , 189 <== , 202 <== , 239 <==     ALT:  315  <== , 287  <== , 231  <== , 87  <== , 129  <== , 126  <== , 137  <==     AP:  44  <=, 43  <=, 39  <=, 40  <=, 58  <=, 60  <=, 62  <=    Bili:  1.4  <=, 1.5  <=, 1.8  <=, 1.8  <=, 1.0  <=, 1.1  <=, 1.3  <=    Venous Blood Gas:  10-04 @ 16:12  7.50/36/58/28/92  VBG Lactate: 2.7    Venous Blood Gas:  10-04 @ 05:52  --/--/--/--/--  VBG Lactate: 8.8    Venous Blood Gas:  10-03 @ 21:22  7.36/42/58/23/85  VBG Lactate: 7.7    Venous Blood Gas:  10-03 @ 18:38  7.37/46/107//97  VBG Lactate: 6.4    ABG - ( 04 Oct 2019 05:52 )  pH: 7.33  /  pCO2: 35    /  pO2: 100   / HCO3: 18    / Base Excess: -6.8  /  SaO2: 97        Procalcitonin, Serum: 0.17 ng/mL (10-04 @ 18:37)    Procalcitonin, Serum: 0.08 ng/mL (10-04 @ 05:57)    Serum Pro-Brain Natriuretic Peptide: 55 pg/mL (10-03 @ 21:22)    CARDIAC MARKERS ( 03 Oct 2019 18:38 )  CPK x     /CKMB x     /CKMB Units x        troponin 18 ng/L      Patient name: FREDY CHOI  YOB: 1965   Age: 54 (F)   MR#: [id number]  Study Date: 10/7/2019  [facility]  Study quality: Technically difficult  Referring Physician: Canelo Bonds MD  BloodPressure: 122/80 mmHg  Height: 165 cm  Weight: 68 kg  BSA: 1.8 m2  ------------------------------------------------------------------------  PROCEDURE: Transthoracic echocardiogram with 2-D, M-Mode  and complete spectral and color flow Doppler. Verbal  consent was obtained for injection of  Ultrasonic Enhancing  Agent following a discussion of risks and benefits.  Following intravenous injection of Ultrasonic Enhancing  Agent , harmonic imaging was performed.  INDICATION: Shortness of breath (R06.02)  ------------------------------------------------------------------------  Observations:  Left Ventricle: Endocardium not well visualized; grossly  normal left ventricular systolic function. Endocardial  visualization enhanced with intravenous injection of  Ultrasonic Enhancing Agent (Definity). Normal left  ventricular internal dimensions and wall thicknesses.  Normal diastolic function  ------------------------------------------------------------------------  Conclusions:  1. Normal left ventricular internal dimensions and wall  thicknesses.  2. Endocardium not well visualized; grossly normal left  ventricular systolic function. Endocardial visualization  enhanced with intravenous injection of Ultrasonic Enhancing  Agent (Definity). Unable to perfrom strain imaging due to  limited clinical windows.  ------------------------------------------------------------------------  Confirmed on  10/7/2019 - 18:11:26 by Margaret Alvarez M.D.  ------------------------------------------------------------------------      MICROBIOLGY:    Rapid Respiratory Viral Panel (10.03.19 @ 19:47)    Rapid RVP Result: NotDetec: This Respiratory Panel uses polymerase chain reaction (PCR) to detect for  adenovirus; coronavirus (HKU1, NL63, 229E, OC43); human metapneumovirus  (hMPV); human enterovirus/rhinovirus (Entero/RV); influenza A; influenza  A/H1; influenza A/H3; influenza A/H1-2009; influenza B; parainfluenza  viruses 1, 2, 3, 4; respiratory syncytial virus; Mycoplasma pneumoniae;  and Chlamydophila pneumoniae.    Urinalysis Basic - ( 04 Oct 2019 07:35 )    Color: Yellow / Appearance: Slightly Turbid / S.019 / pH: x  Gluc: x / Ketone: Moderate  / Bili: Negative / Urobili: Negative   Blood: x / Protein: 30 mg/dL / Nitrite: Negative   Leuk Esterase: Small / RBC: 5 /hpf / WBC 3 /HPF   Sq Epi: x / Non Sq Epi: 1 / Bacteria: Many    Culture - Urine (10.04.19 @ 10:27)    Specimen Source: .Urine    Culture Results:   >=3 organisms. Probable collection contamination.    Culture - Blood (10.04.19 @ 05:25)    Specimen Source: .Blood    Culture Results:   No growth to date.    Culture - Blood (10.04.19 @ 05:25)    Specimen Source: .Blood    Culture Results:   No growth to date.    RADIOLOGY:  [x] Chest radiographs reviewed and interpreted by blanca ROMANO:  XR CHEST AP OR PA 1V                          PROCEDURE DATE:  10/03/2019      INTERPRETATION:    CLINICAL INFORMATION: Shortness of breath    EXAM: Chest X-ray, AP View    COMPARISON: Chest x-ray 3/21/2019    FINDINGS:    The lungs are clear.    Heart size cannot be accurately assessed in this projection.    No acute osseous findings.      IMPRESSION:     Clear lungs.      PREETI HANSEN M.D., RADIOLOGY RESIDENT  This document has been electronically signed.  MIRI NORRIS M.D., ATTENDING RADIOLOGIST  This document has been electronically signed. Oct  4 2019  8:57AM  ---------------------------------------------------------------------------------------------------------------    EXAM:  CT ANGIO CHEST (W)AW IC                          PROCEDURE DATE:  10/03/2019      INTERPRETATION:  CLINICAL INFORMATION: Shortness of breath. Prior DVT.     COMPARISON: CT chest from 3/17/2019.    PROCEDURE:   CT Angiography of the Chest.  90 ml of Omnipaque 350 was injected intravenously. 10 ml were discarded.  Sagittal and coronal reformats were performed as well as 3D (MIP)   reconstructions.      FINDINGS:    LUNGS AND AIRWAYS: Patent central airways.  Severe emphysema. Dependent   changes.    PLEURA: No pleural effusion.    MEDIASTINUM AND CHESTER: No lymphadenopathy.    VESSELS: Good opacification the pulmonary arterial tree. No pulmonary   embolism.    HEART: Heart size is normal. No pericardial effusion.    CHEST WALL AND LOWER NECK: Within normal limits.    VISUALIZED UPPER ABDOMEN: Small hiatal hernia. Hepatic steatosis.    BONES: Within normal limits.    IMPRESSION:     No pulmonary embolism.    Severe emphysema.    MARYA CODY M.D., RADIOLOGY RESIDENT  This document has been electronically signed.  JUWAN ROONEY M.D., ATTENDING RADIOLOGIST  This document has been electronically signed. Oct  3 2019  7:29PM  ---------------------------------------------------------------------------------------------------------------    EXAM:  US ABDOMEN COMPLETE                          PROCEDURE DATE:  10/08/2019      INTERPRETATION:  CLINICAL INFORMATION: Elevated LFTs, EtOH abuse    COMPARISON: Abdominal ultrasound from 3/25/2019    TECHNIQUE: Sonography of the abdomen.     FINDINGS:    Liver: Increased echogenicity, compatible with hepatic steatosis. No   focal lesions. Smooth contour. Right hepatic lobe measures 15.3 cm.    Bile ducts: Normal caliber. Common bile duct measures 4 mm.     Gallbladder: Within normal limits.        Pancreas: Visualized portions are within normal limits.    Spleen: 9.7 cm. Within normal limits.    Right kidney: 10.2 cm. No hydronephrosis.    Left kidney: 11.5 cm.  No hydronephrosis.    Ascites: None.    Aorta and IVC: Visualized portions are within normal limits.    IMPRESSION:     Hepatic steatosis. Otherwise normal abdominal ultrasound.    TALON SAPP M.D., RADIOLOGY RESIDENT  This document has been electronically signed.  GABRIELLE ECHOLS M.D., ATTENDING RADIOLOGIST  This document has been electronically signed. Oct  8 2019  2:04PM  ---------------------------------------------------------------------------------------------------------------

## 2019-10-10 NOTE — DISCHARGE NOTE NURSING/CASE MANAGEMENT/SOCIAL WORK - [id number]_ALL_CORE
[place] website --- [email or web address] website --- [email or web address]

## 2019-10-10 NOTE — PROGRESS NOTE ADULT - ATTENDING COMMENTS
Agree with above NP note.  cv stable  clinically improved  echo with normal lv and valve function  cont current tx  d/c planning

## 2019-10-10 NOTE — PROGRESS NOTE ADULT - SUBJECTIVE AND OBJECTIVE BOX
CARDIOLOGY FOLLOW UP - Dr. Nj    CC  no cp/sob  "im being discharged "      PHYSICAL EXAM:  T(C): 36.8 (10-10-19 @ 08:20), Max: 36.9 (10-09-19 @ 11:55)  HR: 84 (10-10-19 @ 08:20) (74 - 96)  BP: 127/85 (10-10-19 @ 08:20) (127/85 - 151/83)  RR: 16 (10-10-19 @ 08:20) (16 - 19)  SpO2: 98% (10-10-19 @ 08:20) (96% - 100%)  Wt(kg): --  I&O's Summary    09 Oct 2019 07:01  -  10 Oct 2019 07:00  --------------------------------------------------------  IN: 2175 mL / OUT: 4800 mL / NET: -2625 mL        Appearance: Normal	  Cardiovascular: Normal S1 S2,RRR, No JVD, No murmurs  Respiratory: Lungs clear to auscultation	  Gastrointestinal:  Soft, Non-tender, + BS	  Extremities: Normal range of motion, No clubbing, cyanosis or edema        MEDICATIONS  (STANDING):  ALBUTerol/ipratropium for Nebulization 3 milliLiter(s) Nebulizer every 6 hours  ALPRAZolam 0.25 milliGRAM(s) Oral every 8 hours  buDESOnide    Inhalation Suspension 0.5 milliGRAM(s) Inhalation every 12 hours  cholecalciferol 2000 Unit(s) Oral daily  docusate sodium 100 milliGRAM(s) Oral two times a day  fluocinonide 0.05% Solution 1 Application(s) Topical at bedtime  folic acid 1 milliGRAM(s) Oral daily  heparin  Injectable 5000 Unit(s) SubCutaneous every 12 hours  hydrocortisone 1% Cream 1 Application(s) Topical two times a day  montelukast 10 milliGRAM(s) Oral daily  multivitamin 1 Tablet(s) Oral daily  multivitamin 1 Tablet(s) Oral daily  pantoprazole    Tablet 40 milliGRAM(s) Oral before breakfast  prochlorperazine   Tablet 10 milliGRAM(s) Oral daily  senna 2 Tablet(s) Oral at bedtime  thiamine 100 milliGRAM(s) Oral daily      TELEMETRY: nsr 	    ECG:  	  RADIOLOGY:   DIAGNOSTIC TESTING:  [ ] Echocardiogram:  [ ]  Catheterization:  [ ] Stress Test:    OTHER: 	    LABS:	 	                                10.7   5.41  )-----------( 150      ( 09 Oct 2019 08:20 )             31.6     10-09    133<L>  |  91<L>  |  11  ----------------------------<  112<H>  3.5   |  26  |  0.50    Ca    8.9      09 Oct 2019 06:17    TPro  6.1  /  Alb  3.6  /  TBili  1.4<H>  /  DBili  x   /  AST  290<H>  /  ALT  315<H>  /  AlkPhos  44  10-09

## 2019-10-10 NOTE — PROGRESS NOTE ADULT - ASSESSMENT
54 year old female with PMH of stage 3 breast cancer on aromasin, RA, Psoriasis on Otezla, brain aneurysm, s/p clip 1988, right jugular DVT, Catheter related MSSA bacteremia 2015,  ETOH abuse, COPD on oxygen presents here with dyspnea.    1. Dyspnea   likely secondary to pulm disease, COPD exacerbation   cv stable. no decomp chf on exam. pro bnp wnl, hs trop negative   no evidence of acs   CTA chest negative for PE, + severe emphysema   management  per pulm / med   echo with grossly normal LV function     2. Sinus tachycardia -resolved   likley secondary to withdrawal, dehydration, anxiety, copd exacerbation  no evidence of acs , asymptomatic  echo with grossly normal lv function     3 med f/u, CIWA  protocol     4. Elevated lactic acid level  work up per ID/ med     dvt ppx    dc planning per primary team

## 2019-10-10 NOTE — PROGRESS NOTE ADULT - ASSESSMENT
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 Plan of care discussed with the patient at bedside and the dedicated floor NP. No pulmonary objection to discharge. She should follow-up with her usual pulmonologist and continue current pulmonary medications.    Joss Franks MD, [facility]  [phone]  Pulmonary Medicine ASSESSMENT:    chronic hypoxic respiratory failure due to severe COPD/emphysema -> resolved bronchospasm - there is no evidence of pneumonia, pulmonary edema, pleural effusions or pulmonary emboli on the CTA of the chest - ECHO reveals normal LVEF and no significant valvular heart disease    alcohol withdrawal - resolved    worsening LFT abnormalities - alcoholic hepatitis - fatty liver    lactic acidosis -> resolved -> likely due to tremors and liver dysfunction     pancytopenia - bone marrow suppression from alcohol use    breast cancer s/p bilateral mastectomy    rheumatoid and psoriatic arthritis on Otezla - immunocompromised host    brain aneurysm s/p clipping    right jugular DVT    PLAN/RECOMMENDATIONS:    oxygen supplementation to keep saturation greater than 92%  has completed a course of prednisone  observe off antibiotics  albuterol/atrovent nebs q6h  pulmicort 0.5mg nebs q12h  singulair  thiamine/MVI/folate  Otezla/lidex solution to scalp/hydrocortisone cream to face  DVT prophylaxis - SQ heparin  GI prophylaxis - protonix  bowel regimen    Will follow with you. Plan of care discussed with the patient at bedside and the dedicated floor NP. No pulmonary objection to discharge. She should follow-up with her usual pulmonologist and continue current pulmonary medications.    Joss Franks MD, [facility]  [phone]  Pulmonary Medicine

## 2019-10-10 NOTE — DISCHARGE NOTE NURSING/CASE MANAGEMENT/SOCIAL WORK - PATIENT PORTAL LINK FT
You can access the FollowMyHealth Patient Portal offered by [facility] by registering at the following website: [email or web address]. By joining [facility]’s FollowMyHealth portal, you will also be able to view your health information using other applications (apps) compatible with our system.

## 2019-10-20 ENCOUNTER — INPATIENT (INPATIENT)
Facility: HOSPITAL | Age: 54
LOS: 5 days | Discharge: HOME | DRG: 896 | End: 2019-10-26
Attending: INTERNAL MEDICINE | Admitting: INTERNAL MEDICINE
Payer: COMMERCIAL

## 2019-10-20 VITALS
DIASTOLIC BLOOD PRESSURE: 87 MMHG | TEMPERATURE: 97 F | OXYGEN SATURATION: 100 % | HEART RATE: 96 BPM | SYSTOLIC BLOOD PRESSURE: 111 MMHG | HEIGHT: 64 IN | WEIGHT: 139.99 LBS | RESPIRATION RATE: 16 BRPM

## 2019-10-20 DIAGNOSIS — Z90.13 ACQUIRED ABSENCE OF BILATERAL BREASTS AND NIPPLES: Chronic | ICD-10-CM

## 2019-10-20 DIAGNOSIS — Z90.10 ACQUIRED ABSENCE OF UNSPECIFIED BREAST AND NIPPLE: Chronic | ICD-10-CM

## 2019-10-20 PROCEDURE — 99285 EMERGENCY DEPT VISIT HI MDM: CPT

## 2019-10-20 RX ORDER — IPRATROPIUM/ALBUTEROL SULFATE 18-103MCG
3 AEROSOL WITH ADAPTER (GRAM) INHALATION ONCE
Refills: 0 | Status: COMPLETED | OUTPATIENT
Start: 2019-10-20 | End: 2019-10-20

## 2019-10-20 RX ORDER — ALPRAZOLAM 0.25 MG
0.25 TABLET ORAL ONCE
Refills: 0 | Status: DISCONTINUED | OUTPATIENT
Start: 2019-10-20 | End: 2019-10-20

## 2019-10-20 NOTE — ED PROVIDER NOTE - ATTENDING CONTRIBUTION TO CARE
53yo female pmh COPD, breast CA s/o double mastectomy and chemo, EtOH abuse (no withdrawal seizures) p/w dyspnea x 2d, no improvement with albuterol at home today. Last EtOh 1h PTA. +tremors. CIWA. Labs with VBG. CXR. Duonebs, steroids, most likely to be admitted. Saturating well on NC. Poor air movement, pt using accessory muscles although speaking in full sentences.

## 2019-10-20 NOTE — ED PROVIDER NOTE - PSH
Brain aneurysm  1988 two clips  History of modified radical mastectomy of both breasts    S/P bilateral mastectomy

## 2019-10-20 NOTE — ED PROVIDER NOTE - PHYSICAL EXAMINATION
Perez BYRD MD PGY2:   PHYSICAL EXAM:    GENERAL: NAD, well-developed  HEENT:  Atraumatic, Normocephalic  CHEST/LUNG: Lack of air movement bilaterally.   HEART: Regular rate and rhythm  ABDOMEN: Soft, Nontender, Nondistended  EXTREMITIES:  2+ Peripheral Pulses.  PSYCH: A&Ox3  SKIN: No obvious rashes or lesions

## 2019-10-20 NOTE — ED PROVIDER NOTE - CLINICAL SUMMARY MEDICAL DECISION MAKING FREE TEXT BOX
Perez BYRD MD PGY2: 54 F here for persistent sxs of COPD exacerbation. Will obtain VBG to eval for hypercarbia, and CXR to eval for PNA and will treat with COPD exacerbation cocktail.

## 2019-10-20 NOTE — ED PROVIDER NOTE - OBJECTIVE STATEMENT
Perez BYRD MD PGY2: 54 yr old female with Hx significant for COPD, etOH abuse, breast Ca, remote brain aneurysm s/p clip who presented with COPD exacerbation recently discharged coming in with increasing dyspnea x 3 days. No fever, chills, N/V, diarrhea. States that she has also been feeling "shaky" over the last couple of days, has not been drinking. No new cough compared to baseline.

## 2019-10-21 DIAGNOSIS — J44.1 CHRONIC OBSTRUCTIVE PULMONARY DISEASE WITH (ACUTE) EXACERBATION: ICD-10-CM

## 2019-10-21 LAB
ALBUMIN SERPL ELPH-MCNC: 4 G/DL — SIGNIFICANT CHANGE UP (ref 3.3–5)
ALP SERPL-CCNC: 64 U/L — SIGNIFICANT CHANGE UP (ref 40–120)
ALT FLD-CCNC: 101 U/L — HIGH (ref 10–45)
ANION GAP SERPL CALC-SCNC: 20 MMOL/L — HIGH (ref 5–17)
AST SERPL-CCNC: 125 U/L — HIGH (ref 10–40)
BASE EXCESS BLDV CALC-SCNC: 3.9 MMOL/L — HIGH (ref -2–2)
BASE EXCESS BLDV CALC-SCNC: 4.2 MMOL/L — HIGH (ref -2–2)
BASOPHILS # BLD AUTO: 0.03 K/UL — SIGNIFICANT CHANGE UP (ref 0–0.2)
BASOPHILS NFR BLD AUTO: 0.6 % — SIGNIFICANT CHANGE UP (ref 0–2)
BILIRUB SERPL-MCNC: 0.5 MG/DL — SIGNIFICANT CHANGE UP (ref 0.2–1.2)
BUN SERPL-MCNC: <4 MG/DL — LOW (ref 7–23)
CA-I SERPL-SCNC: 1.03 MMOL/L — LOW (ref 1.12–1.3)
CA-I SERPL-SCNC: 1.06 MMOL/L — LOW (ref 1.12–1.3)
CALCIUM SERPL-MCNC: 9.6 MG/DL — SIGNIFICANT CHANGE UP (ref 8.4–10.5)
CHLORIDE BLDV-SCNC: 88 MMOL/L — LOW (ref 96–108)
CHLORIDE BLDV-SCNC: 95 MMOL/L — LOW (ref 96–108)
CHLORIDE SERPL-SCNC: 84 MMOL/L — LOW (ref 96–108)
CO2 BLDV-SCNC: 30 MMOL/L — SIGNIFICANT CHANGE UP (ref 22–30)
CO2 BLDV-SCNC: 32 MMOL/L — HIGH (ref 22–30)
CO2 SERPL-SCNC: 26 MMOL/L — SIGNIFICANT CHANGE UP (ref 22–31)
CREAT SERPL-MCNC: 0.47 MG/DL — LOW (ref 0.5–1.3)
EOSINOPHIL # BLD AUTO: 0.07 K/UL — SIGNIFICANT CHANGE UP (ref 0–0.5)
EOSINOPHIL NFR BLD AUTO: 1.3 % — SIGNIFICANT CHANGE UP (ref 0–6)
ETHANOL SERPL-MCNC: SIGNIFICANT CHANGE UP MG/DL (ref 0–10)
GAS PNL BLDV: 130 MMOL/L — LOW (ref 135–145)
GAS PNL BLDV: 130 MMOL/L — LOW (ref 135–145)
GAS PNL BLDV: SIGNIFICANT CHANGE UP
GLUCOSE BLDV-MCNC: 108 MG/DL — HIGH (ref 70–99)
GLUCOSE BLDV-MCNC: 181 MG/DL — HIGH (ref 70–99)
GLUCOSE SERPL-MCNC: 112 MG/DL — HIGH (ref 70–99)
HCO3 BLDV-SCNC: 29 MMOL/L — SIGNIFICANT CHANGE UP (ref 21–29)
HCO3 BLDV-SCNC: 30 MMOL/L — HIGH (ref 21–29)
HCT VFR BLD CALC: 31.5 % — LOW (ref 34.5–45)
HCT VFR BLDA CALC: 32 % — LOW (ref 39–50)
HCT VFR BLDA CALC: 34 % — LOW (ref 39–50)
HGB BLD CALC-MCNC: 10.4 G/DL — LOW (ref 11.5–15.5)
HGB BLD CALC-MCNC: 10.9 G/DL — LOW (ref 11.5–15.5)
HGB BLD-MCNC: 10.7 G/DL — LOW (ref 11.5–15.5)
IMM GRANULOCYTES NFR BLD AUTO: 0.4 % — SIGNIFICANT CHANGE UP (ref 0–1.5)
LACTATE BLDV-MCNC: 3.7 MMOL/L — HIGH (ref 0.7–2)
LACTATE BLDV-MCNC: 7.2 MMOL/L — CRITICAL HIGH (ref 0.7–2)
LACTATE BLDV-MCNC: 7.3 MMOL/L — CRITICAL HIGH (ref 0.7–2)
LYMPHOCYTES # BLD AUTO: 1.13 K/UL — SIGNIFICANT CHANGE UP (ref 1–3.3)
LYMPHOCYTES # BLD AUTO: 21.2 % — SIGNIFICANT CHANGE UP (ref 13–44)
MCHC RBC-ENTMCNC: 34 GM/DL — SIGNIFICANT CHANGE UP (ref 32–36)
MCHC RBC-ENTMCNC: 34.7 PG — HIGH (ref 27–34)
MCV RBC AUTO: 102.3 FL — HIGH (ref 80–100)
MONOCYTES # BLD AUTO: 0.75 K/UL — SIGNIFICANT CHANGE UP (ref 0–0.9)
MONOCYTES NFR BLD AUTO: 14 % — SIGNIFICANT CHANGE UP (ref 2–14)
NEUTROPHILS # BLD AUTO: 3.34 K/UL — SIGNIFICANT CHANGE UP (ref 1.8–7.4)
NEUTROPHILS NFR BLD AUTO: 62.5 % — SIGNIFICANT CHANGE UP (ref 43–77)
NRBC # BLD: 0 /100 WBCS — SIGNIFICANT CHANGE UP (ref 0–0)
PCO2 BLDV: 46 MMHG — SIGNIFICANT CHANGE UP (ref 35–50)
PCO2 BLDV: 54 MMHG — HIGH (ref 35–50)
PH BLDV: 7.36 — SIGNIFICANT CHANGE UP (ref 7.35–7.45)
PH BLDV: 7.41 — SIGNIFICANT CHANGE UP (ref 7.35–7.45)
PLATELET # BLD AUTO: 220 K/UL — SIGNIFICANT CHANGE UP (ref 150–400)
PO2 BLDV: 34 MMHG — SIGNIFICANT CHANGE UP (ref 25–45)
PO2 BLDV: 64 MMHG — HIGH (ref 25–45)
POTASSIUM BLDV-SCNC: 4.2 MMOL/L — SIGNIFICANT CHANGE UP (ref 3.5–5.3)
POTASSIUM BLDV-SCNC: 4.4 MMOL/L — SIGNIFICANT CHANGE UP (ref 3.5–5.3)
POTASSIUM SERPL-MCNC: 4.4 MMOL/L — SIGNIFICANT CHANGE UP (ref 3.5–5.3)
POTASSIUM SERPL-SCNC: 4.4 MMOL/L — SIGNIFICANT CHANGE UP (ref 3.5–5.3)
PROT SERPL-MCNC: 6.9 G/DL — SIGNIFICANT CHANGE UP (ref 6–8.3)
RBC # BLD: 3.08 M/UL — LOW (ref 3.8–5.2)
RBC # FLD: 13.6 % — SIGNIFICANT CHANGE UP (ref 10.3–14.5)
SAO2 % BLDV: 53 % — LOW (ref 67–88)
SAO2 % BLDV: 90 % — HIGH (ref 67–88)
SODIUM SERPL-SCNC: 130 MMOL/L — LOW (ref 135–145)
WBC # BLD: 5.34 K/UL — SIGNIFICANT CHANGE UP (ref 3.8–10.5)
WBC # FLD AUTO: 5.34 K/UL — SIGNIFICANT CHANGE UP (ref 3.8–10.5)

## 2019-10-21 PROCEDURE — 99223 1ST HOSP IP/OBS HIGH 75: CPT

## 2019-10-21 PROCEDURE — 71045 X-RAY EXAM CHEST 1 VIEW: CPT | Mod: 26

## 2019-10-21 PROCEDURE — 71275 CT ANGIOGRAPHY CHEST: CPT | Mod: 26

## 2019-10-21 RX ORDER — CHOLECALCIFEROL (VITAMIN D3) 125 MCG
1000 CAPSULE ORAL DAILY
Refills: 0 | Status: DISCONTINUED | OUTPATIENT
Start: 2019-10-21 | End: 2019-10-26

## 2019-10-21 RX ORDER — ALBUTEROL 90 UG/1
1.25 AEROSOL, METERED ORAL EVERY 6 HOURS
Refills: 0 | Status: DISCONTINUED | OUTPATIENT
Start: 2019-10-21 | End: 2019-10-21

## 2019-10-21 RX ORDER — ALPRAZOLAM 0.25 MG
0.25 TABLET ORAL ONCE
Refills: 0 | Status: DISCONTINUED | OUTPATIENT
Start: 2019-10-21 | End: 2019-10-21

## 2019-10-21 RX ORDER — EXEMESTANE 25 MG/1
25 TABLET, SUGAR COATED ORAL DAILY
Refills: 0 | Status: DISCONTINUED | OUTPATIENT
Start: 2019-10-21 | End: 2019-10-26

## 2019-10-21 RX ORDER — BUDESONIDE, MICRONIZED 100 %
0.5 POWDER (GRAM) MISCELLANEOUS EVERY 12 HOURS
Refills: 0 | Status: DISCONTINUED | OUTPATIENT
Start: 2019-10-21 | End: 2019-10-26

## 2019-10-21 RX ORDER — FOLIC ACID 0.8 MG
1 TABLET ORAL DAILY
Refills: 0 | Status: DISCONTINUED | OUTPATIENT
Start: 2019-10-21 | End: 2019-10-26

## 2019-10-21 RX ORDER — LANOLIN ALCOHOL/MO/W.PET/CERES
3 CREAM (GRAM) TOPICAL ONCE
Refills: 0 | Status: COMPLETED | OUTPATIENT
Start: 2019-10-21 | End: 2019-10-21

## 2019-10-21 RX ORDER — BUDESONIDE, MICRONIZED 100 %
0.25 POWDER (GRAM) MISCELLANEOUS
Refills: 0 | Status: DISCONTINUED | OUTPATIENT
Start: 2019-10-21 | End: 2019-10-21

## 2019-10-21 RX ORDER — ALPRAZOLAM 0.25 MG
0.25 TABLET ORAL THREE TIMES A DAY
Refills: 0 | Status: DISCONTINUED | OUTPATIENT
Start: 2019-10-21 | End: 2019-10-26

## 2019-10-21 RX ORDER — DOCUSATE SODIUM 100 MG
100 CAPSULE ORAL
Refills: 0 | Status: DISCONTINUED | OUTPATIENT
Start: 2019-10-21 | End: 2019-10-26

## 2019-10-21 RX ORDER — SODIUM CHLORIDE 9 MG/ML
1000 INJECTION INTRAMUSCULAR; INTRAVENOUS; SUBCUTANEOUS ONCE
Refills: 0 | Status: COMPLETED | OUTPATIENT
Start: 2019-10-21 | End: 2019-10-21

## 2019-10-21 RX ORDER — SENNA PLUS 8.6 MG/1
2 TABLET ORAL AT BEDTIME
Refills: 0 | Status: DISCONTINUED | OUTPATIENT
Start: 2019-10-21 | End: 2019-10-26

## 2019-10-21 RX ORDER — PANTOPRAZOLE SODIUM 20 MG/1
40 TABLET, DELAYED RELEASE ORAL
Refills: 0 | Status: DISCONTINUED | OUTPATIENT
Start: 2019-10-21 | End: 2019-10-26

## 2019-10-21 RX ORDER — IPRATROPIUM/ALBUTEROL SULFATE 18-103MCG
3 AEROSOL WITH ADAPTER (GRAM) INHALATION ONCE
Refills: 0 | Status: COMPLETED | OUTPATIENT
Start: 2019-10-21 | End: 2019-10-21

## 2019-10-21 RX ORDER — THIAMINE MONONITRATE (VIT B1) 100 MG
100 TABLET ORAL DAILY
Refills: 0 | Status: DISCONTINUED | OUTPATIENT
Start: 2019-10-21 | End: 2019-10-26

## 2019-10-21 RX ORDER — MONTELUKAST 4 MG/1
10 TABLET, CHEWABLE ORAL DAILY
Refills: 0 | Status: DISCONTINUED | OUTPATIENT
Start: 2019-10-21 | End: 2019-10-26

## 2019-10-21 RX ORDER — SODIUM CHLORIDE 9 MG/ML
1000 INJECTION, SOLUTION INTRAVENOUS
Refills: 0 | Status: DISCONTINUED | OUTPATIENT
Start: 2019-10-21 | End: 2019-10-22

## 2019-10-21 RX ORDER — POLYETHYLENE GLYCOL 3350 17 G/17G
17 POWDER, FOR SOLUTION ORAL DAILY
Refills: 0 | Status: DISCONTINUED | OUTPATIENT
Start: 2019-10-21 | End: 2019-10-26

## 2019-10-21 RX ORDER — PROCHLORPERAZINE MALEATE 5 MG
10 TABLET ORAL DAILY
Refills: 0 | Status: DISCONTINUED | OUTPATIENT
Start: 2019-10-21 | End: 2019-10-26

## 2019-10-21 RX ORDER — HEPARIN SODIUM 5000 [USP'U]/ML
5000 INJECTION INTRAVENOUS; SUBCUTANEOUS EVERY 12 HOURS
Refills: 0 | Status: DISCONTINUED | OUTPATIENT
Start: 2019-10-21 | End: 2019-10-26

## 2019-10-21 RX ORDER — IPRATROPIUM/ALBUTEROL SULFATE 18-103MCG
3 AEROSOL WITH ADAPTER (GRAM) INHALATION EVERY 6 HOURS
Refills: 0 | Status: DISCONTINUED | OUTPATIENT
Start: 2019-10-21 | End: 2019-10-26

## 2019-10-21 RX ORDER — APREMILAST 10-20-30MG
30 KIT ORAL
Refills: 0 | Status: DISCONTINUED | OUTPATIENT
Start: 2019-10-21 | End: 2019-10-26

## 2019-10-21 RX ORDER — TIOTROPIUM BROMIDE AND OLODATEROL 3.124; 2.736 UG/1; UG/1
2 SPRAY, METERED RESPIRATORY (INHALATION)
Qty: 0 | Refills: 0 | DISCHARGE

## 2019-10-21 RX ADMIN — Medication 3 MILLILITER(S): at 00:38

## 2019-10-21 RX ADMIN — Medication 10 MILLIGRAM(S): at 12:04

## 2019-10-21 RX ADMIN — Medication 2 MILLIGRAM(S): at 09:02

## 2019-10-21 RX ADMIN — Medication 100 MILLIGRAM(S): at 12:05

## 2019-10-21 RX ADMIN — SODIUM CHLORIDE 125 MILLILITER(S): 9 INJECTION, SOLUTION INTRAVENOUS at 19:50

## 2019-10-21 RX ADMIN — Medication 2 MILLIGRAM(S): at 19:49

## 2019-10-21 RX ADMIN — Medication 0.5 MILLIGRAM(S): at 19:44

## 2019-10-21 RX ADMIN — PANTOPRAZOLE SODIUM 40 MILLIGRAM(S): 20 TABLET, DELAYED RELEASE ORAL at 12:04

## 2019-10-21 RX ADMIN — Medication 3 MILLILITER(S): at 06:11

## 2019-10-21 RX ADMIN — Medication 25 MILLIGRAM(S): at 00:38

## 2019-10-21 RX ADMIN — Medication 0.25 MILLIGRAM(S): at 00:39

## 2019-10-21 RX ADMIN — MONTELUKAST 10 MILLIGRAM(S): 4 TABLET, CHEWABLE ORAL at 18:28

## 2019-10-21 RX ADMIN — Medication 3 MILLILITER(S): at 21:29

## 2019-10-21 RX ADMIN — Medication 2 MILLIGRAM(S): at 16:44

## 2019-10-21 RX ADMIN — Medication 2 MILLIGRAM(S): at 10:30

## 2019-10-21 RX ADMIN — Medication 0.25 MILLIGRAM(S): at 20:32

## 2019-10-21 RX ADMIN — SODIUM CHLORIDE 1000 MILLILITER(S): 9 INJECTION INTRAMUSCULAR; INTRAVENOUS; SUBCUTANEOUS at 02:47

## 2019-10-21 RX ADMIN — Medication 1 MILLIGRAM(S): at 12:04

## 2019-10-21 RX ADMIN — Medication 3 MILLILITER(S): at 17:46

## 2019-10-21 RX ADMIN — Medication 0.25 MILLIGRAM(S): at 06:12

## 2019-10-21 RX ADMIN — EXEMESTANE 25 MILLIGRAM(S): 25 TABLET, SUGAR COATED ORAL at 18:28

## 2019-10-21 RX ADMIN — Medication 1 TABLET(S): at 12:04

## 2019-10-21 RX ADMIN — Medication 1000 UNIT(S): at 12:05

## 2019-10-21 RX ADMIN — Medication 0.25 MILLIGRAM(S): at 12:04

## 2019-10-21 RX ADMIN — SODIUM CHLORIDE 125 MILLILITER(S): 9 INJECTION, SOLUTION INTRAVENOUS at 11:27

## 2019-10-21 RX ADMIN — Medication 3 MILLILITER(S): at 12:03

## 2019-10-21 RX ADMIN — Medication 3 MILLIGRAM(S): at 23:08

## 2019-10-21 RX ADMIN — HEPARIN SODIUM 5000 UNIT(S): 5000 INJECTION INTRAVENOUS; SUBCUTANEOUS at 19:10

## 2019-10-21 RX ADMIN — Medication 125 MILLIGRAM(S): at 00:38

## 2019-10-21 NOTE — BEHAVIORAL HEALTH ASSESSMENT NOTE - NSBHCONSULTMEDS_PSY_A_CORE FT
1. C/w home dose Xanax    2. Recommend Ativan taper as follows:  Ativan 2mg PO q4h x6 doses, 1.5mg PO q4h x6 doses, 1mg PO q4h x6 doses, 0.5mg PO q4h x6 doses, 0.5mg PO q12h x2 dose --> off    3. CIWA, thiamine/MVI/folic acid.    4. PRN: Ativan 2mg PO q2h PRN sx-triggered CIWA    5. SW for substance abuse referral resources.  OP psych f/u at [facility]: [phone].

## 2019-10-21 NOTE — BEHAVIORAL HEALTH ASSESSMENT NOTE - NSBHCHARTREVIEWVS_PSY_A_CORE FT
T(C): 36.6 (10-21-19 @ 12:24), Max: 36.7 (10-21-19 @ 06:00)  HR: 117 (10-21-19 @ 12:24) (96 - 120)  BP: 125/82 (10-21-19 @ 12:24) (111/87 - 142/84)  RR: 18 (10-21-19 @ 12:24) (16 - 22)  SpO2: 98% (10-21-19 @ 12:24) (97% - 100%)  Wt(kg): --

## 2019-10-21 NOTE — ED ADULT NURSE REASSESSMENT NOTE - NS ED NURSE REASSESS COMMENT FT1
pt requesting Xanax. pt had Xanax administered at 0600 as per EMAR. pt takes medication at home 3 times per day. ABBIE Horta made aware.

## 2019-10-21 NOTE — ED ADULT NURSE REASSESSMENT NOTE - NS ED NURSE REASSESS COMMENT FT1
Patient changed, repositioned, new gown and sheets provided, patient states improvement, aware she is waiting for a bed upstairs.

## 2019-10-21 NOTE — BEHAVIORAL HEALTH ASSESSMENT NOTE - SUMMARY
55 y/o  female, no children, currently unemployed (previously worked as a ), domiciled in a private residence in [place] with her , with PPHx of unspecified anxiety d/o (is prescribed Xanax by her internist), no inpatient psychiatric hospitalizations, no h/o SA, substance abuse significant for daily ETOH (reports 3 beers daily but more per previous records), denies h/o complicated withdrawals or DTs, with PMHx significant for stage 3 Bilateral Breast CA on Aromasin, RA, Psoriasis previously on Otezla, remote Brain Aneurysm s/p Clip in 1988, Rt Jugular DVT, Catheter related MSSA Bacteremia from [place] in July 2015, COPD, p/w dyspnea, admitted for management of COPD exacerbation in setting of potential EtOH withdrawal. Psych CL consulted for medication management.    On assessment by psych CL, patient states "I need my Xanax." Patient denying major mood sx, states her anxiety in general is well controlled with Xanax, but feels anxious now.  +tremors of her upper extremities and tongue fasciculations concerning for ETOH w/d.  A&O x3, denies SI/HI, no psychosis, does not meet criteria for inpatient psychiatric hospitalization.

## 2019-10-21 NOTE — BEHAVIORAL HEALTH ASSESSMENT NOTE - NSBHCHARTREVIEWIMAGING_PSY_A_CORE FT
< from: CT Head w/ IV Cont (09.13.16 @ 19:44) >    IMPRESSION: CT brain: No CT evidence of contrast enhancing lesion to   suggest metastatic disease. Unchanged encephalomalacia/gliosis in the   region of the inferior right frontal lobe. Unchanged ex vacuo dilatation   of the ventricles without hydrocephalus.    < end of copied text >

## 2019-10-21 NOTE — H&P ADULT - HISTORY OF PRESENT ILLNESS
54 yr old female with Hx significant for COPD, ETOH abuse, breast Ca, remote brain aneurysm s/p clip who presented with COPD exacerbation recently discharged coming in with increasing dyspnea and also found to be withdrawing from etoh   . No fever, chills, N/V, diarrhea. States that she has also been feeling "shaky" over the last couple of days, has not been drinking. No new cough

## 2019-10-21 NOTE — BEHAVIORAL HEALTH ASSESSMENT NOTE - NSBHSOCIALHXDETAILSFT_PSY_A_CORE
, no children, lives with , unemployed (previously a ), spends her days at home watching cooking shows which she enjoys

## 2019-10-21 NOTE — CONSULT NOTE ADULT - SUBJECTIVE AND OBJECTIVE BOX
[facility] - [facility] - CONSULT NOTE    HPI: 54 year old gentlewoman, current smoker and daily alcohol user, followed by Dr. Junior Mansfield of our practice for chronic hypoxic respiratory failure due to COPD/emphysema. She is basically homebound but is unable to state why she is unable to walk. She also has a history of breast cancer s/p bilateral mastectomies maintained on Aromasin, rheumatoid arthritis and psoriasis on Otezla, remote brain aneurysm requiring clipping and a right jugular vein DVT. She was admitted in March for alcohol withdrawal and a COPD exacerbation and again earlier this month. Her last drink was yesterday afternoon. She is currently tachycardia, anxious and tremulous. She comes to the ER complaining of shortness of breath. She has no cough, sputum production, chest congestion or wheeze. She has no fevers, chills or sweats.     PMHX:  Mediport related MSSA bactermia - 2015  Breast cancer  Brain aneurysm  Psoriasis  RA (rheumatoid arthritis)  Right jugular vein DVT    PSHX:  Bilateral mastectomy  Brain aneurysm clipping - 1988    FAMILY HISTORY:  Mother - CHF    SOCIAL HISTORY:  former smoker - stopped ~ 6 months ago      Pulmonary Medications:   montelukast 10 milliGRAM(s) Oral daily      Antimicrobials:      Cardiology:      Other:  cholecalciferol 1000 Unit(s) Oral daily  docusate sodium 100 milliGRAM(s) Oral two times a day  folic acid 1 milliGRAM(s) Oral daily  heparin  Injectable 5000 Unit(s) SubCutaneous every 12 hours  pantoprazole    Tablet 40 milliGRAM(s) Oral before breakfast  polyethylene glycol 3350 17 Gram(s) Oral daily  prochlorperazine   Tablet 10 milliGRAM(s) Oral daily  senna 2 Tablet(s) Oral at bedtime  thiamine 100 milliGRAM(s) Oral daily      Prn:  MEDICATIONS  (PRN):  LORazepam   Injectable 2 milliGRAM(s) IV Push every 1 hour PRN Symptom-triggered: each CIWA -Ar score 8 or GREATER      Allergies    amoxicillin (Anaphylaxis)  Levaquin (Other; Anaphylaxis)  Levaquin (Unknown)  penicillin (Anaphylaxis)  penicillins (Anaphylaxis)    HOME MEDICATIONS: see  H & P    REVIEW OF SYSTEMS:  Constitutional: As per HPI  HEENT: Within normal limits  CV: As per HPI  Resp: As per HPI  GI: Within normal limits   : Within normal limits  Musculoskeletal: Within normal limits  Skin: Within normal limits  Neurological: Within normal limits  Psychiatric: anxious - agitated  Endocrine: Within normal limits  Hematologic/Lymphatic: Within normal limits  Allergic/Immunologic: Within normal limits    [x] All other systems negative    OBJECTIVE:    Daily Height in cm: 162.56 (20 Oct 2019 23:20)        PHYSICAL EXAM:  ICU Vital Signs Last 24 Hrs  T(C): 36.7 (21 Oct 2019 07:07), Max: 36.7 (21 Oct 2019 06:00)  T(F): 98.1 (21 Oct 2019 07:07), Max: 98.1 (21 Oct 2019 06:00)  HR: 112 (21 Oct 2019 07:07) (96 - 113)  BP: 134/74 (21 Oct 2019 07:07) (111/87 - 134/74)  BP(mean): --  ABP: --  ABP(mean): --  RR: 18 (21 Oct 2019 07:07) (16 - 22)  SpO2: 97% (21 Oct 2019 07:07) (97% - 100%) on room air    General: Awake. Alert. Cooperative. Anxious. Appears stated age 	  HEENT: Atraumatic. Normocephalic. Anicteric. Normal oral mucosa. PERRL. EOMI. Psoriatic plaque on scalp.  Neck: Supple. Trachea midline. Thyroid without enlargement/tenderness/nodules. No carotid bruit. No JVD.	  Cardiovascular: Tachycardic rate and rhythm. S1 S2 normal. No murmurs, rubs or gallops.  Respiratory: Respirations unlabored. Decreased breath sounds throughout. No wheeze. No rales. No curvature.  Abdomen: Soft. Non-tender. Non-distended. No organomegaly. No masses. Normal bowel sounds. Obese.  Extremities: Warm to touch. No clubbing or cyanosis. No pedal edema.  Pulses: 2+ peripheral pulses all extremities.	  Skin: Venous stasis changes on both lower extremities. Multiple areas of skin hyperpigmentation at areas of prior plaque.  Lymph Nodes: Cervical, supraclavicular and axillary nodes normal  Neurological: Motor and sensory examination equal and normal. A and O x 3  Psychiatry: Anxious.        LABS:                          10.7   5.34  )-----------( 220      ( 21 Oct 2019 00:22 )             31.5     CBC    WBC  5.34 <==    Hemoglobin  10.7 <<==    Hematocrit  31.5 <==    Platelets  220 <==      130<L>  |  84<L>  |  <4<L>  ----------------------------<  112<H>    10-21  4.4   |  26  |  0.47<L>    LYTES    sodium  130 <==    potassium   4.4 <==    chloride  84 <==    carbon dioxide  26 <==    =============================================================================================  RENAL FUNCTION:    Creatinine:   0.47  <<==    BUN:   <4 <==    ============================================================================================    calcium   9.6 <==    ============================================================================================  LFTs    AST:   125 <==     ALT:  101  <==     AP:  64  <=    Bili:  0.5  <=    Serum Pro-Brain Natriuretic Peptide: 55 pg/mL (10-03 @ 21:22)    Venous Blood Gas:  10-21 @ 03:58  --/--/--/--/--  VBG Lactate: 7.3    Venous Blood Gas:  10-21 @ 00:22  7.36/54/34/30/53  VBG Lactate: 7.2      Patient name: FREDY CHOI  YOB: 1965   Age: 54 (F)   MR#: [id number]  Study Date: 10/7/2019  [facility]  Study quality: Technically difficult  Referring Physician: Canelo Bonds MD  BloodPressure: 122/80 mmHg  Height: 165 cm  Weight: 68 kg  BSA: 1.8 m2  ------------------------------------------------------------------------  PROCEDURE: Transthoracic echocardiogram with 2-D, M-Mode  and complete spectral and color flow Doppler. Verbal  consent was obtained for injection of  Ultrasonic Enhancing  Agent following a discussion of risks and benefits.  Following intravenous injection of Ultrasonic Enhancing  Agent , harmonic imaging was performed.  INDICATION: Shortness of breath (R06.02)  ------------------------------------------------------------------------  Observations:  Left Ventricle: Endocardium not well visualized; grossly  normal left ventricular systolic function. Endocardial  visualization enhanced with intravenous injection of  Ultrasonic Enhancing Agent (Definity). Normal left  ventricular internal dimensions and wall thicknesses.  Normal diastolic function  ------------------------------------------------------------------------  Conclusions:  1. Normal left ventricular internal dimensions and wall  thicknesses.  2. Endocardium not well visualized; grossly normal left  ventricular systolic function. Endocardial visualization  enhanced with intravenous injection of Ultrasonic Enhancing  Agent (Definity). Unable to perfrom strain imaging due to  limited clinical windows.  ------------------------------------------------------------------------  Confirmed on  10/7/2019 - 18:11:26 by Margaret Alvarez M.D.  ------------------------------------------------------------------------  ---------------------------------------------------------------------------------------------------------------  MICROBIOLOGY:         RADIOLOGY:  [x ] Chest radiographs reviewed and interpreted by me    EXAM:  CT ANGIO CHEST (W)AW IC                          PROCEDURE DATE:  10/21/2019      INTERPRETATION:  CLINICAL INFORMATION: Dyspnea    COMPARISON: CTA chest 10/3/2019    PROCEDURE:   CT Angiography of the Chest.  90 ml of Omnipaque 350 was injected intravenously. 10 ml were discarded.  Sagittal and coronal reformats were performed as well as 3D (MIP)   reconstructions.      FINDINGS:    LUNGS AND AIRWAYS: Patent central airways.  Severe upper lobe predominant   centrilobular emphysema. Mild bilateral dependent subsegmental   atelectasis. No focal lung consolidation.    PLEURA: No pleural effusion.    MEDIASTINUM AND CHESTER: No lymphadenopathy.    VESSELS: No evidence of pulmonary embolism.    HEART: Heart size is normal. No pericardial effusion.    CHEST WALL AND LOWER NECK: Within normal limits.    VISUALIZED UPPER ABDOMEN: Small hiatal hernia.    BONES: Degenerative changes in the spine.    IMPRESSION:     No CT evidence of pulmonary embolism.     Severe emphysema.    ENID ALBRECHT MD., ATTENDING RADIOLOGIST  This document has been electronically signed. Oct 21 2019  2:30AM      --------------------------------------------------------------------------------------------------------------- [facility] - [facility] - CONSULT NOTE    HPI: 54 year old gentlewoman, current smoker and daily alcohol user, followed by Dr. Junior Mansfield of our practice for chronic hypoxic respiratory failure due to COPD/emphysema. She is basically homebound but is unable to state why she is unable to walk. She also has a history of breast cancer s/p bilateral mastectomies maintained on Aromasin, rheumatoid arthritis and psoriasis on Otezla, remote brain aneurysm requiring clipping and a right jugular vein DVT. She was admitted in March for alcohol withdrawal and a COPD exacerbation and again earlier this month. Her last drink was yesterday afternoon. She is currently tachycardia, anxious and tremulous. She comes to the ER complaining of shortness of breath. She has no cough, sputum production, chest congestion or wheeze. She has no fevers, chills or sweats.     PMHX:  Mediport related MSSA bactermia - 2015  Breast cancer  Brain aneurysm  Psoriasis  RA (rheumatoid arthritis)  Right jugular vein DVT    PSHX:  Bilateral mastectomy  Brain aneurysm clipping - 1988    FAMILY HISTORY:  Mother - CHF    SOCIAL HISTORY:  former smoker - stopped ~ 6 months ago      Pulmonary Medications:   montelukast 10 milliGRAM(s) Oral daily      Antimicrobials:      Cardiology:      Other:  cholecalciferol 1000 Unit(s) Oral daily  docusate sodium 100 milliGRAM(s) Oral two times a day  folic acid 1 milliGRAM(s) Oral daily  heparin  Injectable 5000 Unit(s) SubCutaneous every 12 hours  pantoprazole    Tablet 40 milliGRAM(s) Oral before breakfast  polyethylene glycol 3350 17 Gram(s) Oral daily  prochlorperazine   Tablet 10 milliGRAM(s) Oral daily  senna 2 Tablet(s) Oral at bedtime  thiamine 100 milliGRAM(s) Oral daily      Prn:  MEDICATIONS  (PRN):  LORazepam   Injectable 2 milliGRAM(s) IV Push every 1 hour PRN Symptom-triggered: each CIWA -Ar score 8 or GREATER      Allergies    amoxicillin (Anaphylaxis)  Levaquin (Other; Anaphylaxis)  Levaquin (Unknown)  penicillin (Anaphylaxis)  penicillins (Anaphylaxis)    HOME MEDICATIONS: see  H & P    REVIEW OF SYSTEMS:  Constitutional: As per HPI  HEENT: Within normal limits  CV: As per HPI  Resp: As per HPI  GI: Within normal limits   : Within normal limits  Musculoskeletal: Within normal limits  Skin: Within normal limits  Neurological: Within normal limits  Psychiatric: anxious - agitated  Endocrine: Within normal limits  Hematologic/Lymphatic: Within normal limits  Allergic/Immunologic: Within normal limits    [x] All other systems negative    OBJECTIVE:    Daily Height in cm: 162.56 (20 Oct 2019 23:20)        PHYSICAL EXAM:  ICU Vital Signs Last 24 Hrs  T(C): 36.7 (21 Oct 2019 07:07), Max: 36.7 (21 Oct 2019 06:00)  T(F): 98.1 (21 Oct 2019 07:07), Max: 98.1 (21 Oct 2019 06:00)  HR: 112 (21 Oct 2019 07:07) (96 - 113)  BP: 134/74 (21 Oct 2019 07:07) (111/87 - 134/74)  BP(mean): --  ABP: --  ABP(mean): --  RR: 18 (21 Oct 2019 07:07) (16 - 22)  SpO2: 97% (21 Oct 2019 07:07) (97% - 100%) on 3lpm nasal canula -> 87% on room air    General: Awake. Alert. Cooperative. Anxious. Appears stated age 	  HEENT: Atraumatic. Normocephalic. Anicteric. Normal oral mucosa. PERRL. EOMI. Psoriatic plaque on scalp.  Neck: Supple. Trachea midline. Thyroid without enlargement/tenderness/nodules. No carotid bruit. No JVD.	  Cardiovascular: Tachycardic rate and rhythm. S1 S2 normal. No murmurs, rubs or gallops.  Respiratory: Respirations unlabored. Decreased breath sounds throughout. No wheeze. No rales. No curvature.  Abdomen: Soft. Non-tender. Non-distended. No organomegaly. No masses. Normal bowel sounds. Obese.  Extremities: Warm to touch. No clubbing or cyanosis. No pedal edema.  Pulses: 2+ peripheral pulses all extremities.	  Skin: Venous stasis changes on both lower extremities. Multiple areas of skin hyperpigmentation at areas of prior plaque.  Lymph Nodes: Cervical, supraclavicular and axillary nodes normal  Neurological: Motor and sensory examination equal and normal. A and O x 3  Psychiatry: Anxious.        LABS:                          10.7   5.34  )-----------( 220      ( 21 Oct 2019 00:22 )             31.5     CBC    WBC  5.34 <==    Hemoglobin  10.7 <<==    Hematocrit  31.5 <==    Platelets  220 <==      130<L>  |  84<L>  |  <4<L>  ----------------------------<  112<H>    10-21  4.4   |  26  |  0.47<L>    LYTES    sodium  130 <==    potassium   4.4 <==    chloride  84 <==    carbon dioxide  26 <==    =============================================================================================  RENAL FUNCTION:    Creatinine:   0.47  <<==    BUN:   <4 <==    ============================================================================================    calcium   9.6 <==    ============================================================================================  LFTs    AST:   125 <==     ALT:  101  <==     AP:  64  <=    Bili:  0.5  <=    Serum Pro-Brain Natriuretic Peptide: 55 pg/mL (10-03 @ 21:22)    Venous Blood Gas:  10-21 @ 03:58  --/--/--/--/--  VBG Lactate: 7.3    Venous Blood Gas:  10-21 @ 00:22  7.36/54/34/30/53  VBG Lactate: 7.2      Patient name: FREDY CHOI  YOB: 1965   Age: 54 (F)   MR#: [id number]  Study Date: 10/7/2019  [facility]  Study quality: Technically difficult  Referring Physician: Canelo Bonds MD  BloodPressure: 122/80 mmHg  Height: 165 cm  Weight: 68 kg  BSA: 1.8 m2  ------------------------------------------------------------------------  PROCEDURE: Transthoracic echocardiogram with 2-D, M-Mode  and complete spectral and color flow Doppler. Verbal  consent was obtained for injection of  Ultrasonic Enhancing  Agent following a discussion of risks and benefits.  Following intravenous injection of Ultrasonic Enhancing  Agent , harmonic imaging was performed.  INDICATION: Shortness of breath (R06.02)  ------------------------------------------------------------------------  Observations:  Left Ventricle: Endocardium not well visualized; grossly  normal left ventricular systolic function. Endocardial  visualization enhanced with intravenous injection of  Ultrasonic Enhancing Agent (Definity). Normal left  ventricular internal dimensions and wall thicknesses.  Normal diastolic function  ------------------------------------------------------------------------  Conclusions:  1. Normal left ventricular internal dimensions and wall  thicknesses.  2. Endocardium not well visualized; grossly normal left  ventricular systolic function. Endocardial visualization  enhanced with intravenous injection of Ultrasonic Enhancing  Agent (Definity). Unable to perfrom strain imaging due to  limited clinical windows.  ------------------------------------------------------------------------  Confirmed on  10/7/2019 - 18:11:26 by Margaret Alvarez M.D.  ------------------------------------------------------------------------  ---------------------------------------------------------------------------------------------------------------  MICROBIOLOGY:         RADIOLOGY:  [x ] Chest radiographs reviewed and interpreted by me    EXAM:  CT ANGIO CHEST (W)AW IC                          PROCEDURE DATE:  10/21/2019      INTERPRETATION:  CLINICAL INFORMATION: Dyspnea    COMPARISON: CTA chest 10/3/2019    PROCEDURE:   CT Angiography of the Chest.  90 ml of Omnipaque 350 was injected intravenously. 10 ml were discarded.  Sagittal and coronal reformats were performed as well as 3D (MIP)   reconstructions.      FINDINGS:    LUNGS AND AIRWAYS: Patent central airways.  Severe upper lobe predominant   centrilobular emphysema. Mild bilateral dependent subsegmental   atelectasis. No focal lung consolidation.    PLEURA: No pleural effusion.    MEDIASTINUM AND CHESTER: No lymphadenopathy.    VESSELS: No evidence of pulmonary embolism.    HEART: Heart size is normal. No pericardial effusion.    CHEST WALL AND LOWER NECK: Within normal limits.    VISUALIZED UPPER ABDOMEN: Small hiatal hernia.    BONES: Degenerative changes in the spine.    IMPRESSION:     No CT evidence of pulmonary embolism.     Severe emphysema.    ENID ALBRECHT MD., ATTENDING RADIOLOGIST  This document has been electronically signed. Oct 21 2019  2:30AM      ---------------------------------------------------------------------------------------------------------------

## 2019-10-21 NOTE — BEHAVIORAL HEALTH ASSESSMENT NOTE - RISK ASSESSMENT
Low Acute Suicide Risk Risk factors: acute/chronic medical issues, active substance abuse, anxiety    Protective factors: no current/past SIIP/HIIP, no h/o SA/SIB, no h/o psych admissions, no psychosis, domiciled, intact marriage, social supports, positive therapeutic relationship    Overall, pt is at chronically elevated risk 2/2 substance abuse, which is mitigated by mult protective factors ; pt does not meet criteria for psychiatric hospitalization.

## 2019-10-21 NOTE — ED ADULT NURSE REASSESSMENT NOTE - NS ED NURSE REASSESS COMMENT FT1
Patient complaining of anxiety and SOB, states, " I need my albuterol inhaler, I am getting anxious," MD aware to put prn anxiety and neb treatment in, Will continue to monitor.

## 2019-10-21 NOTE — H&P ADULT - NSHPLABSRESULTS_GEN_ALL_CORE
10.7   5.34  )-----------( 220      ( 21 Oct 2019 00:22 )             31.5       10-21    130<L>  |  84<L>  |  <4<L>  ----------------------------<  112<H>  4.4   |  26  |  0.47<L>    Ca    9.6      21 Oct 2019 00:22    TPro  6.9  /  Alb  4.0  /  TBili  0.5  /  DBili  x   /  AST  125<H>  /  ALT  101<H>  /  AlkPhos  64  10-21                      Lactate Trend            CAPILLARY BLOOD GLUCOSE            Culture Results:   >=3 organisms. Probable collection contamination. (10-04 @ 10:27)  Culture Results:   No growth at 5 days. (10-04 @ 05:25)  Culture Results:   No growth at 5 days. (10-04 @ 05:25)

## 2019-10-21 NOTE — BEHAVIORAL HEALTH ASSESSMENT NOTE - DESCRIPTION (FIRST USE, LAST USE, QUANTITY, FREQUENCY, DURATION)
drinks "3 beers" on average - (up to a 12pack per day on previous records) xanax prescribed by internist TID for last 10 years

## 2019-10-21 NOTE — CONSULT NOTE ADULT - ASSESSMENT
ASSESSMENT:    chronic hypoxic respiratory failure due to severe COPD/emphysema with worsening dyspnea due to a COPD exacerbation - there is no evidence of pneumonia, pulmonary edema, pleural effusions or pulmonary emboli on the CTA of the chest    anion gap metabolic acidosis due to lactic acidosis     alcohol withdrawal    breast cancer s/p bilateral mastectomy    rheumatoid and psoriatic arthritis on Otezla - immunocompromised host    brain aneurysm s/p clipping    PLAN/RECOMMENDATIONS:    oxygen supplementation to keep saturation greater than 92%  observe off antibiotics  observe off systemic steroids  albuterol/atrovent nebs q6h  pulmicort 0.5mg nebs q12h  singulair  watch for and treat alcohol withdrawal  Otezla/lidex solution to scalp/hydrocortisone cream to face  DVT prophylaxis  serum osmolality    Thank you for the courtesy of this referral. Plan of care discussed with the patient at bedside and the ER staff.    Joss Franks MD, [facility]  [phone]  Pulmonary Medicine ASSESSMENT:    chronic hypoxic respiratory failure due to severe COPD/emphysema with worsening dyspnea due to a COPD exacerbation - there is no evidence of pneumonia, pulmonary edema, pleural effusions or pulmonary emboli on the CTA of the chest    anion gap metabolic acidosis due to lactic acidosis     alcohol withdrawal    breast cancer s/p bilateral mastectomy    rheumatoid and psoriatic arthritis on Otezla - immunocompromised host    brain aneurysm s/p clipping    PLAN/RECOMMENDATIONS:    oxygen supplementation to keep saturation greater than 92%  bedside spirometry  observe off antibiotics  observe off systemic steroids  albuterol/atrovent nebs q6h  pulmicort 0.5mg nebs q12h  singulair  watch for and treat alcohol withdrawal  thiamine/MVI/folate  Otezla/lidex solution to scalp/hydrocortisone cream to face  DVT prophylaxis  GI prophylaxis - protonix  bowel regimen  serum osmolality    Thank you for the courtesy of this referral. Plan of care discussed with the patient at bedside, the ER staff and Dr. Bonds.    Joss Franks MD, [facility]  [phone]  Pulmonary Medicine

## 2019-10-21 NOTE — ED ADULT NURSE NOTE - NSIMPLEMENTINTERV_GEN_ALL_ED
Implemented All Fall Risk Interventions:  [place] to call system. Call bell, personal items and telephone within reach. Instruct patient to call for assistance. Room bathroom lighting operational. Non-slip footwear when patient is off stretcher. Physically safe environment: no spills, clutter or unnecessary equipment. Stretcher in lowest position, wheels locked, appropriate side rails in place. Provide visual cue, wrist band, yellow gown, etc. Monitor gait and stability. Monitor for mental status changes and reorient to person, place, and time. Review medications for side effects contributing to fall risk. Reinforce activity limits and safety measures with patient and family.

## 2019-10-21 NOTE — BEHAVIORAL HEALTH ASSESSMENT NOTE - NSBHCHARTREVIEWLAB_PSY_A_CORE FT
10.7   5.34  )-----------( 220      ( 21 Oct 2019 00:22 )             31.5     10-21    130<L>  |  84<L>  |  <4<L>  ----------------------------<  112<H>  4.4   |  26  |  0.47<L>    Ca    9.6      21 Oct 2019 00:22    TPro  6.9  /  Alb  4.0  /  TBili  0.5  /  DBili  x   /  AST  125<H>  /  ALT  101<H>  /  AlkPhos  64  10-21

## 2019-10-21 NOTE — BEHAVIORAL HEALTH ASSESSMENT NOTE - HPI (INCLUDE ILLNESS QUALITY, SEVERITY, DURATION, TIMING, CONTEXT, MODIFYING FACTORS, ASSOCIATED SIGNS AND SYMPTOMS)
55 y/o  female, no children, currently unemployed (previously worked as a ), domiciled in a private residence in [place] with her , with PPHx of unspecified anxiety d/o (is prescribed Xanax by her internist), no inpatient psychiatric hospitalizations, no h/o SA, substance abuse significant for daily ETOH (reports 3 beers daily but more per previous records), denies h/o complicated withdrawals or DTs, with PMHx significant for stage 3 Bilateral Breast CA on Aromasin, RA, Psoriasis previously on Otezla, remote Brain Aneurysm s/p Clip in 1988, Rt Jugular DVT, Catheter related MSSA Bacteremia from [place] in July 2015, COPD, p/w dyspnea, admitted for management of COPD exacerbation in setting of potential EtOH withdrawal. Psych CL consulted for medication management.    On interview, patient A&O x4, and is concerned about her shortness of breath.  She states that she "needs her Xanax" which she takes TID. When asked if she is anxious most of the time, she says, "no." She reports recurrent episodes of anxiety attacks, where she describes as episodes of SOB. She denies all other symptoms of panic attack and says these "anxiety attacks" are non-distinguishable to her from COPD exacerbations. She denies other psychiatric diagnoses or treatment and is not concerned with her alcohol use.  Denies depression, candido, psychosis.  She denies other substance use.  Denies current or past SI/HI/AVH.  Reports supportive relationship with spouse who does most of the household chores as pt has difficulty walking.  Pt declines changes in her medications or management of anxiety; does not think she has a substance abuse problem.  Of note, pt is poorly groomed and disheveled.

## 2019-10-21 NOTE — ED ADULT NURSE NOTE - OBJECTIVE STATEMENT
54 year old A&Ox3 female complaining of SOB,  tremors x 3 days. PMH breast ca s/p double mastectomy, not undergoing chemo, ETOH abuse, COPD on 3.5L home o2. Patient o2 saturation is 100% on 3LNC but states, " I can't breathe, I can't breathe, I want to wear a mask." Poor air movement, lungs diminished bilaterally, able to speak full sentences. Patient also endorses "shakes" last drink was 2 hr PTA, states, " I usually drink beer and liquor." Initial CIWA 5. CM applied. Denies CP, n/v/d, fevers, chills, abdominal pain, urinary symptoms, numbness, tingling in upper and lower extremities, HA, blurry vision. Updated on plan of care. 54 year old A&Ox3 female complaining of SOB,  tremors x 3 days. PMH breast ca s/p double mastectomy, not undergoing chemo, ETOH abuse, COPD on 3.5L home o2. Patient o2 saturation is 100% on 3LNC but states, " I can't breathe, I can't breathe, I want to wear a mask." Poor air movement, lungs diminished bilaterally, able to speak full sentences. Patient also endorses "shakes" last drink was 2 hr PTA, states, " I usually drink beer and liquor." Initial CIWA 5. Extensive excoriations and bruising noted to back and upper arms, patient attributes this to psoriasis, denies acute skin changes. CM applied. Denies CP, n/v/d, fevers, chills, abdominal pain, urinary symptoms, numbness, tingling in upper and lower extremities, HA, blurry vision. Updated on plan of care.

## 2019-10-21 NOTE — BEHAVIORAL HEALTH ASSESSMENT NOTE - NSBHCHARTREVIEWINVESTIGATE_PSY_A_CORE FT
< from: 12 Lead ECG (10.03.19 @ 16:38) >      Ventricular Rate 84 BPM    Atrial Rate 84 BPM    P-R Interval 114 ms    QRS Duration 84 ms    Q-T Interval 378 ms    QTC Calculation(Bezet) 446 ms    P Axis 45 degrees    R Axis 50 degrees    T Axis 60 degrees    Diagnosis Line NORMAL SINUS RHYTHM  LOW VOLTAGE QRS  BORDERLINE ECG    Confirmed by ATTENDING, ED ([id number]),  JOSE LUIS BORRERO ([id number]) on 10/4/2019 10:36:16 AM    < end of copied text >

## 2019-10-22 LAB
ALBUMIN SERPL ELPH-MCNC: 3.8 G/DL — SIGNIFICANT CHANGE UP (ref 3.3–5)
ALP SERPL-CCNC: 57 U/L — SIGNIFICANT CHANGE UP (ref 40–120)
ALT FLD-CCNC: 80 U/L — HIGH (ref 10–45)
ANION GAP SERPL CALC-SCNC: 16 MMOL/L — SIGNIFICANT CHANGE UP (ref 5–17)
ANION GAP SERPL CALC-SCNC: 17 MMOL/L — SIGNIFICANT CHANGE UP (ref 5–17)
ANION GAP SERPL CALC-SCNC: 22 MMOL/L — HIGH (ref 5–17)
APPEARANCE UR: CLEAR — SIGNIFICANT CHANGE UP
AST SERPL-CCNC: 98 U/L — HIGH (ref 10–40)
BILIRUB SERPL-MCNC: 1.7 MG/DL — HIGH (ref 0.2–1.2)
BILIRUB UR-MCNC: NEGATIVE — SIGNIFICANT CHANGE UP
BUN SERPL-MCNC: 5 MG/DL — LOW (ref 7–23)
BUN SERPL-MCNC: 5 MG/DL — LOW (ref 7–23)
BUN SERPL-MCNC: 7 MG/DL — SIGNIFICANT CHANGE UP (ref 7–23)
CALCIUM SERPL-MCNC: 8.1 MG/DL — LOW (ref 8.4–10.5)
CALCIUM SERPL-MCNC: 8.4 MG/DL — SIGNIFICANT CHANGE UP (ref 8.4–10.5)
CALCIUM SERPL-MCNC: 8.4 MG/DL — SIGNIFICANT CHANGE UP (ref 8.4–10.5)
CHLORIDE SERPL-SCNC: 77 MMOL/L — LOW (ref 96–108)
CHLORIDE SERPL-SCNC: 78 MMOL/L — LOW (ref 96–108)
CHLORIDE SERPL-SCNC: 79 MMOL/L — LOW (ref 96–108)
CK MB BLD-MCNC: 3.5 % — SIGNIFICANT CHANGE UP (ref 0–3.5)
CK MB CFR SERPL CALC: 8.1 NG/ML — HIGH (ref 0–3.8)
CK SERPL-CCNC: 230 U/L — HIGH (ref 25–170)
CO2 SERPL-SCNC: 19 MMOL/L — LOW (ref 22–31)
CO2 SERPL-SCNC: 25 MMOL/L — SIGNIFICANT CHANGE UP (ref 22–31)
CO2 SERPL-SCNC: 26 MMOL/L — SIGNIFICANT CHANGE UP (ref 22–31)
COLOR SPEC: SIGNIFICANT CHANGE UP
CREAT ?TM UR-MCNC: 42 MG/DL — SIGNIFICANT CHANGE UP
CREAT SERPL-MCNC: 0.45 MG/DL — LOW (ref 0.5–1.3)
CREAT SERPL-MCNC: 0.47 MG/DL — LOW (ref 0.5–1.3)
CREAT SERPL-MCNC: 0.58 MG/DL — SIGNIFICANT CHANGE UP (ref 0.5–1.3)
DIFF PNL FLD: NEGATIVE — SIGNIFICANT CHANGE UP
GAS PNL BLDA: SIGNIFICANT CHANGE UP
GAS PNL BLDA: SIGNIFICANT CHANGE UP
GLUCOSE BLDC GLUCOMTR-MCNC: 123 MG/DL — HIGH (ref 70–99)
GLUCOSE SERPL-MCNC: 113 MG/DL — HIGH (ref 70–99)
GLUCOSE SERPL-MCNC: 129 MG/DL — HIGH (ref 70–99)
GLUCOSE SERPL-MCNC: 165 MG/DL — HIGH (ref 70–99)
GLUCOSE UR QL: NEGATIVE — SIGNIFICANT CHANGE UP
HCT VFR BLD CALC: 28.5 % — LOW (ref 34.5–45)
HCT VFR BLD CALC: 29.2 % — LOW (ref 34.5–45)
HGB BLD-MCNC: 10.2 G/DL — LOW (ref 11.5–15.5)
HGB BLD-MCNC: 9.8 G/DL — LOW (ref 11.5–15.5)
KETONES UR-MCNC: SIGNIFICANT CHANGE UP
LEUKOCYTE ESTERASE UR-ACNC: NEGATIVE — SIGNIFICANT CHANGE UP
MAGNESIUM SERPL-MCNC: 1.3 MG/DL — LOW (ref 1.6–2.6)
MCHC RBC-ENTMCNC: 33.6 GM/DL — SIGNIFICANT CHANGE UP (ref 32–36)
MCHC RBC-ENTMCNC: 34.3 PG — HIGH (ref 27–34)
MCHC RBC-ENTMCNC: 35.5 PG — HIGH (ref 27–34)
MCHC RBC-ENTMCNC: 35.8 GM/DL — SIGNIFICANT CHANGE UP (ref 32–36)
MCV RBC AUTO: 102.1 FL — HIGH (ref 80–100)
MCV RBC AUTO: 99.3 FL — SIGNIFICANT CHANGE UP (ref 80–100)
NITRITE UR-MCNC: NEGATIVE — SIGNIFICANT CHANGE UP
NRBC # BLD: 0 /100 WBCS — SIGNIFICANT CHANGE UP (ref 0–0)
NT-PROBNP SERPL-SCNC: 2262 PG/ML — HIGH (ref 0–300)
OSMOLALITY UR: 158 MOS/KG — LOW (ref 300–900)
PH UR: 7 — SIGNIFICANT CHANGE UP (ref 5–8)
PHOSPHATE SERPL-MCNC: 2.7 MG/DL — SIGNIFICANT CHANGE UP (ref 2.5–4.5)
PLATELET # BLD AUTO: 207 K/UL — SIGNIFICANT CHANGE UP (ref 150–400)
PLATELET # BLD AUTO: 209 K/UL — SIGNIFICANT CHANGE UP (ref 150–400)
POTASSIUM SERPL-MCNC: 3.8 MMOL/L — SIGNIFICANT CHANGE UP (ref 3.5–5.3)
POTASSIUM SERPL-MCNC: 4.1 MMOL/L — SIGNIFICANT CHANGE UP (ref 3.5–5.3)
POTASSIUM SERPL-MCNC: 5.6 MMOL/L — HIGH (ref 3.5–5.3)
POTASSIUM SERPL-SCNC: 3.8 MMOL/L — SIGNIFICANT CHANGE UP (ref 3.5–5.3)
POTASSIUM SERPL-SCNC: 4.1 MMOL/L — SIGNIFICANT CHANGE UP (ref 3.5–5.3)
POTASSIUM SERPL-SCNC: 5.6 MMOL/L — HIGH (ref 3.5–5.3)
PROT SERPL-MCNC: 6.5 G/DL — SIGNIFICANT CHANGE UP (ref 6–8.3)
PROT UR-MCNC: NEGATIVE — SIGNIFICANT CHANGE UP
RBC # BLD: 2.86 M/UL — LOW (ref 3.8–5.2)
RBC # BLD: 2.87 M/UL — LOW (ref 3.8–5.2)
RBC # FLD: 13.4 % — SIGNIFICANT CHANGE UP (ref 10.3–14.5)
RBC # FLD: 13.4 % — SIGNIFICANT CHANGE UP (ref 10.3–14.5)
SODIUM SERPL-SCNC: 118 MMOL/L — CRITICAL LOW (ref 135–145)
SODIUM SERPL-SCNC: 119 MMOL/L — CRITICAL LOW (ref 135–145)
SODIUM SERPL-SCNC: 122 MMOL/L — LOW (ref 135–145)
SODIUM UR-SCNC: 25 MMOL/L — SIGNIFICANT CHANGE UP
SP GR SPEC: 1.01 — LOW (ref 1.01–1.02)
TROPONIN T, HIGH SENSITIVITY RESULT: 26 NG/L — SIGNIFICANT CHANGE UP (ref 0–51)
UROBILINOGEN FLD QL: NEGATIVE — SIGNIFICANT CHANGE UP
UUN UR-MCNC: 188 MG/DL — SIGNIFICANT CHANGE UP
WBC # BLD: 7.67 K/UL — SIGNIFICANT CHANGE UP (ref 3.8–10.5)
WBC # BLD: 8.75 K/UL — SIGNIFICANT CHANGE UP (ref 3.8–10.5)
WBC # FLD AUTO: 7.67 K/UL — SIGNIFICANT CHANGE UP (ref 3.8–10.5)
WBC # FLD AUTO: 8.75 K/UL — SIGNIFICANT CHANGE UP (ref 3.8–10.5)

## 2019-10-22 PROCEDURE — 71045 X-RAY EXAM CHEST 1 VIEW: CPT | Mod: 26

## 2019-10-22 PROCEDURE — 99232 SBSQ HOSP IP/OBS MODERATE 35: CPT

## 2019-10-22 PROCEDURE — 99223 1ST HOSP IP/OBS HIGH 75: CPT | Mod: GC

## 2019-10-22 PROCEDURE — 93010 ELECTROCARDIOGRAM REPORT: CPT | Mod: 76

## 2019-10-22 PROCEDURE — 94010 BREATHING CAPACITY TEST: CPT | Mod: 26

## 2019-10-22 RX ORDER — HYDROCORTISONE 1 %
1 OINTMENT (GRAM) TOPICAL
Refills: 0 | Status: DISCONTINUED | OUTPATIENT
Start: 2019-10-22 | End: 2019-10-26

## 2019-10-22 RX ORDER — ERTAPENEM SODIUM 1 G/1
1000 INJECTION, POWDER, LYOPHILIZED, FOR SOLUTION INTRAMUSCULAR; INTRAVENOUS EVERY 24 HOURS
Refills: 0 | Status: DISCONTINUED | OUTPATIENT
Start: 2019-10-22 | End: 2019-10-26

## 2019-10-22 RX ORDER — SODIUM CHLORIDE 5 G/100ML
500 INJECTION, SOLUTION INTRAVENOUS
Refills: 0 | Status: DISCONTINUED | OUTPATIENT
Start: 2019-10-22 | End: 2019-10-23

## 2019-10-22 RX ORDER — METOPROLOL TARTRATE 50 MG
5 TABLET ORAL ONCE
Refills: 0 | Status: COMPLETED | OUTPATIENT
Start: 2019-10-22 | End: 2019-10-22

## 2019-10-22 RX ORDER — IPRATROPIUM/ALBUTEROL SULFATE 18-103MCG
3 AEROSOL WITH ADAPTER (GRAM) INHALATION ONCE
Refills: 0 | Status: COMPLETED | OUTPATIENT
Start: 2019-10-22 | End: 2019-10-22

## 2019-10-22 RX ORDER — CHLORHEXIDINE GLUCONATE 213 G/1000ML
1 SOLUTION TOPICAL DAILY
Refills: 0 | Status: DISCONTINUED | OUTPATIENT
Start: 2019-10-22 | End: 2019-10-26

## 2019-10-22 RX ORDER — MAGNESIUM SULFATE 500 MG/ML
2 VIAL (ML) INJECTION ONCE
Refills: 0 | Status: COMPLETED | OUTPATIENT
Start: 2019-10-22 | End: 2019-10-22

## 2019-10-22 RX ORDER — AZITHROMYCIN 500 MG/1
500 TABLET, FILM COATED ORAL ONCE
Refills: 0 | Status: COMPLETED | OUTPATIENT
Start: 2019-10-22 | End: 2019-10-22

## 2019-10-22 RX ORDER — AZITHROMYCIN 500 MG/1
500 TABLET, FILM COATED ORAL EVERY 24 HOURS
Refills: 0 | Status: DISCONTINUED | OUTPATIENT
Start: 2019-10-22 | End: 2019-10-22

## 2019-10-22 RX ADMIN — SENNA PLUS 2 TABLET(S): 8.6 TABLET ORAL at 21:07

## 2019-10-22 RX ADMIN — PANTOPRAZOLE SODIUM 40 MILLIGRAM(S): 20 TABLET, DELAYED RELEASE ORAL at 06:53

## 2019-10-22 RX ADMIN — Medication 1000 UNIT(S): at 13:15

## 2019-10-22 RX ADMIN — AZITHROMYCIN 250 MILLIGRAM(S): 500 TABLET, FILM COATED ORAL at 10:29

## 2019-10-22 RX ADMIN — ERTAPENEM SODIUM 120 MILLIGRAM(S): 1 INJECTION, POWDER, LYOPHILIZED, FOR SOLUTION INTRAMUSCULAR; INTRAVENOUS at 16:06

## 2019-10-22 RX ADMIN — Medication 0.25 MILLIGRAM(S): at 21:07

## 2019-10-22 RX ADMIN — EXEMESTANE 25 MILLIGRAM(S): 25 TABLET, SUGAR COATED ORAL at 13:15

## 2019-10-22 RX ADMIN — Medication 10 MILLIGRAM(S): at 13:16

## 2019-10-22 RX ADMIN — Medication 3 MILLILITER(S): at 23:21

## 2019-10-22 RX ADMIN — HEPARIN SODIUM 5000 UNIT(S): 5000 INJECTION INTRAVENOUS; SUBCUTANEOUS at 17:36

## 2019-10-22 RX ADMIN — SODIUM CHLORIDE 60 MILLILITER(S): 5 INJECTION, SOLUTION INTRAVENOUS at 17:49

## 2019-10-22 RX ADMIN — Medication 2 MILLIGRAM(S): at 06:20

## 2019-10-22 RX ADMIN — Medication 3 MILLILITER(S): at 02:51

## 2019-10-22 RX ADMIN — Medication 0.5 MILLIGRAM(S): at 17:37

## 2019-10-22 RX ADMIN — CHLORHEXIDINE GLUCONATE 1 APPLICATION(S): 213 SOLUTION TOPICAL at 13:15

## 2019-10-22 RX ADMIN — Medication 100 MILLIGRAM(S): at 17:37

## 2019-10-22 RX ADMIN — Medication 20 MILLIGRAM(S): at 23:21

## 2019-10-22 RX ADMIN — Medication 5 MILLIGRAM(S): at 16:06

## 2019-10-22 RX ADMIN — Medication 100 MILLIGRAM(S): at 13:16

## 2019-10-22 RX ADMIN — Medication 1 TABLET(S): at 13:16

## 2019-10-22 RX ADMIN — MONTELUKAST 10 MILLIGRAM(S): 4 TABLET, CHEWABLE ORAL at 13:16

## 2019-10-22 RX ADMIN — Medication 1 APPLICATION(S): at 17:38

## 2019-10-22 RX ADMIN — Medication 1.5 MILLIGRAM(S): at 21:07

## 2019-10-22 RX ADMIN — Medication 0.25 MILLIGRAM(S): at 06:32

## 2019-10-22 RX ADMIN — Medication 3 MILLILITER(S): at 13:15

## 2019-10-22 RX ADMIN — Medication 20 MILLIGRAM(S): at 17:57

## 2019-10-22 RX ADMIN — Medication 0.5 MILLIGRAM(S): at 06:33

## 2019-10-22 RX ADMIN — Medication 1.5 MILLIGRAM(S): at 17:37

## 2019-10-22 RX ADMIN — Medication 2 MILLIGRAM(S): at 10:28

## 2019-10-22 RX ADMIN — Medication 2 MILLIGRAM(S): at 00:56

## 2019-10-22 RX ADMIN — Medication 40 MILLIGRAM(S): at 09:32

## 2019-10-22 RX ADMIN — Medication 1 MILLIGRAM(S): at 13:15

## 2019-10-22 RX ADMIN — Medication 3 MILLILITER(S): at 09:13

## 2019-10-22 RX ADMIN — Medication 2 MILLIGRAM(S): at 13:17

## 2019-10-22 RX ADMIN — Medication 3 MILLILITER(S): at 17:36

## 2019-10-22 RX ADMIN — HEPARIN SODIUM 5000 UNIT(S): 5000 INJECTION INTRAVENOUS; SUBCUTANEOUS at 06:31

## 2019-10-22 RX ADMIN — POLYETHYLENE GLYCOL 3350 17 GRAM(S): 17 POWDER, FOR SOLUTION ORAL at 13:16

## 2019-10-22 RX ADMIN — Medication 0.25 MILLIGRAM(S): at 13:17

## 2019-10-22 RX ADMIN — Medication 50 GRAM(S): at 17:58

## 2019-10-22 NOTE — CHART NOTE - NSCHARTNOTEFT_GEN_A_CORE
Attending of record request for RRT " cannot breath"   See RRT note on sunrise  Documentation of recommendation & actions given by MAR   Patient currently off bipap sat 96% on 3L  Dr Bonds updated renal consulted for hyponatremia   Pulm already on board   MICU consulted   Pt not MICU candidate   Will continue to monitor   Department of Medicine   SIL Sanders      Spectra# [id number]

## 2019-10-22 NOTE — PHYSICAL THERAPY INITIAL EVALUATION ADULT - ADDITIONAL COMMENTS
As per pt, pt lives in a private house w/ 4 steps to enter w/ spouse. Pt required assist w/ ADLs and mobility. Pt states her  assisting putting her in wheelchair PTA. Pt owns RW and WC.

## 2019-10-22 NOTE — RAPID RESPONSE TEAM SUMMARY - NSOTHERINTERVENTIONSRRT_GEN_ALL_CORE
bipap 10/5  MICU consulted for ABG Na 113, straight cath for urine studies.   Primary NP instructed to obtain Nephrology c/s. and follow up urine studies also to notify MICU on serum CMP Na. bipap 10/5  MICU consulted for ABG Na 113, straight cath for urine studies.   Primary NP instructed to obtain Nephrology c/s. and follow up urine studies also to notify MICU on serum CMP Na.  would recommend q4-6 hr trend of serum Na as well as urine osm.

## 2019-10-22 NOTE — PROGRESS NOTE ADULT - SUBJECTIVE AND OBJECTIVE BOX
CHIEF COMPLAINT:Patient is a 54y old  Female who presents with a chief complaint of EtOH withdrawal, COPD exacerbation (22 Oct 2019 10:12)    	        PAST MEDICAL & SURGICAL HISTORY:  Prophylactic measure  Breast cancer  Brain aneurysm: with clips  Psoriasis  RA (rheumatoid arthritis)  Psoriasis  Breast cancer, stage 3  History of modified radical mastectomy of both breasts  S/P bilateral mastectomy  Brain aneurysm: 1988 two clips          REVIEW OF SYSTEMS:  sob /labored    Medications:  MEDICATIONS  (STANDING):  ALBUTerol/ipratropium for Nebulization 3 milliLiter(s) Nebulizer every 6 hours  ALPRAZolam 0.25 milliGRAM(s) Oral three times a day  apremilast 30 milliGRAM(s) Oral two times a day  buDESOnide    Inhalation Suspension 0.5 milliGRAM(s) Inhalation every 12 hours  chlorhexidine 2% Cloths 1 Application(s) Topical daily  cholecalciferol 1000 Unit(s) Oral daily  docusate sodium 100 milliGRAM(s) Oral two times a day  exemestane 25 milliGRAM(s) Oral daily  folic acid 1 milliGRAM(s) Oral daily  heparin  Injectable 5000 Unit(s) SubCutaneous every 12 hours  hydrocortisone 1% Cream 1 Application(s) Topical two times a day  LORazepam     Tablet   Oral   LORazepam     Tablet 2 milliGRAM(s) Oral every 4 hours  LORazepam     Tablet 1.5 milliGRAM(s) Oral every 4 hours  montelukast 10 milliGRAM(s) Oral daily  multivitamin 1 Tablet(s) Oral daily  pantoprazole    Tablet 40 milliGRAM(s) Oral before breakfast  polyethylene glycol 3350 17 Gram(s) Oral daily  prochlorperazine   Tablet 10 milliGRAM(s) Oral daily  senna 2 Tablet(s) Oral at bedtime  thiamine 100 milliGRAM(s) Oral daily    MEDICATIONS  (PRN):  LORazepam   Injectable 2 milliGRAM(s) IV Push every 1 hour PRN Symptom-triggered: each CIWA -Ar score 8 or GREATER    	    PHYSICAL EXAM:  T(C): 36.8 (10-22-19 @ 07:14), Max: 37.3 (10-22-19 @ 04:07)  HR: 132 (10-22-19 @ 10:29) (102 - 132)  BP: 151/80 (10-22-19 @ 07:14) (118/75 - 154/89)  RR: 18 (10-22-19 @ 07:14) (18 - 20)  SpO2: 96% (10-22-19 @ 10:29) (96% - 100%)  Wt(kg): --  I&O's Summary    22 Oct 2019 07:01  -  22 Oct 2019 13:00  --------------------------------------------------------  IN: 200 mL / OUT: 0 mL / NET: 200 mL        Appearance: Normal	  HEENT:   Normal oral mucosa, PERRL, EOMI	    Cardiovascular: Normal S1 S2, tachy  Respiratory: dec bs   Psychiatry: A & O x 3,  Gastrointestinal:  Soft, Non-tender, + BS	  Neurologic: Non-focal  Extremities:dec rom  Vascular: Peripheral pulses palpable     TELEMETRY: 	    ECG:  	  RADIOLOGY:  OTHER: 	  	  LABS:	 	    CARDIAC MARKERS:                                9.8    7.67  )-----------( 209      ( 22 Oct 2019 09:40 )             29.2     10-22    118<LL>  |  77<L>  |  5<L>  ----------------------------<  129<H>  4.1   |  25  |  0.47<L>    Ca    8.4      22 Oct 2019 09:26  Phos  2.7     10-22  Mg     1.3     10-22    TPro  6.5  /  Alb  3.8  /  TBili  1.7<H>  /  DBili  x   /  AST  98<H>  /  ALT  80<H>  /  AlkPhos  57  10-22    proBNP: Serum Pro-Brain Natriuretic Peptide: 2262 pg/mL (10-22 @ 09:26)    Lipid Profile:   HgA1c:   TSH:

## 2019-10-22 NOTE — CHART NOTE - NSCHARTNOTEFT_GEN_A_CORE
Patient seen and examined by MICU team again. Patient's Na level Patient seen and examined by MICU team again    # Hyponatremia  - agree with 1.5% NS   - check BMP q4-6 to monitor Na levels    # Hyperkalemia  - patients K increased to 5.6 with slight increase in Cr, however does not qualify as XIANG  - monitor Cr for XIANG  - continue to trend K levels  - can check cortisol level if patient persistently hyponatremic and hyperkalemic to check for adrenal insufficiency    # Elevated lactate  - patient's lactate increased from 1.1 to 5.2 most likely 2/2 to albuterol vs fatty liver disease vs increased work of breathing  - continue to trend lactate    # Alcohol withdrawal  - Patient's CIWA 5-7  - patient currently on ativan taper  - Please refer to behavioral health note which recommends slowing Ativan taper for now  - recommending continuing higher dose ativan    # Tachypnea  - patient's tachypnea improved s/p duonebs treatment  - currently on nasal cannula satting well    Not a MICU candidate at this time. Please reconsult as needed.     Tara Pinedo, PGY2  Internal Medicine Patient seen and examined by MICU team again    # Hyponatremia  - agree with 1.5% NS   - check BMP q4-6 to monitor Na levels    # Hyperkalemia  - patients K increased to 5.6 with slight increase in Cr, however does not qualify as XIANG  - monitor Cr for XIANG  - continue to trend K levels  - can check cortisol level if patient persistently hyponatremic and hyperkalemic to r/o adrenal insufficiency    # Elevated lactate  - patient's lactate increased from 1.1 to 5.2 most likely 2/2 to albuterol + fatty liver disease + increased work of breathing  - continue to trend lactate    # Alcohol withdrawal  - Patient's CIWA 5-7  - patient currently on ativan taper  - Please refer to behavioral health note which recommends slowing Ativan taper for now  - recommending continuing higher dose ativan    # Tachypnea  - patient's tachypnea improved s/p duonebs treatment  - currently on nasal cannula satting well and comfortable    Not a MICU candidate at this time. Please reconsult as needed.     Tara Pinedo, PGY2  Internal Medicine

## 2019-10-22 NOTE — CONSULT NOTE ADULT - SUBJECTIVE AND OBJECTIVE BOX
CHIEF COMPLAINT:     HPI:  54 yr old female with Hx significant for COPD, ETOH abuse, breast Ca, remote brain aneurysm s/p clip who presented with COPD exacerbation recently discharged coming in with increasing dyspnea and also found to be withdrawing from etoh   . No fever, chills, N/V, diarrhea. States that she has also been feeling "shaky" over the last couple of days, has not been drinking. No new cough (21 Oct 2019 09:15)      FAMILY HISTORY:  FH: CHF (congestive heart failure): Mother      SOCIAL HISTORY:  Smoking: __ packs x ___ years  EtOH Use:  Marital Status:  Occupation:  Recent Travel:  Country of Birth:  Advance Directives:    Allergies    amoxicillin (Anaphylaxis)  Levaquin (Other; Anaphylaxis)  Levaquin (Unknown)  penicillin (Anaphylaxis)  penicillins (Anaphylaxis)    Intolerances        HOME MEDICATIONS:    REVIEW OF SYSTEMS:  Constitutional:   Eyes:  ENT:  CV:  Resp:  GI:  :  MSK:  Integumentary:  Neurological:  Psychiatric:  Endocrine:  Hematologic/Lymphatic:  Allergic/Immunologic:  [ ] All other systems negative  [ ] Unable to assess ROS because ________    OBJECTIVE:  ICU Vital Signs Last 24 Hrs  T(C): 36.8 (22 Oct 2019 07:14), Max: 37.3 (22 Oct 2019 04:07)  T(F): 98.3 (22 Oct 2019 07:14), Max: 99.1 (22 Oct 2019 04:07)  HR: 106 (22 Oct 2019 07:14) (102 - 121)  BP: 151/80 (22 Oct 2019 07:14) (118/75 - 154/89)  BP(mean): --  ABP: --  ABP(mean): --  RR: 18 (22 Oct 2019 07:14) (18 - 20)  SpO2: 98% (22 Oct 2019 07:14) (97% - 100%)        CAPILLARY BLOOD GLUCOSE      POCT Blood Glucose.: 123 mg/dL (22 Oct 2019 09:14)      PHYSICAL EXAM:  GENERAL: NAD, well-developed  HEAD:  Atraumatic, Normocephalic  EYES: EOMI, PERRLA, conjunctiva and sclera clear  NECK: Supple, No JVD  CHEST/LUNG: Clear to auscultation bilaterally; No wheeze  HEART: Regular rate and rhythm; No murmurs, rubs, or gallops  ABDOMEN: Soft, Nontender, Nondistended; Bowel sounds present  EXTREMITIES:  2+ Peripheral Pulses, No clubbing, cyanosis, or edema  PSYCH: AAOx3  NEUROLOGY: non-focal  SKIN: No rashes or lesions      HOSPITAL MEDICATIONS:  MEDICATIONS  (STANDING):  ALBUTerol/ipratropium for Nebulization 3 milliLiter(s) Nebulizer every 6 hours  ALBUTerol/ipratropium for Nebulization 3 milliLiter(s) Nebulizer once  ALPRAZolam 0.25 milliGRAM(s) Oral three times a day  apremilast 30 milliGRAM(s) Oral two times a day  azithromycin  IVPB 500 milliGRAM(s) IV Intermittent once  buDESOnide    Inhalation Suspension 0.5 milliGRAM(s) Inhalation every 12 hours  chlorhexidine 2% Cloths 1 Application(s) Topical daily  cholecalciferol 1000 Unit(s) Oral daily  docusate sodium 100 milliGRAM(s) Oral two times a day  exemestane 25 milliGRAM(s) Oral daily  folic acid 1 milliGRAM(s) Oral daily  heparin  Injectable 5000 Unit(s) SubCutaneous every 12 hours  hydrocortisone 1% Cream 1 Application(s) Topical two times a day  LORazepam     Tablet   Oral   LORazepam     Tablet 2 milliGRAM(s) Oral every 4 hours  LORazepam     Tablet 1.5 milliGRAM(s) Oral every 4 hours  methylPREDNISolone sodium succinate Injectable 40 milliGRAM(s) IV Push once  montelukast 10 milliGRAM(s) Oral daily  multivitamin 1 Tablet(s) Oral daily  multivitamin/thiamine/folic acid in sodium chloride 0.9% 1000 milliLiter(s) (125 mL/Hr) IV Continuous <Continuous>  pantoprazole    Tablet 40 milliGRAM(s) Oral before breakfast  polyethylene glycol 3350 17 Gram(s) Oral daily  prochlorperazine   Tablet 10 milliGRAM(s) Oral daily  senna 2 Tablet(s) Oral at bedtime  thiamine 100 milliGRAM(s) Oral daily    MEDICATIONS  (PRN):  LORazepam   Injectable 2 milliGRAM(s) IV Push every 1 hour PRN Symptom-triggered: each CIWA -Ar score 8 or GREATER      LABS:                        9.8    7.67  )-----------( 209      ( 22 Oct 2019 09:40 )             29.2     10-22    x   |  77<L>  |  5<L>  ----------------------------<  129<H>  4.1   |  25  |  0.47<L>    Ca    8.4      22 Oct 2019 09:26  Phos  2.7     10-22  Mg     1.3     10-22    TPro  6.5  /  Alb  3.8  /  TBili  1.7<H>  /  DBili  x   /  AST  98<H>  /  ALT  80<H>  /  AlkPhos  57  10-22    Arterial Blood Gas:  10-22 @ 09:32  7.46/41/190/28/100/4.6  ABG lactate: --    Venous Blood Gas:  10-21 @ 09:24  7.41/46/64/29/90  VBG Lactate: 3.7  Venous Blood Gas:  10-21 @ 03:58  --/--/--/--/--  VBG Lactate: 7.3  Venous Blood Gas:  10-21 @ 00:22  7.36/54/34/30/53  VBG Lactate: 7.2      MICROBIOLOGY:     RADIOLOGY:  < from: CT Angio Chest w/ IV Cont (10.21.19 @ 02:19) >  FINDINGS:  LUNGS AND AIRWAYS: Patent central airways.  Severe upper lobe predominant   centrilobular emphysema. Mild bilateral dependent subsegmental   atelectasis. No focal lung consolidation.    PLEURA: No pleural effusion.    MEDIASTINUM AND CHESTER: No lymphadenopathy.    VESSELS: No evidence of pulmonary embolism.    HEART: Heart size is normal. No pericardial effusion.    CHEST WALL AND LOWER NECK: Within normal limits.    VISUALIZED UPPER ABDOMEN: Small hiatal hernia.    BONES: Degenerative changes in the spine.    IMPRESSION:     No CT evidence of pulmonary embolism.     Severe emphysema.    < end of copied text >    EKG: CHIEF COMPLAINT:     HPI:  54 yr old female with Hx significant for COPD, ETOH abuse, breast Ca, remote brain aneurysm s/p clip who presented with COPD exacerbation recently discharged coming in with increasing dyspnea and also found to be withdrawing from etoh. No fever, chills, N/V, diarrhea. States that she has also been feeling "shaky" over the last couple of days, has not been drinking. No new cough (21 Oct 2019 09:15)    Pt had RRT on 10/22 morning for tachypnea without hypoxia (sat 98% on 2L of NC). DuoNeb x1,  Solumedrol 40mg IVx1, Azithromycin x1 were given. ABG during RRT showed Na+ 113, pH 7.46, pCO2 41, pO2: 196.  Decision made to provide brief use of bilevel ventilation to aid in support. MICU consulted for hyponatremia, BMP Na+ 118.     Pt was seen and examined at bedside, A&Ox2-3, appeared confused, thought she was at [facility], has generalized shaking, was not compliant with BIPAP, wanted to take the BIPAP mask off. Endorsed that she can't breathe, was not able to answer other questions.     FAMILY HISTORY:  FH: CHF (congestive heart failure): Mother    SOCIAL HISTORY:  Smoking: __ packsEtOH Use: drinks 3 beers (12 oz) daily, last drink 3 days ago   Marital Status:     Occupation:  Recent Travel:  Country of Birth:  Advance Directives:    Allergies    amoxicillin (Anaphylaxis)  Levaquin (Other; Anaphylaxis)  Levaquin (Unknown)  penicillin (Anaphylaxis)  penicillins (Anaphylaxis)    Intolerances        HOME MEDICATIONS:    REVIEW OF SYSTEMS:  Constitutional:   Eyes:  ENT:  CV:  Resp:  GI:  :  MSK:  Integumentary:  Neurological:  Psychiatric:  Endocrine:  Hematologic/Lymphatic:  Allergic/Immunologic:  [ ] All other systems negative  [ ] Unable to assess ROS because ________    OBJECTIVE:  ICU Vital Signs Last 24 Hrs  T(C): 36.8 (22 Oct 2019 07:14), Max: 37.3 (22 Oct 2019 04:07)  T(F): 98.3 (22 Oct 2019 07:14), Max: 99.1 (22 Oct 2019 04:07)  HR: 106 (22 Oct 2019 07:14) (102 - 121)  BP: 151/80 (22 Oct 2019 07:14) (118/75 - 154/89)  BP(mean): --  ABP: --  ABP(mean): --  RR: 18 (22 Oct 2019 07:14) (18 - 20)  SpO2: 98% (22 Oct 2019 07:14) (97% - 100%)        CAPILLARY BLOOD GLUCOSE      POCT Blood Glucose.: 123 mg/dL (22 Oct 2019 09:14)      PHYSICAL EXAM:  GENERAL: NAD, well-developed  HEAD:  Atraumatic, Normocephalic  EYES: EOMI, PERRLA, conjunctiva and sclera clear  NECK: Supple, No JVD  CHEST/LUNG: Clear to auscultation bilaterally; No wheeze  HEART: Regular rate and rhythm; No murmurs, rubs, or gallops  ABDOMEN: Soft, Nontender, Nondistended; Bowel sounds present  EXTREMITIES:  2+ Peripheral Pulses, No clubbing, cyanosis, or edema  PSYCH: AAOx3  NEUROLOGY: non-focal  SKIN: No rashes or lesions      HOSPITAL MEDICATIONS:  MEDICATIONS  (STANDING):  ALBUTerol/ipratropium for Nebulization 3 milliLiter(s) Nebulizer every 6 hours  ALBUTerol/ipratropium for Nebulization 3 milliLiter(s) Nebulizer once  ALPRAZolam 0.25 milliGRAM(s) Oral three times a day  apremilast 30 milliGRAM(s) Oral two times a day  azithromycin  IVPB 500 milliGRAM(s) IV Intermittent once  buDESOnide    Inhalation Suspension 0.5 milliGRAM(s) Inhalation every 12 hours  chlorhexidine 2% Cloths 1 Application(s) Topical daily  cholecalciferol 1000 Unit(s) Oral daily  docusate sodium 100 milliGRAM(s) Oral two times a day  exemestane 25 milliGRAM(s) Oral daily  folic acid 1 milliGRAM(s) Oral daily  heparin  Injectable 5000 Unit(s) SubCutaneous every 12 hours  hydrocortisone 1% Cream 1 Application(s) Topical two times a day  LORazepam     Tablet   Oral   LORazepam     Tablet 2 milliGRAM(s) Oral every 4 hours  LORazepam     Tablet 1.5 milliGRAM(s) Oral every 4 hours  methylPREDNISolone sodium succinate Injectable 40 milliGRAM(s) IV Push once  montelukast 10 milliGRAM(s) Oral daily  multivitamin 1 Tablet(s) Oral daily  multivitamin/thiamine/folic acid in sodium chloride 0.9% 1000 milliLiter(s) (125 mL/Hr) IV Continuous <Continuous>  pantoprazole    Tablet 40 milliGRAM(s) Oral before breakfast  polyethylene glycol 3350 17 Gram(s) Oral daily  prochlorperazine   Tablet 10 milliGRAM(s) Oral daily  senna 2 Tablet(s) Oral at bedtime  thiamine 100 milliGRAM(s) Oral daily    MEDICATIONS  (PRN):  LORazepam   Injectable 2 milliGRAM(s) IV Push every 1 hour PRN Symptom-triggered: each CIWA -Ar score 8 or GREATER      LABS:                        9.8    7.67  )-----------( 209      ( 22 Oct 2019 09:40 )             29.2     10-22    x   |  77<L>  |  5<L>  ----------------------------<  129<H>  4.1   |  25  |  0.47<L>    Ca    8.4      22 Oct 2019 09:26  Phos  2.7     10-22  Mg     1.3     10-22    TPro  6.5  /  Alb  3.8  /  TBili  1.7<H>  /  DBili  x   /  AST  98<H>  /  ALT  80<H>  /  AlkPhos  57  10-22    Arterial Blood Gas:  10-22 @ 09:32  7.46/41/190/28/100/4.6  ABG lactate: --    Venous Blood Gas:  10-21 @ 09:24  7.41/46/64/29/90  VBG Lactate: 3.7  Venous Blood Gas:  10-21 @ 03:58  --/--/--/--/--  VBG Lactate: 7.3  Venous Blood Gas:  10-21 @ 00:22  7.36/54/34/30/53  VBG Lactate: 7.2      MICROBIOLOGY:     RADIOLOGY:  < from: CT Angio Chest w/ IV Cont (10.21.19 @ 02:19) >  FINDINGS:  LUNGS AND AIRWAYS: Patent central airways.  Severe upper lobe predominant   centrilobular emphysema. Mild bilateral dependent subsegmental   atelectasis. No focal lung consolidation.    PLEURA: No pleural effusion.    MEDIASTINUM AND CHESTER: No lymphadenopathy.    VESSELS: No evidence of pulmonary embolism.    HEART: Heart size is normal. No pericardial effusion.    CHEST WALL AND LOWER NECK: Within normal limits.    VISUALIZED UPPER ABDOMEN: Small hiatal hernia.    BONES: Degenerative changes in the spine.    IMPRESSION:     No CT evidence of pulmonary embolism.     Severe emphysema.    < end of copied text >    EKG: CHIEF COMPLAINT:     HPI:  54 yr old female with Hx significant for COPD, ETOH abuse, breast Ca, remote brain aneurysm s/p clip, recent admission for COPD exacerbation, presents to ED with increased dyspnea and also found to be withdrawing from etoh. No fever, chills, N/V, diarrhea. States that she has also been feeling "shaky" over the last couple of days, has not been drinking. No new cough (21 Oct 2019 09:15)    Pt had RRT on 10/22 morning for tachypnea without hypoxia (sat 98% on 2L of NC). DuoNeb x1,  Solumedrol 40mg IVx1, Azithromycin x1 were given. ABG during RRT showed Na+ 113, pH 7.46, pCO2 41, pO2: 196.  Decision made to provide brief use of bilevel ventilation to aid in support. MICU consulted for hyponatremia, BMP Na+ 118.     Pt was seen and examined at bedside, A&Ox2-3, appeared confused, thought she was at [facility], has generalized shaking, was not compliant with BIPAP, wanted to take the BIPAP mask off. Endorsed that she can't breathe, denies fever, chills    FAMILY HISTORY:  FH: CHF (congestive heart failure): Mother    SOCIAL HISTORY:  Smoking: __ packsEtOH Use: drinks 3 beers (12 oz) daily, last drink 3 days ago   Marital Status:     Occupation:  Recent Travel:  Country of Birth:  Advance Directives:    Allergies    amoxicillin (Anaphylaxis)  Levaquin (Other; Anaphylaxis)  Levaquin (Unknown)  penicillin (Anaphylaxis)  penicillins (Anaphylaxis)    Intolerances    HOME MEDICATIONS:    REVIEW OF SYSTEMS:  CONSTITUTIONAL: No weakness, fevers or chills  EYES/ENT: No visual changes;  HEMATOLOGY: no bruising, no bleeding, no lymphadenopathy   RESPIRATORY: + cough, no wheezing, no hemoptysis; + SOB   CARDIOVASCULAR: No chest pain or palpitations  GASTROINTESTINAL: No abdominal pain; nausea, vomiting   GENITOURITARY: No dysuria, frequency or hematuria  NEUROLOGICAL: No numbness or weakness  MUSCULOSKELETAL: no back pain, no spine deformity, no joint pain or deformity, no muscle ache   SKIN: No itching, burning, rashes, or lesions     [ ] All other systems negative  [ ] Unable to assess ROS because ________    OBJECTIVE:  ICU Vital Signs Last 24 Hrs  T(C): 36.8 (22 Oct 2019 07:14), Max: 37.3 (22 Oct 2019 04:07)  T(F): 98.3 (22 Oct 2019 07:14), Max: 99.1 (22 Oct 2019 04:07)  HR: 106 (22 Oct 2019 07:14) (102 - 121)  BP: 151/80 (22 Oct 2019 07:14) (118/75 - 154/89)  BP(mean): --  ABP: --  ABP(mean): --  RR: 18 (22 Oct 2019 07:14) (18 - 20)  SpO2: 98% (22 Oct 2019 07:14) (97% - 100%)        CAPILLARY BLOOD GLUCOSE      POCT Blood Glucose.: 123 mg/dL (22 Oct 2019 09:14)      PHYSICAL EXAM:  GENERAL: NAD, well-developed  HEAD:  Atraumatic, Normocephalic  EYES: EOMI, PERRLA, conjunctiva and sclera clear  NECK: Supple, No JVD  CHEST/LUNG: Clear to auscultation bilaterally; No wheeze  HEART: Regular rate and rhythm; No murmurs, rubs, or gallops  ABDOMEN: Soft, Nontender, Nondistended; Bowel sounds present  EXTREMITIES:  2+ Peripheral Pulses, No clubbing, cyanosis, or edema  PSYCH: AAOx3  NEUROLOGY: non-focal  SKIN: No rashes or lesions      HOSPITAL MEDICATIONS:  MEDICATIONS  (STANDING):  ALBUTerol/ipratropium for Nebulization 3 milliLiter(s) Nebulizer every 6 hours  ALBUTerol/ipratropium for Nebulization 3 milliLiter(s) Nebulizer once  ALPRAZolam 0.25 milliGRAM(s) Oral three times a day  apremilast 30 milliGRAM(s) Oral two times a day  azithromycin  IVPB 500 milliGRAM(s) IV Intermittent once  buDESOnide    Inhalation Suspension 0.5 milliGRAM(s) Inhalation every 12 hours  chlorhexidine 2% Cloths 1 Application(s) Topical daily  cholecalciferol 1000 Unit(s) Oral daily  docusate sodium 100 milliGRAM(s) Oral two times a day  exemestane 25 milliGRAM(s) Oral daily  folic acid 1 milliGRAM(s) Oral daily  heparin  Injectable 5000 Unit(s) SubCutaneous every 12 hours  hydrocortisone 1% Cream 1 Application(s) Topical two times a day  LORazepam     Tablet   Oral   LORazepam     Tablet 2 milliGRAM(s) Oral every 4 hours  LORazepam     Tablet 1.5 milliGRAM(s) Oral every 4 hours  methylPREDNISolone sodium succinate Injectable 40 milliGRAM(s) IV Push once  montelukast 10 milliGRAM(s) Oral daily  multivitamin 1 Tablet(s) Oral daily  multivitamin/thiamine/folic acid in sodium chloride 0.9% 1000 milliLiter(s) (125 mL/Hr) IV Continuous <Continuous>  pantoprazole    Tablet 40 milliGRAM(s) Oral before breakfast  polyethylene glycol 3350 17 Gram(s) Oral daily  prochlorperazine   Tablet 10 milliGRAM(s) Oral daily  senna 2 Tablet(s) Oral at bedtime  thiamine 100 milliGRAM(s) Oral daily    MEDICATIONS  (PRN):  LORazepam   Injectable 2 milliGRAM(s) IV Push every 1 hour PRN Symptom-triggered: each CIWA -Ar score 8 or GREATER      LABS:                        9.8    7.67  )-----------( 209      ( 22 Oct 2019 09:40 )             29.2     10-22    x   |  77<L>  |  5<L>  ----------------------------<  129<H>  4.1   |  25  |  0.47<L>    Ca    8.4      22 Oct 2019 09:26  Phos  2.7     10-22  Mg     1.3     10-22    TPro  6.5  /  Alb  3.8  /  TBili  1.7<H>  /  DBili  x   /  AST  98<H>  /  ALT  80<H>  /  AlkPhos  57  10-22    Arterial Blood Gas:  10-22 @ 09:32  7.46/41/190/28/100/4.6  ABG lactate: --    Venous Blood Gas:  10-21 @ 09:24  7.41/46/64/29/90  VBG Lactate: 3.7  Venous Blood Gas:  10-21 @ 03:58  --/--/--/--/--  VBG Lactate: 7.3  Venous Blood Gas:  10-21 @ 00:22  7.36/54/34/30/53  VBG Lactate: 7.2      MICROBIOLOGY:     RADIOLOGY:  < from: CT Angio Chest w/ IV Cont (10.21.19 @ 02:19) >  FINDINGS:  LUNGS AND AIRWAYS: Patent central airways.  Severe upper lobe predominant   centrilobular emphysema. Mild bilateral dependent subsegmental   atelectasis. No focal lung consolidation.    PLEURA: No pleural effusion.    MEDIASTINUM AND CHESTER: No lymphadenopathy.    VESSELS: No evidence of pulmonary embolism.    HEART: Heart size is normal. No pericardial effusion.    CHEST WALL AND LOWER NECK: Within normal limits.    VISUALIZED UPPER ABDOMEN: Small hiatal hernia.    BONES: Degenerative changes in the spine.    IMPRESSION:     No CT evidence of pulmonary embolism.     Severe emphysema.    < end of copied text >    EKG: CHIEF COMPLAINT:     HPI:  54 yr old female with Hx significant for COPD, ETOH abuse, breast Ca, remote brain aneurysm s/p clip, recent admission for COPD exacerbation, presents to ED with increased dyspnea and also found to be withdrawing from etoh. No fever, chills, N/V, diarrhea. States that she has also been feeling "shaky" over the last couple of days, has not been drinking. No new cough (21 Oct 2019 09:15)    Pt had RRT on 10/22 morning for tachypnea without hypoxia (sat 98% on 2L of NC). DuoNeb x1,  Solumedrol 40mg IVx1, Azithromycin x1 were given. ABG during RRT showed Na+ 113, pH 7.46, pCO2 41, pO2: 196.  Decision made to provide brief use of bilevel ventilation to aid in support. MICU consulted for hyponatremia, BMP Na+ 118.     Pt was seen and examined at bedside, A&Ox2-3, appeared confused, thought she was at [facility], has generalized shaking, was not compliant with BIPAP, wanted to take the BIPAP mask off. Endorsed that she can't breathe, denies fever, chills    FAMILY HISTORY:  FH: CHF (congestive heart failure): Mother    SOCIAL HISTORY:  Smoking: denies smoking hx   EtOH Use: drinks 3 beers (12 oz) daily, last drink 3 days ago   Marital Status:       Allergies    amoxicillin (Anaphylaxis)  Levaquin (Other; Anaphylaxis)  Levaquin (Unknown)  penicillin (Anaphylaxis)  penicillins (Anaphylaxis)    Intolerances    HOME MEDICATIONS:    REVIEW OF SYSTEMS:  CONSTITUTIONAL: No weakness, fevers or chills  EYES/ENT: No visual changes;  HEMATOLOGY: no bruising, no bleeding, no lymphadenopathy   RESPIRATORY: + cough, no wheezing, no hemoptysis; + SOB   CARDIOVASCULAR: No chest pain or palpitations  GASTROINTESTINAL: No abdominal pain; no nausea, vomiting   GENITOURITARY: No dysuria, frequency or hematuria  NEUROLOGICAL: No numbness or weakness  MUSCULOSKELETAL: no back pain,   SKIN: No itching, burning, rashes, or lesions     [ ] All other systems negative  [ ] Unable to assess ROS because ________    OBJECTIVE:  ICU Vital Signs Last 24 Hrs  T(C): 36.8 (22 Oct 2019 07:14), Max: 37.3 (22 Oct 2019 04:07)  T(F): 98.3 (22 Oct 2019 07:14), Max: 99.1 (22 Oct 2019 04:07)  HR: 106 (22 Oct 2019 07:14) (102 - 121)  BP: 151/80 (22 Oct 2019 07:14) (118/75 - 154/89)  BP(mean): --  ABP: --  ABP(mean): --  RR: 18 (22 Oct 2019 07:14) (18 - 20)  SpO2: 98% (22 Oct 2019 07:14) (97% - 100%)        CAPILLARY BLOOD GLUCOSE      POCT Blood Glucose.: 123 mg/dL (22 Oct 2019 09:14)      PHYSICAL EXAM:  GENERAL: NAD, well-developed  HEAD:  Atraumatic, Normocephalic  EYES: EOMI, PERRLA, conjunctiva and sclera clear  NECK: Supple, No JVD  CHEST/LUNG: Clear to auscultation bilaterally; No wheeze  HEART: Regular rate and rhythm; No murmurs, rubs, or gallops  ABDOMEN: Soft, Nontender, Nondistended; Bowel sounds present  EXTREMITIES:  2+ Peripheral Pulses, No clubbing, cyanosis, or edema  PSYCH: AAOx3  NEUROLOGY: non-focal  SKIN: No rashes or lesions      HOSPITAL MEDICATIONS:  MEDICATIONS  (STANDING):  ALBUTerol/ipratropium for Nebulization 3 milliLiter(s) Nebulizer every 6 hours  ALBUTerol/ipratropium for Nebulization 3 milliLiter(s) Nebulizer once  ALPRAZolam 0.25 milliGRAM(s) Oral three times a day  apremilast 30 milliGRAM(s) Oral two times a day  azithromycin  IVPB 500 milliGRAM(s) IV Intermittent once  buDESOnide    Inhalation Suspension 0.5 milliGRAM(s) Inhalation every 12 hours  chlorhexidine 2% Cloths 1 Application(s) Topical daily  cholecalciferol 1000 Unit(s) Oral daily  docusate sodium 100 milliGRAM(s) Oral two times a day  exemestane 25 milliGRAM(s) Oral daily  folic acid 1 milliGRAM(s) Oral daily  heparin  Injectable 5000 Unit(s) SubCutaneous every 12 hours  hydrocortisone 1% Cream 1 Application(s) Topical two times a day  LORazepam     Tablet   Oral   LORazepam     Tablet 2 milliGRAM(s) Oral every 4 hours  LORazepam     Tablet 1.5 milliGRAM(s) Oral every 4 hours  methylPREDNISolone sodium succinate Injectable 40 milliGRAM(s) IV Push once  montelukast 10 milliGRAM(s) Oral daily  multivitamin 1 Tablet(s) Oral daily  multivitamin/thiamine/folic acid in sodium chloride 0.9% 1000 milliLiter(s) (125 mL/Hr) IV Continuous <Continuous>  pantoprazole    Tablet 40 milliGRAM(s) Oral before breakfast  polyethylene glycol 3350 17 Gram(s) Oral daily  prochlorperazine   Tablet 10 milliGRAM(s) Oral daily  senna 2 Tablet(s) Oral at bedtime  thiamine 100 milliGRAM(s) Oral daily    MEDICATIONS  (PRN):  LORazepam   Injectable 2 milliGRAM(s) IV Push every 1 hour PRN Symptom-triggered: each CIWA -Ar score 8 or GREATER      LABS:                        9.8    7.67  )-----------( 209      ( 22 Oct 2019 09:40 )             29.2     10-22    118<LL>  |  77<L>  |  5<L>  ----------------------------<  129<H>  4.1   |  25  |  0.47<L>    Ca    8.4      22 Oct 2019 09:26  Phos  2.7     10-22  Mg     1.3     10-22    TPro  6.5  /  Alb  3.8  /  TBili  1.7<H>  /  DBili  x   /  AST  98<H>  /  ALT  80<H>  /  AlkPhos  57  10-22      Ca    8.4      22 Oct 2019 09:26  Phos  2.7     10-22  Mg     1.3     10-22    TPro  6.5  /  Alb  3.8  /  TBili  1.7<H>  /  DBili  x   /  AST  98<H>  /  ALT  80<H>  /  AlkPhos  57  10-22    Arterial Blood Gas:  10-22 @ 09:32  7.46/41/190/28/100/4.6  ABG lactate: --    Venous Blood Gas:  10-21 @ 09:24  7.41/46/64/29/90  VBG Lactate: 3.7  Venous Blood Gas:  10-21 @ 03:58  --/--/--/--/--  VBG Lactate: 7.3  Venous Blood Gas:  10-21 @ 00:22  7.36/54/34/30/53  VBG Lactate: 7.2      MICROBIOLOGY:     RADIOLOGY:  < from: CT Angio Chest w/ IV Cont (10.21.19 @ 02:19) >  FINDINGS:  LUNGS AND AIRWAYS: Patent central airways.  Severe upper lobe predominant   centrilobular emphysema. Mild bilateral dependent subsegmental   atelectasis. No focal lung consolidation.    PLEURA: No pleural effusion.    MEDIASTINUM AND CHESTER: No lymphadenopathy.    VESSELS: No evidence of pulmonary embolism.    HEART: Heart size is normal. No pericardial effusion.    CHEST WALL AND LOWER NECK: Within normal limits.    VISUALIZED UPPER ABDOMEN: Small hiatal hernia.    BONES: Degenerative changes in the spine.    IMPRESSION:     No CT evidence of pulmonary embolism.     Severe emphysema.    < end of copied text >    EKG: sinus tachy CHIEF COMPLAINT:     HPI:  54 yr old female with Hx significant for COPD, ETOH abuse, breast Ca, remote brain aneurysm s/p clip, recent admission for COPD exacerbation, presents to ED with increased dyspnea and also found to be withdrawing from etoh. No fever, chills, N/V, diarrhea. States that she has also been feeling "shaky" over the last couple of days, has not been drinking. No new cough (21 Oct 2019 09:15)    Pt had RRT on 10/22 morning for tachypnea without hypoxia (sat 98% on 2L of NC). DuoNeb x1,  Solumedrol 40mg IVx1, Azithromycin x1 were given. ABG during RRT showed Na+ 113, pH 7.46, pCO2 41, pO2: 196.  Decision made to provide brief use of bilevel ventilation to aid in support. MICU consulted for hyponatremia, BMP Na+ 118.     Pt was seen and examined at bedside, A&Ox2-3, appeared confused, thought she was at [facility], has generalized shaking, was not compliant with BIPAP, wanted to take the BIPAP mask off. Endorsed that she can't breathe, denies fever, chills    FAMILY HISTORY:  FH: CHF (congestive heart failure): Mother    SOCIAL HISTORY:  Smoking: denies smoking hx   EtOH Use: drinks 3 beers (12 oz) daily, last drink 3 days ago   Marital Status:       Allergies    amoxicillin (Anaphylaxis)  Levaquin (Other; Anaphylaxis)  Levaquin (Unknown)  penicillin (Anaphylaxis)  penicillins (Anaphylaxis)    Intolerances    HOME MEDICATIONS:    REVIEW OF SYSTEMS:  CONSTITUTIONAL: NO weakness, no fever, no chills   EYES/ENT: No visual changes;  HEMATOLOGY: no bruising, no bleeding, no lymphadenopathy   RESPIRATORY: + cough, no wheezing, no hemoptysis; + SOB   CARDIOVASCULAR: No chest pain or palpitations  GASTROINTESTINAL: No abdominal pain; no nausea, vomiting   GENITOURITARY: No dysuria, frequency or hematuria  NEUROLOGICAL: No numbness or weakness  MUSCULOSKELETAL: no back pain,   SKIN: No itching, burning, rashes, or lesions     [ ] All other systems negative  [ ] Unable to assess ROS because ________    OBJECTIVE:  ICU Vital Signs Last 24 Hrs  T(C): 36.8 (22 Oct 2019 07:14), Max: 37.3 (22 Oct 2019 04:07)  T(F): 98.3 (22 Oct 2019 07:14), Max: 99.1 (22 Oct 2019 04:07)  HR: 106 (22 Oct 2019 07:14) (102 - 121)  BP: 151/80 (22 Oct 2019 07:14) (118/75 - 154/89)  BP(mean): --  ABP: --  ABP(mean): --  RR: 18 (22 Oct 2019 07:14) (18 - 20)  SpO2: 98% (22 Oct 2019 07:14) (97% - 100%)        CAPILLARY BLOOD GLUCOSE      POCT Blood Glucose.: 123 mg/dL (22 Oct 2019 09:14)      PHYSICAL EXAM:  GENERAL: NAD, obese, agitated   HEAD:  Atraumatic, Normocephalic  EYES: EOMI, PERRLA, conjunctiva and sclera clear  NECK: Supple, No JVD  CHEST/LUNG: + tachypneic, normal respiratory effort, CTA b/l    HEART: + tachycardia, +S1/S2; No murmurs, rubs, or gallops  ABDOMEN: Soft, Nontender, Nondistended; Bowel sounds present  EXTREMITIES:  2+ Peripheral Pulses, No clubbing, cyanosis, or edema  PSYCH: AAOx2-3   NEUROLOGY: non-focal  SKIN: flushed skin, no rashes        HOSPITAL MEDICATIONS:  MEDICATIONS  (STANDING):  ALBUTerol/ipratropium for Nebulization 3 milliLiter(s) Nebulizer every 6 hours  ALBUTerol/ipratropium for Nebulization 3 milliLiter(s) Nebulizer once  ALPRAZolam 0.25 milliGRAM(s) Oral three times a day  apremilast 30 milliGRAM(s) Oral two times a day  azithromycin  IVPB 500 milliGRAM(s) IV Intermittent once  buDESOnide    Inhalation Suspension 0.5 milliGRAM(s) Inhalation every 12 hours  chlorhexidine 2% Cloths 1 Application(s) Topical daily  cholecalciferol 1000 Unit(s) Oral daily  docusate sodium 100 milliGRAM(s) Oral two times a day  exemestane 25 milliGRAM(s) Oral daily  folic acid 1 milliGRAM(s) Oral daily  heparin  Injectable 5000 Unit(s) SubCutaneous every 12 hours  hydrocortisone 1% Cream 1 Application(s) Topical two times a day  LORazepam     Tablet   Oral   LORazepam     Tablet 2 milliGRAM(s) Oral every 4 hours  LORazepam     Tablet 1.5 milliGRAM(s) Oral every 4 hours  methylPREDNISolone sodium succinate Injectable 40 milliGRAM(s) IV Push once  montelukast 10 milliGRAM(s) Oral daily  multivitamin 1 Tablet(s) Oral daily  multivitamin/thiamine/folic acid in sodium chloride 0.9% 1000 milliLiter(s) (125 mL/Hr) IV Continuous <Continuous>  pantoprazole    Tablet 40 milliGRAM(s) Oral before breakfast  polyethylene glycol 3350 17 Gram(s) Oral daily  prochlorperazine   Tablet 10 milliGRAM(s) Oral daily  senna 2 Tablet(s) Oral at bedtime  thiamine 100 milliGRAM(s) Oral daily    MEDICATIONS  (PRN):  LORazepam   Injectable 2 milliGRAM(s) IV Push every 1 hour PRN Symptom-triggered: each CIWA -Ar score 8 or GREATER      LABS:                        9.8    7.67  )-----------( 209      ( 22 Oct 2019 09:40 )             29.2     10-22    118<LL>  |  77<L>  |  5<L>  ----------------------------<  129<H>  4.1   |  25  |  0.47<L>    Ca    8.4      22 Oct 2019 09:26  Phos  2.7     10-22  Mg     1.3     10-22    TPro  6.5  /  Alb  3.8  /  TBili  1.7<H>  /  DBili  x   /  AST  98<H>  /  ALT  80<H>  /  AlkPhos  57  10-22      Ca    8.4      22 Oct 2019 09:26  Phos  2.7     10-22  Mg     1.3     10-22    TPro  6.5  /  Alb  3.8  /  TBili  1.7<H>  /  DBili  x   /  AST  98<H>  /  ALT  80<H>  /  AlkPhos  57  10-22    Arterial Blood Gas:  10-22 @ 09:32  7.46/41/190/28/100/4.6  ABG lactate: --    Venous Blood Gas:  10-21 @ 09:24  7.41/46/64/29/90  VBG Lactate: 3.7  Venous Blood Gas:  10-21 @ 03:58  --/--/--/--/--  VBG Lactate: 7.3  Venous Blood Gas:  10-21 @ 00:22  7.36/54/34/30/53  VBG Lactate: 7.2      MICROBIOLOGY:     RADIOLOGY:  < from: CT Angio Chest w/ IV Cont (10.21.19 @ 02:19) >  FINDINGS:  LUNGS AND AIRWAYS: Patent central airways.  Severe upper lobe predominant   centrilobular emphysema. Mild bilateral dependent subsegmental   atelectasis. No focal lung consolidation.    PLEURA: No pleural effusion.    MEDIASTINUM AND CHESTER: No lymphadenopathy.    VESSELS: No evidence of pulmonary embolism.    HEART: Heart size is normal. No pericardial effusion.    CHEST WALL AND LOWER NECK: Within normal limits.    VISUALIZED UPPER ABDOMEN: Small hiatal hernia.    BONES: Degenerative changes in the spine.    IMPRESSION:     No CT evidence of pulmonary embolism.     Severe emphysema.    < end of copied text >    EKG: sinus tachy

## 2019-10-22 NOTE — RAPID RESPONSE TEAM SUMMARY - NSSITUATIONBACKGROUNDRRT_GEN_ALL_CORE
54F hx COPD, ETOH abuse, breast CA, remote brain aneurysm s/p clip admitted for COPD exacerbation & ETOH withdrawal. RRT called for tachypnea w/o hypoxia. Lesli vitals signs SBP 150s, tachy cardiac (sinus on EKG) 110s, saturating % 2L NC. Patient's lung exam suggestive of poor airflow, patient w/ pursed lip breathing. duoneb nebulized x2 provided w/ improvement in lung exam. Patient's WOB and tachypnea w/ mild improvement on Duoneb, decision made to provide brief use of bilevel ventilation to aid in support solumedrol 40mg IV was also administered and Azithromycin 500mg x1.  Patient AOx4 however at times seems confused as in mentioning " will home come home now" when not prompted. Noted to have hyponatremia which is worsening based on trend. STAT abg w/ lytes and CMP w/ lytes ordered, as well as urine studies to work up hyponatremia.  ABG returned w/o hypercapnia or hypoxia, slight alkalosis likely in setting of tachypnea

## 2019-10-22 NOTE — PHYSICAL THERAPY INITIAL EVALUATION ADULT - PERTINENT HX OF CURRENT PROBLEM, REHAB EVAL
Pt is a 54 yr old female with Hx significant for COPD, ETOH abuse, breast Ca, remote brain aneurysm s/p clip who presented with COPD exacerbation recently discharged coming in with increasing dyspnea and also found to be withdrawing from etoh. Found to have elevated lactate and CIWA score.Had RRT on 10/22 for tachypnea without hypoxia. CT Chest: severe emphysema

## 2019-10-22 NOTE — CONSULT NOTE ADULT - SUBJECTIVE AND OBJECTIVE BOX
[facility]  [phone]  NEPHROLOGY      INITIAL CONSULT NOTE  --------------------------------------------------------------------------------  HPI:      54 yr old female with Hx significant for COPD, ETOH abuse, breast Ca, remote brain aneurysm s/p clip who presented with COPD exacerbation recently discharged coming in with increasing dyspnea and also found to be withdrawing from etoh. required BIPAP for sob as well as steroids. had received ivf on admission. noticed with worsening hyponatremia. renal consult called.     PAST HISTORY  --------------------------------------------------------------------------------  PAST MEDICAL & SURGICAL HISTORY:  Prophylactic measure  Breast cancer  Brain aneurysm: with clips  Psoriasis  RA (rheumatoid arthritis)  Psoriasis  Breast cancer, stage 3  History of modified radical mastectomy of both breasts  S/P bilateral mastectomy  Brain aneurysm: 1988 two clips    FAMILY HISTORY:  FH: CHF (congestive heart failure): Mother    PAST SOCIAL HISTORY: + etoh use     ALLERGIES & MEDICATIONS  --------------------------------------------------------------------------------  Allergies    amoxicillin (Anaphylaxis)  Levaquin (Other; Anaphylaxis)  Levaquin (Unknown)  penicillin (Anaphylaxis)  penicillins (Anaphylaxis)    Intolerances      Standing Inpatient Medications  ALBUTerol/ipratropium for Nebulization 3 milliLiter(s) Nebulizer every 6 hours  ALPRAZolam 0.25 milliGRAM(s) Oral three times a day  apremilast 30 milliGRAM(s) Oral two times a day  buDESOnide    Inhalation Suspension 0.5 milliGRAM(s) Inhalation every 12 hours  chlorhexidine 2% Cloths 1 Application(s) Topical daily  cholecalciferol 1000 Unit(s) Oral daily  docusate sodium 100 milliGRAM(s) Oral two times a day  ertapenem  IVPB 1000 milliGRAM(s) IV Intermittent every 24 hours  exemestane 25 milliGRAM(s) Oral daily  folic acid 1 milliGRAM(s) Oral daily  heparin  Injectable 5000 Unit(s) SubCutaneous every 12 hours  hydrocortisone 1% Cream 1 Application(s) Topical two times a day  LORazepam     Tablet   Oral   LORazepam     Tablet 1.5 milliGRAM(s) Oral every 4 hours  methylPREDNISolone sodium succinate Injectable 20 milliGRAM(s) IV Push every 6 hours  montelukast 10 milliGRAM(s) Oral daily  multivitamin 1 Tablet(s) Oral daily  pantoprazole    Tablet 40 milliGRAM(s) Oral before breakfast  polyethylene glycol 3350 17 Gram(s) Oral daily  prochlorperazine   Tablet 10 milliGRAM(s) Oral daily  senna 2 Tablet(s) Oral at bedtime  sodium chloride 1.5%. 500 milliLiter(s) IV Continuous <Continuous>  thiamine 100 milliGRAM(s) Oral daily    PRN Inpatient Medications  LORazepam   Injectable 2 milliGRAM(s) IV Push every 1 hour PRN      REVIEW OF SYSTEMS  --------------------------------------------------------------------------------  Gen: No  fevers/chills   Skin: no itching   Head/Eyes/Ears/Mouth: No headache; Normal hearing;  No sinus pain/discomfort, sore throat  Respiratory:  +  dyspnea, -  cough,  - wheezing, hemoptysis -   CV: No chest pain, or palp   GI: No abdominal pain, diarrhea, nausea, vomiting  : No dysuria,  hematuria, nocturia  MSK: No joint pain/swelling; no back pain  Neuro: No dizziness/lightheadedness  also with no edema     All other systems were reviewed and are negative, except as noted.    VITALS/PHYSICAL EXAM  --------------------------------------------------------------------------------  T(C): 36.7 (10-22-19 @ 18:35), Max: 37.3 (10-22-19 @ 04:07)  HR: 114 (10-22-19 @ 18:35) (102 - 144)  BP: 144/71 (10-22-19 @ 18:35) (138/80 - 155/82)  RR: 18 (10-22-19 @ 18:35) (18 - 18)  SpO2: 91% (10-22-19 @ 18:35) (91% - 100%)  Wt(kg): --  Height (cm): 162.6 (10-21-19 @ 16:12)  Weight (kg): 80.1 (10-21-19 @ 16:12)  BMI (kg/m2): 30.3 (10-21-19 @ 16:12)  BSA (m2): 1.86 (10-21-19 @ 16:12)      10-22-19 @ 07:01  -  10-22-19 @ 20:37  --------------------------------------------------------  IN: 605 mL / OUT: 450 mL / NET: 155 mL      Physical Exam:  	Gen: Non toxic comfortable appearing   	no jvd , supple neck,   	Pulm: decrease bs  no rales or ronchi or wheezing  	CV: RRR, S1S2; no rub  	Back: No CVA tenderness; no sacral edema  	Abd: +BS, soft, nontender/nondistended  	: No suprapubic tenderness  	UE: Warm, no cyanosis  no clubbing,  no edema; no asterixis  	LE: Warm, no cyanosis  no clubbing, no edema  	Neuro: alert and oriented. speech coherent   	Psych: Normal affect and mood  	Skin: Warm, no decrease skin turgor   	Vascular access:    LABS/STUDIES  --------------------------------------------------------------------------------              9.8    7.67  >-----------<  209      [10-22-19 @ 09:40]              29.2     119  |  78  |  7   ----------------------------<  165      [10-22-19 @ 16:05]  5.6   |  19  |  0.58        Ca     8.4     [10-22-19 @ 16:05]      Mg     1.3     [10-22-19 @ 09:26]      Phos  2.7     [10-22-19 @ 09:26]    TPro  6.5  /  Alb  3.8  /  TBili  1.7  /  DBili  x   /  AST  98  /  ALT  80  /  AlkPhos  57  [10-22-19 @ 09:26]              [10-22-19 @ 16:05]    Creatinine Trend:  SCr 0.58 [10-22 @ 16:05]  SCr 0.47 [10-22 @ 09:26]  SCr 0.45 [10-22 @ 07:15]  SCr 0.47 [10-21 @ 00:22]  SCr 0.50 [10-09 @ 06:17]    Urinalysis - [10-22-19 @ 11:01]      Color Light Yellow / Appearance Clear / SG 1.006 / pH 7.0      Gluc Negative / Ketone Trace  / Bili Negative / Urobili Negative       Blood Negative / Protein Negative / Leuk Est Negative / Nitrite Negative      RBC  / WBC  / Hyaline  / Gran  / Sq Epi  / Non Sq Epi  / Bacteria     Urine Creatinine 42      [10-22-19 @ 11:01]  Urine Sodium 25      [10-22-19 @ 11:01]  Urine Urea Nitrogen 188      [10-22-19 @ 13:34]  Urine Osmolality 158      [10-22-19 @ 11:01]    HbA1c 5.4      [08-19-16 @ 07:12]    HBsAb Nonreact      [03-25-19 @ 08:55]  HBsAg Nonreact      [03-25-19 @ 08:55]  HBcAb Nonreact      [03-25-19 @ 08:55]  HCV 0.13, Nonreact      [03-18-19 @ 06:26]  HIV Nonreact      [08-23-16 @ 07:49]    KRUNAL: titer 1:320, pattern Speckled      [07-12-15 @ 20:53]  dsDNA <12      [07-12-15 @ 20:53]  Syphilis Screen (Treponema Pallidum Ab) Negative      [08-23-16 @ 07:49]  SPEP Interpretation: Reference Range: None Detected      [09-13-16 @ 09:43]    < from: CT Angio Chest w/ IV Cont (10.21.19 @ 02:19) >    EXAM:  CT ANGIO CHEST (W)AW IC                            PROCEDURE DATE:  10/21/2019            INTERPRETATION:  CLINICAL INFORMATION: Dyspnea    COMPARISON: CTA chest 10/3/2019    PROCEDURE:   CT Angiography of the Chest.  90 ml of Omnipaque 350 was injected intravenously. 10 ml were discarded.  Sagittal and coronal reformats were performed as well as 3D (MIP)   reconstructions.      FINDINGS:    LUNGS AND AIRWAYS: Patent central airways.  Severe upper lobe predominant   centrilobular emphysema. Mild bilateral dependent subsegmental   atelectasis. No focal lung consolidation.    PLEURA: No pleural effusion.    MEDIASTINUM AND CHESTER: No lymphadenopathy.    VESSELS: No evidence of pulmonary embolism.    HEART: Heart size is normal. No pericardial effusion.    CHEST WALL AND LOWER NECK: Within normal limits.    VISUALIZED UPPER ABDOMEN: Small hiatal hernia.    BONES: Degenerative changes in the spine.    IMPRESSION:     No CT evidence of pulmonary embolism.     Severe emphysema.                    ENID ALBRECHT MD., ATTENDING RADIOLOGIST  This document has been electronically signed. Oct 21 2019  2:30AM    < end of copied text >

## 2019-10-22 NOTE — CHART NOTE - NSCHARTNOTEFT_GEN_A_CORE
critically ill 55 y/o f  acute on chronic hypoxic respiratory failure due to severe COPD/emphysema with bronchospasm, significant alcohol withdrawal and hyponatremia  MICU consulted x 2  noted Pt not candidate at this time   Follow up with MICU consult team  ( Tara)   Continue to monitor hyponatremia    1.5% NS per renal   BMP q6   Patient is a 54y old  Female who presents with a chief complaint of EtOH withdrawal, COPD exacerbation (22 Oct 2019 10:12)      Vital Signs Last 24 Hrs  T(C): 36.8 (22 Oct 2019 15:45), Max: 37.3 (22 Oct 2019 04:07)  T(F): 98.2 (22 Oct 2019 15:45), Max: 99.1 (22 Oct 2019 04:07)  HR: 144 (22 Oct 2019 15:45) (102 - 144)  BP: 155/82 (22 Oct 2019 15:45) (138/80 - 155/82)  BP(mean): --  RR: 18 (22 Oct 2019 15:45) (18 - 18)  SpO2: 94% (22 Oct 2019 15:45) (94% - 100%)                        9.8    7.67  )-----------( 209      ( 22 Oct 2019 09:40 )             29.2     10-22    119<LL>  |  78<L>  |  7   ----------------------------<  165<H>  5.6<H>   |  19<L>  |  0.58    Ca    8.4      22 Oct 2019 16:05  Phos  2.7     10-22  Mg     1.3     10-22    TPro  6.5  /  Alb  3.8  /  TBili  1.7<H>  /  DBili  x   /  AST  98<H>  /  ALT  80<H>  /  AlkPhos  57  10-22        General: WN/WD NAD  Neurology: A&Ox3, nonfocal, REY x 4  Head:  Normocephalic, atraumatic  Respiratory: CTA B/L  CV: RRR, S1S2, no murmur  Abdominal: Soft, NT, ND no palpable mass  MSK: No edema, + peripheral pulses, FROM all 4 extremity      Discussed above with Dr Bonds   Agreeable with treatment plans   Will endorse to night team for follow up   Department of Medicine   SIL OCAMPO-c [id number]

## 2019-10-22 NOTE — CONSULT NOTE ADULT - ASSESSMENT
54 yr old female with Hx significant for COPD, ETOH abuse, breast Ca, remote brain aneurysm s/p clip, recent admission for COPD exacerbation, presents to ED with increased dyspnea and also found to be withdrawing EtOH. Had RRT on 10/22 for tachypnea without hypoxia. Pt is not a MICU candidate now, hemodynamically stable, sat 97%-100% on 4L of NC     # hyponatremia   - BMP Na+ 118 during RRT,  122 earlier in the day   - A&Ox2-3, confusion could be 2/2 EtOH withdrawal  - check urine lytes, urine osm, serum osm   - start NS with multivitamin@75ml/hr   - repeat BMP q4 hr  for now    - not a MICU candidate now     # tachypnea  - likely has a component of COPD, anxiety and EtOH withdrawal   - tachypnea improved with DuoNeb, c/w DuoNeb standing q6, home budesonide   - recommend switching from BIPAP to NC     # EtOH withdrawal   - was on sympton trigger Ativan taper earlier, CIWA 5   - was agitated, has generalized shaking on physical exam   - recommend standing Ativan IV 2mg q6 for now, reassess tomorrow   - start NS with multivitamin@75ml/hr   - Jamarcusey score: -10, no indication for steroids 54 yr old female with Hx significant for COPD, ETOH abuse, breast Ca, remote brain aneurysm s/p clip, recent admission for COPD exacerbation, presents to ED with increased dyspnea and also found to be withdrawing EtOH. Had RRT on 10/22 for tachypnea without hypoxia. Pt is not a MICU candidate now, hemodynamically stable, sat 97%-100% on 4L of NC     # hyponatremia   - BMP Na+ 118 during RRT,  122 earlier in the day, likely hypo-osmolar hyponatremia  - A&Ox2-3, confusion could be 2/2 EtOH withdrawal  - check urine lytes, urine osm, serum osm   - start NS with multivitamin@75ml/hr   - repeat BMP q4 hr  for now    - not a MICU candidate now     # tachypnea  - likely has a component of COPD, anxiety and EtOH withdrawal   - tachypnea improved with DuoNeb, c/w DuoNeb standing q6, home budesonide   - recommend switching from BIPAP to NC     # EtOH withdrawal   - was on sympton trigger Ativan taper earlier, CIWA 5   - was agitated, has generalized shaking on physical exam   - c/w standing Ativan taper    - start NS with multivitamin@75ml/hr   - Maddrey score: -10, no indication for steroids 54 yr old female with Hx significant for COPD, ETOH abuse, breast Ca, remote brain aneurysm s/p clip, recent admission for COPD exacerbation, presents to ED with increased dyspnea and also found to be withdrawing EtOH. Had RRT on 10/22 for tachypnea without hypoxia. Pt is not a MICU candidate now, hemodynamically stable, sat 97%-100% on 4L of NC     # hyponatremia   - BMP Na+ 118 during RRT,  122 earlier in the day, likely hypo-osmolar hyponatremia  - A&Ox2-3, confusion could be 2/2 EtOH withdrawal  - check urine lytes, urine osm, serum osm   - start NS with multivitamin@75ml/hr   - repeat BMP q4 hr  for now    - not a MICU candidate now   - f/u renal rec     # tachypnea  - likely has a component of COPD, anxiety and EtOH withdrawal   - tachypnea improved with DuoNeb, c/w DuoNeb standing q6, home budesonide   - recommend switching from BIPAP to NC     # EtOH withdrawal   - was on sympton trigger Ativan taper earlier, CIWA 5   - was agitated, has generalized shaking on physical exam   - c/w standing Ativan taper    - start NS with multivitamin@75ml/hr   - Maddrey score: -10, no indication for steroids      Can Hu  PGY-2   Pager [phone]([facility])/[phone] ([facility])   After 7pm, contact [facility] pager

## 2019-10-22 NOTE — CONSULT NOTE ADULT - ASSESSMENT
54 yr old female with Hx significant for COPD, ETOH abuse, breast Ca, remote brain aneurysm s/p clip who presented with COPD exacerbation recently discharged admitted with increasing dyspnea and also found to be withdrawing from etoh. required BIPAP for sob as well as steroids. pt also with hyponatremia and lactic acidosis    1- hyponatremia  2- lactic acidosis  3- ETOH abuse and withdrawal  4- hyperkalemia  5- copd exacerbation       low urine osoms  favor hypovolemic hyponatremia   to start 1.5 % nacl @ 60 cc/hr with goal of na of 125 range.   hyperkalemia may be in setting of decrease na delivery to distal tubules.   repeat k level   cont bipap  empiric abx   d/w pulm and NP

## 2019-10-22 NOTE — PHYSICAL THERAPY INITIAL EVALUATION ADULT - DISCHARGE DISPOSITION, PT EVAL
subacute rehab however if pt is to go home pt will require assist w/ all ADLs and mobility and home physical therapy. Pt could benefit from transportation home

## 2019-10-22 NOTE — PROGRESS NOTE ADULT - SUBJECTIVE AND OBJECTIVE BOX
[facility] - [facility] - PROGRESS NOTE        INTERVAL HISTORY: diaphoretic, agitated, somnolent, tachycardic, tachypneic and hypoxic; received benzodiazepines within the last 2 hours; substernal chest pain without radiation; no cough, sputum production, chest congestion or wheeze; no fevers, chills or sweats;     REVIEW OF SYSTEMS:  Constitutional: As per interval history  HEENT: Within normal limits  CV: As per interval history  Resp: As per interval history  GI: Within normal limits   : Within normal limits  Musculoskeletal: Within normal limits  Skin: Within normal limits  Neurological: Within normal limits  Psychiatric: agitated  Endocrine: Within normal limits  Hematologic/Lymphatic: Within normal limits  Allergic/Immunologic: Within normal limits    MEDICATIONS:     Pulmonary "  ALBUTerol/ipratropium for Nebulization 3 milliLiter(s) Nebulizer every 6 hours  buDESOnide    Inhalation Suspension 0.5 milliGRAM(s) Inhalation every 12 hours  montelukast 10 milliGRAM(s) Oral daily    Anti-microbials:    Cardiovascular:    Other:  ALPRAZolam 0.25 milliGRAM(s) Oral three times a day  apremilast 30 milliGRAM(s) Oral two times a day  chlorhexidine 2% Cloths 1 Application(s) Topical daily  cholecalciferol 1000 Unit(s) Oral daily  docusate sodium 100 milliGRAM(s) Oral two times a day  exemestane 25 milliGRAM(s) Oral daily  folic acid 1 milliGRAM(s) Oral daily  heparin  Injectable 5000 Unit(s) SubCutaneous every 12 hours  hydrocortisone 1% Cream 1 Application(s) Topical two times a day  LORazepam     Tablet   Oral   LORazepam     Tablet 1.5 milliGRAM(s) Oral every 4 hours  multivitamin 1 Tablet(s) Oral daily  pantoprazole    Tablet 40 milliGRAM(s) Oral before breakfast  polyethylene glycol 3350 17 Gram(s) Oral daily  prochlorperazine   Tablet 10 milliGRAM(s) Oral daily  senna 2 Tablet(s) Oral at bedtime  thiamine 100 milliGRAM(s) Oral daily    MEDICATIONS  (PRN):  LORazepam   Injectable 2 milliGRAM(s) IV Push every 1 hour PRN Symptom-triggered: each CIWA -Ar score 8 or GREATER        OBJECTIVE:    I&O's Detail    22 Oct 2019 07:  -  22 Oct 2019 15:13  --------------------------------------------------------  IN:    Oral Fluid: 300 mL  Total IN: 300 mL    OUT:    Voided: 450 mL  Total OUT: 450 mL    Total NET: -150 mL    Daily Height in cm: 162.56 (21 Oct 2019 16:12)      POCT Blood Glucose.: 123 mg/dL (22 Oct 2019 09:14)      PHYSICAL EXAM:       ICU Vital Signs Last 24 Hrs  T(C): 36.8 (22 Oct 2019 07:14), Max: 37.3 (22 Oct 2019 04:07)  T(F): 98.3 (22 Oct 2019 07:14), Max: 99.1 (22 Oct 2019 04:07)  HR: 132 (22 Oct 2019 10:29) (102 - 132)  BP: 151/80 (22 Oct 2019 07:14) (130/85 - 154/89)  BP(mean): --  ABP: --  ABP(mean): --  RR: 18 (22 Oct 2019 07:14) (18 - 20)  SpO2: 96% (22 Oct 2019 10:29) (96% - 100%) on 3lpm nasal canula -> 87% room air     General: Anxious. Agitated. Pulling off BIPAP. Appears stated age.	  HEENT: Atraumatic. Normocephalic. Anicteric. Normal oral mucosa. PERRL. EOMI. Psoriatic plaque on scalp. Mallampati class IV airway  Neck: Supple. Trachea midline. Thyroid without enlargement/tenderness/nodules. No carotid bruit. No JVD. Short and wide	  Cardiovascular: Tachycardic rate and rhythm. S1 S2 normal. No murmurs, rubs or gallops.  Respiratory: Using accessory muscles or respiration. Decreased breath sounds throughout. Mild wheeze. No rales. No curvature.  Abdomen: Soft. Non-tender. Non-distended. No organomegaly. No masses. Normal bowel sounds. Obese.  Extremities: Warm to touch. No clubbing or cyanosis. No pedal edema.  Pulses: 2+ peripheral pulses all extremities.	  Skin: Venous stasis changes on both lower extremities. Multiple areas of skin hyperpigmentation at areas of prior plaque.  Lymph Nodes: Cervical, supraclavicular and axillary nodes normal  Neurological: Motor and sensory examination equal and normal. A and O x 1  Psychiatry: Anxious.      LABS:                          9.8    7.67  )-----------( 209      ( 22 Oct 2019 09:40 )             29.2     CBC    WBC  7.67 <==, 8.75 <==, 5.34 <==    Hemoglobin  9.8 <<==, 10.2 <<==, 10.7 <<==    Hematocrit  29.2 <==, 28.5 <==, 31.5 <==    Platelets  209 <==, 207 <==, 220 <==      118<LL>  |  77<L>  |  5<L>  ----------------------------<  129<H>    10-22  4.1   |  25  |  0.47<L>      LYTES    sodium  118 <==, 122 <==, 130 <==    potassium   4.1 <==, 3.8 <==, 4.4 <==    chloride  77 <==, 79 <==, 84 <==    carbon dioxide  25 <==, 26 <==, 26 <==    =============================================================================================  RENAL FUNCTION:    Creatinine:   0.47  <<==, 0.45  <<==, 0.47  <<==    BUN:   5 <==, 5 <==, <4 <==    ============================================================================================    calcium   8.4 <==, 8.1 <==, 9.6 <==    phos   2.7 <==    mag   1.3 <==    ============================================================================================  LFTs    AST:   98 <== , 125 <==     ALT:  80  <== , 101  <==     AP:  57  <=, 64  <=    Bili:  1.7  <=, 0.5  <=    Venous Blood Gas:  10-21 @ 09:24  7.41/46/64/29/90  VBG Lactate: 3.7    Venous Blood Gas:  10-21 @ 03:58  --/--/--/--/--  VBG Lactate: 7.3    Venous Blood Gas:  10-21 @ 00:22  7.36/54/34/30/53  VBG Lactate: 7.2    ABG - ( 22 Oct 2019 09:32 )  pH: 7.46  /  pCO2: 41    /  pO2: 190   / HCO3: 28    / Base Excess: 4.6   /  SaO2: 100       Serum Pro-Brain Natriuretic Peptide: 2262 pg/mL (10-22 @ 09:26)    Patient name: RFEDY CHOI  YOB: 1965   Age: 54 (F)   MR#: [id number]  Study Date: 10/7/2019  [facility]  Study quality: Technically difficult  Referring Physician: Canelo Bonds MD  BloodPressure: 122/80 mmHg  Height: 165 cm  Weight: 68 kg  BSA: 1.8 m2  ------------------------------------------------------------------------  PROCEDURE: Transthoracic echocardiogram with 2-D, M-Mode  and complete spectral and color flow Doppler. Verbal  consent was obtained for injection of  Ultrasonic Enhancing  Agent following a discussion of risks and benefits.  Following intravenous injection of Ultrasonic Enhancing  Agent , harmonic imaging was performed.  INDICATION: Shortness of breath (R06.02)  ------------------------------------------------------------------------  Observations:  Left Ventricle: Endocardium not well visualized; grossly  normal left ventricular systolic function. Endocardial  visualization enhanced with intravenous injection of  Ultrasonic Enhancing Agent (Definity). Normal left  ventricular internal dimensions and wall thicknesses.  Normal diastolic function  ------------------------------------------------------------------------  Conclusions:  1. Normal left ventricular internal dimensions and wall  thicknesses.  2. Endocardium not well visualized; grossly normal left  ventricular systolic function. Endocardial visualization  enhanced with intravenous injection of Ultrasonic Enhancing  Agent (Definity). Unable to perfrom strain imaging due to  limited clinical windows.  ------------------------------------------------------------------------  Confirmed on  10/7/2019 - 18:11:26 by Margaret Alvarez M.D.  ------------------------------------------------------------------------  ---------------------------------------------------------------------------------------------------------------  MICROBIOLOGY:   Urinalysis Basic - ( 22 Oct 2019 11:01 )    Color: Light Yellow / Appearance: Clear / S.006 / pH: x  Gluc: x / Ketone: Trace  / Bili: Negative / Urobili: Negative   Blood: x / Protein: Negative / Nitrite: Negative   Leuk Esterase: Negative / RBC: x / WBC x   Sq Epi: x / Non Sq Epi: x / Bacteria: x    RADIOLOGY:  [x] Chest radiographs reviewed and interpreted by me      EXAM:  XR CHEST PORTABLE IMMED 1V                          PROCEDURE DATE:  10/22/2019      INTERPRETATION:  CLINICAL INFORMATION: Rapid response. Respiratory   distress    EXAM: AP portable chest     COMPARISON: Chest radiograph from 10/21/2019.    FINDINGS:    The heart size is normal. The cardiomediastinal silhouette is   unremarkable.    Patchy opacity at the right base may represent atelectasis or infection.   No pleural effusion or pneumothorax. Emphysema.    JENNIFER LUJAN M.D., RADIOLOGY RESIDENT  This document has been electronically signed.  NYDIA VANCE M.D., ATTENDING RADIOLOGIST  This document has been electronically signed. Oct 22 2019 11:58AM  ---------------------------------------------------------------------------------------------------------------  EXAM:  CT ANGIO CHEST (W)AW IC                          PROCEDURE DATE:  10/21/2019      INTERPRETATION:  CLINICAL INFORMATION: Dyspnea    COMPARISON: CTA chest 10/3/2019    PROCEDURE:   CT Angiography of the Chest.    90 ml of Omnipaque 350 was injected intravenously. 10 ml were discarded.  Sagittal and coronal reformats were performed as well as 3D (MIP)   reconstructions.      FINDINGS:    LUNGS AND AIRWAYS: Patent central airways.  Severe upper lobe predominant   centrilobular emphysema. Mild bilateral dependent subsegmental   atelectasis. No focal lung consolidation.    PLEURA: No pleural effusion.    MEDIASTINUM AND CHESTER: No lymphadenopathy.    VESSELS: No evidence of pulmonary embolism.    HEART: Heart size is normal. No pericardial effusion.    CHEST WALL AND LOWER NECK: Within normal limits.    VISUALIZED UPPER ABDOMEN: Small hiatal hernia.    BONES: Degenerative changes in the spine.    IMPRESSION:     No CT evidence of pulmonary embolism.     Severe emphysema.    ENID ALBRECHT MD., ATTENDING RADIOLOGIST  This document has been electronically signed. Oct 21 2019  2:30AM      ---------------------------------------------------------------------------------------------------------------    EXAM:  XR CHEST AP OR PA 1V                          PROCEDURE DATE:  10/21/2019      INTERPRETATION:  CLINICAL INFORMATION: COPD exacerbation    EXAM: AP view of the chest     COMPARISON: Radiograph dated  10/3/2019    FINDINGS:      The lungs are clear.   There is no pleural effusion.  There is no pneumothorax.  The cardiac silhouette size cannot be accurately assessed on this   radiograph.  There is no acute abnormality of the visualized osseous structures.    IMPRESSION:    Clear lungs.     CASPER LERMA M.D., RADIOLOGY RESIDENT  This document has been electronically signed.  MIRI NORRIS M.D., ATTENDING RADIOLOGIST  This document has been electronically signed. Oct 21 2019  9:44AM  ---------------------------------------------------------------------------------------------------------------  SPIROMETRY    FEV1 0.44 liters - 19% predicted  FVC 1.04 liters - 35% predicted  FEV1% 42    c/w very severe obstructive lung disease [facility] - PROGRESS NOTE    CHIEF COMPLAINT: chronic hypoxic respiratory failure; COPD exacerbation; dyspnea; alcohol withdrawal    INTERVAL HISTORY: diaphoretic, agitated, somnolent, tachycardic, tachypneic and hypoxic; received benzodiazepines within the last 2 hours; substernal chest pain without radiation; no cough, sputum production, chest congestion or wheeze; no fevers, chills or sweats;     REVIEW OF SYSTEMS:  Constitutional: As per interval history  HEENT: Within normal limits  CV: As per interval history  Resp: As per interval history  GI: Within normal limits   : Within normal limits  Musculoskeletal: Within normal limits  Skin: Within normal limits  Neurological: Within normal limits  Psychiatric: agitated  Endocrine: Within normal limits  Hematologic/Lymphatic: Within normal limits  Allergic/Immunologic: Within normal limits    MEDICATIONS:     Pulmonary "  ALBUTerol/ipratropium for Nebulization 3 milliLiter(s) Nebulizer every 6 hours  buDESOnide    Inhalation Suspension 0.5 milliGRAM(s) Inhalation every 12 hours  montelukast 10 milliGRAM(s) Oral daily    Anti-microbials:    Cardiovascular:    Other:  ALPRAZolam 0.25 milliGRAM(s) Oral three times a day  apremilast 30 milliGRAM(s) Oral two times a day  chlorhexidine 2% Cloths 1 Application(s) Topical daily  cholecalciferol 1000 Unit(s) Oral daily  docusate sodium 100 milliGRAM(s) Oral two times a day  exemestane 25 milliGRAM(s) Oral daily  folic acid 1 milliGRAM(s) Oral daily  heparin  Injectable 5000 Unit(s) SubCutaneous every 12 hours  hydrocortisone 1% Cream 1 Application(s) Topical two times a day  LORazepam     Tablet   Oral   LORazepam     Tablet 1.5 milliGRAM(s) Oral every 4 hours  multivitamin 1 Tablet(s) Oral daily  pantoprazole    Tablet 40 milliGRAM(s) Oral before breakfast  polyethylene glycol 3350 17 Gram(s) Oral daily  prochlorperazine   Tablet 10 milliGRAM(s) Oral daily  senna 2 Tablet(s) Oral at bedtime  thiamine 100 milliGRAM(s) Oral daily    MEDICATIONS  (PRN):  LORazepam   Injectable 2 milliGRAM(s) IV Push every 1 hour PRN Symptom-triggered: each CIWA -Ar score 8 or GREATER        OBJECTIVE:    I&O's Detail    22 Oct 2019 07:01  -  22 Oct 2019 15:13  --------------------------------------------------------  IN:    Oral Fluid: 300 mL  Total IN: 300 mL    OUT:    Voided: 450 mL  Total OUT: 450 mL    Total NET: -150 mL    Daily Height in cm: 162.56 (21 Oct 2019 16:12)      POCT Blood Glucose.: 123 mg/dL (22 Oct 2019 09:14)      PHYSICAL EXAM:       ICU Vital Signs Last 24 Hrs  T(C): 36.8 (22 Oct 2019 07:14), Max: 37.3 (22 Oct 2019 04:07)  T(F): 98.3 (22 Oct 2019 07:14), Max: 99.1 (22 Oct 2019 04:07)  HR: 132 (22 Oct 2019 10:29) (102 - 132)  BP: 151/80 (22 Oct 2019 07:14) (130/85 - 154/89)  BP(mean): --  ABP: --  ABP(mean): --  RR: 18 (22 Oct 2019 07:14) (18 - 20)  SpO2: 96% (22 Oct 2019 10:29) (96% - 100%) on 3lpm nasal canula -> 87% room air     General: Anxious. Agitated. Pulling off BIPAP. Appears stated age.	  HEENT: Atraumatic. Normocephalic. Anicteric. Normal oral mucosa. PERRL. EOMI. Psoriatic plaque on scalp. Mallampati class IV airway  Neck: Supple. Trachea midline. Thyroid without enlargement/tenderness/nodules. No carotid bruit. No JVD. Short and wide	  Cardiovascular: Tachycardic rate and rhythm. S1 S2 normal. No murmurs, rubs or gallops.  Respiratory: Using accessory muscles or respiration. Decreased breath sounds throughout. Mild wheeze. No rales. No curvature.  Abdomen: Soft. Non-tender. Non-distended. No organomegaly. No masses. Normal bowel sounds. Obese.  Extremities: Warm to touch. No clubbing or cyanosis. No pedal edema.  Pulses: 2+ peripheral pulses all extremities.	  Skin: Venous stasis changes on both lower extremities. Multiple areas of skin hyperpigmentation at areas of prior plaque.  Lymph Nodes: Cervical, supraclavicular and axillary nodes normal  Neurological: Motor and sensory examination equal and normal. A and O x 1  Psychiatry: Anxious.      LABS:                          9.8    7.67  )-----------( 209      ( 22 Oct 2019 09:40 )             29.2     CBC    WBC  7.67 <==, 8.75 <==, 5.34 <==    Hemoglobin  9.8 <<==, 10.2 <<==, 10.7 <<==    Hematocrit  29.2 <==, 28.5 <==, 31.5 <==    Platelets  209 <==, 207 <==, 220 <==      118<LL>  |  77<L>  |  5<L>  ----------------------------<  129<H>    10-22  4.1   |  25  |  0.47<L>      LYTES    sodium  118 <==, 122 <==, 130 <==    potassium   4.1 <==, 3.8 <==, 4.4 <==    chloride  77 <==, 79 <==, 84 <==    carbon dioxide  25 <==, 26 <==, 26 <==    =============================================================================================  RENAL FUNCTION:    Creatinine:   0.47  <<==, 0.45  <<==, 0.47  <<==    BUN:   5 <==, 5 <==, <4 <==    ============================================================================================    calcium   8.4 <==, 8.1 <==, 9.6 <==    phos   2.7 <==    mag   1.3 <==    ============================================================================================  LFTs    AST:   98 <== , 125 <==     ALT:  80  <== , 101  <==     AP:  57  <=, 64  <=    Bili:  1.7  <=, 0.5  <=    Venous Blood Gas:  10-21 @ 09:24  7.41/46/64/29/90  VBG Lactate: 3.7    Venous Blood Gas:  10-21 @ 03:58  --/--/--/--/--  VBG Lactate: 7.3    Venous Blood Gas:  10-21 @ 00:22  7.36/54/34/30/53  VBG Lactate: 7.2    ABG - ( 22 Oct 2019 09:32 )  pH: 7.46  /  pCO2: 41    /  pO2: 190   / HCO3: 28    / Base Excess: 4.6   /  SaO2: 100       Serum Pro-Brain Natriuretic Peptide: 2262 pg/mL (10-22 @ 09:26)    Patient name: FREDY CHOI  YOB: 1965   Age: 54 (F)   MR#: [id number]  Study Date: 10/7/2019  [facility]  Study quality: Technically difficult  Referring Physician: Canelo Bonds MD  BloodPressure: 122/80 mmHg  Height: 165 cm  Weight: 68 kg  BSA: 1.8 m2  ------------------------------------------------------------------------  PROCEDURE: Transthoracic echocardiogram with 2-D, M-Mode  and complete spectral and color flow Doppler. Verbal  consent was obtained for injection of  Ultrasonic Enhancing  Agent following a discussion of risks and benefits.  Following intravenous injection of Ultrasonic Enhancing  Agent , harmonic imaging was performed.  INDICATION: Shortness of breath (R06.02)  ------------------------------------------------------------------------  Observations:  Left Ventricle: Endocardium not well visualized; grossly  normal left ventricular systolic function. Endocardial  visualization enhanced with intravenous injection of  Ultrasonic Enhancing Agent (Definity). Normal left  ventricular internal dimensions and wall thicknesses.  Normal diastolic function  ------------------------------------------------------------------------  Conclusions:  1. Normal left ventricular internal dimensions and wall  thicknesses.  2. Endocardium not well visualized; grossly normal left  ventricular systolic function. Endocardial visualization  enhanced with intravenous injection of Ultrasonic Enhancing  Agent (Definity). Unable to perfrom strain imaging due to  limited clinical windows.  ------------------------------------------------------------------------  Confirmed on  10/7/2019 - 18:11:26 by Margaret Alvarez M.D.  ------------------------------------------------------------------------  ---------------------------------------------------------------------------------------------------------------  MICROBIOLOGY:   Urinalysis Basic - ( 22 Oct 2019 11:01 )    Color: Light Yellow / Appearance: Clear / S.006 / pH: x  Gluc: x / Ketone: Trace  / Bili: Negative / Urobili: Negative   Blood: x / Protein: Negative / Nitrite: Negative   Leuk Esterase: Negative / RBC: x / WBC x   Sq Epi: x / Non Sq Epi: x / Bacteria: x    RADIOLOGY:  [x] Chest radiographs reviewed and interpreted by me      EXAM:  XR CHEST PORTABLE IMMED 1V                          PROCEDURE DATE:  10/22/2019      INTERPRETATION:  CLINICAL INFORMATION: Rapid response. Respiratory   distress    EXAM: AP portable chest     COMPARISON: Chest radiograph from 10/21/2019.    FINDINGS:    The heart size is normal. The cardiomediastinal silhouette is   unremarkable.    Patchy opacity at the right base may represent atelectasis or infection.   No pleural effusion or pneumothorax. Emphysema.    JENNIFER LUJAN M.D., RADIOLOGY RESIDENT  This document has been electronically signed.  NYDIA VANCE M.D., ATTENDING RADIOLOGIST  This document has been electronically signed. Oct 22 2019 11:58AM  ---------------------------------------------------------------------------------------------------------------  EXAM:  CT ANGIO CHEST (W)AW IC                          PROCEDURE DATE:  10/21/2019      INTERPRETATION:  CLINICAL INFORMATION: Dyspnea    COMPARISON: CTA chest 10/3/2019    PROCEDURE:   CT Angiography of the Chest.    90 ml of Omnipaque 350 was injected intravenously. 10 ml were discarded.  Sagittal and coronal reformats were performed as well as 3D (MIP)   reconstructions.      FINDINGS:    LUNGS AND AIRWAYS: Patent central airways.  Severe upper lobe predominant   centrilobular emphysema. Mild bilateral dependent subsegmental   atelectasis. No focal lung consolidation.    PLEURA: No pleural effusion.    MEDIASTINUM AND CHESTER: No lymphadenopathy.    VESSELS: No evidence of pulmonary embolism.    HEART: Heart size is normal. No pericardial effusion.    CHEST WALL AND LOWER NECK: Within normal limits.    VISUALIZED UPPER ABDOMEN: Small hiatal hernia.    BONES: Degenerative changes in the spine.    IMPRESSION:     No CT evidence of pulmonary embolism.     Severe emphysema.    ENID ALBRECHT MD., ATTENDING RADIOLOGIST  This document has been electronically signed. Oct 21 2019  2:30AM      ---------------------------------------------------------------------------------------------------------------    EXAM:  XR CHEST AP OR PA 1V                          PROCEDURE DATE:  10/21/2019      INTERPRETATION:  CLINICAL INFORMATION: COPD exacerbation    EXAM: AP view of the chest     COMPARISON: Radiograph dated  10/3/2019    FINDINGS:      The lungs are clear.   There is no pleural effusion.  There is no pneumothorax.  The cardiac silhouette size cannot be accurately assessed on this   radiograph.  There is no acute abnormality of the visualized osseous structures.    IMPRESSION:    Clear lungs.     CASPER LERMA M.D., RADIOLOGY RESIDENT  This document has been electronically signed.  MIRI NORRIS M.D., ATTENDING RADIOLOGIST  This document has been electronically signed. Oct 21 2019  9:44AM  ---------------------------------------------------------------------------------------------------------------  SPIROMETRY    FEV1 0.44 liters - 19% predicted  FVC 1.04 liters - 35% predicted  FEV1% 42    c/w very severe obstructive lung disease

## 2019-10-23 LAB
ANION GAP SERPL CALC-SCNC: 12 MMOL/L — SIGNIFICANT CHANGE UP (ref 5–17)
ANION GAP SERPL CALC-SCNC: 14 MMOL/L — SIGNIFICANT CHANGE UP (ref 5–17)
ANION GAP SERPL CALC-SCNC: 14 MMOL/L — SIGNIFICANT CHANGE UP (ref 5–17)
ANION GAP SERPL CALC-SCNC: 21 MMOL/L — HIGH (ref 5–17)
ANION GAP SERPL CALC-SCNC: 22 MMOL/L — HIGH (ref 5–17)
BUN SERPL-MCNC: 10 MG/DL — SIGNIFICANT CHANGE UP (ref 7–23)
BUN SERPL-MCNC: 7 MG/DL — SIGNIFICANT CHANGE UP (ref 7–23)
BUN SERPL-MCNC: 8 MG/DL — SIGNIFICANT CHANGE UP (ref 7–23)
BUN SERPL-MCNC: 8 MG/DL — SIGNIFICANT CHANGE UP (ref 7–23)
BUN SERPL-MCNC: 9 MG/DL — SIGNIFICANT CHANGE UP (ref 7–23)
CALCIUM SERPL-MCNC: 7.7 MG/DL — LOW (ref 8.4–10.5)
CALCIUM SERPL-MCNC: 8.2 MG/DL — LOW (ref 8.4–10.5)
CALCIUM SERPL-MCNC: 8.7 MG/DL — SIGNIFICANT CHANGE UP (ref 8.4–10.5)
CALCIUM SERPL-MCNC: 8.8 MG/DL — SIGNIFICANT CHANGE UP (ref 8.4–10.5)
CALCIUM SERPL-MCNC: 9 MG/DL — SIGNIFICANT CHANGE UP (ref 8.4–10.5)
CHLORIDE SERPL-SCNC: 78 MMOL/L — LOW (ref 96–108)
CHLORIDE SERPL-SCNC: 81 MMOL/L — LOW (ref 96–108)
CHLORIDE SERPL-SCNC: 84 MMOL/L — LOW (ref 96–108)
CHLORIDE SERPL-SCNC: 84 MMOL/L — LOW (ref 96–108)
CHLORIDE SERPL-SCNC: 88 MMOL/L — LOW (ref 96–108)
CO2 SERPL-SCNC: 26 MMOL/L — SIGNIFICANT CHANGE UP (ref 22–31)
CO2 SERPL-SCNC: 27 MMOL/L — SIGNIFICANT CHANGE UP (ref 22–31)
CO2 SERPL-SCNC: 28 MMOL/L — SIGNIFICANT CHANGE UP (ref 22–31)
CO2 SERPL-SCNC: 29 MMOL/L — SIGNIFICANT CHANGE UP (ref 22–31)
CO2 SERPL-SCNC: 30 MMOL/L — SIGNIFICANT CHANGE UP (ref 22–31)
CREAT SERPL-MCNC: 0.47 MG/DL — LOW (ref 0.5–1.3)
CREAT SERPL-MCNC: 0.49 MG/DL — LOW (ref 0.5–1.3)
CREAT SERPL-MCNC: 0.52 MG/DL — SIGNIFICANT CHANGE UP (ref 0.5–1.3)
CREAT SERPL-MCNC: 0.52 MG/DL — SIGNIFICANT CHANGE UP (ref 0.5–1.3)
CREAT SERPL-MCNC: 0.58 MG/DL — SIGNIFICANT CHANGE UP (ref 0.5–1.3)
GLUCOSE SERPL-MCNC: 109 MG/DL — HIGH (ref 70–99)
GLUCOSE SERPL-MCNC: 137 MG/DL — HIGH (ref 70–99)
GLUCOSE SERPL-MCNC: 154 MG/DL — HIGH (ref 70–99)
GLUCOSE SERPL-MCNC: 162 MG/DL — HIGH (ref 70–99)
GLUCOSE SERPL-MCNC: 608 MG/DL — CRITICAL HIGH (ref 70–99)
HCT VFR BLD CALC: 26.5 % — LOW (ref 34.5–45)
HGB BLD-MCNC: 8.7 G/DL — LOW (ref 11.5–15.5)
MCHC RBC-ENTMCNC: 32.8 GM/DL — SIGNIFICANT CHANGE UP (ref 32–36)
MCHC RBC-ENTMCNC: 34.8 PG — HIGH (ref 27–34)
MCV RBC AUTO: 106 FL — HIGH (ref 80–100)
PLATELET # BLD AUTO: 151 K/UL — SIGNIFICANT CHANGE UP (ref 150–400)
POTASSIUM SERPL-MCNC: 3.7 MMOL/L — SIGNIFICANT CHANGE UP (ref 3.5–5.3)
POTASSIUM SERPL-MCNC: 4.3 MMOL/L — SIGNIFICANT CHANGE UP (ref 3.5–5.3)
POTASSIUM SERPL-MCNC: 4.3 MMOL/L — SIGNIFICANT CHANGE UP (ref 3.5–5.3)
POTASSIUM SERPL-MCNC: 4.5 MMOL/L — SIGNIFICANT CHANGE UP (ref 3.5–5.3)
POTASSIUM SERPL-MCNC: 4.9 MMOL/L — SIGNIFICANT CHANGE UP (ref 3.5–5.3)
POTASSIUM SERPL-SCNC: 3.7 MMOL/L — SIGNIFICANT CHANGE UP (ref 3.5–5.3)
POTASSIUM SERPL-SCNC: 4.3 MMOL/L — SIGNIFICANT CHANGE UP (ref 3.5–5.3)
POTASSIUM SERPL-SCNC: 4.3 MMOL/L — SIGNIFICANT CHANGE UP (ref 3.5–5.3)
POTASSIUM SERPL-SCNC: 4.5 MMOL/L — SIGNIFICANT CHANGE UP (ref 3.5–5.3)
POTASSIUM SERPL-SCNC: 4.9 MMOL/L — SIGNIFICANT CHANGE UP (ref 3.5–5.3)
RBC # BLD: 2.5 M/UL — LOW (ref 3.8–5.2)
RBC # FLD: 13.7 % — SIGNIFICANT CHANGE UP (ref 10.3–14.5)
SODIUM SERPL-SCNC: 119 MMOL/L — CRITICAL LOW (ref 135–145)
SODIUM SERPL-SCNC: 125 MMOL/L — LOW (ref 135–145)
SODIUM SERPL-SCNC: 128 MMOL/L — LOW (ref 135–145)
SODIUM SERPL-SCNC: 132 MMOL/L — LOW (ref 135–145)
SODIUM SERPL-SCNC: 134 MMOL/L — LOW (ref 135–145)
WBC # BLD: 5.53 K/UL — SIGNIFICANT CHANGE UP (ref 3.8–10.5)
WBC # FLD AUTO: 5.53 K/UL — SIGNIFICANT CHANGE UP (ref 3.8–10.5)

## 2019-10-23 PROCEDURE — 99232 SBSQ HOSP IP/OBS MODERATE 35: CPT

## 2019-10-23 RX ORDER — SODIUM CHLORIDE 9 MG/ML
1000 INJECTION, SOLUTION INTRAVENOUS
Refills: 0 | Status: DISCONTINUED | OUTPATIENT
Start: 2019-10-23 | End: 2019-10-23

## 2019-10-23 RX ADMIN — Medication 10 MILLIGRAM(S): at 12:13

## 2019-10-23 RX ADMIN — PANTOPRAZOLE SODIUM 40 MILLIGRAM(S): 20 TABLET, DELAYED RELEASE ORAL at 06:54

## 2019-10-23 RX ADMIN — Medication 0.25 MILLIGRAM(S): at 21:37

## 2019-10-23 RX ADMIN — SODIUM CHLORIDE 80 MILLILITER(S): 9 INJECTION, SOLUTION INTRAVENOUS at 16:16

## 2019-10-23 RX ADMIN — Medication 20 MILLIGRAM(S): at 12:12

## 2019-10-23 RX ADMIN — Medication 1.5 MILLIGRAM(S): at 12:09

## 2019-10-23 RX ADMIN — SODIUM CHLORIDE 80 MILLILITER(S): 9 INJECTION, SOLUTION INTRAVENOUS at 09:30

## 2019-10-23 RX ADMIN — Medication 0.25 MILLIGRAM(S): at 05:25

## 2019-10-23 RX ADMIN — Medication 1.5 MILLIGRAM(S): at 05:25

## 2019-10-23 RX ADMIN — Medication 1 APPLICATION(S): at 05:27

## 2019-10-23 RX ADMIN — Medication 100 MILLIGRAM(S): at 05:25

## 2019-10-23 RX ADMIN — ERTAPENEM SODIUM 120 MILLIGRAM(S): 1 INJECTION, POWDER, LYOPHILIZED, FOR SOLUTION INTRAMUSCULAR; INTRAVENOUS at 16:10

## 2019-10-23 RX ADMIN — SODIUM CHLORIDE 50 MILLILITER(S): 9 INJECTION, SOLUTION INTRAVENOUS at 06:51

## 2019-10-23 RX ADMIN — Medication 0.25 MILLIGRAM(S): at 14:41

## 2019-10-23 RX ADMIN — MONTELUKAST 10 MILLIGRAM(S): 4 TABLET, CHEWABLE ORAL at 12:13

## 2019-10-23 RX ADMIN — Medication 1 MILLIGRAM(S): at 20:02

## 2019-10-23 RX ADMIN — Medication 20 MILLIGRAM(S): at 05:25

## 2019-10-23 RX ADMIN — Medication 1000 UNIT(S): at 12:14

## 2019-10-23 RX ADMIN — Medication 3 MILLILITER(S): at 18:24

## 2019-10-23 RX ADMIN — Medication 3 MILLILITER(S): at 05:27

## 2019-10-23 RX ADMIN — Medication 3 MILLILITER(S): at 21:10

## 2019-10-23 RX ADMIN — Medication 1.5 MILLIGRAM(S): at 02:39

## 2019-10-23 RX ADMIN — Medication 1 MILLIGRAM(S): at 12:13

## 2019-10-23 RX ADMIN — Medication 3 MILLILITER(S): at 12:14

## 2019-10-23 RX ADMIN — Medication 1 MILLIGRAM(S): at 16:09

## 2019-10-23 RX ADMIN — EXEMESTANE 25 MILLIGRAM(S): 25 TABLET, SUGAR COATED ORAL at 12:12

## 2019-10-23 RX ADMIN — HEPARIN SODIUM 5000 UNIT(S): 5000 INJECTION INTRAVENOUS; SUBCUTANEOUS at 18:24

## 2019-10-23 RX ADMIN — POLYETHYLENE GLYCOL 3350 17 GRAM(S): 17 POWDER, FOR SOLUTION ORAL at 12:13

## 2019-10-23 RX ADMIN — Medication 100 MILLIGRAM(S): at 12:12

## 2019-10-23 RX ADMIN — Medication 1 TABLET(S): at 12:13

## 2019-10-23 RX ADMIN — Medication 1 APPLICATION(S): at 18:26

## 2019-10-23 RX ADMIN — Medication 0.5 MILLIGRAM(S): at 05:26

## 2019-10-23 RX ADMIN — CHLORHEXIDINE GLUCONATE 1 APPLICATION(S): 213 SOLUTION TOPICAL at 12:52

## 2019-10-23 RX ADMIN — HEPARIN SODIUM 5000 UNIT(S): 5000 INJECTION INTRAVENOUS; SUBCUTANEOUS at 05:25

## 2019-10-23 NOTE — CONSULT NOTE ADULT - ATTENDING COMMENTS
Patient seen and examined, agree with the above assessment and plan by ABBIE Pink  Pt known to us from prior admission  54 year old female with PMH of stage 3 breast cancer on aromasin, RA, Psoriasis on Otezla, brain aneurysm, s/p clip 1988, right jugular DVT, Catheter related MSSA bacteremia 2015,  ETOH abuse, COPD on oxygen presents here with dyspnea due to COPD and ETOH withdrawal  nebs per pulm  recent echo w normal lvef  renal followup for hyponatremia  Low suspicion for chf  CIWA protocol for ETOH abuse
I have examined pt and agree with above exam and plan. 55 y/o female admitted for ETOH withdrawal , COPD exacerbation  found to be hyponatremia. At home pt states she drinks 12 oz cans of Coors daily and a lot of water daily. Pt is alert and oriented x3 asking for water or beer.  NA at RRT via ABG was 188. 122 earlier. Agree with serum and urine osms and urine lytes. High suspicion for Beer potomania. Consider maintenance dose of ativan.    Pt is not a MICU candidate at this time.

## 2019-10-23 NOTE — CHART NOTE - NSCHARTNOTEFT_GEN_A_CORE
Patient initial BMP . Per nephrology, NS 1.5% 60cc/hr, goal NA is 125, recent BMP with . Call placed to Dr. Rasmussen, message left, awaiting call back. Patient A&Ox3. BMP q 6hr trend.       -- > 128     -- call placed to Dr. Rasmussen, message left, awaiting call back   -- D/C'd 1.5 % NS 60 cc/hr   -- BMP q 6 hr Patient initial BMP . Per nephrology, NS 1.5% 60cc/hr, goal NA is 125, recent BMP with . Call placed to Dr. Rasmussen, message left, awaiting call back. Patient A&Ox3. BMP q 6hr trend.       -- > 128     -- call placed to Dr. Rasmussen, message left, awaiting call back   -- D/C'd 1.5 % NS 60 cc/hr   -- BMP q 6 hr  -- f/u nephrology in AM Patient initial BMP . Per nephrology, NS 1.5% 60cc/hr, goal NA is 125, recent BMP with . Call placed to Dr. Rasmussen, message left, awaiting call back. Patient A&Ox3. BMP q 6hr trend.       -- > 128     -- call placed to Dr. Rasmussen, message left, awaiting call back (goal was NA-125)  -- D/C'd 1.5 % NS 60 cc/hr   -- BMP q 6 hr   -- f/u nephrology in AM  -- will endorse to AM team   -- attending to follow in AM    Memo Floyd PA-C   Department of Medicine

## 2019-10-23 NOTE — CONSULT NOTE ADULT - CONSULT REASON
Hyponatremia
SOB
chronic hypoxic respiratory failure; dyspnea; COPD/emphysema; alcohol withdrawal
hyponatremia

## 2019-10-23 NOTE — CHART NOTE - NSCHARTNOTEFT_GEN_A_CORE
NA level from 132 -- > 125, (on d5 80cc/hr, d/w Dr. Rasmussen, D/C d5 ivf, monitor bmp q 6hr, okay for NA level 125-130) Patient stable, VSS. Will endorse events to day shift.    Memo Floyd PA-C  Department of Medicine

## 2019-10-23 NOTE — CONSULT NOTE ADULT - ASSESSMENT
54 year old female with PMH of stage 3 breast cancer on aromasin, RA, Psoriasis on Otezla, brain aneurysm, s/p clip 1988, right jugular DVT, Catheter related MSSA bacteremia 2015,  ETOH abuse, COPD on oxygen presents here with dyspnea, ETOH withdrawal, hyponatremia, hyperkalemia     1. Resp Distress   likely secondary to pulm disease, COPD exacerbation   s/p RRT 10/22 , micu consult noted  cv stable. no decomp chf on exam.   no evidence of acs   CTA chest negative for PE, + severe emphysema   management  per pulm / med   recent echo with grossly normal LV function     2. Sinus tachycardia -resolved   likely secondary to withdrawal, dehydration, anxiety, copd exacerbation  no evidence of acs , asymptomatic  recent echo with grossly normal lv function     3 med f/u, CIWA  protocol     4. Elevated lactic acid level  work up per ID/ med     5. hyponatremia  mangement per renal , IVF   hyperkalemia, resolved

## 2019-10-23 NOTE — PROGRESS NOTE ADULT - SUBJECTIVE AND OBJECTIVE BOX
CHIEF COMPLAINT:Patient is a 54y old  Female who presents with a chief complaint of EtOH withdrawal, COPD exacerbation (22 Oct 2019 10:12)    	        PAST MEDICAL & SURGICAL HISTORY:  Prophylactic measure  Breast cancer  Brain aneurysm: with clips  Psoriasis  RA (rheumatoid arthritis)  Psoriasis  Breast cancer, stage 3  History of modified radical mastectomy of both breasts  S/P bilateral mastectomy  Brain aneurysm: 1988 two clips          REVIEW OF SYSTEMS:  CONSTITUTIONAL: weak  EYES: No eye pain, visual disturbances, or discharge  NECK: No pain or stiffness  RESPIRATORY: sob much better   CARDIOVASCULAR: No chest pain, palpitations, passing out, dizziness, or leg swelling  GASTROINTESTINAL: No abdominal or epigastric pain. No nausea, vomiting, or hematemesis;     NEUROLOGICAL: No headaches,    Medications:  MEDICATIONS  (STANDING):  ALBUTerol/ipratropium for Nebulization 3 milliLiter(s) Nebulizer every 6 hours  ALPRAZolam 0.25 milliGRAM(s) Oral three times a day  apremilast 30 milliGRAM(s) Oral two times a day  buDESOnide    Inhalation Suspension 0.5 milliGRAM(s) Inhalation every 12 hours  chlorhexidine 2% Cloths 1 Application(s) Topical daily  cholecalciferol 1000 Unit(s) Oral daily  dextrose 5%. 1000 milliLiter(s) (80 mL/Hr) IV Continuous <Continuous>  docusate sodium 100 milliGRAM(s) Oral two times a day  ertapenem  IVPB 1000 milliGRAM(s) IV Intermittent every 24 hours  exemestane 25 milliGRAM(s) Oral daily  folic acid 1 milliGRAM(s) Oral daily  heparin  Injectable 5000 Unit(s) SubCutaneous every 12 hours  hydrocortisone 1% Cream 1 Application(s) Topical two times a day  LORazepam     Tablet   Oral   LORazepam     Tablet 1.5 milliGRAM(s) Oral every 4 hours  LORazepam     Tablet 1 milliGRAM(s) Oral every 4 hours  methylPREDNISolone sodium succinate Injectable 20 milliGRAM(s) IV Push every 6 hours  montelukast 10 milliGRAM(s) Oral daily  multivitamin 1 Tablet(s) Oral daily  pantoprazole    Tablet 40 milliGRAM(s) Oral before breakfast  polyethylene glycol 3350 17 Gram(s) Oral daily  prochlorperazine   Tablet 10 milliGRAM(s) Oral daily  senna 2 Tablet(s) Oral at bedtime  thiamine 100 milliGRAM(s) Oral daily    MEDICATIONS  (PRN):  LORazepam   Injectable 2 milliGRAM(s) IV Push every 1 hour PRN Symptom-triggered: each CIWA -Ar score 8 or GREATER    	    PHYSICAL EXAM:  T(C): 37.1 (10-23-19 @ 08:09), Max: 37.7 (10-22-19 @ 23:57)  HR: 90 (10-23-19 @ 08:09) (90 - 144)  BP: 115/78 (10-23-19 @ 08:09) (111/63 - 155/82)  RR: 18 (10-23-19 @ 08:09) (18 - 41)  SpO2: 98% (10-23-19 @ 08:09) (91% - 100%)  Wt(kg): --  I&O's Summary    22 Oct 2019 07:01  -  23 Oct 2019 07:00  --------------------------------------------------------  IN: 965 mL / OUT: 1800 mL / NET: -835 mL        HEENT:   Normal oral mucosa, PERRL, EOMI	  Lymphatic: No lymphadenopathy  Cardiovascular: Normal S1 S2, No JVD,   Respiratory: dec bs   Psychiatry: A & O x 3,   Gastrointestinal:  Soft, Non-tender, + BS	  Skin: No rashes, No ecchymoses, No cyanosis	  Neurologic: Non-focal  Extremities: =edema   Vascular: Peripheral pulses palpable     TELEMETRY: 	    ECG:  	  RADIOLOGY:  OTHER: 	  	  LABS:	 	    CARDIAC MARKERS:  CARDIAC MARKERS ( 22 Oct 2019 16:05 )  x     / x     / 230 U/L / x     / 8.1 ng/mL                                9.8    7.67  )-----------( 209      ( 22 Oct 2019 09:40 )             29.2     10-23    134<L>  |  84<L>  |  8   ----------------------------<  109<H>  4.5   |  28  |  0.52    Ca    8.8      23 Oct 2019 06:45  Phos  2.7     10-22  Mg     1.3     10-22    TPro  6.5  /  Alb  3.8  /  TBili  1.7<H>  /  DBili  x   /  AST  98<H>  /  ALT  80<H>  /  AlkPhos  57  10-22    proBNP:   Lipid Profile:   HgA1c:   TSH:

## 2019-10-23 NOTE — CONSULT NOTE ADULT - SUBJECTIVE AND OBJECTIVE BOX
CARDIOLOGY CONSULT - Dr. Nj         HPI:  54 yr old female known from prior admission with Hx significant for COPD, ETOH abuse, breast Ca, remote brain aneurysm s/p clip who presented with COPD exacerbation and also found to be withdrawing from etoh. she is s/p RRT on 10/22 morning for tachypnea without hypoxia (sat 98% on 2L of NC). DuoNeb x1,  Solumedrol 40mg IVx1, Azithromycin x1 were given. MICU consultation noted  for hyponatremia, BMP Na+ 118. On exam, pt reports she is feeling better. She denies cp, sob, palps, orthopnea, fever or chills. ROS otherwise negative        PAST MEDICAL & SURGICAL HISTORY:  Prophylactic measure  Breast cancer  Brain aneurysm: with clips  Psoriasis  RA (rheumatoid arthritis)  Psoriasis  Breast cancer, stage 3  History of modified radical mastectomy of both breasts  S/P bilateral mastectomy  Brain aneurysm: 1988 two clips          PREVIOUS DIAGNOSTIC TESTING:    [ ] Echocardiogram:  < from: TTE with Doppler (w/Cont) (10.07.19 @ 15:16) >  Observations:  Left Ventricle: Endocardium not well visualized; grossly  normal left ventricular systolic function. Endocardial  visualization enhanced with intravenous injection of  Ultrasonic Enhancing Agent (Definity). Normal left  ventricular internal dimensions and wall thicknesses.  Normal diastolic function  ------------------------------------------------------------------------  Conclusions:  1. Normal left ventricular internal dimensions and wall  thicknesses.  2. Endocardium not well visualized; grossly normal left  ventricular systolic function. Endocardial visualization  enhanced with intravenous injection of Ultrasonic Enhancing  Agent (Definity). Unable to perfrom strain imaging due to  limited clinical windows.  ------------------------------------------------------------------------  Confirmed on  10/7/2019 - 18:11:26 by Margaret Alvarez M.D.  ------------------------------------------------------------------------    < end of copied text >    [ ]  Catheterization:  [ ] Stress Test:  	    MEDICATIONS:  Home Medications:  ALPRAZolam 0.25 mg oral tablet: 1 tab(s) orally 3 times a day (21 Oct 2019 11:18)  Aspirin Enteric Coated 325 mg oral delayed release tablet: 1 tab(s) orally once a day, As Needed (21 Oct 2019 11:18)  Brovana 15 mcg/2 mL inhalation solution: 2 milliliter(s) inhaled 2 times a day (21 Oct 2019 11:18)  exemestane 25 mg oral tablet: 1 tab(s) orally once a day (21 Oct 2019 11:18)  folic acid 1 mg oral tablet: 1 tab(s) orally once a day (21 Oct 2019 11:18)   Psoriasis 2% topical ointment: Apply topically to affected area , As Needed    Note: Pt applies up to 2-3x/day  (21 Oct 2019 11:18)  montelukast 10 mg oral tablet: 1 tab(s) orally once a day (21 Oct 2019 11:18)  Multiple Vitamins oral tablet: 1 tab(s) orally once a day (21 Oct 2019 11:18)  Otezla 30 mg oral tablet: 1 tab(s) orally 2 times a day (21 Oct 2019 11:18)  prochlorperazine 10 mg oral tablet: 1 tab(s) orally once a day    Note: Pharmacy verified prochlorperazine 10 mg Q6H (21 Oct 2019 11:18)  Tylenol 325 mg oral tablet: 1 tab(s) orally once a day, As Needed (21 Oct 2019 11:18)  Ventolin HFA 90 mcg/inh inhalation aerosol: 2 puff(s) inhaled every 6 hours, As Needed (21 Oct 2019 11:18)  Vitamin D3 2000 intl units oral tablet: 1 tab(s) orally once a day (21 Oct 2019 11:18)  Yupelri 175 mcg/3 mL inhalation solution: 3 milliliter(s) inhaled once a day    Note: Pt is receiving samples from PCP (21 Oct 2019 11:18)      MEDICATIONS  (STANDING):  ALBUTerol/ipratropium for Nebulization 3 milliLiter(s) Nebulizer every 6 hours  ALPRAZolam 0.25 milliGRAM(s) Oral three times a day  apremilast 30 milliGRAM(s) Oral two times a day  buDESOnide    Inhalation Suspension 0.5 milliGRAM(s) Inhalation every 12 hours  chlorhexidine 2% Cloths 1 Application(s) Topical daily  cholecalciferol 1000 Unit(s) Oral daily  docusate sodium 100 milliGRAM(s) Oral two times a day  ertapenem  IVPB 1000 milliGRAM(s) IV Intermittent every 24 hours  exemestane 25 milliGRAM(s) Oral daily  folic acid 1 milliGRAM(s) Oral daily  heparin  Injectable 5000 Unit(s) SubCutaneous every 12 hours  hydrocortisone 1% Cream 1 Application(s) Topical two times a day  LORazepam     Tablet   Oral   LORazepam     Tablet 1 milliGRAM(s) Oral every 4 hours  montelukast 10 milliGRAM(s) Oral daily  multivitamin 1 Tablet(s) Oral daily  pantoprazole    Tablet 40 milliGRAM(s) Oral before breakfast  polyethylene glycol 3350 17 Gram(s) Oral daily  predniSONE   Tablet 40 milliGRAM(s) Oral daily  prochlorperazine   Tablet 10 milliGRAM(s) Oral daily  senna 2 Tablet(s) Oral at bedtime  thiamine 100 milliGRAM(s) Oral daily      FAMILY HISTORY:  FH: CHF (congestive heart failure): Mother      SOCIAL HISTORY:    [ ] Non-smoker  [ X] Smoker  [ X] Alcohol    Allergies    amoxicillin (Anaphylaxis)  Levaquin (Other; Anaphylaxis)  Levaquin (Unknown)  penicillin (Anaphylaxis)  penicillins (Anaphylaxis)    Intolerances    	    REVIEW OF SYSTEMS:  CONSTITUTIONAL: No fever, weight loss, or fatigue  EYES: No eye pain, visual disturbances, or discharge  ENMT:  No difficulty hearing, tinnitus, vertigo; No sinus or throat pain  NECK: No pain or stiffness  RESPIRATORY: see hpi   CARDIOVASCULAR: No chest pain, palpitations, passing out, dizziness, or leg swelling  GASTROINTESTINAL: No abdominal or epigastric pain. No nausea, vomiting, or hematemesis; No diarrhea or constipation. No melena or hematochezia.  GENITOURINARY: No dysuria, frequency, hematuria, or incontinence  NEUROLOGICAL: No headaches, memory loss, loss of strength, numbness, or tremors  SKIN: No itching, burning, rashes, or lesions   	    [ ] All others negative	  [ x] Unable to obtain    PHYSICAL EXAM:  T(C): 36.9 (10-23-19 @ 12:21), Max: 37.7 (10-22-19 @ 23:57)  HR: 100 (10-23-19 @ 12:21) (90 - 144)  BP: 119/78 (10-23-19 @ 12:21) (111/63 - 155/82)  RR: 20 (10-23-19 @ 12:21) (18 - 41)  SpO2: 98% (10-23-19 @ 12:21) (91% - 100%)  Wt(kg): --  I&O's Summary    22 Oct 2019 07:01  -  23 Oct 2019 07:00  --------------------------------------------------------  IN: 965 mL / OUT: 1800 mL / NET: -835 mL    23 Oct 2019 07:01  -  23 Oct 2019 14:38  --------------------------------------------------------  IN: 440 mL / OUT: 0 mL / NET: 440 mL        Appearance: Normal	  Psychiatry: A & O x 2  HEENT:   Normal oral mucosa, PERRL, EOMI	  Lymphatic: No lymphadenopathy  Cardiovascular: Normal S1 S2,RRR, No JVD, No murmurs  Respiratory: diminished    Gastrointestinal:  Soft, Non-tender, + BS	  Skin: No rashes, No ecchymoses, No cyanosis	  Neurologic: Non-focal  Extremities: Normal range of motion, No clubbing, cyanosis or edema  Vascular: Peripheral pulses palpable 2+ bilaterally    TELEMETRY: 	    ECG:  	  RADIOLOGY:  < from: CT Angio Chest w/ IV Cont (10.21.19 @ 02:19) >    FINDINGS:    LUNGS AND AIRWAYS: Patent central airways.  Severe upper lobe predominant   centrilobular emphysema. Mild bilateral dependent subsegmental   atelectasis. No focal lung consolidation.    PLEURA: No pleural effusion.    MEDIASTINUM AND CHESTER: No lymphadenopathy.    VESSELS: No evidence of pulmonary embolism.    HEART: Heart size is normal. No pericardial effusion.    CHEST WALL AND LOWER NECK: Within normal limits.    VISUALIZED UPPER ABDOMEN: Small hiatal hernia.    BONES: Degenerative changes in the spine.    IMPRESSION:     No CT evidence of pulmonary embolism.     Severe emphysema.          < end of copied text >    OTHER: 	  	  LABS:	 	    CARDIAC MARKERS:  Troponin T, High Sensitivity Result: 26 ng/L (10-22 @ 16:05)                                  8.7    5.53  )-----------( 151      ( 23 Oct 2019 14:22 )             26.5     10-23    132<L>  |  88<L>  |  9   ----------------------------<  162<H>  4.3   |  30  |  0.49<L>    Ca    8.7      23 Oct 2019 13:30  Phos  2.7     10-22  Mg     1.3     10-22    TPro  6.5  /  Alb  3.8  /  TBili  1.7<H>  /  DBili  x   /  AST  98<H>  /  ALT  80<H>  /  AlkPhos  57  10-22      proBNP:   Lipid Profile:   HgA1c:   TSH:

## 2019-10-23 NOTE — PROGRESS NOTE ADULT - SUBJECTIVE AND OBJECTIVE BOX
[facility] - [facility] - PROGRESS NOTE      CHIEF COMPLAINT: chronic hypoxic respiratory failure; COPD exacerbation; dyspnea; alcohol withdrawal    INTERVAL HISTORY: agitation, tremors, confusion much improved; laying in bed asking to go home; much less cough, sputum production, chest congestion and wheeze; no respiratory distress on a nasal canula; no fevers, chills or sweats; no chest pain/pressure or palpitations; rapid correction of hyponatremia      REVIEW OF SYSTEMS:  Constitutional: As per interval history  HEENT: Within normal limits  CV: As per interval history  Resp: As per interval history  GI: Within normal limits   : Within normal limits  Musculoskeletal: Within normal limits  Skin: Within normal limits  Neurological: Within normal limits  Psychiatric: Within normal limits  Endocrine: Within normal limits  Hematologic/Lymphatic: Within normal limits  Allergic/Immunologic: Within normal limits      MEDICATIONS:     Pulmonary "  ALBUTerol/ipratropium for Nebulization 3 milliLiter(s) Nebulizer every 6 hours  buDESOnide    Inhalation Suspension 0.5 milliGRAM(s) Inhalation every 12 hours  montelukast 10 milliGRAM(s) Oral daily    Anti-microbials:  ertapenem  IVPB 1000 milliGRAM(s) IV Intermittent every 24 hours    Cardiovascular:    Other:  ALPRAZolam 0.25 milliGRAM(s) Oral three times a day  apremilast 30 milliGRAM(s) Oral two times a day  chlorhexidine 2% Cloths 1 Application(s) Topical daily  cholecalciferol 1000 Unit(s) Oral daily  docusate sodium 100 milliGRAM(s) Oral two times a day  exemestane 25 milliGRAM(s) Oral daily  folic acid 1 milliGRAM(s) Oral daily  heparin  Injectable 5000 Unit(s) SubCutaneous every 12 hours  hydrocortisone 1% Cream 1 Application(s) Topical two times a day  LORazepam     Tablet   Oral   LORazepam     Tablet 1 milliGRAM(s) Oral every 4 hours  methylPREDNISolone sodium succinate Injectable 20 milliGRAM(s) IV Push every 6 hours  multivitamin 1 Tablet(s) Oral daily  pantoprazole    Tablet 40 milliGRAM(s) Oral before breakfast  polyethylene glycol 3350 17 Gram(s) Oral daily  prochlorperazine   Tablet 10 milliGRAM(s) Oral daily  senna 2 Tablet(s) Oral at bedtime  thiamine 100 milliGRAM(s) Oral daily    MEDICATIONS  (PRN):  LORazepam   Injectable 2 milliGRAM(s) IV Push every 1 hour PRN Symptom-triggered: each CIWA -Ar score 8 or GREATER        OBJECTIVE:    I&O's Detail    22 Oct 2019 07:01  -  23 Oct 2019 07:00  --------------------------------------------------------  IN:    IV PiggyBack: 305 mL    Oral Fluid: 300 mL    sodium chloride 1.5%: 360 mL  Total IN: 965 mL    OUT:    Voided: 1800 mL  Total OUT: 1800 mL    Total NET: -835 mL      23 Oct 2019 07:01  -  23 Oct 2019 14:26  --------------------------------------------------------  IN:    Oral Fluid: 440 mL  Total IN: 440 mL    OUT:  Total OUT: 0 mL    Total NET: 440 mL       Daily Weight in k.5 (23 Oct 2019 08:17)    PHYSICAL EXAM:       ICU Vital Signs Last 24 Hrs  T(C): 36.9 (23 Oct 2019 12:21), Max: 37.7 (22 Oct 2019 23:57)  T(F): 98.4 (23 Oct 2019 12:21), Max: 99.8 (22 Oct 2019 23:57)  HR: 100 (23 Oct 2019 12:21) (90 - 144)  BP: 119/78 (23 Oct 2019 12:21) (111/63 - 155/82)  BP(mean): --  ABP: --  ABP(mean): --  RR: 20 (23 Oct 2019 12:21) (18 - 41)  SpO2: 98% (23 Oct 2019 12:21) (91% - 100%) on 3lpm nasal canula     General: Awake. Alert. Cooperative. No distress. Appears stated age.	  HEENT: Atraumatic. Normocephalic. Anicteric. Normal oral mucosa. PERRL. EOMI. Psoriatic plaque on scalp. Mallampati class IV airway  Neck: Supple. Trachea midline. Thyroid without enlargement/tenderness/nodules. No carotid bruit. No JVD. Short and wide	  Cardiovascular: Regular rate and rhythm. S1 S2 normal. No murmurs, rubs or gallops.  Respiratory: Using accessory muscles or respiration. Decreased breath sounds throughout. Mild wheeze. No rales. No curvature.  Abdomen: Soft. Non-tender. Non-distended. No organomegaly. No masses. Normal bowel sounds. Obese.  Extremities: Warm to touch. No clubbing or cyanosis. No pedal edema.  Pulses: 2+ peripheral pulses all extremities.	  Skin: Venous stasis changes on both lower extremities. Multiple areas of skin hyperpigmentation at areas of prior plaque.  Lymph Nodes: Cervical, supraclavicular and axillary nodes normal  Neurological: Motor and sensory examination equal and normal. A and O x 3  Psychiatry: Anxious.    LABS:                          9.8    7.67  )-----------( 209      ( 22 Oct 2019 09:40 )             29.2     CBC    WBC  7.67 <==, 8.75 <==, 5.34 <==    Hemoglobin  9.8 <<==, 10.2 <<==, 10.7 <<==    Hematocrit  29.2 <==, 28.5 <==, 31.5 <==    Platelets  209 <==, 207 <==, 220 <==      132<L>  |  88<L>  |  9   ----------------------------<  162<H>    10-23  4.3   |  30  |  0.49<L>      LYTES    sodium  132 <==, 119 <==, 134 <==, 128 <==, 119 <==, 118 <==, 122 <==    potassium   4.3 <==, 3.7 <==, 4.5 <==, 4.9 <==, 5.6 <==, 4.1 <==, 3.8 <==    chloride  88 <==, 78 <==, 84 <==, 81 <==, 78 <==, 77 <==, 79 <==    carbon dioxide  30 <==, 27 <==, 28 <==, 26 <==, 19 <==, 25 <==, 26 <==    =============================================================================================  RENAL FUNCTION:    Creatinine:   0.49  <<==, 0.47  <<==, 0.52  <<==, 0.52  <<==, 0.58  <<==, 0.47  <<==, 0.45  <<==    BUN:   9 <==, 7 <==, 8 <==, 8 <==, 7 <==, 5 <==, 5 <==    ============================================================================================    calcium   8.7 <==, 7.7 <==, 8.8 <==, 8.2 <==, 8.4 <==, 8.4 <==, 8.1 <==    phos   2.7 <==    mag   1.3 <==    ============================================================================================  LFTs    AST:   98 <== , 125 <==     ALT:  80  <== , 101  <==     AP:  57  <=, 64  <=    Bili:  1.7  <=, 0.5  <=    ABG - ( 22 Oct 2019 15:58 )  pH: 7.38  /  pCO2: 40    /  pO2: 177   / HCO3: 23    / Base Excess: -1.3  /  SaO2: 99        ABG - ( 22 Oct 2019 09:32 )  pH: 7.46  /  pCO2: 41    /  pO2: 190   / HCO3: 28    / Base Excess: 4.6   /  SaO2: 100       Serum Pro-Brain Natriuretic Peptide: 2262 pg/mL (10-22 @ 09:26)    CARDIAC MARKERS ( 22 Oct 2019 16:05 )   U/L /CKMB x     /CKMB Units 8.1 ng/mL    troponin 26 ng/L      Patient name: FREDY CHOI  YOB: 1965   Age: 54 (F)   MR#: [id number]  Study Date: 10/7/2019  [facility] RDCS  Study quality: Technically difficult  Referring Physician: Canelo Bonds MD  BloodPressure: 122/80 mmHg  Height: 165 cm  Weight: 68 kg  BSA: 1.8 m2  ------------------------------------------------------------------------  PROCEDURE: Transthoracic echocardiogram with 2-D, M-Mode  and complete spectral and color flow Doppler. Verbal  consent was obtained for injection of  Ultrasonic Enhancing  Agent following a discussion of risks and benefits.  Following intravenous injection of Ultrasonic Enhancing  Agent , harmonic imaging was performed.  INDICATION: Shortness of breath (R06.02)  ------------------------------------------------------------------------  Observations:  Left Ventricle: Endocardium not well visualized; grossly  normal left ventricular systolic function. Endocardial  visualization enhanced with intravenous injection of  Ultrasonic Enhancing Agent (Definity). Normal left  ventricular internal dimensions and wall thicknesses.  Normal diastolic function  ------------------------------------------------------------------------  Conclusions:  1. Normal left ventricular internal dimensions and wall  thicknesses.  2. Endocardium not well visualized; grossly normal left  ventricular systolic function. Endocardial visualization  enhanced with intravenous injection of Ultrasonic Enhancing  Agent (Definity). Unable to perform strain imaging due to  limited clinical windows.  ------------------------------------------------------------------------  Confirmed on  10/7/2019 - 18:11:26 by Margaret Alvarez M.D.  ------------------------------------------------------------------------  ---------------------------------------------------------------------------------------------------------------  MICROBIOLOGY:   Urinalysis Basic - ( 22 Oct 2019 11:01 )    Color: Light Yellow / Appearance: Clear / S.006 / pH: x  Gluc: x / Ketone: Trace  / Bili: Negative / Urobili: Negative   Blood: x / Protein: Negative / Nitrite: Negative   Leuk Esterase: Negative / RBC: x / WBC x   Sq Epi: x / Non Sq Epi: x / Bacteria: x    RADIOLOGY:  [x] Chest radiographs reviewed and interpreted by me    EXAM:  XR CHEST PORTABLE IMMED 1V                          PROCEDURE DATE:  10/22/2019      INTERPRETATION:  CLINICAL INFORMATION: Rapid response. Respiratory   distress    EXAM: AP portable chest     COMPARISON: Chest radiograph from 10/21/2019.    FINDINGS:    The heart size is normal. The cardiomediastinal silhouette is   unremarkable.    Patchy opacity at the right base may represent atelectasis or infection.   No pleural effusion or pneumothorax. Emphysema.    JENNIFER LUJAN M.D., RADIOLOGY RESIDENT  This document has been electronically signed.  NYDIA VANCE M.D., ATTENDING RADIOLOGIST  This document has been electronically signed. Oct 22 2019 11:58AM  ---------------------------------------------------------------------------------------------------------------  EXAM:  CT ANGIO CHEST (W)AW IC                          PROCEDURE DATE:  10/21/2019      INTERPRETATION:  CLINICAL INFORMATION: Dyspnea    COMPARISON: CTA chest 10/3/2019    PROCEDURE:   CT Angiography of the Chest.    90 ml of Omnipaque 350 was injected intravenously. 10 ml were discarded.  Sagittal and coronal reformats were performed as well as 3D (MIP)   reconstructions.      FINDINGS:    LUNGS AND AIRWAYS: Patent central airways.  Severe upper lobe predominant   centrilobular emphysema. Mild bilateral dependent subsegmental   atelectasis. No focal lung consolidation.    PLEURA: No pleural effusion.    MEDIASTINUM AND CHESTER: No lymphadenopathy.    VESSELS: No evidence of pulmonary embolism.    HEART: Heart size is normal. No pericardial effusion.    CHEST WALL AND LOWER NECK: Within normal limits.    VISUALIZED UPPER ABDOMEN: Small hiatal hernia.    BONES: Degenerative changes in the spine.    IMPRESSION:     No CT evidence of pulmonary embolism.     Severe emphysema.    ENID ALBRECHT MD., ATTENDING RADIOLOGIST  This document has been electronically signed. Oct 21 2019  2:30AM      ---------------------------------------------------------------------------------------------------------------    EXAM:  XR CHEST AP OR PA 1V                          PROCEDURE DATE:  10/21/2019      INTERPRETATION:  CLINICAL INFORMATION: COPD exacerbation    EXAM: AP view of the chest     COMPARISON: Radiograph dated  10/3/2019    FINDINGS:      The lungs are clear.   There is no pleural effusion.  There is no pneumothorax.  The cardiac silhouette size cannot be accurately assessed on this   radiograph.  There is no acute abnormality of the visualized osseous structures.    IMPRESSION:    Clear lungs.     CASPER LERMA M.D., RADIOLOGY RESIDENT  This document has been electronically signed.  MIRI NORRIS M.D., ATTENDING RADIOLOGIST  This document has been electronically signed. Oct 21 2019  9:44AM  ---------------------------------------------------------------------------------------------------------------  SPIROMETRY:    FEV1 0.44 liters - 19% predicted  FVC 1.04 liters - 35% predicted  FEV1% 42    c/w very severe obstructive lung disease

## 2019-10-23 NOTE — PROGRESS NOTE ADULT - SUBJECTIVE AND OBJECTIVE BOX
[facility]   [phone]  RENAL FOLLOW UP NOTE  --------------------------------------------------------------------------------  Chief Complaint:    24 hour events/subjective:    seen earlier. off bipap. communicative but keeps eyes closed     PAST HISTORY  --------------------------------------------------------------------------------  No significant changes to PMH, PSH, FHx, SHx, unless otherwise noted    ALLERGIES & MEDICATIONS  --------------------------------------------------------------------------------  Allergies    amoxicillin (Anaphylaxis)  Levaquin (Other; Anaphylaxis)  Levaquin (Unknown)  penicillin (Anaphylaxis)  penicillins (Anaphylaxis)    Intolerances      Standing Inpatient Medications  ALBUTerol/ipratropium for Nebulization 3 milliLiter(s) Nebulizer every 6 hours  ALPRAZolam 0.25 milliGRAM(s) Oral three times a day  apremilast 30 milliGRAM(s) Oral two times a day  buDESOnide    Inhalation Suspension 0.5 milliGRAM(s) Inhalation every 12 hours  chlorhexidine 2% Cloths 1 Application(s) Topical daily  cholecalciferol 1000 Unit(s) Oral daily  dextrose 5%. 1000 milliLiter(s) IV Continuous <Continuous>  docusate sodium 100 milliGRAM(s) Oral two times a day  ertapenem  IVPB 1000 milliGRAM(s) IV Intermittent every 24 hours  exemestane 25 milliGRAM(s) Oral daily  folic acid 1 milliGRAM(s) Oral daily  heparin  Injectable 5000 Unit(s) SubCutaneous every 12 hours  hydrocortisone 1% Cream 1 Application(s) Topical two times a day  LORazepam     Tablet   Oral   LORazepam     Tablet 1 milliGRAM(s) Oral every 4 hours  montelukast 10 milliGRAM(s) Oral daily  multivitamin 1 Tablet(s) Oral daily  pantoprazole    Tablet 40 milliGRAM(s) Oral before breakfast  polyethylene glycol 3350 17 Gram(s) Oral daily  predniSONE   Tablet 40 milliGRAM(s) Oral daily  prochlorperazine   Tablet 10 milliGRAM(s) Oral daily  senna 2 Tablet(s) Oral at bedtime  thiamine 100 milliGRAM(s) Oral daily    PRN Inpatient Medications  LORazepam   Injectable 2 milliGRAM(s) IV Push every 1 hour PRN      REVIEW OF SYSTEMS  --------------------------------------------------------------------------------    Gen: denies  fevers/chills,  CVS: denies chest pain/palpitations  Resp: denies worsening SOB/Cough  GI: Denies N/V/Abd pain  : Denies dysuria    All other systems were reviewed and are negative, except as noted.    VITALS/PHYSICAL EXAM  --------------------------------------------------------------------------------  T(C): 37 (10-23-19 @ 15:39), Max: 37.7 (10-22-19 @ 23:57)  HR: 94 (10-23-19 @ 18:13) (90 - 108)  BP: 118/74 (10-23-19 @ 15:45) (111/63 - 134/71)  RR: 20 (10-23-19 @ 15:39) (18 - 41)  SpO2: 98% (10-23-19 @ 18:13) (94% - 100%)  Wt(kg): --        10-22-19 @ 07:01  -  10-23-19 @ 07:00  --------------------------------------------------------  IN: 965 mL / OUT: 1800 mL / NET: -835 mL    10-23-19 @ 07:01  -  10-23-19 @ 20:35  --------------------------------------------------------  IN: 1020 mL / OUT: 0 mL / NET: 1020 mL      Physical Exam:  	    Gen: Non toxic comfortable appearing  on O2 off bipap  morbidly obese  	no jvd ,   	Pulm: decrease bs  no rales or ronchi or wheezing  	CV: RRR, S1S2; no rub  	Abd: +BS, soft, nontender/nondistended  	: No suprapubic tenderness  	UE: Warm, no cyanosis  no clubbing,  no edema  	LE: Warm, no cyanosis  no clubbing, no edema  	Neuro: alert and oriented. speech coherent   	Skin: Warm and scaly       LABS/STUDIES  --------------------------------------------------------------------------------              8.7    5.53  >-----------<  151      [10-23-19 @ 14:22]              26.5     132  |  88  |  9   ----------------------------<  162      [10-23-19 @ 13:30]  4.3   |  30  |  0.49        Ca     8.7     [10-23-19 @ 13:30]      Mg     1.3     [10-22-19 @ 09:26]      Phos  2.7     [10-22-19 @ 09:26]    TPro  6.5  /  Alb  3.8  /  TBili  1.7  /  DBili  x   /  AST  98  /  ALT  80  /  AlkPhos  57  [10-22-19 @ 09:26]              [10-22-19 @ 16:05]    Creatinine Trend:  SCr 0.49 [10-23 @ 13:30]  SCr 0.47 [10-23 @ 10:30]  SCr 0.52 [10-23 @ 06:45]  SCr 0.52 [10-23 @ 00:07]  SCr 0.58 [10-22 @ 16:05]              Urinalysis - [10-22-19 @ 11:01]      Color Light Yellow / Appearance Clear / SG 1.006 / pH 7.0      Gluc Negative / Ketone Trace  / Bili Negative / Urobili Negative       Blood Negative / Protein Negative / Leuk Est Negative / Nitrite Negative      RBC  / WBC  / Hyaline  / Gran  / Sq Epi  / Non Sq Epi  / Bacteria     Urine Creatinine 42      [10-22-19 @ 11:01]  Urine Sodium 25      [10-22-19 @ 11:01]  Urine Urea Nitrogen 188      [10-22-19 @ 13:34]  Urine Osmolality 158      [10-22-19 @ 11:01]    HbA1c 5.4      [08-19-16 @ 07:12]

## 2019-10-24 LAB
ANION GAP SERPL CALC-SCNC: 13 MMOL/L — SIGNIFICANT CHANGE UP (ref 5–17)
ANION GAP SERPL CALC-SCNC: 14 MMOL/L — SIGNIFICANT CHANGE UP (ref 5–17)
ANION GAP SERPL CALC-SCNC: 14 MMOL/L — SIGNIFICANT CHANGE UP (ref 5–17)
BUN SERPL-MCNC: 10 MG/DL — SIGNIFICANT CHANGE UP (ref 7–23)
BUN SERPL-MCNC: 10 MG/DL — SIGNIFICANT CHANGE UP (ref 7–23)
BUN SERPL-MCNC: 9 MG/DL — SIGNIFICANT CHANGE UP (ref 7–23)
CALCIUM SERPL-MCNC: 8.5 MG/DL — SIGNIFICANT CHANGE UP (ref 8.4–10.5)
CALCIUM SERPL-MCNC: 8.6 MG/DL — SIGNIFICANT CHANGE UP (ref 8.4–10.5)
CALCIUM SERPL-MCNC: 8.8 MG/DL — SIGNIFICANT CHANGE UP (ref 8.4–10.5)
CHLORIDE SERPL-SCNC: 86 MMOL/L — LOW (ref 96–108)
CHLORIDE SERPL-SCNC: 86 MMOL/L — LOW (ref 96–108)
CHLORIDE SERPL-SCNC: 89 MMOL/L — LOW (ref 96–108)
CO2 SERPL-SCNC: 28 MMOL/L — SIGNIFICANT CHANGE UP (ref 22–31)
CO2 SERPL-SCNC: 29 MMOL/L — SIGNIFICANT CHANGE UP (ref 22–31)
CO2 SERPL-SCNC: 30 MMOL/L — SIGNIFICANT CHANGE UP (ref 22–31)
CREAT SERPL-MCNC: 0.56 MG/DL — SIGNIFICANT CHANGE UP (ref 0.5–1.3)
CREAT SERPL-MCNC: 0.58 MG/DL — SIGNIFICANT CHANGE UP (ref 0.5–1.3)
CREAT SERPL-MCNC: 0.67 MG/DL — SIGNIFICANT CHANGE UP (ref 0.5–1.3)
GLUCOSE SERPL-MCNC: 106 MG/DL — HIGH (ref 70–99)
GLUCOSE SERPL-MCNC: 138 MG/DL — HIGH (ref 70–99)
GLUCOSE SERPL-MCNC: 83 MG/DL — SIGNIFICANT CHANGE UP (ref 70–99)
HCT VFR BLD CALC: 29.3 % — LOW (ref 34.5–45)
HGB BLD-MCNC: 9.6 G/DL — LOW (ref 11.5–15.5)
MCHC RBC-ENTMCNC: 32.8 GM/DL — SIGNIFICANT CHANGE UP (ref 32–36)
MCHC RBC-ENTMCNC: 34.9 PG — HIGH (ref 27–34)
MCV RBC AUTO: 106.5 FL — HIGH (ref 80–100)
PLATELET # BLD AUTO: 169 K/UL — SIGNIFICANT CHANGE UP (ref 150–400)
POTASSIUM SERPL-MCNC: 3.6 MMOL/L — SIGNIFICANT CHANGE UP (ref 3.5–5.3)
POTASSIUM SERPL-MCNC: 3.9 MMOL/L — SIGNIFICANT CHANGE UP (ref 3.5–5.3)
POTASSIUM SERPL-MCNC: 4.2 MMOL/L — SIGNIFICANT CHANGE UP (ref 3.5–5.3)
POTASSIUM SERPL-SCNC: 3.6 MMOL/L — SIGNIFICANT CHANGE UP (ref 3.5–5.3)
POTASSIUM SERPL-SCNC: 3.9 MMOL/L — SIGNIFICANT CHANGE UP (ref 3.5–5.3)
POTASSIUM SERPL-SCNC: 4.2 MMOL/L — SIGNIFICANT CHANGE UP (ref 3.5–5.3)
RBC # BLD: 2.75 M/UL — LOW (ref 3.8–5.2)
RBC # FLD: 13.8 % — SIGNIFICANT CHANGE UP (ref 10.3–14.5)
SODIUM SERPL-SCNC: 129 MMOL/L — LOW (ref 135–145)
SODIUM SERPL-SCNC: 129 MMOL/L — LOW (ref 135–145)
SODIUM SERPL-SCNC: 131 MMOL/L — LOW (ref 135–145)
WBC # BLD: 6.38 K/UL — SIGNIFICANT CHANGE UP (ref 3.8–10.5)
WBC # FLD AUTO: 6.38 K/UL — SIGNIFICANT CHANGE UP (ref 3.8–10.5)

## 2019-10-24 PROCEDURE — 99232 SBSQ HOSP IP/OBS MODERATE 35: CPT

## 2019-10-24 RX ADMIN — Medication 10 MILLIGRAM(S): at 12:30

## 2019-10-24 RX ADMIN — PANTOPRAZOLE SODIUM 40 MILLIGRAM(S): 20 TABLET, DELAYED RELEASE ORAL at 05:09

## 2019-10-24 RX ADMIN — Medication 0.5 MILLIGRAM(S): at 20:07

## 2019-10-24 RX ADMIN — Medication 3 MILLILITER(S): at 21:28

## 2019-10-24 RX ADMIN — Medication 0.25 MILLIGRAM(S): at 05:59

## 2019-10-24 RX ADMIN — Medication 40 MILLIGRAM(S): at 05:09

## 2019-10-24 RX ADMIN — ERTAPENEM SODIUM 120 MILLIGRAM(S): 1 INJECTION, POWDER, LYOPHILIZED, FOR SOLUTION INTRAMUSCULAR; INTRAVENOUS at 16:23

## 2019-10-24 RX ADMIN — Medication 1 APPLICATION(S): at 18:05

## 2019-10-24 RX ADMIN — Medication 1 MILLIGRAM(S): at 00:02

## 2019-10-24 RX ADMIN — MONTELUKAST 10 MILLIGRAM(S): 4 TABLET, CHEWABLE ORAL at 12:31

## 2019-10-24 RX ADMIN — HEPARIN SODIUM 5000 UNIT(S): 5000 INJECTION INTRAVENOUS; SUBCUTANEOUS at 18:04

## 2019-10-24 RX ADMIN — Medication 0.25 MILLIGRAM(S): at 21:28

## 2019-10-24 RX ADMIN — Medication 1 APPLICATION(S): at 05:09

## 2019-10-24 RX ADMIN — Medication 100 MILLIGRAM(S): at 12:30

## 2019-10-24 RX ADMIN — EXEMESTANE 25 MILLIGRAM(S): 25 TABLET, SUGAR COATED ORAL at 12:30

## 2019-10-24 RX ADMIN — Medication 0.5 MILLIGRAM(S): at 16:23

## 2019-10-24 RX ADMIN — Medication 1 MILLIGRAM(S): at 08:43

## 2019-10-24 RX ADMIN — Medication 1 MILLIGRAM(S): at 05:08

## 2019-10-24 RX ADMIN — Medication 0.25 MILLIGRAM(S): at 14:30

## 2019-10-24 RX ADMIN — CHLORHEXIDINE GLUCONATE 1 APPLICATION(S): 213 SOLUTION TOPICAL at 12:31

## 2019-10-24 RX ADMIN — Medication 3 MILLILITER(S): at 12:31

## 2019-10-24 RX ADMIN — Medication 3 MILLILITER(S): at 17:23

## 2019-10-24 RX ADMIN — Medication 1 MILLIGRAM(S): at 12:31

## 2019-10-24 RX ADMIN — Medication 1000 UNIT(S): at 12:30

## 2019-10-24 RX ADMIN — Medication 0.5 MILLIGRAM(S): at 17:23

## 2019-10-24 RX ADMIN — Medication 0.5 MILLIGRAM(S): at 05:09

## 2019-10-24 RX ADMIN — Medication 1 TABLET(S): at 12:30

## 2019-10-24 RX ADMIN — Medication 3 MILLILITER(S): at 05:08

## 2019-10-24 RX ADMIN — HEPARIN SODIUM 5000 UNIT(S): 5000 INJECTION INTRAVENOUS; SUBCUTANEOUS at 05:09

## 2019-10-24 NOTE — PROGRESS NOTE ADULT - SUBJECTIVE AND OBJECTIVE BOX
[facility]   [phone]  RENAL FOLLOW UP NOTE  --------------------------------------------------------------------------------  Chief Complaint:    24 hour events/subjective:    seen earlier. still with c/o sob     PAST HISTORY  --------------------------------------------------------------------------------  No significant changes to PMH, PSH, FHx, SHx, unless otherwise noted    ALLERGIES & MEDICATIONS  --------------------------------------------------------------------------------  Allergies    amoxicillin (Anaphylaxis)  Levaquin (Other; Anaphylaxis)  Levaquin (Unknown)  penicillin (Anaphylaxis)  penicillins (Anaphylaxis)    Intolerances      Standing Inpatient Medications  ALBUTerol/ipratropium for Nebulization 3 milliLiter(s) Nebulizer every 6 hours  ALPRAZolam 0.25 milliGRAM(s) Oral three times a day  apremilast 30 milliGRAM(s) Oral two times a day  buDESOnide    Inhalation Suspension 0.5 milliGRAM(s) Inhalation every 12 hours  chlorhexidine 2% Cloths 1 Application(s) Topical daily  cholecalciferol 1000 Unit(s) Oral daily  docusate sodium 100 milliGRAM(s) Oral two times a day  ertapenem  IVPB 1000 milliGRAM(s) IV Intermittent every 24 hours  exemestane 25 milliGRAM(s) Oral daily  folic acid 1 milliGRAM(s) Oral daily  heparin  Injectable 5000 Unit(s) SubCutaneous every 12 hours  hydrocortisone 1% Cream 1 Application(s) Topical two times a day  LORazepam     Tablet   Oral   LORazepam     Tablet 0.5 milliGRAM(s) Oral every 4 hours  montelukast 10 milliGRAM(s) Oral daily  multivitamin 1 Tablet(s) Oral daily  pantoprazole    Tablet 40 milliGRAM(s) Oral before breakfast  polyethylene glycol 3350 17 Gram(s) Oral daily  predniSONE   Tablet   Oral   predniSONE   Tablet 40 milliGRAM(s) Oral daily  prochlorperazine   Tablet 10 milliGRAM(s) Oral daily  senna 2 Tablet(s) Oral at bedtime  thiamine 100 milliGRAM(s) Oral daily    PRN Inpatient Medications  LORazepam   Injectable 2 milliGRAM(s) IV Push every 1 hour PRN      REVIEW OF SYSTEMS  --------------------------------------------------------------------------------    Gen: denies fatigue, fevers/chills,  CVS: denies chest pain/palpitations  Resp: +  SOB/Cough  GI: Denies N/V/Abd pain  : Denies dysuria/oliguria/hematuria    All other systems were reviewed and are negative, except as noted.    VITALS/PHYSICAL EXAM  --------------------------------------------------------------------------------  T(C): 37.1 (10-24-19 @ 13:33), Max: 37.2 (10-24-19 @ 08:46)  HR: 94 (10-24-19 @ 13:33) (88 - 101)  BP: 121/80 (10-24-19 @ 13:33) (121/80 - 146/88)  RR: 18 (10-24-19 @ 13:33) (18 - 18)  SpO2: 95% (10-24-19 @ 13:33) (93% - 99%)  Wt(kg): --        10-23-19 @ 07:01  -  10-24-19 @ 07:00  --------------------------------------------------------  IN: 1260 mL / OUT: 0 mL / NET: 1260 mL      Physical Exam:  	    Gen: Non toxic comfortable appearing  on O2  morbidly obese  	no jvd ,   	Pulm: decrease bs  no rales or ronchi or wheezing  	CV: RRR, S1S2; no rub  	Abd: +BS, soft, nontender/nondistended  	: No suprapubic tenderness  	UE: Warm, no cyanosis  no clubbing,  no edema  	LE: Warm, no cyanosis  no clubbing, no edema  	Neuro: alert and oriented. speech coherent   	Skin: Warm and scaly 	      LABS/STUDIES  --------------------------------------------------------------------------------              9.6    6.38  >-----------<  169      [10-24-19 @ 13:02]              29.3     131  |  89  |  10  ----------------------------<  138      [10-24-19 @ 15:44]  4.2   |  28  |  0.67        Ca     8.5     [10-24-19 @ 15:44]            Creatinine Trend:  SCr 0.67 [10-24 @ 15:44]  SCr 0.58 [10-24 @ 09:18]  SCr 0.56 [10-24 @ 01:47]  SCr 0.58 [10-23 @ 20:11]  SCr 0.49 [10-23 @ 13:30]              Urinalysis - [10-22-19 @ 11:01]      Color Light Yellow / Appearance Clear / SG 1.006 / pH 7.0      Gluc Negative / Ketone Trace  / Bili Negative / Urobili Negative       Blood Negative / Protein Negative / Leuk Est Negative / Nitrite Negative      RBC  / WBC  / Hyaline  / Gran  / Sq Epi  / Non Sq Epi  / Bacteria     Urine Creatinine 42      [10-22-19 @ 11:01]  Urine Sodium 25      [10-22-19 @ 11:01]  Urine Urea Nitrogen 188      [10-22-19 @ 13:34]  Urine Osmolality 158      [10-22-19 @ 11:01]    HbA1c 5.4      [08-19-16 @ 07:12]

## 2019-10-24 NOTE — PROGRESS NOTE BEHAVIORAL HEALTH - NSBHCHARTREVIEWVS_PSY_A_CORE FT
T(C): 37.1 (10-24-19 @ 13:33), Max: 37.2 (10-24-19 @ 08:46)  HR: 94 (10-24-19 @ 13:33) (88 - 101)  BP: 121/80 (10-24-19 @ 13:33) (121/80 - 146/88)  RR: 18 (10-24-19 @ 13:33) (18 - 18)  SpO2: 95% (10-24-19 @ 13:33) (93% - 99%)  Wt(kg): --
]T(C): 36.8 (10-22-19 @ 07:14), Max: 37.3 (10-22-19 @ 04:07)  HR: 132 (10-22-19 @ 10:29) (102 - 132)  BP: 151/80 (10-22-19 @ 07:14) (130/85 - 154/89)  RR: 18 (10-22-19 @ 07:14) (18 - 20)  SpO2: 96% (10-22-19 @ 10:29) (96% - 100%)  Wt(kg): --
T(C): 36.9 (10-23-19 @ 12:21), Max: 37.7 (10-22-19 @ 23:57)  HR: 100 (10-23-19 @ 12:21) (90 - 144)  BP: 119/78 (10-23-19 @ 12:21) (111/63 - 155/82)  RR: 20 (10-23-19 @ 12:21) (18 - 41)  SpO2: 98% (10-23-19 @ 12:21) (91% - 100%)  Wt(kg): --

## 2019-10-24 NOTE — PROGRESS NOTE BEHAVIORAL HEALTH - NSBHCHARTREVIEWINVESTIGATE_PSY_A_CORE FT
< from: 12 Lead ECG (10.03.19 @ 16:38) >      Ventricular Rate 84 BPM    Atrial Rate 84 BPM    P-R Interval 114 ms    QRS Duration 84 ms    Q-T Interval 378 ms    QTC Calculation(Bezet) 446 ms    P Axis 45 degrees    R Axis 50 degrees    T Axis 60 degrees    Diagnosis Line NORMAL SINUS RHYTHM  LOW VOLTAGE QRS  BORDERLINE ECG    Confirmed by ATTENDING, ED [id number]),  JOSE LUIS BORRERO ([id number]) on 10/4/2019 10:36:16 AM    < end of copied text >
< from: 12 Lead ECG (10.03.19 @ 16:38) >      Ventricular Rate 84 BPM    Atrial Rate 84 BPM    P-R Interval 114 ms    QRS Duration 84 ms    Q-T Interval 378 ms    QTC Calculation(Bezet) 446 ms    P Axis 45 degrees    R Axis 50 degrees    T Axis 60 degrees    Diagnosis Line NORMAL SINUS RHYTHM  LOW VOLTAGE QRS  BORDERLINE ECG    Confirmed by ATTENDING, ED [id number]),  JOSE LUIS BORRERO ([id number]) on 10/4/2019 10:36:16 AM    < end of copied text >
< from: 12 Lead ECG (10.03.19 @ 16:38) >      Ventricular Rate 84 BPM    Atrial Rate 84 BPM    P-R Interval 114 ms    QRS Duration 84 ms    Q-T Interval 378 ms    QTC Calculation(Bezet) 446 ms    P Axis 45 degrees    R Axis 50 degrees    T Axis 60 degrees    Diagnosis Line NORMAL SINUS RHYTHM  LOW VOLTAGE QRS  BORDERLINE ECG    Confirmed by ATTENDING, ED [id number]),  JOSE LUIS BORRERO ([id number]) on 10/4/2019 10:36:16 AM    < end of copied text >

## 2019-10-24 NOTE — PROGRESS NOTE BEHAVIORAL HEALTH - NSBHFUPINTERVALHXFT_PSY_A_CORE
Pt AOx3, calm and cooperative, sitting in bed, tremor improved.  Denies SI/HI, AVH, or paranoia.  States she feels much better today, looking forward to going home.  CIWA scores 1-4, VSS.  Discusses financial stressors such as having to move due to selling the home they are living in to support mother in law's nursing home needs.  Continues to minimize ETOH use, but does endorse anxiety on a chronic basis.
Pt lethargic, oriented x3 but inattentive, speaking in short sentences.  Feels SOB.  C/o anxiety.  Denies SI/HI.  Mild tremor noted on exam.  CIWA scores 5-7, VS with tachycardia and HTN.  On Ativan taper and required PRN x4 yesterday/ON.
Pt oriented x3 but lethargic during the visit, able to respond to questions appropriately.  Continues to minimize ETOH use, stating, "everybody and this alcohol, I only drink 3 beers a day!" There were no acute events overnight and she feels she is breathing more comfortably today.   came by to visit her yesterday but she does not recall the encounter.   Continues to show mild-moderate tremors, VS with tachycardia, CIWA scores ranging from 2-5.  On Ativan taper which was advanced by team to 1.5mg PO q4h.

## 2019-10-24 NOTE — PROGRESS NOTE BEHAVIORAL HEALTH - NSBHCHARTREVIEWLAB_PSY_A_CORE FT
9.6    6.38  )-----------( 169      ( 24 Oct 2019 13:02 )             29.3     10-24    129<L>  |  86<L>  |  10  ----------------------------<  83  3.6   |  30  |  0.58    Ca    8.6      24 Oct 2019 09:18
9.8    7.67  )-----------( 209      ( 22 Oct 2019 09:40 )             29.2     10-22    118<LL>  |  77<L>  |  5<L>  ----------------------------<  129<H>  4.1   |  25  |  0.47<L>    Ca    8.4      22 Oct 2019 09:26  Phos  2.7     10-22  Mg     1.3     10-22    TPro  6.5  /  Alb  3.8  /  TBili  1.7<H>  /  DBili  x   /  AST  98<H>  /  ALT  80<H>  /  AlkPhos  57  10-22    Urinalysis Basic - ( 22 Oct 2019 11:01 )    Color: Light Yellow / Appearance: Clear / S.006 / pH: x  Gluc: x / Ketone: Trace  / Bili: Negative / Urobili: Negative   Blood: x / Protein: Negative / Nitrite: Negative   Leuk Esterase: Negative / RBC: x / WBC x   Sq Epi: x / Non Sq Epi: x / Bacteria: x
9.8    7.67  )-----------( 209      ( 22 Oct 2019 09:40 )             29.2     10-23    119<LL>  |  78<L>  |  7   ----------------------------<  608<HH>  3.7   |  27  |  0.47<L>    Ca    7.7<L>      23 Oct 2019 10:30  Phos  2.7     10-22  Mg     1.3     10-22    TPro  6.5  /  Alb  3.8  /  TBili  1.7<H>  /  DBili  x   /  AST  98<H>  /  ALT  80<H>  /  AlkPhos  57  10-22    Urinalysis Basic - ( 22 Oct 2019 11:01 )    Color: Light Yellow / Appearance: Clear / S.006 / pH: x  Gluc: x / Ketone: Trace  / Bili: Negative / Urobili: Negative   Blood: x / Protein: Negative / Nitrite: Negative   Leuk Esterase: Negative / RBC: x / WBC x   Sq Epi: x / Non Sq Epi: x / Bacteria: x

## 2019-10-24 NOTE — PROGRESS NOTE ADULT - SUBJECTIVE AND OBJECTIVE BOX
[facility] - [facility] - PROGRESS NOTE    CHIEF COMPLAINT: chronic hypoxic respiratory failure; COPD exacerbation; dyspnea; alcohol withdrawal    INTERVAL HISTORY: found on room air with oxygen saturation 89 - 90% without shortness of breath; calm and mildly tremulous; laying in bed asking to go home; no cough, sputum production, chest congestion or wheeze; no fevers, chills or sweats; no chest pain/pressure or palpitations; hyponatremia; continues on benzodiazepines;     REVIEW OF SYSTEMS:  Constitutional: As per interval history  HEENT: Within normal limits  CV: As per interval history  Resp: As per interval history  GI: Within normal limits   : Within normal limits  Musculoskeletal: Within normal limits  Skin: Within normal limits  Neurological: Within normal limits  Psychiatric: Within normal limits  Endocrine: Within normal limits  Hematologic/Lymphatic: Within normal limits  Allergic/Immunologic: Within normal limits    MEDICATIONS:     Pulmonary "  ALBUTerol/ipratropium for Nebulization 3 milliLiter(s) Nebulizer every 6 hours  buDESOnide    Inhalation Suspension 0.5 milliGRAM(s) Inhalation every 12 hours  montelukast 10 milliGRAM(s) Oral daily    Anti-microbials:  ertapenem  IVPB 1000 milliGRAM(s) IV Intermittent every 24 hours    Cardiovascular:    Other:  ALPRAZolam 0.25 milliGRAM(s) Oral three times a day  apremilast 30 milliGRAM(s) Oral two times a day  chlorhexidine 2% Cloths 1 Application(s) Topical daily  cholecalciferol 1000 Unit(s) Oral daily  docusate sodium 100 milliGRAM(s) Oral two times a day  exemestane 25 milliGRAM(s) Oral daily  folic acid 1 milliGRAM(s) Oral daily  heparin  Injectable 5000 Unit(s) SubCutaneous every 12 hours  hydrocortisone 1% Cream 1 Application(s) Topical two times a day  LORazepam     Tablet   Oral   LORazepam     Tablet 0.5 milliGRAM(s) Oral every 4 hours  multivitamin 1 Tablet(s) Oral daily  pantoprazole    Tablet 40 milliGRAM(s) Oral before breakfast  polyethylene glycol 3350 17 Gram(s) Oral daily  predniSONE   Tablet 40 milliGRAM(s) Oral daily  prochlorperazine   Tablet 10 milliGRAM(s) Oral daily  senna 2 Tablet(s) Oral at bedtime  thiamine 100 milliGRAM(s) Oral daily    MEDICATIONS  (PRN):  LORazepam   Injectable 2 milliGRAM(s) IV Push every 1 hour PRN Symptom-triggered: each CIWA -Ar score 8 or GREATER        OBJECTIVE:    I&O's Detail    23 Oct 2019 07:01  -  24 Oct 2019 07:00  --------------------------------------------------------  IN:    dextrose 5%.: 100 mL    dextrose 5%.: 160 mL    dextrose 5%.: 320 mL    Oral Fluid: 680 mL  Total IN: 1260 mL    OUT:  Total OUT: 0 mL    Total NET: 1260 mL    PHYSICAL EXAM:       ICU Vital Signs Last 24 Hrs  T(C): 37.1 (24 Oct 2019 13:33), Max: 37.2 (24 Oct 2019 08:46)  T(F): 98.7 (24 Oct 2019 13:33), Max: 98.9 (24 Oct 2019 08:46)  HR: 94 (24 Oct 2019 13:33) (88 - 101)  BP: 121/80 (24 Oct 2019 13:33) (118/74 - 146/88)  BP(mean): --  ABP: --  ABP(mean): --  RR: 18 (24 Oct 2019 13:33) (18 - 20)  SpO2: 95% (24 Oct 2019 13:33) (93% - 99%) on 3lpm nasal canula     General: Awake. Alert. Cooperative. No distress. Appears stated age.	  HEENT: Atraumatic. Normocephalic. Anicteric. Normal oral mucosa. PERRL. EOMI. Psoriatic plaque on scalp. Mallampati class IV airway  Neck: Supple. Trachea midline. Thyroid without enlargement/tenderness/nodules. No carotid bruit. No JVD. Short and wide	  Cardiovascular: Regular rate and rhythm. S1 S2 normal. No murmurs, rubs or gallops.  Respiratory: No respiratory distress. Decreased breath sounds throughout. No wheeze. No rales. No curvature.  Abdomen: Soft. Non-tender. Non-distended. No organomegaly. No masses. Normal bowel sounds. Obese.  Extremities: Warm to touch. No clubbing or cyanosis. No pedal edema.  Pulses: 2+ peripheral pulses all extremities.	  Skin: Venous stasis changes on both lower extremities. Multiple areas of skin hyperpigmentation at areas of prior plaque.  Lymph Nodes: Cervical, supraclavicular and axillary nodes normal  Neurological: Motor and sensory examination equal and normal. A and O x 3  Psychiatry: Calm    LABS:                          9.6    6.38  )-----------( 169      ( 24 Oct 2019 13:02 )             29.3     CBC    WBC  6.38 <==, 5.53 <==, 7.67 <==, 8.75 <==, 5.34 <==    Hemoglobin  9.6 <<==, 8.7 <<==, 9.8 <<==, 10.2 <<==, 10.7 <<==    Hematocrit  29.3 <==, 26.5 <==, 29.2 <==, 28.5 <==, 31.5 <==    Platelets  169 <==, 151 <==, 209 <==, 207 <==, 220 <==      129<L>  |  86<L>  |  10  ----------------------------<  83    10-24  3.6   |  30  |  0.58      LYTES    sodium  129 <==, 129 <==, 125 <==, 132 <==, 119 <==, 134 <==, 128 <==    potassium   3.6 <==, 3.9 <==, 4.3 <==, 4.3 <==, 3.7 <==, 4.5 <==, 4.9 <==    chloride  86 <==, 86 <==, 84 <==, 88 <==, 78 <==, 84 <==, 81 <==    carbon dioxide  30 <==, 29 <==, 29 <==, 30 <==, 27 <==, 28 <==, 26 <==    =============================================================================================  RENAL FUNCTION:    Creatinine:   0.58  <<==, 0.56  <<==, 0.58  <<==, 0.49  <<==, 0.47  <<==, 0.52  <<==, 0.52  <<==    BUN:   10 <==, 9 <==, 10 <==, 9 <==, 7 <==, 8 <==, 8 <==    ============================================================================================    calcium   8.6 <==, 8.8 <==, 9.0 <==, 8.7 <==, 7.7 <==, 8.8 <==, 8.2 <==    phos   2.7 <==    mag   1.3 <==    ============================================================================================  LFTs    AST:   98 <== , 125 <==     ALT:  80  <== , 101  <==     AP:  57  <=, 64  <=    Bili:  1.7  <=, 0.5  <=    ABG - ( 22 Oct 2019 15:58 )  pH: 7.38  /  pCO2: 40    /  pO2: 177   / HCO3: 23    / Base Excess: -1.3  /  SaO2: 99        ABG - ( 22 Oct 2019 09:32 )  pH: 7.46  /  pCO2: 41    /  pO2: 190   / HCO3: 28    / Base Excess: 4.6   /  SaO2: 100       Serum Pro-Brain Natriuretic Peptide: 2262 pg/mL (10-22 @ 09:26)    CARDIAC MARKERS ( 22 Oct 2019 16:05 )   U/L /CKMB x     /CKMB Units 8.1 ng/mL    troponin 26 ng/L      Patient name: FREDY CHOI  YOB: 1965   Age: 54 (F)   MR#: [id number]  Study Date: 10/7/2019  [facility]  Study quality: Technically difficult  Referring Physician: Canelo Bonds MD  BloodPressure: 122/80 mmHg  Height: 165 cm  Weight: 68 kg  BSA: 1.8 m2  ------------------------------------------------------------------------  PROCEDURE: Transthoracic echocardiogram with 2-D, M-Mode  and complete spectral and color flow Doppler. Verbal  consent was obtained for injection of  Ultrasonic Enhancing  Agent following a discussion of risks and benefits.  Following intravenous injection of Ultrasonic Enhancing  Agent , harmonic imaging was performed.  INDICATION: Shortness of breath (R06.02)  ------------------------------------------------------------------------  Observations:  Left Ventricle: Endocardium not well visualized; grossly  normal left ventricular systolic function. Endocardial  visualization enhanced with intravenous injection of  Ultrasonic Enhancing Agent (Definity). Normal left  ventricular internal dimensions and wall thicknesses.  Normal diastolic function  ------------------------------------------------------------------------  Conclusions:  1. Normal left ventricular internal dimensions and wall  thicknesses.  2. Endocardium not well visualized; grossly normal left  ventricular systolic function. Endocardial visualization  enhanced with intravenous injection of Ultrasonic Enhancing  Agent (Definity). Unable to perform strain imaging due to  limited clinical windows.  ------------------------------------------------------------------------  Confirmed on  10/7/2019 - 18:11:26 by Margaret Alvarez M.D.  ------------------------------------------------------------------------  ---------------------------------------------------------------------------------------------------------------  MICROBIOLOGY:   Urinalysis Basic - ( 22 Oct 2019 11:01 )    Color: Light Yellow / Appearance: Clear / S.006 / pH: x  Gluc: x / Ketone: Trace  / Bili: Negative / Urobili: Negative   Blood: x / Protein: Negative / Nitrite: Negative   Leuk Esterase: Negative / RBC: x / WBC x   Sq Epi: x / Non Sq Epi: x / Bacteria: x    RADIOLOGY:  [x] Chest radiographs reviewed and interpreted by me    EXAM:  XR CHEST PORTABLE IMMED 1V                          PROCEDURE DATE:  10/22/2019      INTERPRETATION:  CLINICAL INFORMATION: Rapid response. Respiratory   distress    EXAM: AP portable chest     COMPARISON: Chest radiograph from 10/21/2019.    FINDINGS:    The heart size is normal. The cardiomediastinal silhouette is   unremarkable.    Patchy opacity at the right base may represent atelectasis or infection.   No pleural effusion or pneumothorax. Emphysema.    JENNIFER LUJAN M.D., RADIOLOGY RESIDENT  This document has been electronically signed.  NYDIA VANCE M.D., ATTENDING RADIOLOGIST  This document has been electronically signed. Oct 22 2019 11:58AM  ---------------------------------------------------------------------------------------------------------------  EXAM:  CT ANGIO CHEST (W)AW IC                          PROCEDURE DATE:  10/21/2019      INTERPRETATION:  CLINICAL INFORMATION: Dyspnea    COMPARISON: CTA chest 10/3/2019    PROCEDURE:   CT Angiography of the Chest.    90 ml of Omnipaque 350 was injected intravenously. 10 ml were discarded.  Sagittal and coronal reformats were performed as well as 3D (MIP)   reconstructions.      FINDINGS:    LUNGS AND AIRWAYS: Patent central airways.  Severe upper lobe predominant   centrilobular emphysema. Mild bilateral dependent subsegmental   atelectasis. No focal lung consolidation.    PLEURA: No pleural effusion.    MEDIASTINUM AND CHESTER: No lymphadenopathy.    VESSELS: No evidence of pulmonary embolism.    HEART: Heart size is normal. No pericardial effusion.    CHEST WALL AND LOWER NECK: Within normal limits.    VISUALIZED UPPER ABDOMEN: Small hiatal hernia.    BONES: Degenerative changes in the spine.    IMPRESSION:     No CT evidence of pulmonary embolism.     Severe emphysema.    ENID ALBRECHT MD., ATTENDING RADIOLOGIST  This document has been electronically signed. Oct 21 2019  2:30AM      ---------------------------------------------------------------------------------------------------------------    EXAM:  XR CHEST AP OR PA 1V                          PROCEDURE DATE:  10/21/2019      INTERPRETATION:  CLINICAL INFORMATION: COPD exacerbation    EXAM: AP view of the chest     COMPARISON: Radiograph dated  10/3/2019    FINDINGS:      The lungs are clear.   There is no pleural effusion.  There is no pneumothorax.  The cardiac silhouette size cannot be accurately assessed on this   radiograph.  There is no acute abnormality of the visualized osseous structures.    IMPRESSION:    Clear lungs.     CASPER LERMA M.D., RADIOLOGY RESIDENT  This document has been electronically signed.  MIRI NORRIS M.D., ATTENDING RADIOLOGIST  This document has been electronically signed. Oct 21 2019  9:44AM  ---------------------------------------------------------------------------------------------------------------  SPIROMETRY:    FEV1 0.44 liters - 19% predicted  FVC 1.04 liters - 35% predicted  FEV1% 42    c/w very severe obstructive lung disease

## 2019-10-24 NOTE — PROGRESS NOTE BEHAVIORAL HEALTH - NSBHCONSULTFOLLOWAFTERCARE_PSY_A_CORE FT
May f/u as outpatient at [facility]- [phone]
May f/u as outpatient at [facility]- [phone]
May f/u as outpatient at [facility]- [phone]

## 2019-10-24 NOTE — PROGRESS NOTE ADULT - SUBJECTIVE AND OBJECTIVE BOX
CHIEF COMPLAINT:Patient is a 54y old  Female who presents with a chief complaint of EtOH withdrawal, COPD exacerbation (22 Oct 2019 10:12)    	        PAST MEDICAL & SURGICAL HISTORY:  Prophylactic measure  Breast cancer  Brain aneurysm: with clips  Psoriasis  RA (rheumatoid arthritis)  Psoriasis  Breast cancer, stage 3  History of modified radical mastectomy of both breasts  S/P bilateral mastectomy  Brain aneurysm: 1988 two clips          REVIEW OF SYSTEMS:  feels better   EYES: No eye pain, visual disturbances, or discharge  NECK: No pain or stiffness  RESPIRATORY: dec sob   CARDIOVASCULAR: No chest pain, palpitations, passing out,   GASTROINTESTINAL: No abdominal or epigastric pain. No nausea, vomiting, or hematemesis;   NEUROLOGICAL: No headaches,    Medications:  MEDICATIONS  (STANDING):  ALBUTerol/ipratropium for Nebulization 3 milliLiter(s) Nebulizer every 6 hours  ALPRAZolam 0.25 milliGRAM(s) Oral three times a day  apremilast 30 milliGRAM(s) Oral two times a day  buDESOnide    Inhalation Suspension 0.5 milliGRAM(s) Inhalation every 12 hours  chlorhexidine 2% Cloths 1 Application(s) Topical daily  cholecalciferol 1000 Unit(s) Oral daily  docusate sodium 100 milliGRAM(s) Oral two times a day  ertapenem  IVPB 1000 milliGRAM(s) IV Intermittent every 24 hours  exemestane 25 milliGRAM(s) Oral daily  folic acid 1 milliGRAM(s) Oral daily  heparin  Injectable 5000 Unit(s) SubCutaneous every 12 hours  hydrocortisone 1% Cream 1 Application(s) Topical two times a day  LORazepam     Tablet   Oral   LORazepam     Tablet 0.5 milliGRAM(s) Oral every 4 hours  montelukast 10 milliGRAM(s) Oral daily  multivitamin 1 Tablet(s) Oral daily  pantoprazole    Tablet 40 milliGRAM(s) Oral before breakfast  polyethylene glycol 3350 17 Gram(s) Oral daily  predniSONE   Tablet 40 milliGRAM(s) Oral daily  prochlorperazine   Tablet 10 milliGRAM(s) Oral daily  senna 2 Tablet(s) Oral at bedtime  thiamine 100 milliGRAM(s) Oral daily    MEDICATIONS  (PRN):  LORazepam   Injectable 2 milliGRAM(s) IV Push every 1 hour PRN Symptom-triggered: each CIWA -Ar score 8 or GREATER    	    PHYSICAL EXAM:  T(C): 37.1 (10-24-19 @ 13:33), Max: 37.2 (10-24-19 @ 08:46)  HR: 94 (10-24-19 @ 13:33) (88 - 101)  BP: 121/80 (10-24-19 @ 13:33) (118/74 - 146/88)  RR: 18 (10-24-19 @ 13:33) (18 - 20)  SpO2: 95% (10-24-19 @ 13:33) (93% - 99%)  Wt(kg): --  I&O's Summary    23 Oct 2019 07:01  -  24 Oct 2019 07:00  --------------------------------------------------------  IN: 1260 mL / OUT: 0 mL / NET: 1260 mL      Appearance: Normal	  HEENT:   Normal oral mucosa, PERRL, EOMI	  Cardiovascular: Normal S1 S2, No JVD,   Respiratory: dec bs   Psychiatry: A & O x 3  Gastrointestinal:  Soft, Non-tender, + BS	  Skin: No rashes, No ecchymoses, No cyanosis	  Neurologic: Non-focal  Extremities:dec rom   Vascular: Peripheral pulses palpable    TELEMETRY: 	    ECG:  	  RADIOLOGY:  OTHER: 	  	  LABS:	 	    CARDIAC MARKERS:  CARDIAC MARKERS ( 22 Oct 2019 16:05 )  x     / x     / 230 U/L / x     / 8.1 ng/mL                                9.6    6.38  )-----------( 169      ( 24 Oct 2019 13:02 )             29.3     10-24    129<L>  |  86<L>  |  10  ----------------------------<  83  3.6   |  30  |  0.58    Ca    8.6      24 Oct 2019 09:18      proBNP:   Lipid Profile:   HgA1c:   TSH:

## 2019-10-24 NOTE — PROGRESS NOTE ADULT - SUBJECTIVE AND OBJECTIVE BOX
CARDIOLOGY FOLLOW UP - Dr. Nj    CC   no cp/sob  "i want to go home"     PHYSICAL EXAM:  T(C): 37.2 (10-24-19 @ 08:46), Max: 37.2 (10-24-19 @ 08:46)  HR: 92 (10-24-19 @ 08:46) (88 - 101)  BP: 128/80 (10-24-19 @ 08:46) (118/74 - 146/88)  RR: 18 (10-24-19 @ 08:46) (18 - 20)  SpO2: 99% (10-24-19 @ 08:46) (96% - 99%)  Wt(kg): --  I&O's Summary    23 Oct 2019 07:01  -  24 Oct 2019 07:00  --------------------------------------------------------  IN: 1260 mL / OUT: 0 mL / NET: 1260 mL        Appearance: Normal	  Cardiovascular: Normal S1 S2,RRR, No JVD, No murmurs  Respiratory: Lungs clear to auscultation	  Gastrointestinal:  Soft, Non-tender, + BS	  Extremities: Normal range of motion, No clubbing, cyanosis or edema        MEDICATIONS  (STANDING):  ALBUTerol/ipratropium for Nebulization 3 milliLiter(s) Nebulizer every 6 hours  ALPRAZolam 0.25 milliGRAM(s) Oral three times a day  apremilast 30 milliGRAM(s) Oral two times a day  buDESOnide    Inhalation Suspension 0.5 milliGRAM(s) Inhalation every 12 hours  chlorhexidine 2% Cloths 1 Application(s) Topical daily  cholecalciferol 1000 Unit(s) Oral daily  docusate sodium 100 milliGRAM(s) Oral two times a day  ertapenem  IVPB 1000 milliGRAM(s) IV Intermittent every 24 hours  exemestane 25 milliGRAM(s) Oral daily  folic acid 1 milliGRAM(s) Oral daily  heparin  Injectable 5000 Unit(s) SubCutaneous every 12 hours  hydrocortisone 1% Cream 1 Application(s) Topical two times a day  LORazepam     Tablet   Oral   LORazepam     Tablet 1 milliGRAM(s) Oral every 4 hours  LORazepam     Tablet 0.5 milliGRAM(s) Oral every 4 hours  montelukast 10 milliGRAM(s) Oral daily  multivitamin 1 Tablet(s) Oral daily  pantoprazole    Tablet 40 milliGRAM(s) Oral before breakfast  polyethylene glycol 3350 17 Gram(s) Oral daily  predniSONE   Tablet 40 milliGRAM(s) Oral daily  prochlorperazine   Tablet 10 milliGRAM(s) Oral daily  senna 2 Tablet(s) Oral at bedtime  thiamine 100 milliGRAM(s) Oral daily      TELEMETRY: 	    ECG:  	  RADIOLOGY:   DIAGNOSTIC TESTING:  [ ] Echocardiogram:  [ ]  Catheterization:  [ ] Stress Test:    OTHER: 	    LABS:	 	                                8.7    5.53  )-----------( 151      ( 23 Oct 2019 14:22 )             26.5     10-24    129<L>  |  86<L>  |  10  ----------------------------<  83  3.6   |  30  |  0.58    Ca    8.6      24 Oct 2019 09:18

## 2019-10-24 NOTE — PROGRESS NOTE BEHAVIORAL HEALTH - RISK ASSESSMENT
Risk factors: acute/chronic medical issues, active substance abuse, anxiety    Protective factors: no current/past SIIP/HIIP, no h/o SA/SIB, no h/o psych admissions, no psychosis, domiciled, intact marriage, social supports, positive therapeutic relationship    Overall, pt is at chronically elevated risk 2/2 substance abuse, which is mitigated by mult protective factors ; pt does not meet criteria for psychiatric hospitalization.

## 2019-10-24 NOTE — PROGRESS NOTE ADULT - ATTENDING COMMENTS
agree with the above assessment and plan by NP  Pt known to us from prior admission  nebs per pulm  recent echo w normal lvef  renal followup for hyponatremia  [facility] protocol for ETOH abuse

## 2019-10-24 NOTE — PROGRESS NOTE BEHAVIORAL HEALTH - NSBHCONSULTMEDS_PSY_A_CORE FT
1. C/w home dose Xanax    2. Recommend slowing Ativan taper for now, would c/w Ativan 2mg PO q4h x6 doses.  Monitor resp status in setting of COPD.  Low threshold for MICU reconsult.    3. CIWA, MVI/folic acid.  Start Thiamine 500mg IV tid.    4. PRN: Ativan 2mg PO q2h PRN sx-triggered CIWA    5. SW for substance abuse referral resources.  OP psych f/u at [facility]: [phone].
1. C/w home dose Xanax    2. C/w Ativan taper as ordered    3. CIWA, MVI/folic acid/thiamine      4. PRN: Ativan 2mg PO q2h PRN sx-triggered CIWA    5. SW for substance abuse referral resources.  OP psych f/u at [facility]: [phone].
1. C/w home dose Xanax    2. Recommend slowing Ativan taper for now, would c/w Ativan 1.5mg PO q4h.  Monitor resp status in setting of COPD.  Low threshold for MICU reconsult.    3. CIWA, MVI/folic acid.  Start Thiamine 500mg IV tid.    4. PRN: Ativan 2mg PO q2h PRN sx-triggered CIWA    5. SW for substance abuse referral resources.  OP psych f/u at [facility]: [phone].

## 2019-10-24 NOTE — PROGRESS NOTE BEHAVIORAL HEALTH - SUMMARY
53 y/o  female, no children, currently unemployed (previously worked as a ), domiciled in a private residence in [place] with her , with PPHx of unspecified anxiety d/o (is prescribed Xanax by her internist), no inpatient psychiatric hospitalizations, no h/o SA, substance abuse significant for daily ETOH (reports 3 beers daily but more per previous records), denies h/o complicated withdrawals or DTs, with PMHx significant for stage 3 Bilateral Breast CA on Aromasin, RA, Psoriasis previously on Otezla, remote Brain Aneurysm s/p Clip in 1988, Rt Jugular DVT, Catheter related MSSA Bacteremia from [place] in July 2015, COPD, p/w dyspnea, admitted for management of COPD exacerbation in setting of potential EtOH withdrawal. Psych CL consulted for medication management.  Patient denying major mood sx, states her anxiety in general is well controlled with Xanax, but feels anxious now.  +tremors of her upper extremities and tongue fasciculations concerning for ETOH w/d.  A&O x3, denies SI/HI, no psychosis, does not meet criteria for inpatient psychiatric hospitalization.
55 y/o  female, no children, currently unemployed (previously worked as a ), domiciled in a private residence in [place] with her , with PPHx of unspecified anxiety d/o (is prescribed Xanax by her internist), no inpatient psychiatric hospitalizations, no h/o SA, substance abuse significant for daily ETOH (reports 3 beers daily but more per previous records), denies h/o complicated withdrawals or DTs, with PMHx significant for stage 3 Bilateral Breast CA on Aromasin, RA, Psoriasis previously on Otezla, remote Brain Aneurysm s/p Clip in 1988, Rt Jugular DVT, Catheter related MSSA Bacteremia from [place] in July 2015, COPD, p/w dyspnea, admitted for management of COPD exacerbation in setting of potential EtOH withdrawal. Psych CL consulted for medication management.  Patient denying major mood sx, states her anxiety in general is well controlled with Xanax, but feels anxious now.  +tremors of her upper extremities and tongue fasciculations concerning for ETOH w/d.  A&O x3, denies SI/HI, no psychosis, does not meet criteria for inpatient psychiatric hospitalization. 10/24: withdrawal well-controlled, pt more alert and responsive, feels anxious.
53 y/o  female, no children, currently unemployed (previously worked as a ), domiciled in a private residence in [place] with her , with PPHx of unspecified anxiety d/o (is prescribed Xanax by her internist), no inpatient psychiatric hospitalizations, no h/o SA, substance abuse significant for daily ETOH (reports 3 beers daily but more per previous records), denies h/o complicated withdrawals or DTs, with PMHx significant for stage 3 Bilateral Breast CA on Aromasin, RA, Psoriasis previously on Otezla, remote Brain Aneurysm s/p Clip in 1988, Rt Jugular DVT, Catheter related MSSA Bacteremia from [place] in July 2015, COPD, p/w dyspnea, admitted for management of COPD exacerbation in setting of potential EtOH withdrawal. Psych CL consulted for medication management.  Patient denying major mood sx, states her anxiety in general is well controlled with Xanax, but feels anxious now.  +tremors of her upper extremities and tongue fasciculations concerning for ETOH w/d.  A&O x3, denies SI/HI, no psychosis, does not meet criteria for inpatient psychiatric hospitalization.

## 2019-10-25 ENCOUNTER — TRANSCRIPTION ENCOUNTER (OUTPATIENT)
Age: 54
End: 2019-10-25

## 2019-10-25 LAB
ALBUMIN SERPL ELPH-MCNC: 3.5 G/DL — SIGNIFICANT CHANGE UP (ref 3.3–5)
ALP SERPL-CCNC: 49 U/L — SIGNIFICANT CHANGE UP (ref 40–120)
ALT FLD-CCNC: 140 U/L — HIGH (ref 10–45)
ANION GAP SERPL CALC-SCNC: 21 MMOL/L — HIGH (ref 5–17)
AST SERPL-CCNC: 135 U/L — HIGH (ref 10–40)
BILIRUB SERPL-MCNC: 0.9 MG/DL — SIGNIFICANT CHANGE UP (ref 0.2–1.2)
BUN SERPL-MCNC: 11 MG/DL — SIGNIFICANT CHANGE UP (ref 7–23)
CALCIUM SERPL-MCNC: 8.3 MG/DL — LOW (ref 8.4–10.5)
CHLORIDE SERPL-SCNC: 83 MMOL/L — LOW (ref 96–108)
CO2 SERPL-SCNC: 26 MMOL/L — SIGNIFICANT CHANGE UP (ref 22–31)
CREAT SERPL-MCNC: 0.54 MG/DL — SIGNIFICANT CHANGE UP (ref 0.5–1.3)
GLUCOSE SERPL-MCNC: 99 MG/DL — SIGNIFICANT CHANGE UP (ref 70–99)
MAGNESIUM SERPL-MCNC: 1.6 MG/DL — SIGNIFICANT CHANGE UP (ref 1.6–2.6)
POTASSIUM SERPL-MCNC: 3.7 MMOL/L — SIGNIFICANT CHANGE UP (ref 3.5–5.3)
POTASSIUM SERPL-SCNC: 3.7 MMOL/L — SIGNIFICANT CHANGE UP (ref 3.5–5.3)
PROT SERPL-MCNC: 6.1 G/DL — SIGNIFICANT CHANGE UP (ref 6–8.3)
SODIUM SERPL-SCNC: 130 MMOL/L — LOW (ref 135–145)

## 2019-10-25 RX ORDER — REVEFENACIN 175 UG/3ML
3 SOLUTION RESPIRATORY (INHALATION)
Qty: 0 | Refills: 0 | DISCHARGE

## 2019-10-25 RX ORDER — POLYETHYLENE GLYCOL 3350 17 G/17G
17 POWDER, FOR SOLUTION ORAL
Qty: 0 | Refills: 0 | DISCHARGE
Start: 2019-10-25

## 2019-10-25 RX ORDER — IPRATROPIUM/ALBUTEROL SULFATE 18-103MCG
3 AEROSOL WITH ADAPTER (GRAM) INHALATION
Qty: 120 | Refills: 0

## 2019-10-25 RX ORDER — ALBUTEROL 90 UG/1
1 AEROSOL, METERED ORAL ONCE
Refills: 0 | Status: COMPLETED | OUTPATIENT
Start: 2019-10-25 | End: 2019-10-25

## 2019-10-25 RX ORDER — BUDESONIDE, MICRONIZED 100 %
1 POWDER (GRAM) MISCELLANEOUS
Qty: 0 | Refills: 0 | DISCHARGE
Start: 2019-10-25

## 2019-10-25 RX ORDER — SENNA PLUS 8.6 MG/1
2 TABLET ORAL
Qty: 0 | Refills: 0 | DISCHARGE
Start: 2019-10-25

## 2019-10-25 RX ORDER — PROCHLORPERAZINE MALEATE 5 MG
1 TABLET ORAL
Qty: 0 | Refills: 0 | DISCHARGE

## 2019-10-25 RX ADMIN — Medication 0.25 MILLIGRAM(S): at 19:59

## 2019-10-25 RX ADMIN — Medication 1000 UNIT(S): at 12:03

## 2019-10-25 RX ADMIN — Medication 0.25 MILLIGRAM(S): at 13:25

## 2019-10-25 RX ADMIN — HEPARIN SODIUM 5000 UNIT(S): 5000 INJECTION INTRAVENOUS; SUBCUTANEOUS at 06:05

## 2019-10-25 RX ADMIN — Medication 0.5 MILLIGRAM(S): at 12:03

## 2019-10-25 RX ADMIN — EXEMESTANE 25 MILLIGRAM(S): 25 TABLET, SUGAR COATED ORAL at 12:46

## 2019-10-25 RX ADMIN — Medication 100 MILLIGRAM(S): at 12:46

## 2019-10-25 RX ADMIN — Medication 3 MILLILITER(S): at 16:57

## 2019-10-25 RX ADMIN — Medication 1 MILLIGRAM(S): at 12:03

## 2019-10-25 RX ADMIN — ALBUTEROL 1 PUFF(S): 90 AEROSOL, METERED ORAL at 20:49

## 2019-10-25 RX ADMIN — Medication 40 MILLIGRAM(S): at 06:05

## 2019-10-25 RX ADMIN — Medication 3 MILLILITER(S): at 21:45

## 2019-10-25 RX ADMIN — Medication 10 MILLIGRAM(S): at 12:04

## 2019-10-25 RX ADMIN — HEPARIN SODIUM 5000 UNIT(S): 5000 INJECTION INTRAVENOUS; SUBCUTANEOUS at 16:57

## 2019-10-25 RX ADMIN — Medication 0.5 MILLIGRAM(S): at 08:02

## 2019-10-25 RX ADMIN — Medication 1 APPLICATION(S): at 06:05

## 2019-10-25 RX ADMIN — Medication 0.5 MILLIGRAM(S): at 00:05

## 2019-10-25 RX ADMIN — ERTAPENEM SODIUM 120 MILLIGRAM(S): 1 INJECTION, POWDER, LYOPHILIZED, FOR SOLUTION INTRAMUSCULAR; INTRAVENOUS at 16:51

## 2019-10-25 RX ADMIN — Medication 3 MILLILITER(S): at 11:03

## 2019-10-25 RX ADMIN — Medication 0.5 MILLIGRAM(S): at 04:02

## 2019-10-25 RX ADMIN — Medication 0.5 MILLIGRAM(S): at 06:05

## 2019-10-25 RX ADMIN — MONTELUKAST 10 MILLIGRAM(S): 4 TABLET, CHEWABLE ORAL at 12:03

## 2019-10-25 RX ADMIN — Medication 1 TABLET(S): at 12:03

## 2019-10-25 RX ADMIN — Medication 0.25 MILLIGRAM(S): at 06:05

## 2019-10-25 RX ADMIN — Medication 3 MILLILITER(S): at 02:05

## 2019-10-25 RX ADMIN — Medication 0.5 MILLIGRAM(S): at 17:32

## 2019-10-25 NOTE — DISCHARGE NOTE PROVIDER - HOSPITAL COURSE
54 yr old female with Hx significant for COPD, ETOH abuse, breast Ca, remote brain aneurysm s/p clip who presented with COPD exacerbation recently discharged coming in with increasing dyspnea and also found to be withdrawing from etoh     . No fever, chills, N/V, diarrhea. States that she has also been feeling "shaky" over the last couple of days, has not been drinking. No new cough     Diagnosed with copd exacerbation and ETOH withdrawal. seen by psych and pulmonary    Placed on CIWA. Now dyspnea improved and CIWA score is '0". Also found to have    hyponatremia, now na improved, renal was consulted. 54 yr old female with Hx significant for COPD, ETOH abuse, breast Ca, remote brain aneurysm s/p clip who presented with COPD exacerbation recently discharged coming in with increasing dyspnea and also found to be withdrawing from etoh     . No fever, chills, N/V, diarrhea. States that she has also been feeling "shaky" over the last couple of days, has not been drinking. No new cough     Diagnosed with copd exacerbation and ETOH withdrawal. seen by psych and pulmonary    Placed on CIWA. Now dyspnea improved and CIWA score is '0". Also found to have    hyponatremia, now na improved, renal was consulted. Cleared by psych to DC patient home today after    am dose of ativan. Cleared for discharge as per Dr Bonds

## 2019-10-25 NOTE — DISCHARGE NOTE PROVIDER - CARE PROVIDER_API CALL
Junior Mansfield (MD)  Critical Care Medicine; Internal Medicine; Pulmonary Disease  [address] [facility], [address]  [place] [address]  Phone: [phone]  Fax: [phone]  Follow Up Time:

## 2019-10-25 NOTE — PROGRESS NOTE ADULT - SUBJECTIVE AND OBJECTIVE BOX
CHIEF COMPLAINT:Patient is a 54y old  Female who presents with a chief complaint of EtOH withdrawal, COPD exacerbation (22 Oct 2019 10:12)    	        PAST MEDICAL & SURGICAL HISTORY:  Prophylactic measure  Breast cancer  Brain aneurysm: with clips  Psoriasis  RA (rheumatoid arthritis)  Psoriasis  Breast cancer, stage 3  History of modified radical mastectomy of both breasts  S/P bilateral mastectomy  Brain aneurysm: 1988 two clips          REVIEW OF SYSTEMS:  feels better   EYES: No eye pain, visual disturbances, or discharge  NECK: No pain or stiffness  RESPIRATORY: breathing improved   CARDIOVASCULAR: No chest pain, palpitations, passing out, dizziness,   GASTROINTESTINAL: No abdominal or epigastric pain. No nausea, vomiting  GENITOURINARY: No dysuria, frequency, hematuria, or incontinence  NEUROLOGICAL: No headaches,     Medications:  MEDICATIONS  (STANDING):  ALBUTerol/ipratropium for Nebulization 3 milliLiter(s) Nebulizer every 6 hours  ALPRAZolam 0.25 milliGRAM(s) Oral three times a day  apremilast 30 milliGRAM(s) Oral two times a day  buDESOnide    Inhalation Suspension 0.5 milliGRAM(s) Inhalation every 12 hours  chlorhexidine 2% Cloths 1 Application(s) Topical daily  cholecalciferol 1000 Unit(s) Oral daily  docusate sodium 100 milliGRAM(s) Oral two times a day  ertapenem  IVPB 1000 milliGRAM(s) IV Intermittent every 24 hours  exemestane 25 milliGRAM(s) Oral daily  folic acid 1 milliGRAM(s) Oral daily  heparin  Injectable 5000 Unit(s) SubCutaneous every 12 hours  hydrocortisone 1% Cream 1 Application(s) Topical two times a day  LORazepam     Tablet   Oral   LORazepam     Tablet 0.5 milliGRAM(s) Oral every 4 hours  LORazepam     Tablet 0.5 milliGRAM(s) Oral every 12 hours  montelukast 10 milliGRAM(s) Oral daily  multivitamin 1 Tablet(s) Oral daily  pantoprazole    Tablet 40 milliGRAM(s) Oral before breakfast  polyethylene glycol 3350 17 Gram(s) Oral daily  predniSONE   Tablet   Oral   predniSONE   Tablet 40 milliGRAM(s) Oral daily  prochlorperazine   Tablet 10 milliGRAM(s) Oral daily  senna 2 Tablet(s) Oral at bedtime  thiamine 100 milliGRAM(s) Oral daily    MEDICATIONS  (PRN):  LORazepam   Injectable 2 milliGRAM(s) IV Push every 1 hour PRN Symptom-triggered: each CIWA -Ar score 8 or GREATER    	    PHYSICAL EXAM:  T(C): 37.2 (10-25-19 @ 07:21), Max: 37.2 (10-25-19 @ 07:21)  HR: 84 (10-25-19 @ 07:21) (84 - 94)  BP: 137/82 (10-25-19 @ 07:21) (121/80 - 144/88)  RR: 18 (10-25-19 @ 07:21) (18 - 18)  SpO2: 97% (10-25-19 @ 07:21) (93% - 100%)  Wt(kg): --  I&O's Summary    24 Oct 2019 07:01  -  25 Oct 2019 07:00  --------------------------------------------------------  IN: 0 mL / OUT: 1450 mL / NET: -1450 mL        Appearance: Normal	  HEENT:   Normal oral mucosa, PERRL, EOMI	  Lymphatic: No lymphadenopathy  Cardiovascular: Normal S1 S2, No JVD,   Respiratory:dec bs 	  Psychiatry: A & O  Gastrointestinal:  Soft, Non-tender, + BS	  Skin: No rashes, No ecchymoses, No cyanosis	  Neurologic: Non-focal  Extremities: dec rom   Vascular: Peripheral pulses palpable     TELEMETRY: 	    ECG:  	  RADIOLOGY:  OTHER: 	  	  LABS:	 	    CARDIAC MARKERS:                                9.6    6.38  )-----------( 169      ( 24 Oct 2019 13:02 )             29.3     10-25    130<L>  |  83<L>  |  11  ----------------------------<  99  3.7   |  26  |  0.54    Ca    8.3<L>      25 Oct 2019 07:26  Mg     1.6     10-25    TPro  6.1  /  Alb  3.5  /  TBili  0.9  /  DBili  x   /  AST  135<H>  /  ALT  140<H>  /  AlkPhos  49  10-25    proBNP:   Lipid Profile:   HgA1c:   TSH:

## 2019-10-25 NOTE — PROGRESS NOTE ADULT - ATTENDING COMMENTS
agree with the above assessment and plan by NP  nebs per pulm  recent echo w normal lvef  renal followup for hyponatremia  [facility] protocol for ETOH abuse

## 2019-10-25 NOTE — PROGRESS NOTE ADULT - SUBJECTIVE AND OBJECTIVE BOX
CARDIOLOGY FOLLOW UP - Dr. Nj    CC no cp/sob   " i want to be discharged "       PHYSICAL EXAM:  T(C): 37.2 (10-25-19 @ 07:21), Max: 37.2 (10-25-19 @ 07:21)  HR: 84 (10-25-19 @ 07:21) (84 - 94)  BP: 137/82 (10-25-19 @ 07:21) (121/80 - 144/88)  RR: 18 (10-25-19 @ 07:21) (18 - 18)  SpO2: 97% (10-25-19 @ 07:21) (93% - 100%)  Wt(kg): --  I&O's Summary    24 Oct 2019 07:01  -  25 Oct 2019 07:00  --------------------------------------------------------  IN: 0 mL / OUT: 1450 mL / NET: -1450 mL    25 Oct 2019 07:01  -  25 Oct 2019 11:28  --------------------------------------------------------  IN: 320 mL / OUT: 1000 mL / NET: -680 mL        Appearance: Normal	  Cardiovascular: Normal S1 S2,RRR, No JVD, No murmurs  Respiratory: Lungs clear to auscultation	  Gastrointestinal:  Soft, Non-tender, + BS	  Extremities: Normal range of motion, No clubbing, cyanosis or edema        MEDICATIONS  (STANDING):  ALBUTerol/ipratropium for Nebulization 3 milliLiter(s) Nebulizer every 6 hours  ALPRAZolam 0.25 milliGRAM(s) Oral three times a day  apremilast 30 milliGRAM(s) Oral two times a day  buDESOnide    Inhalation Suspension 0.5 milliGRAM(s) Inhalation every 12 hours  chlorhexidine 2% Cloths 1 Application(s) Topical daily  cholecalciferol 1000 Unit(s) Oral daily  docusate sodium 100 milliGRAM(s) Oral two times a day  ertapenem  IVPB 1000 milliGRAM(s) IV Intermittent every 24 hours  exemestane 25 milliGRAM(s) Oral daily  folic acid 1 milliGRAM(s) Oral daily  heparin  Injectable 5000 Unit(s) SubCutaneous every 12 hours  hydrocortisone 1% Cream 1 Application(s) Topical two times a day  LORazepam     Tablet   Oral   LORazepam     Tablet 0.5 milliGRAM(s) Oral every 4 hours  LORazepam     Tablet 0.5 milliGRAM(s) Oral every 12 hours  montelukast 10 milliGRAM(s) Oral daily  multivitamin 1 Tablet(s) Oral daily  pantoprazole    Tablet 40 milliGRAM(s) Oral before breakfast  polyethylene glycol 3350 17 Gram(s) Oral daily  predniSONE   Tablet   Oral   predniSONE   Tablet 40 milliGRAM(s) Oral daily  prochlorperazine   Tablet 10 milliGRAM(s) Oral daily  senna 2 Tablet(s) Oral at bedtime  thiamine 100 milliGRAM(s) Oral daily      TELEMETRY: 	    ECG:  	  RADIOLOGY:   DIAGNOSTIC TESTING:  [ ] Echocardiogram:  [ ]  Catheterization:  [ ] Stress Test:    OTHER: 	    LABS:	 	                                9.6    6.38  )-----------( 169      ( 24 Oct 2019 13:02 )             29.3     10-25    130<L>  |  83<L>  |  11  ----------------------------<  99  3.7   |  26  |  0.54    Ca    8.3<L>      25 Oct 2019 07:26  Mg     1.6     10-25    TPro  6.1  /  Alb  3.5  /  TBili  0.9  /  DBili  x   /  AST  135<H>  /  ALT  140<H>  /  AlkPhos  49  10-25

## 2019-10-25 NOTE — PROGRESS NOTE ADULT - SUBJECTIVE AND OBJECTIVE BOX
[facility] - [facility] - PROGRESS NOTE    CHIEF COMPLAINT: chronic hypoxic respiratory failure; COPD exacerbation; dyspnea; alcohol withdrawal    INTERVAL HISTORY: no shortness of breath on a nasal canula at baseline 3lpm; calm and without tremor; laying in bed asking to go home; no cough, sputum production, chest congestion or wheeze; no fevers, chills or sweats; no chest pain/pressure or palpitations; improved hyponatremia; continues on benzodiazepines;     REVIEW OF SYSTEMS:  Constitutional: As per interval history  HEENT: Within normal limits  CV: As per interval history  Resp: As per interval history  GI: Within normal limits   : Within normal limits  Musculoskeletal: Within normal limits  Skin: Within normal limits  Neurological: Within normal limits  Psychiatric: Within normal limits  Endocrine: Within normal limits  Hematologic/Lymphatic: Within normal limits  Allergic/Immunologic: Within normal limits    MEDICATIONS:     Pulmonary "  ALBUTerol/ipratropium for Nebulization 3 milliLiter(s) Nebulizer every 6 hours  buDESOnide    Inhalation Suspension 0.5 milliGRAM(s) Inhalation every 12 hours  montelukast 10 milliGRAM(s) Oral daily    Anti-microbials:  ertapenem  IVPB 1000 milliGRAM(s) IV Intermittent every 24 hours    Cardiovascular:    Other:  ALPRAZolam 0.25 milliGRAM(s) Oral three times a day  apremilast 30 milliGRAM(s) Oral two times a day  chlorhexidine 2% Cloths 1 Application(s) Topical daily  cholecalciferol 1000 Unit(s) Oral daily  docusate sodium 100 milliGRAM(s) Oral two times a day  exemestane 25 milliGRAM(s) Oral daily  folic acid 1 milliGRAM(s) Oral daily  heparin  Injectable 5000 Unit(s) SubCutaneous every 12 hours  hydrocortisone 1% Cream 1 Application(s) Topical two times a day  LORazepam     Tablet   Oral   LORazepam     Tablet 0.5 milliGRAM(s) Oral every 12 hours  multivitamin 1 Tablet(s) Oral daily  pantoprazole    Tablet 40 milliGRAM(s) Oral before breakfast  polyethylene glycol 3350 17 Gram(s) Oral daily  predniSONE   Tablet   Oral   predniSONE   Tablet 40 milliGRAM(s) Oral daily  prochlorperazine   Tablet 10 milliGRAM(s) Oral daily  senna 2 Tablet(s) Oral at bedtime  thiamine 100 milliGRAM(s) Oral daily    MEDICATIONS  (PRN):  LORazepam   Injectable 2 milliGRAM(s) IV Push every 1 hour PRN Symptom-triggered: each CIWA -Ar score 8 or GREATER      OBJECTIVE:    I&O's Detail    24 Oct 2019 07:  -  25 Oct 2019 07:00  --------------------------------------------------------  IN:  Total IN: 0 mL    OUT:    Voided: 1450 mL  Total OUT: 1450 mL    Total NET: -1450 mL      25 Oct 2019 07:01  -  25 Oct 2019 12:36  --------------------------------------------------------  IN:    Oral Fluid: 320 mL  Total IN: 320 mL    OUT:    Voided: 1000 mL  Total OUT: 1000 mL    Total NET: -680 mL    PHYSICAL EXAM:       ICU Vital Signs Last 24 Hrs  T(C): 37.2 (25 Oct 2019 07:21), Max: 37.2 (25 Oct 2019 07:21)  T(F): 99 (25 Oct 2019 07:21), Max: 99 (25 Oct 2019 07:21)  HR: 88 (25 Oct 2019 12:30) (84 - 94)  BP: 137/82 (25 Oct 2019 07:21) (121/80 - 144/88)  BP(mean): --  ABP: --  ABP(mean): --  RR: 18 (25 Oct 2019 07:21) (18 - 18)  SpO2: 94% (25 Oct 2019 12:30) (93% - 100%) on 3lpm nasal canula     General: Awake. Alert. Cooperative. No distress. Appears stated age.	  HEENT: Atraumatic. Normocephalic. Anicteric. Normal oral mucosa. PERRL. EOMI. Psoriatic plaque on scalp. Mallampati class IV airway  Neck: Supple. Trachea midline. Thyroid without enlargement/tenderness/nodules. No carotid bruit. No JVD. Short and wide	  Cardiovascular: Regular rate and rhythm. S1 S2 normal. No murmurs, rubs or gallops.  Respiratory: No respiratory distress. Decreased breath sounds throughout. No wheeze. No rales. No curvature.  Abdomen: Soft. Non-tender. Non-distended. No organomegaly. No masses. Normal bowel sounds. Obese.  Extremities: Warm to touch. No clubbing or cyanosis. No pedal edema.  Pulses: 2+ peripheral pulses all extremities.	  Skin: Venous stasis changes on both lower extremities. Multiple areas of skin hyperpigmentation at areas of prior plaque.  Lymph Nodes: Cervical, supraclavicular and axillary nodes normal  Neurological: Motor and sensory examination equal and normal. A and O x 3  Psychiatry: Calm    LABS:                          9.6    6.38  )-----------( 169      ( 24 Oct 2019 13:02 )             29.3     CBC    WBC  6.38 <==, 5.53 <==, 7.67 <==, 8.75 <==, 5.34 <==    Hemoglobin  9.6 <<==, 8.7 <<==, 9.8 <<==, 10.2 <<==, 10.7 <<==    Hematocrit  29.3 <==, 26.5 <==, 29.2 <==, 28.5 <==, 31.5 <==    Platelets  169 <==, 151 <==, 209 <==, 207 <==, 220 <==      130<L>  |  83<L>  |  11  ----------------------------<  99    10-25  3.7   |  26  |  0.54      LYTES    sodium  130 <==, 131 <==, 129 <==, 129 <==, 125 <==, 132 <==, 119 <==    potassium   3.7 <==, 4.2 <==, 3.6 <==, 3.9 <==, 4.3 <==, 4.3 <==, 3.7 <==    chloride  83 <==, 89 <==, 86 <==, 86 <==, 84 <==, 88 <==, 78 <==    carbon dioxide  26 <==, 28 <==, 30 <==, 29 <==, 29 <==, 30 <==, 27 <==    =============================================================================================  RENAL FUNCTION:    Creatinine:   0.54  <<==, 0.67  <<==, 0.58  <<==, 0.56  <<==, 0.58  <<==, 0.49  <<==, 0.47  <<==    BUN:   11 <==, 10 <==, 10 <==, 9 <==, 10 <==, 9 <==, 7 <==    ============================================================================================    calcium   8.3 <==, 8.5 <==, 8.6 <==, 8.8 <==, 9.0 <==, 8.7 <==, 7.7 <==    phos   2.7 <==    mag   1.6 <==, 1.3 <==    ============================================================================================  LFTs    AST:   135 <== , 98 <== , 125 <==     ALT:  140  <== , 80  <== , 101  <==     AP:  49  <=, 57  <=, 64  <=    Bili:  0.9  <=, 1.7  <=, 0.5  <=    ABG - ( 22 Oct 2019 15:58 )  pH: 7.38  /  pCO2: 40    /  pO2: 177   / HCO3: 23    / Base Excess: -1.3  /  SaO2: 99        ABG - ( 22 Oct 2019 09:32 )  pH: 7.46  /  pCO2: 41    /  pO2: 190   / HCO3: 28    / Base Excess: 4.6   /  SaO2: 100       Serum Pro-Brain Natriuretic Peptide: 2262 pg/mL (10-22 @ 09:26)    CARDIAC MARKERS ( 22 Oct 2019 16:05 )   U/L /CKMB x     /CKMB Units 8.1 ng/mL    troponin 26 ng/L    Patient name: FREDY CHOI  YOB: 1965   Age: 54 (F)   MR#: [id number]  Study Date: 10/7/2019  [facility]  Study quality: Technically difficult  Referring Physician: Canelo Bonds MD  BloodPressure: 122/80 mmHg  Height: 165 cm  Weight: 68 kg  BSA: 1.8 m2  ------------------------------------------------------------------------  PROCEDURE: Transthoracic echocardiogram with 2-D, M-Mode  and complete spectral and color flow Doppler. Verbal  consent was obtained for injection of  Ultrasonic Enhancing  Agent following a discussion of risks and benefits.  Following intravenous injection of Ultrasonic Enhancing  Agent , harmonic imaging was performed.  INDICATION: Shortness of breath (R06.02)  ------------------------------------------------------------------------  Observations:  Left Ventricle: Endocardium not well visualized; grossly  normal left ventricular systolic function. Endocardial  visualization enhanced with intravenous injection of  Ultrasonic Enhancing Agent (Definity). Normal left  ventricular internal dimensions and wall thicknesses.  Normal diastolic function  ------------------------------------------------------------------------  Conclusions:  1. Normal left ventricular internal dimensions and wall  thicknesses.  2. Endocardium not well visualized; grossly normal left  ventricular systolic function. Endocardial visualization  enhanced with intravenous injection of Ultrasonic Enhancing  Agent (Definity). Unable to perform strain imaging due to  limited clinical windows.  ------------------------------------------------------------------------  Confirmed on  10/7/2019 - 18:11:26 by Margaret Alvarez M.D.  ------------------------------------------------------------------------  ---------------------------------------------------------------------------------------------------------------  MICROBIOLOGY:   Urinalysis Basic - ( 22 Oct 2019 11:01 )    Color: Light Yellow / Appearance: Clear / S.006 / pH: x  Gluc: x / Ketone: Trace  / Bili: Negative / Urobili: Negative   Blood: x / Protein: Negative / Nitrite: Negative   Leuk Esterase: Negative / RBC: x / WBC x   Sq Epi: x / Non Sq Epi: x / Bacteria: x    RADIOLOGY:  [x] Chest radiographs reviewed and interpreted by me    EXAM:  XR CHEST PORTABLE IMMED 1V                          PROCEDURE DATE:  10/22/2019      INTERPRETATION:  CLINICAL INFORMATION: Rapid response. Respiratory   distress    EXAM: AP portable chest     COMPARISON: Chest radiograph from 10/21/2019.    FINDINGS:    The heart size is normal. The cardiomediastinal silhouette is   unremarkable.    Patchy opacity at the right base may represent atelectasis or infection.   No pleural effusion or pneumothorax. Emphysema.    JENNIFER LUJAN M.D., RADIOLOGY RESIDENT  This document has been electronically signed.  NYDIA VANCE M.D., ATTENDING RADIOLOGIST  This document has been electronically signed. Oct 22 2019 11:58AM  ---------------------------------------------------------------------------------------------------------------  EXAM:  CT ANGIO CHEST (W)AW IC                          PROCEDURE DATE:  10/21/2019      INTERPRETATION:  CLINICAL INFORMATION: Dyspnea    COMPARISON: CTA chest 10/3/2019    PROCEDURE:   CT Angiography of the Chest.    90 ml of Omnipaque 350 was injected intravenously. 10 ml were discarded.  Sagittal and coronal reformats were performed as well as 3D (MIP)   reconstructions.      FINDINGS:    LUNGS AND AIRWAYS: Patent central airways.  Severe upper lobe predominant   centrilobular emphysema. Mild bilateral dependent subsegmental   atelectasis. No focal lung consolidation.    PLEURA: No pleural effusion.    MEDIASTINUM AND CHESTER: No lymphadenopathy.    VESSELS: No evidence of pulmonary embolism.    HEART: Heart size is normal. No pericardial effusion.    CHEST WALL AND LOWER NECK: Within normal limits.    VISUALIZED UPPER ABDOMEN: Small hiatal hernia.    BONES: Degenerative changes in the spine.    IMPRESSION:     No CT evidence of pulmonary embolism.     Severe emphysema.    ENID ALBRECHT MD., ATTENDING RADIOLOGIST  This document has been electronically signed. Oct 21 2019  2:30AM      ---------------------------------------------------------------------------------------------------------------    EXAM:  XR CHEST AP OR PA 1V                          PROCEDURE DATE:  10/21/2019      INTERPRETATION:  CLINICAL INFORMATION: COPD exacerbation    EXAM: AP view of the chest     COMPARISON: Radiograph dated  10/3/2019    FINDINGS:      The lungs are clear.   There is no pleural effusion.  There is no pneumothorax.  The cardiac silhouette size cannot be accurately assessed on this   radiograph.  There is no acute abnormality of the visualized osseous structures.    IMPRESSION:    Clear lungs.     CASPER LERMA M.D., RADIOLOGY RESIDENT  This document has been electronically signed.  MIRI NORRIS M.D., ATTENDING RADIOLOGIST  This document has been electronically signed. Oct 21 2019  9:44AM  ---------------------------------------------------------------------------------------------------------------  SPIROMETRY:    FEV1 0.44 liters - 19% predicted  FVC 1.04 liters - 35% predicted  FEV1% 42    c/w very severe obstructive lung disease

## 2019-10-25 NOTE — PROGRESS NOTE ADULT - SUBJECTIVE AND OBJECTIVE BOX
[facility]   [phone]  RENAL FOLLOW UP NOTE  --------------------------------------------------------------------------------  Chief Complaint:    24 hour events/subjective:    seen earlier. states breathing is improving and wants to go home     PAST HISTORY  --------------------------------------------------------------------------------  No significant changes to PMH, PSH, FHx, SHx, unless otherwise noted    ALLERGIES & MEDICATIONS  --------------------------------------------------------------------------------  Allergies    amoxicillin (Anaphylaxis)  Levaquin (Other; Anaphylaxis)  Levaquin (Unknown)  penicillin (Anaphylaxis)  penicillins (Anaphylaxis)    Intolerances      Standing Inpatient Medications  ALBUTerol/ipratropium for Nebulization 3 milliLiter(s) Nebulizer every 6 hours  ALPRAZolam 0.25 milliGRAM(s) Oral three times a day  apremilast 30 milliGRAM(s) Oral two times a day  buDESOnide    Inhalation Suspension 0.5 milliGRAM(s) Inhalation every 12 hours  chlorhexidine 2% Cloths 1 Application(s) Topical daily  cholecalciferol 1000 Unit(s) Oral daily  docusate sodium 100 milliGRAM(s) Oral two times a day  ertapenem  IVPB 1000 milliGRAM(s) IV Intermittent every 24 hours  exemestane 25 milliGRAM(s) Oral daily  folic acid 1 milliGRAM(s) Oral daily  heparin  Injectable 5000 Unit(s) SubCutaneous every 12 hours  hydrocortisone 1% Cream 1 Application(s) Topical two times a day  LORazepam     Tablet   Oral   LORazepam     Tablet 0.5 milliGRAM(s) Oral every 12 hours  montelukast 10 milliGRAM(s) Oral daily  multivitamin 1 Tablet(s) Oral daily  pantoprazole    Tablet 40 milliGRAM(s) Oral before breakfast  polyethylene glycol 3350 17 Gram(s) Oral daily  predniSONE   Tablet   Oral   predniSONE   Tablet 40 milliGRAM(s) Oral daily  prochlorperazine   Tablet 10 milliGRAM(s) Oral daily  senna 2 Tablet(s) Oral at bedtime  thiamine 100 milliGRAM(s) Oral daily    PRN Inpatient Medications  LORazepam   Injectable 2 milliGRAM(s) IV Push every 1 hour PRN      REVIEW OF SYSTEMS  --------------------------------------------------------------------------------    Gen: denies  fevers/chills,  CVS: denies chest pain/palpitations  Resp: denies worsening SOB/Cough +   GI: Denies N/V/Abd pain  : Denies dysuria/oliguria/hematuria    All other systems were reviewed and are negative, except as noted.    VITALS/PHYSICAL EXAM  --------------------------------------------------------------------------------  T(C): 37.4 (10-25-19 @ 13:13), Max: 37.4 (10-25-19 @ 13:13)  HR: 86 (10-25-19 @ 13:13) (84 - 92)  BP: 122/82 (10-25-19 @ 13:13) (122/82 - 144/88)  RR: 18 (10-25-19 @ 13:13) (18 - 18)  SpO2: 97% (10-25-19 @ 13:13) (93% - 100%)  Wt(kg): --        10-24-19 @ 07:01  -  10-25-19 @ 07:00  --------------------------------------------------------  IN: 0 mL / OUT: 1450 mL / NET: -1450 mL    10-25-19 @ 07:01  -  10-25-19 @ 15:59  --------------------------------------------------------  IN: 540 mL / OUT: 1700 mL / NET: -1160 mL      Physical Exam:      Gen: Non toxic comfortable appearing  on O2  morbidly obese  	no jvd ,   	Pulm: decrease bs  no rales +  ronchi -  wheezing  	CV: RRR, S1S2; no rub  	Abd: +BS, soft, nontender/nondistended  	: No suprapubic tenderness  	UE: Warm, no cyanosis  no clubbing,  no edema  	LE: Warm, no cyanosis  no clubbing, no edema  	Neuro: alert and oriented. speech coherent   	Skin: Warm and scaly 	      LABS/STUDIES  --------------------------------------------------------------------------------              9.6    6.38  >-----------<  169      [10-24-19 @ 13:02]              29.3     130  |  83  |  11  ----------------------------<  99      [10-25-19 @ 07:26]  3.7   |  26  |  0.54        Ca     8.3     [10-25-19 @ 07:26]      Mg     1.6     [10-25-19 @ 07:26]    TPro  6.1  /  Alb  3.5  /  TBili  0.9  /  DBili  x   /  AST  135  /  ALT  140  /  AlkPhos  49  [10-25-19 @ 07:26]          Creatinine Trend:  SCr 0.54 [10-25 @ 07:26]  SCr 0.67 [10-24 @ 15:44]  SCr 0.58 [10-24 @ 09:18]  SCr 0.56 [10-24 @ 01:47]  SCr 0.58 [10-23 @ 20:11]              Urinalysis - [10-22-19 @ 11:01]      Color Light Yellow / Appearance Clear / SG 1.006 / pH 7.0      Gluc Negative / Ketone Trace  / Bili Negative / Urobili Negative       Blood Negative / Protein Negative / Leuk Est Negative / Nitrite Negative      RBC  / WBC  / Hyaline  / Gran  / Sq Epi  / Non Sq Epi  / Bacteria     Urine Creatinine 42      [10-22-19 @ 11:01]  Urine Sodium 25      [10-22-19 @ 11:01]  Urine Urea Nitrogen 188      [10-22-19 @ 13:34]  Urine Osmolality 158      [10-22-19 @ 11:01]    HbA1c 5.4      [08-19-16 @ 07:12]

## 2019-10-26 ENCOUNTER — TRANSCRIPTION ENCOUNTER (OUTPATIENT)
Age: 54
End: 2019-10-26

## 2019-10-26 VITALS
HEART RATE: 85 BPM | RESPIRATION RATE: 18 BRPM | TEMPERATURE: 99 F | OXYGEN SATURATION: 98 % | DIASTOLIC BLOOD PRESSURE: 80 MMHG | SYSTOLIC BLOOD PRESSURE: 131 MMHG

## 2019-10-26 LAB
ANION GAP SERPL CALC-SCNC: 13 MMOL/L — SIGNIFICANT CHANGE UP (ref 5–17)
BUN SERPL-MCNC: 8 MG/DL — SIGNIFICANT CHANGE UP (ref 7–23)
CALCIUM SERPL-MCNC: 9 MG/DL — SIGNIFICANT CHANGE UP (ref 8.4–10.5)
CHLORIDE SERPL-SCNC: 89 MMOL/L — LOW (ref 96–108)
CO2 SERPL-SCNC: 28 MMOL/L — SIGNIFICANT CHANGE UP (ref 22–31)
CREAT SERPL-MCNC: 0.5 MG/DL — SIGNIFICANT CHANGE UP (ref 0.5–1.3)
GLUCOSE SERPL-MCNC: 135 MG/DL — HIGH (ref 70–99)
POTASSIUM SERPL-MCNC: 3.7 MMOL/L — SIGNIFICANT CHANGE UP (ref 3.5–5.3)
POTASSIUM SERPL-SCNC: 3.7 MMOL/L — SIGNIFICANT CHANGE UP (ref 3.5–5.3)
SODIUM SERPL-SCNC: 130 MMOL/L — LOW (ref 135–145)

## 2019-10-26 PROCEDURE — 82550 ASSAY OF CK (CPK): CPT

## 2019-10-26 PROCEDURE — 82947 ASSAY GLUCOSE BLOOD QUANT: CPT

## 2019-10-26 PROCEDURE — 81003 URINALYSIS AUTO W/O SCOPE: CPT

## 2019-10-26 PROCEDURE — 97161 PT EVAL LOW COMPLEX 20 MIN: CPT

## 2019-10-26 PROCEDURE — 82803 BLOOD GASES ANY COMBINATION: CPT

## 2019-10-26 PROCEDURE — 94640 AIRWAY INHALATION TREATMENT: CPT

## 2019-10-26 PROCEDURE — 82330 ASSAY OF CALCIUM: CPT

## 2019-10-26 PROCEDURE — 80048 BASIC METABOLIC PNL TOTAL CA: CPT

## 2019-10-26 PROCEDURE — 84295 ASSAY OF SERUM SODIUM: CPT

## 2019-10-26 PROCEDURE — 94010 BREATHING CAPACITY TEST: CPT

## 2019-10-26 PROCEDURE — 84484 ASSAY OF TROPONIN QUANT: CPT

## 2019-10-26 PROCEDURE — 85014 HEMATOCRIT: CPT

## 2019-10-26 PROCEDURE — 83735 ASSAY OF MAGNESIUM: CPT

## 2019-10-26 PROCEDURE — 83935 ASSAY OF URINE OSMOLALITY: CPT

## 2019-10-26 PROCEDURE — 84100 ASSAY OF PHOSPHORUS: CPT

## 2019-10-26 PROCEDURE — 83880 ASSAY OF NATRIURETIC PEPTIDE: CPT

## 2019-10-26 PROCEDURE — 84540 ASSAY OF URINE/UREA-N: CPT

## 2019-10-26 PROCEDURE — 84300 ASSAY OF URINE SODIUM: CPT

## 2019-10-26 PROCEDURE — 36600 WITHDRAWAL OF ARTERIAL BLOOD: CPT

## 2019-10-26 PROCEDURE — 82435 ASSAY OF BLOOD CHLORIDE: CPT

## 2019-10-26 PROCEDURE — 71275 CT ANGIOGRAPHY CHEST: CPT

## 2019-10-26 PROCEDURE — 82553 CREATINE MB FRACTION: CPT

## 2019-10-26 PROCEDURE — 71045 X-RAY EXAM CHEST 1 VIEW: CPT

## 2019-10-26 PROCEDURE — 94660 CPAP INITIATION&MGMT: CPT

## 2019-10-26 PROCEDURE — 85027 COMPLETE CBC AUTOMATED: CPT

## 2019-10-26 PROCEDURE — 99285 EMERGENCY DEPT VISIT HI MDM: CPT | Mod: 25

## 2019-10-26 PROCEDURE — 82962 GLUCOSE BLOOD TEST: CPT

## 2019-10-26 PROCEDURE — 96374 THER/PROPH/DIAG INJ IV PUSH: CPT | Mod: XU

## 2019-10-26 PROCEDURE — 80307 DRUG TEST PRSMV CHEM ANLYZR: CPT

## 2019-10-26 PROCEDURE — 82570 ASSAY OF URINE CREATININE: CPT

## 2019-10-26 PROCEDURE — 84132 ASSAY OF SERUM POTASSIUM: CPT

## 2019-10-26 PROCEDURE — 80053 COMPREHEN METABOLIC PANEL: CPT

## 2019-10-26 PROCEDURE — 83605 ASSAY OF LACTIC ACID: CPT

## 2019-10-26 PROCEDURE — 93005 ELECTROCARDIOGRAM TRACING: CPT

## 2019-10-26 RX ORDER — PANTOPRAZOLE SODIUM 20 MG/1
1 TABLET, DELAYED RELEASE ORAL
Qty: 30 | Refills: 0
Start: 2019-10-26 | End: 2019-11-24

## 2019-10-26 RX ORDER — ASPIRIN/CALCIUM CARB/MAGNESIUM 324 MG
1 TABLET ORAL
Qty: 0 | Refills: 0 | DISCHARGE

## 2019-10-26 RX ADMIN — Medication 1 APPLICATION(S): at 06:29

## 2019-10-26 RX ADMIN — Medication 0.5 MILLIGRAM(S): at 06:31

## 2019-10-26 RX ADMIN — PANTOPRAZOLE SODIUM 40 MILLIGRAM(S): 20 TABLET, DELAYED RELEASE ORAL at 06:29

## 2019-10-26 RX ADMIN — HEPARIN SODIUM 5000 UNIT(S): 5000 INJECTION INTRAVENOUS; SUBCUTANEOUS at 06:29

## 2019-10-26 RX ADMIN — Medication 0.5 MILLIGRAM(S): at 06:28

## 2019-10-26 RX ADMIN — Medication 3 MILLILITER(S): at 01:26

## 2019-10-26 RX ADMIN — Medication 0.25 MILLIGRAM(S): at 08:09

## 2019-10-26 RX ADMIN — Medication 40 MILLIGRAM(S): at 06:28

## 2019-10-26 NOTE — PROGRESS NOTE ADULT - ASSESSMENT
54 yr old female with  Hx significant for COPD, EtOH abuse, Breast Ca, remote brain aneurysm s/p clip who presented with COPD exacerbation initially treated with duoneb and steroid tx  received MICU consult for elevated lactate and COPD exacerbation. and elevated CIWA score        resp distress  improved  c/w steroids/po    limit abs   pulm f/u noted     -Pt with hx of EtOH abuse   c/w ciwa      - Pt with COPD on home O2  -supplemental O2 - titrate to maintain SPO2 > 88%  c/w pulm meds     -diet as tolerated  -strict I & O's -   ppi     transaminitis sec to etoh abuse  elevated  ruq sono neg for obstruction  fatty liver   gi eval noted    hyponatremia /improved   /renal to f/u  fluid management as per renal   pt
54 yr old female with Hx significant for COPD, ETOH abuse, breast Ca, remote brain aneurysm s/p clip who presented with COPD exacerbation recently discharged admitted with increasing dyspnea and also found to be withdrawing from etoh. required BIPAP for sob as well as steroids. pt also with hyponatremia and lactic acidosis    1- hyponatremia  2- lactic acidosis  3- ETOH abuse and withdrawal  4- copd exacerbation       hypomagnesemia check repeat mag in am   k is in range  na is improving towards normal now   copd improving hypoxemia  cont prednisone and nebs
54 yr old female with  Hx significant for COPD, EtOH abuse, Breast Ca, remote brain aneurysm s/p clip who presented with COPD exacerbation initially treated with duoneb and steroid tx  received MICU consult for elevated lactate and COPD exacerbation. and elevated CIWA score        resp distress  improved  c/w steroids and abs  pulm f/u    -Pt with hx of EtOH abuse   c/w ciwa      - Pt with COPD on home O2  -supplemental O2 - titrate to maintain SPO2 > 88%  abs for possible aspir     -diet as tolerated  -strict I & O's -   ppi     transaminitis sec to etoh abuse  elevated  ruq sono neg for obstruction  fatty liver   gi eval noted    hyponatremia /improved   /renal to f/u  fluid management as per renal
54 year old female with PMH of stage 3 breast cancer on aromasin, RA, Psoriasis on Otezla, brain aneurysm, s/p clip 1988, right jugular DVT, Catheter related MSSA bacteremia 2015,  ETOH abuse, COPD on oxygen presents here with dyspnea, ETOH withdrawal, hyponatremia, hyperkalemia.     1. Resp Distress   likely secondary to pulm disease, COPD exacerbation   s/p RRT 10/22 , micu consult noted  cv stable. no decomp chf on exam  no evidence of acs   CTA chest negative for PE, + severe emphysema   management  per pulm / med   recent echo with grossly normal LV function     2. Sinus tachycardia - resolved   likely secondary to withdrawal, dehydration, anxiety, copd exacerbation  no evidence of acs , asymptomatic  recent echo with grossly normal lv function     3 med f/u, CIWA  protocol     4. Elevated lactic acid level  work up per med     5. hyponatremia  mangement per renal , IVF   hyperkalemia, resolved
54 year old female with PMH of stage 3 breast cancer on aromasin, RA, Psoriasis on Otezla, brain aneurysm, s/p clip 1988, right jugular DVT, Catheter related MSSA bacteremia 2015,  ETOH abuse, COPD on oxygen presents here with dyspnea, ETOH withdrawal, hyponatremia, hyperkalemia.     1. Resp Distress   likely secondary to pulm disease, COPD exacerbation   s/p RRT 10/22 , micu consult noted  cv stable. no decomp chf on exam  no evidence of acs   CTA chest negative for PE, + severe emphysema   management  per pulm / med   recent echo with grossly normal LV function     2. Sinus tachycardia -resolved   likely secondary to withdrawal, dehydration, anxiety, copd exacerbation  no evidence of acs , asymptomatic  recent echo with grossly normal lv function     3 med f/u, CIWA  protocol     4. Elevated lactic acid level  work up per med     5. hyponatremia  mangement per renal , IVF   hyperkalemia, resolved
54 yr old female with  Hx significant for COPD, EtOH abuse, Breast Ca, remote brain aneurysm s/p clip who presented with COPD exacerbation initially treated with duoneb and steroid tx  received MICU consult for elevated lactate and COPD exacerbation. and elevated CIWA score        resp distress  RRT Called   pulm f/u  micu eval  renal eval    -Pt with hx of EtOH abuse   c/w ciwa        - Pt with COPD on home O2  pulm f/u  -supplemental O2 - titrate to maintain SPO2 > 88%    -diet as tolerated  -strict I & O's -   ppi     transaminitis sec to etoh abuse  elevated  ruq sono neg for obstruction  fatty liver   gi eval noted    prognosis guarded
54 yr old female with  Hx significant for COPD, EtOH abuse, Breast Ca, remote brain aneurysm s/p clip who presented with COPD exacerbation initially treated with duoneb and steroid tx  received MICU consult for elevated lactate and COPD exacerbation. and elevated CIWA score        resp distress  improved  c/w steroids and abs  pulm f/u  pulm f/u    -Pt with hx of EtOH abuse   c/w ciwa        - Pt with COPD on home O2  -supplemental O2 - titrate to maintain SPO2 > 88%    -diet as tolerated  -strict I & O's -   ppi     transaminitis sec to etoh abuse  elevated  ruq sono neg for obstruction  fatty liver   gi eval noted    hyponatremia /improved   /renal to f/u  fluid management as per renal
54 yr old female with  Hx significant for COPD, EtOH abuse, Breast Ca, remote brain aneurysm s/p clip who presented with COPD exacerbation initially treated with duoneb and steroid tx  received MICU consult for elevated lactate and COPD exacerbation. and elevated CIWA score        resp distress  resolved  c/w steroids/po    limit abs /d/c today   pulm f/u noted     -Pt with hx of EtOH abuse   no alcohol withdrawal  taper over today       - Pt with COPD on home O2  -supplemental O2 - titrate to maintain SPO2 > 88%  c/w pulm meds     -diet as tolerated  -strict I & O's -   ppi     transaminitis sec to etoh abuse  elevated  ruq sono neg for obstruction  fatty liver   gi eval noted    hyponatremia /improved   /renal to f/u  fluid management as per renal   pt   d/c planning
54 yr old female with Hx significant for COPD, ETOH abuse, breast Ca, remote brain aneurysm s/p clip who presented with COPD exacerbation recently discharged admitted with increasing dyspnea and also found to be withdrawing from etoh. required BIPAP for sob as well as steroids. pt also with hyponatremia and lactic acidosis    1- hyponatremia  2- ETOH abuse and withdrawal  4- copd exacerbation       k in range    k is in range  na is improving towards normal now i d/w her to limit fluid intake to one liter a day  copd improving hypoxemia improving   cont prednisone and nebs
54 yr old female with Hx significant for COPD, ETOH abuse, breast Ca, remote brain aneurysm s/p clip who presented with COPD exacerbation recently discharged admitted with increasing dyspnea and also found to be withdrawing from etoh. required BIPAP for sob as well as steroids. pt also with hyponatremia and lactic acidosis    1- hyponatremia  2- lactic acidosis  3- ETOH abuse and withdrawal  4- hyperkalemia  5- copd exacerbation       hypomagnesemia check repeat mag  k is better  copd improving hypoxemia  sodium correcting rapidly albeit it was an acute decrease earlier as well but attempt to keep na at 125-128 range for another 12 hours or so  d5w was started check repeat na level   cont steroids
ASSESSMENT:    critically ill woman with acute on chronic hypoxic respiratory failure due to severe COPD/emphysema with bronchospasm, significant alcohol withdrawal and hyponatremia    breast cancer s/p bilateral mastectomy    rheumatoid and psoriatic arthritis on Otezla - immunocompromised host    brain aneurysm s/p clipping    PLAN/RECOMMENDATIONS:    start BIPAP for increased work of breathing  stat ABG  solumedrol 20mg IVP q6h  albuterol/atrovent q6h  pulmicort 0.5mg nebs q12h  singulair  empiric antibiotics for possible aspiration pneumonia (zosyn)  treat hyponatremia  treat alcohol withdrawal  DVT prophylaxis  GI prophylaxis - protonix  bowel regimen  apremilast  exemestane  folate/thiamine/MVI  would recommend ICU monitoring for closer observation as patient is pulling off her oxygen/BIPAP, electrolye stabilization and for treatment of alcohol withdrawal    Will follow with you. Plan of care discussed with the dedicated floor NP and RN at bedside and with Dr. Vamshi Franks MD, [facility]  [phone]  Pulmonary Medicine
ASSESSMENT:    resolved acute on chronic hypoxic respiratory failure due to severe COPD/emphysema with bronchospasm    alcohol withdrawal     hyponatremia    breast cancer s/p bilateral mastectomy    rheumatoid and psoriatic arthritis on Otezla - immunocompromised host    brain aneurysm s/p clipping    PLAN/RECOMMENDATIONS:    stable oxygenation on nasal canula  change IV to oral steroids - prednisone 40mg daily  albuterol/atrovent q6h  pulmicort 0.5mg nebs q12h  singulair  empiric antibiotics for possible aspiration pneumonia (invanz) -> no objection to switching to oral antibiotics when otherwise ready for discharge  treat hyponatremia  treat alcohol withdrawal  DVT prophylaxis  GI prophylaxis - protonix  bowel regimen  apremilast  exemestane  folate/thiamine/MVI  would recommend ICU monitoring for closer observation as patient is pulling off her oxygen/BIPAP, electrolye stabilization and for treatment of alcohol withdrawal    Will follow with you. Plan of care discussed with the patient at bedside and the dedicated floor NP.    Joss Franks MD, [facility]  [phone]  Pulmonary Medicine
ASSESSMENT:    resolved acute on chronic hypoxic respiratory failure due to severe COPD/emphysema with bronchospasm    alcohol withdrawal     hyponatremia    breast cancer s/p bilateral mastectomy    rheumatoid and psoriatic arthritis on Otezla - immunocompromised host    brain aneurysm s/p clipping    PLAN/RECOMMENDATIONS:    stable oxygenation on nasal canula  prednisone 40mg daily - taper as written  albuterol/atrovent q6h  pulmicort 0.5mg nebs q12h  singulair  continue invanz given multiple drug allergies - can discontinue when ready for discharge  treat hyponatremia  treat alcohol withdrawal  DVT prophylaxis - SQ heparin  GI prophylaxis - protonix  bowel regimen  apremilast  exemestane  folate/thiamine/MVI    Will follow with you. Plan of care discussed with the patient at bedside and the dedicated floor NP. No pulmonary objection to discharge. Her usual pulmonary medications include pulmicort 0.5mg nebs q12h - yupelri neb q24h - brovana nebs q12h    Joss Franks MD, [facility]  [phone]  Pulmonary Medicine
ASSESSMENT:    resolved acute on chronic hypoxic respiratory failure due to severe COPD/emphysema with bronchospasm    alcohol withdrawal     hyponatremia    breast cancer s/p bilateral mastectomy    rheumatoid and psoriatic arthritis on Otezla - immunocompromised host    brain aneurysm s/p clipping    PLAN/RECOMMENDATIONS:    stable oxygenation on nasal canula @ 3lpm   prednisone 40mg daily - taper as written  albuterol/atrovent q6h until discharge  pulmicort 0.5mg nebs q12h  singulair  continue invanz given multiple drug allergies - can discontinue when ready for discharge  treat hyponatremia  treat alcohol withdrawal  DVT prophylaxis - SQ heparin  GI prophylaxis - protonix  bowel regimen  apremilast  exemestane  folate/thiamine/MVI    Will follow with you. Plan of care discussed with the patient at bedside and the dedicated floor NP. No pulmonary objection to discharge. Her usual pulmonary medications include pulmicort 0.5mg nebs q12h - yupelri neb q24h - brovana nebs q12h - ventolin 2 puffs q4h as needed for shortness of breath or wheeze    Joss Franks MD, [facility]  [phone]  Pulmonary Medicine
ASSESSMENT:    resolved acute on chronic hypoxic respiratory failure due to severe COPD/emphysema with bronchospasm    alcohol withdrawal, treated     hyponatremia improved    breast cancer s/p bilateral mastectomy    rheumatoid and psoriatic arthritis on Otezla - immunocompromised host    brain aneurysm s/p clipping    PLAN/RECOMMENDATIONS:    stable oxygenation on nasal canula @ 3lpm. Pt has at home   prednisone 40mg daily - taper as written  albuterol/atrovent q6h until discharge  pulmicort 0.5mg nebs q12h  singulair  continue invanz given multiple drug allergies - can discontinue when ready for discharge  DVT prophylaxis - SQ heparin  GI prophylaxis - protonix      No pulmonary objection to discharge.     Her usual pulmonary medications include pulmicort 0.5mg nebs q12h - yupelri neb q24h - brovana nebs q12h - ventolin 2 puffs q4h as needed for shortness of breath or wheeze    Junior Mansfield MD  Pulmonary Medicine  [phone]

## 2019-10-26 NOTE — DISCHARGE NOTE NURSING/CASE MANAGEMENT/SOCIAL WORK - PATIENT PORTAL LINK FT
You can access the FollowMyHealth Patient Portal offered by [facility] by registering at the following website: [email or web address]. By joining [facility]’s FollowMyHealth portal, you will also be able to view your health information using other applications (apps) compatible with our system.

## 2019-10-26 NOTE — PROVIDER CONTACT NOTE (OTHER) - ACTION/TREATMENT ORDERED:
provider notified, lotion applied. Pt educated on risk v benefits of scratching and verbalized understanding

## 2019-10-26 NOTE — PROGRESS NOTE ADULT - SUBJECTIVE AND OBJECTIVE BOX
CHIEF COMPLAINT:Patient is a 54y old  Female who presents with a chief complaint of EtOH withdrawal, COPD exacerbation (22 Oct 2019 10:12)    	        PAST MEDICAL & SURGICAL HISTORY:  Prophylactic measure  Breast cancer  Brain aneurysm: with clips  Psoriasis  RA (rheumatoid arthritis)  Psoriasis  Breast cancer, stage 3  History of modified radical mastectomy of both breasts  S/P bilateral mastectomy  Brain aneurysm: 1988 two clips          REVIEW OF SYSTEMS:  feels well  EYES: No eye pain, visual disturbances, or discharge  NECK: No pain or stiffness  RESPIRATORY: No cough, wheezing, chills or hemoptysis; No Shortness of Breath  CARDIOVASCULAR: No chest pain, palpitations, passing out, dizziness  GASTROINTESTINAL: No abdominal or epigastric pain. No nausea, vomiting, or hematemesis;   GENITOURINARY: No dysuria, frequency, hematuria, or incontinence  NEUROLOGICAL: No headaches,    Medications:  MEDICATIONS  (STANDING):  ALBUTerol/ipratropium for Nebulization 3 milliLiter(s) Nebulizer every 6 hours  ALPRAZolam 0.25 milliGRAM(s) Oral three times a day  apremilast 30 milliGRAM(s) Oral two times a day  buDESOnide    Inhalation Suspension 0.5 milliGRAM(s) Inhalation every 12 hours  chlorhexidine 2% Cloths 1 Application(s) Topical daily  cholecalciferol 1000 Unit(s) Oral daily  docusate sodium 100 milliGRAM(s) Oral two times a day  ertapenem  IVPB 1000 milliGRAM(s) IV Intermittent every 24 hours  exemestane 25 milliGRAM(s) Oral daily  folic acid 1 milliGRAM(s) Oral daily  heparin  Injectable 5000 Unit(s) SubCutaneous every 12 hours  hydrocortisone 1% Cream 1 Application(s) Topical two times a day  LORazepam     Tablet   Oral   LORazepam     Tablet 0.5 milliGRAM(s) Oral every 12 hours  montelukast 10 milliGRAM(s) Oral daily  multivitamin 1 Tablet(s) Oral daily  pantoprazole    Tablet 40 milliGRAM(s) Oral before breakfast  polyethylene glycol 3350 17 Gram(s) Oral daily  predniSONE   Tablet   Oral   predniSONE   Tablet 40 milliGRAM(s) Oral daily  prochlorperazine   Tablet 10 milliGRAM(s) Oral daily  senna 2 Tablet(s) Oral at bedtime  thiamine 100 milliGRAM(s) Oral daily    MEDICATIONS  (PRN):  LORazepam   Injectable 2 milliGRAM(s) IV Push every 1 hour PRN Symptom-triggered: each CIWA -Ar score 8 or GREATER    	    PHYSICAL EXAM:  T(C): 37 (10-26-19 @ 07:23), Max: 37.4 (10-25-19 @ 13:13)  HR: 85 (10-26-19 @ 07:23) (85 - 92)  BP: 131/80 (10-26-19 @ 07:23) (121/77 - 138/80)  RR: 18 (10-26-19 @ 07:23) (18 - 20)  SpO2: 98% (10-26-19 @ 07:23) (93% - 98%)  Wt(kg): --  I&O's Summary    25 Oct 2019 07:01  -  26 Oct 2019 07:00  --------------------------------------------------------  IN: 710 mL / OUT: 2500 mL / NET: -1790 mL    26 Oct 2019 07:01  -  26 Oct 2019 08:50  --------------------------------------------------------  IN: 0 mL / OUT: 900 mL / NET: -900 mL        Appearance: Normal	  HEENT:   Normal oral mucosa, PERRL, EOMI	  Lymphatic: No lymphadenopathy  Cardiovascular: Normal S1 S2, No JVD,   Respiratory: dec bs   Psychiatry: A & O x 3,  Gastrointestinal:  Soft, Non-tender, + BS	    Neurologic: Non-focal  Extremities: dec rom  Vascular: Peripheral pulses palpable     TELEMETRY: 	    ECG:  	  RADIOLOGY:  OTHER: 	  	  LABS:	 	    CARDIAC MARKERS:                                9.6    6.38  )-----------( 169      ( 24 Oct 2019 13:02 )             29.3     10-25    130<L>  |  83<L>  |  11  ----------------------------<  99  3.7   |  26  |  0.54    Ca    8.3<L>      25 Oct 2019 07:26  Mg     1.6     10-25    TPro  6.1  /  Alb  3.5  /  TBili  0.9  /  DBili  x   /  AST  135<H>  /  ALT  140<H>  /  AlkPhos  49  10-25    proBNP:   Lipid Profile:   HgA1c:   TSH:

## 2019-10-26 NOTE — PROGRESS NOTE ADULT - PROVIDER SPECIALTY LIST ADULT
Cardiology
Cardiology
Internal Medicine
Nephrology
Nephrology
Pulmonology
Internal Medicine
Nephrology
Internal Medicine

## 2019-10-26 NOTE — PROGRESS NOTE ADULT - SUBJECTIVE AND OBJECTIVE BOX
Follow-up Pulm Progress Note    The patient was seen and examined. Notes reviewed and discussed with staff/team as applicable      No new respiratory events overnight. Feels ok .Wants to go home.     Denies: increased  SOB, Chest pain, increased cough, colored phlegm, hemoptysis, N/V/D, neck stiffness, dysuria      Vital Signs Last 24 Hrs  T(C): 37 (26 Oct 2019 07:23), Max: 37.4 (25 Oct 2019 13:13)  T(F): 98.6 (26 Oct 2019 07:23), Max: 99.4 (25 Oct 2019 13:13)  HR: 85 (26 Oct 2019 07:23) (85 - 92)  BP: 131/80 (26 Oct 2019 07:23) (121/77 - 138/80)  BP(mean): --  RR: 18 (26 Oct 2019 07:23) (18 - 20)  SpO2: 98% (26 Oct 2019 07:23) (93% - 98%)          10-25 @ 07:01  -  10-26 @ 07:00  --------------------------------------------------------  IN: 710 mL / OUT: 2500 mL / NET: -1790 mL          Medications:  MEDICATIONS  (STANDING):  ALBUTerol/ipratropium for Nebulization 3 milliLiter(s) Nebulizer every 6 hours  ALPRAZolam 0.25 milliGRAM(s) Oral three times a day  apremilast 30 milliGRAM(s) Oral two times a day  buDESOnide    Inhalation Suspension 0.5 milliGRAM(s) Inhalation every 12 hours  chlorhexidine 2% Cloths 1 Application(s) Topical daily  cholecalciferol 1000 Unit(s) Oral daily  docusate sodium 100 milliGRAM(s) Oral two times a day  ertapenem  IVPB 1000 milliGRAM(s) IV Intermittent every 24 hours  exemestane 25 milliGRAM(s) Oral daily  folic acid 1 milliGRAM(s) Oral daily  heparin  Injectable 5000 Unit(s) SubCutaneous every 12 hours  hydrocortisone 1% Cream 1 Application(s) Topical two times a day  LORazepam     Tablet   Oral   LORazepam     Tablet 0.5 milliGRAM(s) Oral every 12 hours  montelukast 10 milliGRAM(s) Oral daily  multivitamin 1 Tablet(s) Oral daily  pantoprazole    Tablet 40 milliGRAM(s) Oral before breakfast  polyethylene glycol 3350 17 Gram(s) Oral daily  predniSONE   Tablet   Oral   predniSONE   Tablet 40 milliGRAM(s) Oral daily  prochlorperazine   Tablet 10 milliGRAM(s) Oral daily  senna 2 Tablet(s) Oral at bedtime  thiamine 100 milliGRAM(s) Oral daily    MEDICATIONS  (PRN):  LORazepam   Injectable 2 milliGRAM(s) IV Push every 1 hour PRN Symptom-triggered: each CIWA -Ar score 8 or GREATER      Allergies    amoxicillin (Anaphylaxis)  Levaquin (Other; Anaphylaxis)  Levaquin (Unknown)  penicillin (Anaphylaxis)  penicillins (Anaphylaxis)        Physical Examination:    Pleasant female, NAD. Psoriasis  Neck: no JVD, LAD, accessory muscle use  PULM: Clear to auscultation bilaterally, no wheezes, rales, rhonchi  CVS: Regular rate and rhythm, S1S2, no murmurs, rubs, or gallops  Abdomen: soft, NT  Extremities: no edema  Neuro: oriented. remembers me. non focal      LABS:                        9.6    6.38  )-----------( 169      ( 24 Oct 2019 13:02 )             29.3     10-25    130<L>  |  83<L>  |  11  ----------------------------<  99  3.7   |  26  |  0.54    Ca    8.3<L>      25 Oct 2019 07:26  Mg     1.6     10-25    TPro  6.1  /  Alb  3.5  /  TBili  0.9  /  DBili  x   /  AST  135<H>  /  ALT  140<H>  /  AlkPhos  49  10-25

## 2019-11-14 ENCOUNTER — INPATIENT (INPATIENT)
Facility: HOSPITAL | Age: 54
LOS: 3 days | Discharge: HOME | DRG: 896 | End: 2019-11-18
Attending: INTERNAL MEDICINE | Admitting: INTERNAL MEDICINE
Payer: COMMERCIAL

## 2019-11-14 VITALS
HEART RATE: 93 BPM | TEMPERATURE: 98 F | HEIGHT: 64 IN | DIASTOLIC BLOOD PRESSURE: 85 MMHG | SYSTOLIC BLOOD PRESSURE: 128 MMHG | OXYGEN SATURATION: 98 % | RESPIRATION RATE: 20 BRPM | WEIGHT: 139.99 LBS

## 2019-11-14 DIAGNOSIS — Z90.13 ACQUIRED ABSENCE OF BILATERAL BREASTS AND NIPPLES: Chronic | ICD-10-CM

## 2019-11-14 DIAGNOSIS — Z90.10 ACQUIRED ABSENCE OF UNSPECIFIED BREAST AND NIPPLE: Chronic | ICD-10-CM

## 2019-11-14 LAB
ALBUMIN SERPL ELPH-MCNC: 4.1 G/DL — SIGNIFICANT CHANGE UP (ref 3.3–5)
ALP SERPL-CCNC: 70 U/L — SIGNIFICANT CHANGE UP (ref 40–120)
ALT FLD-CCNC: 53 U/L — HIGH (ref 10–45)
ANION GAP SERPL CALC-SCNC: 16 MMOL/L — SIGNIFICANT CHANGE UP (ref 5–17)
APTT BLD: 29.3 SEC — SIGNIFICANT CHANGE UP (ref 27.5–36.3)
AST SERPL-CCNC: 76 U/L — HIGH (ref 10–40)
BASOPHILS # BLD AUTO: 0.03 K/UL — SIGNIFICANT CHANGE UP (ref 0–0.2)
BASOPHILS NFR BLD AUTO: 0.5 % — SIGNIFICANT CHANGE UP (ref 0–2)
BILIRUB SERPL-MCNC: 1.1 MG/DL — SIGNIFICANT CHANGE UP (ref 0.2–1.2)
BLD GP AB SCN SERPL QL: NEGATIVE — SIGNIFICANT CHANGE UP
BUN SERPL-MCNC: <4 MG/DL — LOW (ref 7–23)
CALCIUM SERPL-MCNC: 9.6 MG/DL — SIGNIFICANT CHANGE UP (ref 8.4–10.5)
CHLORIDE SERPL-SCNC: 83 MMOL/L — LOW (ref 96–108)
CO2 SERPL-SCNC: 22 MMOL/L — SIGNIFICANT CHANGE UP (ref 22–31)
CREAT SERPL-MCNC: 0.41 MG/DL — LOW (ref 0.5–1.3)
EOSINOPHIL # BLD AUTO: 0.39 K/UL — SIGNIFICANT CHANGE UP (ref 0–0.5)
EOSINOPHIL NFR BLD AUTO: 6.8 % — HIGH (ref 0–6)
GAS PNL BLDV: SIGNIFICANT CHANGE UP
GLUCOSE SERPL-MCNC: 91 MG/DL — SIGNIFICANT CHANGE UP (ref 70–99)
HCT VFR BLD CALC: 33.4 % — LOW (ref 34.5–45)
HGB BLD-MCNC: 11.8 G/DL — SIGNIFICANT CHANGE UP (ref 11.5–15.5)
IMM GRANULOCYTES NFR BLD AUTO: 1.2 % — SIGNIFICANT CHANGE UP (ref 0–1.5)
INR BLD: 0.97 RATIO — SIGNIFICANT CHANGE UP (ref 0.88–1.16)
LYMPHOCYTES # BLD AUTO: 0.56 K/UL — LOW (ref 1–3.3)
LYMPHOCYTES # BLD AUTO: 9.8 % — LOW (ref 13–44)
MCHC RBC-ENTMCNC: 35.3 GM/DL — SIGNIFICANT CHANGE UP (ref 32–36)
MCHC RBC-ENTMCNC: 35.8 PG — HIGH (ref 27–34)
MCV RBC AUTO: 101.2 FL — HIGH (ref 80–100)
MONOCYTES # BLD AUTO: 0.65 K/UL — SIGNIFICANT CHANGE UP (ref 0–0.9)
MONOCYTES NFR BLD AUTO: 11.4 % — SIGNIFICANT CHANGE UP (ref 2–14)
NEUTROPHILS # BLD AUTO: 4.01 K/UL — SIGNIFICANT CHANGE UP (ref 1.8–7.4)
NEUTROPHILS NFR BLD AUTO: 70.3 % — SIGNIFICANT CHANGE UP (ref 43–77)
NRBC # BLD: 0 /100 WBCS — SIGNIFICANT CHANGE UP (ref 0–0)
NT-PROBNP SERPL-SCNC: 75 PG/ML — SIGNIFICANT CHANGE UP (ref 0–300)
PLATELET # BLD AUTO: 268 K/UL — SIGNIFICANT CHANGE UP (ref 150–400)
POTASSIUM SERPL-MCNC: 5.1 MMOL/L — SIGNIFICANT CHANGE UP (ref 3.5–5.3)
POTASSIUM SERPL-SCNC: 5.1 MMOL/L — SIGNIFICANT CHANGE UP (ref 3.5–5.3)
PROT SERPL-MCNC: 6.9 G/DL — SIGNIFICANT CHANGE UP (ref 6–8.3)
PROTHROM AB SERPL-ACNC: 11.1 SEC — SIGNIFICANT CHANGE UP (ref 10–12.9)
RBC # BLD: 3.3 M/UL — LOW (ref 3.8–5.2)
RBC # FLD: 13.5 % — SIGNIFICANT CHANGE UP (ref 10.3–14.5)
RH IG SCN BLD-IMP: POSITIVE — SIGNIFICANT CHANGE UP
SODIUM SERPL-SCNC: 121 MMOL/L — LOW (ref 135–145)
TROPONIN T, HIGH SENSITIVITY RESULT: 11 NG/L — SIGNIFICANT CHANGE UP (ref 0–51)
WBC # BLD: 5.71 K/UL — SIGNIFICANT CHANGE UP (ref 3.8–10.5)
WBC # FLD AUTO: 5.71 K/UL — SIGNIFICANT CHANGE UP (ref 3.8–10.5)

## 2019-11-14 PROCEDURE — 71275 CT ANGIOGRAPHY CHEST: CPT | Mod: 26

## 2019-11-14 PROCEDURE — 93971 EXTREMITY STUDY: CPT | Mod: 26

## 2019-11-14 PROCEDURE — 71045 X-RAY EXAM CHEST 1 VIEW: CPT | Mod: 26

## 2019-11-14 PROCEDURE — 99291 CRITICAL CARE FIRST HOUR: CPT

## 2019-11-14 RX ORDER — SODIUM CHLORIDE 9 MG/ML
1000 INJECTION, SOLUTION INTRAVENOUS
Refills: 0 | Status: DISCONTINUED | OUTPATIENT
Start: 2019-11-14 | End: 2019-11-14

## 2019-11-14 RX ORDER — SODIUM CHLORIDE 9 MG/ML
1000 INJECTION, SOLUTION INTRAVENOUS
Refills: 0 | Status: COMPLETED | OUTPATIENT
Start: 2019-11-14 | End: 2019-11-15

## 2019-11-14 RX ADMIN — Medication 2 MILLIGRAM(S): at 23:00

## 2019-11-14 RX ADMIN — Medication 50 MILLIGRAM(S): at 18:34

## 2019-11-14 NOTE — ED PROVIDER NOTE - NS ED ROS FT
GENERAL: No fever or chills, EYES: no change in vision, HEENT: no trouble speaking, CARDIAC: no chest pain, palpitation PULMONARY: + cough or + SOB, GI: no abdominal pain, no nausea, no vomiting, no diarrhea or constipation,+ BRBPR : No changes in urination, SKIN: no rashes, NEURO: no headache,  MSK: No muscle pain ~Misty Koo MD

## 2019-11-14 NOTE — ED PROVIDER NOTE - CLINICAL SUMMARY MEDICAL DECISION MAKING FREE TEXT BOX
Misty Koo MD: 54 yr old female with Hx significant for COPD on home O2 3L, ETOH abuse, breast Ca, remote brain aneurysm s/p clip, DVT  who p/w increase nonexertional SOB x 3 days and RLE edema with TTP. COPD exacerbation but got to r/o DVT or PE vs acs will get labs trop, bnp cta doppler u/s

## 2019-11-14 NOTE — ED ADULT NURSE NOTE - NSIMPLEMENTINTERV_GEN_ALL_ED
Implemented All Fall Risk Interventions:  [place] to call system. Call bell, personal items and telephone within reach. Instruct patient to call for assistance. Room bathroom lighting operational. Non-slip footwear when patient is off stretcher. Physically safe environment: no spills, clutter or unnecessary equipment. Stretcher in lowest position, wheels locked, appropriate side rails in place. Provide visual cue, wrist band, yellow gown, etc. Monitor gait and stability. Monitor for mental status changes and reorient to person, place, and time. Review medications for side effects contributing to fall risk. Reinforce activity limits and safety measures with patient and family.

## 2019-11-14 NOTE — ED PROVIDER NOTE - ATTENDING CONTRIBUTION TO CARE
54yr F hx of COPD on home O2, etoh daily use (last drink 3 hours ago) breast CA, DVT not on AC now. p/w RLE swelling, worsening SOB and TSE and rectal bleeding. denies cp. denies fever chills, uri symptoms. denies urinary sx.   exam notable for tremors, decreased air movement , no murmur, Lt > Rt low ext pitting edema. soft abd.  nebs administered and on re exam improved air movement.   concern for DVT, PE, and less likely COPD flare. will need GIB work up. plan for rectal, nebs, CIWA monitoring, CTPE and DVT US and admit after wards .

## 2019-11-14 NOTE — ED ADULT NURSE NOTE - OBJECTIVE STATEMENT
55 yo female presents to the ED via waiting room from home complaining of SOB and RLE swelling x 2 days. Patient 53 yo female presents to the ED via waiting room from home complaining of SOB and RLE swelling x 2 days. Per  at bedside, was told by home care nurse that the RLE was swelling and she was concerned of a DVT. Denies any pain in the calves. Per , there has been blood in the stool x 2 days. Patient states she has had a productive cough x 3 days, sputum is "white." Per , patient has been experiencing chills during the night. Patient is A&Ox3, spontaneous breathing. Skin is warm and dry. PMH of ETOH abuse, states drinking 5 drinks/day, last drink "3 hours ago, just beer" COPD on 3L at home, brain aneurysm, breast Ca. Per , patient has difficulty walking and has fallen frequently. Denies headache, dizziness, vision changes, chest pain, abdominal pain, nausea, vomiting, diarrhea, fevers, dysuria, hematuria, recent illness travel. 53 yo female presents to the ED via EMS from home complaining of SOB and RLE swelling x 2 days. Per  at bedside, was told by home care nurse that the RLE was swollen and she was concerned of a DVT. Denies any pain in the calves. Per , there has been blood in the stool x 2 days. Patient states she has had a productive cough x 3 days, sputum is "white." Per , patient has been experiencing chills during the night. Patient is A&Ox3, spontaneous breathing. Skin is warm and dry. PMH of ETOH abuse, states drinking 5 drinks/day, last drink "3 hours ago, just beer" COPD on 3L at home, brain aneurysm, breast Ca. Per , patient has difficulty walking and has fallen frequently, primarily wheelchair/walker. Denies headache, dizziness, vision changes, chest pain, abdominal pain, vomiting, diarrhea, fevers, dysuria, hematuria, recent illness travel.

## 2019-11-15 DIAGNOSIS — F10.239 ALCOHOL DEPENDENCE WITH WITHDRAWAL, UNSPECIFIED: ICD-10-CM

## 2019-11-15 DIAGNOSIS — R06.02 SHORTNESS OF BREATH: ICD-10-CM

## 2019-11-15 DIAGNOSIS — K62.5 HEMORRHAGE OF ANUS AND RECTUM: ICD-10-CM

## 2019-11-15 DIAGNOSIS — Z71.89 OTHER SPECIFIED COUNSELING: ICD-10-CM

## 2019-11-15 LAB
ALBUMIN SERPL ELPH-MCNC: 3.3 G/DL — SIGNIFICANT CHANGE UP (ref 3.3–5)
ALBUMIN SERPL ELPH-MCNC: 3.3 G/DL — SIGNIFICANT CHANGE UP (ref 3.3–5)
ALP SERPL-CCNC: 54 U/L — SIGNIFICANT CHANGE UP (ref 40–120)
ALP SERPL-CCNC: 58 U/L — SIGNIFICANT CHANGE UP (ref 40–120)
ALT FLD-CCNC: 45 U/L — SIGNIFICANT CHANGE UP (ref 10–45)
ALT FLD-CCNC: 47 U/L — HIGH (ref 10–45)
ANION GAP SERPL CALC-SCNC: 14 MMOL/L — SIGNIFICANT CHANGE UP (ref 5–17)
ANION GAP SERPL CALC-SCNC: 15 MMOL/L — SIGNIFICANT CHANGE UP (ref 5–17)
AST SERPL-CCNC: 58 U/L — HIGH (ref 10–40)
AST SERPL-CCNC: 58 U/L — HIGH (ref 10–40)
BILIRUB DIRECT SERPL-MCNC: 0.2 MG/DL — SIGNIFICANT CHANGE UP (ref 0–0.2)
BILIRUB DIRECT SERPL-MCNC: 0.3 MG/DL — HIGH (ref 0–0.2)
BILIRUB INDIRECT FLD-MCNC: 0.4 MG/DL — SIGNIFICANT CHANGE UP (ref 0.2–1)
BILIRUB INDIRECT FLD-MCNC: 0.6 MG/DL — SIGNIFICANT CHANGE UP (ref 0.2–1)
BILIRUB SERPL-MCNC: 0.7 MG/DL — SIGNIFICANT CHANGE UP (ref 0.2–1.2)
BILIRUB SERPL-MCNC: 0.8 MG/DL — SIGNIFICANT CHANGE UP (ref 0.2–1.2)
BUN SERPL-MCNC: 7 MG/DL — SIGNIFICANT CHANGE UP (ref 7–23)
BUN SERPL-MCNC: <4 MG/DL — LOW (ref 7–23)
CALCIUM SERPL-MCNC: 8.8 MG/DL — SIGNIFICANT CHANGE UP (ref 8.4–10.5)
CALCIUM SERPL-MCNC: 8.9 MG/DL — SIGNIFICANT CHANGE UP (ref 8.4–10.5)
CHLORIDE SERPL-SCNC: 90 MMOL/L — LOW (ref 96–108)
CHLORIDE SERPL-SCNC: 90 MMOL/L — LOW (ref 96–108)
CO2 SERPL-SCNC: 27 MMOL/L — SIGNIFICANT CHANGE UP (ref 22–31)
CO2 SERPL-SCNC: 27 MMOL/L — SIGNIFICANT CHANGE UP (ref 22–31)
CREAT SERPL-MCNC: 0.49 MG/DL — LOW (ref 0.5–1.3)
CREAT SERPL-MCNC: 0.52 MG/DL — SIGNIFICANT CHANGE UP (ref 0.5–1.3)
GLUCOSE SERPL-MCNC: 120 MG/DL — HIGH (ref 70–99)
GLUCOSE SERPL-MCNC: 133 MG/DL — HIGH (ref 70–99)
MAGNESIUM SERPL-MCNC: 1.4 MG/DL — LOW (ref 1.6–2.6)
MAGNESIUM SERPL-MCNC: 1.6 MG/DL — SIGNIFICANT CHANGE UP (ref 1.6–2.6)
PHOSPHATE SERPL-MCNC: 2.8 MG/DL — SIGNIFICANT CHANGE UP (ref 2.5–4.5)
PHOSPHATE SERPL-MCNC: 4.4 MG/DL — SIGNIFICANT CHANGE UP (ref 2.5–4.5)
POTASSIUM SERPL-MCNC: 4 MMOL/L — SIGNIFICANT CHANGE UP (ref 3.5–5.3)
POTASSIUM SERPL-MCNC: 5.1 MMOL/L — SIGNIFICANT CHANGE UP (ref 3.5–5.3)
POTASSIUM SERPL-SCNC: 4 MMOL/L — SIGNIFICANT CHANGE UP (ref 3.5–5.3)
POTASSIUM SERPL-SCNC: 5.1 MMOL/L — SIGNIFICANT CHANGE UP (ref 3.5–5.3)
PROT SERPL-MCNC: 5.5 G/DL — LOW (ref 6–8.3)
PROT SERPL-MCNC: 6 G/DL — SIGNIFICANT CHANGE UP (ref 6–8.3)
SODIUM SERPL-SCNC: 131 MMOL/L — LOW (ref 135–145)
SODIUM SERPL-SCNC: 132 MMOL/L — LOW (ref 135–145)

## 2019-11-15 RX ORDER — ALPRAZOLAM 0.25 MG
0.25 TABLET ORAL ONCE
Refills: 0 | Status: DISCONTINUED | OUTPATIENT
Start: 2019-11-15 | End: 2019-11-15

## 2019-11-15 RX ORDER — THIAMINE MONONITRATE (VIT B1) 100 MG
100 TABLET ORAL DAILY
Refills: 0 | Status: DISCONTINUED | OUTPATIENT
Start: 2019-11-15 | End: 2019-11-18

## 2019-11-15 RX ORDER — SENNA PLUS 8.6 MG/1
2 TABLET ORAL AT BEDTIME
Refills: 0 | Status: DISCONTINUED | OUTPATIENT
Start: 2019-11-15 | End: 2019-11-18

## 2019-11-15 RX ORDER — HEPARIN SODIUM 5000 [USP'U]/ML
5000 INJECTION INTRAVENOUS; SUBCUTANEOUS EVERY 12 HOURS
Refills: 0 | Status: DISCONTINUED | OUTPATIENT
Start: 2019-11-15 | End: 2019-11-18

## 2019-11-15 RX ORDER — MONTELUKAST 4 MG/1
10 TABLET, CHEWABLE ORAL DAILY
Refills: 0 | Status: DISCONTINUED | OUTPATIENT
Start: 2019-11-15 | End: 2019-11-18

## 2019-11-15 RX ORDER — FOLIC ACID 0.8 MG
1 TABLET ORAL DAILY
Refills: 0 | Status: DISCONTINUED | OUTPATIENT
Start: 2019-11-15 | End: 2019-11-18

## 2019-11-15 RX ORDER — PANTOPRAZOLE SODIUM 20 MG/1
40 TABLET, DELAYED RELEASE ORAL
Refills: 0 | Status: DISCONTINUED | OUTPATIENT
Start: 2019-11-15 | End: 2019-11-18

## 2019-11-15 RX ORDER — COAL TAR 0.5 %
1 SHAMPOO TOPICAL
Qty: 0 | Refills: 0 | DISCHARGE

## 2019-11-15 RX ORDER — ACETAMINOPHEN 500 MG
1 TABLET ORAL
Qty: 0 | Refills: 0 | DISCHARGE

## 2019-11-15 RX ORDER — APREMILAST 10-20-30MG
30 KIT ORAL
Refills: 0 | Status: DISCONTINUED | OUTPATIENT
Start: 2019-11-15 | End: 2019-11-18

## 2019-11-15 RX ORDER — SODIUM CHLORIDE 9 MG/ML
1000 INJECTION INTRAMUSCULAR; INTRAVENOUS; SUBCUTANEOUS
Refills: 0 | Status: DISCONTINUED | OUTPATIENT
Start: 2019-11-15 | End: 2019-11-15

## 2019-11-15 RX ORDER — SODIUM CHLORIDE 9 MG/ML
1000 INJECTION INTRAMUSCULAR; INTRAVENOUS; SUBCUTANEOUS
Refills: 0 | Status: DISCONTINUED | OUTPATIENT
Start: 2019-11-15 | End: 2019-11-16

## 2019-11-15 RX ORDER — HYDROCORTISONE 1 %
1 OINTMENT (GRAM) TOPICAL
Refills: 0 | Status: DISCONTINUED | OUTPATIENT
Start: 2019-11-15 | End: 2019-11-18

## 2019-11-15 RX ORDER — IPRATROPIUM/ALBUTEROL SULFATE 18-103MCG
3 AEROSOL WITH ADAPTER (GRAM) INHALATION EVERY 6 HOURS
Refills: 0 | Status: DISCONTINUED | OUTPATIENT
Start: 2019-11-15 | End: 2019-11-18

## 2019-11-15 RX ORDER — IPRATROPIUM/ALBUTEROL SULFATE 18-103MCG
3 AEROSOL WITH ADAPTER (GRAM) INHALATION ONCE
Refills: 0 | Status: COMPLETED | OUTPATIENT
Start: 2019-11-15 | End: 2019-11-15

## 2019-11-15 RX ORDER — EXEMESTANE 25 MG/1
25 TABLET, SUGAR COATED ORAL DAILY
Refills: 0 | Status: DISCONTINUED | OUTPATIENT
Start: 2019-11-15 | End: 2019-11-18

## 2019-11-15 RX ORDER — CHOLECALCIFEROL (VITAMIN D3) 125 MCG
1000 CAPSULE ORAL DAILY
Refills: 0 | Status: DISCONTINUED | OUTPATIENT
Start: 2019-11-15 | End: 2019-11-18

## 2019-11-15 RX ORDER — BUDESONIDE, MICRONIZED 100 %
0.5 POWDER (GRAM) MISCELLANEOUS
Refills: 0 | Status: DISCONTINUED | OUTPATIENT
Start: 2019-11-15 | End: 2019-11-18

## 2019-11-15 RX ADMIN — SODIUM CHLORIDE 75 MILLILITER(S): 9 INJECTION INTRAMUSCULAR; INTRAVENOUS; SUBCUTANEOUS at 15:43

## 2019-11-15 RX ADMIN — SODIUM CHLORIDE 100 MILLILITER(S): 9 INJECTION, SOLUTION INTRAVENOUS at 00:16

## 2019-11-15 RX ADMIN — Medication 1 SUPPOSITORY(S): at 18:24

## 2019-11-15 RX ADMIN — Medication 0.25 MILLIGRAM(S): at 20:42

## 2019-11-15 RX ADMIN — Medication 50 MILLIGRAM(S): at 03:56

## 2019-11-15 RX ADMIN — Medication 2 MILLIGRAM(S): at 14:02

## 2019-11-15 RX ADMIN — HEPARIN SODIUM 5000 UNIT(S): 5000 INJECTION INTRAVENOUS; SUBCUTANEOUS at 18:24

## 2019-11-15 RX ADMIN — MONTELUKAST 10 MILLIGRAM(S): 4 TABLET, CHEWABLE ORAL at 15:38

## 2019-11-15 RX ADMIN — Medication 100 MILLIGRAM(S): at 15:38

## 2019-11-15 RX ADMIN — Medication 2 MILLIGRAM(S): at 10:48

## 2019-11-15 RX ADMIN — Medication 3 MILLILITER(S): at 03:56

## 2019-11-15 RX ADMIN — EXEMESTANE 25 MILLIGRAM(S): 25 TABLET, SUGAR COATED ORAL at 15:39

## 2019-11-15 RX ADMIN — Medication 3 MILLILITER(S): at 23:06

## 2019-11-15 RX ADMIN — PANTOPRAZOLE SODIUM 40 MILLIGRAM(S): 20 TABLET, DELAYED RELEASE ORAL at 12:30

## 2019-11-15 RX ADMIN — Medication 3 MILLILITER(S): at 18:23

## 2019-11-15 RX ADMIN — Medication 2 MILLIGRAM(S): at 18:24

## 2019-11-15 RX ADMIN — Medication 1 MILLIGRAM(S): at 12:30

## 2019-11-15 RX ADMIN — SODIUM CHLORIDE 100 MILLILITER(S): 9 INJECTION INTRAMUSCULAR; INTRAVENOUS; SUBCUTANEOUS at 14:02

## 2019-11-15 RX ADMIN — Medication 0.5 MILLIGRAM(S): at 18:23

## 2019-11-15 RX ADMIN — Medication 1000 UNIT(S): at 12:29

## 2019-11-15 NOTE — CONSULT NOTE ADULT - ASSESSMENT
ASSESSMENT:    chronic hypoxic respiratory failure due to severe COPD/emphysema - there is no evidence of pneumonia, pulmonary edema, pleural effusions or pulmonary emboli on the CTA of the chest - her respiratory status is at baseline without bronchospasm - anxiety may be contributing to her chronic sense of dyspnea    chronic alcohol use    breast cancer s/p bilateral mastectomy    rheumatoid and psoriatic arthritis on Otezla - immunocompromised host    brain aneurysm s/p clipping    PLAN/RECOMMENDATIONS:    oxygen supplementation to keep saturation greater than 92%  observe off antibiotics  observe off systemic steroids which would increase anxiety  albuterol/atrovent nebs q6h  pulmicort 0.5mg nebs q12h  singulair  SANCHEZ stockings  watch for and treat alcohol withdrawal  thiamine/MVI/folate  Otezla/lidex solution to scalp/hydrocortisone cream to face  Arimidex  DVT prophylaxis - SQ heparin  GI prophylaxis - protonix  bowel regimen    Thank you for the courtesy of this referral. Plan of care discussed with the patient at bedside and the dedicated floor NP.    Joss Franks MD, [facility]  [phone]  Pulmonary Medicine

## 2019-11-15 NOTE — CONSULT NOTE ADULT - SUBJECTIVE AND OBJECTIVE BOX
[facility] - [facility] - CONSULT NOTE    HPI: 54 year old gentlewoman, former smoker stopping several months ago and daily alcohol user, followed by Dr. Junior Mansfield of our practice for chronic hypoxic respiratory failure due to COPD/emphysema. She is basically bedbound but is able to walk to the bathroom with the assistance of her . She also has a history of breast cancer s/p bilateral mastectomies maintained on Aromasin, rheumatoid arthritis and psoriasis on Otezla, remote brain aneurysm requiring clipping and a right jugular vein DVT. She has had multiple recent admissions for alcohol withdrawal and COPD exacerbations. She is sent to the hospital by her visiting nurse due to leg swelling and "shortness of breath". She appears comfortable. She has no cough, sputum production, chest congestion or wheeze. No fevers chills or sweats. No chest pain/pressure or palpitations. No anxiety, diaphoresis, agitation or tachycardia. Asked to evaluate    PMHX:  Mediport related MSSA bactermia - 2015  Breast cancer  Brain aneurysm  Psoriasis  RA (rheumatoid arthritis)  Right jugular vein DVT    PSHX:  Bilateral mastectomy  Brain aneurysm clipping - 1988    FAMILY HISTORY:  mother - CHF      SOCIAL HISTORY:  former smoker - stopped ~ 6 months ago; daily alcohol use;       Pulmonary Medications:   buDESOnide    Inhalation Suspension 0.5 milliGRAM(s) Inhalation two times a day  montelukast 10 milliGRAM(s) Oral daily      Antimicrobials:      Cardiology:      Other:  apremilast 30 milliGRAM(s) Oral two times a day  cholecalciferol 1000 Unit(s) Oral daily  exemestane 25 milliGRAM(s) Oral daily  folic acid 1 milliGRAM(s) Oral daily  heparin  Injectable 5000 Unit(s) SubCutaneous every 12 hours  LORazepam   Injectable   IV Push   LORazepam   Injectable 2 milliGRAM(s) IV Push every 4 hours  pantoprazole    Tablet 40 milliGRAM(s) Oral before breakfast  senna 2 Tablet(s) Oral at bedtime  sodium chloride 0.9%. 1000 milliLiter(s) IV Continuous <Continuous>  thiamine 100 milliGRAM(s) Oral daily      Prn:  MEDICATIONS  (PRN):      Allergies    amoxicillin (Anaphylaxis)  Levaquin  (Anaphylaxis)  penicillin (Anaphylaxis)    HOME MEDICATIONS: see  H & P    REVIEW OF SYSTEMS:  Constitutional: As per HPI  HEENT: Within normal limits  CV: As per HPI  Resp: As per HPI  GI: Within normal limits   : Within normal limits  Musculoskeletal: Within normal limits  Skin: Within normal limits  Neurological: Within normal limits  Psychiatric: Within normal limits  Endocrine: Within normal limits  Hematologic/Lymphatic: Within normal limits  Allergic/Immunologic: Within normal limits    [x] All other systems negative    OBJECTIVE:    Daily Height in cm: 162.56 (14 Nov 2019 16:13)      PHYSICAL EXAM:  ICU Vital Signs Last 24 Hrs  T(C): 36.9 (15 Nov 2019 12:31), Max: 37.2 (14 Nov 2019 19:44)  T(F): 98.5 (15 Nov 2019 12:31), Max: 99 (14 Nov 2019 19:44)  HR: 99 (15 Nov 2019 12:31) (76 - 114)  BP: 112/76 (15 Nov 2019 12:31) (108/71 - 128/85)  BP(mean): --  ABP: --  ABP(mean): --  RR: 20 (15 Nov 2019 12:31) (20 - 20)  SpO2: 98% (15 Nov 2019 12:31) (90% - 100%) on 3lpm nasal canula    General: Awake. Alert. Cooperative. No distress. Appears stated age   HEENT: Atraumatic. Normocephalic. Anicteric. Normal oral mucosa. PERRL. EOMI. Decreased psoriatic plaque on scalp.  Neck: Supple. Trachea midline. Thyroid without enlargement/tenderness/nodules. No carotid bruit. No JVD.	  Cardiovascular: Regular rate and rhythm. S1 S2 normal. No murmurs, rubs or gallops.  Respiratory: Respirations unlabored. Decreased breath sounds throughout. No wheeze. No rales. No curvature.  Abdomen: Soft. Non-tender. Non-distended. No organomegaly. No masses. Normal bowel sounds. Obese.  Extremities: Warm to touch. No clubbing or cyanosis. Mild bilateral up to the knee left > right  Pulses: 2+ peripheral pulses all extremities.	  Skin: Venous stasis changes on both lower extremities. Multiple areas of skin hyperpigmentation at areas of prior plaque.  Lymph Nodes: Cervical, supraclavicular and axillary nodes normal  Neurological: Motor and sensory examination equal and normal. A and O x 3  Psychiatry: Calm      LABS:                          11.8   5.71  )-----------( 268      ( 14 Nov 2019 19:44 )             33.4     CBC    WBC  5.71 <==    Hemoglobin  11.8 <<==    Hematocrit  33.4 <==    Platelets  268 <==      131<L>  |  90<L>  |  <4<L>  ----------------------------<  120<H>    11-15  5.1   |  27  |  0.49<L>    LYTES    sodium  131 <==, 121 <==    potassium   5.1 <==, 5.1 <==    chloride  90 <==, 83 <==    carbon dioxide  27 <==, 22 <==    =============================================================================================  RENAL FUNCTION:    Creatinine:   0.49  <<==, 0.41  <<==    BUN:   <4 <==, <4 <==    ============================================================================================    calcium   8.9 <==, 9.6 <==    phos   4.4 <==, 3.4 <==    mag   1.6 <==, 1.6 <==    ============================================================================================  LFTs    AST:   58 <== , 76 <==     ALT:  47  <== , 53  <==     AP:  58  <=, 70  <=    Bili:  0.8  <=, 1.1  <=    PT/INR - ( 14 Nov 2019 19:44 )   PT: 11.1 sec;   INR: 0.97 ratio       PTT - ( 14 Nov 2019 19:44 )  PTT:29.3 sec    Venous Blood Gas:  11-14 @ 19:44  7.39/48/29/29/49  VBG Lactate: 2.3    Serum Pro-Brain Natriuretic Peptide: 75 pg/mL (11-14 @ 18:31)      CARDIAC MARKERS ( 14 Nov 2019 18:31 )  CPK x     /CKMB x     /CKMB Units x        troponin 11 ng/L    Patient name: FREDY CHOI  YOB: 1965   Age: 54 (F)   MR#: [id number]  Study Date: 10/7/2019  [facility]  Study quality: Technically difficult  Referring Physician: Canelo Bonds MD  BloodPressure: 122/80 mmHg  Height: 165 cm  Weight: 68 kg  BSA: 1.8 m2  ------------------------------------------------------------------------  PROCEDURE: Transthoracic echocardiogram with 2-D, M-Mode  and complete spectral and color flow Doppler. Verbal  consent was obtained for injection of  Ultrasonic Enhancing  Agent following a discussion of risks and benefits.  Following intravenous injection of Ultrasonic Enhancing  Agent , harmonic imaging was performed.  INDICATION: Shortness of breath (R06.02)  ------------------------------------------------------------------------  Observations:  Left Ventricle: Endocardium not well visualized; grossly  normal left ventricular systolic function. Endocardial  visualization enhanced with intravenous injection of  Ultrasonic Enhancing Agent (Definity). Normal left  ventricular internal dimensions and wall thicknesses.  Normal diastolic function  ------------------------------------------------------------------------  Conclusions:  1. Normal left ventricular internal dimensions and wall  thicknesses.  2. Endocardium not well visualized; grossly normal left  ventricular systolic function. Endocardial visualization  enhanced with intravenous injection of Ultrasonic Enhancing  Agent (Definity). Unable to perfrom strain imaging due to  limited clinical windows.  ------------------------------------------------------------------------  Confirmed on  10/7/2019 - 18:11:26 by Margaret Alvarez M.D.  ------------------------------------------------------------------------  ---------------------------------------------------------------------------------------------------------------  MICROBIOLOGY:       RADIOLOGY:  [x ] Chest radiographs reviewed and interpreted by me      EXAM:  XR CHEST AP OR PA 1V                          PROCEDURE DATE:  11/14/2019      INTERPRETATION:  CLINICAL INFORMATION: Shortness of breath.    TECHNIQUE: Portable AP radiograph of the chest.    COMPARISON: Chest x-ray from 10/22/2019.    FINDINGS:    LUNGS: The lungs are clear.    PLEURA: No pleural effusion or pneumothorax.    HEART AND MEDIASTINUM: Heart is normal in size.    SKELETON: Unremarkable skeletal structures.      IMPRESSION:     Clear lungs..      PAVLOPAUL MISHYN M.D., RADIOLOGY RESIDENT  This document has been electronically signed.  MELI WHITTINGTON M.D., ATTENDING RADIOLOGIST  This document has been electronically signed. Nov 14 2019  8:36PM  ---------------------------------------------------------------------------------------------------------------    EXAM:  CT ANGIO CHEST (W)AW IC                          PROCEDURE DATE:  11/14/2019      INTERPRETATION:  CLINICAL INFORMATION: Worsening shortness of breath.    COMPARISON: CT chest 10/21/2019.    PROCEDURE:   CT Angiography of the Chest.  90 ml of Omnipaque 350 was injected intravenously. 10 ml were discarded.  Sagittal and coronal reformats were performed as well as 3D (MIP)   reconstructions.      FINDINGS:  Images in the lower lungs degraded by respiratory motion.    LUNGS AND AIRWAYS: Patent central airways.  Severe emphysema is   unchanged. Bibasilar subsegmental atelectasis.    PLEURA: No pleural effusion. No pneumothorax.    MEDIASTINUM AND CHESTER: No lymphadenopathy.    VESSELS: Evaluation for pulmonary embolus is limited secondary to   respiratory motion. No main, lobar or proximal segmental pulmonary   embolus.    HEART: Heart size is normal. No pericardial effusion. Coronary artery   calcifications.    CHEST WALL AND LOWER NECK: Within normal limits.    VISUALIZED UPPER ABDOMEN: Within normal limits.    BONES: Within normal limits.    IMPRESSION:     Respiratory motion limits evaluation, however no main, lobar or proximal   segmental pulmonary embolus.    Severe emphysema.    JOSE MANUEL JORDAN M.D., RADIOLOGY RESIDENT  This document has been electronically signed.  MAYUR KENNEDY M.D., ATTENDING RADIOLOGIST  This document has been electronically signed. Nov 15 2019 12:27AM  ---------------------------------------------------------------------------------------------------------------    EXAM:  DUPLEX EXT VEINS LOWER RT                          PROCEDURE DATE:  11/14/2019      INTERPRETATION:  CLINICAL INFORMATION: Swelling and tenderness to   palpation of the right lower extremity.    COMPARISON: Bilateral lower extremity duplex 7/1/2015.    TECHNIQUE: Duplex sonography of the RIGHT LOWER extremity veins with   color and spectral Doppler, with and without compression.      FINDINGS:  Limited evaluation due to poor acoustic window.    There is normal compressibility of the visualized right common femoral,   femoral and popliteal veins.     The contralateral common femoral vein is patent.    Doppler examination shows normal spontaneous and phasic flow.    No calf vein thrombosis is detected.    IMPRESSION:     No evidence of right lower extremity deep venous thrombosis.    JOSE MANUEL JORDAN M.D., RADIOLOGY RESIDENT  This document has been electronically signed.  MAYUR KENNEDY M.D., ATTENDING RADIOLOGIST  This document has been electronically signed. Nov 15 2019  1:21AM

## 2019-11-15 NOTE — CONSULT NOTE ADULT - ASSESSMENT
54 yr old female with Hx significant for COPD on home O2, ETOH abuse, breast Ca, remote brain aneurysm s/p clip, DVT  who p/w increase nonexertional SOB x 3 days. had  bright red blood in her stool in the toilet bowl x 2 days.  Pt has been having prod cough with white sputum x 3 days. + etoh active abuser with hyponatremia      1- hyponatremia  2- etoh dependence  3- copd  4- sob       ivf hydration   dc etoh   O2   strict I/O   one liter fluid restriction   CIWA protocol   gi bleed GI Consult frequent check hb   d/w [employer] when pt seen earlier

## 2019-11-15 NOTE — CONSULT NOTE ADULT - SUBJECTIVE AND OBJECTIVE BOX
[facility]  [phone]  NEPHROLOGY      INITIAL CONSULT NOTE  --------------------------------------------------------------------------------  HPI:    54 yr old female with Hx significant for COPD on home O2, ETOH abuse, breast Ca, remote brain aneurysm s/p clip, DVT  who p/w increase nonexertional SOB x 3 days. had  bright red blood in her stool in the toilet bowl x 2 days.  Pt has been having prod cough with white sputum x 3 days. no fever, cp, abd, dysuria. Pt also drink EtOH 5 drinks/day beers       PAST HISTORY  --------------------------------------------------------------------------------  PAST MEDICAL & SURGICAL HISTORY:  Prophylactic measure  Breast cancer  Brain aneurysm: with clips  Psoriasis  RA (rheumatoid arthritis)  Psoriasis  Breast cancer, stage 3  History of modified radical mastectomy of both breasts  S/P bilateral mastectomy  Brain aneurysm: 1988 two clips    FAMILY HISTORY:  FH: CHF (congestive heart failure): Mother    PAST SOCIAL HISTORY: + etoh     ALLERGIES & MEDICATIONS  --------------------------------------------------------------------------------  Allergies    amoxicillin (Anaphylaxis)  Levaquin (Other; Anaphylaxis)  Levaquin (Unknown)  penicillin (Anaphylaxis)  penicillins (Anaphylaxis)    Intolerances      Standing Inpatient Medications  albuterol/ipratropium for Nebulization 3 milliLiter(s) Nebulizer every 6 hours  apremilast 30 milliGRAM(s) Oral two times a day  buDESOnide    Inhalation Suspension 0.5 milliGRAM(s) Inhalation two times a day  cholecalciferol 1000 Unit(s) Oral daily  exemestane 25 milliGRAM(s) Oral daily  folic acid 1 milliGRAM(s) Oral daily  heparin  Injectable 5000 Unit(s) SubCutaneous every 12 hours  hydrocortisone hemorrhoidal Suppository 1 Suppository(s) Rectal two times a day  montelukast 10 milliGRAM(s) Oral daily  pantoprazole    Tablet 40 milliGRAM(s) Oral before breakfast  senna 2 Tablet(s) Oral at bedtime  sodium chloride 0.9%. 1000 milliLiter(s) IV Continuous <Continuous>  thiamine 100 milliGRAM(s) Oral daily    PRN Inpatient Medications  LORazepam     Tablet 2 milliGRAM(s) Oral every 2 hours PRN      REVIEW OF SYSTEMS  --------------------------------------------------------------------------------  Gen: No  fevers/chills   Skin: No rashes  Head/Eyes/Ears/Mouth: No headache; Normal hearing;  No sinus pain/discomfort, sore throat  Respiratory: No dyspnea, cough, wheezing  CV: No chest pain, or palp   GI: No abdominal pain, diarrhea, nausea, vomiting, melena,   : No dysuria, decrease urination or hesitancy urinating  hematuria, nocturia  MSK: No joint pain/swelling; no back pain  Neuro: No dizziness/lightheadedness,also with no edema     All other systems were reviewed and are negative, except as noted.    VITALS/PHYSICAL EXAM  --------------------------------------------------------------------------------  T(C): 37.1 (11-15-19 @ 19:57), Max: 37.2 (11-14-19 @ 23:09)  HR: 111 (11-15-19 @ 19:57) (76 - 111)  BP: 107/67 (11-15-19 @ 19:57) (99/58 - 127/78)  RR: 18 (11-15-19 @ 19:57) (18 - 20)  SpO2: 95% (11-15-19 @ 19:57) (95% - 100%)  Wt(kg): --  Height (cm): 162.56 (11-14-19 @ 16:13)  Weight (kg): 63.5 (11-14-19 @ 16:13)  BMI (kg/m2): 24 (11-14-19 @ 16:13)  BSA (m2): 1.68 (11-14-19 @ 16:13)      11-15-19 @ 07:01  -  11-15-19 @ 21:24  --------------------------------------------------------  IN: 375 mL / OUT: 180 mL / NET: 195 mL      Physical Exam:  	Gen: Non toxic comfortable appearing  	no jvd , supple neck,   	Pulm: decrease bs  no rales or ronchi or wheezing  	CV: RRR, S1S2; no rub  	Back: No CVA tenderness  	Abd: +BS, soft, nontender/nondistended obese   	: No suprapubic tenderness  	UE: Warm, no cyanosis  no clubbing,  no edema  	LE: Warm, no cyanosis  no clubbing, 1+   edema  	Neuro: alert and oriented. speech coherent   	Skin: Warm, scaly skin   	    LABS/STUDIES  --------------------------------------------------------------------------------              11.8   5.71  >-----------<  268      [11-14-19 @ 19:44]              33.4     131  |  90  |  <4  ----------------------------<  120      [11-15-19 @ 11:58]   sodium 121   5.1   |  27  |  0.49        Ca     8.9     [11-15-19 @ 11:58]      Mg     1.6     [11-15-19 @ 11:58]      Phos  4.4     [11-15-19 @ 11:58]    TPro  6.0  /  Alb  3.3  /  TBili  0.8  /  DBili  0.2  /  AST  58  /  ALT  47  /  AlkPhos  58  [11-15-19 @ 11:58]    PT/INR: PT 11.1 , INR 0.97       [11-14-19 @ 19:44]  PTT: 29.3       [11-14-19 @ 19:44]      Creatinine Trend:  SCr 0.49 [11-15 @ 11:58]  SCr 0.41 [11-14 @ 18:31]  SCr 0.50 [10-26 @ 09:34]  SCr 0.54 [10-25 @ 07:26]  SCr 0.67 [10-24 @ 15:44]    Urinalysis - [10-22-19 @ 11:01]      Color Light Yellow / Appearance Clear / SG 1.006 / pH 7.0      Gluc Negative / Ketone Trace  / Bili Negative / Urobili Negative       Blood Negative / Protein Negative / Leuk Est Negative / Nitrite Negative      RBC  / WBC  / Hyaline  / Gran  / Sq Epi  / Non Sq Epi  / Bacteria       HbA1c 5.4      [08-19-16 @ 07:12]    HBsAb Nonreact      [03-25-19 @ 08:55]  HBsAg Nonreact      [03-25-19 @ 08:55]  HBcAb Nonreact      [03-25-19 @ 08:55]  HCV 0.13, Nonreact      [03-18-19 @ 06:26]  HIV Nonreact      [08-23-16 @ 07:49]    KRUNAL: titer 1:320, pattern Speckled      [07-12-15 @ 20:53]  dsDNA <12      [07-12-15 @ 20:53]  Syphilis Screen (Treponema Pallidum Ab) Negative      [08-23-16 @ 07:49]  SPEP Interpretation: Reference Range: None Detected      [09-13-16 @ 09:43]

## 2019-11-15 NOTE — CONSULT NOTE ADULT - ASSESSMENT
54 F PMH stage 3 Bilateral Breast CA on Aromasin, RA, Psoriasis previously on Otezla, remote Brain Aneurysm s/p Clip in 1988,  Rt Jugular DVT, Catheter related MSSA Bacteremia from [place] in July 2015, ETOH abuse, COPD, p/w dypsnea, leg edema, rectal bleeding    #Breast cancer- bilateral breast cancer, ER+/VA+/Her2 equivocal on Left; ER+/VA+/Her - on right; s/p taxotere/carbo/perjeta/herceptin x 3 cycles, s/p bilateral mastectomies 9/2015  - last PET scan 11/2017 negative  - s/p Herceptin, completed 3/2017; on arimidex, with side effects, changed to aromasin 1/2016- continue  - Ca 27.29 slightly increased as outpatient though fluctuating -- had been advised to go for scans 12/2018 but did not have; CA 27.29 on 9/10 was 45, prior was 33 in 4/2019 and 54 in 12/2018  - BRCA+ -- has been discussed as outpatient that she needs oopherectomy; overdue for yearly US    #Anemia, macrocytic- mild but may be hemoconcentrated  - check iron studies, check b12/folate  - with reported rectal bleeding- monitor- states she will not have a colonoscopy  - monitor Hg    #Pulm- with COPD on home O2, CTA no PE  - on nebs  - to be monitored off steroids/antibiotics    #Psoriasis- on Otezla    #ETOH dependence- last drink yesterday- on withdrawal protocol, on folic acid/thiamine 54 F PMH stage 3 Bilateral Breast CA on Aromasin, RA, Psoriasis previously on Otezla, remote Brain Aneurysm s/p Clip in 1988,  Rt Jugular DVT, Catheter related MSSA Bacteremia from [place] in July 2015, ETOH abuse, COPD, p/w dypsnea, leg edema, rectal bleeding    #Breast cancer- bilateral breast cancer, ER+/IL+/Her2 equivocal on Left; ER+/IL+/Her - on right; s/p taxotere/carbo/perjeta/herceptin x 3 cycles, s/p bilateral mastectomies 9/2015  - last PET scan 11/2017 negative  - s/p Herceptin, completed 3/2017; on arimidex, with side effects, changed to aromasin 1/2016- continue  - Ca 27.29 slightly increased as outpatient though fluctuating -- had been advised to go for scans 12/2018 but did not have; CA 27.29 on 9/10 was 45, prior was 33 in 4/2019 and 54 in 12/2018  - BRCA+ -- has been discussed as outpatient that she needs oopherectomy; overdue for yearly US    #Anemia, macrocytic- mild but may be hemoconcentrated, chronic ETOH use  - check iron studies, check b12/folate  - with reported rectal bleeding- monitor- states she will not have a colonoscopy  - monitor Hg    #Pulm- with COPD on home O2, CTA no PE  - on nebs  - to be monitored off steroids/antibiotics    #Psoriasis- on Otezla    #ETOH dependence- last drink yesterday- on withdrawal protocol, on folic acid/thiamine 54 F PMH stage 3 Bilateral Breast CA on Aromasin, RA, Psoriasis previously on Otezla, remote Brain Aneurysm s/p Clip in 1988,  Rt Jugular DVT, Catheter related MSSA Bacteremia from [place] in July 2015, ETOH abuse, COPD, p/w dypsnea, leg edema, rectal bleeding    #Breast cancer- bilateral breast cancer, ER+/LA+/Her2 equivocal on Left; ER+/LA+/Her - on right; s/p taxotere/carbo/perjeta/herceptin x 3 cycles, s/p bilateral mastectomies 9/2015  - last PET scan 11/2017 negative  - s/p Herceptin, completed 3/2017; on arimidex, with side effects, changed to aromasin 1/2016- continue  - Ca 27.29 slightly increased as outpatient though fluctuating -- had been advised to go for scans 12/2018 but did not have; CA 27.29 on 9/10 was 45, prior was 33 in 4/2019 and 54 in 12/2018  - BRCA+ -- has been discussed as outpatient that she needs oopherectomy; overdue for yearly US    #Anemia, macrocytic- mild but may be hemoconcentrated, chronic ETOH use  - check iron studies, check b12/folate  - with reported rectal bleeding- monitor- states she will not have a colonoscopy; check fecal occult  - monitor Hg    #Pulm- with COPD on home O2, CTA no PE  - on nebs  - to be monitored off steroids/antibiotics    #Psoriasis- on Otezla    #ETOH dependence- last drink yesterday- on withdrawal protocol, on folic acid/thiamine

## 2019-11-15 NOTE — ED ADULT NURSE REASSESSMENT NOTE - NS ED NURSE REASSESS COMMENT FT1
assessed patient at start of shift. She is a&ox3 in no distress. States her SOB has resolved. She is in no acute distress. Denies any pain or discomfort. She is admitted pending bed assignment. Educated she is a fall risk- given call bell and instructed on its use. Verbalized understanding. Will continue to monitor.

## 2019-11-15 NOTE — H&P ADULT - NSHPLABSRESULTS_GEN_ALL_CORE
11.8   5.71  )-----------( 268      ( 14 Nov 2019 19:44 )             33.4       11-14    121<L>  |  83<L>  |  <4<L>  ----------------------------<  91  5.1   |  22  |  0.41<L>    Ca    9.6      14 Nov 2019 18:31  Phos  3.4     11-14  Mg     1.6     11-14    TPro  6.9  /  Alb  4.1  /  TBili  1.1  /  DBili  x   /  AST  76<H>  /  ALT  53<H>  /  AlkPhos  70  11-14                  PT/INR - ( 14 Nov 2019 19:44 )   PT: 11.1 sec;   INR: 0.97 ratio         PTT - ( 14 Nov 2019 19:44 )  PTT:29.3 sec    Lactate Trend            CAPILLARY BLOOD GLUCOSE

## 2019-11-15 NOTE — PROVIDER CONTACT NOTE (OTHER) - SITUATION
Pt experiencing anxiety and requests XANAX. Pt refused CT scan of A/P, states  "I had it done 2 weeks ago I don't need it again". Pt is CIWA but order did not specify if Low or High risk

## 2019-11-15 NOTE — CONSULT NOTE ADULT - PROBLEM SELECTOR RECOMMENDATION 9
- monitor h/h daily, transfuse prn  - suspect hemorrhoidal versus diverticular  - CT a/p with PO and IV contrast ordered for further evaluation  - ppi daily   - start anusol BID  - keep stools soft  - recommend  upper gastrointestinal endoscopy and colonoscopy on Monday. Pt is currently refusing. If she changes her mind- clear liquid diet sunday. NPO after midnight sunday night. prep to be given sunday  - hold NSAIDS

## 2019-11-15 NOTE — CONSULT NOTE ADULT - SUBJECTIVE AND OBJECTIVE BOX
Chief Complaint:  Patient is a 54y old  Female who presents with a chief complaint of     HPI: 54 yr old female with Hx significant for COPD on home O2, ETOH abuse, breast Ca, remote brain aneurysm s/p clip, DVT  who p/w increase nonexertional SOB x 3 days. some lower ext edema. Pt's  states that he sees bright red blood in her stool in the toilet bowl x 2 days.     GI consulted. At time of examination, pt denies abdominal pain, n/v, diarrhea, constipation, melena. She states brbpr in toilet bowl, not mixed with stool. She denies egd/colonoscopy in the past.     Allergies:  amoxicillin (Anaphylaxis)  Levaquin (Other; Anaphylaxis)  Levaquin (Unknown)  penicillin (Anaphylaxis)  penicillins (Anaphylaxis)      Medications:  albuterol/ipratropium for Nebulization 3 milliLiter(s) Nebulizer every 6 hours  apremilast 30 milliGRAM(s) Oral two times a day  buDESOnide    Inhalation Suspension 0.5 milliGRAM(s) Inhalation two times a day  cholecalciferol 1000 Unit(s) Oral daily  exemestane 25 milliGRAM(s) Oral daily  folic acid 1 milliGRAM(s) Oral daily  heparin  Injectable 5000 Unit(s) SubCutaneous every 12 hours  LORazepam   Injectable   IV Push   LORazepam   Injectable 2 milliGRAM(s) IV Push every 4 hours  montelukast 10 milliGRAM(s) Oral daily  pantoprazole    Tablet 40 milliGRAM(s) Oral before breakfast  senna 2 Tablet(s) Oral at bedtime  thiamine 100 milliGRAM(s) Oral daily      PMHX/PSHX:  Prophylactic measure  Breast cancer  Brain aneurysm  Psoriasis  RA (rheumatoid arthritis)  Psoriasis  Breast cancer, stage 3  History of modified radical mastectomy of both breasts  S/P bilateral mastectomy  Brain aneurysm  No significant past surgical history      Family history:  FH: CHF (congestive heart failure)  No pertinent family history in first degree relatives  No pertinent family history in first degree relatives      Social History: heavy etoh use  hx of tobacco use, quit 18 months ago    ROS:     General:  No wt loss, fevers, chills, night sweats, fatigue,   Eyes:  Good vision, no reported pain  ENT:  No sore throat, pain, runny nose, dysphagia  CV:  No pain, palpitations, hypo/hypertension  Resp:  No dyspnea, cough, tachypnea, wheezing  GI:  see HPI  :  No pain, bleeding, incontinence, nocturia  Muscle:  No pain, weakness  Neuro:  No weakness, tingling, memory problems  Psych:  No fatigue, insomnia, mood problems, depression  Endocrine:  No polyuria, polydipsia, cold/heat intolerance  Heme:  No petechiae, ecchymosis, easy bruisability  Skin:  No rash, tattoos, scars, edema      PHYSICAL EXAM:   Vital Signs:  Vital Signs Last 24 Hrs  T(C): 37.1 (15 Nov 2019 14:15), Max: 37.2 (14 Nov 2019 19:44)  T(F): 98.7 (15 Nov 2019 14:15), Max: 99 (14 Nov 2019 19:44)  HR: 100 (15 Nov 2019 14:15) (76 - 114)  BP: 99/58 (15 Nov 2019 14:15) (99/58 - 128/85)  BP(mean): --  RR: 18 (15 Nov 2019 14:15) (18 - 20)  SpO2: 98% (15 Nov 2019 14:15) (90% - 100%)  Daily Height in cm: 162.56 (14 Nov 2019 16:13)    Daily     GENERAL:  Appears stated age, well-groomed, well-nourished, no distress  HEENT:  NC/AT,  conjunctivae clear and pink, no thyromegaly, nodules, adenopathy, no JVD, sclera -anicteric  CHEST:  Full & symmetric excursion, no increased effort, breath sounds clear  HEART:  Regular rhythm, S1, S2, no murmur/rub/S3/S4, no abdominal bruit, no edema  ABDOMEN:  Soft, non-tender, non-distended, normoactive bowel sounds,  no masses ,no hepato-splenomegaly, no signs of chronic liver disease  EXTEREMITIES:  no cyanosis, clubbing or edema  SKIN:  No rash/erythema/ecchymoses/petechiae/wounds/abscess/warm/dry  NEURO:  Alert, oriented, no asterixis, no tremor, no encephalopathy    LABS:                        11.8   5.71  )-----------( 268      ( 14 Nov 2019 19:44 )             33.4     11-15    131<L>  |  90<L>  |  <4<L>  ----------------------------<  120<H>  5.1   |  27  |  0.49<L>    Ca    8.9      15 Nov 2019 11:58  Phos  4.4     11-15  Mg     1.6     11-15    TPro  6.0  /  Alb  3.3  /  TBili  0.8  /  DBili  0.2  /  AST  58<H>  /  ALT  47<H>  /  AlkPhos  58  11-15    LIVER FUNCTIONS - ( 15 Nov 2019 11:58 )  Alb: 3.3 g/dL / Pro: 6.0 g/dL / ALK PHOS: 58 U/L / ALT: 47 U/L / AST: 58 U/L / GGT: x           PT/INR - ( 14 Nov 2019 19:44 )   PT: 11.1 sec;   INR: 0.97 ratio         PTT - ( 14 Nov 2019 19:44 )  PTT:29.3 sec        Imaging:

## 2019-11-15 NOTE — CONSULT NOTE ADULT - SUBJECTIVE AND OBJECTIVE BOX
Patient is a 54y old  Female who presents with a chief complaint of     HPI:  54 yr old female with Hx significant for COPD on home O2, ETOH abuse, breast Ca, remote brain aneurysm s/p clip, DVT  who p/w increase nonexertional SOB x 3 days.  some lower ext edema    Pt's  states that he sees bright red blood in her stool in the toilet bowl x 2 days.?   Pt has been having prod cough with white sputum x 3 days. no fever, cp, abd, dysuria. Pt also drink EtOH 5 drinks/day ? (15 Nov 2019 08:08)    pt reports increased dyspnea for past few days, on home O2, she denies cough or phlegm; no chest pain, no n/v/abd pain, reports bright blood with BM, no other bleeding, no melena, reports edema in legs bilateral but R>L, no calf pain. reports fall at home 4 days previous, denies LOC or injury, reports she "tripped". last ETOH drink yesterday      PAST MEDICAL & SURGICAL HISTORY:  Prophylactic measure  Breast cancer  Brain aneurysm: with clips  Psoriasis  RA (rheumatoid arthritis)  Psoriasis  Breast cancer, stage 3  History of modified radical mastectomy of both breasts  S/P bilateral mastectomy  Brain aneurysm: 1988 two clips      SOCIAL HISTORY:  Smoking - former   Alcohol - daily  Drugs - No drug use      FAMILY HISTORY:  FH: CHF (congestive heart failure): Mother      MEDICATIONS  (STANDING):  albuterol/ipratropium for Nebulization 3 milliLiter(s) Nebulizer every 6 hours  apremilast 30 milliGRAM(s) Oral two times a day  buDESOnide    Inhalation Suspension 0.5 milliGRAM(s) Inhalation two times a day  cholecalciferol 1000 Unit(s) Oral daily  exemestane 25 milliGRAM(s) Oral daily  folic acid 1 milliGRAM(s) Oral daily  heparin  Injectable 5000 Unit(s) SubCutaneous every 12 hours  LORazepam   Injectable   IV Push   LORazepam   Injectable 2 milliGRAM(s) IV Push every 4 hours  montelukast 10 milliGRAM(s) Oral daily  pantoprazole    Tablet 40 milliGRAM(s) Oral before breakfast  senna 2 Tablet(s) Oral at bedtime  sodium chloride 0.9%. 1000 milliLiter(s) (100 mL/Hr) IV Continuous <Continuous>  thiamine 100 milliGRAM(s) Oral daily    MEDICATIONS  (PRN):      Allergies    amoxicillin (Anaphylaxis)  Levaquin (Other; Anaphylaxis)  Levaquin (Unknown)  penicillin (Anaphylaxis)  penicillins (Anaphylaxis)    ROS  gen- no f/c, weight/energy stable  heent- no sore throat  cv- no chest pain, no palpitation  resp- no cough, +increased dyspnea  gi- no n/v/abd pain, +BRBPR< no melena  gu- no hematuria  ext- +leg swelling, R>L  neuro- no numbness/tingling, no HA  skin- psoriasis plaques  ROS otherwise reviewed and negative        Vital Signs Last 24 Hrs  T(C): 37.1 (15 Nov 2019 14:15), Max: 37.2 (14 Nov 2019 19:44)  T(F): 98.7 (15 Nov 2019 14:15), Max: 99 (14 Nov 2019 19:44)  HR: 100 (15 Nov 2019 14:15) (76 - 114)  BP: 99/58 (15 Nov 2019 14:15) (99/58 - 128/85)  BP(mean): --  RR: 18 (15 Nov 2019 14:15) (18 - 20)  SpO2: 98% (15 Nov 2019 14:15) (90% - 100%)    PHYSICAL EXAM  General: adult in NAD, appears older than stated age  HEENT: clear oropharynx, anicteric sclera, pink conjunctiva  Neck: supple  CV: normal S1/S2  Lungs: decreased BS  Abdomen: soft non-tender obese non-distended, positive bowel sounds  Ext: + edema  Skin: areas of hyperpigmentation prior plaques  Lymph Nodes: No LAD in axillae, groin, neck  Neuro: alert and oriented X 3, no focal deficits    LABS:                          11.8   5.71  )-----------( 268      ( 14 Nov 2019 19:44 )             33.4         Mean Cell Volume : 101.2 fl  Mean Cell Hemoglobin : 35.8 pg  Mean Cell Hemoglobin Concentration : 35.3 gm/dL  Auto Neutrophil # : 4.01 K/uL  Auto Lymphocyte # : 0.56 K/uL  Auto Monocyte # : 0.65 K/uL  Auto Eosinophil # : 0.39 K/uL  Auto Basophil # : 0.03 K/uL  Auto Neutrophil % : 70.3 %  Auto Lymphocyte % : 9.8 %  Auto Monocyte % : 11.4 %  Auto Eosinophil % : 6.8 %  Auto Basophil % : 0.5 %      Serial CBC's  11-14 @ 19:44  Hct-33.4 / Hgb-11.8 / Plat-268 / RBC-3.30 / WBC-5.71      11-15    131<L>  |  90<L>  |  <4<L>  ----------------------------<  120<H>  5.1   |  27  |  0.49<L>    Ca    8.9      15 Nov 2019 11:58  Phos  4.4     11-15  Mg     1.6     11-15    TPro  6.0  /  Alb  3.3  /  TBili  0.8  /  DBili  0.2  /  AST  58<H>  /  ALT  47<H>  /  AlkPhos  58  11-15      PT/INR - ( 14 Nov 2019 19:44 )   PT: 11.1 sec;   INR: 0.97 ratio         PTT - ( 14 Nov 2019 19:44 )  PTT:29.3 sec                  Radiology    < from: [facility] Duplex Lower Ext Vein Scan, Right (11.14.19 @ 23:04) >  IMPRESSION:     No evidence of right lower extremity deep venous thrombosis.      < from: CT Angio Chest w/ IV Cont (11.14.19 @ 21:53) >  IMPRESSION:     Respiratory motion limits evaluation, however no main, lobar or proximal   segmental pulmonary embolus.    Severe emphysema.      < from: US Abdomen Complete (10.08.19 @ 10:36) >  IMPRESSION:     Hepatic steatosis. Otherwise normal abdominal ultrasound. Patient is a 54y old  Female who presents with a chief complaint of dyspnea    HPI:  54 yr old female with Hx significant for COPD on home O2, ETOH abuse, breast Ca, remote brain aneurysm s/p clip, DVT  who p/w increase nonexertional SOB x 3 days.  some lower ext edema    Pt's  states that he sees bright red blood in her stool in the toilet bowl x 2 days.?   Pt has been having prod cough with white sputum x 3 days. no fever, cp, abd, dysuria. Pt also drink EtOH 5 drinks/day ? (15 Nov 2019 08:08)    pt reports increased dyspnea for past few days, on home O2, she denies cough or phlegm; no chest pain, no n/v/abd pain, reports bright blood with BM, no other bleeding, no melena, reports edema in legs bilateral but R>L, no calf pain. reports fall at home 4 days previous, denies LOC or injury, reports she "tripped". last ETOH drink yesterday      PAST MEDICAL & SURGICAL HISTORY:  Prophylactic measure  Breast cancer  Brain aneurysm: with clips  Psoriasis  RA (rheumatoid arthritis)  Psoriasis  Breast cancer, stage 3  History of modified radical mastectomy of both breasts  S/P bilateral mastectomy  Brain aneurysm: 1988 two clips      SOCIAL HISTORY:  Smoking - former   Alcohol - daily  Drugs - No drug use      FAMILY HISTORY:  FH: CHF (congestive heart failure): Mother      MEDICATIONS  (STANDING):  albuterol/ipratropium for Nebulization 3 milliLiter(s) Nebulizer every 6 hours  apremilast 30 milliGRAM(s) Oral two times a day  buDESOnide    Inhalation Suspension 0.5 milliGRAM(s) Inhalation two times a day  cholecalciferol 1000 Unit(s) Oral daily  exemestane 25 milliGRAM(s) Oral daily  folic acid 1 milliGRAM(s) Oral daily  heparin  Injectable 5000 Unit(s) SubCutaneous every 12 hours  LORazepam   Injectable   IV Push   LORazepam   Injectable 2 milliGRAM(s) IV Push every 4 hours  montelukast 10 milliGRAM(s) Oral daily  pantoprazole    Tablet 40 milliGRAM(s) Oral before breakfast  senna 2 Tablet(s) Oral at bedtime  sodium chloride 0.9%. 1000 milliLiter(s) (100 mL/Hr) IV Continuous <Continuous>  thiamine 100 milliGRAM(s) Oral daily    MEDICATIONS  (PRN):      Allergies    amoxicillin (Anaphylaxis)  Levaquin (Other; Anaphylaxis)  Levaquin (Unknown)  penicillin (Anaphylaxis)  penicillins (Anaphylaxis)    ROS  gen- no f/c, weight/energy stable  heent- no sore throat  cv- no chest pain, no palpitation  resp- no cough, +increased dyspnea  gi- no n/v/abd pain, +BRBPR< no melena  gu- no hematuria  ext- +leg swelling, R>L  neuro- no numbness/tingling, no HA  skin- psoriasis plaques  ROS otherwise reviewed and negative        Vital Signs Last 24 Hrs  T(C): 37.1 (15 Nov 2019 14:15), Max: 37.2 (14 Nov 2019 19:44)  T(F): 98.7 (15 Nov 2019 14:15), Max: 99 (14 Nov 2019 19:44)  HR: 100 (15 Nov 2019 14:15) (76 - 114)  BP: 99/58 (15 Nov 2019 14:15) (99/58 - 128/85)  BP(mean): --  RR: 18 (15 Nov 2019 14:15) (18 - 20)  SpO2: 98% (15 Nov 2019 14:15) (90% - 100%)    PHYSICAL EXAM  General: adult in NAD, appears older than stated age  HEENT: clear oropharynx, anicteric sclera, pink conjunctiva  Neck: supple  CV: normal S1/S2  Lungs: decreased BS  Abdomen: soft non-tender obese non-distended, positive bowel sounds  Ext: + edema  Skin: areas of hyperpigmentation prior plaques  Lymph Nodes: No LAD in axillae, groin, neck  Neuro: alert and oriented X 3, no focal deficits    LABS:                          11.8   5.71  )-----------( 268      ( 14 Nov 2019 19:44 )             33.4         Mean Cell Volume : 101.2 fl  Mean Cell Hemoglobin : 35.8 pg  Mean Cell Hemoglobin Concentration : 35.3 gm/dL  Auto Neutrophil # : 4.01 K/uL  Auto Lymphocyte # : 0.56 K/uL  Auto Monocyte # : 0.65 K/uL  Auto Eosinophil # : 0.39 K/uL  Auto Basophil # : 0.03 K/uL  Auto Neutrophil % : 70.3 %  Auto Lymphocyte % : 9.8 %  Auto Monocyte % : 11.4 %  Auto Eosinophil % : 6.8 %  Auto Basophil % : 0.5 %      Serial CBC's  11-14 @ 19:44  Hct-33.4 / Hgb-11.8 / Plat-268 / RBC-3.30 / WBC-5.71      11-15    131<L>  |  90<L>  |  <4<L>  ----------------------------<  120<H>  5.1   |  27  |  0.49<L>    Ca    8.9      15 Nov 2019 11:58  Phos  4.4     11-15  Mg     1.6     11-15    TPro  6.0  /  Alb  3.3  /  TBili  0.8  /  DBili  0.2  /  AST  58<H>  /  ALT  47<H>  /  AlkPhos  58  11-15      PT/INR - ( 14 Nov 2019 19:44 )   PT: 11.1 sec;   INR: 0.97 ratio         PTT - ( 14 Nov 2019 19:44 )  PTT:29.3 sec                  Radiology    < from: [facility] Duplex Lower Ext Vein Scan, Right (11.14.19 @ 23:04) >  IMPRESSION:     No evidence of right lower extremity deep venous thrombosis.      < from: CT Angio Chest w/ IV Cont (11.14.19 @ 21:53) >  IMPRESSION:     Respiratory motion limits evaluation, however no main, lobar or proximal   segmental pulmonary embolus.    Severe emphysema.      < from: US Abdomen Complete (10.08.19 @ 10:36) >  IMPRESSION:     Hepatic steatosis. Otherwise normal abdominal ultrasound.

## 2019-11-15 NOTE — H&P ADULT - HISTORY OF PRESENT ILLNESS
54 yr old female with Hx significant for COPD on home O2, ETOH abuse, breast Ca, remote brain aneurysm s/p clip, DVT  who p/w increase nonexertional SOB x 3 days.  some lower ext edema    Pt's  states that he sees bright red blood in her stool in the toilet bowl x 2 days.?   Pt has been having prod cough with white sputum x 3 days. no fever, cp, abd, dysuria. Pt also drink EtOH 5 drinks/day ?

## 2019-11-16 LAB
ALBUMIN SERPL ELPH-MCNC: 3.3 G/DL — SIGNIFICANT CHANGE UP (ref 3.3–5)
ALP SERPL-CCNC: 51 U/L — SIGNIFICANT CHANGE UP (ref 40–120)
ALT FLD-CCNC: 42 U/L — SIGNIFICANT CHANGE UP (ref 10–45)
ANION GAP SERPL CALC-SCNC: 9 MMOL/L — SIGNIFICANT CHANGE UP (ref 5–17)
AST SERPL-CCNC: 51 U/L — HIGH (ref 10–40)
BASOPHILS # BLD AUTO: 0.02 K/UL — SIGNIFICANT CHANGE UP (ref 0–0.2)
BASOPHILS NFR BLD AUTO: 0.4 % — SIGNIFICANT CHANGE UP (ref 0–2)
BILIRUB SERPL-MCNC: 0.8 MG/DL — SIGNIFICANT CHANGE UP (ref 0.2–1.2)
BUN SERPL-MCNC: 4 MG/DL — LOW (ref 7–23)
CALCIUM SERPL-MCNC: 8.9 MG/DL — SIGNIFICANT CHANGE UP (ref 8.4–10.5)
CHLORIDE SERPL-SCNC: 95 MMOL/L — LOW (ref 96–108)
CO2 SERPL-SCNC: 29 MMOL/L — SIGNIFICANT CHANGE UP (ref 22–31)
CREAT SERPL-MCNC: 0.52 MG/DL — SIGNIFICANT CHANGE UP (ref 0.5–1.3)
EOSINOPHIL # BLD AUTO: 0.13 K/UL — SIGNIFICANT CHANGE UP (ref 0–0.5)
EOSINOPHIL NFR BLD AUTO: 2.9 % — SIGNIFICANT CHANGE UP (ref 0–6)
FERRITIN SERPL-MCNC: 792 NG/ML — HIGH (ref 15–150)
FOLATE SERPL-MCNC: >20 NG/ML — SIGNIFICANT CHANGE UP
GLUCOSE SERPL-MCNC: 132 MG/DL — HIGH (ref 70–99)
HCT VFR BLD CALC: 31.3 % — LOW (ref 34.5–45)
HGB BLD-MCNC: 10.1 G/DL — LOW (ref 11.5–15.5)
IMM GRANULOCYTES NFR BLD AUTO: 1.1 % — SIGNIFICANT CHANGE UP (ref 0–1.5)
IRON SATN MFR SERPL: 21 % — SIGNIFICANT CHANGE UP (ref 14–50)
IRON SATN MFR SERPL: 42 UG/DL — SIGNIFICANT CHANGE UP (ref 30–160)
LYMPHOCYTES # BLD AUTO: 0.78 K/UL — LOW (ref 1–3.3)
LYMPHOCYTES # BLD AUTO: 17.5 % — SIGNIFICANT CHANGE UP (ref 13–44)
MCHC RBC-ENTMCNC: 32.3 GM/DL — SIGNIFICANT CHANGE UP (ref 32–36)
MCHC RBC-ENTMCNC: 34.7 PG — HIGH (ref 27–34)
MCV RBC AUTO: 107.6 FL — HIGH (ref 80–100)
MONOCYTES # BLD AUTO: 0.49 K/UL — SIGNIFICANT CHANGE UP (ref 0–0.9)
MONOCYTES NFR BLD AUTO: 11 % — SIGNIFICANT CHANGE UP (ref 2–14)
NEUTROPHILS # BLD AUTO: 2.99 K/UL — SIGNIFICANT CHANGE UP (ref 1.8–7.4)
NEUTROPHILS NFR BLD AUTO: 67.1 % — SIGNIFICANT CHANGE UP (ref 43–77)
PLATELET # BLD AUTO: 219 K/UL — SIGNIFICANT CHANGE UP (ref 150–400)
POTASSIUM SERPL-MCNC: 3.8 MMOL/L — SIGNIFICANT CHANGE UP (ref 3.5–5.3)
POTASSIUM SERPL-SCNC: 3.8 MMOL/L — SIGNIFICANT CHANGE UP (ref 3.5–5.3)
PROT SERPL-MCNC: 5.6 G/DL — LOW (ref 6–8.3)
RBC # BLD: 2.91 M/UL — LOW (ref 3.8–5.2)
RBC # FLD: 14.3 % — SIGNIFICANT CHANGE UP (ref 10.3–14.5)
SODIUM SERPL-SCNC: 133 MMOL/L — LOW (ref 135–145)
TIBC SERPL-MCNC: 196 UG/DL — LOW (ref 220–430)
UIBC SERPL-MCNC: 154 UG/DL — SIGNIFICANT CHANGE UP (ref 110–370)
VIT B12 SERPL-MCNC: 753 PG/ML — SIGNIFICANT CHANGE UP (ref 232–1245)
WBC # BLD: 4.46 K/UL — SIGNIFICANT CHANGE UP (ref 3.8–10.5)
WBC # FLD AUTO: 4.46 K/UL — SIGNIFICANT CHANGE UP (ref 3.8–10.5)

## 2019-11-16 RX ORDER — SODIUM CHLORIDE 9 MG/ML
1000 INJECTION INTRAMUSCULAR; INTRAVENOUS; SUBCUTANEOUS
Refills: 0 | Status: DISCONTINUED | OUTPATIENT
Start: 2019-11-16 | End: 2019-11-17

## 2019-11-16 RX ORDER — MAGNESIUM SULFATE 500 MG/ML
1 VIAL (ML) INJECTION ONCE
Refills: 0 | Status: COMPLETED | OUTPATIENT
Start: 2019-11-16 | End: 2019-11-16

## 2019-11-16 RX ORDER — LANOLIN ALCOHOL/MO/W.PET/CERES
5 CREAM (GRAM) TOPICAL AT BEDTIME
Refills: 0 | Status: COMPLETED | OUTPATIENT
Start: 2019-11-16 | End: 2019-11-16

## 2019-11-16 RX ADMIN — Medication 2 MILLIGRAM(S): at 19:04

## 2019-11-16 RX ADMIN — Medication 3 MILLILITER(S): at 17:13

## 2019-11-16 RX ADMIN — Medication 3 MILLILITER(S): at 23:55

## 2019-11-16 RX ADMIN — PANTOPRAZOLE SODIUM 40 MILLIGRAM(S): 20 TABLET, DELAYED RELEASE ORAL at 05:48

## 2019-11-16 RX ADMIN — EXEMESTANE 25 MILLIGRAM(S): 25 TABLET, SUGAR COATED ORAL at 12:33

## 2019-11-16 RX ADMIN — SODIUM CHLORIDE 50 MILLILITER(S): 9 INJECTION INTRAMUSCULAR; INTRAVENOUS; SUBCUTANEOUS at 11:58

## 2019-11-16 RX ADMIN — Medication 1 MILLIGRAM(S): at 11:59

## 2019-11-16 RX ADMIN — Medication 100 MILLIGRAM(S): at 12:33

## 2019-11-16 RX ADMIN — Medication 2 MILLIGRAM(S): at 21:04

## 2019-11-16 RX ADMIN — HEPARIN SODIUM 5000 UNIT(S): 5000 INJECTION INTRAVENOUS; SUBCUTANEOUS at 05:48

## 2019-11-16 RX ADMIN — HEPARIN SODIUM 5000 UNIT(S): 5000 INJECTION INTRAVENOUS; SUBCUTANEOUS at 17:14

## 2019-11-16 RX ADMIN — Medication 1 SUPPOSITORY(S): at 05:38

## 2019-11-16 RX ADMIN — Medication 2 MILLIGRAM(S): at 10:33

## 2019-11-16 RX ADMIN — Medication 100 GRAM(S): at 09:22

## 2019-11-16 RX ADMIN — Medication 5 MILLIGRAM(S): at 01:06

## 2019-11-16 RX ADMIN — MONTELUKAST 10 MILLIGRAM(S): 4 TABLET, CHEWABLE ORAL at 12:33

## 2019-11-16 RX ADMIN — Medication 1000 UNIT(S): at 11:57

## 2019-11-16 RX ADMIN — Medication 3 MILLILITER(S): at 11:57

## 2019-11-16 RX ADMIN — SODIUM CHLORIDE 75 MILLILITER(S): 9 INJECTION INTRAMUSCULAR; INTRAVENOUS; SUBCUTANEOUS at 04:30

## 2019-11-16 RX ADMIN — SODIUM CHLORIDE 50 MILLILITER(S): 9 INJECTION INTRAMUSCULAR; INTRAVENOUS; SUBCUTANEOUS at 09:23

## 2019-11-16 RX ADMIN — Medication 1 SUPPOSITORY(S): at 17:18

## 2019-11-16 RX ADMIN — Medication 0.5 MILLIGRAM(S): at 05:30

## 2019-11-16 RX ADMIN — Medication 3 MILLILITER(S): at 05:19

## 2019-11-16 RX ADMIN — Medication 0.5 MILLIGRAM(S): at 23:55

## 2019-11-16 NOTE — PHYSICAL THERAPY INITIAL EVALUATION ADULT - ADDITIONAL COMMENTS
Pt lives with her  in a private house w/ 3 steps to neg. Pt req assistance w/ ADL's PTA. Pt amb w/. RW in the house and used a w/c for community mobility. Pt has a RW, w/c and shower chair at home. Pt states that her  assisted w/ ADL's. [Fever] : fever [Nasal Discharge] : nasal discharge [Cough] : cough [Negative] : Genitourinary

## 2019-11-16 NOTE — PROGRESS NOTE ADULT - SUBJECTIVE AND OBJECTIVE BOX
Follow-up Pulm Progress Note - [facility] - [place]    No new respiratory events overnight.  Denies SOB/CP. Feels okay    Medications:  MEDICATIONS  (STANDING):  albuterol/ipratropium for Nebulization 3 milliLiter(s) Nebulizer every 6 hours  apremilast 30 milliGRAM(s) Oral two times a day  buDESOnide    Inhalation Suspension 0.5 milliGRAM(s) Inhalation two times a day  cholecalciferol 1000 Unit(s) Oral daily  exemestane 25 milliGRAM(s) Oral daily  folic acid 1 milliGRAM(s) Oral daily  heparin  Injectable 5000 Unit(s) SubCutaneous every 12 hours  hydrocortisone hemorrhoidal Suppository 1 Suppository(s) Rectal two times a day  montelukast 10 milliGRAM(s) Oral daily  pantoprazole    Tablet 40 milliGRAM(s) Oral before breakfast  senna 2 Tablet(s) Oral at bedtime  sodium chloride 0.9%. 1000 milliLiter(s) (50 mL/Hr) IV Continuous <Continuous>  thiamine 100 milliGRAM(s) Oral daily    MEDICATIONS  (PRN):  LORazepam     Tablet 2 milliGRAM(s) Oral every 2 hours PRN Symptom-triggered 2 point increase in CIWA-Ar  LORazepam     Tablet 2 milliGRAM(s) Oral every 2 hours PRN CIWA-Ar score increase by 2 points and a total score of 7 or less      Vital Signs Last 24 Hrs  T(C): 36.9 (16 Nov 2019 08:30), Max: 37.1 (15 Nov 2019 14:15)  T(F): 98.5 (16 Nov 2019 08:30), Max: 98.8 (15 Nov 2019 19:57)  HR: 87 (16 Nov 2019 09:16) (83 - 111)  BP: 106/70 (16 Nov 2019 09:16) (99/58 - 116/73)  BP(mean): --  RR: 17 (16 Nov 2019 08:30) (17 - 18)  SpO2: 98% (16 Nov 2019 09:16) (95% - 98%)          11-15 @ 07:01  -  11-16 @ 07:00  --------------------------------------------------------  IN: 1575 mL / OUT: 1180 mL / NET: 395 mL          LABS:                        10.1   4.46  )-----------( 219      ( 16 Nov 2019 10:21 )             31.3     11-16    133<L>  |  95<L>  |  4<L>  ----------------------------<  132<H>  3.8   |  29  |  0.52    Ca    8.9      16 Nov 2019 07:28  Phos  2.8     11-15  Mg     1.4     11-15    TPro  5.6<L>  /  Alb  3.3  /  TBili  0.8  /  DBili  x   /  AST  51<H>  /  ALT  42  /  AlkPhos  51  11-16        CAPILLARY BLOOD GLUCOSE        PT/INR - ( 14 Nov 2019 19:44 )   PT: 11.1 sec;   INR: 0.97 ratio         PTT - ( 14 Nov 2019 19:44 )  PTT:29.3 sec      Serum Pro-Brain Natriuretic Peptide: 75 pg/mL (11-14-19 @ 18:31)      CULTURES:        Physical Examination:  Awake and alert  Normocephalic atraumatic  NECK: supple, normal range of motion, no use of accessory muscles  PULM: Clear to auscultation bilaterally, without rales, rhonchi or wheezing  CVS: Regular rate and rhythm, no murmurs, rubs, or gallops  Abd:  soft, non tender, obese  Extrem: No CCE

## 2019-11-16 NOTE — PROGRESS NOTE ADULT - SUBJECTIVE AND OBJECTIVE BOX
CHIEF COMPLAINT:Patient is a 54y old  Female who presents with a chief complaint of   	        PAST MEDICAL & SURGICAL HISTORY:  Prophylactic measure  Breast cancer  Brain aneurysm: with clips  Psoriasis  RA (rheumatoid arthritis)  Psoriasis  Breast cancer, stage 3  History of modified radical mastectomy of both breasts  S/P bilateral mastectomy  Brain aneurysm: 1988 two clips          REVIEW OF SYSTEMS:  sleepy   arousable  feels better  EYES: No eye pain,  NECK: No pain or stiffness  RESPIRATORY: No cough, wheezing, chills or hemoptysis; No Shortness of Breath  CARDIOVASCULAR: No chest pain, palpitations, passing out,  GASTROINTESTINAL: No abdominal or epigastric pain. No nausea, vomiting, or hematemesis;  GENITOURINARY: No dysuria, frequency, hematuria, or incontinence  NEUROLOGICAL: No headaches,    Medications:  MEDICATIONS  (STANDING):  albuterol/ipratropium for Nebulization 3 milliLiter(s) Nebulizer every 6 hours  apremilast 30 milliGRAM(s) Oral two times a day  buDESOnide    Inhalation Suspension 0.5 milliGRAM(s) Inhalation two times a day  cholecalciferol 1000 Unit(s) Oral daily  exemestane 25 milliGRAM(s) Oral daily  folic acid 1 milliGRAM(s) Oral daily  heparin  Injectable 5000 Unit(s) SubCutaneous every 12 hours  hydrocortisone hemorrhoidal Suppository 1 Suppository(s) Rectal two times a day  magnesium sulfate  IVPB 1 Gram(s) IV Intermittent once  montelukast 10 milliGRAM(s) Oral daily  pantoprazole    Tablet 40 milliGRAM(s) Oral before breakfast  senna 2 Tablet(s) Oral at bedtime  sodium chloride 0.9%. 1000 milliLiter(s) (50 mL/Hr) IV Continuous <Continuous>  thiamine 100 milliGRAM(s) Oral daily    MEDICATIONS  (PRN):  LORazepam     Tablet 2 milliGRAM(s) Oral every 2 hours PRN Symptom-triggered 2 point increase in CIWA-Ar  LORazepam     Tablet 2 milliGRAM(s) Oral every 2 hours PRN CIWA-Ar score increase by 2 points and a total score of 7 or less    	    PHYSICAL EXAM:  T(C): 36.9 (11-16-19 @ 08:30), Max: 37.1 (11-15-19 @ 14:15)  HR: 87 (11-16-19 @ 08:30) (83 - 111)  BP: 106/70 (11-16-19 @ 08:30) (99/58 - 116/73)  RR: 17 (11-16-19 @ 08:30) (17 - 20)  SpO2: 98% (11-16-19 @ 08:30) (95% - 99%)  Wt(kg): --  I&O's Summary    15 Nov 2019 07:01  -  16 Nov 2019 07:00  --------------------------------------------------------  IN: 1575 mL / OUT: 1180 mL / NET: 395 mL        Appearance: Normal	  HEENT:   Normal oral mucosa, PERRL, EOMI	  Lymphatic: No lymphadenopathy  Cardiovascular: Normal S1 S2, No JVD  Respiratory: dec bs   Psychiatry: A & O   Gastrointestinal:  Soft, Non-tender, + BS	  Skin: No rashes, No ecchymoses, No cyanosis	  Neurologic: Non-focal  Extremities: dec edema  Vascular: Peripheral pulses palpable     TELEMETRY: 	    ECG:  	  RADIOLOGY:  OTHER: 	  	  LABS:	 	    CARDIAC MARKERS:                                11.8   5.71  )-----------( 268      ( 14 Nov 2019 19:44 )             33.4     11-16    133<L>  |  95<L>  |  4<L>  ----------------------------<  132<H>  3.8   |  29  |  0.52    Ca    8.9      16 Nov 2019 07:28  Phos  2.8     11-15  Mg     1.4     11-15    TPro  5.6<L>  /  Alb  3.3  /  TBili  0.8  /  DBili  x   /  AST  51<H>  /  ALT  42  /  AlkPhos  51  11-16    proBNP:   Lipid Profile:   HgA1c:   TSH:

## 2019-11-16 NOTE — PROGRESS NOTE ADULT - ASSESSMENT
ASSESSMENT:    chronic hypoxic respiratory failure due to severe COPD/emphysema - there is no evidence of pneumonia, pulmonary edema, pleural effusions or pulmonary emboli on the CTA of the chest - her respiratory status is at baseline without bronchospasm - anxiety may be contributing to her chronic sense of dyspnea    chronic alcohol use    breast cancer s/p bilateral mastectomy    rheumatoid and psoriatic arthritis on Otezla - immunocompromised host    brain aneurysm s/p clipping    PLAN/RECOMMENDATIONS:    oxygen supplementation to keep saturation greater than 92%  continue to observe off antibiotics and steroids, which would increase anxiety  albuterol/atrovent nebs q6h  pulmicort 0.5mg nebs q12h  singulair  SANCHEZ stockings  watch for and treat alcohol withdrawal  thiamine/MVI/folate  Otezla/lidex solution to scalp/hydrocortisone cream to face  Arimidex  DVT prophylaxis - SQ heparin  GI prophylaxis - protonix  bowel regimen    Tanja Kidd MD, [facility]  [phone]  Pulmonary Medicine

## 2019-11-16 NOTE — PROGRESS NOTE ADULT - ASSESSMENT
54 yr old female with Hx significant for COPD on home O2, ETOH abuse, breast Ca, remote brain aneurysm s/p clip, DVT  who p/w increase nonexertional SOB x 3 days. had  bright red blood in her stool in the toilet bowl x 2 days.  Pt has been having prod cough with white sputum x 3 days. + etoh active abuser with hyponatremia      1- hyponatremia  2- etoh dependence  3- copd  4- sob       ivf hydration--> DC    O2   strict I/O   one liter fluid restriction   CIWA protocol   gi bleed GI trend hb   cont neb tx   hypomagnesemia mag so4- 1 gram iv  d/w D.r Base when pt seen earlier

## 2019-11-16 NOTE — PROGRESS NOTE ADULT - SUBJECTIVE AND OBJECTIVE BOX
[facility]   [phone]  RENAL FOLLOW UP NOTE  --------------------------------------------------------------------------------  Chief Complaint:    24 hour events/subjective:    seen earlier. still with sob but not worsened    PAST HISTORY  --------------------------------------------------------------------------------  No significant changes to PMH, PSH, FHx, SHx, unless otherwise noted    ALLERGIES & MEDICATIONS  --------------------------------------------------------------------------------  Allergies    amoxicillin (Anaphylaxis)  Levaquin (Other; Anaphylaxis)  Levaquin (Unknown)  penicillin (Anaphylaxis)  penicillins (Anaphylaxis)    Intolerances      Standing Inpatient Medications  albuterol/ipratropium for Nebulization 3 milliLiter(s) Nebulizer every 6 hours  apremilast 30 milliGRAM(s) Oral two times a day  buDESOnide    Inhalation Suspension 0.5 milliGRAM(s) Inhalation two times a day  cholecalciferol 1000 Unit(s) Oral daily  exemestane 25 milliGRAM(s) Oral daily  folic acid 1 milliGRAM(s) Oral daily  heparin  Injectable 5000 Unit(s) SubCutaneous every 12 hours  hydrocortisone hemorrhoidal Suppository 1 Suppository(s) Rectal two times a day  montelukast 10 milliGRAM(s) Oral daily  pantoprazole    Tablet 40 milliGRAM(s) Oral before breakfast  senna 2 Tablet(s) Oral at bedtime  sodium chloride 0.9%. 1000 milliLiter(s) IV Continuous <Continuous>  thiamine 100 milliGRAM(s) Oral daily    PRN Inpatient Medications  LORazepam     Tablet 2 milliGRAM(s) Oral every 2 hours PRN  LORazepam     Tablet 2 milliGRAM(s) Oral every 2 hours PRN      REVIEW OF SYSTEMS  --------------------------------------------------------------------------------    Gen: denies  fevers/chills,  CVS: denies chest pain/palpitations  Resp: + SOB/Cough  GI: Denies N/V/Abd pain  : Denies dysuria    All other systems were reviewed and are negative, except as noted.    VITALS/PHYSICAL EXAM  --------------------------------------------------------------------------------  T(C): 36.9 (11-16-19 @ 14:30), Max: 37.1 (11-15-19 @ 22:30)  HR: 94 (11-16-19 @ 14:30) (83 - 100)  BP: 120/76 (11-16-19 @ 14:30) (103/70 - 120/76)  RR: 18 (11-16-19 @ 14:30) (17 - 18)  SpO2: 97% (11-16-19 @ 14:30) (95% - 98%)  Wt(kg): --        11-15-19 @ 07:01  -  11-16-19 @ 07:00  --------------------------------------------------------  IN: 1575 mL / OUT: 1180 mL / NET: 395 mL    11-16-19 @ 07:01  -  11-16-19 @ 20:08  --------------------------------------------------------  IN: 700 mL / OUT: 0 mL / NET: 700 mL      Physical Exam:  	  	Gen: Non toxic comfortable appearing  	no jvd , supple neck,   	Pulm: decrease bs  no rales or ronchi or wheezing  	CV: RRR, S1S2; no rub  	Abd: +BS, soft, nontender/nondistended obese   	: No suprapubic tenderness  	UE: Warm, no cyanosis  no clubbing,  no edema  	LE: Warm, no cyanosis  no clubbing, 1+   edema  	Neuro: alert and oriented. speech coherent   	Skin: Warm, scaly skin   	  	      LABS/STUDIES  --------------------------------------------------------------------------------              10.1   4.46  >-----------<  219      [11-16-19 @ 10:21]              31.3     133  |  95  |  4   ----------------------------<  132      [11-16-19 @ 07:28]  3.8   |  29  |  0.52        Ca     8.9     [11-16-19 @ 07:28]      Mg     1.4     [11-15-19 @ 21:19]      Phos  2.8     [11-15-19 @ 21:19]    TPro  5.6  /  Alb  3.3  /  TBili  0.8  /  DBili  x   /  AST  51  /  ALT  42  /  AlkPhos  51  [11-16-19 @ 07:28]          Creatinine Trend:  SCr 0.52 [11-16 @ 07:28]  SCr 0.52 [11-15 @ 21:19]  SCr 0.49 [11-15 @ 11:58]  SCr 0.41 [11-14 @ 18:31]  SCr 0.50 [10-26 @ 09:34]              Urinalysis - [10-22-19 @ 11:01]      Color Light Yellow / Appearance Clear / SG 1.006 / pH 7.0      Gluc Negative / Ketone Trace  / Bili Negative / Urobili Negative       Blood Negative / Protein Negative / Leuk Est Negative / Nitrite Negative      RBC  / WBC  / Hyaline  / Gran  / Sq Epi  / Non Sq Epi  / Bacteria       Iron 42, TIBC 196, %sat 21      [11-16-19 @ 10:30]  Ferritin 792      [11-16-19 @ 10:31]  HbA1c 5.4      [08-19-16 @ 07:12]

## 2019-11-16 NOTE — PROGRESS NOTE ADULT - ASSESSMENT
53 y/o female with a PMH stage 3 Bilateral Breast CA on Aromasin, RA, Psoriasis previously on Otezla, remote Brain Aneurysm s/p Clip in 1988,  Rt Jugular DVT, Catheter related MSSA Bacteremia from [place] in July 2015, ETOH abuse, COPD, p/w dypsnea, leg edema, rectal bleeding.    1.Breast cancer- bilateral breast cancer, ER+/IL+/Her2 equivocal on Left; ER+/IL+/Her - on right; s/p taxotere/carbo/perjeta/herceptin x 3 cycles, s/p bilateral mastectomies 9/2015  - last PET scan 11/2017 negative  - s/p Herceptin, completed 3/2017; on arimidex, with side effects, changed to aromasin 1/2016- continue  - Ca 27.29 slightly increased as outpatient though fluctuating -- had been advised to go for scans 12/2018 but did not have; CA 27.29 on 9/10 was 45, prior was 33 in 4/2019 and 54 in 12/2018  - BRCA+ -- has been discussed as outpatient that she needs oopherectomy; overdue for yearly US    2.Anemia, macrocytic- mild but may be hemoconcentrated, chronic ETOH use  - iron, B12 and folate levels pending.   - with reported rectal bleeding- monitor- states she will not have a colonoscopy; check fecal occult  - monitor Hg pending for today.    3. Pulm- with COPD on home O2, CTA no PE  - on nebs  - to be monitored off steroids/antibiotics    4. Psoriasis- on Otezla    5. ETOH dependence- on withdrawal protocol, on folic acid/thiamine    Follow up labs from today and anemia labs

## 2019-11-16 NOTE — PROGRESS NOTE ADULT - SUBJECTIVE AND OBJECTIVE BOX
Chief Complaint: "I am weak".    History of Present Illness:    The patient is feeling a little weak.  She was aware of her surroundings.  No nausea.  No vomiting.  No diarrhea.  No constipation.  No fevers.  She does feel more swollen.  No SOB at rest.  Remainder ROS is stable. No obvious bleeding at this time.     MEDICATIONS  (STANDING):  albuterol/ipratropium for Nebulization 3 milliLiter(s) Nebulizer every 6 hours  apremilast 30 milliGRAM(s) Oral two times a day  buDESOnide    Inhalation Suspension 0.5 milliGRAM(s) Inhalation two times a day  cholecalciferol 1000 Unit(s) Oral daily  exemestane 25 milliGRAM(s) Oral daily  folic acid 1 milliGRAM(s) Oral daily  heparin  Injectable 5000 Unit(s) SubCutaneous every 12 hours  hydrocortisone hemorrhoidal Suppository 1 Suppository(s) Rectal two times a day  magnesium sulfate  IVPB 1 Gram(s) IV Intermittent once  montelukast 10 milliGRAM(s) Oral daily  pantoprazole    Tablet 40 milliGRAM(s) Oral before breakfast  senna 2 Tablet(s) Oral at bedtime  sodium chloride 0.9%. 1000 milliLiter(s) (50 mL/Hr) IV Continuous <Continuous>  thiamine 100 milliGRAM(s) Oral daily    MEDICATIONS  (PRN):  LORazepam     Tablet 2 milliGRAM(s) Oral every 2 hours PRN Symptom-triggered 2 point increase in CIWA-Ar  LORazepam     Tablet 2 milliGRAM(s) Oral every 2 hours PRN CIWA-Ar score increase by 2 points and a total score of 7 or less      Allergies    amoxicillin (Anaphylaxis)  Levaquin (Other; Anaphylaxis)  Levaquin (Unknown)  penicillin (Anaphylaxis)  penicillins (Anaphylaxis)    Intolerances        Vital Signs Last 24 Hrs  T(C): 36.9 (16 Nov 2019 08:30), Max: 37.1 (15 Nov 2019 14:15)  T(F): 98.5 (16 Nov 2019 08:30), Max: 98.8 (15 Nov 2019 19:57)  HR: 87 (16 Nov 2019 08:30) (83 - 111)  BP: 106/70 (16 Nov 2019 08:30) (99/58 - 116/73)  BP(mean): --  RR: 17 (16 Nov 2019 08:30) (17 - 20)  SpO2: 98% (16 Nov 2019 08:30) (95% - 99%)    PHYSICAL EXAM  General: weak  HEENT: clear oropharynx, anicteric sclera, pink conjunctiva  Neck: supple  CV: normal S1/S2   Lungs: clear to auscultation  Abdomen: soft non-tender non-distended,  positive bowel sounds  Ext: positive LE edema  Skin: plaques on skin with mild erythema  Lymph Nodes: No LAD in neck  Neuro: alert and oriented X 3    LABS:                          11.8   5.71  )-----------( 268      ( 14 Nov 2019 19:44 )             33.4         Mean Cell Volume : 101.2 fl  Mean Cell Hemoglobin : 35.8 pg  Mean Cell Hemoglobin Concentration : 35.3 gm/dL  Auto Neutrophil # : 4.01 K/uL  Auto Lymphocyte # : 0.56 K/uL  Auto Monocyte # : 0.65 K/uL  Auto Eosinophil # : 0.39 K/uL  Auto Basophil # : 0.03 K/uL  Auto Neutrophil % : 70.3 %  Auto Lymphocyte % : 9.8 %  Auto Monocyte % : 11.4 %  Auto Eosinophil % : 6.8 %  Auto Basophil % : 0.5 %      Serial CBC's  11-14 @ 19:44  Hct-33.4 / Hgb-11.8 / Plat-268 / RBC-3.30 / WBC-5.71      11-16    133<L>  |  95<L>  |  4<L>  ----------------------------<  132<H>  3.8   |  29  |  0.52    Ca    8.9      16 Nov 2019 07:28  Phos  2.8     11-15  Mg     1.4     11-15    TPro  5.6<L>  /  Alb  3.3  /  TBili  0.8  /  DBili  x   /  AST  51<H>  /  ALT  42  /  AlkPhos  51  11-16      PT/INR - ( 14 Nov 2019 19:44 )   PT: 11.1 sec;   INR: 0.97 ratio         PTT - ( 14 Nov 2019 19:44 )  PTT:29.3 sec

## 2019-11-16 NOTE — PHYSICAL THERAPY INITIAL EVALUATION ADULT - GAIT TRAINING, PT EVAL
GOAL: Patient will ambulate 150 feet with RW and supervision in 2 weeks. Pt will be able to neg 3 steps w/ supervision in 2 weeks.

## 2019-11-16 NOTE — PROGRESS NOTE ADULT - ASSESSMENT
pt w/ etoh abuse / copd/ failure to thrive w/ limited mobility /hx breast ca  /w sob improved  apprec  pulm eval  hyponatremia improved  dec fluids  pulm / gi evals noted  ct a/p pending    ppis  ciwa protocol  dvt proph     renal eval noted  pt

## 2019-11-16 NOTE — PROGRESS NOTE ADULT - ASSESSMENT
anemia    ct scan a/p needed  daily cbc   transfuse prn   consider hematology eval  check iron studies   ppi once a day  check stool occult blood  further recommendations pending above anemia    ct scan a/p needed  daily cbc   transfuse prn   consider hematology eval  check iron studies   ppi once a day  check stool occult blood  further recommendations pending above    refusing egd/colonosocpy and further workup

## 2019-11-16 NOTE — PROGRESS NOTE ADULT - SUBJECTIVE AND OBJECTIVE BOX
INTERVAL HPI/OVERNIGHT EVENTS:  No new overnight event.  No N/V/D.  Tolerating diet. no bleeding   wants to go home   refused ct scan also    Allergies    amoxicillin (Anaphylaxis)  Levaquin (Other; Anaphylaxis)  Levaquin (Unknown)  penicillin (Anaphylaxis)  penicillins (Anaphylaxis)    Intolerances          General:  No wt loss, fevers, chills, night sweats, fatigue,   Eyes:  Good vision, no reported pain  ENT:  No sore throat, pain, runny nose, dysphagia  CV:  No pain, palpitations, hypo/hypertension  Resp:  No dyspnea, cough, tachypnea, wheezing  GI:  No pain, No nausea, No vomiting, No diarrhea, No constipation, No weight loss, No fever, No pruritis, No rectal bleeding, No tarry stools, No dysphagia,  :  No pain, bleeding, incontinence, nocturia  Muscle:  No pain, weakness  Neuro:  No weakness, tingling, memory problems  Psych:  No fatigue, insomnia, mood problems, depression  Endocrine:  No polyuria, polydipsia, cold/heat intolerance  Heme:  No petechiae, ecchymosis, easy bruisability  Skin:  No rash, tattoos, scars, edema      PHYSICAL EXAM:   Vital Signs:  Vital Signs Last 24 Hrs  T(C): 36.9 (16 Nov 2019 08:30), Max: 37.1 (15 Nov 2019 14:15)  T(F): 98.5 (16 Nov 2019 08:30), Max: 98.8 (15 Nov 2019 19:57)  HR: 87 (16 Nov 2019 09:16) (83 - 111)  BP: 106/70 (16 Nov 2019 09:16) (99/58 - 116/73)  BP(mean): --  RR: 17 (16 Nov 2019 08:30) (17 - 20)  SpO2: 98% (16 Nov 2019 09:16) (95% - 99%)  Daily     Daily I&O's Summary    15 Nov 2019 07:01  -  16 Nov 2019 07:00  --------------------------------------------------------  IN: 1575 mL / OUT: 1180 mL / NET: 395 mL        GENERAL:  Appears stated age, well-groomed, well-nourished, no distress  HEENT:  NC/AT,  conjunctivae clear and pink, no thyromegaly, nodules, adenopathy, no JVD, sclera -anicteric  CHEST:  Full & symmetric excursion, no increased effort, breath sounds clear  HEART:  Regular rhythm, S1, S2, no murmur/rub/S3/S4, no abdominal bruit, no edema  ABDOMEN:  Soft, non-tender, non-distended, normoactive bowel sounds,  no masses ,no hepato-splenomegaly, no signs of chronic liver disease  EXTEREMITIES:  no cyanosis,clubbing or edema  SKIN:  No rash/erythema/ecchymoses/petechiae/wounds/abscess/warm/dry  NEURO:  Alert, oriented, no asterixis, no tremor, no encephalopathy      LABS:                        10.1   4.46  )-----------( 219      ( 16 Nov 2019 10:21 )             31.3     11-16    133<L>  |  95<L>  |  4<L>  ----------------------------<  132<H>  3.8   |  29  |  0.52    Ca    8.9      16 Nov 2019 07:28  Phos  2.8     11-15  Mg     1.4     11-15    TPro  5.6<L>  /  Alb  3.3  /  TBili  0.8  /  DBili  x   /  AST  51<H>  /  ALT  42  /  AlkPhos  51  11-16    PT/INR - ( 14 Nov 2019 19:44 )   PT: 11.1 sec;   INR: 0.97 ratio         PTT - ( 14 Nov 2019 19:44 )  PTT:29.3 sec    amylase   lipase  RADIOLOGY & ADDITIONAL TESTS:

## 2019-11-16 NOTE — PHYSICAL THERAPY INITIAL EVALUATION ADULT - CRITERIA FOR SKILLED THERAPEUTIC INTERVENTIONS
anticipated discharge recommendation/impairments found/rehab potential/predicted duration of therapy intervention/therapy frequency/anticipated equipment needs at discharge/functional limitations in following categories/risk reduction/prevention

## 2019-11-17 LAB
ANION GAP SERPL CALC-SCNC: 16 MMOL/L — SIGNIFICANT CHANGE UP (ref 5–17)
BUN SERPL-MCNC: <4 MG/DL — LOW (ref 7–23)
CALCIUM SERPL-MCNC: 8.8 MG/DL — SIGNIFICANT CHANGE UP (ref 8.4–10.5)
CHLORIDE SERPL-SCNC: 91 MMOL/L — LOW (ref 96–108)
CO2 SERPL-SCNC: 26 MMOL/L — SIGNIFICANT CHANGE UP (ref 22–31)
CREAT SERPL-MCNC: 0.5 MG/DL — SIGNIFICANT CHANGE UP (ref 0.5–1.3)
GLUCOSE SERPL-MCNC: 113 MG/DL — HIGH (ref 70–99)
MAGNESIUM SERPL-MCNC: 1.5 MG/DL — LOW (ref 1.6–2.6)
POTASSIUM SERPL-MCNC: 3.8 MMOL/L — SIGNIFICANT CHANGE UP (ref 3.5–5.3)
POTASSIUM SERPL-SCNC: 3.8 MMOL/L — SIGNIFICANT CHANGE UP (ref 3.5–5.3)
SODIUM SERPL-SCNC: 133 MMOL/L — LOW (ref 135–145)

## 2019-11-17 PROCEDURE — 71045 X-RAY EXAM CHEST 1 VIEW: CPT | Mod: 26

## 2019-11-17 RX ORDER — MAGNESIUM SULFATE 500 MG/ML
2 VIAL (ML) INJECTION ONCE
Refills: 0 | Status: COMPLETED | OUTPATIENT
Start: 2019-11-17 | End: 2019-11-17

## 2019-11-17 RX ORDER — IPRATROPIUM/ALBUTEROL SULFATE 18-103MCG
3 AEROSOL WITH ADAPTER (GRAM) INHALATION ONCE
Refills: 0 | Status: COMPLETED | OUTPATIENT
Start: 2019-11-17 | End: 2019-11-17

## 2019-11-17 RX ORDER — FUROSEMIDE 40 MG
40 TABLET ORAL ONCE
Refills: 0 | Status: COMPLETED | OUTPATIENT
Start: 2019-11-17 | End: 2019-11-17

## 2019-11-17 RX ORDER — LEVALBUTEROL 1.25 MG/.5ML
0.63 SOLUTION, CONCENTRATE RESPIRATORY (INHALATION) ONCE
Refills: 0 | Status: COMPLETED | OUTPATIENT
Start: 2019-11-17 | End: 2019-11-17

## 2019-11-17 RX ADMIN — PANTOPRAZOLE SODIUM 40 MILLIGRAM(S): 20 TABLET, DELAYED RELEASE ORAL at 06:28

## 2019-11-17 RX ADMIN — Medication 50 GRAM(S): at 17:24

## 2019-11-17 RX ADMIN — Medication 1000 UNIT(S): at 13:08

## 2019-11-17 RX ADMIN — Medication 3 MILLILITER(S): at 17:18

## 2019-11-17 RX ADMIN — MONTELUKAST 10 MILLIGRAM(S): 4 TABLET, CHEWABLE ORAL at 13:07

## 2019-11-17 RX ADMIN — APREMILAST 30 MILLIGRAM(S): KIT ORAL at 17:43

## 2019-11-17 RX ADMIN — Medication 40 MILLIGRAM(S): at 17:22

## 2019-11-17 RX ADMIN — Medication 2 MILLIGRAM(S): at 13:07

## 2019-11-17 RX ADMIN — Medication 1 SUPPOSITORY(S): at 06:26

## 2019-11-17 RX ADMIN — Medication 100 MILLIGRAM(S): at 13:08

## 2019-11-17 RX ADMIN — Medication 3 MILLILITER(S): at 06:25

## 2019-11-17 RX ADMIN — Medication 1 MILLIGRAM(S): at 13:08

## 2019-11-17 RX ADMIN — Medication 3 MILLILITER(S): at 10:21

## 2019-11-17 RX ADMIN — Medication 3 MILLILITER(S): at 14:51

## 2019-11-17 RX ADMIN — Medication 2 MILLIGRAM(S): at 20:28

## 2019-11-17 RX ADMIN — Medication 0.5 MILLIGRAM(S): at 06:25

## 2019-11-17 RX ADMIN — LEVALBUTEROL 0.63 MILLIGRAM(S): 1.25 SOLUTION, CONCENTRATE RESPIRATORY (INHALATION) at 03:20

## 2019-11-17 RX ADMIN — EXEMESTANE 25 MILLIGRAM(S): 25 TABLET, SUGAR COATED ORAL at 14:51

## 2019-11-17 RX ADMIN — Medication 1 SUPPOSITORY(S): at 17:19

## 2019-11-17 RX ADMIN — HEPARIN SODIUM 5000 UNIT(S): 5000 INJECTION INTRAVENOUS; SUBCUTANEOUS at 06:25

## 2019-11-17 RX ADMIN — Medication 0.5 MILLIGRAM(S): at 17:19

## 2019-11-17 RX ADMIN — HEPARIN SODIUM 5000 UNIT(S): 5000 INJECTION INTRAVENOUS; SUBCUTANEOUS at 17:19

## 2019-11-17 NOTE — CHART NOTE - NSCHARTNOTEFT_GEN_A_CORE
MEDICINE NP    Notified by RN patient with dyspnea. Seen and examined patient at bedside. Patient is AOx3, mild distress, use of abdominal accessory muscles. Denies dizziness or CP.    VITAL SIGNS:  Vital Signs Last 24 Hrs  T(C): 36.8 (17 Nov 2019 02:48), Max: 36.9 (16 Nov 2019 08:30)  T(F): 98.2 (17 Nov 2019 02:48), Max: 98.5 (16 Nov 2019 08:30)  HR: 103 (17 Nov 2019 02:48) (83 - 103)  BP: 146/82 (17 Nov 2019 02:48) (103/70 - 146/82)  BP(mean): --  RR: 18 (17 Nov 2019 02:48) (17 - 18)  SpO2: 95% (17 Nov 2019 02:48) (94% - 98%)      LABORATORY:                          10.1   4.46  )-----------( 219      ( 16 Nov 2019 10:21 )             31.3       11-16    133<L>  |  95<L>  |  4<L>  ----------------------------<  132<H>  3.8   |  29  |  0.52    Ca    8.9      16 Nov 2019 07:28  Phos  2.8     11-15  Mg     1.4     11-15    TPro  5.6<L>  /  Alb  3.3  /  TBili  0.8  /  DBili  x   /  AST  51<H>  /  ALT  42  /  AlkPhos  51  11-16    RADIOLOGY:  11/14 CXR: IMPRESSION:   Clear lungs.    PHYSICAL EXAM:  Constitutional: AOx3. NAD.  Respiratory: exp wheezing throughout  Cardiovascular: S1 S2. No murmurs.  Gastrointestinal: BS X4 active. soft. nontender. obese  Extremities/Vascular: +2 pulses bilaterally. trace BLE edema.    ASSESSMENT/PLAN:   54 yr old female with Hx significant for COPD on home O2, ETOH abuse, breast Ca, remote brain aneurysm s/p clip, DVT  who p/w increase nonexertional SOB x 3 days.  some lower ext edema    Pt's  states that he sees bright red blood in her stool in the toilet bowl x 2 days.?   Pt has been having prod cough with white sputum x 3 days. no fever, cp, abd, dysuria. Pt also drink EtOH 5 drinks/day ? (15 Nov 2019 08:08)    Recurrent dyspnea  --pt  slight tachy, Xopenex x 1  --CXR  --d'cd IVF (also per renal recs)  --F/U primary team in AM    Reyna Sin, [facility]  [id number] MEDICINE NP    Notified by RN patient with dyspnea. Seen and examined patient at bedside. Patient is AOx3, mild distress, use of abdominal accessory muscles. Denies dizziness or CP.    VITAL SIGNS:  Vital Signs Last 24 Hrs  T(C): 36.8 (17 Nov 2019 02:48), Max: 36.9 (16 Nov 2019 08:30)  T(F): 98.2 (17 Nov 2019 02:48), Max: 98.5 (16 Nov 2019 08:30)  HR: 103 (17 Nov 2019 02:48) (83 - 103)  BP: 146/82 (17 Nov 2019 02:48) (103/70 - 146/82)  BP(mean): --  RR: 18 (17 Nov 2019 02:48) (17 - 18)  SpO2: 95% (17 Nov 2019 02:48) (94% - 98%)      LABORATORY:                          10.1   4.46  )-----------( 219      ( 16 Nov 2019 10:21 )             31.3       11-16    133<L>  |  95<L>  |  4<L>  ----------------------------<  132<H>  3.8   |  29  |  0.52    Ca    8.9      16 Nov 2019 07:28  Phos  2.8     11-15  Mg     1.4     11-15    TPro  5.6<L>  /  Alb  3.3  /  TBili  0.8  /  DBili  x   /  AST  51<H>  /  ALT  42  /  AlkPhos  51  11-16    RADIOLOGY:  11/14 CXR: IMPRESSION:   Clear lungs.    PHYSICAL EXAM:  Constitutional: AOx3. NAD.  Respiratory: exp wheezing throughout  Cardiovascular: S1 S2. No murmurs.  Gastrointestinal: BS X4 active. soft. nontender. obese  Extremities/Vascular: +2 pulses bilaterally. trace BLE edema.    ASSESSMENT/PLAN:   54 yr old female with Hx significant for COPD on home O2, ETOH abuse, breast Ca, remote brain aneurysm s/p clip, DVT  who p/w increase nonexertional SOB x 3 days. had  bright red blood in her stool in the toilet bowl x 2 days.  Pt has been having prod cough with white sputum x 3 days. no fever, cp, abd, dysuria. Pt also drink EtOH 5 drinks/day beers. Now c/o recurrent dyspnea.     Recurrent dyspnea  --pt slight tachy, Xopenex x 1  --CXR  --d'cd IVF (also per renal recs)  --F/U primary team in AM    Reyna Sin, [facility]  [id number]

## 2019-11-17 NOTE — PROGRESS NOTE ADULT - SUBJECTIVE AND OBJECTIVE BOX
Chief Complaint: "I am shaky this morning".     History of Present Illness:    The patient is feeling more shaky this morning.  She is still aware of her surroundings.  No chest pain.  No pleuritic chest pain.  No abdominal pain.  No nausea.  No vomiting.  No diarrhea.  No fevers.  She has some swelling.  She is not SOB at rest and is not exerting herself.  Remainder ROS is stable.     MEDICATIONS  (STANDING):  albuterol/ipratropium for Nebulization 3 milliLiter(s) Nebulizer every 6 hours  apremilast 30 milliGRAM(s) Oral two times a day  buDESOnide    Inhalation Suspension 0.5 milliGRAM(s) Inhalation two times a day  cholecalciferol 1000 Unit(s) Oral daily  exemestane 25 milliGRAM(s) Oral daily  folic acid 1 milliGRAM(s) Oral daily  heparin  Injectable 5000 Unit(s) SubCutaneous every 12 hours  hydrocortisone hemorrhoidal Suppository 1 Suppository(s) Rectal two times a day  montelukast 10 milliGRAM(s) Oral daily  pantoprazole    Tablet 40 milliGRAM(s) Oral before breakfast  senna 2 Tablet(s) Oral at bedtime  thiamine 100 milliGRAM(s) Oral daily    MEDICATIONS  (PRN):  LORazepam     Tablet 2 milliGRAM(s) Oral every 2 hours PRN Symptom-triggered 2 point increase in CIWA-Ar  LORazepam     Tablet 2 milliGRAM(s) Oral every 2 hours PRN CIWA-Ar score increase by 2 points and a total score of 7 or less      Allergies    amoxicillin (Anaphylaxis)  Levaquin (Other; Anaphylaxis)  Levaquin (Unknown)  penicillin (Anaphylaxis)  penicillins (Anaphylaxis)    Intolerances        Vital Signs Last 24 Hrs  T(C): 36.9 (17 Nov 2019 08:11), Max: 36.9 (16 Nov 2019 14:30)  T(F): 98.4 (17 Nov 2019 08:11), Max: 98.4 (16 Nov 2019 14:30)  HR: 94 (17 Nov 2019 08:11) (87 - 103)  BP: 129/78 (17 Nov 2019 08:11) (106/70 - 146/82)  BP(mean): --  RR: 18 (17 Nov 2019 08:11) (18 - 18)  SpO2: 94% (17 Nov 2019 08:11) (94% - 98%)    PHYSICAL EXAM  General: weak  HEENT: clear oropharynx, anicteric sclera, pink conjunctiva  Neck: supple  CV: normal S1/S2  Lungs: decreased BS at the bases  Abdomen: soft non-tender non-distended,positive bowel sounds  Ext: positive LE edema  Skin: psoriasis plaques on sking  Lymph Nodes: No LAD in neck  Neuro: alert and oriented X 3 but shaky    LABS:                          10.1   4.46  )-----------( 219      ( 16 Nov 2019 10:21 )             31.3         Mean Cell Volume : 107.6 fl  Mean Cell Hemoglobin : 34.7 pg  Mean Cell Hemoglobin Concentration : 32.3 gm/dL  Auto Neutrophil # : 2.99 K/uL  Auto Lymphocyte # : 0.78 K/uL  Auto Monocyte # : 0.49 K/uL  Auto Eosinophil # : 0.13 K/uL  Auto Basophil # : 0.02 K/uL  Auto Neutrophil % : 67.1 %  Auto Lymphocyte % : 17.5 %  Auto Monocyte % : 11.0 %  Auto Eosinophil % : 2.9 %  Auto Basophil % : 0.4 %      Serial CBC's  11-16 @ 10:21  Hct-31.3 / Hgb-10.1 / Plat-219 / RBC-2.91 / WBC-4.46  Serial CBC's  11-14 @ 19:44  Hct-33.4 / Hgb-11.8 / Plat-268 / RBC-3.30 / WBC-5.71      11-17    133<L>  |  91<L>  |  <4<L>  ----------------------------<  113<H>  3.8   |  26  |  0.50    Ca    8.8      17 Nov 2019 07:19  Phos  2.8     11-15  Mg     1.5     11-17    TPro  5.6<L>  /  Alb  3.3  /  TBili  0.8  /  DBili  x   /  AST  51<H>  /  ALT  42  /  AlkPhos  51  11-16          Ferritin, Serum: 792 ng/mL (11-16 @ 10:31)  Vitamin B12, Serum: 753 pg/mL (11-16 @ 10:31)  Folate, Serum: >20.0 ng/mL (11-16 @ 10:31)  Iron - Total Binding Capacity.: 196 ug/dL (11-16 @ 10:30)

## 2019-11-17 NOTE — PROGRESS NOTE ADULT - ASSESSMENT
55 y/o female with a PMH stage 3 Bilateral Breast CA on Aromasin, RA, Psoriasis previously on Otezla, remote Brain Aneurysm s/p Clip in 1988,  Rt Jugular DVT, Catheter related MSSA Bacteremia from [place] in July 2015, ETOH abuse, COPD, p/w dypsnea, leg edema, rectal bleeding.    1.Breast cancer- bilateral breast cancer, ER+/GA+/Her2 equivocal on Left; ER+/GA+/Her - on right; s/p taxotere/carbo/perjeta/herceptin x 3 cycles, s/p bilateral mastectomies 9/2015  - last PET scan 11/2017 negative  - s/p Herceptin, completed 3/2017; on arimidex, with side effects, changed to aromasin 1/2016- continue  - Ca 27-29 slightly increased as outpatient though fluctuating -- had been advised to go for scans 12/2018 but did not have; CA 27.29 on 9/10 was 45, prior was 33 in 4/2019 and 54 in 12/2018  - BRCA+ -- has been discussed as outpatient that she needs oopherectomy; overdue for yearly U/S but the patient is non-compliant    2.Anemia, macrocytic- mild but may be hemoconcentrated, chronic ETOH use  - iron studies c/w AOCD.  Normal B12 and folate  - with reported rectal bleeding- monitor- states she will not have a colonoscopy; check fecal occult  - hb today is 10.1    3. Pulm- with COPD on home O2, CTA no PE  - on nebs  - to be monitored off steroids/antibiotics    4. Psoriasis- on Otezla    5. ETOH dependence- on withdrawal protocol, on folic acid/thiamine

## 2019-11-17 NOTE — PROGRESS NOTE ADULT - SUBJECTIVE AND OBJECTIVE BOX
INTERVAL HPI/OVERNIGHT EVENTS:  No new overnight event.  No N/V/D.  Tolerating diet.  refusing all work up  Allergies    amoxicillin (Anaphylaxis)  Levaquin (Other; Anaphylaxis)  Levaquin (Unknown)  penicillin (Anaphylaxis)  penicillins (Anaphylaxis)    Intolerances          General:  No wt loss, fevers, chills, night sweats, fatigue,   Eyes:  Good vision, no reported pain  ENT:  No sore throat, pain, runny nose, dysphagia  CV:  No pain, palpitations, hypo/hypertension  Resp:  No dyspnea, cough, tachypnea, wheezing  GI:  No pain, No nausea, No vomiting, No diarrhea, No constipation, No weight loss, No fever, No pruritis, No rectal bleeding, No tarry stools, No dysphagia,  :  No pain, bleeding, incontinence, nocturia  Muscle:  No pain, weakness  Neuro:  No weakness, tingling, memory problems  Psych:  No fatigue, insomnia, mood problems, depression  Endocrine:  No polyuria, polydipsia, cold/heat intolerance  Heme:  No petechiae, ecchymosis, easy bruisability  Skin:  No rash, tattoos, scars, edema      PHYSICAL EXAM:   Vital Signs:  Vital Signs Last 24 Hrs  T(C): 36.9 (17 Nov 2019 09:00), Max: 36.9 (16 Nov 2019 14:30)  T(F): 98.4 (17 Nov 2019 09:00), Max: 98.4 (16 Nov 2019 14:30)  HR: 90 (17 Nov 2019 09:00) (90 - 103)  BP: 132/82 (17 Nov 2019 09:00) (120/76 - 146/82)  BP(mean): --  RR: 18 (17 Nov 2019 09:00) (18 - 18)  SpO2: 96% (17 Nov 2019 09:00) (94% - 97%)  Daily     Daily I&O's Summary    16 Nov 2019 07:01  -  17 Nov 2019 07:00  --------------------------------------------------------  IN: 700 mL / OUT: 600 mL / NET: 100 mL        GENERAL:  Appears stated age, well-groomed, well-nourished, no distress  HEENT:  NC/AT,  conjunctivae clear and pink, no thyromegaly, nodules, adenopathy, no JVD, sclera -anicteric  CHEST:  Full & symmetric excursion, no increased effort, breath sounds clear  HEART:  Regular rhythm, S1, S2, no murmur/rub/S3/S4, no abdominal bruit, no edema  ABDOMEN:  Soft, non-tender, non-distended, normoactive bowel sounds,  no masses ,no hepato-splenomegaly, no signs of chronic liver disease  EXTEREMITIES:  no cyanosis,clubbing or edema  SKIN:  No rash/erythema/ecchymoses/petechiae/wounds/abscess/warm/dry  NEURO:  Alert, oriented, no asterixis, no tremor, no encephalopathy      LABS:                        10.1   4.46  )-----------( 219      ( 16 Nov 2019 10:21 )             31.3     11-17    133<L>  |  91<L>  |  <4<L>  ----------------------------<  113<H>  3.8   |  26  |  0.50    Ca    8.8      17 Nov 2019 07:19  Phos  2.8     11-15  Mg     1.5     11-17    TPro  5.6<L>  /  Alb  3.3  /  TBili  0.8  /  DBili  x   /  AST  51<H>  /  ALT  42  /  AlkPhos  51  11-16        amylase   lipase  RADIOLOGY & ADDITIONAL TESTS:

## 2019-11-17 NOTE — PROGRESS NOTE ADULT - SUBJECTIVE AND OBJECTIVE BOX
CHIEF COMPLAINT:Patient is a 54y old  Female who presents with a chief complaint of SOB (16 Nov 2019 09:16)    	        PAST MEDICAL & SURGICAL HISTORY:  Prophylactic measure  Breast cancer  Brain aneurysm: with clips  Psoriasis  RA (rheumatoid arthritis)  Psoriasis  Breast cancer, stage 3  History of modified radical mastectomy of both breasts  S/P bilateral mastectomy  Brain aneurysm: 1988 two clips          REVIEW OF SYSTEMS:    EYES: No eye pain, visual disturbances, or discharge  NECK: No pain or stiffness  RESPIRATORY: No cough, wheezing, chills or hemoptysis;   pt says some sob  CARDIOVASCULAR: No chest pain, palpitations, passing out, dizziness,   GASTROINTESTINAL: No abdominal or epigastric pain. No nausea, vomiting, or hematemesis; No diarrhea or constipation.   GENITOURINARY: No dysuria, frequency, hematuria, or incontinence  NEUROLOGICAL: No headaches,     MUSCULOSKELETAL: No joint pain or swelling; No muscle, back, or extremity pain    Medications:  MEDICATIONS  (STANDING):  albuterol/ipratropium for Nebulization 3 milliLiter(s) Nebulizer every 6 hours  apremilast 30 milliGRAM(s) Oral two times a day  buDESOnide    Inhalation Suspension 0.5 milliGRAM(s) Inhalation two times a day  cholecalciferol 1000 Unit(s) Oral daily  exemestane 25 milliGRAM(s) Oral daily  folic acid 1 milliGRAM(s) Oral daily  furosemide   Injectable 40 milliGRAM(s) IV Push once  heparin  Injectable 5000 Unit(s) SubCutaneous every 12 hours  hydrocortisone hemorrhoidal Suppository 1 Suppository(s) Rectal two times a day  magnesium sulfate  IVPB 2 Gram(s) IV Intermittent once  montelukast 10 milliGRAM(s) Oral daily  pantoprazole    Tablet 40 milliGRAM(s) Oral before breakfast  senna 2 Tablet(s) Oral at bedtime  thiamine 100 milliGRAM(s) Oral daily    MEDICATIONS  (PRN):  LORazepam     Tablet 2 milliGRAM(s) Oral every 2 hours PRN Symptom-triggered 2 point increase in CIWA-Ar  LORazepam     Tablet 2 milliGRAM(s) Oral every 2 hours PRN CIWA-Ar score increase by 2 points and a total score of 7 or less    	    PHYSICAL EXAM:  T(C): 36.7 (11-17-19 @ 13:00), Max: 36.9 (11-17-19 @ 08:11)  HR: 95 (11-17-19 @ 13:00) (90 - 103)  BP: 135/34 (11-17-19 @ 13:00) (129/78 - 146/82)  RR: 18 (11-17-19 @ 13:00) (18 - 18)  SpO2: 96% (11-17-19 @ 13:00) (94% - 96%)  Wt(kg): --  I&O's Summary    16 Nov 2019 07:01  -  17 Nov 2019 07:00  --------------------------------------------------------  IN: 700 mL / OUT: 600 mL / NET: 100 mL        Appearance: Normal	  HEENT:   Normal oral mucosa, PERRL, EOMI	  Lymphatic: No lymphadenopathy  Cardiovascular: Normal S1 S2, No JVD,  Respiratory: dec bs   Psychiatry: A & O   Gastrointestinal:  Soft, Non-tender, + BS	  Skin: No rashes, No ecchymoses, No cyanosis	  Neurologic: Non-focal  Extremities: Normal range of motion, No clubbing, cyanosis   dec edema  Vascular: Peripheral pulses palpable 2+ bilaterally    TELEMETRY: 	    ECG:  	  RADIOLOGY:  OTHER: 	  	  LABS:	 	    CARDIAC MARKERS:                                10.1   4.46  )-----------( 219      ( 16 Nov 2019 10:21 )             31.3     11-17    133<L>  |  91<L>  |  <4<L>  ----------------------------<  113<H>  3.8   |  26  |  0.50    Ca    8.8      17 Nov 2019 07:19  Phos  2.8     11-15  Mg     1.5     11-17    TPro  5.6<L>  /  Alb  3.3  /  TBili  0.8  /  DBili  x   /  AST  51<H>  /  ALT  42  /  AlkPhos  51  11-16    proBNP:   Lipid Profile:   HgA1c:   TSH:

## 2019-11-17 NOTE — PROGRESS NOTE ADULT - ASSESSMENT
ASSESSMENT:    chronic hypoxic respiratory failure due to severe COPD/emphysema - there is no evidence of pneumonia, pulmonary edema, pleural effusions or pulmonary emboli on the CTA of the chest - her respiratory status is at baseline without bronchospasm - anxiety may be contributing to her chronic sense of dyspnea    chronic alcohol use    breast cancer s/p bilateral mastectomy    rheumatoid and psoriatic arthritis on Otezla - immunocompromised host    brain aneurysm s/p clipping    PLAN/RECOMMENDATIONS:    oxygen supplementation to keep saturation greater than 92%  continue to observe off antibiotics and steroids, which would increase anxiety  albuterol/atrovent nebs q6h  pulmicort 0.5mg nebs q12h  singulair  SANCHEZ stockings  watch for and treat alcohol withdrawal  thiamine/MVI/folate  Otezla/lidex solution to scalp/hydrocortisone cream to face  Arimidex  DVT prophylaxis - SQ heparin  GI prophylaxis - protonix  bowel regimen  likely mild fluid overload and better with nebs and stopped ivf.  If occurs again consider one dose lasix iv if necessary    Tanja Kidd MD, [facility]  [phone]  Pulmonary Medicine

## 2019-11-17 NOTE — PROGRESS NOTE ADULT - SUBJECTIVE AND OBJECTIVE BOX
Follow-up Pulm Progress Note - [facility] [place]    Events of last pm noted, pt currently lying flat, feels better, no wheeze    Medications:  MEDICATIONS  (STANDING):  albuterol/ipratropium for Nebulization 3 milliLiter(s) Nebulizer every 6 hours  apremilast 30 milliGRAM(s) Oral two times a day  buDESOnide    Inhalation Suspension 0.5 milliGRAM(s) Inhalation two times a day  cholecalciferol 1000 Unit(s) Oral daily  exemestane 25 milliGRAM(s) Oral daily  folic acid 1 milliGRAM(s) Oral daily  heparin  Injectable 5000 Unit(s) SubCutaneous every 12 hours  hydrocortisone hemorrhoidal Suppository 1 Suppository(s) Rectal two times a day  montelukast 10 milliGRAM(s) Oral daily  pantoprazole    Tablet 40 milliGRAM(s) Oral before breakfast  senna 2 Tablet(s) Oral at bedtime  thiamine 100 milliGRAM(s) Oral daily    MEDICATIONS  (PRN):  LORazepam     Tablet 2 milliGRAM(s) Oral every 2 hours PRN Symptom-triggered 2 point increase in CIWA-Ar  LORazepam     Tablet 2 milliGRAM(s) Oral every 2 hours PRN CIWA-Ar score increase by 2 points and a total score of 7 or less             Vital Signs Last 24 Hrs  T(C): 36.8 (17 Nov 2019 02:48), Max: 36.9 (16 Nov 2019 08:30)  T(F): 98.2 (17 Nov 2019 02:48), Max: 98.5 (16 Nov 2019 08:30)  HR: 103 (17 Nov 2019 02:48) (83 - 103)  BP: 146/82 (17 Nov 2019 02:48) (103/70 - 146/82)  BP(mean): --  RR: 18 (17 Nov 2019 02:48) (17 - 18)  SpO2: 95% (17 Nov 2019 02:48) (94% - 98%)          11-16 @ 07:01  -  11-17 @ 07:00  --------------------------------------------------------  IN: 700 mL / OUT: 600 mL / NET: 100 mL          LABS:                        10.1   4.46  )-----------( 219      ( 16 Nov 2019 10:21 )             31.3     11-16    133<L>  |  95<L>  |  4<L>  ----------------------------<  132<H>  3.8   |  29  |  0.52    Ca    8.9      16 Nov 2019 07:28  Phos  2.8     11-15  Mg     1.4     11-15    TPro  5.6<L>  /  Alb  3.3  /  TBili  0.8  /  DBili  x   /  AST  51<H>  /  ALT  42  /  AlkPhos  51  11-16        CAPILLARY BLOOD GLUCOSE              Serum Pro-Brain Natriuretic Peptide: 75 pg/mL (11-14-19 @ 18:31)      CULTURES:        Physical Examination:  Awake and alert  Normocephalic atraumatic  NECK: supple, normal range of motion, no use of accessory muscles  PULM: Clear to auscultation bilaterally, without rales, rhonchi or wheezing  CVS: Regular rate and rhythm, no murmurs, rubs, or gallops  Abd:  soft, non tender, obese  Extrem: No CC, trace edema with teds stockings    RADIOLOGY REVIEWED  CXR: prelim: mild pvc, no acute infiltrates

## 2019-11-17 NOTE — PROGRESS NOTE ADULT - ASSESSMENT
pt w/ etoh abuse / copd/ failure to thrive w/ limited mobility /hx breast ca  /w sob   pulm eval noted\  lasix 40 mg x 1  hypomagnesemia  supp /mg  hyponatremia improved  pulm / gi evals noted  ct a/p/ imaging being refused by pt  explained risks     ppis  ciwa protocol  dvt proph     renal f/u    pt

## 2019-11-18 ENCOUNTER — TRANSCRIPTION ENCOUNTER (OUTPATIENT)
Age: 54
End: 2019-11-18

## 2019-11-18 VITALS
OXYGEN SATURATION: 99 % | SYSTOLIC BLOOD PRESSURE: 104 MMHG | TEMPERATURE: 98 F | DIASTOLIC BLOOD PRESSURE: 68 MMHG | HEART RATE: 85 BPM | RESPIRATION RATE: 18 BRPM

## 2019-11-18 LAB
ALBUMIN SERPL ELPH-MCNC: 3.5 G/DL — SIGNIFICANT CHANGE UP (ref 3.3–5)
ALP SERPL-CCNC: 51 U/L — SIGNIFICANT CHANGE UP (ref 40–120)
ALT FLD-CCNC: 40 U/L — SIGNIFICANT CHANGE UP (ref 10–45)
ANION GAP SERPL CALC-SCNC: 14 MMOL/L — SIGNIFICANT CHANGE UP (ref 5–17)
AST SERPL-CCNC: 45 U/L — HIGH (ref 10–40)
BILIRUB SERPL-MCNC: 0.7 MG/DL — SIGNIFICANT CHANGE UP (ref 0.2–1.2)
BUN SERPL-MCNC: 5 MG/DL — LOW (ref 7–23)
CALCIUM SERPL-MCNC: 8.8 MG/DL — SIGNIFICANT CHANGE UP (ref 8.4–10.5)
CHLORIDE SERPL-SCNC: 90 MMOL/L — LOW (ref 96–108)
CO2 SERPL-SCNC: 31 MMOL/L — SIGNIFICANT CHANGE UP (ref 22–31)
CREAT SERPL-MCNC: 0.54 MG/DL — SIGNIFICANT CHANGE UP (ref 0.5–1.3)
GLUCOSE SERPL-MCNC: 123 MG/DL — HIGH (ref 70–99)
HCT VFR BLD CALC: 35.5 % — SIGNIFICANT CHANGE UP (ref 34.5–45)
HGB BLD-MCNC: 11.7 G/DL — SIGNIFICANT CHANGE UP (ref 11.5–15.5)
MCHC RBC-ENTMCNC: 33 GM/DL — SIGNIFICANT CHANGE UP (ref 32–36)
MCHC RBC-ENTMCNC: 35.3 PG — HIGH (ref 27–34)
MCV RBC AUTO: 107.3 FL — HIGH (ref 80–100)
PLATELET # BLD AUTO: 225 K/UL — SIGNIFICANT CHANGE UP (ref 150–400)
POTASSIUM SERPL-MCNC: 3.4 MMOL/L — LOW (ref 3.5–5.3)
POTASSIUM SERPL-SCNC: 3.4 MMOL/L — LOW (ref 3.5–5.3)
PROT SERPL-MCNC: 6.2 G/DL — SIGNIFICANT CHANGE UP (ref 6–8.3)
RBC # BLD: 3.31 M/UL — LOW (ref 3.8–5.2)
RBC # FLD: 13.7 % — SIGNIFICANT CHANGE UP (ref 10.3–14.5)
SODIUM SERPL-SCNC: 135 MMOL/L — SIGNIFICANT CHANGE UP (ref 135–145)
WBC # BLD: 5.62 K/UL — SIGNIFICANT CHANGE UP (ref 3.8–10.5)
WBC # FLD AUTO: 5.62 K/UL — SIGNIFICANT CHANGE UP (ref 3.8–10.5)

## 2019-11-18 PROCEDURE — 99285 EMERGENCY DEPT VISIT HI MDM: CPT | Mod: 25

## 2019-11-18 PROCEDURE — 82728 ASSAY OF FERRITIN: CPT

## 2019-11-18 PROCEDURE — 82746 ASSAY OF FOLIC ACID SERUM: CPT

## 2019-11-18 PROCEDURE — 86900 BLOOD TYPING SEROLOGIC ABO: CPT

## 2019-11-18 PROCEDURE — 83880 ASSAY OF NATRIURETIC PEPTIDE: CPT

## 2019-11-18 PROCEDURE — 84295 ASSAY OF SERUM SODIUM: CPT

## 2019-11-18 PROCEDURE — 86850 RBC ANTIBODY SCREEN: CPT

## 2019-11-18 PROCEDURE — 71045 X-RAY EXAM CHEST 1 VIEW: CPT

## 2019-11-18 PROCEDURE — 84100 ASSAY OF PHOSPHORUS: CPT

## 2019-11-18 PROCEDURE — 80053 COMPREHEN METABOLIC PANEL: CPT

## 2019-11-18 PROCEDURE — 82607 VITAMIN B-12: CPT

## 2019-11-18 PROCEDURE — 83605 ASSAY OF LACTIC ACID: CPT

## 2019-11-18 PROCEDURE — 94640 AIRWAY INHALATION TREATMENT: CPT

## 2019-11-18 PROCEDURE — 85027 COMPLETE CBC AUTOMATED: CPT

## 2019-11-18 PROCEDURE — 85730 THROMBOPLASTIN TIME PARTIAL: CPT

## 2019-11-18 PROCEDURE — 80048 BASIC METABOLIC PNL TOTAL CA: CPT

## 2019-11-18 PROCEDURE — 93971 EXTREMITY STUDY: CPT

## 2019-11-18 PROCEDURE — 83735 ASSAY OF MAGNESIUM: CPT

## 2019-11-18 PROCEDURE — 86901 BLOOD TYPING SEROLOGIC RH(D): CPT

## 2019-11-18 PROCEDURE — 82947 ASSAY GLUCOSE BLOOD QUANT: CPT

## 2019-11-18 PROCEDURE — 97162 PT EVAL MOD COMPLEX 30 MIN: CPT

## 2019-11-18 PROCEDURE — 85014 HEMATOCRIT: CPT

## 2019-11-18 PROCEDURE — 85610 PROTHROMBIN TIME: CPT

## 2019-11-18 PROCEDURE — 80076 HEPATIC FUNCTION PANEL: CPT

## 2019-11-18 PROCEDURE — 82803 BLOOD GASES ANY COMBINATION: CPT

## 2019-11-18 PROCEDURE — 83550 IRON BINDING TEST: CPT

## 2019-11-18 PROCEDURE — 96374 THER/PROPH/DIAG INJ IV PUSH: CPT | Mod: XU

## 2019-11-18 PROCEDURE — 71275 CT ANGIOGRAPHY CHEST: CPT

## 2019-11-18 PROCEDURE — 82330 ASSAY OF CALCIUM: CPT

## 2019-11-18 PROCEDURE — 84484 ASSAY OF TROPONIN QUANT: CPT

## 2019-11-18 PROCEDURE — 84132 ASSAY OF SERUM POTASSIUM: CPT

## 2019-11-18 PROCEDURE — 83540 ASSAY OF IRON: CPT

## 2019-11-18 PROCEDURE — 96375 TX/PRO/DX INJ NEW DRUG ADDON: CPT | Mod: XU

## 2019-11-18 PROCEDURE — 82435 ASSAY OF BLOOD CHLORIDE: CPT

## 2019-11-18 RX ORDER — SENNA PLUS 8.6 MG/1
2 TABLET ORAL
Qty: 0 | Refills: 0 | DISCHARGE
Start: 2019-11-18

## 2019-11-18 RX ORDER — IPRATROPIUM/ALBUTEROL SULFATE 18-103MCG
3 AEROSOL WITH ADAPTER (GRAM) INHALATION
Qty: 360 | Refills: 0
Start: 2019-11-18 | End: 2019-12-17

## 2019-11-18 RX ORDER — HYDROCORTISONE 1 %
1 OINTMENT (GRAM) TOPICAL
Qty: 10 | Refills: 0
Start: 2019-11-18 | End: 2019-11-22

## 2019-11-18 RX ORDER — REVEFENACIN 175 UG/3ML
3 SOLUTION RESPIRATORY (INHALATION)
Qty: 0 | Refills: 0 | DISCHARGE

## 2019-11-18 RX ORDER — BUDESONIDE, MICRONIZED 100 %
0.5 POWDER (GRAM) MISCELLANEOUS
Qty: 30 | Refills: 0
Start: 2019-11-18 | End: 2019-12-17

## 2019-11-18 RX ORDER — POTASSIUM CHLORIDE 20 MEQ
20 PACKET (EA) ORAL ONCE
Refills: 0 | Status: COMPLETED | OUTPATIENT
Start: 2019-11-18 | End: 2019-11-18

## 2019-11-18 RX ORDER — MAGNESIUM SULFATE 500 MG/ML
1 VIAL (ML) INJECTION ONCE
Refills: 0 | Status: COMPLETED | OUTPATIENT
Start: 2019-11-18 | End: 2019-11-18

## 2019-11-18 RX ORDER — ARFORMOTEROL TARTRATE 15 UG/2ML
2 SOLUTION RESPIRATORY (INHALATION)
Qty: 0 | Refills: 0 | DISCHARGE

## 2019-11-18 RX ORDER — PROCHLORPERAZINE MALEATE 5 MG
1 TABLET ORAL
Qty: 0 | Refills: 0 | DISCHARGE

## 2019-11-18 RX ADMIN — Medication 1000 UNIT(S): at 12:31

## 2019-11-18 RX ADMIN — Medication 20 MILLIEQUIVALENT(S): at 11:27

## 2019-11-18 RX ADMIN — Medication 1 MILLIGRAM(S): at 12:31

## 2019-11-18 RX ADMIN — EXEMESTANE 25 MILLIGRAM(S): 25 TABLET, SUGAR COATED ORAL at 13:34

## 2019-11-18 RX ADMIN — PANTOPRAZOLE SODIUM 40 MILLIGRAM(S): 20 TABLET, DELAYED RELEASE ORAL at 05:15

## 2019-11-18 RX ADMIN — HEPARIN SODIUM 5000 UNIT(S): 5000 INJECTION INTRAVENOUS; SUBCUTANEOUS at 05:15

## 2019-11-18 RX ADMIN — Medication 3 MILLILITER(S): at 00:41

## 2019-11-18 RX ADMIN — Medication 100 MILLIGRAM(S): at 13:35

## 2019-11-18 RX ADMIN — Medication 100 GRAM(S): at 11:27

## 2019-11-18 RX ADMIN — APREMILAST 30 MILLIGRAM(S): KIT ORAL at 05:16

## 2019-11-18 RX ADMIN — Medication 0.5 MILLIGRAM(S): at 05:15

## 2019-11-18 RX ADMIN — Medication 3 MILLILITER(S): at 11:10

## 2019-11-18 RX ADMIN — Medication 3 MILLILITER(S): at 05:15

## 2019-11-18 RX ADMIN — Medication 2 MILLIGRAM(S): at 05:14

## 2019-11-18 RX ADMIN — MONTELUKAST 10 MILLIGRAM(S): 4 TABLET, CHEWABLE ORAL at 12:31

## 2019-11-18 NOTE — PROGRESS NOTE ADULT - SUBJECTIVE AND OBJECTIVE BOX
[facility] - PROGRESS NOTE    CHIEF COMPLAINT: dyspnea; COPD; obesity; atelectasis; alcohol dependence    INTERVAL HISTORY: no shortness of breath after diuresis; no cough, sputum production, chest congestion or wheeze; no fevers, chills or sweats; no chest pain/pressure or palpitations; no anxiety, tachycardia, diaphoresis, tremor or agitation; BRBPR    REVIEW OF SYSTEMS:  Constitutional: As per interval history  HEENT: Within normal limits  CV: As per interval history  Resp: As per interval history  GI: Within normal limits   : Within normal limits  Musculoskeletal: Within normal limits  Skin: Within normal limits  Neurological: Within normal limits  Psychiatric: Within normal limits  Endocrine: Within normal limits  Hematologic/Lymphatic: Within normal limits  Allergic/Immunologic: Within normal limits    MEDICATIONS:     Pulmonary "  albuterol/ipratropium for Nebulization 3 milliLiter(s) Nebulizer every 6 hours  buDESOnide    Inhalation Suspension 0.5 milliGRAM(s) Inhalation two times a day  montelukast 10 milliGRAM(s) Oral daily    Anti-microbials:    Cardiovascular:    Other:  apremilast 30 milliGRAM(s) Oral two times a day  cholecalciferol 1000 Unit(s) Oral daily  exemestane 25 milliGRAM(s) Oral daily  folic acid 1 milliGRAM(s) Oral daily  heparin  Injectable 5000 Unit(s) SubCutaneous every 12 hours  hydrocortisone hemorrhoidal Suppository 1 Suppository(s) Rectal two times a day  pantoprazole    Tablet 40 milliGRAM(s) Oral before breakfast  senna 2 Tablet(s) Oral at bedtime  thiamine 100 milliGRAM(s) Oral daily    MEDICATIONS  (PRN):  LORazepam     Tablet 2 milliGRAM(s) Oral every 2 hours PRN Symptom-triggered 2 point increase in CIWA-Ar  LORazepam     Tablet 2 milliGRAM(s) Oral every 2 hours PRN CIWA-Ar score increase by 2 points and a total score of 7 or less        OBJECTIVE:    I&O's Detail    17 Nov 2019 07:01  -  18 Nov 2019 07:00  --------------------------------------------------------  IN:    Oral Fluid: 420 mL  Total IN: 420 mL    OUT:    Voided: 2200 mL  Total OUT: 2200 mL    Total NET: -1780 mL      18 Nov 2019 07:01  -  18 Nov 2019 13:03  --------------------------------------------------------  IN:    IV PiggyBack: 50 mL  Total IN: 50 mL    OUT:  Total OUT: 0 mL    Total NET: 50 mL    PHYSICAL EXAM:       ICU Vital Signs Last 24 Hrs  T(C): 36.9 (18 Nov 2019 08:48), Max: 37 (17 Nov 2019 19:10)  T(F): 98.4 (18 Nov 2019 08:48), Max: 98.6 (17 Nov 2019 19:10)  HR: 85 (18 Nov 2019 08:48) (85 - 105)  BP: 104/68 (18 Nov 2019 08:48) (104/68 - 129/79)  BP(mean): --  ABP: --  ABP(mean): --  RR: 18 (18 Nov 2019 08:48) (18 - 18)  SpO2: 99% (18 Nov 2019 08:48) (94% - 99%) on 3lpm nasal canula     General: Awake. Alert. Cooperative. No distress. Appears stated age   HEENT: Atraumatic. Normocephalic. Anicteric. Normal oral mucosa. PERRL. EOMI. Decreased psoriatic plaque on scalp.  Neck: Supple. Trachea midline. Thyroid without enlargement/tenderness/nodules. No carotid bruit. No JVD.	  Cardiovascular: Regular rate and rhythm. S1 S2 normal. No murmurs, rubs or gallops.  Respiratory: Respirations unlabored. Decreased breath sounds throughout. No wheeze. No rales. No curvature.  Abdomen: Soft. Non-tender. Non-distended. No organomegaly. No masses. Normal bowel sounds. Obese.  Extremities: Warm to touch. No clubbing or cyanosis. Resolved lower extremity edema with SANCHEZ stockings in place.  Pulses: 2+ peripheral pulses all extremities.	  Skin: Venous stasis changes on both lower extremities. Multiple areas of skin hyperpigmentation at areas of prior plaque.  Lymph Nodes: Cervical, supraclavicular and axillary nodes normal  Neurological: Motor and sensory examination equal and normal. A and O x 3  Psychiatry: Calm    LABS:                          11.7   5.62  )-----------( 225      ( 18 Nov 2019 10:56 )             35.5     CBC    WBC  5.62 <==, 4.46 <==, 5.71 <==    Hemoglobin  11.7 <<==, 10.1 <<==, 11.8 <<==    Hematocrit  35.5 <==, 31.3 <==, 33.4 <==    Platelets  225 <==, 219 <==, 268 <==      135  |  90<L>  |  5<L>  ----------------------------<  123<H>    11-18  3.4<L>   |  31  |  0.54      LYTES    sodium  135 <==, 133 <==, 133 <==, 132 <==, 131 <==, 121 <==    potassium   3.4 <==, 3.8 <==, 3.8 <==, 4.0 <==, 5.1 <==, 5.1 <==    chloride  90 <==, 91 <==, 95 <==, 90 <==, 90 <==, 83 <==    carbon dioxide  31 <==, 26 <==, 29 <==, 27 <==, 27 <==, 22 <==    =============================================================================================  RENAL FUNCTION:    Creatinine:   0.54  <<==, 0.50  <<==, 0.52  <<==, 0.52  <<==, 0.49  <<==, 0.41  <<==    BUN:   5 <==, <4 <==, 4 <==, 7 <==, <4 <==, <4 <==    ============================================================================================    calcium   8.8 <==, 8.8 <==, 8.9 <==, 8.8 <==, 8.9 <==, 9.6 <==    phos   2.8 <==, 4.4 <==, 3.4 <==    mag   1.5 <==, 1.4 <==, 1.6 <==, 1.6 <==    ============================================================================================  LFTs    AST:   45 <== , 51 <== , 58 <== , 58 <== , 76 <==     ALT:  40  <== , 42  <== , 45  <== , 47  <== , 53  <==     AP:  51  <=, 51  <=, 54  <=, 58  <=, 70  <=    Bili:  0.7  <=, 0.8  <=, 0.7  <=, 0.8  <=, 1.1  <=      PT/INR - ( 14 Nov 2019 19:44 )   PT: 11.1 sec;   INR: 0.97 ratio      PTT - ( 14 Nov 2019 19:44 )  PTT:29.3 sec    Venous Blood Gas:  11-14 @ 19:44  7.39/48/29/29/49  VBG Lactate: 2.3    Serum Pro-Brain Natriuretic Peptide: 75 pg/mL (11-14 @ 18:31)    CARDIAC MARKERS ( 14 Nov 2019 18:31 )  CPK x     /CKMB x     /CKMB Units x        troponin 11 ng/L    Patient name: FREDY CHOI  YOB: 1965   Age: 54 (F)   MR#: [id number]  Study Date: 10/7/2019  [facility]  Study quality: Technically difficult  Referring Physician: Canelo Bonds MD  BloodPressure: 122/80 mmHg  Height: 165 cm  Weight: 68 kg  BSA: 1.8 m2  ------------------------------------------------------------------------  PROCEDURE: Transthoracic echocardiogram with 2-D, M-Mode  and complete spectral and color flow Doppler. Verbal  consent was obtained for injection of  Ultrasonic Enhancing  Agent following a discussion of risks and benefits.  Following intravenous injection of Ultrasonic Enhancing  Agent , harmonic imaging was performed.  INDICATION: Shortness of breath (R06.02)  ------------------------------------------------------------------------  Observations:  Left Ventricle: Endocardium not well visualized; grossly  normal left ventricular systolic function. Endocardial  visualization enhanced with intravenous injection of  Ultrasonic Enhancing Agent (Definity). Normal left  ventricular internal dimensions and wall thicknesses.  Normal diastolic function  ------------------------------------------------------------------------  Conclusions:  1. Normal left ventricular internal dimensions and wall  thicknesses.  2. Endocardium not well visualized; grossly normal left  ventricular systolic function. Endocardial visualization  enhanced with intravenous injection of Ultrasonic Enhancing  Agent (Definity). Unable to perfrom strain imaging due to  limited clinical windows.  ------------------------------------------------------------------------  Confirmed on  10/7/2019 - 18:11:26 by Margaret Alvarez M.D.  ------------------------------------------------------------------------  ---------------------------------------------------------------------------------------------------------------  MICROBIOLOGY:           RADIOLOGY:  [x] Chest radiographs reviewed and interpreted by me      EXAM:  XR CHEST PORTABLE URGENT 1V                          PROCEDURE DATE:  11/17/2019      INTERPRETATION:  HISTORY: Shortness of breath    TECHNIQUE: A single AP view of the chest was obtained.    COMPARISON: 11/14/2019    FINDINGS:  Thecardiac silhouette is normal in size. There are no focal   consolidations or pleural effusions. The hilar and mediastinal structures   appear unremarkable. The osseous structures are intact.    IMPRESSION: Clear lungs.    MIRI NORRIS M.D., ATTENDING RADIOLOGIST  This document has been electronically signed. Nov 17 2019  9:04AM  ---------------------------------------------------------------------------------------------------------------  EXAM:  XR CHEST AP OR PA 1V                          PROCEDURE DATE:  11/14/2019      INTERPRETATION:  CLINICAL INFORMATION: Shortness of breath.    TECHNIQUE: Portable AP radiograph of the chest.    COMPARISON: Chest x-ray from 10/22/2019.    FINDINGS:    LUNGS: The lungs are clear.    PLEURA: No pleural effusion or pneumothorax.    HEART AND MEDIASTINUM: Heart is normal in size.    SKELETON: Unremarkable skeletal structures.      IMPRESSION:     Clear lungs..      ADELA WESTBROOK M.D., RADIOLOGY RESIDENT  This document has been electronically signed.  MELI WHITTINGTON M.D., ATTENDING RADIOLOGIST  This document has been electronically signed. Nov 14 2019  8:36PM  ---------------------------------------------------------------------------------------------------------------    EXAM:  CT ANGIO CHEST (W)AW IC                          PROCEDURE DATE:  11/14/2019      INTERPRETATION:  CLINICAL INFORMATION: Worsening shortness of breath.    COMPARISON: CT chest 10/21/2019.    PROCEDURE:   CT Angiography of the Chest.  90 ml of Omnipaque 350 was injected intravenously. 10 ml were discarded.  Sagittal and coronal reformats were performed as well as 3D (MIP)   reconstructions.      FINDINGS:  Images in the lower lungs degraded by respiratory motion.    LUNGS AND AIRWAYS: Patent central airways.  Severe emphysema is   unchanged. Bibasilar subsegmental atelectasis.    PLEURA: No pleural effusion. No pneumothorax.    MEDIASTINUM AND CHESTER: No lymphadenopathy.    VESSELS: Evaluation for pulmonary embolus is limited secondary to   respiratory motion. No main, lobar or proximal segmental pulmonary   embolus.    HEART: Heart size is normal. No pericardial effusion. Coronary artery   calcifications.    CHEST WALL AND LOWER NECK: Within normal limits.    VISUALIZED UPPER ABDOMEN: Within normal limits.    BONES: Within normal limits.    IMPRESSION:     Respiratory motion limits evaluation, however no main, lobar or proximal   segmental pulmonary embolus.    Severe emphysema.    JOSE MANUEL JORDAN M.D., RADIOLOGY RESIDENT  This document has been electronically signed.  MAYUR KENNEDY M.D., ATTENDING RADIOLOGIST  This document has been electronically signed. Nov 15 2019 12:27AM  ---------------------------------------------------------------------------------------------------------------    EXAM:  DUPLEX EXT VEINS LOWER RT                          PROCEDURE DATE:  11/14/2019      INTERPRETATION:  CLINICAL INFORMATION: Swelling and tenderness to   palpation of the right lower extremity.    COMPARISON: Bilateral lower extremity duplex 7/1/2015.    TECHNIQUE: Duplex sonography of the RIGHT LOWER extremity veins with   color and spectral Doppler, with and without compression.      FINDINGS:  Limited evaluation due to poor acoustic window.    There is normal compressibility of the visualized right common femoral,   femoral and popliteal veins.     The contralateral common femoral vein is patent.    Doppler examination shows normal spontaneous and phasic flow.    No calf vein thrombosis is detected.    IMPRESSION:     No evidence of right lower extremity deep venous thrombosis.    JOSE MANUEL JORDAN M.D., RADIOLOGY RESIDENT  This document has been electronically signed.  MAYUR KENNEDY M.D., ATTENDING RADIOLOGIST  This document has been electronically signed. Nov 15 2019  1:21AM  ---------------------------------------------------------------------------------------------------------------

## 2019-11-18 NOTE — DISCHARGE NOTE PROVIDER - NSDCMRMEDTOKEN_GEN_ALL_CORE_FT
ALPRAZolam 0.25 mg oral tablet: 1 tab(s) orally 3 times a day  Brovana 15 mcg/2 mL inhalation solution: 2 milliliter(s) inhaled 2 times a day  exemestane 25 mg oral tablet: 1 tab(s) orally once a day  folic acid 1 mg oral tablet: 1 tab(s) orally once a day  montelukast 10 mg oral tablet: 1 tab(s) orally once a day  Multiple Vitamins oral tablet: 1 tab(s) orally once a day  Otezla 30 mg oral tablet: 1 tab(s) orally 2 times a day  pantoprazole 40 mg oral delayed release tablet: 1 tab(s) orally once a day (before a meal)  prochlorperazine 10 mg oral tablet: 1 tab(s) orally once a day      thiamine 100 mg oral tablet: 1 tab(s) orally once a day  Ventolin HFA 90 mcg/inh inhalation aerosol: 2 puff(s) inhaled every 6 hours, As Needed  Vitamin D3 2000 intl units oral tablet: 1 tab(s) orally once a day  Yupelri 175 mcg/3 mL inhalation solution: 3 milliliter(s) inhaled once a day ALPRAZolam 0.25 mg oral tablet: 1 tab(s) orally 3 times a day  exemestane 25 mg oral tablet: 1 tab(s) orally once a day  folic acid 1 mg oral tablet: 1 tab(s) orally once a day  hydrocortisone 25 mg rectal suppository: 1 suppository(ies) rectal 2 times a day  montelukast 10 mg oral tablet: 1 tab(s) orally once a day  Multiple Vitamins oral tablet: 1 tab(s) orally once a day  Otezla 30 mg oral tablet: 1 tab(s) orally 2 times a day  pantoprazole 40 mg oral delayed release tablet: 1 tab(s) orally once a day (before a meal)  Pulmicort Respules 0.5 mg/2 mL inhalation suspension: 0.5 milliliter(s) by nebulizer 2 times a day   senna oral tablet: 2 tab(s) orally once a day (at bedtime)  thiamine 100 mg oral tablet: 1 tab(s) orally once a day  Ventolin HFA 90 mcg/inh inhalation aerosol: 2 puff(s) inhaled every 6 hours, As Needed  Vitamin D3 2000 intl units oral tablet: 1 tab(s) orally once a day ALPRAZolam 0.25 mg oral tablet: 1 tab(s) orally 3 times a day  exemestane 25 mg oral tablet: 1 tab(s) orally once a day  folic acid 1 mg oral tablet: 1 tab(s) orally once a day  hydrocortisone 25 mg rectal suppository: 1 suppository(ies) rectal 2 times a day  ipratropium-albuterol 0.5 mg-2.5 mg/3 mLinhalation solution: 3 milliliter(s) inhaled every 6 hours  montelukast 10 mg oral tablet: 1 tab(s) orally once a day  Multiple Vitamins oral tablet: 1 tab(s) orally once a day  Otezla 30 mg oral tablet: 1 tab(s) orally 2 times a day  pantoprazole 40 mg oral delayed release tablet: 1 tab(s) orally once a day (before a meal)  Pulmicort Respules 0.5 mg/2 mL inhalation suspension: 0.5 milliliter(s) by nebulizer 2 times a day   senna oral tablet: 2 tab(s) orally once a day (at bedtime)  thiamine 100 mg oral tablet: 1 tab(s) orally once a day  Ventolin HFA 90 mcg/inh inhalation aerosol: 2 puff(s) inhaled every 6 hours, As Needed  Vitamin D3 2000 intl units oral tablet: 1 tab(s) orally once a day

## 2019-11-18 NOTE — DISCHARGE NOTE PROVIDER - CARE PROVIDER_API CALL
Jaime Dickinson (DO)  Gastroenterology; Internal Medicine  [address] [place] [address]  Phone: [phone]  Fax: [phone]  Follow Up Time:     Joss Franks)  Internal Medicine; Pulmonary Disease  [address] [facility], [address] [place] [id number]  Phone: [phone]  Fax: [phone]  Follow Up Time:     Primary Care Provider,   Phone: (   )    -  Fax: (   )    -  Follow Up Time:     Pulmonologist,   Phone: (   )    -  Fax: (   )    -  Follow Up Time:     Madai Hurt)  Hematology; Medical Oncology  [address] [facility] [address]  [place] [address]  Phone: [phone]  Fax: [phone]  Follow Up Time: 1 month

## 2019-11-18 NOTE — DISCHARGE NOTE PROVIDER - HOSPITAL COURSE
54 year old gentlewoman, former smoker stopping several months ago and daily alcohol user, followed by Dr. Junior Mansfield of our practice for chronic hypoxic respiratory failure due to COPD/emphysema. She is basically bedbound but is able to walk to the bathroom with the assistance of her . She also has a history of breast cancer s/p bilateral mastectomies maintained on Aromasin, rheumatoid arthritis and psoriasis on Otezla, remote brain aneurysm requiring clipping and a right jugular vein DVT. She has had multiple recent admissions for alcohol withdrawal and COPD exacerbations. She is sent to the hospital by her visiting nurse due to leg swelling and "shortness of breath".    Seen by pulmonologist: 54 year old gentlewoman, former smoker stopping several months ago and daily alcohol user, followed by Dr. Junior Mansfield of our practice for chronic hypoxic respiratory failure due to COPD/emphysema. She is basically bedbound but is able to walk to the bathroom with the assistance of her . She also has a history of breast cancer s/p bilateral mastectomies maintained on Aromasin, rheumatoid arthritis and psoriasis on Otezla, remote brain aneurysm requiring clipping and a right jugular vein DVT. She has had multiple recent admissions for alcohol withdrawal and COPD exacerbations. She is sent to the hospital by her visiting nurse due to leg swelling and "shortness of breath".    Seen by pulmonologist: chronic hypoxic respiratory failure due to severe COPD/emphysema - there is no evidence of pneumonia, pulmonary edema, pleural effusions or pulmonary emboli on the CTA of the chest - her respiratory status is at baseline without bronchospasm - anxiety may be contributing to her chronic sense of dyspnea. Continue with oxygen supplementation to keep saturation greater than 92%, continue to observe off antibiotics and steroids, which would increase anxiety, continue with Duoneb, Pulmicort and Singulair.     Pt seen by GI: for BRBPR, suspect hemorrhoidal versus diverticular, recommended CT A/P, upper endoscopy, and colonoscopy and Pt refused testing. Hgb stable at 11.7, continue with PPI, Anusol BID and bowel regimen 54 year old gentlewoman, former smoker stopping several months ago and daily alcohol user, followed by Dr. Junior Mansfield of our practice for chronic hypoxic respiratory failure due to COPD/emphysema. She is basically bedbound but is able to walk to the bathroom with the assistance of her . She also has a history of breast cancer s/p bilateral mastectomies maintained on Aromasin, rheumatoid arthritis and psoriasis on Otezla, remote brain aneurysm requiring clipping and a right jugular vein DVT. She has had multiple recent admissions for alcohol withdrawal and COPD exacerbations. She is sent to the hospital by her visiting nurse due to leg swelling and "shortness of breath".    Seen by pulmonologist: chronic hypoxic respiratory failure due to severe COPD/emphysema - there is no evidence of pneumonia, pulmonary edema, pleural effusions or pulmonary emboli on the CTA of the chest - her respiratory status is at baseline without bronchospasm - anxiety may be contributing to her chronic sense of dyspnea. Continue with oxygen supplementation to keep saturation greater than 92%, continue to observe off antibiotics and steroids, which would increase anxiety, continue with Duoneb, Pulmicort and Singulair.     Pt seen by GI: for BRBPR, suspect hemorrhoidal versus diverticular, recommended CT A/P, upper endoscopy, and colonoscopy and Pt refused testing. Hgb stable at 11.7, continue with PPI, Anusol BID and bowel regimen.    Pt seen by Hem/Onc: stage 3 Bilateral Breast CA on Aromasin, s/p taxotere/carbo/perjeta/herceptin x 3 cycles, s/p bilateral mastectomies 9/2015, last PET scan 11/2017 negative. S/p Herceptin, completed 3/2017; on arimidex, with side effects, changed to Aromasin 1/2016- continue. Ca+ 27-29 slightly increased as outpatient though fluctuating -- had been advised to go for scans 12/2018 but did not have; CA 27.29 on 9/10 was 45, prior was 33 in 4/2019 and 54 in 12/2018. BRCA+ has been discussed as outpatient that she needs oophorectomy; overdue for yearly U/S but the patient is non-compliant.    Anemia, macrocytic- mild but may be hemoconcentrated, chronic ETOH use, iron studies c/w AOCD.  Normal B12 and folate. With reported rectal bleeding- declined EGD, Colonoscopy and CT A/P.    Pt cleared for DC by Dr. Bonds. PT recommended [facility] but Pt requested to be discharged home.

## 2019-11-18 NOTE — PROGRESS NOTE ADULT - PROBLEM SELECTOR PLAN 1
- monitor h/h daily, transfuse prn  - suspect hemorrhoidal versus diverticular  - CT a/p with PO and IV contrast ordered for further evaluation  - ppi daily   - anusol BID  - keep stools soft  - refusing  upper gastrointestinal endoscopy and colonoscopy   - hold NSAIDS

## 2019-11-18 NOTE — PROGRESS NOTE ADULT - SUBJECTIVE AND OBJECTIVE BOX
CHIEF COMPLAINT:Patient is a 54y old  Female who presents with a chief complaint of SOB (16 Nov 2019 09:16)    	        PAST MEDICAL & SURGICAL HISTORY:  Prophylactic measure  Breast cancer  Brain aneurysm: with clips  Psoriasis  RA (rheumatoid arthritis)  Psoriasis  Breast cancer, stage 3  History of modified radical mastectomy of both breasts  S/P bilateral mastectomy  Brain aneurysm: 1988 two clips          REVIEW OF SYSTEMS:  CONSTITUTIONAL: No fever, weight loss, or fatigue  EYES: No eye pain, visual disturbances, or discharge  NECK: No pain or stiffness  RESPIRATORY: breathing improved  CARDIOVASCULAR: No chest pain, palpitations, passing out,   GASTROINTESTINAL: No abdominal or epigastric pain. No nausea, vomiting, or hematemesis;   GENITOURINARY: No dysuria, frequency, hematuria, or incontinence  NEUROLOGICAL: No headaches,   MUSCULOSKELETAL: No joint pain     Medications:  MEDICATIONS  (STANDING):  albuterol/ipratropium for Nebulization 3 milliLiter(s) Nebulizer every 6 hours  apremilast 30 milliGRAM(s) Oral two times a day  buDESOnide    Inhalation Suspension 0.5 milliGRAM(s) Inhalation two times a day  cholecalciferol 1000 Unit(s) Oral daily  exemestane 25 milliGRAM(s) Oral daily  folic acid 1 milliGRAM(s) Oral daily  heparin  Injectable 5000 Unit(s) SubCutaneous every 12 hours  hydrocortisone hemorrhoidal Suppository 1 Suppository(s) Rectal two times a day  montelukast 10 milliGRAM(s) Oral daily  pantoprazole    Tablet 40 milliGRAM(s) Oral before breakfast  senna 2 Tablet(s) Oral at bedtime  thiamine 100 milliGRAM(s) Oral daily    MEDICATIONS  (PRN):  LORazepam     Tablet 2 milliGRAM(s) Oral every 2 hours PRN Symptom-triggered 2 point increase in CIWA-Ar  LORazepam     Tablet 2 milliGRAM(s) Oral every 2 hours PRN CIWA-Ar score increase by 2 points and a total score of 7 or less    	    PHYSICAL EXAM:  T(C): 36.9 (11-18-19 @ 08:48), Max: 37 (11-17-19 @ 19:10)  HR: 85 (11-18-19 @ 08:48) (85 - 105)  BP: 104/68 (11-18-19 @ 08:48) (104/68 - 129/79)  RR: 18 (11-18-19 @ 08:48) (18 - 18)  SpO2: 99% (11-18-19 @ 08:48) (94% - 99%)  Wt(kg): --  I&O's Summary    17 Nov 2019 07:01  -  18 Nov 2019 07:00  --------------------------------------------------------  IN: 420 mL / OUT: 2200 mL / NET: -1780 mL    18 Nov 2019 07:01  -  18 Nov 2019 14:20  --------------------------------------------------------  IN: 50 mL / OUT: 0 mL / NET: 50 mL        Appearance: Normal	  HEENT:   Normal oral mucosa, PERRL, EOMI	  Lymphatic: No lymphadenopathy  Cardiovascular: Normal S1 S2, No JVD  Respiratory: decb s 	  Psychiatry: A & O x 3,  Gastrointestinal:  Soft, Non-tender, + BS	  Skin: chronic exzema  Neurologic: Non-focal  Extremities: dec rom  Vascular: Peripheral pulses palpable     TELEMETRY: 	    ECG:  	  RADIOLOGY:  OTHER: 	  	  LABS:	 	    CARDIAC MARKERS:                                11.7   5.62  )-----------( 225      ( 18 Nov 2019 10:56 )             35.5     11-18    135  |  90<L>  |  5<L>  ----------------------------<  123<H>  3.4<L>   |  31  |  0.54    Ca    8.8      18 Nov 2019 08:20  Mg     1.5     11-17    TPro  6.2  /  Alb  3.5  /  TBili  0.7  /  DBili  x   /  AST  45<H>  /  ALT  40  /  AlkPhos  51  11-18    proBNP:   Lipid Profile:   HgA1c:   TSH:

## 2019-11-18 NOTE — ADVANCED PRACTICE NURSE CONSULT - REASON FOR CONSULT
Requested by staff to assess skin status of pt a/w IAD. PMH is noted:     54 yr old female with Hx significant for COPD on home O2, ETOH abuse, breast Ca, remote brain aneurysm s/p clip, DVT  who p/w increase nonexertional SOB x 3 days.  some lower ext edema    Pt's  states that he sees bright red blood in her stool in the toilet bowl x 2 days.?   Pt has been having prod cough with white sputum x 3 days. no fever, cp, abd, dysuria. Pt also drink EtOH 5 drinks/day ?

## 2019-11-18 NOTE — PROGRESS NOTE ADULT - ASSESSMENT
pt w/ etoh abuse / copd/ failure to thrive w/ limited mobility /hx breast ca  /w sob   pulm eval noted\  hypomagnesemia  supp /mg  hyponatremia improved  hypokalemia  supp k+  pulm / gi evals noted  ct a/p/ imaging being refused by pt  explained risks     ppis  ciwa protocol  dvt proph     d/c planning    pt

## 2019-11-18 NOTE — ADVANCED PRACTICE NURSE CONSULT - ASSESSMENT
The pt is on 8Monti on a [employer] P 500 support surface for low air-loss and pressure redistribution features. She is being assisted with turning and positioning.  Staff had applied the Prima-fit externa catheter to divert urine from the skin - it was contaminated with stool and removed. Pericare was provided.  Pt presents with skin changes secondary to IAD- satellite lesions suggest a fungal component. There is blanchable erythema of the skin on the b/l buttocks, b/l thighs and b/l groin areas. Scattered denuded areas are noted. Will recommend pericare with Eleanor Antifungal moisture barrier cream to lay down a protective coating on the skin and to provide antifungal activity.

## 2019-11-18 NOTE — DISCHARGE NOTE PROVIDER - CARE PROVIDERS DIRECT ADDRESSES
,DirectAddress_Unknown,DirectAddress_Unknown,DirectAddress_Unknown,DirectAddress_Unknown,[email or web address]

## 2019-11-18 NOTE — DISCHARGE NOTE NURSING/CASE MANAGEMENT/SOCIAL WORK - PATIENT PORTAL LINK FT
You can access the FollowMyHealth Patient Portal offered by [facility] by registering at the following website: [email or web address]. By joining [facility]’s FollowMyHealth portal, you will also be able to view your health information using other applications (apps) compatible with our system.

## 2019-11-18 NOTE — PROGRESS NOTE ADULT - ASSESSMENT
ASSESSMENT:    chronic hypoxic respiratory failure due to severe COPD/emphysema - there is no evidence of pneumonia, pulmonary edema, pleural effusions or pulmonary emboli on the CTA of the chest - her respiratory status is at baseline without bronchospasm - anxiety may be contributing to her chronic sense of dyspnea    chronic alcohol use    breast cancer s/p bilateral mastectomy    rheumatoid and psoriatic arthritis on Otezla - immunocompromised host    brain aneurysm s/p clipping    BRBPR - GI evaluation ongoing    PLAN/RECOMMENDATIONS:    oxygen supplementation to keep saturation greater than 92%  observe off antibiotics  observe off systemic steroids which would increase anxiety  albuterol/atrovent nebs q6h  pulmicort 0.5mg nebs q12h  singulair  SANCHEZ stockings  watch for and treat alcohol withdrawal  thiamine/MVI/folate  Otezla/lidex solution to scalp/hydrocortisone cream to face  Arimidex  DVT prophylaxis - SQ heparin  GI prophylaxis - protonix  bowel regimen  EGD/colonoscopy   CT abdomen/pelvis    Will follow with you. Plan of care discussed with the patient at bedside and the dedicated floor NP.    Joss Franks MD, [facility]  [phone]  Pulmonary Medicine

## 2019-11-18 NOTE — PROGRESS NOTE ADULT - ASSESSMENT
53 y/o female with a PMH stage 3 Bilateral Breast CA on Aromasin, RA, Psoriasis previously on Otezla, remote Brain Aneurysm s/p Clip in 1988,  Rt Jugular DVT, Catheter related MSSA Bacteremia from [place] in July 2015, ETOH abuse, COPD, p/w dypsnea, leg edema, rectal bleeding.    # Breast cancer- bilateral breast cancer, ER+/MA+/Her2 equivocal on Left; ER+/MA+/Her - on right; s/p taxotere/carbo/perjeta/herceptin x 3 cycles, s/p bilateral mastectomies 9/2015  - last PET scan 11/2017 negative  - s/p Herceptin, completed 3/2017; on arimidex, with side effects, changed to aromasin 1/2016- continue  - Ca 27-29 slightly increased as outpatient though fluctuating -- had been advised to go for scans 12/2018 but did not have; CA 27.29 on 9/10 was 45, prior was 33 in 4/2019 and 54 in 12/2018  - BRCA+ -- has been discussed as outpatient that she needs oopherectomy; overdue for yearly U/S but the patient is non-compliant    # Anemia, macrocytic- mild but may be hemoconcentrated, chronic ETOH use  - iron studies c/w AOCD.  Normal B12 and folate  - with reported rectal bleeding- refused EGD/colonoscopy  - hg stable    # Pulm- with COPD on home O2, CTA no PE  - on nebs  - to be monitored off steroids/antibiotics    # Psoriasis- on Otezla    # ETOH dependence- on withdrawal protocol, on folic acid/thiamine    DC planning

## 2019-11-18 NOTE — DISCHARGE NOTE PROVIDER - NSDCCPCAREPLAN_GEN_ALL_CORE_FT
PRINCIPAL DISCHARGE DIAGNOSIS  Diagnosis: Alcohol withdrawal  Assessment and Plan of Treatment: Pt placed on CIWA taper while in the hospital and finished. Pt counseled on ETOH use.   Anemia, macrocytic- mild but may be hemoconcentrated, chronic ETOH use, iron studies c/w AOCD.  Normal B12 and folate. With reported rectal bleeding- declined EGD, Colonoscopy and CT A/P.      SECONDARY DISCHARGE DIAGNOSES  Diagnosis: SOB (shortness of breath)  Assessment and Plan of Treatment: Seen by pulmonologist: chronic hypoxic respiratory failure due to severe COPD/emphysema - there is no evidence of pneumonia, pulmonary edema, pleural effusions or pulmonary emboli on the CTA of the chest - her respiratory status is at baseline without bronchospasm - anxiety may be contributing to her chronic sense of dyspnea. Continue with oxygen supplementation to keep saturation greater than 92%, continue to observe off antibiotics and steroids, which would increase anxiety, continue with Duoneb, Pulmicort and Singulair.   Pt seen by GI: for BRBPR, suspect hemorrhoidal versus diverticular, recommended CT A/P, upper endoscopy, and colonoscopy and Pt refused testing. Hgb stable at 11.7, continue with PPI, Anusol BID and bowel regimen.    Diagnosis: BRBPR (bright red blood per rectum)  Assessment and Plan of Treatment: Pt seen by GI: for BRBPR, suspect hemorrhoidal versus diverticular, recommended CT A/P, upper endoscopy, and colonoscopy and Pt refused testing. Hgb stable at 11.7, continue with PPI, Anusol BID and bowel regimen.  Follow up with GI as OP.

## 2019-11-18 NOTE — PROGRESS NOTE ADULT - SUBJECTIVE AND OBJECTIVE BOX
INTERVAL HPI/OVERNIGHT EVENTS:    pt seen and examined  she denies abdominal pain, n/v  tolerating PO   denies further rectal bleeding  hgb overall stable    MEDICATIONS  (STANDING):  albuterol/ipratropium for Nebulization 3 milliLiter(s) Nebulizer every 6 hours  apremilast 30 milliGRAM(s) Oral two times a day  buDESOnide    Inhalation Suspension 0.5 milliGRAM(s) Inhalation two times a day  cholecalciferol 1000 Unit(s) Oral daily  exemestane 25 milliGRAM(s) Oral daily  folic acid 1 milliGRAM(s) Oral daily  heparin  Injectable 5000 Unit(s) SubCutaneous every 12 hours  hydrocortisone hemorrhoidal Suppository 1 Suppository(s) Rectal two times a day  montelukast 10 milliGRAM(s) Oral daily  pantoprazole    Tablet 40 milliGRAM(s) Oral before breakfast  senna 2 Tablet(s) Oral at bedtime  thiamine 100 milliGRAM(s) Oral daily    MEDICATIONS  (PRN):  LORazepam     Tablet 2 milliGRAM(s) Oral every 2 hours PRN Symptom-triggered 2 point increase in CIWA-Ar  LORazepam     Tablet 2 milliGRAM(s) Oral every 2 hours PRN CIWA-Ar score increase by 2 points and a total score of 7 or less      Allergies    amoxicillin (Anaphylaxis)  Levaquin (Other; Anaphylaxis)  Levaquin (Unknown)  penicillin (Anaphylaxis)  penicillins (Anaphylaxis)    Intolerances        Review of Systems:    General:  No wt loss, fevers, chills, night sweats,fatigue,   Eyes:  Good vision, no reported pain  ENT:  No sore throat, pain, runny nose, dysphagia  CV:  No pain, palpitations, hypo/hypertension  Resp:  No dyspnea, cough, tachypnea, wheezing  GI:  No pain, No nausea, No vomiting, No diarrhea, No constipation, No weight loss, No fever, No pruritis, No rectal bleeding, No melena, No dysphagia  :  No pain, bleeding, incontinence, nocturia  Muscle:  No pain, weakness  Neuro:  No weakness, tingling, memory problems  Psych:  No fatigue, insomnia, mood problems, depression  Endocrine:  No polyuria, polydypsia, cold/heat intolerance  Heme:  No petechiae, ecchymosis, easy bruisability  Skin:  No rash, tattoos, scars, edema      Vital Signs Last 24 Hrs  T(C): 36.9 (18 Nov 2019 08:48), Max: 37 (17 Nov 2019 19:10)  T(F): 98.4 (18 Nov 2019 08:48), Max: 98.6 (17 Nov 2019 19:10)  HR: 85 (18 Nov 2019 08:48) (85 - 105)  BP: 104/68 (18 Nov 2019 08:48) (104/68 - 135/34)  BP(mean): --  RR: 18 (18 Nov 2019 08:48) (18 - 18)  SpO2: 99% (18 Nov 2019 08:48) (94% - 99%)    PHYSICAL EXAM:    Constitutional: NAD, well-developed  HEENT: EOMI, throat clear  Neck: No LAD, supple  Respiratory: CTA and P  Cardiovascular: S1 and S2, RRR, no M  Gastrointestinal: BS+, soft, NT/ND, neg HSM,  Extremities: No peripheral edema, neg clubing, cyanosis  Vascular: 2+ peripheral pulses  Neurological: A/O x 3, no focal deficits  Psychiatric: Normal mood, normal affect  Skin: No rashes      LABS:                        10.1   4.46  )-----------( 219      ( 16 Nov 2019 10:21 )             31.3     11-18    135  |  90<L>  |  5<L>  ----------------------------<  123<H>  3.4<L>   |  31  |  0.54    Ca    8.8      18 Nov 2019 08:20  Mg     1.5     11-17    TPro  6.2  /  Alb  3.5  /  TBili  0.7  /  DBili  x   /  AST  45<H>  /  ALT  40  /  AlkPhos  51  11-18          RADIOLOGY & ADDITIONAL TESTS:

## 2019-11-18 NOTE — ADVANCED PRACTICE NURSE CONSULT - RECOMMEDATIONS
Will recommend the followin. routine pericare with Eleanor antifungal moisture barrier cream - apply twice daily and prn for incontinence  2. Continue with Prima-Fit catheter  3.continue with turning and positioning  4. Nutrition support as pt condition allows  Tx plan discussed with RN

## 2019-11-18 NOTE — DISCHARGE NOTE PROVIDER - PROVIDER TOKENS
PROVIDER:[TOKEN:[[id number]]],PROVIDER:[TOKEN:[[id number]]],FREE:[LAST:[Primary Care Provider],PHONE:[(   )    -],FAX:[(   )    -]],FREE:[LAST:[Pulmonologist],PHONE:[(   )    -],FAX:[(   )    -]],PROVIDER:[TOKEN:[[id number]],FOLLOWUP:[1 month]]

## 2019-11-18 NOTE — PROGRESS NOTE ADULT - SUBJECTIVE AND OBJECTIVE BOX
Chief Complaint: fu    History of Present Illness:  no increased dyspnea, no cough, no f/c, no cp, no n/v/abd pain, no further bleeding, +BM, tolerating diet, +weak      MEDICATIONS  (STANDING):  albuterol/ipratropium for Nebulization 3 milliLiter(s) Nebulizer every 6 hours  apremilast 30 milliGRAM(s) Oral two times a day  buDESOnide    Inhalation Suspension 0.5 milliGRAM(s) Inhalation two times a day  cholecalciferol 1000 Unit(s) Oral daily  exemestane 25 milliGRAM(s) Oral daily  folic acid 1 milliGRAM(s) Oral daily  heparin  Injectable 5000 Unit(s) SubCutaneous every 12 hours  hydrocortisone hemorrhoidal Suppository 1 Suppository(s) Rectal two times a day  montelukast 10 milliGRAM(s) Oral daily  pantoprazole    Tablet 40 milliGRAM(s) Oral before breakfast  senna 2 Tablet(s) Oral at bedtime  thiamine 100 milliGRAM(s) Oral daily    MEDICATIONS  (PRN):  LORazepam     Tablet 2 milliGRAM(s) Oral every 2 hours PRN Symptom-triggered 2 point increase in CIWA-Ar  LORazepam     Tablet 2 milliGRAM(s) Oral every 2 hours PRN CIWA-Ar score increase by 2 points and a total score of 7 or less      Allergies    amoxicillin (Anaphylaxis)  Levaquin (Other; Anaphylaxis)  Levaquin (Unknown)  penicillin (Anaphylaxis)  penicillins (Anaphylaxis)    Intolerances        Vital Signs Last 24 Hrs  T(C): 36.9 (18 Nov 2019 08:48), Max: 37 (17 Nov 2019 19:10)  T(F): 98.4 (18 Nov 2019 08:48), Max: 98.6 (17 Nov 2019 19:10)  HR: 85 (18 Nov 2019 08:48) (85 - 105)  BP: 104/68 (18 Nov 2019 08:48) (104/68 - 129/79)  BP(mean): --  RR: 18 (18 Nov 2019 08:48) (18 - 18)  SpO2: 99% (18 Nov 2019 08:48) (94% - 99%)    PHYSICAL EXAM  General: adult in NAD  HEENT: clear oropharynx, anicteric sclera, pink conjunctiva  Neck: supple  CV: normal S1/S2   Lungs: decreased BS  Abdomen: soft non-tender non-distended, obese, positive bowel sounds  Ext: edema improved  Skin: no rashes and no petechiae  Lymph Nodes: No LAD in axillae, groin, neck  Neuro: alert and oriented X 3, no focal deficits    LABS:                          11.7   5.62  )-----------( 225      ( 18 Nov 2019 10:56 )             35.5         Mean Cell Volume : 107.3 fl  Mean Cell Hemoglobin : 35.3 pg  Mean Cell Hemoglobin Concentration : 33.0 gm/dL  Auto Neutrophil # : x  Auto Lymphocyte # : x  Auto Monocyte # : x  Auto Eosinophil # : x  Auto Basophil # : x  Auto Neutrophil % : x  Auto Lymphocyte % : x  Auto Monocyte % : x  Auto Eosinophil % : x  Auto Basophil % : x      Serial CBC's  11-18 @ 10:56  Hct-35.5 / Hgb-11.7 / Plat-225 / RBC-3.31 / WBC-5.62  Serial CBC's  11-16 @ 10:21  Hct-31.3 / Hgb-10.1 / Plat-219 / RBC-2.91 / WBC-4.46  Serial CBC's  11-14 @ 19:44  Hct-33.4 / Hgb-11.8 / Plat-268 / RBC-3.30 / WBC-5.71      11-18    135  |  90<L>  |  5<L>  ----------------------------<  123<H>  3.4<L>   |  31  |  0.54    Ca    8.8      18 Nov 2019 08:20  Mg     1.5     11-17    TPro  6.2  /  Alb  3.5  /  TBili  0.7  /  DBili  x   /  AST  45<H>  /  ALT  40  /  AlkPhos  51  11-18          Ferritin, Serum: 792 ng/mL (11-16 @ 10:31)  Vitamin B12, Serum: 753 pg/mL (11-16 @ 10:31)  Folate, Serum: >20.0 ng/mL (11-16 @ 10:31)  Iron - Total Binding Capacity.: 196 ug/dL (11-16 @ 10:30)              Radiology:

## 2019-12-11 ENCOUNTER — INPATIENT (INPATIENT)
Facility: HOSPITAL | Age: 54
LOS: 4 days | Discharge: HOME | DRG: 191 | End: 2019-12-16
Attending: INTERNAL MEDICINE | Admitting: INTERNAL MEDICINE
Payer: COMMERCIAL

## 2019-12-11 VITALS
OXYGEN SATURATION: 100 % | TEMPERATURE: 98 F | RESPIRATION RATE: 24 BRPM | SYSTOLIC BLOOD PRESSURE: 128 MMHG | DIASTOLIC BLOOD PRESSURE: 84 MMHG | WEIGHT: 139.99 LBS | HEIGHT: 60 IN | HEART RATE: 98 BPM

## 2019-12-11 DIAGNOSIS — F41.9 ANXIETY DISORDER, UNSPECIFIED: ICD-10-CM

## 2019-12-11 DIAGNOSIS — Z90.10 ACQUIRED ABSENCE OF UNSPECIFIED BREAST AND NIPPLE: Chronic | ICD-10-CM

## 2019-12-11 DIAGNOSIS — Z29.9 ENCOUNTER FOR PROPHYLACTIC MEASURES, UNSPECIFIED: ICD-10-CM

## 2019-12-11 DIAGNOSIS — F10.239 ALCOHOL DEPENDENCE WITH WITHDRAWAL, UNSPECIFIED: ICD-10-CM

## 2019-12-11 DIAGNOSIS — E87.2 ACIDOSIS: ICD-10-CM

## 2019-12-11 DIAGNOSIS — R06.02 SHORTNESS OF BREATH: ICD-10-CM

## 2019-12-11 DIAGNOSIS — Z90.13 ACQUIRED ABSENCE OF BILATERAL BREASTS AND NIPPLES: Chronic | ICD-10-CM

## 2019-12-11 DIAGNOSIS — M79.89 OTHER SPECIFIED SOFT TISSUE DISORDERS: ICD-10-CM

## 2019-12-11 DIAGNOSIS — J96.11 CHRONIC RESPIRATORY FAILURE WITH HYPOXIA: ICD-10-CM

## 2019-12-11 DIAGNOSIS — R09.89 OTHER SPECIFIED SYMPTOMS AND SIGNS INVOLVING THE CIRCULATORY AND RESPIRATORY SYSTEMS: ICD-10-CM

## 2019-12-11 DIAGNOSIS — M06.9 RHEUMATOID ARTHRITIS, UNSPECIFIED: ICD-10-CM

## 2019-12-11 DIAGNOSIS — C50.919 MALIGNANT NEOPLASM OF UNSPECIFIED SITE OF UNSPECIFIED FEMALE BREAST: ICD-10-CM

## 2019-12-11 LAB
ALBUMIN SERPL ELPH-MCNC: 3.5 G/DL — SIGNIFICANT CHANGE UP (ref 3.3–5)
ALBUMIN SERPL ELPH-MCNC: 3.7 G/DL — SIGNIFICANT CHANGE UP (ref 3.3–5)
ALP SERPL-CCNC: 61 U/L — SIGNIFICANT CHANGE UP (ref 40–120)
ALP SERPL-CCNC: 61 U/L — SIGNIFICANT CHANGE UP (ref 40–120)
ALT FLD-CCNC: 22 U/L — SIGNIFICANT CHANGE UP (ref 10–45)
ALT FLD-CCNC: 29 U/L — SIGNIFICANT CHANGE UP (ref 10–45)
ANION GAP SERPL CALC-SCNC: 18 MMOL/L — HIGH (ref 5–17)
ANION GAP SERPL CALC-SCNC: 22 MMOL/L — HIGH (ref 5–17)
APTT BLD: 28.7 SEC — SIGNIFICANT CHANGE UP (ref 27.5–36.3)
AST SERPL-CCNC: 44 U/L — HIGH (ref 10–40)
AST SERPL-CCNC: 74 U/L — HIGH (ref 10–40)
BASOPHILS # BLD AUTO: 0.03 K/UL — SIGNIFICANT CHANGE UP (ref 0–0.2)
BASOPHILS NFR BLD AUTO: 0.3 % — SIGNIFICANT CHANGE UP (ref 0–2)
BILIRUB DIRECT SERPL-MCNC: 0.1 MG/DL — SIGNIFICANT CHANGE UP (ref 0–0.2)
BILIRUB INDIRECT FLD-MCNC: 0.5 MG/DL — SIGNIFICANT CHANGE UP (ref 0.2–1)
BILIRUB SERPL-MCNC: 0.5 MG/DL — SIGNIFICANT CHANGE UP (ref 0.2–1.2)
BILIRUB SERPL-MCNC: 0.6 MG/DL — SIGNIFICANT CHANGE UP (ref 0.2–1.2)
BUN SERPL-MCNC: 6 MG/DL — LOW (ref 7–23)
BUN SERPL-MCNC: 7 MG/DL — SIGNIFICANT CHANGE UP (ref 7–23)
CALCIUM SERPL-MCNC: 8.7 MG/DL — SIGNIFICANT CHANGE UP (ref 8.4–10.5)
CALCIUM SERPL-MCNC: 8.9 MG/DL — SIGNIFICANT CHANGE UP (ref 8.4–10.5)
CHLORIDE SERPL-SCNC: 89 MMOL/L — LOW (ref 96–108)
CHLORIDE SERPL-SCNC: 90 MMOL/L — LOW (ref 96–108)
CO2 SERPL-SCNC: 26 MMOL/L — SIGNIFICANT CHANGE UP (ref 22–31)
CO2 SERPL-SCNC: 28 MMOL/L — SIGNIFICANT CHANGE UP (ref 22–31)
CREAT SERPL-MCNC: 0.41 MG/DL — LOW (ref 0.5–1.3)
CREAT SERPL-MCNC: 0.41 MG/DL — LOW (ref 0.5–1.3)
EOSINOPHIL # BLD AUTO: 0.16 K/UL — SIGNIFICANT CHANGE UP (ref 0–0.5)
EOSINOPHIL NFR BLD AUTO: 1.8 % — SIGNIFICANT CHANGE UP (ref 0–6)
FLU A RESULT: SIGNIFICANT CHANGE UP
FLU A RESULT: SIGNIFICANT CHANGE UP
FLUAV AG NPH QL: SIGNIFICANT CHANGE UP
FLUBV AG NPH QL: SIGNIFICANT CHANGE UP
GAS PNL BLDV: SIGNIFICANT CHANGE UP
GLUCOSE SERPL-MCNC: 94 MG/DL — SIGNIFICANT CHANGE UP (ref 70–99)
GLUCOSE SERPL-MCNC: 96 MG/DL — SIGNIFICANT CHANGE UP (ref 70–99)
HCT VFR BLD CALC: 35.3 % — SIGNIFICANT CHANGE UP (ref 34.5–45)
HGB BLD-MCNC: 11.4 G/DL — LOW (ref 11.5–15.5)
IMM GRANULOCYTES NFR BLD AUTO: 0.3 % — SIGNIFICANT CHANGE UP (ref 0–1.5)
INR BLD: 0.94 RATIO — SIGNIFICANT CHANGE UP (ref 0.88–1.16)
LYMPHOCYTES # BLD AUTO: 1.66 K/UL — SIGNIFICANT CHANGE UP (ref 1–3.3)
LYMPHOCYTES # BLD AUTO: 18.7 % — SIGNIFICANT CHANGE UP (ref 13–44)
MAGNESIUM SERPL-MCNC: 2.5 MG/DL — SIGNIFICANT CHANGE UP (ref 1.6–2.6)
MCHC RBC-ENTMCNC: 32.3 GM/DL — SIGNIFICANT CHANGE UP (ref 32–36)
MCHC RBC-ENTMCNC: 34.4 PG — HIGH (ref 27–34)
MCV RBC AUTO: 106.6 FL — HIGH (ref 80–100)
MONOCYTES # BLD AUTO: 0.73 K/UL — SIGNIFICANT CHANGE UP (ref 0–0.9)
MONOCYTES NFR BLD AUTO: 8.2 % — SIGNIFICANT CHANGE UP (ref 2–14)
NEUTROPHILS # BLD AUTO: 6.26 K/UL — SIGNIFICANT CHANGE UP (ref 1.8–7.4)
NEUTROPHILS NFR BLD AUTO: 70.7 % — SIGNIFICANT CHANGE UP (ref 43–77)
NRBC # BLD: 0 /100 WBCS — SIGNIFICANT CHANGE UP (ref 0–0)
NT-PROBNP SERPL-SCNC: 29 PG/ML — SIGNIFICANT CHANGE UP (ref 0–300)
PHOSPHATE SERPL-MCNC: 3.4 MG/DL — SIGNIFICANT CHANGE UP (ref 2.5–4.5)
PLATELET # BLD AUTO: 177 K/UL — SIGNIFICANT CHANGE UP (ref 150–400)
POTASSIUM SERPL-MCNC: 3.9 MMOL/L — SIGNIFICANT CHANGE UP (ref 3.5–5.3)
POTASSIUM SERPL-MCNC: 5.9 MMOL/L — HIGH (ref 3.5–5.3)
POTASSIUM SERPL-SCNC: 3.9 MMOL/L — SIGNIFICANT CHANGE UP (ref 3.5–5.3)
POTASSIUM SERPL-SCNC: 5.9 MMOL/L — HIGH (ref 3.5–5.3)
PROT SERPL-MCNC: 6.6 G/DL — SIGNIFICANT CHANGE UP (ref 6–8.3)
PROT SERPL-MCNC: 7.1 G/DL — SIGNIFICANT CHANGE UP (ref 6–8.3)
PROTHROM AB SERPL-ACNC: 10.7 SEC — SIGNIFICANT CHANGE UP (ref 10–12.9)
RBC # BLD: 3.31 M/UL — LOW (ref 3.8–5.2)
RBC # FLD: 13.5 % — SIGNIFICANT CHANGE UP (ref 10.3–14.5)
RSV RESULT: SIGNIFICANT CHANGE UP
RSV RNA RESP QL NAA+PROBE: SIGNIFICANT CHANGE UP
SODIUM SERPL-SCNC: 136 MMOL/L — SIGNIFICANT CHANGE UP (ref 135–145)
SODIUM SERPL-SCNC: 137 MMOL/L — SIGNIFICANT CHANGE UP (ref 135–145)
TROPONIN T, HIGH SENSITIVITY RESULT: 17 NG/L — SIGNIFICANT CHANGE UP (ref 0–51)
TROPONIN T, HIGH SENSITIVITY RESULT: 18 NG/L — SIGNIFICANT CHANGE UP (ref 0–51)
WBC # BLD: 8.87 K/UL — SIGNIFICANT CHANGE UP (ref 3.8–10.5)
WBC # FLD AUTO: 8.87 K/UL — SIGNIFICANT CHANGE UP (ref 3.8–10.5)

## 2019-12-11 PROCEDURE — 71275 CT ANGIOGRAPHY CHEST: CPT | Mod: 26

## 2019-12-11 PROCEDURE — 71045 X-RAY EXAM CHEST 1 VIEW: CPT | Mod: 26

## 2019-12-11 PROCEDURE — 99285 EMERGENCY DEPT VISIT HI MDM: CPT | Mod: 25

## 2019-12-11 PROCEDURE — 93971 EXTREMITY STUDY: CPT | Mod: 26

## 2019-12-11 PROCEDURE — 93010 ELECTROCARDIOGRAM REPORT: CPT | Mod: NC,59

## 2019-12-11 PROCEDURE — 76937 US GUIDE VASCULAR ACCESS: CPT | Mod: 26

## 2019-12-11 PROCEDURE — 99223 1ST HOSP IP/OBS HIGH 75: CPT

## 2019-12-11 RX ORDER — EXEMESTANE 25 MG/1
25 TABLET, SUGAR COATED ORAL DAILY
Refills: 0 | Status: DISCONTINUED | OUTPATIENT
Start: 2019-12-11 | End: 2019-12-16

## 2019-12-11 RX ORDER — CHOLECALCIFEROL (VITAMIN D3) 125 MCG
1000 CAPSULE ORAL DAILY
Refills: 0 | Status: DISCONTINUED | OUTPATIENT
Start: 2019-12-11 | End: 2019-12-16

## 2019-12-11 RX ORDER — IPRATROPIUM/ALBUTEROL SULFATE 18-103MCG
3 AEROSOL WITH ADAPTER (GRAM) INHALATION ONCE
Refills: 0 | Status: COMPLETED | OUTPATIENT
Start: 2019-12-11 | End: 2019-12-11

## 2019-12-11 RX ORDER — FOLIC ACID 0.8 MG
1 TABLET ORAL DAILY
Refills: 0 | Status: DISCONTINUED | OUTPATIENT
Start: 2019-12-11 | End: 2019-12-16

## 2019-12-11 RX ORDER — BUDESONIDE, MICRONIZED 100 %
0.5 POWDER (GRAM) MISCELLANEOUS EVERY 12 HOURS
Refills: 0 | Status: DISCONTINUED | OUTPATIENT
Start: 2019-12-11 | End: 2019-12-16

## 2019-12-11 RX ORDER — THIAMINE MONONITRATE (VIT B1) 100 MG
1 TABLET ORAL
Qty: 30 | Refills: 0

## 2019-12-11 RX ORDER — ALPRAZOLAM 0.25 MG
0.25 TABLET ORAL ONCE
Refills: 0 | Status: DISCONTINUED | OUTPATIENT
Start: 2019-12-11 | End: 2019-12-11

## 2019-12-11 RX ORDER — PANTOPRAZOLE SODIUM 20 MG/1
40 TABLET, DELAYED RELEASE ORAL
Refills: 0 | Status: DISCONTINUED | OUTPATIENT
Start: 2019-12-11 | End: 2019-12-16

## 2019-12-11 RX ORDER — ALPRAZOLAM 0.25 MG
1 TABLET ORAL
Qty: 0 | Refills: 0 | DISCHARGE

## 2019-12-11 RX ORDER — MONTELUKAST 4 MG/1
10 TABLET, CHEWABLE ORAL AT BEDTIME
Refills: 0 | Status: DISCONTINUED | OUTPATIENT
Start: 2019-12-11 | End: 2019-12-16

## 2019-12-11 RX ORDER — HEPARIN SODIUM 5000 [USP'U]/ML
5000 INJECTION INTRAVENOUS; SUBCUTANEOUS EVERY 12 HOURS
Refills: 0 | Status: DISCONTINUED | OUTPATIENT
Start: 2019-12-11 | End: 2019-12-16

## 2019-12-11 RX ORDER — SODIUM CHLORIDE 9 MG/ML
1000 INJECTION INTRAMUSCULAR; INTRAVENOUS; SUBCUTANEOUS
Refills: 0 | Status: DISCONTINUED | OUTPATIENT
Start: 2019-12-11 | End: 2019-12-12

## 2019-12-11 RX ORDER — MAGNESIUM SULFATE 500 MG/ML
2 VIAL (ML) INJECTION ONCE
Refills: 0 | Status: COMPLETED | OUTPATIENT
Start: 2019-12-11 | End: 2019-12-11

## 2019-12-11 RX ORDER — IPRATROPIUM/ALBUTEROL SULFATE 18-103MCG
3 AEROSOL WITH ADAPTER (GRAM) INHALATION
Refills: 0 | Status: DISCONTINUED | OUTPATIENT
Start: 2019-12-11 | End: 2019-12-16

## 2019-12-11 RX ADMIN — Medication 0.5 MILLIGRAM(S): at 17:07

## 2019-12-11 RX ADMIN — SODIUM CHLORIDE 100 MILLILITER(S): 9 INJECTION INTRAMUSCULAR; INTRAVENOUS; SUBCUTANEOUS at 15:57

## 2019-12-11 RX ADMIN — Medication 50 MILLIGRAM(S): at 19:53

## 2019-12-11 RX ADMIN — Medication 2 MILLIGRAM(S): at 23:06

## 2019-12-11 RX ADMIN — Medication 3 MILLILITER(S): at 13:00

## 2019-12-11 RX ADMIN — Medication 0.25 MILLIGRAM(S): at 14:48

## 2019-12-11 RX ADMIN — Medication 50 GRAM(S): at 14:23

## 2019-12-11 RX ADMIN — Medication 3 MILLILITER(S): at 19:52

## 2019-12-11 RX ADMIN — MONTELUKAST 10 MILLIGRAM(S): 4 TABLET, CHEWABLE ORAL at 21:44

## 2019-12-11 RX ADMIN — Medication 3 MILLILITER(S): at 23:06

## 2019-12-11 RX ADMIN — Medication 2 GRAM(S): at 15:44

## 2019-12-11 RX ADMIN — HEPARIN SODIUM 5000 UNIT(S): 5000 INJECTION INTRAVENOUS; SUBCUTANEOUS at 19:53

## 2019-12-11 RX ADMIN — Medication 0.5 MILLIGRAM(S): at 20:20

## 2019-12-11 RX ADMIN — SODIUM CHLORIDE 100 MILLILITER(S): 9 INJECTION INTRAMUSCULAR; INTRAVENOUS; SUBCUTANEOUS at 20:20

## 2019-12-11 RX ADMIN — Medication 2 MILLIGRAM(S): at 19:53

## 2019-12-11 NOTE — CONSULT NOTE ADULT - SUBJECTIVE AND OBJECTIVE BOX
[facility] - [facility] - CONSULT NOTE    HPI: 54 year old gentlewoman, former smoker stopping several months ago and daily alcohol user, followed by Dr. Junior Mansfield of our practice for chronic hypoxic respiratory failure due to COPD/emphysema. She is basically bedbound but is able to walk to the bathroom with the assistance of her . She also has a history of breast cancer s/p bilateral mastectomies maintained on Aromasin, rheumatoid arthritis and psoriasis on Otezla, remote brain aneurysm requiring clipping and a right jugular vein DVT. She has had multiple recent admissions for alcohol withdrawal and COPD exacerbations. The patient returns to the hospital with leg swelling, redness and pain. She also describes profound shortness of breath without cough, sputum production, chest congestion or wheeze. She has no fever, chills or sweats. No chest pain/pressure or palpitations. She is extremely anxious, tremulous and tachycardic.    PMHX:  Mediport related MSSA bactermia - 2015  Breast cancer  Brain aneurysm  Psoriasis  RA (rheumatoid arthritis)  Right jugular vein DVT    PSHX:  Bilateral mastectomy  Brain aneurysm clipping - 1988    FAMILY HISTORY:  mother - CHF    SOCIAL HISTORY:  former smoker - stopped ~ 6 months ago; daily alcohol use;       Pulmonary Medications:       Antimicrobials:      Cardiology:      Other:  sodium chloride 0.9%. 1000 milliLiter(s) IV Continuous <Continuous>      Prn:  MEDICATIONS  (PRN):      Allergies    amoxicillin (Anaphylaxis)  Levaquin (Other; Anaphylaxis)  penicillins (Anaphylaxis)    HOME MEDICATIONS: see  H & P    REVIEW OF SYSTEMS:  Constitutional: As per HPI  HEENT: Within normal limits  CV: As per HPI  Resp: As per HPI  GI: Within normal limits   : Within normal limits  Musculoskeletal: Within normal limits  Skin: Within normal limits  Neurological: Within normal limits  Psychiatric: Within normal limits  Endocrine: Within normal limits  Hematologic/Lymphatic: Within normal limits  Allergic/Immunologic: Within normal limits    [x] All other systems negative    OBJECTIVE:    Daily Height in cm: 152.4 (11 Dec 2019 11:59)      PHYSICAL EXAM:  ICU Vital Signs Last 24 Hrs  T(C): 36.7 (11 Dec 2019 15:29), Max: 36.9 (11 Dec 2019 11:59)  T(F): 98 (11 Dec 2019 15:29), Max: 98.4 (11 Dec 2019 11:59)  HR: 117 (11 Dec 2019 16:10) (98 - 117)  BP: 110/68 (11 Dec 2019 16:10) (102/69 - 130/84)  BP(mean): --  ABP: --  ABP(mean): --  RR: 22 (11 Dec 2019 16:10) (22 - 24)  SpO2: 98% (11 Dec 2019 16:10) (94% - 100%) on 2lpm nasal canula    General: Awake. Alert. Cooperative. Anxious. Appears stated age   HEENT: Atraumatic. Normocephalic. Anicteric. Normal oral mucosa. PERRL. EOMI. Decreased psoriatic plaque on scalp.  Neck: Supple. Trachea midline. Thyroid without enlargement/tenderness/nodules. No carotid bruit. No JVD.	  Cardiovascular: Tachycardic rate and rhythm. S1 S2 normal. No murmurs, rubs or gallops.  Respiratory: Respirations unlabored. Decreased breath sounds throughout. No wheeze. No rales. No curvature.  Abdomen: Soft. Non-tender. Non-distended. No organomegaly. No masses. Normal bowel sounds. Obese.  Extremities: Warm to touch. No clubbing or cyanosis. Moderate bilateral lower extremity edema up to the knees.  Pulses: 2+ peripheral pulses all extremities.	  Skin: Venous stasis changes of both lower extremities. Multiple areas of skin hyperpigmentation at areas of prior plaque.  Lymph Nodes: Cervical, supraclavicular and axillary nodes normal  Neurological: Motor and sensory examination equal and normal. A and O x 3  Psychiatry: Anxious          LABS:                          11.4   8.87  )-----------( 177      ( 11 Dec 2019 12:43 )             35.3     CBC    WBC  8.87 <==    Hemoglobin  11.4 <<==    Hematocrit  35.3 <==    Platelets  177 <==      137  |  89<L>  |  7   ----------------------------<  96    12-11  3.9   |  26  |  0.41<L>    LYTES    sodium  137 <==, 136 <==    potassium   3.9 <==, 5.9 <==    chloride  89 <==, 90 <==    carbon dioxide  26 <==, 28 <==    =============================================================================================  RENAL FUNCTION:    Creatinine:   0.41  <<==, 0.41  <<==    BUN:   7 <==, 6 <==    ============================================================================================    calcium   8.9 <==, 8.7 <==    phos   3.4 <==    mag   2.5 <==, 1.6 <==    ============================================================================================  LFTs    AST:   44 <== , 74 <==     ALT:  22  <== , 29  <==     AP:  61  <=, 61  <=    Bili:  0.6  <=, 0.5  <=    PT/INR - ( 11 Dec 2019 12:43 )   PT: 10.7 sec;   INR: 0.94 ratio       PTT - ( 11 Dec 2019 12:43 )  PTT:28.7 sec    Venous Blood Gas:  12-11 @ 12:43  7.34/69/38/36/55  VBG Lactate: 3.3    Serum Pro-Brain Natriuretic Peptide: 29 pg/mL (12-11 @ 12:43)    CARDIAC MARKERS ( 11 Dec 2019 15:41 )  CPK x     /CKMB x     /CKMB Units x        troponin 17 ng/L    CARDIAC MARKERS ( 11 Dec 2019 12:43 )  CPK x     /CKMB x     /CKMB Units x        troponin 18 ng/L    Patient name: FREDY CHOI  YOB: 1965   Age: 54 (F)   MR#: [id number]  Study Date: 10/7/2019  [facility]  Study quality: Technically difficult  Referring Physician: Canelo Bonds MD  BloodPressure: 122/80 mmHg  Height: 165 cm  Weight: 68 kg  BSA: 1.8 m2  ------------------------------------------------------------------------  PROCEDURE: Transthoracic echocardiogram with 2-D, M-Mode  and complete spectral and color flow Doppler. Verbal  consent was obtained for injection of  Ultrasonic Enhancing  Agent following a discussion of risks and benefits.  Following intravenous injection of Ultrasonic Enhancing  Agent , harmonic imaging was performed.  INDICATION: Shortness of breath (R06.02)  ------------------------------------------------------------------------  Observations:  Left Ventricle: Endocardium not well visualized; grossly  normal left ventricular systolic function. Endocardial  visualization enhanced with intravenous injection of  Ultrasonic Enhancing Agent (Definity). Normal left  ventricular internal dimensions and wall thicknesses.  Normal diastolic function  ------------------------------------------------------------------------  Conclusions:  1. Normal left ventricular internal dimensions and wall  thicknesses.  2. Endocardium not well visualized; grossly normal left  ventricular systolic function. Endocardial visualization  enhanced with intravenous injection of Ultrasonic Enhancing  Agent (Definity). Unable to perform strain imaging due to  limited clinical windows.  ------------------------------------------------------------------------  Confirmed on  10/7/2019 - 18:11:26 by Margaret Alvarez M.D.  ------------------------------------------------------------------------  ---------------------------------------------------------------------------------------------------------------  MICROBIOLOGY:         RADIOLOGY:  [x ] Chest radiographs reviewed and interpreted by me      EXAM:  XR CHEST AP OR PA 1V                          PROCEDURE DATE:  12/11/2019      INTERPRETATION:  A single chest x-ray was obtained on December 11, 2019.    Indication: Shortness of breath.    Impression:    The heart is normal in size. The lungs are clear. No acute pathology seen   in the visualized bones.    PARESH GALEAS M.D., ATTENDING RADIOLOGIST  This document has been electronically signed. Dec 11 2019  2:57PM  ---------------------------------------------------------------------------------------------------------------    EXAM:  CT ANGIO CHEST (W)AW IC                          PROCEDURE DATE:  12/11/2019      INTERPRETATION:  CLINICAL INFORMATION: Shortness of breath. History of   cancer. Recent pulmonary embolism.    COMPARISON: CT chest 11/14/2019.    PROCEDURE:   CT Angiography of the Chest.  90 ml of Omnipaque 350 was injected intravenously. 10 ml were discarded.  Sagittal and coronal reformats were performed as well as 3D (MIP)   reconstructions.      FINDINGS:    LUNGS AND AIRWAYS: Secretions within the bronchus intermedius. No   endobronchial lesions.  Patchy right lower lobe opacity likely reflecting   atelectasis. Severe centrilobular emphysema.    PLEURA: No pleural effusion.    MEDIASTINUM AND CHESTER: No lymphadenopathy.    VESSELS: No main, right, or left pulmonary embolism. Evaluation of some   of the lobar, segmental, and subsegmental pulmonary arteries is limited   secondary to bolus timing and respiratory motion artifact    HEART: Heart size is normal. No pericardial effusion.    CHEST WALL AND LOWER NECK: Within normal limits.    VISUALIZED UPPER ABDOMEN: Within normal limits.    BONES: Mild degenerative changes.    IMPRESSION:     No right or left main pulmonary embolism. Evaluation of the distal   pulmonary arteries is limited secondary to bolus timing and respiratory   motion artifact.    ZULEYKA NUÑEZ M.D., RADIOLOGY RESIDENT  This document has been electronically signed.  LAURIE NGO M.D., ATTENDING RADIOLOGIST  This document has been electronically signed. Dec 11 2019  4:33PM  --------------------------------------------------------------------------------------------------------------- [facility] - [facility] - CONSULT NOTE    HPI: 54 year old gentlewoman, former smoker stopping several months ago and daily alcohol user, followed by Dr. Junior Mansfield of our practice for chronic hypoxic respiratory failure due to COPD/emphysema. She is basically bedbound but is able to walk to the bathroom with the assistance of her . She also has a history of breast cancer s/p bilateral mastectomies maintained on Aromasin, rheumatoid arthritis and psoriasis on Otezla, remote brain aneurysm requiring clipping and a right jugular vein DVT. She has had multiple recent admissions for alcohol withdrawal and COPD exacerbations. The patient returns to the hospital with leg swelling, redness and pain. She also describes profound shortness of breath without cough, sputum production, chest congestion or wheeze. She has no fever, chills or sweats. No chest pain/pressure or palpitations. She is extremely anxious, tremulous and tachycardic. Asked to evaluate.    PMHX:  Mediport related MSSA bactermia - 2015  Breast cancer  Brain aneurysm  Psoriasis  RA (rheumatoid arthritis)  Right jugular vein DVT    PSHX:  Bilateral mastectomy  Brain aneurysm clipping - 1988    FAMILY HISTORY:  mother - CHF    SOCIAL HISTORY:  former smoker - stopped ~ 6 months ago; daily alcohol use;       Pulmonary Medications:       Antimicrobials:      Cardiology:      Other:  sodium chloride 0.9%. 1000 milliLiter(s) IV Continuous <Continuous>      Prn:  MEDICATIONS  (PRN):      Allergies    amoxicillin (Anaphylaxis)  Levaquin (Other; Anaphylaxis)  penicillins (Anaphylaxis)    HOME MEDICATIONS: see  H & P    REVIEW OF SYSTEMS:  Constitutional: As per HPI  HEENT: Within normal limits  CV: As per HPI  Resp: As per HPI  GI: Within normal limits   : Within normal limits  Musculoskeletal: Within normal limits  Skin: Within normal limits  Neurological: Within normal limits  Psychiatric: Within normal limits  Endocrine: Within normal limits  Hematologic/Lymphatic: Within normal limits  Allergic/Immunologic: Within normal limits    [x] All other systems negative    OBJECTIVE:    Daily Height in cm: 152.4 (11 Dec 2019 11:59)      PHYSICAL EXAM:  ICU Vital Signs Last 24 Hrs  T(C): 36.7 (11 Dec 2019 15:29), Max: 36.9 (11 Dec 2019 11:59)  T(F): 98 (11 Dec 2019 15:29), Max: 98.4 (11 Dec 2019 11:59)  HR: 117 (11 Dec 2019 16:10) (98 - 117)  BP: 110/68 (11 Dec 2019 16:10) (102/69 - 130/84)  BP(mean): --  ABP: --  ABP(mean): --  RR: 22 (11 Dec 2019 16:10) (22 - 24)  SpO2: 98% (11 Dec 2019 16:10) (94% - 100%) on 2lpm nasal canula    General: Awake. Alert. Cooperative. Anxious. Appears stated age   HEENT: Atraumatic. Normocephalic. Anicteric. Normal oral mucosa. PERRL. EOMI. Decreased psoriatic plaque on scalp.  Neck: Supple. Trachea midline. Thyroid without enlargement/tenderness/nodules. No carotid bruit. No JVD.	  Cardiovascular: Tachycardic rate and rhythm. S1 S2 normal. No murmurs, rubs or gallops.  Respiratory: Respirations unlabored. Decreased breath sounds throughout. No wheeze. No rales. No curvature.  Abdomen: Soft. Non-tender. Non-distended. No organomegaly. No masses. Normal bowel sounds. Obese.  Extremities: Warm to touch. No clubbing or cyanosis. Moderate bilateral lower extremity edema up to the knees.  Pulses: 2+ peripheral pulses all extremities.	  Skin: Venous stasis changes of both lower extremities. Multiple areas of skin hyperpigmentation at areas of prior plaque.  Lymph Nodes: Cervical, supraclavicular and axillary nodes normal  Neurological: Motor and sensory examination equal and normal. A and O x 3  Psychiatry: Anxious          LABS:                          11.4   8.87  )-----------( 177      ( 11 Dec 2019 12:43 )             35.3     CBC    WBC  8.87 <==    Hemoglobin  11.4 <<==    Hematocrit  35.3 <==    Platelets  177 <==      137  |  89<L>  |  7   ----------------------------<  96    12-11  3.9   |  26  |  0.41<L>    LYTES    sodium  137 <==, 136 <==    potassium   3.9 <==, 5.9 <==    chloride  89 <==, 90 <==    carbon dioxide  26 <==, 28 <==    =============================================================================================  RENAL FUNCTION:    Creatinine:   0.41  <<==, 0.41  <<==    BUN:   7 <==, 6 <==    ============================================================================================    calcium   8.9 <==, 8.7 <==    phos   3.4 <==    mag   2.5 <==, 1.6 <==    ============================================================================================  LFTs    AST:   44 <== , 74 <==     ALT:  22  <== , 29  <==     AP:  61  <=, 61  <=    Bili:  0.6  <=, 0.5  <=    PT/INR - ( 11 Dec 2019 12:43 )   PT: 10.7 sec;   INR: 0.94 ratio       PTT - ( 11 Dec 2019 12:43 )  PTT:28.7 sec    Venous Blood Gas:  12-11 @ 12:43  7.34/69/38/36/55  VBG Lactate: 3.3    Serum Pro-Brain Natriuretic Peptide: 29 pg/mL (12-11 @ 12:43)    CARDIAC MARKERS ( 11 Dec 2019 15:41 )  CPK x     /CKMB x     /CKMB Units x        troponin 17 ng/L    CARDIAC MARKERS ( 11 Dec 2019 12:43 )  CPK x     /CKMB x     /CKMB Units x        troponin 18 ng/L    Patient name: FREDY CHOI  YOB: 1965   Age: 54 (F)   MR#: [id number]  Study Date: 10/7/2019  [facility]  Study quality: Technically difficult  Referring Physician: Canelo Bonds MD  BloodPressure: 122/80 mmHg  Height: 165 cm  Weight: 68 kg  BSA: 1.8 m2  ------------------------------------------------------------------------  PROCEDURE: Transthoracic echocardiogram with 2-D, M-Mode  and complete spectral and color flow Doppler. Verbal  consent was obtained for injection of  Ultrasonic Enhancing  Agent following a discussion of risks and benefits.  Following intravenous injection of Ultrasonic Enhancing  Agent , harmonic imaging was performed.  INDICATION: Shortness of breath (R06.02)  ------------------------------------------------------------------------  Observations:  Left Ventricle: Endocardium not well visualized; grossly  normal left ventricular systolic function. Endocardial  visualization enhanced with intravenous injection of  Ultrasonic Enhancing Agent (Definity). Normal left  ventricular internal dimensions and wall thicknesses.  Normal diastolic function  ------------------------------------------------------------------------  Conclusions:  1. Normal left ventricular internal dimensions and wall  thicknesses.  2. Endocardium not well visualized; grossly normal left  ventricular systolic function. Endocardial visualization  enhanced with intravenous injection of Ultrasonic Enhancing  Agent (Definity). Unable to perform strain imaging due to  limited clinical windows.  ------------------------------------------------------------------------  Confirmed on  10/7/2019 - 18:11:26 by Margaret Alvarez M.D.  ------------------------------------------------------------------------  ---------------------------------------------------------------------------------------------------------------  MICROBIOLOGY:         RADIOLOGY:  [x ] Chest radiographs reviewed and interpreted by me      EXAM:  XR CHEST AP OR PA 1V                          PROCEDURE DATE:  12/11/2019      INTERPRETATION:  A single chest x-ray was obtained on December 11, 2019.    Indication: Shortness of breath.    Impression:    The heart is normal in size. The lungs are clear. No acute pathology seen   in the visualized bones.    PARESH GALEAS M.D., ATTENDING RADIOLOGIST  This document has been electronically signed. Dec 11 2019  2:57PM  ---------------------------------------------------------------------------------------------------------------    EXAM:  CT ANGIO CHEST (W)AW IC                          PROCEDURE DATE:  12/11/2019      INTERPRETATION:  CLINICAL INFORMATION: Shortness of breath. History of   cancer. Recent pulmonary embolism.    COMPARISON: CT chest 11/14/2019.    PROCEDURE:   CT Angiography of the Chest.  90 ml of Omnipaque 350 was injected intravenously. 10 ml were discarded.  Sagittal and coronal reformats were performed as well as 3D (MIP)   reconstructions.      FINDINGS:    LUNGS AND AIRWAYS: Secretions within the bronchus intermedius. No   endobronchial lesions.  Patchy right lower lobe opacity likely reflecting   atelectasis. Severe centrilobular emphysema.    PLEURA: No pleural effusion.    MEDIASTINUM AND CHESTER: No lymphadenopathy.    VESSELS: No main, right, or left pulmonary embolism. Evaluation of some   of the lobar, segmental, and subsegmental pulmonary arteries is limited   secondary to bolus timing and respiratory motion artifact    HEART: Heart size is normal. No pericardial effusion.    CHEST WALL AND LOWER NECK: Within normal limits.    VISUALIZED UPPER ABDOMEN: Within normal limits.    BONES: Mild degenerative changes.    IMPRESSION:     No right or left main pulmonary embolism. Evaluation of the distal   pulmonary arteries is limited secondary to bolus timing and respiratory   motion artifact.    ZULEYKA NUÑEZ M.D., RADIOLOGY RESIDENT  This document has been electronically signed.  LAURIE NGO M.D., ATTENDING RADIOLOGIST  This document has been electronically signed. Dec 11 2019  4:33PM  ---------------------------------------------------------------------------------------------------------------

## 2019-12-11 NOTE — H&P ADULT - CONSTITUTIONAL COMMENTS
obese, disheveled, foul smelling on 3L NC breathing comfortably but in mild distress due to moderate tremors of b/l

## 2019-12-11 NOTE — H&P ADULT - PROBLEM SELECTOR PLAN 2
COPD on 3L NC at home  -c/w supplemental oxygen, goal 02 sat 88-92%  -CTA results pending   -c/w pulmicort bid  -puljulio césar consulted Dr Franks COPD on 3L NC at home likely exacerbated by alc/benzo withdrawal, anxiety, inhaler noncompliance   -flu negative, CTA today negative for acute findings, no PE, PNA, effusion etc  -c/w supplemental oxygen, goal 02 sat 88-92%  -c/w pulmicort bid, nebs prn but may worsen anxiety, tremors  -pulm consulted Dr Franks, f/u recs COPD on 3L NC at home likely exacerbated by alc/benzo withdrawal, anxiety, inhaler noncompliance, less likely viral URI  -flu negative, CTA today negative for acute findings, no PE, PNA, effusion etc  -c/w supplemental oxygen, goal 02 sat 88-92%  -pulm consulted Dr Franks, rec prednisone, nebs, singular, Pulmicort

## 2019-12-11 NOTE — H&P ADULT - PROBLEM SELECTOR PLAN 6
dvt ppx: lovenox outpatient onc followup, of note has been noncompliant with recommended followups likely due to chronic venous insufficiency, poor nutrition, alb 3.5. Exam not consistent with b/l cellulitis, afebrile, no leukocytosis  -LLE duplex pending to r/o DVT  -BNP 28, recent TTE with normal LV, unlikely due to HF  -d/c IVF if repeat lactate downtrending

## 2019-12-11 NOTE — H&P ADULT - NSHPLABSRESULTS_GEN_ALL_CORE
personally reviewed labs:                        11.4   8.87  )-----------( 177      ( 11 Dec 2019 12:43 )             35.3     PT/INR - ( 11 Dec 2019 12:43 )   PT: 10.7 sec;   INR: 0.94 ratio         PTT - ( 11 Dec 2019 12:43 )  PTT:28.7 sec  12-11    137  |  89<L>  |  7   ----------------------------<  96  3.9   |  26  |  0.41<L>    Ca    8.9      11 Dec 2019 15:41  Phos  3.4     12-11  Mg     2.5     12-11    TPro  6.6  /  Alb  3.5  /  TBili  0.6  /  DBili  0.1  /  AST  44<H>  /  ALT  22  /  AlkPhos  61  12-11    flu negative   trop flat 17 from 18    personally reviewed EKG: sinus tachy @ 103 with low voltage qrs which is unchanged from previous    CXR:   INTERPRETATION:  A single chest x-ray was obtained on December 11, 2019.  Indication: Shortness of breath.  Impression:  The heart is normal in size. The lungs are clear. No acute pathology seen   in the visualized bones.    CTA results pending personally reviewed labs:                        11.4   8.87  )-----------( 177      ( 11 Dec 2019 12:43 )             35.3     PT/INR - ( 11 Dec 2019 12:43 )   PT: 10.7 sec;   INR: 0.94 ratio         PTT - ( 11 Dec 2019 12:43 )  PTT:28.7 sec  12-11    137  |  89<L>  |  7   ----------------------------<  96  3.9   |  26  |  0.41<L>    Ca    8.9      11 Dec 2019 15:41  Phos  3.4     12-11  Mg     2.5     12-11    TPro  6.6  /  Alb  3.5  /  TBili  0.6  /  DBili  0.1  /  AST  44<H>  /  ALT  22  /  AlkPhos  61  12-11    flu negative   trop flat 17 from 18    personally reviewed EKG: sinus tachy @ 103 with low voltage qrs which is unchanged from previous    CXR:   INTERPRETATION:  A single chest x-ray was obtained on December 11, 2019.  Indication: Shortness of breath.  Impression:  The heart is normal in size. The lungs are clear. No acute pathology seen   in the visualized bones.    CTA: EXAM:  CT ANGIO CHEST (W)AW IC                            PROCEDURE DATE:  12/11/2019            INTERPRETATION:  CLINICAL INFORMATION: Shortness of breath. History of   cancer. Recent pulmonary embolism.    COMPARISON: CT chest 11/14/2019.    PROCEDURE:   CT Angiography of the Chest.  90 ml of Omnipaque 350 was injected intravenously. 10 ml were discarded.  Sagittal and coronal reformats were performed as well as 3D (MIP)   reconstructions.      FINDINGS:    LUNGS AND AIRWAYS: Secretions within the bronchus intermedius. No   endobronchial lesions.  Patchy right lower lobe opacity likely reflecting   atelectasis. Severe centrilobular emphysema.    PLEURA: No pleural effusion.    MEDIASTINUM AND CHESTER: No lymphadenopathy.    VESSELS: No main, right, or left pulmonary embolism. Evaluation of some   of the lobar, segmental, and subsegmental pulmonary arteries is limited   secondary to bolus timing and respiratory motion artifact    HEART: Heart size is normal. No pericardial effusion.    CHEST WALL AND LOWER NECK: Within normal limits.    VISUALIZED UPPER ABDOMEN: Within normal limits.    BONES: Mild degenerative changes.    IMPRESSION:     No right or left main pulmonary embolism. Evaluation of the distal   pulmonary arteries is limited secondary to bolus timing and respiratory   motion artifact.

## 2019-12-11 NOTE — H&P ADULT - NSHPATTENDINGPLANDISCUSS_GEN_ALL_CORE
ABBIE Florian and Dr Bonds who will be presuming further care ABBIE Alva and Dr Bonds who will be presuming further care

## 2019-12-11 NOTE — H&P ADULT - PROBLEM SELECTOR PLAN 1
sob x 2 days but afebrile, no leukocytosis, CTA with PNA, PE, effusions  -sob likely due to alcohol withdrawl in setting of chronic resp failure on home 02  -pulm consulted Dr Franks sob x 2 days but afebrile, no leukocytosis, CTA with PNA, PE, effusions  -COPD on 3L NC at home likely exacerbated by alc/benzo withdrawal, anxiety, inhaler noncompliance, chronic resp failure  -pulm consulted Dr Franks  -jessie negative/flat, EKG with no acute ischemic changes, no cp, recent TTE with normal LV sob x 2 days but afebrile, no leukocytosis, CTA with PNA, PE, effusions  -COPD on 3L NC at home likely exacerbated by alc/benzo withdrawal, anxiety, inhaler noncompliance, chronic resp failure  -pulm consulted Dr Franks  -trops negative/flat, EKG with no acute ischemic changes, no cp,   -bnp 29 normal, recent TTE limited but with normal LV, has LE edema but no sign of HF otherwise

## 2019-12-11 NOTE — H&P ADULT - PROBLEM SELECTOR PLAN 3
tachycardia with moderate UE tremors, whole body tremors with daily alcohol intake and xanax use, likely alcohol/benzo withdrawal  -will monitor on CIWA score 8  -will start ativan taper given high risk  -if worsening CIWA despite benzo, then consult MICU tachycardic with moderate UE tremors, whole body tremors with daily alcohol intake and xanax use, likely alcohol/benzo withdrawal  -CIWA score 8 and sign of withdrawal on exam, monitor CIWA protocol  -will start po ativan taper given high risk   -if worsening CIWA despite benzos, then consult MICU tachycardic with moderate UE tremors, whole body tremors with daily alcohol intake and xanax use, likely alcohol/benzo withdrawal  -CIWA score 8 and sign of withdrawal on exam, monitor CIWA protocol  -will start po ativan taper given high risk   -MVI, folic acid, thiamine, home ppi  -counseled to stop mixing benzos and alcohol given synergistic deadly effects of resp depression  -if worsening CIWA despite benzos, then consult MICU

## 2019-12-11 NOTE — H&P ADULT - PROBLEM SELECTOR PLAN 4
outpatient onc followup, of note has been noncompliant with recommended followups anxious likely due to alc/benzo withdrawl  takes benzo at home ISTOP check ref #: [id number] but could not locate any benzo prescriptions, unclear if getting illicitly but  says from PCP   c/w KISHA and ativan taper as below

## 2019-12-11 NOTE — CONSULT NOTE ADULT - ASSESSMENT
ASSESSMENT:    chronic hypoxic/hypercapnic respiratory failure due to severe COPD/emphysema admitted with severe dyspnea - there is no evidence of a pulmonary embolus on the CTA of the chest - suspect that the patchy right lower lobe opacity represents atelectasis rather than pneumonia in the absence of fevers or leukocytosis - anxiety may be contributing to her sense of dyspnea    chronic alcohol use with evidence of withdrawal symptoms    lower extremity swelling likely related to intermittent hypoxemia causing pulmonary artery hypoxic vasoconstriction - suspect erythema is due to venous stasis changes rather than cellulitis    breast cancer s/p bilateral mastectomy    rheumatoid and psoriatic arthritis on Otezla - immunocompromised host    brain aneurysm s/p clipping    PLAN/RECOMMENDATIONS:    oxygen supplementation to keep saturation greater than 92%  check ABG  observe off antibiotics  prednisone 50mg daily watching for worsening anxiety  albuterol/atrovent nebs q4h holding 2am dose  pulmicort 0.5mg nebs q12h  singulair  watch for and treat alcohol withdrawal  thiamine/MVI/folate  Otezla/lidex solution to scalp/hydrocortisone cream to face  Arimidex  DVT prophylaxis - SQ heparin  GI prophylaxis - protonix  bowel regimen    Thank you for the courtesy of this referral. Plan of care discussed with the patient at bedside and the ER NP.    Joss Franks MD, [facility]  [phone]  Pulmonary Medicine ASSESSMENT:    chronic hypoxic/hypercapnic respiratory failure due to severe COPD/emphysema admitted with severe dyspnea - there is no evidence of a pulmonary embolus on the CTA of the chest - suspect that the patchy right lower lobe opacity represents atelectasis rather than pneumonia in the absence of fevers or leukocytosis - anxiety may be contributing to her dyspnea by provoking hyperventilation resulting in dynamic hyperinflation    chronic alcohol use with evidence of withdrawal symptoms    lower extremity swelling likely related to intermittent hypoxemia causing pulmonary artery hypoxic vasoconstriction - suspect erythema is due to venous stasis changes rather than cellulitis    breast cancer s/p bilateral mastectomy    rheumatoid and psoriatic arthritis on Otezla - immunocompromised host    brain aneurysm s/p clipping    PLAN/RECOMMENDATIONS:    oxygen supplementation to keep saturation greater than 92%  may need BIPAP for increase work of breathing and to offset "auto-PEEP'  check ABG  observe off antibiotics  prednisone 50mg daily watching for worsening anxiety  albuterol/atrovent nebs q4h holding 2am dose  pulmicort 0.5mg nebs q12h  singulair  watch for and treat alcohol withdrawal  thiamine/MVI/folate  Otezla/lidex solution to scalp/hydrocortisone cream to face  Arimidex  DVT prophylaxis - SQ heparin  GI prophylaxis - protonix  bowel regimen    Thank you for the courtesy of this referral. Plan of care discussed with the patient at bedside and the ER PA.    Joss Franks MD, [facility]  [phone]  Pulmonary Medicine

## 2019-12-11 NOTE — ED PROVIDER NOTE - CLINICAL SUMMARY MEDICAL DECISION MAKING FREE TEXT BOX
53 yo female pmhx COPD (never intubated), alcohol dependence, breast CA no current tx presents with sob/cough x 1 day. Recently admitted last month for same sxs. Mild resp and increased work of breathing, lungs diminished throughout. DDx includes COPD exacerbation vs PE vs HF vs infectious etiology (ie viral/PNA) Will give duonebs, O2 supplementation, obtain labs, cxr, CTA chest and LLE US to r/o DVT/PE. and likely admission to hospital.

## 2019-12-11 NOTE — ED PROVIDER NOTE - CHIEF COMPLAINT
The patient is a 54y Female complaining of The patient is a 54y Female complaining of shortness of beath.

## 2019-12-11 NOTE — ED PROCEDURE NOTE - PROCEDURE ADDITIONAL DETAILS
Emergency Department Focused Ultrasound performed at patient's bedside for educational purposes. The study will have a follow up study performed or was performed in the direct supervision of an ultrasound trained attending.
POCUS: Emergency Department Focused Ultrasound performed at patient's bedside.  The complete report may be available in PACS, see below for findings.
POCUS: Emergency Department Focused Ultrasound performed at patient's bedside.  The complete report may be available in PACS, see below for findings.

## 2019-12-11 NOTE — H&P ADULT - PROBLEM SELECTOR PLAN 7
on Otezla,  says will bring in erika c/w home exemestane 25mg daily, if LE duplex positive for DVT then may need to hold pending onc input  outpatient onc followup, of note has been noncompliant with recommended followups

## 2019-12-11 NOTE — ED PROVIDER NOTE - SEVERE SEPSIS ALERT DETAILS
Patient presents SOB and although peumonia is in the differential, it is more likely that this is COPD exacerbation or PE causing elevated lactate.  Patient without elevated WBC and without fever.

## 2019-12-11 NOTE — ED PROVIDER NOTE - LOWER EXTREMITY EXAM, LEFT
+asymmetric swelling of L leg compared to R with +mild overlying erythema, warmth and ttp. Full AROM with 5/5 strength all joints.

## 2019-12-11 NOTE — ED PROVIDER NOTE - ATTENDING CONTRIBUTION TO CARE
I, Bret Prado, performed a history and physical exam of the patient and discussed their management with the resident and /or advanced care provider. I reviewed the resident and /or ACP's note and agree with the documented findings and plan of care. I was present and available for all procedures.  Patient stable and requires further treatment and evaluation for COPD exacerbation.  Due to that pt is high risk, will also evaluate CT/PE which will also further evaluate lung tissue.  Patient is not tachycardic and without right heart strain on ekg.

## 2019-12-11 NOTE — ED PROVIDER NOTE - INTERNATIONAL TRAVEL
Called patient and left detailed message regarding abnormal lab results and further action dictated. Liver issue likely noted on CMP (and UA). Will order RUQ ultrasound for further evaluation. Gave scheduling information. Invited call back with questions. Will follow up after I have US results.   
No

## 2019-12-11 NOTE — ED PROVIDER NOTE - OBJECTIVE STATEMENT
55 yo female PMhx COPD on 3L home O2, alcohol dependence (3 drinks/day, last drink this morning), brain aneurysm s/p clip, breast CA s/o chemo and b/l mastectomy, DVT presents to the ED c/o increased shortness of breath and productive cough that began yesterday. 55 yo female PMhx COPD on 3L home O2, alcohol dependence (3 drinks/day, last drink this morning), brain aneurysm s/p clip, breast CA s/o chemo and b/l mastectomy, DVT presents to the ED c/o increased shortness of breath and productive cough that began yesterday. Pt has shortness of breath at baseline however yesterday  noticed increased work of breathing with cough, 53 yo female PMhx COPD on 3L home O2, alcohol dependence (3 drinks/day, last drink this morning), brain aneurysm s/p clip, breast CA s/o chemo and b/l mastectomy, DVT presents to the ED c/o increased shortness of breath and productive cough that began yesterday. Pt has shortness of breath at baseline however yesterday  noticed increased work of breathing with cough. Tried her albuterol inhaler last night without improvement. Symptoms worsened this morning without relief w/ home inhalers so came to ED. Additionally endorsing increasing swelling and pain to left lower extremity over the last few days. Denies chest pain, abdominal pain, n/v/d, numbness/tingling, headache, hemoptysis, speech/visual changes, fever/chills, recent sick contacts.

## 2019-12-11 NOTE — ED PROVIDER NOTE - PROGRESS NOTE DETAILS
mg 1.6, pt w/ mild improvement s/p duoneb, d/w attending will give 2 g IV mg for additional relief. Will continue to monitor. - Dimas Metcalf PA-C Pt requested her home xanax dose. Istop reviewed, pt prescribed 0.25 xanax this month, attending aware, dose ordered. Pt well known to Dr. Bonds, admitted multiple times to his service, discussed w/ him regarding eventual admission as pt will require further management, informed him of workup thus far, results, tx and pending CTA to r/o PE. He accepted pt under his service for admission, will not ready to move pt until CTA result. He requested [facility] 2/2 pt's hx alcohol use. Orders in. - Dimas Metcalf PA-C

## 2019-12-11 NOTE — H&P ADULT - HISTORY OF PRESENT ILLNESS
54 yr old female with Hx significant for COPD 3L on home O2, ETOH abuse, breast Ca s/p mastectomy, remote brain aneurysm s/p clip,  rheumatoid and psoriatic arthritis on Otezla p/w 2 days of worsening sob despite using nebs at home. Shes denies any chest pain, fever, n/v, reports chronic unchanged cough with clear sputum. Also reports bilateral leg swelling. Denies sick contacts, recent travels. Reports drinking about 3 beers for the past 20 years and takes xanax 0.25mg TID at home, reports feeling shaky in b/l hands and asking for xanax now. Last drink was this morning. Denies abd pain, n/v. last BM this AM, denies bleeding, melena, hematochezia.    During time of my exam patient with moderate b/l UE tremors and in sinus tachy 105-130 no in resp distress    ED vitals: 98.4, 98, 128/84, 24, 98%on 3L NC  In Er patient was given xanax, Mg 2gram, duoneb x 1 54 yr old female with Hx significant for COPD 3L on home O2, ETOH abuse, breast Ca s/p mastectomy, remote brain aneurysm s/p clip,  rheumatoid and psoriatic arthritis on Otezla, anxiety, benzo abues p/w 2 days of worsening sob despite using nebs at home. Shes denies any chest pain, fever, n/v, reports chronic unchanged cough with clear sputum. Also reports bilateral leg swelling. Denies sick contacts, recent travels. Reports drinking about 3 beers for the past 20 years and takes xanax 0.25mg TID at home, reports feeling shaky in b/l hands and asking for xanax now. Last drink was this morning. Denies abd pain, n/v. last BM this AM, denies bleeding, melena, hematochezia.  says shes not compliant with her inhalers and cough has worsened past few days    During time of my exam patient with moderate b/l UE tremors and in sinus tachy 105-130 no in resp distress  Has multiple admission for alc withdrawal and sob. most recently discharged 11/18 with similar presentation and compliants    ED vitals: 98.4, 98, 128/84, 24, 98%on 3L NC  In Er patient was given xanax, Mg 2gram, duoneb x 1

## 2019-12-11 NOTE — H&P ADULT - PROBLEM SELECTOR PLAN 5
lactate 3.3, no clear sign of infection and no hypotension to suggest hypoperfusion, possible from alc withdraw, has had elevated lactate levels in past with no clear explanation  -s/p fluids in ER  -will repeat lactate, if rapidly increasing, call MICU, lactate 3.3, no clear sign of infection and no hypotension to suggest hypoperfusion, possible from alc withdraw, has had elevated lactate levels in past with no clear explanation  -s/p fluids in ER  -will repeat lactate, if rapidly increasing, call MICU, if downtrending then would stop IVF given LE edema

## 2019-12-11 NOTE — ED ADULT NURSE REASSESSMENT NOTE - NS ED NURSE REASSESS COMMENT FT1
Pt received from [facility] in [place], Pt AOx3  breathing even unlabored spontaneously, Pt requesting duoneb at this time. Pt night meds administered. Awaits bed.
Patient   was   c/o  itchiness  to  rt  buttock  .  Excoriation  to  buttocks  and  sacrum  noted  upon  arrival  to  the  ER.  Patient  was  scratching   her  right  buttock these  areas  Her  nails  are  long  and  sharped.  She  sustained skin  abrasion  to  right  buttock  and  gluteal  fold.

## 2019-12-11 NOTE — H&P ADULT - NSHPSOCIALHISTORY_GEN_ALL_CORE
quit smoking in march 2019, smoked 2 ppd  x40 years  drinks at least 3 beer for past 20 years  denies toxic drug use

## 2019-12-11 NOTE — ED ADULT NURSE NOTE - OBJECTIVE STATEMENT
54 yr old female to ED via EMS c/o SOB , worsening this AM. Pt awake alert and orientedx3 Accomp by spouse. Had 2 neb tx this am , however felt better with inhalers. Never intubated. Recent admission October , 2019, same sx. Afebrile Denies fever or chills. Denies chest pain LLE red and increased swelling, more so then rle. Pt has h/o bilat mastectomy 2015. Completed chemo therapy. Increased SOB on exertion Uses 3.5l O2 at home. Drinks 3 beer /day. Never had seizure from not drinking. SBIRT done. HOB elevated . 54 yr old female to ED via EMS c/o SOB , worsening this AM. Pt awake alert and orientedx3 Accomp by spouse. Had 2 neb tx this am , however felt better with inhalers. Never intubated. Recent admission October , 2019, same sx. Afebrile Denies fever or chills. Denies chest pain LLE red and increased swelling, more so then rle. Pt has h/o bilat mastectomy 2015. Completed chemo therapy. Increased SOB on exertion Uses 3.5l O2 at home. Drinks 3 beer /day. Never had seizure from not drinking. SBIRT done. HOB elevated . Made comfortable

## 2019-12-11 NOTE — ED ADULT NURSE NOTE - NSFALLRSKASSISTTYPE_ED_ALL_ED
Chief Complaint Patient presents with [address] [place] [address] Annual Wellness Visit  Follow-up  
  lab result review 1. Have you been to the ER, urgent care clinic since your last visit? Hospitalized since your last visit? No 
 
2. Have you seen or consulted any other health care providers outside of the [address] [place] [address] since your last visit? Include any pap smears or colon screening.  No  
 Toileting/Walking/Standing

## 2019-12-12 LAB
ALBUMIN SERPL ELPH-MCNC: 3.4 G/DL — SIGNIFICANT CHANGE UP (ref 3.3–5)
ALP SERPL-CCNC: 60 U/L — SIGNIFICANT CHANGE UP (ref 40–120)
ALT FLD-CCNC: 22 U/L — SIGNIFICANT CHANGE UP (ref 10–45)
ANION GAP SERPL CALC-SCNC: 18 MMOL/L — HIGH (ref 5–17)
AST SERPL-CCNC: 40 U/L — SIGNIFICANT CHANGE UP (ref 10–40)
BASE EXCESS BLDA CALC-SCNC: 9.3 MMOL/L — HIGH (ref -2–2)
BILIRUB SERPL-MCNC: 1 MG/DL — SIGNIFICANT CHANGE UP (ref 0.2–1.2)
BUN SERPL-MCNC: 7 MG/DL — SIGNIFICANT CHANGE UP (ref 7–23)
CALCIUM SERPL-MCNC: 8.7 MG/DL — SIGNIFICANT CHANGE UP (ref 8.4–10.5)
CHLORIDE SERPL-SCNC: 88 MMOL/L — LOW (ref 96–108)
CO2 BLDA-SCNC: 34 MMOL/L — HIGH (ref 22–30)
CO2 SERPL-SCNC: 26 MMOL/L — SIGNIFICANT CHANGE UP (ref 22–31)
CREAT SERPL-MCNC: 0.41 MG/DL — LOW (ref 0.5–1.3)
GAS PNL BLDA: SIGNIFICANT CHANGE UP
GLUCOSE SERPL-MCNC: 159 MG/DL — HIGH (ref 70–99)
HCO3 BLDA-SCNC: 33 MMOL/L — HIGH (ref 21–29)
HCT VFR BLD CALC: 34.2 % — LOW (ref 34.5–45)
HGB BLD-MCNC: 11.3 G/DL — LOW (ref 11.5–15.5)
HOROWITZ INDEX BLDA+IHG-RTO: 32 — SIGNIFICANT CHANGE UP
LACTATE SERPL-SCNC: 1.1 MMOL/L — SIGNIFICANT CHANGE UP (ref 0.7–2)
MAGNESIUM SERPL-MCNC: 1.8 MG/DL — SIGNIFICANT CHANGE UP (ref 1.6–2.6)
MCHC RBC-ENTMCNC: 33 GM/DL — SIGNIFICANT CHANGE UP (ref 32–36)
MCHC RBC-ENTMCNC: 34.3 PG — HIGH (ref 27–34)
MCV RBC AUTO: 104 FL — HIGH (ref 80–100)
PCO2 BLDA: 39 MMHG — SIGNIFICANT CHANGE UP (ref 32–46)
PH BLDA: 7.53 — HIGH (ref 7.35–7.45)
PHOSPHATE SERPL-MCNC: 3.5 MG/DL — SIGNIFICANT CHANGE UP (ref 2.5–4.5)
PLATELET # BLD AUTO: 183 K/UL — SIGNIFICANT CHANGE UP (ref 150–400)
PO2 BLDA: 82 MMHG — SIGNIFICANT CHANGE UP (ref 74–108)
POTASSIUM SERPL-MCNC: 4.3 MMOL/L — SIGNIFICANT CHANGE UP (ref 3.5–5.3)
POTASSIUM SERPL-SCNC: 4.3 MMOL/L — SIGNIFICANT CHANGE UP (ref 3.5–5.3)
PROT SERPL-MCNC: 6.5 G/DL — SIGNIFICANT CHANGE UP (ref 6–8.3)
RBC # BLD: 3.29 M/UL — LOW (ref 3.8–5.2)
RBC # FLD: 13.3 % — SIGNIFICANT CHANGE UP (ref 10.3–14.5)
SAO2 % BLDA: 98 % — HIGH (ref 92–96)
SODIUM SERPL-SCNC: 132 MMOL/L — LOW (ref 135–145)
WBC # BLD: 4.66 K/UL — SIGNIFICANT CHANGE UP (ref 3.8–10.5)
WBC # FLD AUTO: 4.66 K/UL — SIGNIFICANT CHANGE UP (ref 3.8–10.5)

## 2019-12-12 PROCEDURE — 71045 X-RAY EXAM CHEST 1 VIEW: CPT | Mod: 26

## 2019-12-12 RX ORDER — FUROSEMIDE 40 MG
40 TABLET ORAL ONCE
Refills: 0 | Status: COMPLETED | OUTPATIENT
Start: 2019-12-12 | End: 2019-12-12

## 2019-12-12 RX ORDER — IPRATROPIUM/ALBUTEROL SULFATE 18-103MCG
3 AEROSOL WITH ADAPTER (GRAM) INHALATION ONCE
Refills: 0 | Status: COMPLETED | OUTPATIENT
Start: 2019-12-12 | End: 2019-12-12

## 2019-12-12 RX ADMIN — PANTOPRAZOLE SODIUM 40 MILLIGRAM(S): 20 TABLET, DELAYED RELEASE ORAL at 06:17

## 2019-12-12 RX ADMIN — Medication 3 MILLILITER(S): at 17:26

## 2019-12-12 RX ADMIN — Medication 1 MILLIGRAM(S): at 13:10

## 2019-12-12 RX ADMIN — Medication 0.5 MILLIGRAM(S): at 06:19

## 2019-12-12 RX ADMIN — Medication 2 MILLIGRAM(S): at 06:18

## 2019-12-12 RX ADMIN — Medication 40 MILLIGRAM(S): at 16:34

## 2019-12-12 RX ADMIN — Medication 3 MILLILITER(S): at 19:29

## 2019-12-12 RX ADMIN — Medication 3 MILLILITER(S): at 14:42

## 2019-12-12 RX ADMIN — Medication 2 MILLIGRAM(S): at 14:42

## 2019-12-12 RX ADMIN — EXEMESTANE 25 MILLIGRAM(S): 25 TABLET, SUGAR COATED ORAL at 13:10

## 2019-12-12 RX ADMIN — MONTELUKAST 10 MILLIGRAM(S): 4 TABLET, CHEWABLE ORAL at 21:47

## 2019-12-12 RX ADMIN — HEPARIN SODIUM 5000 UNIT(S): 5000 INJECTION INTRAVENOUS; SUBCUTANEOUS at 17:26

## 2019-12-12 RX ADMIN — Medication 40 MILLIGRAM(S): at 23:30

## 2019-12-12 RX ADMIN — Medication 1000 UNIT(S): at 13:10

## 2019-12-12 RX ADMIN — Medication 50 MILLIGRAM(S): at 06:18

## 2019-12-12 RX ADMIN — Medication 3 MILLILITER(S): at 06:18

## 2019-12-12 RX ADMIN — Medication 0.5 MILLIGRAM(S): at 17:26

## 2019-12-12 RX ADMIN — Medication 1 TABLET(S): at 13:10

## 2019-12-12 RX ADMIN — Medication 3 MILLILITER(S): at 23:30

## 2019-12-12 RX ADMIN — Medication 2 MILLIGRAM(S): at 01:55

## 2019-12-12 RX ADMIN — Medication 1.5 MILLIGRAM(S): at 21:46

## 2019-12-12 RX ADMIN — Medication 3 MILLILITER(S): at 10:23

## 2019-12-12 RX ADMIN — Medication 2 MILLIGRAM(S): at 10:23

## 2019-12-12 RX ADMIN — Medication 1.5 MILLIGRAM(S): at 17:26

## 2019-12-12 RX ADMIN — HEPARIN SODIUM 5000 UNIT(S): 5000 INJECTION INTRAVENOUS; SUBCUTANEOUS at 06:17

## 2019-12-12 RX ADMIN — Medication 1 MILLIGRAM(S): at 21:04

## 2019-12-12 NOTE — CONSULT NOTE ADULT - ATTENDING COMMENTS
agree with the above assessment and plan by NP M  Pt known to us from prior admission  54 year old female with PMH of stage 3 breast cancer on aromasin, RA, Psoriasis on Otezla, brain aneurysm, s/p clip 1988, right jugular DVT, Catheter related MSSA bacteremia 2015,  ETOH abuse, COPD on oxygen presents here with dyspnea due to COPD and ETOH withdrawal  nebs per pulm  recent echo w normal lvef  renal followup for hyponatremia  prn iv lasix  CIWA protocol for ETOH abuse

## 2019-12-12 NOTE — PHYSICAL THERAPY INITIAL EVALUATION ADULT - CRITERIA FOR SKILLED THERAPEUTIC INTERVENTIONS
rehab potential/functional limitations in following categories/predicted duration of therapy intervention/anticipated discharge recommendation/risk reduction/prevention/therapy frequency/anticipated equipment needs at discharge/impairments found

## 2019-12-12 NOTE — PROVIDER CONTACT NOTE (OTHER) - SITUATION
Pt c/o difficulty breathing, pt calling out without ringing call bell for additional anxiety medication.

## 2019-12-12 NOTE — PROGRESS NOTE ADULT - SUBJECTIVE AND OBJECTIVE BOX
[facility] - PROGRESS NOTE    CHIEF COMPLAINT: dyspnea; COPD exacerbation; obesity; alcohol dependence/withdrawal    INTERVAL HISTORY: much less anxious and tachycardic on ATC ativan; no shortness of breath on a nasal canula; no cough, sputum production, chest congestion or wheeze; no fevers, chills or sweats; no chest pain/pressure or palpitations; worsening leg swelling and discomfort    REVIEW OF SYSTEMS:  Constitutional: As per interval history  HEENT: Within normal limits  CV: As per interval history  Resp: As per interval history  GI: Within normal limits   : Within normal limits  Musculoskeletal: Within normal limits  Skin: Within normal limits  Neurological: Within normal limits  Psychiatric: Within normal limits  Endocrine: Within normal limits  Hematologic/Lymphatic: Within normal limits  Allergic/Immunologic: Within normal limits    MEDICATIONS:     Pulmonary "  albuterol/ipratropium for Nebulization 3 milliLiter(s) Nebulizer <User Schedule>  buDESOnide    Inhalation Suspension 0.5 milliGRAM(s) Inhalation every 12 hours  montelukast 10 milliGRAM(s) Oral at bedtime    Anti-microbials:    Cardiovascular:    Other:  cholecalciferol 1000 Unit(s) Oral daily  exemestane 25 milliGRAM(s) Oral daily  folic acid 1 milliGRAM(s) Oral daily  heparin  Injectable 5000 Unit(s) SubCutaneous every 12 hours  LORazepam     Tablet 1.5 milliGRAM(s) Oral every 4 hours  LORazepam     Tablet   Oral   multivitamin 1 Tablet(s) Oral daily  pantoprazole    Tablet 40 milliGRAM(s) Oral before breakfast  predniSONE   Tablet 50 milliGRAM(s) Oral daily    MEDICATIONS  (PRN):  LORazepam   Injectable 2 milliGRAM(s) IV Push every 2 hours PRN Symptom-triggered: each CIWA -Ar score 8 or GREATER        OBJECTIVE:    I&O's Detail    11 Dec 2019 07:01  -  12 Dec 2019 07:00  --------------------------------------------------------  IN:    Oral Fluid: 360 mL    sodium chloride 0.9%: 1100 mL  Total IN: 1460 mL    OUT:  Total OUT: 0 mL    Total NET: 1460 mL    PHYSICAL EXAM:       ICU Vital Signs Last 24 Hrs  T(C): 36.7 (12 Dec 2019 10:00), Max: 36.9 (11 Dec 2019 19:50)  T(F): 98.1 (12 Dec 2019 10:00), Max: 98.5 (11 Dec 2019 19:50)  HR: 94 (12 Dec 2019 10:00) (94 - 120)  BP: 128/88 (12 Dec 2019 10:00) (102/69 - 143/77)  BP(mean): --  ABP: --  ABP(mean): --  RR: 18 (12 Dec 2019 10:00) (17 - 24)  SpO2: 98% (12 Dec 2019 10:00) (96% - 100%) on 2lpm nasal canula     General: Awake. Alert. Cooperative. No distress. Appears stated age.	  HEENT: Atraumatic. Normocephalic. Anicteric. Normal oral mucosa. PERRL. EOMI. Decreased psoriatic plaque on scalp.  Neck: Supple. Trachea midline. Thyroid without enlargement/tenderness/nodules. No carotid bruit. No JVD.	  Cardiovascular: Regular rate and rhythm. S1 S2 normal. No murmurs, rubs or gallops.  Respiratory: Respirations unlabored. Decreased breath sounds throughout. No wheeze. No rales. No curvature.  Abdomen: Soft. Non-tender. Non-distended. No organomegaly. No masses. Normal bowel sounds. Obese.  Extremities: Warm to touch. No clubbing or cyanosis. Moderate bilateral lower extremity edema up to the knees.  Pulses: 2+ peripheral pulses all extremities.	  Skin: Venous stasis changes of both lower extremities. Multiple areas of skin hyperpigmentation at areas of prior plaque.  Lymph Nodes: Cervical, supraclavicular and axillary nodes normal  Neurological: Motor and sensory examination equal and normal. A and O x 3  Psychiatry: Appropriate mood and affect.    LABS:                          11.3   4.66  )-----------( 183      ( 12 Dec 2019 08:22 )             34.2     CBC    WBC  4.66 <==, 8.87 <==    Hemoglobin  11.3 <<==, 11.4 <<==    Hematocrit  34.2 <==, 35.3 <==    Platelets  183 <==, 177 <==      132<L>  |  88<L>  |  7   ----------------------------<  159<H>    12-12  4.3   |  26  |  0.41<L>      LYTES    sodium  132 <==, 137 <==, 136 <==    potassium   4.3 <==, 3.9 <==, 5.9 <==    chloride  88 <==, 89 <==, 90 <==    carbon dioxide  26 <==, 26 <==, 28 <==    =============================================================================================  RENAL FUNCTION:    Creatinine:   0.41  <<==, 0.41  <<==, 0.41  <<==    BUN:   7 <==, 7 <==, 6 <==    ============================================================================================    calcium   8.7 <==, 8.9 <==, 8.7 <==    phos   3.5 <==, 3.4 <==    mag   1.8 <==, 2.5 <==, 1.6 <==    ============================================================================================  LFTs    AST:   40 <== , 44 <== , 74 <==     ALT:  22  <== , 22  <== , 29  <==     AP:  60  <=, 61  <=, 61  <=    Bili:  1.0  <=, 0.6  <=, 0.5  <=      PT/INR - ( 11 Dec 2019 12:43 )   PT: 10.7 sec;   INR: 0.94 ratio       PTT - ( 11 Dec 2019 12:43 )  PTT: 28.7 sec    Venous Blood Gas:  12-11 @ 12:43  7.34/69/38/36/55  VBG Lactate: 3.3    Serum Pro-Brain Natriuretic Peptide: 29 pg/mL (12-11 @ 12:43)    CARDIAC MARKERS ( 11 Dec 2019 15:41 )  CPK x     /CKMB x     /CKMB Units x        troponin 17 ng/L    CARDIAC MARKERS ( 11 Dec 2019 12:43 )  CPK x     /CKMB x     /CKMB Units x        troponin 18 ng/L    Patient name: FREDY CHOI  YOB: 1965   Age: 54 (F)   MR#: [id number]  Study Date: 10/7/2019  [facility]  Study quality: Technically difficult  Referring Physician: Canelo Bonds MD  BloodPressure: 122/80 mmHg  Height: 165 cm  Weight: 68 kg  BSA: 1.8 m2  ------------------------------------------------------------------------  PROCEDURE: Transthoracic echocardiogram with 2-D, M-Mode  and complete spectral and color flow Doppler. Verbal  consent was obtained for injection of  Ultrasonic Enhancing  Agent following a discussion of risks and benefits.  Following intravenous injection of Ultrasonic Enhancing  Agent , harmonic imaging was performed.  INDICATION: Shortness of breath (R06.02)  ------------------------------------------------------------------------  Observations:  Left Ventricle: Endocardium not well visualized; grossly  normal left ventricular systolic function. Endocardial  visualization enhanced with intravenous injection of  Ultrasonic Enhancing Agent (Definity). Normal left  ventricular internal dimensions and wall thicknesses.  Normal diastolic function  ------------------------------------------------------------------------  Conclusions:  1. Normal left ventricular internal dimensions and wall  thicknesses.  2. Endocardium not well visualized; grossly normal left  ventricular systolic function. Endocardial visualization  enhanced with intravenous injection of Ultrasonic Enhancing  Agent (Definity). Unable to perform strain imaging due to  limited clinical windows.  ------------------------------------------------------------------------  Confirmed on  10/7/2019 - 18:11:26 by Margaret Alvarez M.D.  ------------------------------------------------------------------------  ---------------------------------------------------------------------------------------------------------------    MICROBIOLOGY:       RADIOLOGY:  [x] Chest radiographs reviewed and interpreted by me    EXAM:  XR CHEST AP OR PA 1V                          PROCEDURE DATE:  12/11/2019      INTERPRETATION:  A single chest x-ray was obtained on December 11, 2019.    Indication: Shortness of breath.    Impression:    The heart is normal in size. The lungs are clear. No acute pathology seen   in the visualized bones.    PARESH GALEAS M.D., ATTENDING RADIOLOGIST  This document has been electronically signed. Dec 11 2019  2:57PM  ---------------------------------------------------------------------------------------------------------------    EXAM:  CT ANGIO CHEST (W)AW IC                          PROCEDURE DATE:  12/11/2019      INTERPRETATION:  CLINICAL INFORMATION: Shortness of breath. History of   cancer. Recent pulmonary embolism.    COMPARISON: CT chest 11/14/2019.    PROCEDURE:   CT Angiography of the Chest.  90 ml of Omnipaque 350 was injected intravenously. 10 ml were discarded.  Sagittal and coronal reformats were performed as well as 3D (MIP)   reconstructions.    FINDINGS:    LUNGS AND AIRWAYS: Secretions within the bronchus intermedius. No   endobronchial lesions.  Patchy right lower lobe opacity likely reflecting   atelectasis. Severe centrilobular emphysema.    PLEURA: No pleural effusion.    MEDIASTINUM AND CHESTER: No lymphadenopathy.    VESSELS: No main, right, or left pulmonary embolism. Evaluation of some   of the lobar, segmental, and subsegmental pulmonary arteries is limited   secondary to bolus timing and respiratory motion artifact    HEART: Heart size is normal. No pericardial effusion.    CHEST WALL AND LOWER NECK: Within normal limits.    VISUALIZED UPPER ABDOMEN: Within normal limits.    BONES: Mild degenerative changes.    IMPRESSION:     No right or left main pulmonary embolism. Evaluation of the distal   pulmonary arteries is limited secondary to bolus timing and respiratory   motion artifact.    ZULEYKA NUÑEZ M.D., RADIOLOGY RESIDENT  This document has been electronically signed.  LAURIE NGO M.D., ATTENDING RADIOLOGIST  This document has been electronically signed. Dec 11 2019  4:33PM  ---------------------------------------------------------------------------------------------------------------

## 2019-12-12 NOTE — PHYSICAL THERAPY INITIAL EVALUATION ADULT - PERTINENT HX OF CURRENT PROBLEM, REHAB EVAL
54 year old gentlewoman, daily alcohol user, addmitted for SOB for chronic hypoxic respiratory failure due to COPD/emphysema.  as per pulm consult chronic hypoxic/hypercapnic respiratory failure due to severe COPD/emphysema admitted with severe dyspnea - there is no evidence of a pulmonary embolus on the CTA of the chest - suspect that the patchy right lower lobe opacity represents atelectasis rather than pneumonia in the absence of fevers or leukocytosis

## 2019-12-12 NOTE — CONSULT NOTE ADULT - SUBJECTIVE AND OBJECTIVE BOX
CARDIOLOGY CONSULT - Dr. Nj         HPI:  54 yr old female known from prior admission with Hx significant for COPD, ETOH abuse, breast Ca, remote brain aneurysm s/p clip who presented with 2 days of worsening sob despite using nebs at home. Shes denies any chest pain, fever, n/v. She reports chronic unchanged cough with clear sputum. Also reports bilateral leg swelling. Denies sick contacts, recent travels. Reports drinking about 3 beers for the past 20 years and takes xanax 0.25mg TID at home.  Denies abd pain, n/v. last BM this AM, denies bleeding, melena, hematochezia. Per h/p  says shes not compliant with her inhalers and cough has worsened past few days. Echo from 10/ 2019 with nl LV sys fx. ROS otherwise negative.         PAST MEDICAL & SURGICAL HISTORY:  Prophylactic measure  Breast cancer  Brain aneurysm: with clips  Psoriasis  RA (rheumatoid arthritis)  Psoriasis  Breast cancer, stage 3  History of modified radical mastectomy of both breasts  S/P bilateral mastectomy  Brain aneurysm: 1988 two clips          PREVIOUS DIAGNOSTIC TESTING:    [ ] Echocardiogram:  < from: TTE with Doppler (w/Cont) (10.07.19 @ 15:16) >  Conclusions:  1. Normal left ventricular internal dimensions and wall  thicknesses.  2. Endocardium not well visualized; grossly normal left  ventricular systolic function. Endocardial visualization  enhanced with intravenous injection of Ultrasonic Enhancing  Agent (Definity). Unable to perfrom strain imaging due to  limited clinical windows.  ------------------------------------------------------------------------    < end of copied text >    [ ]  Catheterization:    [ ] Stress Test:  	      MEDICATIONS:  Home Medications:  ALPRAZolam 0.25 mg oral tablet: 1 tab(s) orally 4 times a day (11 Dec 2019 17:29)  Brovana 15 mcg/2 mL inhalation solution: 2 milliliter(s) inhaled 2 times a day (11 Dec 2019 17:29)  budesonide 0.5 mg/2 mL inhalation suspension: 2 milliliter(s) inhaled 2 times a day (11 Dec 2019 17:29)  exemestane 25 mg oral tablet: 1 tab(s) orally once a day (11 Dec 2019 17:29)  folic acid 1 mg oral tablet: 1 tab(s) orally once a day (11 Dec 2019 17:29)  montelukast 10 mg oral tablet: 1 tab(s) orally once a day (11 Dec 2019 17:29)  Multiple Vitamins oral tablet: 1 tab(s) orally once a day (11 Dec 2019 17:29)  Otezla 30 mg oral tablet: 1 tab(s) orally 2 times a day (11 Dec 2019 17:29)  prochlorperazine 10 mg oral tablet: 1 tab(s) orally once a day    NOTE: pharmacy dispensed 1 tab every 6 hours as needed back in June 2019. (11 Dec 2019 17:29)  Ventolin HFA 90 mcg/inh inhalation aerosol: 2 puff(s) inhaled every 6 hours, As Needed (11 Dec 2019 17:29)  Vitamin D3 2000 intl units oral tablet: 1 tab(s) orally once a day (11 Dec 2019 17:29)  Yupelri 175 mcg/3 mL inhalation solution: 3 milliliter(s) inhaled once a day    NOTE: unable to verify with secondary source. (11 Dec 2019 17:29)      MEDICATIONS  (STANDING):  albuterol/ipratropium for Nebulization 3 milliLiter(s) Nebulizer <User Schedule>  buDESOnide    Inhalation Suspension 0.5 milliGRAM(s) Inhalation every 12 hours  cholecalciferol 1000 Unit(s) Oral daily  exemestane 25 milliGRAM(s) Oral daily  folic acid 1 milliGRAM(s) Oral daily  heparin  Injectable 5000 Unit(s) SubCutaneous every 12 hours  LORazepam     Tablet 1.5 milliGRAM(s) Oral every 4 hours  LORazepam     Tablet   Oral   montelukast 10 milliGRAM(s) Oral at bedtime  multivitamin 1 Tablet(s) Oral daily  pantoprazole    Tablet 40 milliGRAM(s) Oral before breakfast  predniSONE   Tablet 50 milliGRAM(s) Oral daily      FAMILY HISTORY:  FH: CHF (congestive heart failure): Mother      SOCIAL HISTORY:    [x ] Non-smoker  [ ] Smoker  [ x] Alcohol: 3 beers daily     Allergies    amoxicillin (Anaphylaxis)  Levaquin (Other; Anaphylaxis)  Levaquin (Unknown)  penicillin (Anaphylaxis)  penicillins (Anaphylaxis)    Intolerances    	    REVIEW OF SYSTEMS:  CONSTITUTIONAL: No fever, weight loss, or fatigue  EYES: No eye pain, visual disturbances, or discharge  ENMT:  No difficulty hearing, tinnitus, vertigo; No sinus or throat pain  NECK: No pain or stiffness  RESPIRATORY:  see hpi   CARDIOVASCULAR: No chest pain, palpitations, passing out, dizziness, or leg swelling  GASTROINTESTINAL: No abdominal or epigastric pain. No nausea, vomiting, or hematemesis; No diarrhea or constipation. No melena or hematochezia.  GENITOURINARY: No dysuria, frequency, hematuria, or incontinence  NEUROLOGICAL: No headaches, memory loss, loss of strength, numbness, or tremors  SKIN: No itching, burning, rashes, or lesions   	    [x ] All others negative	  [ ] Unable to obtain    PHYSICAL EXAM:  T(C): 36.7 (12-12-19 @ 10:00), Max: 36.9 (12-11-19 @ 19:50)  HR: 94 (12-12-19 @ 10:00) (94 - 120)  BP: 128/88 (12-12-19 @ 10:00) (102/69 - 143/77)  RR: 18 (12-12-19 @ 10:00) (17 - 24)  SpO2: 98% (12-12-19 @ 10:00) (96% - 100%)  Wt(kg): --  I&O's Summary    11 Dec 2019 07:01  -  12 Dec 2019 07:00  --------------------------------------------------------  IN: 1460 mL / OUT: 0 mL / NET: 1460 mL        Appearance: Normal	  Psychiatry: A & O x 3, Mood & affect appropriate  HEENT:   Normal oral mucosa, PERRL, EOMI	  Lymphatic: No lymphadenopathy  Cardiovascular: Normal S1 S2,RRR, No JVD, No murmurs  Respiratory: diminished   Gastrointestinal:  Soft, Non-tender, + BS	  Skin: No rashes, No ecchymoses, No cyanosis	  Neurologic: Non-focal  Extremities: le edema + 2     TELEMETRY: 	    ECG:  sinus tachycardia  bpm 	  RADIOLOGY:  < from: CT Angio Chest w/ IV Cont (12.11.19 @ 15:55) >    FINDINGS:    LUNGS AND AIRWAYS: Secretions within the bronchus intermedius. No   endobronchial lesions.  Patchy right lower lobe opacity likely reflecting   atelectasis. Severe centrilobular emphysema.    PLEURA: No pleural effusion.    MEDIASTINUM AND CHESTER: No lymphadenopathy.    VESSELS: No main,right, or left pulmonary embolism. Evaluation of some   of the lobar, segmental, and subsegmental pulmonary arteries is limited   secondary to bolus timing and respiratory motion artifact    HEART: Heart size is normal. No pericardial effusion.    CHEST WALL AND LOWER NECK: Within normal limits.    VISUALIZED UPPER ABDOMEN: Within normal limits.    BONES: Mild degenerative changes.    IMPRESSION:     No right or left main pulmonary embolism. Evaluation of the distal   pulmonary arteries is limited secondary to bolus timing and respiratory   motion artifact.          < end of copied text >    OTHER: 	  	  LABS:	 	    CARDIAC MARKERS:  Troponin T, High Sensitivity Result: 17 ng/L (12-11 @ 15:41)  Troponin T, High Sensitivity Result: 18 ng/L (12-11 @ 12:43)                                  11.3   4.66  )-----------( 183      ( 12 Dec 2019 08:22 )             34.2     12-12    132<L>  |  88<L>  |  7   ----------------------------<  159<H>  4.3   |  26  |  0.41<L>    Ca    8.7      12 Dec 2019 06:17  Phos  3.5     12-12  Mg     1.8     12-12    TPro  6.5  /  Alb  3.4  /  TBili  1.0  /  DBili  x   /  AST  40  /  ALT  22  /  AlkPhos  60  12-12    PT/INR - ( 11 Dec 2019 12:43 )   PT: 10.7 sec;   INR: 0.94 ratio         PTT - ( 11 Dec 2019 12:43 )  PTT:28.7 sec  proBNP:   Lipid Profile:   HgA1c:   TSH:

## 2019-12-12 NOTE — PROGRESS NOTE ADULT - ASSESSMENT
54 yr old female with Hx of COPD 3L on home O2, ETOH abuse, breast Ca s/p mastectomy, remote brain aneurysm s/p clip,  rheumatoid and psoriatic arthritis on Otezla p/w sob also with signs of alcohol withdrawal        ·  Problem: Chronic respiratory failure with hypoxia.  P COPD on 3L NC at home likely exacerbation  impending  withdrawal, a  anxiety, i  -flu negative, CTA today negative for acute findings, no PE, PNA, effusion etc  -c/w supplemental oxygen, goal 02 sat 88-92%  -pulm consulted Dr Franks, noted    :  ·  Problem: Alcohol withdrawal.   improved   -CIWA   ativan taper  -MVI, folic acid, thiamine, home ppi  .      ·  Problem: Anxiety.  Plan: anxious likely due to alc/benzo withdrawl  takes benzo at home ISTOP check ref #: [id number] but could not locate any benzo prescriptions, unclear if getting illicitly but  says from PCP   c/w CIWA and ativan taper.             Problem: Swelling of lower extremity.Plan: likely due to chronic venous insufficiency, poor nutrition, alb 3.5. Exam not consistent with b/l cellulitis, afebrile, no leukocytosis  f/u doppler  cards eval      ·  Problem: Breast cancer. Plan: c/w home exemestane 25mg daily  outpatient onc followup, of note has been noncompliant with recommended followups.      ·  Problem: RA (rheumatoid arthritis). Plan: on Otezla,       ·  Problem: Prophylactic measure.  Plan: dvt ppx: hsq  PT eval         Attending Statement:  Plan discussed with NP Artie and Dr Bonds who will be presuming further care.      Electronic Signatures:  Neftali Reyez)  (Signed 11-Dec-2019 17:32)  	Authored: History and Physical, History of Present Illness, Allergies/Medications, Patient History, Risk Assessment, Physical Exam, Labs and Results, Assessment and Plan, Attending Statement      Last Updated: 11-Dec-2019 17:32 by Neftali Reyez) 54 yr old female with Hx of COPD 3L on home O2, ETOH abuse, breast Ca s/p mastectomy, remote brain aneurysm s/p clip,  rheumatoid and psoriatic arthritis on Otezla p/w sob also with signs of alcohol withdrawal        ·  Problem: Chronic respiratory failure with hypoxia.  P COPD on 3L NC at home likely exacerbation  impending  withdrawal, a  anxiety, i  -flu negative, CTA today negative for acute findings, no PE, PNA, effusion etc  -c/w supplemental oxygen, goal 02 sat 88-92%  -pulm consulted Dr Franks, noted    :  ·  Problem: Alcohol withdrawal.   improved   -CIWA   ativan taper  -MVI, folic acid, thiamine, home ppi  .      ·  Problem: Anxiety.  Plan: anxious likely due to alc/benzo withdrawl  takes benzo at home ISTOP check ref #: [id number] but could not locate any benzo prescriptions, unclear if getting illicitly but  says from PCP   c/w CIWA and ativan taper.             Problem: Swelling of lower extremity.Plan: likely due to chronic venous insufficiency, poor nutrition, alb 3.5. Exam not consistent with b/l cellulitis, afebrile, no leukocytosis  f/u doppler  cards eval      ·  Problem: Breast cancer. Plan: c/w home exemestane 25mg daily  outpatient onc followup, of note has been noncompliant with recommended followups.      ·  Problem: RA (rheumatoid arthritis). Plan: on Otezla,       ·  Problem: Prophylactic measure.  Plan: dvt ppx: hsq  PT eval

## 2019-12-12 NOTE — PROVIDER CONTACT NOTE (OTHER) - ASSESSMENT
Pt speech clear and audible, skin dry and warm, oral mucosa and lips pink. VSS, per flowsheet. Pt slightly tachycardic, visibly upset.

## 2019-12-12 NOTE — CONSULT NOTE ADULT - ASSESSMENT
54 year old female with PMH of stage 3 breast cancer on aromasin, RA, Psoriasis on Otezla, brain aneurysm, s/p clip 1988, right jugular DVT, Catheter related MSSA bacteremia 2015,  ETOH abuse, COPD on oxygen presents here with dyspnea, ETOH withdrawal, hyponatremia, hyperkalemia.     1.  Chronic respiratory failure with hypoxia.  secondary to pulm disease, COPD/ emphysema   CTA chest negative for PE, + severe emphysema   cv stable. no decomp chf on exam; no evidence of acs   management  per pulm / med   recent echo with grossly normal LV function     2. Sinus tachycardia   likely secondary to withdrawal, dehydration, anxiety, copd exacerbation  no evidence of acs , asymptomatic  recent echo with grossly normal lv function     3. med f/u, CIWA  protocol     4. LE edema   continue to monitor     dvt ppx 54 year old female with PMH of stage 3 breast cancer on aromasin, RA, Psoriasis on Otezla, brain aneurysm, s/p clip 1988, right jugular DVT, Catheter related MSSA bacteremia 2015,  ETOH abuse, COPD on oxygen presents here with dyspnea, ETOH withdrawal, hyponatremia, hyperkalemia.     1.  Chronic respiratory failure with hypoxia.  secondary to pulm disease, COPD/ emphysema   CTA chest negative for PE, + severe emphysema   cv stable. no decomp chf on exam; no evidence of acs   management  per pulm / med   recent echo with grossly normal LV function     2. Sinus tachycardia   likely secondary to withdrawal, dehydration, anxiety, copd exacerbation  no evidence of acs , asymptomatic  recent echo with grossly normal lv function     3. med f/u, CIWA  protocol     4. LE edema   lasix 40 mg IVP x 1   will reassess tomorrow for diuretics need     dvt ppx

## 2019-12-12 NOTE — PHYSICAL THERAPY INITIAL EVALUATION ADULT - ADDITIONAL COMMENTS
Pt lives with her  in a private house w/ 3 steps to neg. Pt req assistance w/ ADL's PTA. Pt amb w/. RW in the house and used a w/c for community mobility. Pt has a RW, w/c and shower chair at home. Pt states that her  assisted w/ ADL's.

## 2019-12-12 NOTE — PROGRESS NOTE ADULT - SUBJECTIVE AND OBJECTIVE BOX
CHIEF COMPLAINT:Patient is a 54y old  Female who presents with a chief complaint of sob (12 Dec 2019 15:18)    	        PAST MEDICAL & SURGICAL HISTORY:  Prophylactic measure  Breast cancer  Brain aneurysm: with clips  Psoriasis  RA (rheumatoid arthritis)  Psoriasis  Breast cancer, stage 3  History of modified radical mastectomy of both breasts  S/P bilateral mastectomy  Brain aneurysm: 1988 two clips          REVIEW OF SYSTEMS:  feels better  RESPIRATORY: No cough, wheezing, chills or hemoptysisdec sob  CARDIOVASCULAR: No chest pain, palpitations, passing out, dizziness,   GASTROINTESTINAL: No abdominal or epigastric pain. No nausea, vomiting, or hematemesis; No diarrhea or constipation. No melena or hematochezia.  GENITOURINARY: No dysuria, frequency, hematuria, or incontinence  NEUROLOGICAL: No headaches,   swelling in legs     Medications:  MEDICATIONS  (STANDING):  albuterol/ipratropium for Nebulization 3 milliLiter(s) Nebulizer <User Schedule>  buDESOnide    Inhalation Suspension 0.5 milliGRAM(s) Inhalation every 12 hours  cholecalciferol 1000 Unit(s) Oral daily  exemestane 25 milliGRAM(s) Oral daily  folic acid 1 milliGRAM(s) Oral daily  heparin  Injectable 5000 Unit(s) SubCutaneous every 12 hours  LORazepam     Tablet 1.5 milliGRAM(s) Oral every 4 hours  LORazepam     Tablet   Oral   montelukast 10 milliGRAM(s) Oral at bedtime  multivitamin 1 Tablet(s) Oral daily  pantoprazole    Tablet 40 milliGRAM(s) Oral before breakfast  predniSONE   Tablet 50 milliGRAM(s) Oral daily    MEDICATIONS  (PRN):  LORazepam   Injectable 2 milliGRAM(s) IV Push every 2 hours PRN Symptom-triggered: each CIWA -Ar score 8 or GREATER    	    PHYSICAL EXAM:  T(C): 36.7 (12-12-19 @ 17:14), Max: 36.9 (12-11-19 @ 19:50)  HR: 101 (12-12-19 @ 17:14) (88 - 117)  BP: 128/76 (12-12-19 @ 17:14) (110/61 - 143/77)  RR: 18 (12-12-19 @ 17:14) (18 - 24)  SpO2: 96% (12-12-19 @ 17:14) (96% - 99%)  Wt(kg): --  I&O's Summary    11 Dec 2019 07:01  -  12 Dec 2019 07:00  --------------------------------------------------------  IN: 1460 mL / OUT: 0 mL / NET: 1460 mL        Appearance: Normal	  HEENT:   Normal oral mucosa, PERRL, EOMI	  Lymphatic: No lymphadenopathy  Cardiovascular: Normal S1 S2, No JVD,   Respiratory: Lungs clear to auscultation	  Psychiatry: A & O x 3,   Gastrointestinal:  Soft, Non-tender, + BS	  Skin: No rashes, No ecchymoses, No cyanosis	  Neurologic: Non-focal  Extremitiesdec rom  +edema   Vascular: Peripheral pulses palpable    TELEMETRY: 	    ECG:  	  RADIOLOGY:  OTHER: 	  	  LABS:	 	    CARDIAC MARKERS:                                11.3   4.66  )-----------( 183      ( 12 Dec 2019 08:22 )             34.2     12-12    132<L>  |  88<L>  |  7   ----------------------------<  159<H>  4.3   |  26  |  0.41<L>    Ca    8.7      12 Dec 2019 06:17  Phos  3.5     12-12  Mg     1.8     12-12    TPro  6.5  /  Alb  3.4  /  TBili  1.0  /  DBili  x   /  AST  40  /  ALT  22  /  AlkPhos  60  12-12    proBNP:   Lipid Profile:   HgA1c:   TSH:

## 2019-12-12 NOTE — PROGRESS NOTE ADULT - ASSESSMENT
ASSESSMENT:    chronic hypoxic/hypercapnic respiratory failure due to severe COPD/emphysema admitted with severe dyspnea - there is no evidence of a pulmonary embolus on the CTA of the chest - there is no evidence of significant bronchospasm - suspect that the patchy right lower lobe opacity represents atelectasis rather than pneumonia in the absence of fevers or leukocytosis - anxiety may be contributing to her dyspnea by provoking hyperventilation resulting in dynamic hyperinflation    chronic alcohol use with evidence of withdrawal symptoms    lower extremity swelling likely related to intermittent hypoxemia causing pulmonary artery hypoxic vasoconstriction - suspect erythema is due to venous stasis changes rather than cellulitis    breast cancer s/p bilateral mastectomy    rheumatoid and psoriatic arthritis on Otezla - immunocompromised host    brain aneurysm s/p clipping    PLAN/RECOMMENDATIONS:    oxygen supplementation to keep saturation greater than 92%  check ABG  lasix 40mg IVP x 1  thigh high SANCHEZ stockings  observe off antibiotics  prednisone 50mg daily watching for worsening anxiety  albuterol/atrovent nebs q4h holding 2am dose  pulmicort 0.5mg nebs q12h  singulair  treat alcohol withdrawal  thiamine/MVI/folate  Otezla/lidex solution to scalp/hydrocortisone cream to face  Arimidex  DVT prophylaxis - SQ heparin  GI prophylaxis - protonix  bowel regimen    Will follow with you. Plan of care discussed with the patient at bedside    Joss Franks MD, [facility]  [phone]  Pulmonary Medicine

## 2019-12-13 RX ORDER — LANOLIN ALCOHOL/MO/W.PET/CERES
3 CREAM (GRAM) TOPICAL ONCE
Refills: 0 | Status: COMPLETED | OUTPATIENT
Start: 2019-12-13 | End: 2019-12-13

## 2019-12-13 RX ADMIN — PANTOPRAZOLE SODIUM 40 MILLIGRAM(S): 20 TABLET, DELAYED RELEASE ORAL at 05:21

## 2019-12-13 RX ADMIN — Medication 3 MILLILITER(S): at 05:22

## 2019-12-13 RX ADMIN — HEPARIN SODIUM 5000 UNIT(S): 5000 INJECTION INTRAVENOUS; SUBCUTANEOUS at 05:21

## 2019-12-13 RX ADMIN — Medication 1.5 MILLIGRAM(S): at 13:04

## 2019-12-13 RX ADMIN — Medication 3 MILLILITER(S): at 09:27

## 2019-12-13 RX ADMIN — Medication 1.5 MILLIGRAM(S): at 05:20

## 2019-12-13 RX ADMIN — Medication 3 MILLILITER(S): at 17:37

## 2019-12-13 RX ADMIN — MONTELUKAST 10 MILLIGRAM(S): 4 TABLET, CHEWABLE ORAL at 21:06

## 2019-12-13 RX ADMIN — Medication 0.5 MILLIGRAM(S): at 05:22

## 2019-12-13 RX ADMIN — Medication 1 MILLIGRAM(S): at 21:06

## 2019-12-13 RX ADMIN — Medication 3 MILLILITER(S): at 14:42

## 2019-12-13 RX ADMIN — Medication 3 MILLIGRAM(S): at 01:07

## 2019-12-13 RX ADMIN — Medication 1 TABLET(S): at 11:20

## 2019-12-13 RX ADMIN — Medication 1 MILLIGRAM(S): at 11:20

## 2019-12-13 RX ADMIN — Medication 0.5 MILLIGRAM(S): at 17:37

## 2019-12-13 RX ADMIN — Medication 1000 UNIT(S): at 11:20

## 2019-12-13 RX ADMIN — Medication 1 MILLIGRAM(S): at 17:36

## 2019-12-13 RX ADMIN — EXEMESTANE 25 MILLIGRAM(S): 25 TABLET, SUGAR COATED ORAL at 11:21

## 2019-12-13 RX ADMIN — Medication 1.5 MILLIGRAM(S): at 01:08

## 2019-12-13 RX ADMIN — Medication 3 MILLILITER(S): at 21:06

## 2019-12-13 RX ADMIN — Medication 50 MILLIGRAM(S): at 09:24

## 2019-12-13 RX ADMIN — Medication 1.5 MILLIGRAM(S): at 09:27

## 2019-12-13 RX ADMIN — HEPARIN SODIUM 5000 UNIT(S): 5000 INJECTION INTRAVENOUS; SUBCUTANEOUS at 17:37

## 2019-12-13 NOTE — PROGRESS NOTE ADULT - SUBJECTIVE AND OBJECTIVE BOX
CHIEF COMPLAINT:Patient is a 54y old  Female who presents with a chief complaint of sob (13 Dec 2019 13:21)    	        PAST MEDICAL & SURGICAL HISTORY:  Prophylactic measure  Breast cancer  Brain aneurysm: with clips  Psoriasis  RA (rheumatoid arthritis)  Psoriasis  Breast cancer, stage 3  History of modified radical mastectomy of both breasts  S/P bilateral mastectomy  Brain aneurysm: 1988 two clips          REVIEW OF SYSTEMS:  feels better   EYES: No eye pain  NECK: No pain or stiffness  RESPIRATORY: dec sob   CARDIOVASCULAR: No chest pain, palpitations, passing out, dizziness,   GASTROINTESTINAL: No abdominal or epigastric pain. No nausea, vomiting, or hematemesis;   GENITOURINARY: No dysuria, frequency, hematuria, or incontinence  NEUROLOGICAL: No headaches,      Medications:  MEDICATIONS  (STANDING):  albuterol/ipratropium for Nebulization 3 milliLiter(s) Nebulizer <User Schedule>  buDESOnide    Inhalation Suspension 0.5 milliGRAM(s) Inhalation every 12 hours  cholecalciferol 1000 Unit(s) Oral daily  exemestane 25 milliGRAM(s) Oral daily  folic acid 1 milliGRAM(s) Oral daily  heparin  Injectable 5000 Unit(s) SubCutaneous every 12 hours  LORazepam     Tablet 1 milliGRAM(s) Oral every 4 hours  LORazepam     Tablet   Oral   montelukast 10 milliGRAM(s) Oral at bedtime  multivitamin 1 Tablet(s) Oral daily  pantoprazole    Tablet 40 milliGRAM(s) Oral before breakfast  predniSONE   Tablet 50 milliGRAM(s) Oral daily    MEDICATIONS  (PRN):  LORazepam   Injectable 2 milliGRAM(s) IV Push every 2 hours PRN Symptom-triggered: each CIWA -Ar score 8 or GREATER    	    PHYSICAL EXAM:  T(C): 36.7 (12-13-19 @ 11:42), Max: 37.2 (12-12-19 @ 23:10)  HR: 95 (12-13-19 @ 11:42) (88 - 110)  BP: 152/96 (12-13-19 @ 11:42) (120/75 - 152/96)  RR: 18 (12-13-19 @ 11:42) (18 - 20)  SpO2: 96% (12-13-19 @ 11:42) (93% - 98%)  Wt(kg): --  I&O's Summary    13 Dec 2019 07:01  -  13 Dec 2019 13:36  --------------------------------------------------------  IN: 0 mL / OUT: 0 mL / NET: 0 mL        Appearance: Normal	  HEENT:   Normal oral mucosa, PERRL, EOMI	  Lymphatic: No lymphadenopathy  Cardiovascular: Normal S1 S2, No JVD,  Respiratory: dec bs   Psychiatry: A & O   Gastrointestinal:  Soft, Non-tender, + BS	  Neurologic: Non-focal  Extremities:dec edema  Vascular: Peripheral pulses palpable     TELEMETRY: 	    ECG:  	  RADIOLOGY:  OTHER: 	  	  LABS:	 	    CARDIAC MARKERS:                                11.3   4.66  )-----------( 183      ( 12 Dec 2019 08:22 )             34.2     12-12    132<L>  |  88<L>  |  7   ----------------------------<  159<H>  4.3   |  26  |  0.41<L>    Ca    8.7      12 Dec 2019 06:17  Phos  3.5     12-12  Mg     1.8     12-12    TPro  6.5  /  Alb  3.4  /  TBili  1.0  /  DBili  x   /  AST  40  /  ALT  22  /  AlkPhos  60  12-12    proBNP:   Lipid Profile:   HgA1c:   TSH:

## 2019-12-13 NOTE — PROGRESS NOTE ADULT - SUBJECTIVE AND OBJECTIVE BOX
CARDIOLOGY FOLLOW UP - Dr. Nj    CC no cp. c/o sob, anxiety       PHYSICAL EXAM:  T(C): 36.7 (12-13-19 @ 11:42), Max: 37.2 (12-12-19 @ 23:10)  HR: 95 (12-13-19 @ 11:42) (88 - 110)  BP: 152/96 (12-13-19 @ 11:42) (120/75 - 152/96)  RR: 18 (12-13-19 @ 11:42) (18 - 20)  SpO2: 96% (12-13-19 @ 11:42) (93% - 98%)  Wt(kg): --  I&O's Summary    13 Dec 2019 07:01  -  13 Dec 2019 13:22  --------------------------------------------------------  IN: 0 mL / OUT: 0 mL / NET: 0 mL        Appearance: Normal	  Cardiovascular: Normal S1 S2,RRR, No JVD, No murmurs  Respiratory: Lungs clear to auscultation	  Gastrointestinal:  Soft, Non-tender, + BS	  Extremities: Normal range of motion, No clubbing, cyanosis or edema        MEDICATIONS  (STANDING):  albuterol/ipratropium for Nebulization 3 milliLiter(s) Nebulizer <User Schedule>  buDESOnide    Inhalation Suspension 0.5 milliGRAM(s) Inhalation every 12 hours  cholecalciferol 1000 Unit(s) Oral daily  exemestane 25 milliGRAM(s) Oral daily  folic acid 1 milliGRAM(s) Oral daily  heparin  Injectable 5000 Unit(s) SubCutaneous every 12 hours  LORazepam     Tablet 1 milliGRAM(s) Oral every 4 hours  LORazepam     Tablet   Oral   montelukast 10 milliGRAM(s) Oral at bedtime  multivitamin 1 Tablet(s) Oral daily  pantoprazole    Tablet 40 milliGRAM(s) Oral before breakfast  predniSONE   Tablet 50 milliGRAM(s) Oral daily      TELEMETRY: nsr -sinus tachycardia hr up to 110s	    ECG:  	  RADIOLOGY:   DIAGNOSTIC TESTING:  [ ] Echocardiogram:  [ ]  Catheterization:  [ ] Stress Test:    OTHER: 	    LABS:	 	    Troponin T, High Sensitivity Result: 17 ng/L [0 - 51] (12-11 @ 15:41)  Troponin T, High Sensitivity Result: 18 ng/L [0 - 51] (12-11 @ 12:43)                          11.3   4.66  )-----------( 183      ( 12 Dec 2019 08:22 )             34.2     12-12    132<L>  |  88<L>  |  7   ----------------------------<  159<H>  4.3   |  26  |  0.41<L>    Ca    8.7      12 Dec 2019 06:17  Phos  3.5     12-12  Mg     1.8     12-12    TPro  6.5  /  Alb  3.4  /  TBili  1.0  /  DBili  x   /  AST  40  /  ALT  22  /  AlkPhos  60  12-12

## 2019-12-13 NOTE — PROGRESS NOTE ADULT - ASSESSMENT
54 year old female with PMH of stage 3 breast cancer on aromasin, RA, Psoriasis on Otezla, brain aneurysm, s/p clip 1988, right jugular DVT, Catheter related MSSA bacteremia 2015,  ETOH abuse, COPD on oxygen presents here with dyspnea, ETOH withdrawal, hyponatremia, hyperkalemia.     1.  Chronic respiratory failure with hypoxia.  secondary to pulm disease, COPD/ emphysema   CTA chest negative for PE, + severe emphysema   cv stable. no decomp chf on exam; no evidence of acs   management  per pulm / med   recent echo with grossly normal LV function     2. Sinus tachycardia   likely secondary to withdrawal, dehydration, anxiety, copd exacerbation  no evidence of acs , asymptomatic  recent echo with grossly normal lv function     3. med f/u, CIWA  protocol     4. LE edema   lasix prn , stable   LLE doppler neg dvt    dvt ppx

## 2019-12-13 NOTE — PROGRESS NOTE ADULT - ASSESSMENT
ASSESSMENT:    chronic hypoxic/hypercapnic respiratory failure due to severe COPD/emphysema admitted with severe dyspnea - there is no evidence of a pulmonary embolus on the CTA of the chest - there is no evidence of significant bronchospasm - suspect that the patchy right lower lobe opacity represents atelectasis rather than pneumonia in the absence of fevers or leukocytosis - anxiety may be contributing to her dyspnea by provoking hyperventilation resulting in dynamic hyperinflation - the ABG shows an acute respiratory alkalosis likely related to hyperventilation related to pain or anxiety    chronic alcohol use with evidence of withdrawal symptoms    lower extremity swelling likely related to intermittent hypoxemia causing pulmonary artery hypoxic vasoconstriction - suspect erythema is due to venous stasis changes rather than cellulitis -> improved following diuresis after lasix    breast cancer s/p bilateral mastectomy    rheumatoid and psoriatic arthritis on Otezla - immunocompromised host    brain aneurysm s/p clipping    PLAN/RECOMMENDATIONS:    oxygen supplementation to keep saturation greater than 92%  thigh high SANCHEZ stockings  observe off antibiotics  prednisone taper as written  albuterol/atrovent nebs q4h holding 2am dose  pulmicort 0.5mg nebs q12h  singulair  treat alcohol withdrawal  thiamine/MVI/folate  Otezla/lidex solution to scalp/hydrocortisone cream to face  Arimidex  DVT prophylaxis - SQ heparin  GI prophylaxis - protonix  bowel regimen    Will follow with you. Plan of care discussed with the patient at bedside    Please call over the weekend with questions or clinical changes.   Joss Franks MD  Pulmonary Medicine  [phone]

## 2019-12-13 NOTE — PROGRESS NOTE ADULT - SUBJECTIVE AND OBJECTIVE BOX
[facility] - PROGRESS NOTE    CHIEF COMPLAINT: dyspnea; COPD exacerbation; obesity; alcohol dependence/withdrawal    INTERVAL HISTORY: short of breath without chest congestion or wheeze; no hypoxemia on a nasal canula; no cough or sputum production; intermittent bouts of severe anxiety which result in worsening dyspnea; no fevers, chills or sweats; no chest pain/pressure or palpitations; leg swelling and discomfort much improved after a brisk diuresis after a dose of lasix    REVIEW OF SYSTEMS:  Constitutional: As per interval history  HEENT: Within normal limits  CV: As per interval history  Resp: As per interval history  GI: Within normal limits   : Within normal limits  Musculoskeletal: Within normal limits  Skin: Within normal limits  Neurological: Within normal limits  Psychiatric: Within normal limits  Endocrine: Within normal limits  Hematologic/Lymphatic: Within normal limits  Allergic/Immunologic: Within normal limits    MEDICATIONS:     Pulmonary "  albuterol/ipratropium for Nebulization 3 milliLiter(s) Nebulizer <User Schedule>  buDESOnide    Inhalation Suspension 0.5 milliGRAM(s) Inhalation every 12 hours  montelukast 10 milliGRAM(s) Oral at bedtime    Anti-microbials:    Cardiovascular:    Other:  cholecalciferol 1000 Unit(s) Oral daily  exemestane 25 milliGRAM(s) Oral daily  folic acid 1 milliGRAM(s) Oral daily  heparin  Injectable 5000 Unit(s) SubCutaneous every 12 hours  LORazepam     Tablet 1.5 milliGRAM(s) Oral every 4 hours  LORazepam     Tablet 1 milliGRAM(s) Oral every 4 hours  LORazepam     Tablet   Oral   multivitamin 1 Tablet(s) Oral daily  pantoprazole    Tablet 40 milliGRAM(s) Oral before breakfast  predniSONE   Tablet 50 milliGRAM(s) Oral daily    MEDICATIONS  (PRN):  LORazepam   Injectable 2 milliGRAM(s) IV Push every 2 hours PRN Symptom-triggered: each CIWA -Ar score 8 or GREATER        OBJECTIVE:    I&O's Detail    13 Dec 2019 07:01  -  13 Dec 2019 12:05  --------------------------------------------------------  IN:  Total IN: 0 mL    OUT:  Total OUT: 0 mL    Total NET: 0 mL    PHYSICAL EXAM:       ICU Vital Signs Last 24 Hrs  T(C): 36.7 (13 Dec 2019 11:42), Max: 37.2 (12 Dec 2019 23:10)  T(F): 98.1 (13 Dec 2019 11:42), Max: 99 (12 Dec 2019 23:10)  HR: 95 (13 Dec 2019 11:42) (88 - 110)  BP: 152/96 (13 Dec 2019 11:42) (120/75 - 152/96)  BP(mean): --  ABP: --  ABP(mean): --  RR: 18 (13 Dec 2019 11:42) (18 - 20)  SpO2: 96% (13 Dec 2019 11:42) (93% - 98%) on 4lpm nasal canula     General: Awake. Alert. Cooperative. No distress. Appears stated age.	  HEENT: Atraumatic. Normocephalic. Anicteric. Normal oral mucosa. PERRL. EOMI. Decreased psoriatic plaque on scalp.  Neck: Supple. Trachea midline. Thyroid without enlargement/tenderness/nodules. No carotid bruit. No JVD.  Cardiovascular: Regular rate and rhythm. S1 S2 normal. No murmurs, rubs or gallops.  Respiratory: Respirations unlabored. Decreased breath sounds throughout. No wheeze. No rales. No curvature.  Abdomen: Soft. Non-tender. Non-distended. No organomegaly. No masses. Normal bowel sounds. Obese.  Extremities: Warm to touch. No clubbing or cyanosis. Mild bilateral lower extremity edema up to the knees.  Pulses: 2+ peripheral pulses all extremities.	  Skin: Venous stasis changes of both lower extremities. Multiple areas of skin hyperpigmentation at areas of prior plaque.  Lymph Nodes: Cervical, supraclavicular and axillary nodes normal  Neurological: Motor and sensory examination equal and normal. A and O x 3  Psychiatry: Appropriate mood and affect.    LABS:                          11.3   4.66  )-----------( 183      ( 12 Dec 2019 08:22 )             34.2     CBC    WBC  4.66 <==, 8.87 <==    Hemoglobin  11.3 <<==, 11.4 <<==    Hematocrit  34.2 <==, 35.3 <==    Platelets  183 <==, 177 <==      132<L>  |  88<L>  |  7   ----------------------------<  159<H>    12-12  4.3   |  26  |  0.41<L>      LYTES    sodium  132 <==, 137 <==, 136 <==    potassium   4.3 <==, 3.9 <==, 5.9 <==    chloride  88 <==, 89 <==, 90 <==    carbon dioxide  26 <==, 26 <==, 28 <==    =============================================================================================  RENAL FUNCTION:    Creatinine:   0.41  <<==, 0.41  <<==, 0.41  <<==    BUN:   7 <==, 7 <==, 6 <==    ============================================================================================    calcium   8.7 <==, 8.9 <==, 8.7 <==    phos   3.5 <==, 3.4 <==    mag   1.8 <==, 2.5 <==, 1.6 <==    ============================================================================================  LFTs    AST:   40 <== , 44 <== , 74 <==     ALT:  22  <== , 22  <== , 29  <==     AP:  60  <=, 61  <=, 61  <=    Bili:  1.0  <=, 0.6  <=, 0.5  <=      PT/INR - ( 11 Dec 2019 12:43 )   PT: 10.7 sec;   INR: 0.94 ratio       PTT - ( 11 Dec 2019 12:43 )  PTT:28.7 sec    Venous Blood Gas:  12-11 @ 12:43  7.34/69/38/36/55  VBG Lactate: 3.3    ABG - ( 12 Dec 2019 23:55 )  pH: 7.53  /  pCO2: 39    /  pO2: 82    / HCO3: 33    / Base Excess: 9.3   /  SaO2: 98        Serum Pro-Brain Natriuretic Peptide: 29 pg/mL (12-11 @ 12:43)    CARDIAC MARKERS ( 11 Dec 2019 15:41 )  CPK x     /CKMB x     /CKMB Units x        troponin 17 ng/L    CARDIAC MARKERS ( 11 Dec 2019 12:43 )  CPK x     /CKMB x     /CKMB Units x        troponin 18 ng/L    Patient name: FREDY CHOI  YOB: 1965   Age: 54 (F)   MR#: [id number]  Study Date: 10/7/2019  [facility]  Study quality: Technically difficult  Referring Physician: Canelo Bonds MD  BloodPressure: 122/80 mmHg  Height: 165 cm  Weight: 68 kg  BSA: 1.8 m2  ------------------------------------------------------------------------  PROCEDURE: Transthoracic echocardiogram with 2-D, M-Mode  and complete spectral and color flow Doppler. Verbal  consent was obtained for injection of  Ultrasonic Enhancing  Agent following a discussion of risks and benefits.  Following intravenous injection of Ultrasonic Enhancing  Agent , harmonic imaging was performed.  INDICATION: Shortness of breath (R06.02)  ------------------------------------------------------------------------  Observations:  Left Ventricle: Endocardium not well visualized; grossly  normal left ventricular systolic function. Endocardial  visualization enhanced with intravenous injection of  Ultrasonic Enhancing Agent (Definity). Normal left  ventricular internal dimensions and wall thicknesses.  Normal diastolic function  ------------------------------------------------------------------------  Conclusions:  1. Normal left ventricular internal dimensions and wall  thicknesses.  2. Endocardium not well visualized; grossly normal left  ventricular systolic function. Endocardial visualization  enhanced with intravenous injection of Ultrasonic Enhancing  Agent (Definity). Unable to perform strain imaging due to  limited clinical windows.  ------------------------------------------------------------------------  Confirmed on  10/7/2019 - 18:11:26 by Margaret Alvarez M.D.  ------------------------------------------------------------------------  ---------------------------------------------------------------------------------------------------------------    MICROBIOLOGY:     Culture - Blood (12.11.19 @ 16:44)    Specimen Source: .Blood Blood-Peripheral    Culture Results:   No growth to date.    Culture - Blood (12.11.19 @ 16:44)    Specimen Source: .Blood Blood-Peripheral    Culture Results:   No growth to date.    RADIOLOGY:  [x] Chest radiographs reviewed and interpreted by me    EXAM:  XR CHEST AP OR PA 1V                          PROCEDURE DATE:  12/11/2019      INTERPRETATION:  A single chest x-ray was obtained on December 11, 2019.    Indication: Shortness of breath.    Impression:    The heart is normal in size. The lungs are clear. No acute pathology seen   in the visualized bones.    PARESH GALEAS M.D., ATTENDING RADIOLOGIST  This document has been electronically signed. Dec 11 2019  2:57PM  ---------------------------------------------------------------------------------------------------------------    EXAM:  CT ANGIO CHEST (W)AW IC                          PROCEDURE DATE:  12/11/2019      INTERPRETATION:  CLINICAL INFORMATION: Shortness of breath. History of   cancer. Recent pulmonary embolism.    COMPARISON: CT chest 11/14/2019.    PROCEDURE:   CT Angiography of the Chest.  90 ml of Omnipaque 350 was injected intravenously. 10 ml were discarded.  Sagittal and coronal reformats were performed as well as 3D (MIP)   reconstructions.    FINDINGS:    LUNGS AND AIRWAYS: Secretions within the bronchus intermedius. No   endobronchial lesions.  Patchy right lower lobe opacity likely reflecting   atelectasis. Severe centrilobular emphysema.    PLEURA: No pleural effusion.    MEDIASTINUM AND CHESTER: No lymphadenopathy.    VESSELS: No main, right, or left pulmonary embolism. Evaluation of some   of the lobar, segmental, and subsegmental pulmonary arteries is limited   secondary to bolus timing and respiratory motion artifact    HEART: Heart size is normal. No pericardial effusion.    CHEST WALL AND LOWER NECK: Within normal limits.    VISUALIZED UPPER ABDOMEN: Within normal limits.    BONES: Mild degenerative changes.    IMPRESSION:     No right or left main pulmonary embolism. Evaluation of the distal   pulmonary arteries is limited secondary to bolus timing and respiratory   motion artifact.    ZULEYKA NUÑEZ M.D., RADIOLOGY RESIDENT  This document has been electronically signed.  LAURIE NGO M.D., ATTENDING RADIOLOGIST  This document has been electronically signed. Dec 11 2019  4:33PM  ---------------------------------------------------------------------------------------------------------------

## 2019-12-13 NOTE — CHART NOTE - NSCHARTNOTEFT_GEN_A_CORE
MEDICINE PA     EVENT SUMMARY   Notified by RN for SOB. Pt seen at the bedside; alert; talking in full sentences. Pt states that she feels anxious and SOB. Pt denies CP, dyspnea, numbness, tingling, weakness. Pt admitted for SOB/dyspnea; COPD exacerbation; flu negative CTA negative for PE; on duonebs and PO prednisone. Pt with multiple admission in the past for similar complaints.     MEDICATIONS  (STANDING):  albuterol/ipratropium for Nebulization 3 milliLiter(s) Nebulizer <User Schedule>  buDESOnide    Inhalation Suspension 0.5 milliGRAM(s) Inhalation every 12 hours  cholecalciferol 1000 Unit(s) Oral daily  exemestane 25 milliGRAM(s) Oral daily  folic acid 1 milliGRAM(s) Oral daily  heparin  Injectable 5000 Unit(s) SubCutaneous every 12 hours  LORazepam     Tablet 1.5 milliGRAM(s) Oral every 4 hours  LORazepam     Tablet 1 milliGRAM(s) Oral every 4 hours  LORazepam     Tablet   Oral   montelukast 10 milliGRAM(s) Oral at bedtime  multivitamin 1 Tablet(s) Oral daily  pantoprazole    Tablet 40 milliGRAM(s) Oral before breakfast  predniSONE   Tablet 50 milliGRAM(s) Oral daily    MEDICATIONS  (PRN):  LORazepam   Injectable 2 milliGRAM(s) IV Push every 2 hours PRN Symptom-triggered: each CIWA -Ar score 8 or GREATER    VSS    Physical Exam  General: NAD; anxious   CV: S1, S2 tachycardic  Respiratory: diminished breath sounds throughout; unlabored no wheezes or rales heard  Abdomen: soft NT ND   Extremities: LE edema; + pedal pulses   Neuro: AO x 3     < from: CT Angio Chest w/ IV Cont (12.11.19 @ 15:55) >    FINDINGS:    LUNGS AND AIRWAYS: Secretions within the bronchus intermedius. No   endobronchial lesions.  Patchy right lower lobe opacity likely reflecting   atelectasis. Severe centrilobular emphysema.    PLEURA: No pleural effusion.    MEDIASTINUM AND CHESTER: No lymphadenopathy.    VESSELS: No main,right, or left pulmonary embolism. Evaluation of some   of the lobar, segmental, and subsegmental pulmonary arteries is limited   secondary to bolus timing and respiratory motion artifact    HEART: Heart size is normal. No pericardial effusion.    CHEST WALL AND LOWER NECK: Within normal limits.    VISUALIZED UPPER ABDOMEN: Within normal limits.    BONES: Mild degenerative changes.    IMPRESSION:     No right or left main pulmonary embolism. Evaluation of the distal   pulmonary arteries is limited secondary to bolus timing and respiratory   motion artifact.      < end of copied text >    < from: Xray Chest 1 View AP/PA (12.11.19 @ 14:53) >    Impression:    The heart is normal in size. The lungs are clear. No acute pathology seen   in the visualized bones.    < end of copied text >    FLU A B RSV Detection by PCR (12.11.19 @ 12:43)    Flu A Result: NotDete: The Flu A B RSV assay is a Real-Time PCR test for the qualitative  detection and differentiation of Influenza A, Influenza B, and  Respiratory Syncytial Virus on nasopharyngeal swabs. The results should  be interpreted in the context of all clinical and laboratory findings.    Flu B Result: NotDete    RSV Result: NotDetec    A&P  54 year old female with PMH of stage 3 breast cancer on aromasin, RA, Psoriasis on Otezla, brain aneurysm, s/p clip 1988, right jugular DVT, Catheter related MSSA bacteremia 2015,  ETOH abuse, COPD on oxygen presents here with dyspnea, ETOH withdrawal, hyponatremia, hyperkalemia.    SOB likely 2/2 anxiety  - pt received 1.5 PO ativan as part of her ativan taper and was given 1 mg IVP ativan   - supplemental O2   - STAT ABG  - STAT CXR  - Duonebs x 1   - Lasix 40 mg IVP x 1  - ? BiPAP for comfort; pt with no use of accessory muscles/ abdominal breathing   Will continue to monitor     Whitney SAUCEDO  #[id number]    ADDENDUM @ 1 AM  Blood Gas Profile - Arterial (12.12.19 @ 23:55)    pH, Arterial: 7.53    pCO2, Arterial: 39 mmHg    pO2, Arterial: 82 mmHg    HCO3, Arterial: 33 mmol/L    Base Excess, Arterial: 9.3 mmol/L    Oxygen Saturation, Arterial: 98 %    Total CO2, Arterial: 34 mmoL/L    FIO2, Arterial: 32    Blood Gas Source Arterial: Arterial    Pt with mild improvement; will place on BiPAP. Will continue to monitor.    Whitney SAUCEDO  #[id number]

## 2019-12-13 NOTE — PROGRESS NOTE ADULT - ASSESSMENT
54 yr old female with Hx of COPD 3L on home O2, ETOH abuse, breast Ca s/p mastectomy, remote brain aneurysm s/p clip,  rheumatoid and psoriatic arthritis on Otezla p/w sob also with signs of alcohol withdrawal        ·  Problem: Chronic respiratory failure with hypoxia.  P COPD on 3L NC at home likely exacerbation  anxiety,   -flu negative, CTA today negative for acute findings, no PE, PNA, effusion   -c/w supplemental oxygen  -pulm  f.u noted    :  ·  Problem: Alcohol withdrawal.   improved   -CIWA   ativan taper  -MVI, folic acid, thiamine,    ppi  .      ·  Problem: Anxiety.  Plan: anxious likely due to alc/benzo withdrawl  takes benzo at home ISTOP check ref #: [id number] but could not locate any benzo prescriptions, unclear if getting illicitly but  says from PCP   c/w CIWA and ativan taper.             Problem: Swelling of lower extremity.  : likely due to chronic venous insufficiency, poor nutrition,  f/u doppler  cards eval noted      ·  Problem: Breast cancer. Plan: c/w home exemestane 25mg daily  outpatient onc followup, of note has been noncompliant with recommended followups.      ·  Problem: RA (rheumatoid arthritis). Plan: on Otezla,       ·  Problem: Prophylactic measure.  Plan: dvt ppx: hsq  PT

## 2019-12-14 LAB
ANION GAP SERPL CALC-SCNC: 15 MMOL/L — SIGNIFICANT CHANGE UP (ref 5–17)
BUN SERPL-MCNC: 6 MG/DL — LOW (ref 7–23)
CALCIUM SERPL-MCNC: 8.9 MG/DL — SIGNIFICANT CHANGE UP (ref 8.4–10.5)
CHLORIDE SERPL-SCNC: 85 MMOL/L — LOW (ref 96–108)
CO2 SERPL-SCNC: 29 MMOL/L — SIGNIFICANT CHANGE UP (ref 22–31)
CREAT SERPL-MCNC: 0.53 MG/DL — SIGNIFICANT CHANGE UP (ref 0.5–1.3)
GLUCOSE SERPL-MCNC: 89 MG/DL — SIGNIFICANT CHANGE UP (ref 70–99)
POTASSIUM SERPL-MCNC: 3.9 MMOL/L — SIGNIFICANT CHANGE UP (ref 3.5–5.3)
POTASSIUM SERPL-SCNC: 3.9 MMOL/L — SIGNIFICANT CHANGE UP (ref 3.5–5.3)
SODIUM SERPL-SCNC: 129 MMOL/L — LOW (ref 135–145)

## 2019-12-14 PROCEDURE — 93010 ELECTROCARDIOGRAM REPORT: CPT

## 2019-12-14 RX ORDER — LANOLIN ALCOHOL/MO/W.PET/CERES
5 CREAM (GRAM) TOPICAL ONCE
Refills: 0 | Status: COMPLETED | OUTPATIENT
Start: 2019-12-14 | End: 2019-12-14

## 2019-12-14 RX ADMIN — MONTELUKAST 10 MILLIGRAM(S): 4 TABLET, CHEWABLE ORAL at 21:05

## 2019-12-14 RX ADMIN — Medication 0.5 MILLIGRAM(S): at 08:28

## 2019-12-14 RX ADMIN — Medication 3 MILLILITER(S): at 18:08

## 2019-12-14 RX ADMIN — Medication 1 MILLIGRAM(S): at 18:08

## 2019-12-14 RX ADMIN — Medication 0.5 MILLIGRAM(S): at 21:05

## 2019-12-14 RX ADMIN — Medication 1 MILLIGRAM(S): at 12:46

## 2019-12-14 RX ADMIN — Medication 3 MILLILITER(S): at 14:38

## 2019-12-14 RX ADMIN — Medication 1 MILLIGRAM(S): at 08:26

## 2019-12-14 RX ADMIN — Medication 50 MILLIGRAM(S): at 12:48

## 2019-12-14 RX ADMIN — Medication 1 MILLIGRAM(S): at 12:48

## 2019-12-14 RX ADMIN — EXEMESTANE 25 MILLIGRAM(S): 25 TABLET, SUGAR COATED ORAL at 12:47

## 2019-12-14 RX ADMIN — Medication 1000 UNIT(S): at 12:48

## 2019-12-14 RX ADMIN — Medication 3 MILLILITER(S): at 21:05

## 2019-12-14 RX ADMIN — Medication 5 MILLIGRAM(S): at 01:10

## 2019-12-14 RX ADMIN — Medication 3 MILLILITER(S): at 08:28

## 2019-12-14 RX ADMIN — Medication 0.5 MILLIGRAM(S): at 18:09

## 2019-12-14 RX ADMIN — HEPARIN SODIUM 5000 UNIT(S): 5000 INJECTION INTRAVENOUS; SUBCUTANEOUS at 18:09

## 2019-12-14 RX ADMIN — Medication 1 MILLIGRAM(S): at 00:44

## 2019-12-14 NOTE — PROGRESS NOTE ADULT - SUBJECTIVE AND OBJECTIVE BOX
Follow-up Pulm Progress Note    The patient was seen and examined. Notes reviewed and discussed with staff/team as applicable      No new respiratory events overnight. Pt remains disoriented. No distress.     Denies: SOB, Chest pain, increased cough, colored phlegm, hemoptysis, N/V/D, neck stiffness,       Vital Signs Last 24 Hrs  T(C): 36.9 (14 Dec 2019 04:24), Max: 37.3 (13 Dec 2019 20:19)  T(F): 98.4 (14 Dec 2019 04:24), Max: 99.1 (13 Dec 2019 20:19)  HR: 89 (14 Dec 2019 06:57) (89 - 118)  BP: 119/80 (14 Dec 2019 04:24) (119/80 - 153/80)  BP(mean): --  RR: 14 (14 Dec 2019 06:57) (14 - 18)  SpO2: 100% (14 Dec 2019 06:57) (88% - 100%)    ABG - ( 12 Dec 2019 23:55 )  pH, Arterial: 7.53  pH, Blood: x     /  pCO2: 39    /  pO2: 82    / HCO3: 33    / Base Excess: 9.3   /  SaO2: 98                    12-13 @ 07:01  -  12-14 @ 07:00  --------------------------------------------------------  IN: 2400 mL / OUT: 1150 mL / NET: 1250 mL          Medications:  MEDICATIONS  (STANDING):  albuterol/ipratropium for Nebulization 3 milliLiter(s) Nebulizer <User Schedule>  buDESOnide    Inhalation Suspension 0.5 milliGRAM(s) Inhalation every 12 hours  cholecalciferol 1000 Unit(s) Oral daily  exemestane 25 milliGRAM(s) Oral daily  folic acid 1 milliGRAM(s) Oral daily  heparin  Injectable 5000 Unit(s) SubCutaneous every 12 hours  LORazepam     Tablet 1 milliGRAM(s) Oral every 4 hours  LORazepam     Tablet 0.5 milliGRAM(s) Oral every 4 hours  LORazepam     Tablet   Oral   montelukast 10 milliGRAM(s) Oral at bedtime  multivitamin 1 Tablet(s) Oral daily  pantoprazole    Tablet 40 milliGRAM(s) Oral before breakfast  predniSONE   Tablet 50 milliGRAM(s) Oral daily    MEDICATIONS  (PRN):  LORazepam   Injectable 2 milliGRAM(s) IV Push every 2 hours PRN Symptom-triggered: each CIWA -Ar score 8 or GREATER      Allergies    amoxicillin (Anaphylaxis)  Levaquin (Other; Anaphylaxis)  Levaquin (Unknown)  penicillin (Anaphylaxis)  penicillins (Anaphylaxis)    Intolerances        Vent settings (if applicable)        Physical Examination:    Pleasant but chronically ill female, NAD, on O2  Neck: no JVD, LAD, accessory muscle use  PULM: Clear to auscultation bilaterally, no wheezes, rales, rhonchi. s/p bilateral mastectomy  CVS: Regular rate and rhythm, S1S2, no murmurs, rubs, or gallops  Abdomen: firm, non tender  Extremities: no edema        LABS:                        11.3   4.66  )-----------( 183      ( 12 Dec 2019 08:22 )             34.2         Serum Pro-Brain Natriuretic Peptide: 29 pg/mL (12-11-19 @ 12:43)      CULTURES:  Culture Results:   No growth to date. (12-11 @ 16:44)  Culture Results:   No growth to date. (12-11 @ 16:44)    Most recent blood culture -- 12-11 @ 16:44   -- -- .Blood Blood-Peripheral 12-11 @ 16:44

## 2019-12-14 NOTE — PROGRESS NOTE ADULT - SUBJECTIVE AND OBJECTIVE BOX
CHIEF COMPLAINT:Patient is a 54y old  Female who presents with a chief complaint of sob (14 Dec 2019 12:23)    	        PAST MEDICAL & SURGICAL HISTORY:  Prophylactic measure  Breast cancer  Brain aneurysm: with clips  Psoriasis  RA (rheumatoid arthritis)  Psoriasis  Breast cancer, stage 3  History of modified radical mastectomy of both breasts  S/P bilateral mastectomy  Brain aneurysm: 1988 two clips          REVIEW OF SYSTEMS:  feels better  RESPIRATORY: dec sob  CARDIOVASCULAR: No chest pain, palpitations, passing out, dizziness,   GASTROINTESTINAL: No abdominal or epigastric pain. No nausea, vomiting, or hematemesis; No diarrhea or constipation. No melena or hematochezia.  GENITOURINARY: No dysuria,   NEUROLOGICAL: No headaches,  MUSCULOSKELETAL: No joint pain or swelling; No muscle, back, or extremity pain    Medications:  MEDICATIONS  (STANDING):  albuterol/ipratropium for Nebulization 3 milliLiter(s) Nebulizer <User Schedule>  buDESOnide    Inhalation Suspension 0.5 milliGRAM(s) Inhalation every 12 hours  cholecalciferol 1000 Unit(s) Oral daily  exemestane 25 milliGRAM(s) Oral daily  folic acid 1 milliGRAM(s) Oral daily  heparin  Injectable 5000 Unit(s) SubCutaneous every 12 hours  LORazepam     Tablet 1 milliGRAM(s) Oral every 4 hours  LORazepam     Tablet 0.5 milliGRAM(s) Oral every 4 hours  LORazepam     Tablet   Oral   montelukast 10 milliGRAM(s) Oral at bedtime  multivitamin 1 Tablet(s) Oral daily  pantoprazole    Tablet 40 milliGRAM(s) Oral before breakfast  predniSONE   Tablet 50 milliGRAM(s) Oral daily    MEDICATIONS  (PRN):  LORazepam   Injectable 2 milliGRAM(s) IV Push every 2 hours PRN Symptom-triggered: each CIWA -Ar score 8 or GREATER    	    PHYSICAL EXAM:  T(C): 37.3 (12-14-19 @ 12:03), Max: 37.3 (12-13-19 @ 20:19)  HR: 92 (12-14-19 @ 12:03) (89 - 118)  BP: 118/77 (12-14-19 @ 12:03) (118/77 - 156/73)  RR: 18 (12-14-19 @ 12:03) (14 - 18)  SpO2: 99% (12-14-19 @ 12:03) (88% - 100%)  Wt(kg): --  I&O's Summary    13 Dec 2019 07:01  -  14 Dec 2019 07:00  --------------------------------------------------------  IN: 2400 mL / OUT: 1150 mL / NET: 1250 mL    14 Dec 2019 07:01  -  14 Dec 2019 13:21  --------------------------------------------------------  IN: 0 mL / OUT: 0 mL / NET: 0 mL        Appearance: Normal	  HEENT:   Normal oral mucosa, PERRL, EOMI	  Lymphatic: No lymphadenopathy  Cardiovascular: Normal S1 S2, No JVD,  Respiratory: dec bs 	  Psychiatry: A & O x 3,   Gastrointestinal:  Soft, Non-tender, + BS	  Skin:exzema  Neurologic: Non-focal  Extremities:dec rom dec edema  Vascular: Peripheral pulses palpable    TELEMETRY: 	    ECG:  	  RADIOLOGY:  OTHER: 	  	  LABS:	 	    CARDIAC MARKERS:            12-14    129<L>  |  85<L>  |  6<L>  ----------------------------<  89  3.9   |  29  |  0.53    Ca    8.9      14 Dec 2019 11:06      proBNP:   Lipid Profile:   HgA1c:   TSH:

## 2019-12-14 NOTE — PROGRESS NOTE ADULT - ASSESSMENT
54 year old female with PMH of stage 3 breast cancer on aromasin, RA, Psoriasis on Otezla, brain aneurysm, s/p clip 1988, right jugular DVT, Catheter related MSSA bacteremia 2015,  ETOH abuse, COPD on oxygen presents here with dyspnea, ETOH withdrawal, hyponatremia, hyperkalemia.     1.  Chronic respiratory failure with hypoxia.  -secondary to pulm disease, COPD/ emphysema   -CTA chest negative for PE, + severe emphysema   -cv stable. no decomp chf on exam; no evidence of acs   -management per pulm / med   -recent echo with grossly normal LV function     2. Sinus tachycardia   -resolved and likely secondary to withdrawal, dehydration, anxiety, copd exacerbation  -no evidence of acs , asymptomatic  -recent echo with grossly normal lv function     3. med f/u, CIWA  protocol     4. LE edema   -lasix prn , stable   -LLE doppler neg dvt    dvt ppx

## 2019-12-14 NOTE — CONSULT NOTE ADULT - SUBJECTIVE AND OBJECTIVE BOX
[facility]  [phone]  NEPHROLOGY      INITIAL CONSULT NOTE  --------------------------------------------------------------------------------  HPI:      54 yr old female with Hx significant for COPD 3L on home O2, ETOH abuse, breast Ca s/p mastectomy, remote brain aneurysm s/p clip,  rheumatoid and psoriatic arthritis on Otezla, anxiety, benzo abues p/w 2 days of worsening sob despite using nebs at home. drinking about 3 beers for the past 20 years and takes xanax 0.25mg TID at home, now noted with hyponatremia renal consult called.   PAST HISTORY  --------------------------------------------------------------------------------  PAST MEDICAL & SURGICAL HISTORY:  Prophylactic measure  Breast cancer  Brain aneurysm: with clips  Psoriasis  RA (rheumatoid arthritis)  Psoriasis  Breast cancer, stage 3  History of modified radical mastectomy of both breasts  S/P bilateral mastectomy  Brain aneurysm: 1988 two clips    FAMILY HISTORY:  FH: CHF (congestive heart failure): Mother    PAST SOCIAL HISTORY: + alcohol use     ALLERGIES & MEDICATIONS  --------------------------------------------------------------------------------  Allergies    amoxicillin (Anaphylaxis)  Levaquin (Other; Anaphylaxis)  Levaquin (Unknown)  penicillin (Anaphylaxis)  penicillins (Anaphylaxis)    Intolerances      Standing Inpatient Medications  albuterol/ipratropium for Nebulization 3 milliLiter(s) Nebulizer <User Schedule>  buDESOnide    Inhalation Suspension 0.5 milliGRAM(s) Inhalation every 12 hours  cholecalciferol 1000 Unit(s) Oral daily  exemestane 25 milliGRAM(s) Oral daily  folic acid 1 milliGRAM(s) Oral daily  heparin  Injectable 5000 Unit(s) SubCutaneous every 12 hours  LORazepam     Tablet 0.5 milliGRAM(s) Oral every 4 hours  LORazepam     Tablet   Oral   montelukast 10 milliGRAM(s) Oral at bedtime  multivitamin 1 Tablet(s) Oral daily  pantoprazole    Tablet 40 milliGRAM(s) Oral before breakfast  predniSONE   Tablet 50 milliGRAM(s) Oral daily    PRN Inpatient Medications  LORazepam   Injectable 2 milliGRAM(s) IV Push every 2 hours PRN      REVIEW OF SYSTEMS  --------------------------------------------------------------------------------  Gen: No  fevers/chills   Head/Eyes/Ears/Mouth: No headache; Normal hearing;  No sinus pain/discomfort, sore throat  Respiratory: +  dyspnea, cough, - wheezing,  CV: No chest pain, or palp   GI: No abdominal pain, diarrhea, nausea, vomiting,  : No dysuria, decrease urination or hesitancy urinating  hematuria, nocturia  MSK: No joint pain/swelling; no back pain  Neuro: No dizziness/lightheadedness,  also with no edema     All other systems were reviewed and are negative, except as noted.    VITALS/PHYSICAL EXAM  --------------------------------------------------------------------------------  T(C): 36.9 (12-14-19 @ 16:08), Max: 37.3 (12-14-19 @ 12:03)  HR: 102 (12-14-19 @ 16:08) (89 - 114)  BP: 117/76 (12-14-19 @ 16:08) (117/76 - 156/73)  RR: 18 (12-14-19 @ 16:08) (14 - 18)  SpO2: 97% (12-14-19 @ 16:08) (88% - 100%)  Wt(kg): --        12-13-19 @ 07:01  -  12-14-19 @ 07:00  --------------------------------------------------------  IN: 2400 mL / OUT: 1150 mL / NET: 1250 mL    12-14-19 @ 07:01  -  12-14-19 @ 20:30  --------------------------------------------------------  IN: 0 mL / OUT: 950 mL / NET: -950 mL      Physical Exam:  	Gen: mildly tachypneic morbidly obese  	no jvd  	Pulm: decrease bs  no rales or ronchi or wheezing  	CV: RRR, S1S2; no rub  	Back: No CVA tenderness;  	Abd: +BS, soft, nontender/nondistended  	: No suprapubic tenderness  	UE: Warm, no cyanosis  no clubbing,  no edema  	LE: Warm, no cyanosis  no clubbing, no edema  	Neuro: alert and oriented. speech coherent   	Skin: Warm, no decrease skin turgor scaly       LABS/STUDIES  --------------------------------------------------------------------------------    129  |  85  |  6   ----------------------------<  89      [12-14-19 @ 11:06]  3.9   |  29  |  0.53        Ca     8.9     [12-14-19 @ 11:06]            Creatinine Trend:  SCr 0.53 [12-14 @ 11:06]  SCr 0.41 [12-12 @ 06:17]  SCr 0.41 [12-11 @ 15:41]  SCr 0.41 [12-11 @ 12:43]  SCr 0.54 [11-18 @ 08:20]    Urinalysis - [10-22-19 @ 11:01]      Color Light Yellow / Appearance Clear / SG 1.006 / pH 7.0      Gluc Negative / Ketone Trace  / Bili Negative / Urobili Negative       Blood Negative / Protein Negative / Leuk Est Negative / Nitrite Negative      RBC  / WBC  / Hyaline  / Gran  / Sq Epi  / Non Sq Epi  / Bacteria       Iron 42, TIBC 196, %sat 21      [11-16-19 @ 10:30]  Ferritin 792      [11-16-19 @ 10:31]  HbA1c 5.4      [08-19-16 @ 07:12]    HBsAb Nonreact      [03-25-19 @ 08:55]  HBsAg Nonreact      [03-25-19 @ 08:55]  HBcAb Nonreact      [03-25-19 @ 08:55]  HCV 0.13, Nonreact      [03-18-19 @ 06:26]  HIV Nonreact      [08-23-16 @ 07:49]    KRUNAL: titer 1:320, pattern Speckled      [07-12-15 @ 20:53]  dsDNA <12      [07-12-15 @ 20:53]  Syphilis Screen (Treponema Pallidum Ab) Negative      [08-23-16 @ 07:49]  SPEP Interpretation: Reference Range: None Detected      [09-13-16 @ 09:43]

## 2019-12-14 NOTE — PROGRESS NOTE ADULT - ASSESSMENT
ASSESSMENT:    chronic hypoxic/hypercapnic respiratory failure due to severe COPD/emphysema admitted with severe dyspnea - there is no evidence of a pulmonary embolus on the CTA of the chest - there is no evidence of significant bronchospasm - suspect that the patchy right lower lobe opacity represents atelectasis rather than pneumonia in the absence of fevers or leukocytosis - anxiety may be contributing to her dyspnea by provoking hyperventilation resulting in dynamic hyperinflation - the ABG shows an acute respiratory alkalosis likely related to hyperventilation related to pain or anxiety    chronic alcohol use with evidence of withdrawal symptoms    lower extremity swelling likely related to intermittent hypoxemia causing pulmonary artery hypoxic vasoconstriction - suspect erythema is due to venous stasis changes rather than cellulitis -> improved following diuresis after lasix    breast cancer s/p bilateral mastectomy    rheumatoid and psoriatic arthritis on Otezla - immunocompromised host    brain aneurysm s/p clipping    PLAN/RECOMMENDATIONS:    oxygen supplementation to keep saturation greater than 92%  thigh high SANCHEZ stockings  observe off antibiotics  prednisone taper as written, currently 50mg  albuterol/atrovent nebs q6h holding 2am dose  pulmicort 0.5mg nebs q12h  singulair  treat alcohol withdrawal as doing  thiamine/MVI/folate  Otezla/lidex solution to scalp/hydrocortisone cream to face  Arimidex  DVT prophylaxis - SQ heparin  GI prophylaxis - protonix  bowel regimen    Please call over the weekend with questions or clinical changes.     Junior Mansfield MD  Pulmonary Medicine  [phone]

## 2019-12-14 NOTE — PROGRESS NOTE ADULT - ASSESSMENT
54 yr old female with Hx of COPD 3L on home O2, ETOH abuse, breast Ca s/p mastectomy, remote brain aneurysm s/p clip,  rheumatoid and psoriatic arthritis on Otezla p/w sob also with signs of alcohol withdrawal        ·  Problem: Chronic respiratory failure with hypoxia.  P COPD on 3L NC at home likely exacerbation  anxiety,   -flu negative,   CTA negative for acute findings, no PE, PNA, effusion   -c/w supplemental oxygen  -pulm  f.u noted  c/w steroids    :  ·  Problem: Alcohol withdrawal.   improved   -CIWA   ativan taper  -MVI, folic acid, thiamine,    ppi  .      ·  Problem: Anxiety.  Plan: anxious likely due to alc/benzo withdrawl  takes benzo at home ISTOP check ref #: [id number] but could not locate any benzo prescriptions, unclear if getting illicitly but  says from PCP   c/w CIWA and ativan taper.             Problem: Swelling of lower extremity.  : likely due to chronic venous insufficiency, poor nutrition,  cards eval noted  diurese as needed      ·  Problem: Breast cancer. Plan: c/w home exemestane 25mg daily  outpatient onc followup, of note has been noncompliant with recommended followups.      ·  Problem: RA (rheumatoid arthritis). Plan: on Otezla,     hyponatremia  renal to f/u       ·  Problem: Prophylactic measure.  Plan: dvt ppx: hsq  PT

## 2019-12-14 NOTE — CONSULT NOTE ADULT - ASSESSMENT
54 yr old female with Hx significant for COPD 3L on home O2, ETOH abuse, breast Ca s/p mastectomy, remote brain aneurysm s/p clip,  rheumatoid and psoriatic arthritis on Otezla, anxiety, benzo abues p/w 2 days of worsening sob despite using nebs at home. drinking about 3 beers for the past 20 years and takes xanax 0.25mg TID at home, dx with copd/emphysema      1- hyponatremia  2- copd/emphysema   3- alcohol dependence      suspect reset osmolality in setting of beer use/siadh  one liter fluid restriction   cont prednisone   ciwa

## 2019-12-15 LAB
ANION GAP SERPL CALC-SCNC: 19 MMOL/L — HIGH (ref 5–17)
BUN SERPL-MCNC: 10 MG/DL — SIGNIFICANT CHANGE UP (ref 7–23)
CALCIUM SERPL-MCNC: 9 MG/DL — SIGNIFICANT CHANGE UP (ref 8.4–10.5)
CHLORIDE SERPL-SCNC: 88 MMOL/L — LOW (ref 96–108)
CO2 SERPL-SCNC: 28 MMOL/L — SIGNIFICANT CHANGE UP (ref 22–31)
CREAT SERPL-MCNC: 0.5 MG/DL — SIGNIFICANT CHANGE UP (ref 0.5–1.3)
GLUCOSE SERPL-MCNC: 101 MG/DL — HIGH (ref 70–99)
HCT VFR BLD CALC: 31.4 % — LOW (ref 34.5–45)
HGB BLD-MCNC: 10.5 G/DL — LOW (ref 11.5–15.5)
MCHC RBC-ENTMCNC: 33.4 GM/DL — SIGNIFICANT CHANGE UP (ref 32–36)
MCHC RBC-ENTMCNC: 35 PG — HIGH (ref 27–34)
MCV RBC AUTO: 104.7 FL — HIGH (ref 80–100)
PLATELET # BLD AUTO: 162 K/UL — SIGNIFICANT CHANGE UP (ref 150–400)
POTASSIUM SERPL-MCNC: 3 MMOL/L — LOW (ref 3.5–5.3)
POTASSIUM SERPL-SCNC: 3 MMOL/L — LOW (ref 3.5–5.3)
RBC # BLD: 3 M/UL — LOW (ref 3.8–5.2)
RBC # FLD: 13.2 % — SIGNIFICANT CHANGE UP (ref 10.3–14.5)
SODIUM SERPL-SCNC: 135 MMOL/L — SIGNIFICANT CHANGE UP (ref 135–145)
WBC # BLD: 6.19 K/UL — SIGNIFICANT CHANGE UP (ref 3.8–10.5)
WBC # FLD AUTO: 6.19 K/UL — SIGNIFICANT CHANGE UP (ref 3.8–10.5)

## 2019-12-15 PROCEDURE — 93010 ELECTROCARDIOGRAM REPORT: CPT

## 2019-12-15 RX ORDER — ACETAMINOPHEN 500 MG
650 TABLET ORAL ONCE
Refills: 0 | Status: COMPLETED | OUTPATIENT
Start: 2019-12-15 | End: 2019-12-15

## 2019-12-15 RX ORDER — POTASSIUM CHLORIDE 20 MEQ
20 PACKET (EA) ORAL
Refills: 0 | Status: COMPLETED | OUTPATIENT
Start: 2019-12-15 | End: 2019-12-15

## 2019-12-15 RX ORDER — POTASSIUM CHLORIDE 20 MEQ
40 PACKET (EA) ORAL ONCE
Refills: 0 | Status: COMPLETED | OUTPATIENT
Start: 2019-12-15 | End: 2019-12-15

## 2019-12-15 RX ADMIN — Medication 20 MILLIEQUIVALENT(S): at 23:56

## 2019-12-15 RX ADMIN — Medication 0.5 MILLIGRAM(S): at 10:40

## 2019-12-15 RX ADMIN — Medication 2 MILLIGRAM(S): at 23:55

## 2019-12-15 RX ADMIN — Medication 1 TABLET(S): at 10:40

## 2019-12-15 RX ADMIN — Medication 0.5 MILLIGRAM(S): at 18:29

## 2019-12-15 RX ADMIN — Medication 0.5 MILLIGRAM(S): at 06:29

## 2019-12-15 RX ADMIN — HEPARIN SODIUM 5000 UNIT(S): 5000 INJECTION INTRAVENOUS; SUBCUTANEOUS at 18:28

## 2019-12-15 RX ADMIN — Medication 0.5 MILLIGRAM(S): at 13:32

## 2019-12-15 RX ADMIN — Medication 40 MILLIEQUIVALENT(S): at 13:31

## 2019-12-15 RX ADMIN — Medication 0.5 MILLIGRAM(S): at 06:30

## 2019-12-15 RX ADMIN — HEPARIN SODIUM 5000 UNIT(S): 5000 INJECTION INTRAVENOUS; SUBCUTANEOUS at 06:30

## 2019-12-15 RX ADMIN — Medication 650 MILLIGRAM(S): at 22:15

## 2019-12-15 RX ADMIN — Medication 40 MILLIGRAM(S): at 21:33

## 2019-12-15 RX ADMIN — Medication 650 MILLIGRAM(S): at 21:39

## 2019-12-15 RX ADMIN — Medication 50 MILLIGRAM(S): at 06:30

## 2019-12-15 RX ADMIN — PANTOPRAZOLE SODIUM 40 MILLIGRAM(S): 20 TABLET, DELAYED RELEASE ORAL at 06:30

## 2019-12-15 RX ADMIN — Medication 1 MILLIGRAM(S): at 10:40

## 2019-12-15 RX ADMIN — Medication 20 MILLIEQUIVALENT(S): at 21:30

## 2019-12-15 RX ADMIN — Medication 3 MILLILITER(S): at 18:29

## 2019-12-15 RX ADMIN — EXEMESTANE 25 MILLIGRAM(S): 25 TABLET, SUGAR COATED ORAL at 10:40

## 2019-12-15 RX ADMIN — Medication 1000 UNIT(S): at 10:40

## 2019-12-15 RX ADMIN — MONTELUKAST 10 MILLIGRAM(S): 4 TABLET, CHEWABLE ORAL at 21:31

## 2019-12-15 RX ADMIN — Medication 3 MILLILITER(S): at 21:31

## 2019-12-15 RX ADMIN — Medication 0.5 MILLIGRAM(S): at 02:35

## 2019-12-15 RX ADMIN — Medication 2 MILLIGRAM(S): at 21:42

## 2019-12-15 RX ADMIN — Medication 2 MILLIGRAM(S): at 18:28

## 2019-12-15 RX ADMIN — Medication 3 MILLILITER(S): at 06:29

## 2019-12-15 RX ADMIN — Medication 0.5 MILLIGRAM(S): at 18:28

## 2019-12-15 RX ADMIN — Medication 3 MILLILITER(S): at 13:31

## 2019-12-15 NOTE — PROGRESS NOTE ADULT - ASSESSMENT
54 yr old female with Hx significant for COPD 3L on home O2, ETOH abuse, breast Ca s/p mastectomy, remote brain aneurysm s/p clip,  rheumatoid and psoriatic arthritis on Otezla, anxiety, benzo abues p/w 2 days of worsening sob despite using nebs at home. drinking about 3 beers for the past 20 years and takes xanax 0.25mg TID at home, dx with copd/emphysema      1- hyponatremia improved   2- copd/emphysema   3- alcohol dependence  4- hypokalemia     replete kcl  one liter fluid restriction   cont prednisone   ciwa

## 2019-12-15 NOTE — PROVIDER CONTACT NOTE (OTHER) - ASSESSMENT
Pt speech clear and audible, skin dry and warm, oral mucosa and lips pink. VSS, per flowsheet. Pt complaining of chest pain for 5 min that has subsided now, but having a panic attack.

## 2019-12-15 NOTE — PROGRESS NOTE ADULT - SUBJECTIVE AND OBJECTIVE BOX
[facility]   [phone]  RENAL FOLLOW UP NOTE  --------------------------------------------------------------------------------  Chief Complaint:    24 hour events/subjective:        PAST HISTORY  --------------------------------------------------------------------------------  No significant changes to PMH, PSH, FHx, SHx, unless otherwise noted    ALLERGIES & MEDICATIONS  --------------------------------------------------------------------------------  Allergies    amoxicillin (Anaphylaxis)  Levaquin (Other; Anaphylaxis)  Levaquin (Unknown)  penicillin (Anaphylaxis)  penicillins (Anaphylaxis)    Intolerances      Standing Inpatient Medications  albuterol/ipratropium for Nebulization 3 milliLiter(s) Nebulizer <User Schedule>  buDESOnide    Inhalation Suspension 0.5 milliGRAM(s) Inhalation every 12 hours  cholecalciferol 1000 Unit(s) Oral daily  exemestane 25 milliGRAM(s) Oral daily  folic acid 1 milliGRAM(s) Oral daily  heparin  Injectable 5000 Unit(s) SubCutaneous every 12 hours  LORazepam     Tablet   Oral   montelukast 10 milliGRAM(s) Oral at bedtime  multivitamin 1 Tablet(s) Oral daily  pantoprazole    Tablet 40 milliGRAM(s) Oral before breakfast  predniSONE   Tablet 40 milliGRAM(s) Oral daily    PRN Inpatient Medications  LORazepam   Injectable 2 milliGRAM(s) IV Push every 2 hours PRN      REVIEW OF SYSTEMS  --------------------------------------------------------------------------------    Gen: denies fatigue, fevers/chills,  CVS: denies chest pain/palpitations  Resp: denies SOB/Cough  GI: Denies N/V/Abd pain  : Denies dysuria/oliguria/hematuria    All other systems were reviewed and are negative, except as noted.    VITALS/PHYSICAL EXAM  --------------------------------------------------------------------------------  T(C): 36.6 (12-15-19 @ 20:49), Max: 37.4 (12-15-19 @ 04:26)  HR: 84 (12-15-19 @ 20:49) (81 - 105)  BP: 116/74 (12-15-19 @ 20:49) (113/70 - 127/85)  RR: 18 (12-15-19 @ 20:49) (18 - 18)  SpO2: 96% (12-15-19 @ 20:49) (93% - 99%)  Wt(kg): --        12-14-19 @ 07:01  -  12-15-19 @ 07:00  --------------------------------------------------------  IN: 200 mL / OUT: 1650 mL / NET: -1450 mL    12-15-19 @ 07:01  -  12-15-19 @ 21:10  --------------------------------------------------------  IN: 180 mL / OUT: 200 mL / NET: -20 mL      Physical Exam:  	    Physical Exam:  	Gen: alert oriented place person and date   	Pulm: Decreased breath sounds b/l bases. no rales or ronchi or wheezing  	CV: RRR, S1/S2. no rub  	Back: No CVA tenderness; no sacral edema  	Abd: +BS, soft, nontender/nondistended  	: No suprapubic tenderness.               Extremity: No cyanosis, no edema no clubbing  	Neuro: No focal deficits  	Psych: Normal affect and mood      LABS/STUDIES  --------------------------------------------------------------------------------              10.5   6.19  >-----------<  162      [12-15-19 @ 09:03]              31.4     135  |  88  |  10  ----------------------------<  101      [12-15-19 @ 06:11]  3.0   |  28  |  0.50        Ca     9.0     [12-15-19 @ 06:11]            Creatinine Trend:  SCr 0.50 [12-15 @ 06:11]  SCr 0.53 [12-14 @ 11:06]  SCr 0.41 [12-12 @ 06:17]  SCr 0.41 [12-11 @ 15:41]  SCr 0.41 [12-11 @ 12:43]                  Iron 42, TIBC 196, %sat 21      [11-16-19 @ 10:30]  Ferritin 792      [11-16-19 @ 10:31]  HbA1c 5.4      [08-19-16 @ 07:12]

## 2019-12-15 NOTE — PROGRESS NOTE ADULT - SUBJECTIVE AND OBJECTIVE BOX
CHIEF COMPLAINT:Patient is a 54y old  Female who presents with a chief complaint of sob (15 Dec 2019 09:49)    	        PAST MEDICAL & SURGICAL HISTORY:  Prophylactic measure  Breast cancer  Brain aneurysm: with clips  Psoriasis  RA (rheumatoid arthritis)  Psoriasis  Breast cancer, stage 3  History of modified radical mastectomy of both breasts  S/P bilateral mastectomy  Brain aneurysm: 1988 two clips          REVIEW OF SYSTEMS:  feels better  RESPIRATORY: dec sob  CARDIOVASCULAR: No chest pain, palpitations, passing out, dizziness,   GASTROINTESTINAL: No abdominal or epigastric pain. No nausea, vomiting, or hematemesis; No diarrhea or constipation. No melena or hematochezia.  GENITOURINARY: No dysuria, frequency, hematuria, or incontinence  NEUROLOGICAL: No headaches,  MUSCULOSKELETAL: No joint pain or swelling; No muscle, back, or extremity pain    Medications:  MEDICATIONS  (STANDING):  albuterol/ipratropium for Nebulization 3 milliLiter(s) Nebulizer <User Schedule>  buDESOnide    Inhalation Suspension 0.5 milliGRAM(s) Inhalation every 12 hours  cholecalciferol 1000 Unit(s) Oral daily  exemestane 25 milliGRAM(s) Oral daily  folic acid 1 milliGRAM(s) Oral daily  heparin  Injectable 5000 Unit(s) SubCutaneous every 12 hours  LORazepam     Tablet 0.5 milliGRAM(s) Oral every 4 hours  LORazepam     Tablet   Oral   montelukast 10 milliGRAM(s) Oral at bedtime  multivitamin 1 Tablet(s) Oral daily  pantoprazole    Tablet 40 milliGRAM(s) Oral before breakfast  potassium chloride    Tablet ER 40 milliEquivalent(s) Oral once  predniSONE   Tablet 40 milliGRAM(s) Oral daily    MEDICATIONS  (PRN):  LORazepam   Injectable 2 milliGRAM(s) IV Push every 2 hours PRN Symptom-triggered: each CIWA -Ar score 8 or GREATER    	    PHYSICAL EXAM:  T(C): 37.4 (12-15-19 @ 04:26), Max: 37.6 (12-14-19 @ 20:55)  HR: 96 (12-15-19 @ 04:26) (92 - 105)  BP: 113/72 (12-15-19 @ 04:26) (112/73 - 118/77)  RR: 18 (12-15-19 @ 04:26) (18 - 18)  SpO2: 99% (12-15-19 @ 04:26) (96% - 99%)  Wt(kg): --  I&O's Summary    14 Dec 2019 07:01  -  15 Dec 2019 07:00  --------------------------------------------------------  IN: 200 mL / OUT: 1650 mL / NET: -1450 mL    15 Dec 2019 07:01  -  15 Dec 2019 11:15  --------------------------------------------------------  IN: 0 mL / OUT: 0 mL / NET: 0 mL        Appearance: Normal	  HEENT:   Normal oral mucosa, PERRL, EOMI	  Lymphatic: No lymphadenopathy  Cardiovascular: Normal S1 S2, No JVD,  Respiratory: dec bs   Psychiatry: A & O   Gastrointestinal:  Soft, Non-tender, + BS	  	  Neurologic: Non-focal  Extremities: Normal range of motion, No clubbing, cyanosis  dec edema  Vascular: Peripheral pulses palpable     TELEMETRY: 	    ECG:  	  RADIOLOGY:  OTHER: 	  	  LABS:	 	    CARDIAC MARKERS:                                10.5   6.19  )-----------( 162      ( 15 Dec 2019 09:03 )             31.4     12-15    135  |  88<L>  |  10  ----------------------------<  101<H>  3.0<L>   |  28  |  0.50    Ca    9.0      15 Dec 2019 06:11      proBNP:   Lipid Profile:   HgA1c:   TSH:

## 2019-12-15 NOTE — PROGRESS NOTE ADULT - SUBJECTIVE AND OBJECTIVE BOX
Follow-up Pulm Progress Note    The patient was seen and examined. Notes reviewed and discussed with staff/team as applicable      No new respiratory events overnight. Feels better. Awake and alert currently    Denies: SOB, Chest pain, increased cough, colored phlegm, hemoptysis, N/V/D, neck stiffness, dysuria      Vital Signs Last 24 Hrs  T(C): 37.4 (15 Dec 2019 04:26), Max: 37.6 (14 Dec 2019 20:55)  T(F): 99.4 (15 Dec 2019 04:26), Max: 99.6 (14 Dec 2019 20:55)  HR: 96 (15 Dec 2019 04:26) (92 - 105)  BP: 113/72 (15 Dec 2019 04:26) (112/73 - 118/77)  BP(mean): --  RR: 18 (15 Dec 2019 04:26) (18 - 18)  SpO2: 99% (15 Dec 2019 04:26) (96% - 99%)          12-14 @ 07:01  -  12-15 @ 07:00  --------------------------------------------------------  IN: 200 mL / OUT: 1650 mL / NET: -1450 mL          Medications:  MEDICATIONS  (STANDING):  albuterol/ipratropium for Nebulization 3 milliLiter(s) Nebulizer <User Schedule>  buDESOnide    Inhalation Suspension 0.5 milliGRAM(s) Inhalation every 12 hours  cholecalciferol 1000 Unit(s) Oral daily  exemestane 25 milliGRAM(s) Oral daily  folic acid 1 milliGRAM(s) Oral daily  heparin  Injectable 5000 Unit(s) SubCutaneous every 12 hours  LORazepam     Tablet 0.5 milliGRAM(s) Oral every 4 hours  LORazepam     Tablet   Oral   montelukast 10 milliGRAM(s) Oral at bedtime  multivitamin 1 Tablet(s) Oral daily  pantoprazole    Tablet 40 milliGRAM(s) Oral before breakfast  predniSONE   Tablet 50 milliGRAM(s) Oral daily    MEDICATIONS  (PRN):  LORazepam   Injectable 2 milliGRAM(s) IV Push every 2 hours PRN Symptom-triggered: each CIWA -Ar score 8 or GREATER      Allergies    amoxicillin (Anaphylaxis)  Levaquin (Other; Anaphylaxis)  Levaquin (Unknown)  penicillin (Anaphylaxis)  penicillins (Anaphylaxis)    Intolerances      Physical Examination:    Pleasant chronically ill appearing female, NAD  Neck: no JVD, LAD, accessory muscle use  PULM: Clear to auscultation bilaterally, no wheezes, rales, rhonchi. s/p bilateral mast.  CVS: Regular rate and rhythm, S1S2, no murmurs, rubs, or gallops  Abdomen: soft, NT  Extremities: no edema  Neuro: calm, non focal      LABS:                        10.5   6.19  )-----------( 162      ( 15 Dec 2019 09:03 )             31.4     12-15    135  |  88<L>  |  10  ----------------------------<  101<H>  3.0<L>   |  28  |  0.50    Ca    9.0      15 Dec 2019 06:11            CULTURES:  Culture Results:   No growth to date. (12-11 @ 16:44)  Culture Results:   No growth to date. (12-11 @ 16:44)    Most recent blood culture -- 12-11 @ 16:44   -- -- .Blood Blood-Peripheral 12-11 @ 16:44

## 2019-12-15 NOTE — PROGRESS NOTE ADULT - SUBJECTIVE AND OBJECTIVE BOX
CARDIOLOGY FOLLOW UP NOTE - DR. HANDY    Subjective:    no chest pain, sob, palpitations    PHYSICAL EXAM:  T(C): 36.5 (12-15-19 @ 11:57), Max: 37.6 (12-14-19 @ 20:55)  HR: 81 (12-15-19 @ 11:57) (81 - 105)  BP: 127/85 (12-15-19 @ 11:57) (112/73 - 127/85)  RR: 18 (12-15-19 @ 11:57) (18 - 18)  SpO2: 93% (12-15-19 @ 11:57) (93% - 99%)  Wt(kg): --  I&O's Summary    14 Dec 2019 07:01  -  15 Dec 2019 07:00  --------------------------------------------------------  IN: 200 mL / OUT: 1650 mL / NET: -1450 mL    15 Dec 2019 07:01  -  15 Dec 2019 12:44  --------------------------------------------------------  IN: 180 mL / OUT: 0 mL / NET: 180 mL      Daily     Daily     Appearance: Normal	  Cardiovascular: Normal S1 S2,RRR, No JVD, No murmurs  Respiratory: Lungs clear to auscultation	  Gastrointestinal:  Soft, Non-tender, + BS	  Extremities: Normal range of motion, No clubbing, cyanosis or edema      Home Medications:  ALPRAZolam 0.25 mg oral tablet: 1 tab(s) orally 4 times a day (11 Dec 2019 17:29)  Brovana 15 mcg/2 mL inhalation solution: 2 milliliter(s) inhaled 2 times a day (11 Dec 2019 17:29)  budesonide 0.5 mg/2 mL inhalation suspension: 2 milliliter(s) inhaled 2 times a day (11 Dec 2019 17:29)  exemestane 25 mg oral tablet: 1 tab(s) orally once a day (11 Dec 2019 17:29)  folic acid 1 mg oral tablet: 1 tab(s) orally once a day (11 Dec 2019 17:29)  montelukast 10 mg oral tablet: 1 tab(s) orally once a day (11 Dec 2019 17:29)  Multiple Vitamins oral tablet: 1 tab(s) orally once a day (11 Dec 2019 17:29)  Otezla 30 mg oral tablet: 1 tab(s) orally 2 times a day (11 Dec 2019 17:29)  prochlorperazine 10 mg oral tablet: 1 tab(s) orally once a day    NOTE: pharmacy dispensed 1 tab every 6 hours as needed back in June 2019. (11 Dec 2019 17:29)  Ventolin HFA 90 mcg/inh inhalation aerosol: 2 puff(s) inhaled every 6 hours, As Needed (11 Dec 2019 17:29)  Vitamin D3 2000 intl units oral tablet: 1 tab(s) orally once a day (11 Dec 2019 17:29)  Yupelri 175 mcg/3 mL inhalation solution: 3 milliliter(s) inhaled once a day    NOTE: unable to verify with secondary source. (11 Dec 2019 17:29)      MEDICATIONS  (STANDING):  albuterol/ipratropium for Nebulization 3 milliLiter(s) Nebulizer <User Schedule>  buDESOnide    Inhalation Suspension 0.5 milliGRAM(s) Inhalation every 12 hours  cholecalciferol 1000 Unit(s) Oral daily  exemestane 25 milliGRAM(s) Oral daily  folic acid 1 milliGRAM(s) Oral daily  heparin  Injectable 5000 Unit(s) SubCutaneous every 12 hours  LORazepam     Tablet 0.5 milliGRAM(s) Oral every 4 hours  LORazepam     Tablet   Oral   montelukast 10 milliGRAM(s) Oral at bedtime  multivitamin 1 Tablet(s) Oral daily  pantoprazole    Tablet 40 milliGRAM(s) Oral before breakfast  potassium chloride    Tablet ER 40 milliEquivalent(s) Oral once  predniSONE   Tablet 40 milliGRAM(s) Oral daily      TELEMETRY: 	    ECG:  	  RADIOLOGY:   DIAGNOSTIC TESTING:  [ ] Echocardiogram:  [ ] Catheterization:  [ ] Stress Test:    OTHER: 	    LABS:	 	    CARDIAC MARKERS:  Troponin T, High Sensitivity Result: 17 ng/L (12-11 @ 15:41)  Troponin T, High Sensitivity Result: 18 ng/L (12-11 @ 12:43)                                10.5   6.19  )-----------( 162      ( 15 Dec 2019 09:03 )             31.4     12-15    135  |  88<L>  |  10  ----------------------------<  101<H>  3.0<L>   |  28  |  0.50    Ca    9.0      15 Dec 2019 06:11      proBNP:     Lipid Profile:   HgA1c:     Creatinine, Serum: 0.50 mg/dL (12-15-19 @ 06:11)  Creatinine, Serum: 0.53 mg/dL (12-14-19 @ 11:06)

## 2019-12-15 NOTE — PROGRESS NOTE ADULT - ASSESSMENT
54 year old female with PMH of stage 3 breast cancer on aromasin, RA, Psoriasis on Otezla, brain aneurysm, s/p clip 1988, right jugular DVT, Catheter related MSSA bacteremia 2015,  ETOH abuse, COPD on oxygen presents here with dyspnea, ETOH withdrawal, hyponatremia, hyperkalemia.     1.  Chronic respiratory failure with hypoxia.  -secondary to pulm disease, COPD/ emphysema   -CTA chest negative for PE, + severe emphysema   -cv stable. no decomp chf on exam; no evidence of acs   -management per pulm / med   -recent echo with grossly normal LV function     2. Sinus tachycardia   -resolved and likely secondary to withdrawal, dehydration, anxiety, copd exacerbation  -no evidence of acs , asymptomatic  -recent echo with grossly normal lv function     3. med f/u, CIWA  protocol     4. LE edema   -lasix prn , stable   -LLE doppler neg dvt    dvt ppx     no cv ci to d/c planning

## 2019-12-15 NOTE — PROGRESS NOTE ADULT - ASSESSMENT
54 yr old female with Hx of COPD 3L on home O2, ETOH abuse, breast Ca s/p mastectomy, remote brain aneurysm s/p clip,  rheumatoid and psoriatic arthritis on Otezla p/w sob also with signs of alcohol withdrawal        ·  Problem: Chronic respiratory failure with hypoxia.  P COPD on 3L NC at home likely exacerbation  anxiety,   -flu negative,   CTA negative for acute findings, no PE, PNA, effusion   -c/w supplemental oxygen  -pulm  f.u noted  c/w steroids/start taper    :  ·  Problem: Alcohol withdrawal.   improved   -CIWA   ativan taper/over tomorrow   -MVI, folic acid, thiamine,    ppi  .      ·  Problem: Anxiety.  Plan: anxious likely due to alc/benzo withdrawl  takes benzo at home [employer]STOP check ref #: [id number] but could not locate any benzo prescriptions, unclear if getting illicitly but  says from PCP   c/w CIWA and ativan taper.             Problem: Swelling of lower extremity.  : likely due to chronic venous insufficiency, poor nutrition,  cards eval noted  diurese as needed      ·  Problem: Breast cancer. Plan: c/w home exemestane 25mg daily  outpatient onc followup, of note has been noncompliant with recommended followups.      ·  Problem: RA (rheumatoid arthritis). Plan: on Otezla,     hyponatremia/improved  renal to f/u     hypokalemia  supp k+      ·  Problem: Prophylactic measure.  Plan: dvt ppx: hsq  PT         d/c planning soon

## 2019-12-15 NOTE — PROVIDER CONTACT NOTE (OTHER) - ACTION/TREATMENT ORDERED:
Provider notified. Provider assessed pt at bedside. Will perform EKG and STAT labs. Will give additional PRN dose of lorazepam as per NP.

## 2019-12-15 NOTE — PROGRESS NOTE ADULT - ASSESSMENT
ASSESSMENT:    chronic hypoxic/hypercapnic respiratory failure due to severe COPD/emphysema admitted with severe dyspnea - improved     chronic alcohol use with evidence of withdrawal symptoms    breast cancer s/p bilateral mastectomy    rheumatoid and psoriatic arthritis on Otezla - immunocompromised host    brain aneurysm s/p clipping    PLAN/RECOMMENDATIONS:    oxygen supplementation to keep saturation greater than 92%  observe off antibiotics  prednisone taper as written, currently 50mg  albuterol/atrovent nebs q6h holding 2am dose  pulmicort 0.5mg nebs q12h  singulair  treat alcohol withdrawal as doing  Arimidex  DVT prophylaxis - SQ heparin  GI prophylaxis - protonix  bowel regimen    Junior Mansfield MD  Pulmonary Medicine  [phone]

## 2019-12-16 ENCOUNTER — TRANSCRIPTION ENCOUNTER (OUTPATIENT)
Age: 54
End: 2019-12-16

## 2019-12-16 VITALS
OXYGEN SATURATION: 97 % | TEMPERATURE: 98 F | SYSTOLIC BLOOD PRESSURE: 117 MMHG | HEART RATE: 91 BPM | DIASTOLIC BLOOD PRESSURE: 78 MMHG | RESPIRATION RATE: 18 BRPM

## 2019-12-16 LAB
ANION GAP SERPL CALC-SCNC: 16 MMOL/L — SIGNIFICANT CHANGE UP (ref 5–17)
BUN SERPL-MCNC: 12 MG/DL — SIGNIFICANT CHANGE UP (ref 7–23)
CALCIUM SERPL-MCNC: 8.9 MG/DL — SIGNIFICANT CHANGE UP (ref 8.4–10.5)
CHLORIDE SERPL-SCNC: 88 MMOL/L — LOW (ref 96–108)
CK MB CFR SERPL CALC: <1 NG/ML — SIGNIFICANT CHANGE UP (ref 0–3.8)
CK SERPL-CCNC: 37 U/L — SIGNIFICANT CHANGE UP (ref 25–170)
CO2 SERPL-SCNC: 26 MMOL/L — SIGNIFICANT CHANGE UP (ref 22–31)
CREAT SERPL-MCNC: 0.5 MG/DL — SIGNIFICANT CHANGE UP (ref 0.5–1.3)
CULTURE RESULTS: SIGNIFICANT CHANGE UP
CULTURE RESULTS: SIGNIFICANT CHANGE UP
GLUCOSE SERPL-MCNC: 138 MG/DL — HIGH (ref 70–99)
HCT VFR BLD CALC: 31.9 % — LOW (ref 34.5–45)
HGB BLD-MCNC: 10.6 G/DL — LOW (ref 11.5–15.5)
MCHC RBC-ENTMCNC: 33.2 GM/DL — SIGNIFICANT CHANGE UP (ref 32–36)
MCHC RBC-ENTMCNC: 34.8 PG — HIGH (ref 27–34)
MCV RBC AUTO: 104.6 FL — HIGH (ref 80–100)
PLATELET # BLD AUTO: 170 K/UL — SIGNIFICANT CHANGE UP (ref 150–400)
POTASSIUM SERPL-MCNC: 4.6 MMOL/L — SIGNIFICANT CHANGE UP (ref 3.5–5.3)
POTASSIUM SERPL-SCNC: 4.6 MMOL/L — SIGNIFICANT CHANGE UP (ref 3.5–5.3)
RBC # BLD: 3.05 M/UL — LOW (ref 3.8–5.2)
RBC # FLD: 13.2 % — SIGNIFICANT CHANGE UP (ref 10.3–14.5)
SODIUM SERPL-SCNC: 130 MMOL/L — LOW (ref 135–145)
SPECIMEN SOURCE: SIGNIFICANT CHANGE UP
SPECIMEN SOURCE: SIGNIFICANT CHANGE UP
TROPONIN T, HIGH SENSITIVITY RESULT: 10 NG/L — SIGNIFICANT CHANGE UP (ref 0–51)
WBC # BLD: 6.14 K/UL — SIGNIFICANT CHANGE UP (ref 3.8–10.5)
WBC # FLD AUTO: 6.14 K/UL — SIGNIFICANT CHANGE UP (ref 3.8–10.5)

## 2019-12-16 PROCEDURE — 85027 COMPLETE CBC AUTOMATED: CPT

## 2019-12-16 PROCEDURE — 97162 PT EVAL MOD COMPLEX 30 MIN: CPT

## 2019-12-16 PROCEDURE — 80053 COMPREHEN METABOLIC PANEL: CPT

## 2019-12-16 PROCEDURE — 71045 X-RAY EXAM CHEST 1 VIEW: CPT

## 2019-12-16 PROCEDURE — 80076 HEPATIC FUNCTION PANEL: CPT

## 2019-12-16 PROCEDURE — 82553 CREATINE MB FRACTION: CPT

## 2019-12-16 PROCEDURE — 36000 PLACE NEEDLE IN VEIN: CPT | Mod: LT,XU

## 2019-12-16 PROCEDURE — 82803 BLOOD GASES ANY COMBINATION: CPT

## 2019-12-16 PROCEDURE — 96374 THER/PROPH/DIAG INJ IV PUSH: CPT

## 2019-12-16 PROCEDURE — 84295 ASSAY OF SERUM SODIUM: CPT

## 2019-12-16 PROCEDURE — 99285 EMERGENCY DEPT VISIT HI MDM: CPT | Mod: 25

## 2019-12-16 PROCEDURE — 93005 ELECTROCARDIOGRAM TRACING: CPT

## 2019-12-16 PROCEDURE — 82435 ASSAY OF BLOOD CHLORIDE: CPT

## 2019-12-16 PROCEDURE — 36600 WITHDRAWAL OF ARTERIAL BLOOD: CPT

## 2019-12-16 PROCEDURE — 82550 ASSAY OF CK (CPK): CPT

## 2019-12-16 PROCEDURE — 76937 US GUIDE VASCULAR ACCESS: CPT

## 2019-12-16 PROCEDURE — 87040 BLOOD CULTURE FOR BACTERIA: CPT

## 2019-12-16 PROCEDURE — 84132 ASSAY OF SERUM POTASSIUM: CPT

## 2019-12-16 PROCEDURE — 94660 CPAP INITIATION&MGMT: CPT

## 2019-12-16 PROCEDURE — 85014 HEMATOCRIT: CPT

## 2019-12-16 PROCEDURE — 82330 ASSAY OF CALCIUM: CPT

## 2019-12-16 PROCEDURE — 83605 ASSAY OF LACTIC ACID: CPT

## 2019-12-16 PROCEDURE — 84100 ASSAY OF PHOSPHORUS: CPT

## 2019-12-16 PROCEDURE — 94640 AIRWAY INHALATION TREATMENT: CPT

## 2019-12-16 PROCEDURE — 85730 THROMBOPLASTIN TIME PARTIAL: CPT

## 2019-12-16 PROCEDURE — 87631 RESP VIRUS 3-5 TARGETS: CPT

## 2019-12-16 PROCEDURE — 82947 ASSAY GLUCOSE BLOOD QUANT: CPT

## 2019-12-16 PROCEDURE — 83880 ASSAY OF NATRIURETIC PEPTIDE: CPT

## 2019-12-16 PROCEDURE — 85610 PROTHROMBIN TIME: CPT

## 2019-12-16 PROCEDURE — 80048 BASIC METABOLIC PNL TOTAL CA: CPT

## 2019-12-16 PROCEDURE — 71275 CT ANGIOGRAPHY CHEST: CPT

## 2019-12-16 PROCEDURE — 84484 ASSAY OF TROPONIN QUANT: CPT

## 2019-12-16 PROCEDURE — 93971 EXTREMITY STUDY: CPT

## 2019-12-16 PROCEDURE — 83735 ASSAY OF MAGNESIUM: CPT

## 2019-12-16 RX ORDER — ALPRAZOLAM 0.25 MG
0.25 TABLET ORAL ONCE
Refills: 0 | Status: DISCONTINUED | OUTPATIENT
Start: 2019-12-16 | End: 2019-12-16

## 2019-12-16 RX ORDER — ALPRAZOLAM 0.25 MG
1 TABLET ORAL
Qty: 0 | Refills: 0 | DISCHARGE

## 2019-12-16 RX ORDER — MONTELUKAST 4 MG/1
1 TABLET, CHEWABLE ORAL
Qty: 0 | Refills: 0 | DISCHARGE
Start: 2019-12-16

## 2019-12-16 RX ORDER — CHOLECALCIFEROL (VITAMIN D3) 125 MCG
1 CAPSULE ORAL
Qty: 0 | Refills: 0 | DISCHARGE

## 2019-12-16 RX ORDER — PANTOPRAZOLE SODIUM 20 MG/1
1 TABLET, DELAYED RELEASE ORAL
Qty: 0 | Refills: 0 | DISCHARGE
Start: 2019-12-16

## 2019-12-16 RX ORDER — MONTELUKAST 4 MG/1
1 TABLET, CHEWABLE ORAL
Qty: 0 | Refills: 0 | DISCHARGE

## 2019-12-16 RX ORDER — FOLIC ACID 0.8 MG
1 TABLET ORAL
Qty: 0 | Refills: 0 | DISCHARGE
Start: 2019-12-16

## 2019-12-16 RX ORDER — PROCHLORPERAZINE MALEATE 5 MG
1 TABLET ORAL
Qty: 0 | Refills: 0 | DISCHARGE

## 2019-12-16 RX ORDER — IPRATROPIUM/ALBUTEROL SULFATE 18-103MCG
3 AEROSOL WITH ADAPTER (GRAM) INHALATION EVERY 6 HOURS
Refills: 0 | Status: DISCONTINUED | OUTPATIENT
Start: 2019-12-16 | End: 2019-12-16

## 2019-12-16 RX ORDER — REVEFENACIN 175 UG/3ML
3 SOLUTION RESPIRATORY (INHALATION)
Qty: 0 | Refills: 0 | DISCHARGE

## 2019-12-16 RX ORDER — EXEMESTANE 25 MG/1
1 TABLET, SUGAR COATED ORAL
Qty: 0 | Refills: 0 | DISCHARGE
Start: 2019-12-16

## 2019-12-16 RX ORDER — CHOLECALCIFEROL (VITAMIN D3) 125 MCG
1000 CAPSULE ORAL
Qty: 0 | Refills: 0 | DISCHARGE
Start: 2019-12-16

## 2019-12-16 RX ORDER — EXEMESTANE 25 MG/1
1 TABLET, SUGAR COATED ORAL
Qty: 0 | Refills: 0 | DISCHARGE

## 2019-12-16 RX ADMIN — HEPARIN SODIUM 5000 UNIT(S): 5000 INJECTION INTRAVENOUS; SUBCUTANEOUS at 06:09

## 2019-12-16 RX ADMIN — Medication 0.5 MILLIGRAM(S): at 05:36

## 2019-12-16 RX ADMIN — Medication 1 MILLIGRAM(S): at 11:05

## 2019-12-16 RX ADMIN — Medication 1 TABLET(S): at 11:05

## 2019-12-16 RX ADMIN — Medication 40 MILLIGRAM(S): at 06:09

## 2019-12-16 RX ADMIN — Medication 1000 UNIT(S): at 11:05

## 2019-12-16 RX ADMIN — Medication 0.25 MILLIGRAM(S): at 11:04

## 2019-12-16 RX ADMIN — Medication 3 MILLILITER(S): at 10:14

## 2019-12-16 RX ADMIN — PANTOPRAZOLE SODIUM 40 MILLIGRAM(S): 20 TABLET, DELAYED RELEASE ORAL at 06:09

## 2019-12-16 RX ADMIN — EXEMESTANE 25 MILLIGRAM(S): 25 TABLET, SUGAR COATED ORAL at 11:05

## 2019-12-16 RX ADMIN — Medication 3 MILLILITER(S): at 05:36

## 2019-12-16 RX ADMIN — Medication 0.25 MILLIGRAM(S): at 02:39

## 2019-12-16 RX ADMIN — Medication 0.5 MILLIGRAM(S): at 06:09

## 2019-12-16 NOTE — DISCHARGE NOTE PROVIDER - CARE PROVIDER_API CALL
Jacob Neely)  Internal Medicine  [address], [address]  [place] [address]  Phone: [phone]  Fax: [phone]  Follow Up Time: 1 week

## 2019-12-16 NOTE — DISCHARGE NOTE PROVIDER - NSDCCPCAREPLAN_GEN_ALL_CORE_FT
PRINCIPAL DISCHARGE DIAGNOSIS  Diagnosis: COPD exacerbation  Assessment and Plan of Treatment: Call your Health Care provider upon arrival home to make a follow up appointment within one week.  Follow up with Dr Neely in one week, call to make an appointment.  Take all inhalers as prescribed by your Health Care Provider.  Take steroids as prescribed by your Health Care Provider.  If your cough increases infrequency and severity and/or you have shortness of breath or increased shortness of breath call your Health Care Provider.  If you develop fever, chills, night sweats, malaise, and/or change in mental status call your Health care Provider.  Nutrition is very important.  Eat small frequent meals.  Increase your activity as tolerated.  Do not stay in bed all day  Pt. verbalized an understanding of all instructions.        SECONDARY DISCHARGE DIAGNOSES  Diagnosis: Rheumatoid arthritis  Assessment and Plan of Treatment: Continue to take your medications.    Diagnosis: Hyponatremia  Assessment and Plan of Treatment: resolving  Take all your medications as prescribed by your physician.    Diagnosis: Anxiety  Assessment and Plan of Treatment:     Diagnosis: ETOH abuse  Assessment and Plan of Treatment: Treatment for alcohol dependence and withdrawal includes medicines, detoxification, and therapy. Diagnosing and treating alcohol dependence and withdrawal as soon as possible may relieve or prevent symptoms. With treatment and care, your alcohol dependence and withdrawal may be controlled, and your quality of life improved.  Keep all follow up appointments. Write down any questions you may have. This way you will remember to ask these questions during your next visit.

## 2019-12-16 NOTE — DISCHARGE NOTE PROVIDER - NSDCMRMEDTOKEN_GEN_ALL_CORE_FT
Brovana 15 mcg/2 mL inhalation solution: 2 milliliter(s) inhaled 2 times a day  budesonide 0.5 mg/2 mL inhalation suspension: 2 milliliter(s) inhaled 2 times a day  cholecalciferol oral tablet: 1000 unit(s) orally once a day  exemestane 25 mg oral tablet: 1 tab(s) orally once a day  folic acid 1 mg oral tablet: 1 tab(s) orally once a day  montelukast 10 mg oral tablet: 1 tab(s) orally once a day (at bedtime)  Multiple Vitamins oral tablet: 1 tab(s) orally once a day  Otezla 30 mg oral tablet: 1 tab(s) orally 2 times a day  pantoprazole 40 mg oral delayed release tablet: 1 tab(s) orally once a day (before a meal)  predniSONE 10 mg oral tablet: 4 tab(s) orally daily x 2 days then 3 tabs po daily x 2 days; then 2 tabs po daily x 2 days then 1 tab po daily x 2 days then stop  Ventolin HFA 90 mcg/inh inhalation aerosol: 2 puff(s) inhaled every 6 hours, As Needed  Yupelri 175 mcg/3 mL inhalation solution: 3 milliliter(s) inhaled once a day

## 2019-12-16 NOTE — PROGRESS NOTE ADULT - ATTENDING COMMENTS
Patient seen and examined.  Agree with above NP note.  cv stable  tachycardia sec to withdrawal   cont current tx  recent echo with normal lv function
Agree with above NP note.  cv stable  cont current tx  d/c planning per medicine

## 2019-12-16 NOTE — PROGRESS NOTE ADULT - ASSESSMENT
ASSESSMENT:    chronic hypoxic/hypercapnic respiratory failure due to severe COPD/emphysema admitted with severe dyspnea - there is no evidence of a pulmonary embolus on the CTA of the chest - there is currently no evidence of significant bronchospasm - suspect that the patchy right lower lobe opacity represents atelectasis rather than pneumonia in the absence of fevers or leukocytosis - anxiety is likely contributing to her dyspnea by provoking hyperventilation resulting in dynamic hyperinflation - the ABG shows an acute respiratory alkalosis likely related to hyperventilation related to pain or anxiety    hyponatremia    chronic alcohol use with evidence of withdrawal symptoms -> improved    lower extremity swelling likely related to intermittent hypoxemia causing pulmonary artery hypoxic vasoconstriction - suspect erythema is due to venous stasis changes rather than cellulitis -> improved following diuresis after lasix    breast cancer s/p bilateral mastectomy    rheumatoid and psoriatic arthritis on Otezla - immunocompromised host    brain aneurysm s/p clipping    PLAN/RECOMMENDATIONS:    oxygen supplementation to keep saturation greater than 92%  free water restriction  observe off antibiotics  prednisone taper as written  albuterol/atrovent nebs q6h  pulmicort 0.5mg nebs q12h  singulair  treating alcohol withdrawal  thiamine/MVI/folate  Otezla/lidex solution to scalp/hydrocortisone cream to face  Arimidex  DVT prophylaxis - SQ heparin  GI prophylaxis - protonix  bowel regimen    Will follow with you. Plan of care discussed with the patient at bedside. No pulmonary objection to discharge.     Joss Franks MD  Pulmonary Medicine  [phone]

## 2019-12-16 NOTE — DISCHARGE NOTE NURSING/CASE MANAGEMENT/SOCIAL WORK - PATIENT PORTAL LINK FT
You can access the FollowMyHealth Patient Portal offered by [facility] by registering at the following website: [email or web address]. By joining [employer]’s FollowMyHealth portal, you will also be able to view your health information using other applications (apps) compatible with our system.

## 2019-12-16 NOTE — PROGRESS NOTE ADULT - SUBJECTIVE AND OBJECTIVE BOX
CHIEF COMPLAINT:Patient is a 54y old  Female who presents with a chief complaint of sob (15 Dec 2019 21:09)    	        PAST MEDICAL & SURGICAL HISTORY:  Prophylactic measure  Breast cancer  Brain aneurysm: with clips  Psoriasis  RA (rheumatoid arthritis)  Psoriasis  Breast cancer, stage 3  History of modified radical mastectomy of both breasts  S/P bilateral mastectomy  Brain aneurysm: 1988 two clips          REVIEW OF SYSTEMS:  anxious at times  RESPIRATORY: No cough, wheezing, chills or hemoptysis; No Shortness of Breath  CARDIOVASCULAR: No chest pain, palpitations, passing out, dizziness,   GASTROINTESTINAL: No abdominal or epigastric pain. No nausea, vomiting, or hematemesis; No diarrhea or constipation. No melena or hematochezia.  GENITOURINARY: No dysuria, frequency, hematuria, or incontinence  NEUROLOGICAL: No headaches,     Medications:  MEDICATIONS  (STANDING):  albuterol/ipratropium for Nebulization 3 milliLiter(s) Nebulizer <User Schedule>  buDESOnide    Inhalation Suspension 0.5 milliGRAM(s) Inhalation every 12 hours  cholecalciferol 1000 Unit(s) Oral daily  exemestane 25 milliGRAM(s) Oral daily  folic acid 1 milliGRAM(s) Oral daily  heparin  Injectable 5000 Unit(s) SubCutaneous every 12 hours  LORazepam     Tablet 0.5 milliGRAM(s) Oral every 12 hours  LORazepam     Tablet   Oral   montelukast 10 milliGRAM(s) Oral at bedtime  multivitamin 1 Tablet(s) Oral daily  pantoprazole    Tablet 40 milliGRAM(s) Oral before breakfast  predniSONE   Tablet 40 milliGRAM(s) Oral daily    MEDICATIONS  (PRN):  LORazepam   Injectable 2 milliGRAM(s) IV Push every 2 hours PRN Symptom-triggered: each CIWA -Ar score 8 or GREATER    	    PHYSICAL EXAM:  T(C): 36.7 (12-16-19 @ 04:37), Max: 36.8 (12-16-19 @ 00:32)  HR: 87 (12-16-19 @ 04:37) (81 - 87)  BP: 118/76 (12-16-19 @ 04:37) (110/65 - 134/81)  RR: 18 (12-16-19 @ 04:37) (18 - 18)  SpO2: 100% (12-16-19 @ 04:37) (93% - 100%)  Wt(kg): --  I&O's Summary    15 Dec 2019 07:01  -  16 Dec 2019 07:00  --------------------------------------------------------  IN: 330 mL / OUT: 500 mL / NET: -170 mL    16 Dec 2019 07:01  -  16 Dec 2019 11:26  --------------------------------------------------------  IN: 120 mL / OUT: 0 mL / NET: 120 mL        Appearance: Normal	  HEENT:   Normal oral mucosa, PERRL, EOMI	  Lymphatic: No lymphadenopathy  Cardiovascular: Normal S1 S2, No JVD,   Respiratory: Lungs clear to auscultation	  Psychiatry: A & O  Gastrointestinal:  Soft, Non-tender, + BS	  Skin: No rashes, No ecchymoses, No cyanosis	  Neurologic: Non-focal  Extremities: dec rom   Vascular: Peripheral pulses palpable     TELEMETRY: 	    ECG:  	  RADIOLOGY:  OTHER: 	  	  LABS:	 	    CARDIAC MARKERS:  CARDIAC MARKERS ( 16 Dec 2019 00:59 )  x     / x     / 37 U/L / x     / <1.0 ng/mL                                10.6   6.14  )-----------( 170      ( 16 Dec 2019 08:20 )             31.9     12-16    130<L>  |  88<L>  |  12  ----------------------------<  138<H>  4.6   |  26  |  0.50    Ca    8.9      16 Dec 2019 06:57      proBNP:   Lipid Profile:   HgA1c:   TSH:

## 2019-12-16 NOTE — PROGRESS NOTE ADULT - SUBJECTIVE AND OBJECTIVE BOX
CARDIOLOGY FOLLOW UP - Dr. Nj    CC no cp/sob   c/o anxiety     PHYSICAL EXAM:  T(C): 36.7 (12-16-19 @ 04:37), Max: 36.8 (12-16-19 @ 00:32)  HR: 87 (12-16-19 @ 04:37) (81 - 87)  BP: 118/76 (12-16-19 @ 04:37) (110/65 - 134/81)  RR: 18 (12-16-19 @ 04:37) (18 - 18)  SpO2: 100% (12-16-19 @ 04:37) (93% - 100%)  Wt(kg): --  I&O's Summary    15 Dec 2019 07:01  -  16 Dec 2019 07:00  --------------------------------------------------------  IN: 330 mL / OUT: 500 mL / NET: -170 mL    16 Dec 2019 07:01  -  16 Dec 2019 11:27  --------------------------------------------------------  IN: 120 mL / OUT: 0 mL / NET: 120 mL        Appearance: Normal	  Cardiovascular: Normal S1 S2,RRR, No JVD, No murmurs  Respiratory: Lungs clear to auscultation	  Gastrointestinal:  Soft, Non-tender, + BS	  Extremities: Normal range of motion, No clubbing, cyanosis or edema        MEDICATIONS  (STANDING):  albuterol/ipratropium for Nebulization 3 milliLiter(s) Nebulizer <User Schedule>  buDESOnide    Inhalation Suspension 0.5 milliGRAM(s) Inhalation every 12 hours  cholecalciferol 1000 Unit(s) Oral daily  exemestane 25 milliGRAM(s) Oral daily  folic acid 1 milliGRAM(s) Oral daily  heparin  Injectable 5000 Unit(s) SubCutaneous every 12 hours  LORazepam     Tablet 0.5 milliGRAM(s) Oral every 12 hours  LORazepam     Tablet   Oral   montelukast 10 milliGRAM(s) Oral at bedtime  multivitamin 1 Tablet(s) Oral daily  pantoprazole    Tablet 40 milliGRAM(s) Oral before breakfast  predniSONE   Tablet 40 milliGRAM(s) Oral daily      TELEMETRY:  NSR, ST 	    ECG:  	  RADIOLOGY:   DIAGNOSTIC TESTING:  [ ] Echocardiogram:  [ ]  Catheterization:  [ ] Stress Test:    OTHER: 	    LABS:	 	                                10.6   6.14  )-----------( 170      ( 16 Dec 2019 08:20 )             31.9     12-16    130<L>  |  88<L>  |  12  ----------------------------<  138<H>  4.6   |  26  |  0.50    Ca    8.9      16 Dec 2019 06:57

## 2019-12-16 NOTE — DISCHARGE NOTE PROVIDER - HOSPITAL COURSE
54 yr old female with Hx of COPD 3L on home O2, ETOH abuse, breast Ca s/p mastectomy, remote brain aneurysm s/p clip,  rheumatoid and psoriatic arthritis on Otezla p/w sob also with signs of alcohol withdrawal                ·  Problem: Chronic respiratory failure with hypoxia.  P COPD on 3L NC at home likely exacerbation    anxiety,     -flu negative,     CTA negative for acute findings, no PE, PNA, effusion     -c/w supplemental oxygen    -pulm  f.u noted    c/w steroids/ taper        :    ·  Problem: Alcohol withdrawal.     improved     -CIWA     ativan tapered     -MVI, folic acid, thiamine,      ppi    .            ·  Problem: Anxiety.  Plan: anxious likely due to alc/benzo withdrawl    takes benzo at home ISTOP check ref #: [id number] but could not locate any benzo prescriptions, unclear if getting illicitly but  says from PCP     c/w CIWA and ativan taper.                         Problem: Swelling of lower extremity.    : likely due to chronic venous insufficiency, poor nutrition,    cards eval noted    diurese as needed            ·  Problem: Breast cancer. Plan: c/w home exemestane 25mg daily    outpatient onc followup, of note has been noncompliant with recommended followups.            ·  Problem: RA (rheumatoid arthritis). Plan: on Otezla,         hyponatremia/improved    renal to f/u         hypokalemia    supp k+ 54 yr old female PMH COPD 3L on home O2, ETOH abuse, breast Ca s/p mastectomy, remote brain aneurysm s/p clip,  rheumatoid and psoriatic arthritis on Otezla admitted with SOB, also with signs of alcohol withdrawal.  Found to have COPD exacerbation, RVP negative, CTA negative for any acute findings.  Pulmonary consulted, treated with prednisone taper and nebulizers, now improved.  Patient was maintained on CIWA during hospital course and po ativan taper.   Swelling of lower extremity likely due to chronic venous insufficieny and poor nutrition. Patient was monitored on telemetry during hospital course and found to be in sinus tachycardia. Cardiology consulted, no additional recommendations were made at this time, tachycardia likely from withdrawal.  Tachycardia resolved prior to discharge.  Patient with a history of anxiety, stating that she takes benzos at home, ISTOP check reference # [id number] did not locate any prescriptions, unclear if patient is getting these prescriptions illicitly. Patient refused any social work assistance for ETOH abuse. Patient stable for discharge to home.  Follow up with PMD.

## 2019-12-16 NOTE — CHART NOTE - NSCHARTNOTEFT_GEN_A_CORE
Patient is a 54y old  Female who presents with a chief complaint of sob (15 Dec 2019 21:09)      Called by RN to evaluate patient for Panic attack that does not resolved by Ativan. Seen and examined at the bedside. Patient  c/o chest pressure that lasted 5 minutes 8/10 and resolved without intervention. She denies SOB, dizziness, nausea and vomiting.    Vital Signs Last 24 Hrs  T(C): 36.6 (15 Dec 2019 20:49), Max: 37.4 (15 Dec 2019 04:26)  T(F): 97.9 (15 Dec 2019 20:49), Max: 99.4 (15 Dec 2019 04:26)  HR: 84 (15 Dec 2019 20:49) (81 - 105)  BP: 116/74 (15 Dec 2019 20:49) (113/70 - 127/85)  BP(mean): --  RR: 18 (15 Dec 2019 20:49) (18 - 18)  SpO2: 96% (15 Dec 2019 20:49) (93% - 99%)                          10.5   6.19  )-----------( 162      ( 15 Dec 2019 09:03 )             31.4     12-15    135  |  88<L>  |  10  ----------------------------<  101<H>  3.0<L>   |  28  |  0.50    Ca    9.0      15 Dec 2019 06:11          PHYSICAL EXAM:  GENERAL: NAD, well-developed  HEAD:  Atraumatic, Normocephalic  EYES: EOMI, PERRLA, conjunctiva and sclera clear  NECK: Supple, No JVD  CHEST/LUNG: Clear to auscultation bilaterally; No wheeze  HEART: Regular rate and rhythm; No murmurs, rubs, or gallops  ABDOMEN: Soft, Nontender, Nondistended; Bowel sounds present  EXTREMITIES:  2+ Peripheral Pulses, No clubbing, cyanosis, or edema  PSYCH: AAOx3  NEUROLOGY: non-focal  SKIN: No rashes or lesions    Assessment/ Plan  Chest pressure resolved  EKG  Cardiac Enzyme  Ativan 2 mg given by RN as per order  Will d/c with attending and NP team in AM  Will continue to monitor        Phone: [phone]

## 2019-12-16 NOTE — PROGRESS NOTE ADULT - ASSESSMENT
54 yr old female with Hx of COPD 3L on home O2, ETOH abuse, breast Ca s/p mastectomy, remote brain aneurysm s/p clip,  rheumatoid and psoriatic arthritis on Otezla p/w sob also with signs of alcohol withdrawal        ·  Problem: Chronic respiratory failure with hypoxia.  P COPD on 3L NC at home likely exacerbation  anxiety,   -flu negative,   CTA negative for acute findings, no PE, PNA, effusion   -c/w supplemental oxygen  -pulm  f.u noted  c/w steroids/ taper    :  ·  Problem: Alcohol withdrawal.   improved   -CIWA   ativan tapered   -MVI, folic acid, thiamine,    ppi  .      ·  Problem: Anxiety.  Plan: anxious likely due to alc/benzo withdrawl  takes benzo at home ISTOP check ref #: [id number] but could not locate any benzo prescriptions, unclear if getting illicitly but  says from PCP   c/w CIWA and ativan taper.             Problem: Swelling of lower extremity.  : likely due to chronic venous insufficiency, poor nutrition,  cards eval noted  diurese as needed      ·  Problem: Breast cancer. Plan: c/w home exemestane 25mg daily  outpatient onc followup, of note has been noncompliant with recommended followups.      ·  Problem: RA (rheumatoid arthritis). Plan: on Otezla,     hyponatremia/improved  renal to f/u     hypokalemia  supp k+      ·  Problem: Prophylactic measure.  Plan: dvt ppx: hsq  PT         d/c planning later today if stable

## 2019-12-16 NOTE — PROGRESS NOTE ADULT - ASSESSMENT
54 year old female with PMH of stage 3 breast cancer on aromasin, RA, Psoriasis on Otezla, brain aneurysm, s/p clip 1988, right jugular DVT, Catheter related MSSA bacteremia 2015,  ETOH abuse, COPD on oxygen presents here with dyspnea, ETOH withdrawal, hyponatremia, hyperkalemia.     1.  Chronic respiratory failure with hypoxia.  -secondary to pulm disease, COPD/ emphysema   -CTA chest negative for PE, + severe emphysema   -cv stable. no decomp chf on exam; no evidence of acs   -management per pulm / med   -recent echo with grossly normal LV function     2. Sinus tachycardia   -resolved and likely secondary to withdrawal, dehydration, anxiety, copd exacerbation  -no evidence of acs , asymptomatic  -recent echo with grossly normal lv function     3. med f/u, CIWA  protocol     4. LE edema   -lasix prn , stable   -LLE doppler neg dvt    dvt ppx     no cv ci to d/c planning 54 year old female with PMH of stage 3 breast cancer on aromasin, RA, Psoriasis on Otezla, brain aneurysm, s/p clip 1988, right jugular DVT, Catheter related MSSA bacteremia 2015,  ETOH abuse, COPD on oxygen presents here with dyspnea, ETOH withdrawal, hyponatremia, hyperkalemia.     1.  Chronic respiratory failure with hypoxia.  -secondary to pulm disease, COPD/ emphysema   -CTA chest negative for PE, + severe emphysema   -cv stable. no decomp chf on exam; no evidence of acs   -management per pulm / med   -recent echo with grossly normal LV function     2. Sinus tachycardia   -resolved and likely secondary to withdrawal, dehydration, anxiety, copd exacerbation  -no evidence of acs , asymptomatic  -recent echo with grossly normal lv function   ~  3. med f/u, CIWA  protocol     4. LE edema   -lasix prn , stable   -LLE doppler neg dvt    dvt ppx     no cv ci to d/c planning

## 2019-12-16 NOTE — PROGRESS NOTE ADULT - SUBJECTIVE AND OBJECTIVE BOX
[facility] - PROGRESS NOTE    CHIEF COMPLAINT: dyspnea; COPD exacerbation; obesity; alcohol dependence/withdrawal    INTERVAL HISTORY: anxious -> short of breath without hypoxemia; no cough, sputum production, chest congestion or wheeze; no fevers, chills or sweats; no chest pain/pressure or palptiations;     REVIEW OF SYSTEMS:  Constitutional: As per interval history  HEENT: Within normal limits  CV: As per interval history  Resp: As per interval history  GI: Within normal limits   : Within normal limits  Musculoskeletal: Within normal limits  Skin: Within normal limits  Neurological: Within normal limits  Psychiatric: Within normal limits  Endocrine: Within normal limits  Hematologic/Lymphatic: Within normal limits  Allergic/Immunologic: Within normal limits    MEDICATIONS:     Pulmonary "  albuterol/ipratropium for Nebulization 3 milliLiter(s) Nebulizer <User Schedule>  buDESOnide    Inhalation Suspension 0.5 milliGRAM(s) Inhalation every 12 hours  montelukast 10 milliGRAM(s) Oral at bedtime    Anti-microbials:    Cardiovascular:    Other:  cholecalciferol 1000 Unit(s) Oral daily  exemestane 25 milliGRAM(s) Oral daily  folic acid 1 milliGRAM(s) Oral daily  heparin  Injectable 5000 Unit(s) SubCutaneous every 12 hours  LORazepam     Tablet 0.5 milliGRAM(s) Oral every 12 hours  LORazepam     Tablet   Oral   multivitamin 1 Tablet(s) Oral daily  pantoprazole    Tablet 40 milliGRAM(s) Oral before breakfast  predniSONE   Tablet 40 milliGRAM(s) Oral daily    MEDICATIONS  (PRN):  LORazepam   Injectable 2 milliGRAM(s) IV Push every 2 hours PRN Symptom-triggered: each CIWA -Ar score 8 or GREATER        OBJECTIVE:    I&O's Detail    15 Dec 2019 07:01  -  16 Dec 2019 07:00  --------------------------------------------------------  IN:    Oral Fluid: 330 mL  Total IN: 330 mL    OUT:    Voided: 500 mL  Total OUT: 500 mL    Total NET: -170 mL      16 Dec 2019 07:01  -  16 Dec 2019 11:34  --------------------------------------------------------  IN:    Oral Fluid: 120 mL  Total IN: 120 mL    OUT:  Total OUT: 0 mL    Total NET: 120 mL    PHYSICAL EXAM:       ICU Vital Signs Last 24 Hrs  T(C): 36.7 (16 Dec 2019 04:37), Max: 36.8 (16 Dec 2019 00:32)  T(F): 98.1 (16 Dec 2019 04:37), Max: 98.2 (16 Dec 2019 00:32)  HR: 87 (16 Dec 2019 04:37) (81 - 87)  BP: 118/76 (16 Dec 2019 04:37) (110/65 - 134/81)  BP(mean): --  ABP: --  ABP(mean): --  RR: 18 (16 Dec 2019 04:37) (18 - 18)  SpO2: 100% (16 Dec 2019 04:37) (93% - 100%) on 4lpm nasal canula     General: Awake. Alert. Cooperative. No distress. Appears stated age.	  HEENT: Atraumatic. Normocephalic. Anicteric. Normal oral mucosa. PERRL. EOMI. Decreased psoriatic plaque on scalp.  Neck: Supple. Trachea midline. Thyroid without enlargement/tenderness/nodules. No carotid bruit. No JVD.  Cardiovascular: Regular rate and rhythm. S1 S2 normal. No murmurs, rubs or gallops.  Respiratory: Respirations unlabored. Decreased breath sounds throughout. No wheeze. No rales. No curvature.  Abdomen: Soft. Non-tender. Non-distended. No organomegaly. No masses. Normal bowel sounds. Obese.  Extremities: Warm to touch. No clubbing or cyanosis. Mild bilateral lower extremity edema up to the knees.  Pulses: 2+ peripheral pulses all extremities.	  Skin: Venous stasis changes of both lower extremities. Multiple areas of skin hyperpigmentation at areas of prior plaque.  Lymph Nodes: Cervical, supraclavicular and axillary nodes normal  Neurological: Motor and sensory examination equal and normal. A and O x 3  Psychiatry: Appropriate mood and affect.    LABS:                          10.6   6.14  )-----------( 170      ( 16 Dec 2019 08:20 )             31.9     CBC    WBC  6.14 <==, 6.19 <==, 4.66 <==, 8.87 <==    Hemoglobin  10.6 <<==, 10.5 <<==, 11.3 <<==, 11.4 <<==    Hematocrit  31.9 <==, 31.4 <==, 34.2 <==, 35.3 <==    Platelets  170 <==, 162 <==, 183 <==, 177 <==      130<L>  |  88<L>  |  12  ----------------------------<  138<H>    12-16  4.6   |  26  |  0.50      LYTES    sodium  130 <==, 135 <==, 129 <==, 132 <==, 137 <==, 136 <==    potassium   4.6 <==, 3.0 <==, 3.9 <==, 4.3 <==, 3.9 <==, 5.9 <==    chloride  88 <==, 88 <==, 85 <==, 88 <==, 89 <==, 90 <==    carbon dioxide  26 <==, 28 <==, 29 <==, 26 <==, 26 <==, 28 <==    =============================================================================================  RENAL FUNCTION:    Creatinine:   0.50  <<==, 0.50  <<==, 0.53  <<==, 0.41  <<==, 0.41  <<==, 0.41  <<==    BUN:   12 <==, 10 <==, 6 <==, 7 <==, 7 <==, 6 <==    ============================================================================================    calcium   8.9 <==, 9.0 <==, 8.9 <==, 8.7 <==, 8.9 <==, 8.7 <==    phos   3.5 <==, 3.4 <==    mag   1.8 <==, 2.5 <==, 1.6 <==    ============================================================================================  LFTs    AST:   40 <== , 44 <== , 74 <==     ALT:  22  <== , 22  <== , 29  <==     AP:  60  <=, 61  <=, 61  <=    Bili:  1.0  <=, 0.6  <=, 0.5  <=      PT/INR - ( 11 Dec 2019 12:43 )   PT: 10.7 sec;   INR: 0.94 ratio      PTT - ( 11 Dec 2019 12:43 )  PTT:28.7 sec    Venous Blood Gas:  12-11 @ 12:43  7.34/69/38/36/55  VBG Lactate: 3.3    ABG - ( 12 Dec 2019 23:55 )  pH: 7.53  /  pCO2: 39    /  pO2: 82    / HCO3: 33    / Base Excess: 9.3   /  SaO2: 98        Serum Pro-Brain Natriuretic Peptide: 29 pg/mL (12-11 @ 12:43)    CARDIAC MARKERS ( 11 Dec 2019 15:41 )  CPK x     /CKMB x     /CKMB Units x        troponin 17 ng/L    CARDIAC MARKERS ( 11 Dec 2019 12:43 )  CPK x     /CKMB x     /CKMB Units x        troponin 18 ng/L    Patient name: FREDY CHOI  YOB: 1965   Age: 54 (F)   MR#: [id number]  Study Date: 10/7/2019  [facility]  Study quality: Technically difficult  Referring Physician: Canelo Bonds MD  BloodPressure: 122/80 mmHg  Height: 165 cm  Weight: 68 kg  BSA: 1.8 m2  ------------------------------------------------------------------------  PROCEDURE: Transthoracic echocardiogram with 2-D, M-Mode  and complete spectral and color flow Doppler. Verbal  consent was obtained for injection of  Ultrasonic Enhancing  Agent following a discussion of risks and benefits.  Following intravenous injection of Ultrasonic Enhancing  Agent , harmonic imaging was performed.  INDICATION: Shortness of breath (R06.02)  ------------------------------------------------------------------------  Observations:  Left Ventricle: Endocardium not well visualized; grossly  normal left ventricular systolic function. Endocardial  visualization enhanced with intravenous injection of  Ultrasonic Enhancing Agent (Definity). Normal left  ventricular internal dimensions and wall thicknesses.  Normal diastolic function  ------------------------------------------------------------------------  Conclusions:  1. Normal left ventricular internal dimensions and wall  thicknesses.  2. Endocardium not well visualized; grossly normal left  ventricular systolic function. Endocardial visualization  enhanced with intravenous injection of Ultrasonic Enhancing  Agent (Definity). Unable to perform strain imaging due to  limited clinical windows.  ------------------------------------------------------------------------  Confirmed on  10/7/2019 - 18:11:26 by Margaret Alvarez M.D.  ------------------------------------------------------------------------  ---------------------------------------------------------------------------------------------------------------    MICROBIOLOGY:     Culture - Blood (12.11.19 @ 16:44)    Specimen Source: .Blood Blood-Peripheral    Culture Results:   No growth to date.    Culture - Blood (12.11.19 @ 16:44)    Specimen Source: .Blood Blood-Peripheral    Culture Results:   No growth to date.    RADIOLOGY:  [x] Chest radiographs reviewed and interpreted by me    EXAM:  XR CHEST AP OR PA 1V                          PROCEDURE DATE:  12/11/2019      INTERPRETATION:  A single chest x-ray was obtained on December 11, 2019.    Indication: Shortness of breath.    Impression:    The heart is normal in size. The lungs are clear. No acute pathology seen   in the visualized bones.    PARESH GALEAS M.D., ATTENDING RADIOLOGIST  This document has been electronically signed. Dec 11 2019  2:57PM  ---------------------------------------------------------------------------------------------------------------    EXAM:  CT ANGIO CHEST (W)AW IC                          PROCEDURE DATE:  12/11/2019      INTERPRETATION:  CLINICAL INFORMATION: Shortness of breath. History of   cancer. Recent pulmonary embolism.    COMPARISON: CT chest 11/14/2019.    PROCEDURE:   CT Angiography of the Chest.  90 ml of Omnipaque 350 was injected intravenously. 10 ml were discarded.  Sagittal and coronal reformats were performed as well as 3D (MIP)   reconstructions.    FINDINGS:    LUNGS AND AIRWAYS: Secretions within the bronchus intermedius. No   endobronchial lesions.  Patchy right lower lobe opacity likely reflecting   atelectasis. Severe centrilobular emphysema.    PLEURA: No pleural effusion.    MEDIASTINUM AND CHESTER: No lymphadenopathy.    VESSELS: No main, right, or left pulmonary embolism. Evaluation of some   of the lobar, segmental, and subsegmental pulmonary arteries is limited   secondary to bolus timing and respiratory motion artifact    HEART: Heart size is normal. No pericardial effusion.    CHEST WALL AND LOWER NECK: Within normal limits.    VISUALIZED UPPER ABDOMEN: Within normal limits.    BONES: Mild degenerative changes.    IMPRESSION:     No right or left main pulmonary embolism. Evaluation of the distal   pulmonary arteries is limited secondary to bolus timing and respiratory   motion artifact.    ZULEYKA NUÑEZ M.D., RADIOLOGY RESIDENT  This document has been electronically signed.  LAURIE NGO M.D., ATTENDING RADIOLOGIST  This document has been electronically signed. Dec 11 2019  4:33PM  ---------------------------------------------------------------------------------------------------------------

## 2019-12-31 ENCOUNTER — INPATIENT (INPATIENT)
Facility: HOSPITAL | Age: 54
LOS: 6 days | Discharge: NURSING, REHAB OR LONG-TERM CARE | DRG: 191 | End: 2020-01-07
Attending: INTERNAL MEDICINE | Admitting: INTERNAL MEDICINE
Payer: COMMERCIAL

## 2019-12-31 VITALS
HEART RATE: 111 BPM | SYSTOLIC BLOOD PRESSURE: 140 MMHG | OXYGEN SATURATION: 99 % | WEIGHT: 154.98 LBS | DIASTOLIC BLOOD PRESSURE: 95 MMHG | TEMPERATURE: 99 F | RESPIRATION RATE: 24 BRPM | HEIGHT: 60 IN

## 2019-12-31 DIAGNOSIS — Z90.13 ACQUIRED ABSENCE OF BILATERAL BREASTS AND NIPPLES: Chronic | ICD-10-CM

## 2019-12-31 DIAGNOSIS — Z90.10 ACQUIRED ABSENCE OF UNSPECIFIED BREAST AND NIPPLE: Chronic | ICD-10-CM

## 2019-12-31 DIAGNOSIS — F10.239 ALCOHOL DEPENDENCE WITH WITHDRAWAL, UNSPECIFIED: ICD-10-CM

## 2019-12-31 LAB
ALBUMIN SERPL ELPH-MCNC: 4.1 G/DL — SIGNIFICANT CHANGE UP (ref 3.3–5)
ALP SERPL-CCNC: 75 U/L — SIGNIFICANT CHANGE UP (ref 40–120)
ALT FLD-CCNC: 65 U/L — HIGH (ref 10–45)
ANION GAP SERPL CALC-SCNC: 24 MMOL/L — HIGH (ref 5–17)
APTT BLD: 27.9 SEC — SIGNIFICANT CHANGE UP (ref 27.5–36.3)
AST SERPL-CCNC: 76 U/L — HIGH (ref 10–40)
BASOPHILS # BLD AUTO: 0.03 K/UL — SIGNIFICANT CHANGE UP (ref 0–0.2)
BASOPHILS NFR BLD AUTO: 0.5 % — SIGNIFICANT CHANGE UP (ref 0–2)
BILIRUB SERPL-MCNC: 0.4 MG/DL — SIGNIFICANT CHANGE UP (ref 0.2–1.2)
BUN SERPL-MCNC: <4 MG/DL — LOW (ref 7–23)
CALCIUM SERPL-MCNC: 8.6 MG/DL — SIGNIFICANT CHANGE UP (ref 8.4–10.5)
CHLORIDE SERPL-SCNC: 85 MMOL/L — LOW (ref 96–108)
CO2 SERPL-SCNC: 23 MMOL/L — SIGNIFICANT CHANGE UP (ref 22–31)
CREAT SERPL-MCNC: 0.39 MG/DL — LOW (ref 0.5–1.3)
EOSINOPHIL # BLD AUTO: 0.24 K/UL — SIGNIFICANT CHANGE UP (ref 0–0.5)
EOSINOPHIL NFR BLD AUTO: 4.2 % — SIGNIFICANT CHANGE UP (ref 0–6)
ETHANOL SERPL-MCNC: 335 MG/DL — HIGH (ref 0–10)
GAS PNL BLDV: SIGNIFICANT CHANGE UP
GAS PNL BLDV: SIGNIFICANT CHANGE UP
GLUCOSE BLDC GLUCOMTR-MCNC: 132 MG/DL — HIGH (ref 70–99)
GLUCOSE SERPL-MCNC: 108 MG/DL — HIGH (ref 70–99)
HCT VFR BLD CALC: 34.7 % — SIGNIFICANT CHANGE UP (ref 34.5–45)
HGB BLD-MCNC: 11.4 G/DL — LOW (ref 11.5–15.5)
IMM GRANULOCYTES NFR BLD AUTO: 0.7 % — SIGNIFICANT CHANGE UP (ref 0–1.5)
INR BLD: 0.83 RATIO — LOW (ref 0.88–1.16)
LYMPHOCYTES # BLD AUTO: 1.12 K/UL — SIGNIFICANT CHANGE UP (ref 1–3.3)
LYMPHOCYTES # BLD AUTO: 19.4 % — SIGNIFICANT CHANGE UP (ref 13–44)
MCHC RBC-ENTMCNC: 32.9 GM/DL — SIGNIFICANT CHANGE UP (ref 32–36)
MCHC RBC-ENTMCNC: 34.4 PG — HIGH (ref 27–34)
MCV RBC AUTO: 104.8 FL — HIGH (ref 80–100)
MONOCYTES # BLD AUTO: 0.51 K/UL — SIGNIFICANT CHANGE UP (ref 0–0.9)
MONOCYTES NFR BLD AUTO: 8.8 % — SIGNIFICANT CHANGE UP (ref 2–14)
NEUTROPHILS # BLD AUTO: 3.84 K/UL — SIGNIFICANT CHANGE UP (ref 1.8–7.4)
NEUTROPHILS NFR BLD AUTO: 66.4 % — SIGNIFICANT CHANGE UP (ref 43–77)
NRBC # BLD: 0 /100 WBCS — SIGNIFICANT CHANGE UP (ref 0–0)
PLATELET # BLD AUTO: 186 K/UL — SIGNIFICANT CHANGE UP (ref 150–400)
POTASSIUM SERPL-MCNC: 4.6 MMOL/L — SIGNIFICANT CHANGE UP (ref 3.5–5.3)
POTASSIUM SERPL-SCNC: 4.6 MMOL/L — SIGNIFICANT CHANGE UP (ref 3.5–5.3)
PROT SERPL-MCNC: 7.1 G/DL — SIGNIFICANT CHANGE UP (ref 6–8.3)
PROTHROM AB SERPL-ACNC: 9.5 SEC — LOW (ref 10–12.9)
RBC # BLD: 3.31 M/UL — LOW (ref 3.8–5.2)
RBC # FLD: 14.4 % — SIGNIFICANT CHANGE UP (ref 10.3–14.5)
SODIUM SERPL-SCNC: 132 MMOL/L — LOW (ref 135–145)
TROPONIN T, HIGH SENSITIVITY RESULT: 13 NG/L — SIGNIFICANT CHANGE UP (ref 0–51)
TROPONIN T, HIGH SENSITIVITY RESULT: 14 NG/L — SIGNIFICANT CHANGE UP (ref 0–51)
WBC # BLD: 5.78 K/UL — SIGNIFICANT CHANGE UP (ref 3.8–10.5)
WBC # FLD AUTO: 5.78 K/UL — SIGNIFICANT CHANGE UP (ref 3.8–10.5)

## 2019-12-31 PROCEDURE — 71045 X-RAY EXAM CHEST 1 VIEW: CPT | Mod: 26

## 2019-12-31 PROCEDURE — 93010 ELECTROCARDIOGRAM REPORT: CPT | Mod: NC

## 2019-12-31 PROCEDURE — 70450 CT HEAD/BRAIN W/O DYE: CPT | Mod: 26

## 2019-12-31 PROCEDURE — 99285 EMERGENCY DEPT VISIT HI MDM: CPT

## 2019-12-31 RX ORDER — ALPRAZOLAM 0.25 MG
0.25 TABLET ORAL ONCE
Refills: 0 | Status: DISCONTINUED | OUTPATIENT
Start: 2019-12-31 | End: 2019-12-31

## 2019-12-31 RX ORDER — IPRATROPIUM/ALBUTEROL SULFATE 18-103MCG
3 AEROSOL WITH ADAPTER (GRAM) INHALATION ONCE
Refills: 0 | Status: COMPLETED | OUTPATIENT
Start: 2019-12-31 | End: 2019-12-31

## 2019-12-31 RX ORDER — SODIUM CHLORIDE 9 MG/ML
1000 INJECTION, SOLUTION INTRAVENOUS
Refills: 0 | Status: DISCONTINUED | OUTPATIENT
Start: 2019-12-31 | End: 2020-01-02

## 2019-12-31 RX ORDER — ONDANSETRON 8 MG/1
4 TABLET, FILM COATED ORAL ONCE
Refills: 0 | Status: COMPLETED | OUTPATIENT
Start: 2019-12-31 | End: 2019-12-31

## 2019-12-31 RX ORDER — PANTOPRAZOLE SODIUM 20 MG/1
40 TABLET, DELAYED RELEASE ORAL
Refills: 0 | Status: DISCONTINUED | OUTPATIENT
Start: 2019-12-31 | End: 2020-01-07

## 2019-12-31 RX ORDER — FOLIC ACID 0.8 MG
1 TABLET ORAL DAILY
Refills: 0 | Status: DISCONTINUED | OUTPATIENT
Start: 2019-12-31 | End: 2020-01-07

## 2019-12-31 RX ORDER — ALBUTEROL 90 UG/1
1.25 AEROSOL, METERED ORAL EVERY 6 HOURS
Refills: 0 | Status: DISCONTINUED | OUTPATIENT
Start: 2019-12-31 | End: 2019-12-31

## 2019-12-31 RX ORDER — BUDESONIDE, MICRONIZED 100 %
0.25 POWDER (GRAM) MISCELLANEOUS EVERY 12 HOURS
Refills: 0 | Status: DISCONTINUED | OUTPATIENT
Start: 2019-12-31 | End: 2020-01-07

## 2019-12-31 RX ORDER — CHOLECALCIFEROL (VITAMIN D3) 125 MCG
1000 CAPSULE ORAL DAILY
Refills: 0 | Status: DISCONTINUED | OUTPATIENT
Start: 2019-12-31 | End: 2020-01-07

## 2019-12-31 RX ORDER — HEPARIN SODIUM 5000 [USP'U]/ML
5000 INJECTION INTRAVENOUS; SUBCUTANEOUS EVERY 12 HOURS
Refills: 0 | Status: DISCONTINUED | OUTPATIENT
Start: 2019-12-31 | End: 2020-01-07

## 2019-12-31 RX ORDER — SODIUM CHLORIDE 9 MG/ML
1000 INJECTION INTRAMUSCULAR; INTRAVENOUS; SUBCUTANEOUS
Refills: 0 | Status: DISCONTINUED | OUTPATIENT
Start: 2019-12-31 | End: 2020-01-02

## 2019-12-31 RX ORDER — IPRATROPIUM BROMIDE 0.2 MG/ML
500 SOLUTION, NON-ORAL INHALATION EVERY 6 HOURS
Refills: 0 | Status: DISCONTINUED | OUTPATIENT
Start: 2019-12-31 | End: 2019-12-31

## 2019-12-31 RX ORDER — SODIUM CHLORIDE 9 MG/ML
1000 INJECTION INTRAMUSCULAR; INTRAVENOUS; SUBCUTANEOUS ONCE
Refills: 0 | Status: COMPLETED | OUTPATIENT
Start: 2019-12-31 | End: 2019-12-31

## 2019-12-31 RX ORDER — MONTELUKAST 4 MG/1
10 TABLET, CHEWABLE ORAL AT BEDTIME
Refills: 0 | Status: DISCONTINUED | OUTPATIENT
Start: 2019-12-31 | End: 2020-01-07

## 2019-12-31 RX ORDER — IPRATROPIUM/ALBUTEROL SULFATE 18-103MCG
3 AEROSOL WITH ADAPTER (GRAM) INHALATION EVERY 6 HOURS
Refills: 0 | Status: DISCONTINUED | OUTPATIENT
Start: 2019-12-31 | End: 2020-01-07

## 2019-12-31 RX ADMIN — ONDANSETRON 4 MILLIGRAM(S): 8 TABLET, FILM COATED ORAL at 11:45

## 2019-12-31 RX ADMIN — Medication 0.25 MILLIGRAM(S): at 09:13

## 2019-12-31 RX ADMIN — Medication 0.25 MILLIGRAM(S): at 18:40

## 2019-12-31 RX ADMIN — Medication 3 MILLILITER(S): at 04:50

## 2019-12-31 RX ADMIN — MONTELUKAST 10 MILLIGRAM(S): 4 TABLET, CHEWABLE ORAL at 22:20

## 2019-12-31 RX ADMIN — Medication 1000 UNIT(S): at 17:00

## 2019-12-31 RX ADMIN — Medication 3 MILLILITER(S): at 17:00

## 2019-12-31 RX ADMIN — Medication 3 MILLILITER(S): at 22:20

## 2019-12-31 RX ADMIN — Medication 1 MILLIGRAM(S): at 17:00

## 2019-12-31 RX ADMIN — Medication 40 MILLIGRAM(S): at 22:20

## 2019-12-31 RX ADMIN — Medication 1 TABLET(S): at 17:00

## 2019-12-31 RX ADMIN — ALBUTEROL 1.25 MILLIGRAM(S): 90 AEROSOL, METERED ORAL at 15:31

## 2019-12-31 RX ADMIN — Medication 40 MILLIGRAM(S): at 15:59

## 2019-12-31 RX ADMIN — Medication 2 MILLIGRAM(S): at 17:00

## 2019-12-31 RX ADMIN — SODIUM CHLORIDE 1000 MILLILITER(S): 9 INJECTION INTRAMUSCULAR; INTRAVENOUS; SUBCUTANEOUS at 05:51

## 2019-12-31 RX ADMIN — SODIUM CHLORIDE 70 MILLILITER(S): 9 INJECTION INTRAMUSCULAR; INTRAVENOUS; SUBCUTANEOUS at 17:11

## 2019-12-31 RX ADMIN — Medication 2 MILLIGRAM(S): at 20:46

## 2019-12-31 RX ADMIN — HEPARIN SODIUM 5000 UNIT(S): 5000 INJECTION INTRAVENOUS; SUBCUTANEOUS at 17:00

## 2019-12-31 RX ADMIN — SODIUM CHLORIDE 100 MILLILITER(S): 9 INJECTION, SOLUTION INTRAVENOUS at 18:40

## 2019-12-31 RX ADMIN — Medication 2 MILLIGRAM(S): at 11:45

## 2019-12-31 NOTE — CONSULT NOTE ADULT - SUBJECTIVE AND OBJECTIVE BOX
CARDIOLOGY CONSULT - Dr. Nj     CHIEF COMPLAINT: sob, etoh     HPI:  54y female with COPD on home O2 3L, Breast CA s/p mastectomy, EtOH abuse, brain aneurysm s/p clip, RA, psoriatic arthritis presenting with sob and some chest discomfort. Pt. states she is still drinking, aprox  Patient drinks 5-6beers a day, States she wants to go home tonight. Chest pain resolved, still feeling SOB on nasal cannula.  Patient denies any chest pain, dyspnea, palpitations, cough, syncope, edema, nausea, abdominal pain, fever, chills,  or rash.      PAST MEDICAL & SURGICAL HISTORY:  Prophylactic measure  Breast cancer  Brain aneurysm: with clips  Psoriasis  RA (rheumatoid arthritis)  Psoriasis  Breast cancer, stage 3  History of modified radical mastectomy of both breasts  S/P bilateral mastectomy  Brain aneurysm: 1988 two clips          PREVIOUS DIAGNOSTIC TESTING:    [ ] Echocardiogram:< from: TTE with Doppler (w/Cont) (10.07.19 @ 15:16) >  Conclusions:  1. Normal left ventricular internal dimensions and wall  thicknesses.  2. Endocardium not well visualized; grossly normal left  ventricular systolic function. Endocardial visualization  enhanced with intravenous injection of Ultrasonic Enhancing  Agent (Definity). Unable to perfrom strain imaging due to  limited clinical windows.    < end of copied text >     [ ]  Catheterization:   [ ] Stress Test:  	    MEDICATIONS:  MEDICATIONS  (STANDING):  ALBUTerol   0.042% 1.25 milliGRAM(s) Nebulizer every 6 hours  buDESOnide    Inhalation Suspension 0.25 milliGRAM(s) Inhalation every 12 hours  cholecalciferol 1000 Unit(s) Oral daily  folic acid 1 milliGRAM(s) Oral daily  heparin  Injectable 5000 Unit(s) SubCutaneous every 12 hours  LORazepam     Tablet   Oral   LORazepam     Tablet 2 milliGRAM(s) Oral every 4 hours  methylPREDNISolone sodium succinate Injectable 40 milliGRAM(s) IV Push every 8 hours  montelukast 10 milliGRAM(s) Oral at bedtime  multivitamin 1 Tablet(s) Oral daily  pantoprazole    Tablet 40 milliGRAM(s) Oral before breakfast  sodium chloride 0.9%. 1000 milliLiter(s) (70 mL/Hr) IV Continuous <Continuous>      FAMILY HISTORY:  FH: CHF (congestive heart failure): Mother      SOCIAL HISTORY:    [x ] Non-smoker  [ ] Smoker  [ ] Alcohol    Allergies    amoxicillin (Anaphylaxis)  Levaquin (Other; Anaphylaxis)  Levaquin (Unknown)  penicillin (Anaphylaxis)  penicillins (Anaphylaxis)    Intolerances    	    REVIEW OF SYSTEMS:  CONSTITUTIONAL: No fever, weight loss, or fatigue  EYES: No eye pain, visual disturbances, or discharge  ENMT:  No difficulty hearing, tinnitus, vertigo; No sinus or throat pain  NECK: No pain or stiffness  RESPIRATORY: No cough, wheezing, chills or hemoptysis;+ Shortness of Breath  CARDIOVASCULAR: No chest pain, palpitations, passing out, dizziness, or leg swelling  GASTROINTESTINAL: No abdominal or epigastric pain. No nausea, vomiting, or hematemesis; No diarrhea or constipation. No melena or hematochezia.  GENITOURINARY: No dysuria, frequency, hematuria, or incontinence  NEUROLOGICAL: No headaches, memory loss, loss of strength, numbness, or tremors  SKIN: No itching, burning, rashes, or lesions   	    [ x] All others negative	  [ ] Unable to obtain    PHYSICAL EXAM:  T(C): 37.2 (12-31-19 @ 11:17), Max: 37.2 (12-31-19 @ 11:17)  HR: 112 (12-31-19 @ 12:52) (107 - 125)  BP: 118/72 (12-31-19 @ 12:52) (118/72 - 165/103)  RR: 21 (12-31-19 @ 12:52) (20 - 24)  SpO2: 98% (12-31-19 @ 12:52) (92% - 100%)  Wt(kg): --  I&O's Summary      Appearance: Normal	  Psychiatry: A & O x 3, Mood & affect appropriate  HEENT:   Normal oral mucosa, PERRL, EOMI	  Lymphatic: No lymphadenopathy  Cardiovascular: Normal S1 S2,RRR, No JVD, No murmurs  Respiratory: Lungs clear to auscultation	  Gastrointestinal:  Soft, Non-tender, + BS	  Skin: No rashes, No ecchymoses, No cyanosis	  Neurologic: Non-focal  Extremities: Normal range of motion, No clubbing, cyanosis or edema  Vascular: Peripheral pulses palpable 2+ bilaterally    TELEMETRY: st	    ECG:  	  RADIOLOGY: < from: Xray Chest 1 View- PORTABLE-Urgent (12.31.19 @ 05:26) >  IMPRESSION:     Clear lungs.    < end of copied text >    OTHER: 	  	  LABS:	 	    CARDIAC MARKERS:                                  11.4   5.78  )-----------( 186      ( 31 Dec 2019 04:52 )             34.7     12-31    132<L>  |  85<L>  |  <4<L>  ----------------------------<  108<H>  4.6   |  23  |  0.39<L>    Ca    8.6      31 Dec 2019 04:52  Phos  3.3     12-31  Mg     1.7     12-31    TPro  7.1  /  Alb  4.1  /  TBili  0.4  /  DBili  x   /  AST  76<H>  /  ALT  65<H>  /  AlkPhos  75  12-31    PT/INR - ( 31 Dec 2019 04:52 )   PT: 9.5 sec;   INR: 0.83 ratio         PTT - ( 31 Dec 2019 04:52 )  PTT:27.9 sec  proBNP:   Lipid Profile:   HgA1c:   TSH:

## 2019-12-31 NOTE — ED PROVIDER NOTE - SEVERE SEPSIS ALERT DETAILS
Attending note (Kevyn): At this time, a diagnosis of sepsis is not supported by the overall clinical picture.

## 2019-12-31 NOTE — H&P ADULT - ASSESSMENT
pt w/ hx copd / etoh abuse / wcopd exac and intoxication  mult admissions for above  ciwa protocol  iv steroids  pulm eval  nebs   o2  dvt proph  will have cards eval  pt continues to drink   risks explained again to pt   pt

## 2019-12-31 NOTE — ED ADULT NURSE REASSESSMENT NOTE - NS ED NURSE REASSESS COMMENT FT1
Pt. went for CT.
pt changed and cleaned, extensive skin breakdown noted to sacral area, posterior upper thighs and groin area, barrier cream applied and allevyn on sacrum
pt provided with alpralozam 0.25mg, per md's orders for increased anxiety.  FT=539 and pt states that she cannot breathe.  Md Nova aware.  will continue to monitor.
pt sleeping, ciwa improved, ready to move tele admit
Recvd pt at 0715 awake, alert and responsive to all stimuli but c/o anxiety.  per pt, she takes xanax 0.25mg at home and needs it now.  Md Nova notified.  no meds ordered at this time bc pt remains clinically intoxicated.  pt resting in bed awaiting md reeval.  will continue to monitor.

## 2019-12-31 NOTE — CONSULT NOTE ADULT - ASSESSMENT
54 year old female with PMH of stage 3 breast cancer on aromasin, RA, Psoriasis on Otezla, brain aneurysm, s/p clip 1988, right jugular DVT, Catheter related MSSA bacteremia 2015,  multiple admissions for ETOH abuse, COPD on oxygen presents here with dyspnea, ETOH withdrawal.     1.  Chronic respiratory failure with hypoxia.  -secondary to pulm disease, COPD/ emphysema   -cxr w/ clear lungs   -cv stable. no decomp chf on exam; no evidence of acs   -management per med/ pulm  -iv steroids   -recent echo with grossly normal LV function     2. Sinus tachycardia   - secondary to withdrawal, dehydration, anxiety, copd exacerbation  -no evidence of acs , asymptomatic  -recent echo with grossly normal lv function     3. med f/u, CIWA  protocol   pt. continues to drink despite multiple admissions  aware of risks of continued drinking     4. LE edema   -lasix prn , stable   -LLE doppler neg dvt    dvt ppx 54 year old female with PMH of stage 3 breast cancer on aromasin, RA, Psoriasis on Otezla, brain aneurysm, s/p clip 1988, right jugular DVT, Catheter related MSSA bacteremia 2015,  multiple admissions for ETOH abuse, COPD on oxygen presents here with dyspnea, ETOH withdrawal.     1.  Chronic respiratory failure with hypoxia.  -secondary to pulm disease, COPD/ emphysema   -cxr w/ clear lungs   -cv stable. no decomp chf on exam; no evidence of acs   -management per med/ pulm  -iv steroids   -recent echo with grossly normal LV function     2. Sinus tachycardia   - secondary to withdrawal, dehydration, anxiety, copd exacerbation  -no evidence of acs , asymptomatic  -recent echo with grossly normal lv function     3. med f/u, CIWA  protocol   pt. continues to drink despite multiple admissions  aware of risks of continued drinking       dvt ppx

## 2019-12-31 NOTE — ED ADULT NURSE NOTE - OBJECTIVE STATEMENT
53 y/o F A&Ox3 with PMH of COPD, breast CA (in remission), brain aneurysm, psoriasis, RA, presents to the ED via EMS from home c/o worsening SOB, weakness, cough, and chest discomfort. Pt. reports SOB has been worsening with productive cough over last 3-4 days. Reports brownish color phlegm. States she has been feeling weak over the last 2 days. Today developed chest discomfort with slightly slurred speech. Family reports pt. speech is sometimes slurred. Reports pt. drank about 6 beers prior to arrival over coarse of the day. Last drink was around 2230. Pt. was ambulating earlier today. Family reports pt. had more trouble ambulating without assistance as the day went on. Pt. was recently 12/16 with similar symptoms. Pt. on 3L home O2 for COPD. Sating 99% on 3L. Pt. tachy to the 100s. Placed on CM. IV access obtained. Safety and comfort provided. Family at bedside.

## 2019-12-31 NOTE — ED ADULT NURSE NOTE - NSIMPLEMENTINTERV_GEN_ALL_ED
Implemented All Fall Risk Interventions:  [place] to call system. Call bell, personal items and telephone within reach. Instruct patient to call for assistance. Room bathroom lighting operational. Non-slip footwear when patient is off stretcher. Physically safe environment: no spills, clutter or unnecessary equipment. Stretcher in lowest position, wheels locked, appropriate side rails in place. Provide visual cue, wrist band, yellow gown, etc. Monitor gait and stability. Monitor for mental status changes and reorient to person, place, and time. Review medications for side effects contributing to fall risk. Reinforce activity limits and safety measures with patient and family.

## 2019-12-31 NOTE — H&P ADULT - NEUROLOGICAL DETAILS
alert and oriented x 3/responds to pain/sensation intact/strength decreased/responds to verbal commands

## 2019-12-31 NOTE — CONSULT NOTE ADULT - ATTENDING COMMENTS
agree with the above assessment and plan by NP  Pt known to us from prior admissions  54 year old female with PMH of stage 3 breast cancer on aromasin, RA, Psoriasis on Otezla, brain aneurysm, s/p clip 1988, right jugular DVT, Catheter related MSSA bacteremia 2015,  ETOH abuse, COPD on oxygen presents here with dyspnea due to COPD and ETOH withdrawal  nebs per pulm  recent echo w normal lvef  prn iv lasix  CIWA protocol for ETOH abuse

## 2019-12-31 NOTE — ED PROVIDER NOTE - PHYSICAL EXAMINATION
Gen: AAOx3, intoxicated appearing  Head: NCAT  HEENT: EOMI, oral mucosa moist, normal conjunctiva  Lung: CTAB, no respiratory distress, no wheezes/rhonchi/rales B/L, speaking in full sentences  CV: RRR, no murmurs, rubs or gallops  Abd: soft, NTND, no guarding, no CVA tenderness  MSK: no visible deformities  Neuro: No focal sensory or motor deficits, +slurred speech, CN2-12 grossly intact  Skin: Warm, well perfused, no rash  Psych: normal affect.   ~Jimmie Alexandra PGY2

## 2019-12-31 NOTE — ED PROVIDER NOTE - ATTENDING CONTRIBUTION TO CARE
54y F w/ h/o COPD on home O2 3L, Breast CA s/p mastectomy, EtOH abuse, brain aneurysm s/p clip, RA, psoratic arthritis presenting with chest pain. Started at 6pm, chest pain lasted for 5 minutes, also had SOB, she also had slurred speech. Chest pain resolved, still feeling SOB and still having slurred speech. No fevers, chills, cough, abdominal pain, numbness weakness. +bilateral leg pain. Patient drinks 5-6beers a day, last drink around 5:30pm.    On Physical Exam:  General: well appearing, in NAD, slurred speech, AOB / clinically intoxicated  HEENT: PERRL, MMM  Neck: no neck tenderness, no nuchal rigidity  Cardiac: normal s1, s2; RRR; no MGR  Lungs: CTABL  Abdomen: soft nontender/nondistended  : no bladder tenderness or distension  Skin: intact, no rash  Extremities: no peripheral edema, no gross deformities  Neuro: no gross neurologic deficits; no facial asymmetry; CN II-XII grossly intact, moving all extremities.    AP: Chest pain, slurred speech in setting of alcohol intoxication: no objective neurologic deficits to suggest acute CVA; CP cardiac vs GI (gastritis from alcohol most likely); obtain labs (cbc, cmp, trop), cxr, etoh level.  symptom control.  reassess pending clinical sobriety.

## 2019-12-31 NOTE — ED PROVIDER NOTE - CLINICAL SUMMARY MEDICAL DECISION MAKING FREE TEXT BOX
54y female with chest pain, SOB, and slurred speech. Intoxicated appearing. Concern for acs, COPD exacerbation 54y female with chest pain, SOB, and slurred speech. Intoxicated appearing. Concern for acs, COPD exacerbation, alcohol intoxication. Will get labs, ct head, chest xray, reassess when sober.

## 2019-12-31 NOTE — ED PROVIDER NOTE - OBJECTIVE STATEMENT
54y female with COPD on home O2 3L, Breast CA s/p mastectomy, EtOH abuse, brain aneurysm s/p clip, RA, psoratic arthritis presenting with chest pain. Started at 6pm, chest pain lasted for 5 minutes, also had SOB, she also had slurred speech. Chest pain resolved, still feeling SOB and still having slurred speech. No fevers, chills, cough, abdominal pain, numbness weakness. +bilateral leg pain. Patient drinks 5-6beers a day, last drink around 5:30pm.

## 2019-12-31 NOTE — ED PROVIDER NOTE - NS ED ROS FT
Gen: No fever, No chills  ENT: No congestion, sore throat  Resp: No cough. +SOB  Cardiovascular: + chest pain   GI: No abdominal pain, nausea/vomiting, diarrhea, constipation  :  No change in urine output or frequency; no dysuria  MS: No joint or muscle pain  Skin: No rashes  Neuro: +slurred speech. No headache; No numbness or weakness  Remainder negative, except as per the HPI

## 2019-12-31 NOTE — H&P ADULT - NSHPLABSRESULTS_GEN_ALL_CORE
11.4   5.78  )-----------( 186      ( 31 Dec 2019 04:52 )             34.7       12-31    132<L>  |  85<L>  |  <4<L>  ----------------------------<  108<H>  4.6   |  23  |  0.39<L>    Ca    8.6      31 Dec 2019 04:52  Phos  3.3     12-31  Mg     1.7     12-31    TPro  7.1  /  Alb  4.1  /  TBili  0.4  /  DBili  x   /  AST  76<H>  /  ALT  65<H>  /  AlkPhos  75  12-31                  PT/INR - ( 31 Dec 2019 04:52 )   PT: 9.5 sec;   INR: 0.83 ratio         PTT - ( 31 Dec 2019 04:52 )  PTT:27.9 sec    Lactate Trend            CAPILLARY BLOOD GLUCOSE            Culture Results:   No growth at 5 days. (12-11 @ 16:44)  Culture Results:   No growth at 5 days. (12-11 @ 16:44)

## 2019-12-31 NOTE — CONSULT NOTE ADULT - SUBJECTIVE AND OBJECTIVE BOX
FREDY CHOI  [id number]  12-31-19 @ 15:31  -------------------------------------------------------------------------------  [facility] - [facility]  CONSULT NOTE    Asked to see this patient of Dr. Mansfield for increasing sob.  She is a 80 py smoker who by her report quit in March.  She awoke today with increased wheezing and sob and came to ER.  At baseline she is severely debilitated and requires 3 L oxygen.  She does not use chronic steroids.  She drinks alcohol regularly and despite that way she feels, reports drinking 3 beers this morning.    Harvey better in ER with initial steroids and nebs but still with labored respirations.  She denies CP or pressure, palpitations, LE edema, fever, chills or URI sxs.    -------------------------------------------------------------------------------  PMHX:  Prophylactic measure  Breast cancer  Brain aneurysm  Psoriasis  RA (rheumatoid arthritis)  Psoriasis  Breast cancer, stage 3      PSHX:  History of modified radical mastectomy of both breasts  S/P bilateral mastectomy  Brain aneurysm  No significant past surgical history      FAMILY HISTORY:  FH: CHF (congestive heart failure): Mother      SOCIAL HISTORY:    Pulmonary Medications:   ALBUTerol   0.042% 1.25 milliGRAM(s) Nebulizer every 6 hours  buDESOnide    Inhalation Suspension 0.25 milliGRAM(s) Inhalation every 12 hours  montelukast 10 milliGRAM(s) Oral at bedtime      Antimicrobials:      Cardiology:      Other:  cholecalciferol 1000 Unit(s) Oral daily  folic acid 1 milliGRAM(s) Oral daily  heparin  Injectable 5000 Unit(s) SubCutaneous every 12 hours  LORazepam     Tablet   Oral   LORazepam     Tablet 2 milliGRAM(s) Oral every 4 hours  methylPREDNISolone sodium succinate Injectable 40 milliGRAM(s) IV Push every 8 hours  multivitamin 1 Tablet(s) Oral daily  pantoprazole    Tablet 40 milliGRAM(s) Oral before breakfast  sodium chloride 0.9%. 1000 milliLiter(s) IV Continuous <Continuous>      Allergies    amoxicillin (Anaphylaxis)  Levaquin (Other; Anaphylaxis)  Levaquin (Unknown)  penicillin (Anaphylaxis)  penicillins (Anaphylaxis)    Intolerances        HOME MEDICATIONS: see  H & P    REVIEW OF SYSTEMS:  Constitutional: As per HPI  HEENT: Within normal limits  CV: As per HPI  Resp: As per HPI  GI: Within normal limits   : Within normal limits  Musculoskeletal: Within normal limits  Skin: Within normal limits  Neurological: Within normal limits  Psychiatric: Within normal limits  Endocrine: Within normal limits  Hematologic/Lymphatic: Within normal limits  Allergic/Immunologic: Within normal limits    [ ] All other systems negative    ________________________________________________________________________  OBJECTIVE:      I&O's Detail      Daily Height in cm: 152.4 (31 Dec 2019 03:09)    Daily     CAPILLARY BLOOD GLUCOSE        _______________________________________________________________________  PHYSICAL EXAM:  ICU Vital Signs Last 24 Hrs  T(C): 37.2 (31 Dec 2019 11:17), Max: 37.2 (31 Dec 2019 11:17)  T(F): 99 (31 Dec 2019 11:17), Max: 99 (31 Dec 2019 11:17)  HR: 112 (31 Dec 2019 12:52) (107 - 125)  BP: 118/72 (31 Dec 2019 12:52) (118/72 - 165/103)  BP(mean): --  ABP: --  ABP(mean): --  RR: 21 (31 Dec 2019 12:52) (20 - 24)  SpO2: 98% (31 Dec 2019 12:52) (92% - 100%)    Alert and oriented x 3, mildly labored respirations  HEENT: unremarkable.  Mallampati class:  Neck No JVD, stridor, adenopathy or thyromegaly  Oropharynx is mallampati class 4  Cardiac regular, tachy, without m/r/g  Lungs with diffuse wheezing and marked expiratory prolongation  Abdomen is soft and nontender, nondistended  Extrems are warm, without clubbing, cyanosis or edema  Neurological is grossly intact, nonfocal.  No asterixis or tremor presently.  _____________________________________________________________________    LABS:                        11.4   5.78  )-----------( 186      ( 31 Dec 2019 04:52 )             34.7     132<L>  |  85<L>  |  <4<L>  ----------------------------<  108<H>    12-31  4.6   |  23  |  0.39<L>    PT/INR - ( 31 Dec 2019 04:52 )   PT: 9.5 sec;   INR: 0.83 ratio         PTT - ( 31 Dec 2019 04:52 )  PTT:27.9 sec    Venous Blood Gas:  12-31 @ 08:17  7.37/50/49/28/78  VBG Lactate: 3.3  Venous Blood Gas:  12-31 @ 04:52  7.34/58/52/31/78  VBG Lactate: 4.4    CARDIAC MARKERS ( 31 Dec 2019 05:59 )  CPK x     /CKMB x     /CKMB Units x        troponin 13 ng/L  CARDIAC MARKERS ( 31 Dec 2019 04:52 )  CPK x     /CKMB x     /CKMB Units x        troponin 14 ng/L        MICROBIOLOGY:     CXR today reviewed and negative.  _____________________________________________________________________    IMPRESSION AND RECOMMENDATIONS:    Severe COPD with exacerbation  Etoh use - agree with concern for withdrawal.  Change to duoneb q 6 hrs (ordered) and continue budesonide BID  Solumedrol 40 IV q 8  3 L O2 and elevate HOB  I have discussed with RN and to start Duoneb and solumedrol now.    Mgmt per primary team.  I have discussed with Dr. Bonds.    Please call for specific pulmonary issues.      Thank you for the courtesy of this referral    Alessandro Rodriges MD, FCCP, FAA  Pulmonary and Sleep Medicine  [phone]

## 2019-12-31 NOTE — ED PROVIDER NOTE - PROGRESS NOTE DETAILS
Danae Severino MD: Received sign out on patient. Patient reassessed. Vital signs stable. NAD. Pt still slurring words, clinically drunk. Pending rpt VBG for elevated lactate. Will MTF. Danae Severino MD: Tachycardic to 120's, tongue fasciculations, spitting up in ED, CIWA 11, will admit for EtOH withdrawal

## 2019-12-31 NOTE — ED PROVIDER NOTE - SHIFT CHANGE DETAILS
Attending MD Nova: 54F with CP, slurred speech, CT head nonactionable, found to have +EtOH, given IVFs, speech improving, pending repeat lactate and clinical sobriety

## 2019-12-31 NOTE — H&P ADULT - HISTORY OF PRESENT ILLNESS
54y female with COPD on home O2 3L, Breast CA s/p mastectomy, EtOH abuse, brain aneurysm s/p clip, RA, psoriatic arthritis presenting with sob  and some chest discomfort    . Chest pain resolved, still feeling SOB    No fevers, chills, cough, abdominal pain, numbness weakness. +bilateral leg pain. Patient drinks 5-6beers a day,

## 2020-01-01 LAB
ALBUMIN SERPL ELPH-MCNC: 3.8 G/DL — SIGNIFICANT CHANGE UP (ref 3.3–5)
ALP SERPL-CCNC: 64 U/L — SIGNIFICANT CHANGE UP (ref 40–120)
ALT FLD-CCNC: 45 U/L — SIGNIFICANT CHANGE UP (ref 10–45)
ANION GAP SERPL CALC-SCNC: 16 MMOL/L — SIGNIFICANT CHANGE UP (ref 5–17)
ANION GAP SERPL CALC-SCNC: 17 MMOL/L — SIGNIFICANT CHANGE UP (ref 5–17)
AST SERPL-CCNC: 50 U/L — HIGH (ref 10–40)
BILIRUB SERPL-MCNC: 1.1 MG/DL — SIGNIFICANT CHANGE UP (ref 0.2–1.2)
BUN SERPL-MCNC: 4 MG/DL — LOW (ref 7–23)
BUN SERPL-MCNC: 5 MG/DL — LOW (ref 7–23)
CALCIUM SERPL-MCNC: 8.8 MG/DL — SIGNIFICANT CHANGE UP (ref 8.4–10.5)
CALCIUM SERPL-MCNC: 8.8 MG/DL — SIGNIFICANT CHANGE UP (ref 8.4–10.5)
CHLORIDE SERPL-SCNC: 87 MMOL/L — LOW (ref 96–108)
CHLORIDE SERPL-SCNC: 88 MMOL/L — LOW (ref 96–108)
CO2 SERPL-SCNC: 28 MMOL/L — SIGNIFICANT CHANGE UP (ref 22–31)
CO2 SERPL-SCNC: 29 MMOL/L — SIGNIFICANT CHANGE UP (ref 22–31)
CREAT SERPL-MCNC: 0.39 MG/DL — LOW (ref 0.5–1.3)
CREAT SERPL-MCNC: 0.41 MG/DL — LOW (ref 0.5–1.3)
GLUCOSE SERPL-MCNC: 190 MG/DL — HIGH (ref 70–99)
GLUCOSE SERPL-MCNC: 195 MG/DL — HIGH (ref 70–99)
HCT VFR BLD CALC: 31 % — LOW (ref 34.5–45)
HGB BLD-MCNC: 10.5 G/DL — LOW (ref 11.5–15.5)
MAGNESIUM SERPL-MCNC: 1.6 MG/DL — SIGNIFICANT CHANGE UP (ref 1.6–2.6)
MCHC RBC-ENTMCNC: 33.9 GM/DL — SIGNIFICANT CHANGE UP (ref 32–36)
MCHC RBC-ENTMCNC: 35.4 PG — HIGH (ref 27–34)
MCV RBC AUTO: 104.4 FL — HIGH (ref 80–100)
NRBC # BLD: 0 /100 WBCS — SIGNIFICANT CHANGE UP (ref 0–0)
PHOSPHATE SERPL-MCNC: 2.7 MG/DL — SIGNIFICANT CHANGE UP (ref 2.5–4.5)
PLATELET # BLD AUTO: 139 K/UL — LOW (ref 150–400)
POTASSIUM SERPL-MCNC: 4 MMOL/L — SIGNIFICANT CHANGE UP (ref 3.5–5.3)
POTASSIUM SERPL-MCNC: 4.4 MMOL/L — SIGNIFICANT CHANGE UP (ref 3.5–5.3)
POTASSIUM SERPL-SCNC: 4 MMOL/L — SIGNIFICANT CHANGE UP (ref 3.5–5.3)
POTASSIUM SERPL-SCNC: 4.4 MMOL/L — SIGNIFICANT CHANGE UP (ref 3.5–5.3)
PROT SERPL-MCNC: 6.4 G/DL — SIGNIFICANT CHANGE UP (ref 6–8.3)
RBC # BLD: 2.97 M/UL — LOW (ref 3.8–5.2)
RBC # FLD: 14 % — SIGNIFICANT CHANGE UP (ref 10.3–14.5)
SODIUM SERPL-SCNC: 132 MMOL/L — LOW (ref 135–145)
SODIUM SERPL-SCNC: 133 MMOL/L — LOW (ref 135–145)
WBC # BLD: 2.92 K/UL — LOW (ref 3.8–10.5)
WBC # FLD AUTO: 2.92 K/UL — LOW (ref 3.8–10.5)

## 2020-01-01 RX ORDER — IPRATROPIUM/ALBUTEROL SULFATE 18-103MCG
3 AEROSOL WITH ADAPTER (GRAM) INHALATION ONCE
Refills: 0 | Status: COMPLETED | OUTPATIENT
Start: 2020-01-01 | End: 2020-01-01

## 2020-01-01 RX ADMIN — SODIUM CHLORIDE 70 MILLILITER(S): 9 INJECTION INTRAMUSCULAR; INTRAVENOUS; SUBCUTANEOUS at 05:24

## 2020-01-01 RX ADMIN — Medication 3 MILLILITER(S): at 00:54

## 2020-01-01 RX ADMIN — SODIUM CHLORIDE 70 MILLILITER(S): 9 INJECTION INTRAMUSCULAR; INTRAVENOUS; SUBCUTANEOUS at 09:44

## 2020-01-01 RX ADMIN — Medication 3 MILLILITER(S): at 05:24

## 2020-01-01 RX ADMIN — Medication 1.5 MILLIGRAM(S): at 17:21

## 2020-01-01 RX ADMIN — Medication 1 MILLIGRAM(S): at 13:20

## 2020-01-01 RX ADMIN — SODIUM CHLORIDE 70 MILLILITER(S): 9 INJECTION INTRAMUSCULAR; INTRAVENOUS; SUBCUTANEOUS at 21:35

## 2020-01-01 RX ADMIN — Medication 40 MILLIGRAM(S): at 13:21

## 2020-01-01 RX ADMIN — Medication 2 MILLIGRAM(S): at 00:53

## 2020-01-01 RX ADMIN — Medication 40 MILLIGRAM(S): at 05:25

## 2020-01-01 RX ADMIN — HEPARIN SODIUM 5000 UNIT(S): 5000 INJECTION INTRAVENOUS; SUBCUTANEOUS at 05:25

## 2020-01-01 RX ADMIN — HEPARIN SODIUM 5000 UNIT(S): 5000 INJECTION INTRAVENOUS; SUBCUTANEOUS at 17:21

## 2020-01-01 RX ADMIN — Medication 2 MILLIGRAM(S): at 09:44

## 2020-01-01 RX ADMIN — PANTOPRAZOLE SODIUM 40 MILLIGRAM(S): 20 TABLET, DELAYED RELEASE ORAL at 05:25

## 2020-01-01 RX ADMIN — Medication 1 TABLET(S): at 13:20

## 2020-01-01 RX ADMIN — Medication 3 MILLILITER(S): at 16:52

## 2020-01-01 RX ADMIN — Medication 40 MILLIGRAM(S): at 21:30

## 2020-01-01 RX ADMIN — Medication 3 MILLILITER(S): at 21:29

## 2020-01-01 RX ADMIN — Medication 1000 UNIT(S): at 13:20

## 2020-01-01 RX ADMIN — Medication 2 MILLIGRAM(S): at 05:24

## 2020-01-01 RX ADMIN — Medication 0.25 MILLIGRAM(S): at 16:52

## 2020-01-01 RX ADMIN — Medication 1.5 MILLIGRAM(S): at 13:25

## 2020-01-01 RX ADMIN — Medication 1.5 MILLIGRAM(S): at 21:29

## 2020-01-01 RX ADMIN — MONTELUKAST 10 MILLIGRAM(S): 4 TABLET, CHEWABLE ORAL at 21:34

## 2020-01-01 RX ADMIN — Medication 3 MILLILITER(S): at 13:18

## 2020-01-01 RX ADMIN — Medication 0.25 MILLIGRAM(S): at 05:24

## 2020-01-01 NOTE — PHYSICAL THERAPY INITIAL EVALUATION ADULT - DISCHARGE DISPOSITION, PT EVAL
Pt declines subacute rehab. IF home recommend home PT and assist with ALL ADLs and functional mobility provided by spouse. Pt owns all DME. BELEN Wu made aware.

## 2020-01-01 NOTE — PHYSICAL THERAPY INITIAL EVALUATION ADULT - GENERAL OBSERVATIONS, REHAB EVAL
Take doxepin every other day until gone  Start bupropion (Wellbutrin), take daily 
Pt jordan 40 min eval fair. pt with multiple admissions p/w COPD exacerbation, on CIWA protocol. Pt rec'd in bed, 3L NC, tele, enhanced supervision. Pt agreed to session.

## 2020-01-01 NOTE — PHYSICAL THERAPY INITIAL EVALUATION ADULT - PERTINENT HX OF CURRENT PROBLEM, REHAB EVAL
inr .83; Na 132; alcohol in blood 335 ; [facility] 12/31/19 chr R inf frontal and temporal cortical encephalomalcia , mild ischemic change chronic frontoparietal white matter (h/o R crani and aneurysm clipping ) inr .83; Na 132; alcohol in blood 335 ; [facility] 12/31/19 chr R inf frontal and temporal cortical encephalomalacia , mild ischemic change chronic frontoparietal white matter (h/o R crani and aneurysm clipping ) KISHA AYON met with pt

## 2020-01-01 NOTE — SBIRT NOTE ADULT - NSSBIRTALCPOSREINDET_GEN_A_CORE
Patient verbalized that she is committed to stop using ETOH. Resources offered; but declined. Positive reinforcement provided. Patient aware of ongoing social work availability.

## 2020-01-01 NOTE — PROGRESS NOTE ADULT - SUBJECTIVE AND OBJECTIVE BOX
CC: no cp. breathing improved    TELEMETRY: NSR    PHYSICAL EXAM:    T(C): 36.9 (01-01-20 @ 08:39), Max: 37.4 (12-31-19 @ 16:01)  HR: 92 (01-01-20 @ 08:39) (92 - 125)  BP: 143/87 (01-01-20 @ 08:39) (117/77 - 165/103)  RR: 18 (01-01-20 @ 08:39) (18 - 24)  SpO2: 100% (01-01-20 @ 08:39) (92% - 100%)  Wt(kg): --  I&O's Summary    31 Dec 2019 07:01  -  01 Jan 2020 07:00  --------------------------------------------------------  IN: 0 mL / OUT: 450 mL / NET: -450 mL        Appearance: Normal	  Cardiovascular: Normal S1 S2,RRR, No JVD, No murmurs  Respiratory: Lungs clear to auscultation	  Gastrointestinal:  Soft, Non-tender, + BS	  Extremities: Normal range of motion, No clubbing, cyanosis or edema  Vascular: Peripheral pulses palpable 2+ bilaterally     LABS:	 	                          10.5   2.92  )-----------( 139      ( 01 Jan 2020 06:16 )             31.0     01-01    132<L>  |  87<L>  |  5<L>  ----------------------------<  195<H>  4.0   |  28  |  0.39<L>    Ca    8.8      01 Jan 2020 06:16  Phos  2.7     01-01  Mg     1.6     01-01    TPro  6.4  /  Alb  3.8  /  TBili  1.1  /  DBili  x   /  AST  50<H>  /  ALT  45  /  AlkPhos  64  01-01      PT/INR - ( 31 Dec 2019 04:52 )   PT: 9.5 sec;   INR: 0.83 ratio         PTT - ( 31 Dec 2019 04:52 )  PTT:27.9 sec    CARDIAC MARKERS:

## 2020-01-01 NOTE — PROGRESS NOTE ADULT - SUBJECTIVE AND OBJECTIVE BOX
CHIEF COMPLAINT:Patient is a 54y old  Female who presents with a chief complaint of sob (31 Dec 2019 15:30)    	        PAST MEDICAL & SURGICAL HISTORY:  Prophylactic measure  Breast cancer  Brain aneurysm: with clips  Psoriasis  RA (rheumatoid arthritis)  Psoriasis  Breast cancer, stage 3  History of modified radical mastectomy of both breasts  S/P bilateral mastectomy  Brain aneurysm: 1988 two clips          REVIEW OF SYSTEMS:  CONSTITUTIONAL: No fever, weight loss, or fatigue  EYES: No eye pain, visual disturbances, or discharge  NECK: No pain or stiffness  RESPIRATORY: No cough, wheezing, chills or hemoptysis; No Shortness of Breath  CARDIOVASCULAR: No chest pain, palpitations, passing out, dizziness, or leg swelling  GASTROINTESTINAL: No abdominal or epigastric pain. No nausea, vomiting, or hematemesis; No diarrhea or constipation. No melena or hematochezia.  GENITOURINARY: No dysuria, frequency, hematuria, or incontinence  NEUROLOGICAL: No headaches, memory loss, loss of strength, numbness, or tremors  SKIN: No itching, burning, rashes, or lesions   LYMPH Nodes: No enlarged glands  ENDOCRINE: No heat or cold intolerance; No hair loss  MUSCULOSKELETAL: No joint pain or swelling; No muscle, back, or extremity pain    Medications:  MEDICATIONS  (STANDING):  albuterol/ipratropium for Nebulization 3 milliLiter(s) Nebulizer every 6 hours  buDESOnide    Inhalation Suspension 0.25 milliGRAM(s) Inhalation every 12 hours  cholecalciferol 1000 Unit(s) Oral daily  folic acid 1 milliGRAM(s) Oral daily  heparin  Injectable 5000 Unit(s) SubCutaneous every 12 hours  LORazepam     Tablet   Oral   LORazepam     Tablet 2 milliGRAM(s) Oral every 4 hours  LORazepam     Tablet 1.5 milliGRAM(s) Oral every 4 hours  methylPREDNISolone sodium succinate Injectable 40 milliGRAM(s) IV Push every 8 hours  montelukast 10 milliGRAM(s) Oral at bedtime  multivitamin 1 Tablet(s) Oral daily  pantoprazole    Tablet 40 milliGRAM(s) Oral before breakfast  sodium chloride 0.9% 1000 milliLiter(s) (100 mL/Hr) IV Continuous <Continuous>  sodium chloride 0.9%. 1000 milliLiter(s) (70 mL/Hr) IV Continuous <Continuous>    MEDICATIONS  (PRN):  LORazepam   Injectable 2 milliGRAM(s) IV Push every 2 hours PRN Symptom-triggered: 2 point increase in CIWA -Ar score and a total score of 7 or LESS    	    PHYSICAL EXAM:  T(C): 36.9 (01-01-20 @ 08:39), Max: 37.4 (12-31-19 @ 16:01)  HR: 92 (01-01-20 @ 08:39) (92 - 125)  BP: 143/87 (01-01-20 @ 08:39) (117/77 - 165/103)  RR: 18 (01-01-20 @ 08:39) (18 - 24)  SpO2: 100% (01-01-20 @ 08:39) (92% - 100%)  Wt(kg): --  I&O's Summary    31 Dec 2019 07:01  -  01 Jan 2020 07:00  --------------------------------------------------------  IN: 0 mL / OUT: 450 mL / NET: -450 mL        Appearance: Normal	  HEENT:   Normal oral mucosa, PERRL, EOMI	  Lymphatic: No lymphadenopathy  Cardiovascular: Normal S1 S2, No JVD, No murmurs, No edema  Respiratory: Lungs clear to auscultation	  Psychiatry: A & O x 3, Mood & affect appropriate  Gastrointestinal:  Soft, Non-tender, + BS	  Skin: No rashes, No ecchymoses, No cyanosis	  Neurologic: Non-focal  Extremities: Normal range of motion, No clubbing, cyanosis or edema  Vascular: Peripheral pulses palpable 2+ bilaterally    TELEMETRY: 	    ECG:  	  RADIOLOGY:  OTHER: 	  	  LABS:	 	    CARDIAC MARKERS:                                10.5   2.92  )-----------( 139      ( 01 Jan 2020 06:16 )             31.0     01-01    132<L>  |  87<L>  |  5<L>  ----------------------------<  195<H>  4.0   |  28  |  0.39<L>    Ca    8.8      01 Jan 2020 06:16  Phos  2.7     01-01  Mg     1.6     01-01    TPro  6.4  /  Alb  3.8  /  TBili  1.1  /  DBili  x   /  AST  50<H>  /  ALT  45  /  AlkPhos  64  01-01    proBNP:   Lipid Profile:   HgA1c:   TSH: CHIEF COMPLAINT:Patient is a 54y old  Female who presents with a chief complaint of sob (31 Dec 2019 15:30)    	        PAST MEDICAL & SURGICAL HISTORY:  Prophylactic measure  Breast cancer  Brain aneurysm: with clips  Psoriasis  RA (rheumatoid arthritis)  Psoriasis  Breast cancer, stage 3  History of modified radical mastectomy of both breasts  S/P bilateral mastectomy  Brain aneurysm: 1988 two clips          REVIEW OF SYSTEMS:  feels better  NECK: No pain or stiffness  RESPIRATORY: dec sob  CARDIOVASCULAR: No chest pain, palpitations, passing out,  GASTROINTESTINAL: No abdominal or epigastric pain. No nausea, vomiting, or hematemesis; No diarrhea or constipation. No melena or hematochezia.  GENITOURINARY: No dysuria, frequency, hematuria,  NEUROLOGICAL: No headaches,     Medications:  MEDICATIONS  (STANDING):  albuterol/ipratropium for Nebulization 3 milliLiter(s) Nebulizer every 6 hours  buDESOnide    Inhalation Suspension 0.25 milliGRAM(s) Inhalation every 12 hours  cholecalciferol 1000 Unit(s) Oral daily  folic acid 1 milliGRAM(s) Oral daily  heparin  Injectable 5000 Unit(s) SubCutaneous every 12 hours  LORazepam     Tablet   Oral   LORazepam     Tablet 2 milliGRAM(s) Oral every 4 hours  LORazepam     Tablet 1.5 milliGRAM(s) Oral every 4 hours  methylPREDNISolone sodium succinate Injectable 40 milliGRAM(s) IV Push every 8 hours  montelukast 10 milliGRAM(s) Oral at bedtime  multivitamin 1 Tablet(s) Oral daily  pantoprazole    Tablet 40 milliGRAM(s) Oral before breakfast  sodium chloride 0.9% 1000 milliLiter(s) (100 mL/Hr) IV Continuous <Continuous>  sodium chloride 0.9%. 1000 milliLiter(s) (70 mL/Hr) IV Continuous <Continuous>    MEDICATIONS  (PRN):  LORazepam   Injectable 2 milliGRAM(s) IV Push every 2 hours PRN Symptom-triggered: 2 point increase in CIWA -Ar score and a total score of 7 or LESS    	    PHYSICAL EXAM:  T(C): 36.9 (01-01-20 @ 08:39), Max: 37.4 (12-31-19 @ 16:01)  HR: 92 (01-01-20 @ 08:39) (92 - 125)  BP: 143/87 (01-01-20 @ 08:39) (117/77 - 165/103)  RR: 18 (01-01-20 @ 08:39) (18 - 24)  SpO2: 100% (01-01-20 @ 08:39) (92% - 100%)  Wt(kg): --  I&O's Summary    31 Dec 2019 07:01  -  01 Jan 2020 07:00  --------------------------------------------------------  IN: 0 mL / OUT: 450 mL / NET: -450 mL        	  HEENT:   Normal oral mucosa, PERRL, EOMI	  Cardiovascular: Normal S1 S2, No JVD,   Respiratory: dec  bs   Psychiatry: A & O x 3,  Gastrointestinal:  Soft, Non-tender, + BS	    Neurologic: Non-focal  Extremities: Normal range of motion, No clubbing, cyanosis  tr edema  Vascular: Peripheral pulses palpable    TELEMETRY: 	    ECG:  	  RADIOLOGY:  OTHER: 	  	  LABS:	 	    CARDIAC MARKERS:                                10.5   2.92  )-----------( 139      ( 01 Jan 2020 06:16 )             31.0     01-01    132<L>  |  87<L>  |  5<L>  ----------------------------<  195<H>  4.0   |  28  |  0.39<L>    Ca    8.8      01 Jan 2020 06:16  Phos  2.7     01-01  Mg     1.6     01-01    TPro  6.4  /  Alb  3.8  /  TBili  1.1  /  DBili  x   /  AST  50<H>  /  ALT  45  /  AlkPhos  64  01-01    proBNP:   Lipid Profile:   HgA1c:   TSH:

## 2020-01-01 NOTE — PROGRESS NOTE ADULT - SUBJECTIVE AND OBJECTIVE BOX
FREDY CHOI        [id number]  20 @ 11:57  ------------------------------------------------------------------------------------  [facility] - [facility]     PROGRESS NOTE    Feeling much better.  Family at bedside.  -----------------------------------------------------------------------------------  REVIEW OF SYSTEMS: (except as described above)  Constitutional: As per interval history  HEENT: Within normal limits  CV: As per interval history  Resp: As per interval history  GI: Within normal limits   : Within normal limits  Musculoskeletal: Within normal limits  Skin: Within normal limits  Neurological: Within normal limits  Psychiatric: Within normal limits  Endocrine: Within normal limits  Hematologic/Lymphatic: Within normal limits  Allergic/Immunologic: Within normal limits    [ ] Unable to assess ROS because   -------------------------------------------------------------------------------------  PAST MEDICAL & SURGICAL HISTORY:  Alcohol dependence with withdrawal  FH: CHF (congestive heart failure)  No pertinent family history in first degree relatives  No pertinent family history in first degree relatives  Handoff  MEWS Score  Prophylactic measure  Breast cancer  Brain aneurysm  Psoriasis  RA (rheumatoid arthritis)  Psoriasis  Breast cancer, stage 3  Breast cancer  Withdrawal symptoms, alcohol  History of modified radical mastectomy of both breasts  S/P bilateral mastectomy  Brain aneurysm  No significant past surgical history  S/P bilateral mastectomy  WEAKNESS  RAD CDS  14    --------------------------------------------------------------------------------------    MEDICATIONS:     Pulmonary "  albuterol/ipratropium for Nebulization 3 milliLiter(s) Nebulizer every 6 hours  buDESOnide    Inhalation Suspension 0.25 milliGRAM(s) Inhalation every 12 hours  montelukast 10 milliGRAM(s) Oral at bedtime    Anti-microbials:    Cardiovascular:    Other:  cholecalciferol 1000 Unit(s) Oral daily  folic acid 1 milliGRAM(s) Oral daily  heparin  Injectable 5000 Unit(s) SubCutaneous every 12 hours  LORazepam     Tablet   Oral   LORazepam     Tablet 2 milliGRAM(s) Oral every 4 hours  LORazepam     Tablet 1.5 milliGRAM(s) Oral every 4 hours  LORazepam   Injectable 2 milliGRAM(s) IV Push every 2 hours PRN  methylPREDNISolone sodium succinate Injectable 40 milliGRAM(s) IV Push every 8 hours  multivitamin 1 Tablet(s) Oral daily  pantoprazole    Tablet 40 milliGRAM(s) Oral before breakfast  sodium chloride 0.9% 1000 milliLiter(s) IV Continuous <Continuous>  sodium chloride 0.9%. 1000 milliLiter(s) IV Continuous <Continuous>    --------------------------------------------------------------------------------------    OBJECTIVE:        I&O's Detail    31 Dec 2019 07:  -  2020 07:00  --------------------------------------------------------  IN:  Total IN: 0 mL    OUT:    Voided: 450 mL  Total OUT: 450 mL    Total NET: -450 mL          Daily     Daily Weight in k.3 (2020 04:43)    CAPILLARY BLOOD GLUCOSE      POCT Blood Glucose.: 132 mg/dL (31 Dec 2019 18:59)    ------------------------------------------------------------------------------------------  PHYSICAL EXAM:       ICU Vital Signs Last 24 Hrs  T(C): 36.9 (2020 08:39), Max: 37.4 (31 Dec 2019 16:01)  T(F): 98.4 (2020 08:39), Max: 99.3 (31 Dec 2019 16:01)  HR: 92 (2020 08:39) (92 - 115)  BP: 143/87 (2020 08:39) (117/77 - 157/92)  BP(mean): --  ABP: --  ABP(mean): --  RR: 18 (2020 08:39) (18 - 23)  SpO2: 100% (2020 08:39) (93% - 100%)       relatively well-appearing, no acute distress  Alert and oriented x 3  HEENT: unremarkable  Neck no JVD, stridor, adenopathy or thyromegaly  Cardiac regular, without m/r/g  Lungs moderate exp prolongation with minimal wheezing.  Much improved.  Abdomen is soft and nontender, nondistended  Extrems are warm, without clubbing, cyanosis or edema  Neurological is grossly intact, nonfocal  ________________________________________________________  LABS:                        10.5   2.92  )-----------( 139      ( 2020 06:16 )             31.0     132<L>  |  87<L>  |  5<L>  ----------------------------<  195<H>      4.0   |  28  |  0.39<L>    PT/INR - ( 31 Dec 2019 04:52 )   PT: 9.5 sec;   INR: 0.83 ratio         PTT - ( 31 Dec 2019 04:52 )  PTT:27.9 sec    Venous Blood Gas:   @ 08:17  7.37/50/49/78  VBG Lactate: 3.3  Venous Blood Gas:   @ 04:52  7.34/58/52/  VBG Lactate: 4.4    CARDIAC MARKERS ( 31 Dec 2019 05:59 )  CPK x     /CKMB x     /CKMB Units x        troponin 13 ng/L  CARDIAC MARKERS ( 31 Dec 2019 04:52 )  CPK x     /CKMB x     /CKMB Units x        troponin 14 ng/L  __________________________________________________________________    IMPRESSION and RECOMMENDATIONS:    COPD exacerbation.  Clinically improving.  Continue current pulmonary regimen.  Would maintain solumedrol dose one more day before tapering.  Mobilize as able.      Alessandro Rodriges MD, FCCP, FAASM  Pulmonary and Sleep Medicine  [phone]

## 2020-01-01 NOTE — PHYSICAL THERAPY INITIAL EVALUATION ADULT - ADDITIONAL COMMENTS
pt lives with spouse Lasha [phone] 3 steps to enter wiith rail , RW to amb ;spouse assist with ADL"s as need; pt has Rolling walker , commode, w/c , shower bench and grab bars in shower , Home O2 and nebulizer ;at home on 3 L nc o2 ; pt met with SW admit to 3 beers per day SW off resources and support pt decline ; no caregiver ID ; + smoke hx x 40 yrs quit in 3/1/19 Pt states she lives with spouse in a private home with a ramp entrance in back, first floor set up inside. Pt states she was predominantly wheelchair bound. Pt states she uses a RW with assistance from spouse for short distances within home. Pt states spouse assists her with ADLs and functional mobility. Pt states she owns a commode, shower bench, and grab bars in home.

## 2020-01-01 NOTE — PROGRESS NOTE ADULT - ASSESSMENT
54 year old female with PMH of stage 3 breast cancer on aromasin, RA, Psoriasis on Otezla, brain aneurysm, s/p clip 1988, right jugular DVT, Catheter related MSSA bacteremia 2015,  multiple admissions for ETOH abuse, COPD on oxygen presents here with dyspnea, ETOH withdrawal.     1.  Chronic respiratory failure with hypoxia.  -secondary to pulm disease, COPD/ emphysema   -cxr w/ clear lungs   -cv stable. no decomp chf on exam; no evidence of acs   -management per med/ pulm  -iv steroids   -recent echo with grossly normal LV function     2. Sinus tachycardia -resolved  - secondary to withdrawal, dehydration, anxiety, copd exacerbation  -no evidence of acs , asymptomatic  -recent echo with grossly normal lv function     3. med f/u, CIWA  protocol   pt. continues to drink despite multiple admissions  aware of risks of continued drinking       dvt ppx

## 2020-01-01 NOTE — PHYSICAL THERAPY INITIAL EVALUATION ADULT - PRECAUTIONS/LIMITATIONS, REHAB EVAL
PMH - Breast CA s/p Bilateral/fall precautions fall precautions/54 year old female with PMH of stage 3 breast cancer on aromasin, RA, Psoriasis on Otezla, brain aneurysm, s/p clip 1988, right jugular DVT, Catheter related MSSA bacteremia 2015,  multiple admissions for ETOH abuse, COPD on oxygen presents here with dyspnea, ETOH withdrawal.

## 2020-01-01 NOTE — PROGRESS NOTE ADULT - ASSESSMENT
pt w/ hx copd / etoh abuse / wcopd exac and intoxication  mult admissions  ciwa protocol  iv steroids/taper steroids starting tomorrow  pulm eval noted  nebs   o2  dvt proph  cards f/u noted  dvt proph  gi proph   pt

## 2020-01-02 LAB
ANION GAP SERPL CALC-SCNC: 16 MMOL/L — SIGNIFICANT CHANGE UP (ref 5–17)
BUN SERPL-MCNC: 7 MG/DL — SIGNIFICANT CHANGE UP (ref 7–23)
CALCIUM SERPL-MCNC: 9.1 MG/DL — SIGNIFICANT CHANGE UP (ref 8.4–10.5)
CHLORIDE SERPL-SCNC: 85 MMOL/L — LOW (ref 96–108)
CO2 SERPL-SCNC: 25 MMOL/L — SIGNIFICANT CHANGE UP (ref 22–31)
CREAT SERPL-MCNC: 0.41 MG/DL — LOW (ref 0.5–1.3)
GLUCOSE BLDC GLUCOMTR-MCNC: 118 MG/DL — HIGH (ref 70–99)
GLUCOSE BLDC GLUCOMTR-MCNC: 123 MG/DL — HIGH (ref 70–99)
GLUCOSE BLDC GLUCOMTR-MCNC: 147 MG/DL — HIGH (ref 70–99)
GLUCOSE BLDC GLUCOMTR-MCNC: 158 MG/DL — HIGH (ref 70–99)
GLUCOSE SERPL-MCNC: 156 MG/DL — HIGH (ref 70–99)
HCT VFR BLD CALC: 31.8 % — LOW (ref 34.5–45)
HGB BLD-MCNC: 10.4 G/DL — LOW (ref 11.5–15.5)
MAGNESIUM SERPL-MCNC: 1.4 MG/DL — LOW (ref 1.6–2.6)
MAGNESIUM SERPL-MCNC: 1.4 MG/DL — LOW (ref 1.6–2.6)
MAGNESIUM SERPL-MCNC: 1.8 MG/DL — SIGNIFICANT CHANGE UP (ref 1.6–2.6)
MCHC RBC-ENTMCNC: 32.7 GM/DL — SIGNIFICANT CHANGE UP (ref 32–36)
MCHC RBC-ENTMCNC: 34.3 PG — HIGH (ref 27–34)
MCV RBC AUTO: 105 FL — HIGH (ref 80–100)
NRBC # BLD: 0 /100 WBCS — SIGNIFICANT CHANGE UP (ref 0–0)
PHOSPHATE SERPL-MCNC: 2.9 MG/DL — SIGNIFICANT CHANGE UP (ref 2.5–4.5)
PHOSPHATE SERPL-MCNC: 3 MG/DL — SIGNIFICANT CHANGE UP (ref 2.5–4.5)
PHOSPHATE SERPL-MCNC: 3.2 MG/DL — SIGNIFICANT CHANGE UP (ref 2.5–4.5)
PLATELET # BLD AUTO: 131 K/UL — LOW (ref 150–400)
POTASSIUM SERPL-MCNC: 4 MMOL/L — SIGNIFICANT CHANGE UP (ref 3.5–5.3)
POTASSIUM SERPL-SCNC: 4 MMOL/L — SIGNIFICANT CHANGE UP (ref 3.5–5.3)
RBC # BLD: 3.03 M/UL — LOW (ref 3.8–5.2)
RBC # FLD: 13.8 % — SIGNIFICANT CHANGE UP (ref 10.3–14.5)
SODIUM SERPL-SCNC: 126 MMOL/L — LOW (ref 135–145)
WBC # BLD: 6.91 K/UL — SIGNIFICANT CHANGE UP (ref 3.8–10.5)
WBC # FLD AUTO: 6.91 K/UL — SIGNIFICANT CHANGE UP (ref 3.8–10.5)

## 2020-01-02 RX ORDER — MAGNESIUM SULFATE 500 MG/ML
2 VIAL (ML) INJECTION ONCE
Refills: 0 | Status: COMPLETED | OUTPATIENT
Start: 2020-01-02 | End: 2020-01-02

## 2020-01-02 RX ORDER — IPRATROPIUM/ALBUTEROL SULFATE 18-103MCG
3 AEROSOL WITH ADAPTER (GRAM) INHALATION ONCE
Refills: 0 | Status: COMPLETED | OUTPATIENT
Start: 2020-01-02 | End: 2020-01-02

## 2020-01-02 RX ORDER — MAGNESIUM SULFATE 500 MG/ML
1 VIAL (ML) INJECTION ONCE
Refills: 0 | Status: DISCONTINUED | OUTPATIENT
Start: 2020-01-02 | End: 2020-01-03

## 2020-01-02 RX ADMIN — Medication 2 MILLIGRAM(S): at 19:43

## 2020-01-02 RX ADMIN — Medication 40 MILLIGRAM(S): at 05:33

## 2020-01-02 RX ADMIN — Medication 1.5 MILLIGRAM(S): at 05:32

## 2020-01-02 RX ADMIN — Medication 20 MILLIGRAM(S): at 17:56

## 2020-01-02 RX ADMIN — PANTOPRAZOLE SODIUM 40 MILLIGRAM(S): 20 TABLET, DELAYED RELEASE ORAL at 05:33

## 2020-01-02 RX ADMIN — Medication 3 MILLILITER(S): at 22:14

## 2020-01-02 RX ADMIN — SODIUM CHLORIDE 70 MILLILITER(S): 9 INJECTION INTRAMUSCULAR; INTRAVENOUS; SUBCUTANEOUS at 05:36

## 2020-01-02 RX ADMIN — Medication 1.5 MILLIGRAM(S): at 01:31

## 2020-01-02 RX ADMIN — Medication 3 MILLILITER(S): at 00:03

## 2020-01-02 RX ADMIN — Medication 1 MILLIGRAM(S): at 10:17

## 2020-01-02 RX ADMIN — Medication 3 MILLILITER(S): at 03:38

## 2020-01-02 RX ADMIN — Medication 1 TABLET(S): at 10:17

## 2020-01-02 RX ADMIN — Medication 20 MILLIGRAM(S): at 11:23

## 2020-01-02 RX ADMIN — Medication 1.5 MILLIGRAM(S): at 10:14

## 2020-01-02 RX ADMIN — Medication 0.25 MILLIGRAM(S): at 16:21

## 2020-01-02 RX ADMIN — Medication 20 MILLIGRAM(S): at 23:11

## 2020-01-02 RX ADMIN — Medication 3 MILLILITER(S): at 05:33

## 2020-01-02 RX ADMIN — HEPARIN SODIUM 5000 UNIT(S): 5000 INJECTION INTRAVENOUS; SUBCUTANEOUS at 05:33

## 2020-01-02 RX ADMIN — MONTELUKAST 10 MILLIGRAM(S): 4 TABLET, CHEWABLE ORAL at 22:02

## 2020-01-02 RX ADMIN — Medication 3 MILLILITER(S): at 11:23

## 2020-01-02 RX ADMIN — Medication 3 MILLILITER(S): at 23:24

## 2020-01-02 RX ADMIN — Medication 50 GRAM(S): at 10:16

## 2020-01-02 RX ADMIN — Medication 2 MILLIGRAM(S): at 07:40

## 2020-01-02 RX ADMIN — HEPARIN SODIUM 5000 UNIT(S): 5000 INJECTION INTRAVENOUS; SUBCUTANEOUS at 17:56

## 2020-01-02 RX ADMIN — Medication 1000 UNIT(S): at 10:17

## 2020-01-02 RX ADMIN — Medication 2 MILLIGRAM(S): at 12:47

## 2020-01-02 RX ADMIN — Medication 2 MILLIGRAM(S): at 00:03

## 2020-01-02 RX ADMIN — Medication 1 MILLIGRAM(S): at 22:02

## 2020-01-02 RX ADMIN — Medication 0.25 MILLIGRAM(S): at 05:33

## 2020-01-02 RX ADMIN — Medication 2 MILLIGRAM(S): at 03:31

## 2020-01-02 RX ADMIN — Medication 3 MILLILITER(S): at 17:54

## 2020-01-02 RX ADMIN — Medication 1 MILLIGRAM(S): at 14:50

## 2020-01-02 RX ADMIN — Medication 1 MILLIGRAM(S): at 17:54

## 2020-01-02 NOTE — CONSULT NOTE ADULT - SUBJECTIVE AND OBJECTIVE BOX
seen and examined earlier. full consult to follow seen and examined earlier. full consult to follow     addendum:    [facility]  [phone]  NEPHROLOGY      INITIAL CONSULT NOTE  --------------------------------------------------------------------------------  HPI:      54y female with COPD on home O2 3L, Breast CA s/p mastectomy, EtOH abuse, brain aneurysm s/p clip, RA, psoriatic arthritis presenting with sob along with some chest discomfort cp resolved but has had sob   +bilateral leg pain. Patient drinks 5-6beers a day noticed with hyponatremia renal consult called.       PAST HISTORY  --------------------------------------------------------------------------------  PAST MEDICAL & SURGICAL HISTORY:  Prophylactic measure  Breast cancer  Brain aneurysm: with clips  Psoriasis  RA (rheumatoid arthritis)  Psoriasis  Breast cancer, stage 3  History of modified radical mastectomy of both breasts  S/P bilateral mastectomy  Brain aneurysm: 1988 two clips    FAMILY HISTORY:  FH: CHF (congestive heart failure): Mother    PAST SOCIAL HISTORY: + etoh beer abuse     ALLERGIES & MEDICATIONS  --------------------------------------------------------------------------------  Allergies    amoxicillin (Anaphylaxis)  Levaquin (Other; Anaphylaxis)  Levaquin (Unknown)  penicillin (Anaphylaxis)  penicillins (Anaphylaxis)    Intolerances      Standing Inpatient Medications  albuterol/ipratropium for Nebulization 3 milliLiter(s) Nebulizer every 6 hours  buDESOnide    Inhalation Suspension 0.25 milliGRAM(s) Inhalation every 12 hours  cholecalciferol 1000 Unit(s) Oral daily  folic acid 1 milliGRAM(s) Oral daily  heparin  Injectable 5000 Unit(s) SubCutaneous every 12 hours  LORazepam     Tablet   Oral   LORazepam     Tablet 1 milliGRAM(s) Oral every 4 hours  LORazepam     Tablet 0.5 milliGRAM(s) Oral every 4 hours  magnesium sulfate  IVPB 1 Gram(s) IV Intermittent once  methylPREDNISolone sodium succinate Injectable 20 milliGRAM(s) IV Push every 6 hours  montelukast 10 milliGRAM(s) Oral at bedtime  multivitamin 1 Tablet(s) Oral daily  pantoprazole    Tablet 40 milliGRAM(s) Oral before breakfast    PRN Inpatient Medications  LORazepam   Injectable 2 milliGRAM(s) IV Push every 2 hours PRN      REVIEW OF SYSTEMS  --------------------------------------------------------------------------------  Gen: No  fevers/chills   Skin: No rashes  Head/Eyes/Ears/Mouth: No headache; Normal hearing;  No sinus pain/discomfort, sore throat  Respiratory: + sob/ dyspnea,  + cough,  - wheezing, hemoptysis -   CV: No chest pain, or palp   GI: No abdominal pain, diarrhea, nausea, vomiting, melena  : No dysuria, decrease urination or hesitancy urinating  hematuria, nocturia  MSK: No joint pain/swelling; no back pain  Neuro: No dizziness/lightheadedness,  also with no edema     All other systems were reviewed and are negative, except as noted.    VITALS/PHYSICAL EXAM  --------------------------------------------------------------------------------  T(C): 36.9 (01-03-20 @ 00:02), Max: 36.9 (01-03-20 @ 00:02)  HR: 117 (01-03-20 @ 00:02) (96 - 117)  BP: 152/90 (01-03-20 @ 00:02) (144/77 - 167/90)  RR: 17 (01-03-20 @ 00:02) (17 - 19)  SpO2: 97% (01-03-20 @ 00:02) (97% - 100%)  Wt(kg): --        01-01-20 @ 07:01  -  01-02-20 @ 07:00  --------------------------------------------------------  IN: 600 mL / OUT: 2100 mL / NET: -1500 mL    01-02-20 @ 07:01  -  01-03-20 @ 00:33  --------------------------------------------------------  IN: 1020 mL / OUT: 4150 mL / NET: -3130 mL      Physical Exam:  	Gen: morbidly obese    	no jvd  	Pulm: decrease bs  no rales or ronchi intermittent  wheezing +   	CV: RRR, S1S2; no rub  	Back: No CVA tenderness  	Abd: +BS, soft, nontender/nondistended  	: No suprapubic tenderness  	UE: Warm, no cyanosis  no clubbing,  no edema  	LE: Warm, no cyanosis  no clubbing, no edema  	Neuro: alert and oriented. speech coherent   	Skin: Warm, no decrease skin turgor + scaly skin   	    LABS/STUDIES  --------------------------------------------------------------------------------              10.4   6.91  >-----------<  131      [01-02-20 @ 06:21]              31.8     126  |  85  |  7   ----------------------------<  156      [01-02-20 @ 06:21]  4.0   |  25  |  0.41        Ca     9.1     [01-02-20 @ 06:21]      Mg     1.8     [01-02-20 @ 15:46]      Phos  3.2     [01-02-20 @ 15:46]    TPro  6.4  /  Alb  3.8  /  TBili  1.1  /  DBili  x   /  AST  50  /  ALT  45  /  AlkPhos  64  [01-01-20 @ 06:16]          Creatinine Trend:  SCr 0.41 [01-02 @ 06:21]  SCr 0.39 [01-01 @ 06:16]  SCr 0.41 [01-01 @ 01:11]  SCr 0.39 [12-31 @ 04:52]  SCr 0.50 [12-16 @ 06:57]    Urinalysis - [10-22-19 @ 11:01]      Color Light Yellow / Appearance Clear / SG 1.006 / pH 7.0      Gluc Negative / Ketone Trace  / Bili Negative / Urobili Negative       Blood Negative / Protein Negative / Leuk Est Negative / Nitrite Negative      RBC  / WBC  / Hyaline  / Gran  / Sq Epi  / Non Sq Epi  / Bacteria       Iron 42, TIBC 196, %sat 21      [11-16-19 @ 10:30]  Ferritin 792      [11-16-19 @ 10:31]  HbA1c 5.4      [08-19-16 @ 07:12]    HBsAb Nonreact      [03-25-19 @ 08:55]  HBsAg Nonreact      [03-25-19 @ 08:55]  HBcAb Nonreact      [03-25-19 @ 08:55]  HCV 0.13, Nonreact      [03-18-19 @ 06:26]  HIV Nonreact      [08-23-16 @ 07:49]    KRUNAL: titer 1:320, pattern Speckled      [07-12-15 @ 20:53]  dsDNA <12      [07-12-15 @ 20:53]  Syphilis Screen (Treponema Pallidum Ab) Negative      [08-23-16 @ 07:49]  SPEP Interpretation: Reference Range: None Detected      [09-13-16 @ 09:43]

## 2020-01-02 NOTE — PROGRESS NOTE ADULT - SUBJECTIVE AND OBJECTIVE BOX
Follow-up Pulm Progress Note    No new respiratory events overnight.  Denies increased SOB, chest pain, cough or mucus.  remains dyspneic,.  wants to go home. anxious    Medications:  MEDICATIONS  (STANDING):  albuterol/ipratropium for Nebulization 3 milliLiter(s) Nebulizer every 6 hours  buDESOnide    Inhalation Suspension 0.25 milliGRAM(s) Inhalation every 12 hours  cholecalciferol 1000 Unit(s) Oral daily  folic acid 1 milliGRAM(s) Oral daily  heparin  Injectable 5000 Unit(s) SubCutaneous every 12 hours  LORazepam     Tablet   Oral   LORazepam     Tablet 1.5 milliGRAM(s) Oral every 4 hours  LORazepam     Tablet 1 milliGRAM(s) Oral every 4 hours  methylPREDNISolone sodium succinate Injectable 40 milliGRAM(s) IV Push every 8 hours  montelukast 10 milliGRAM(s) Oral at bedtime  multivitamin 1 Tablet(s) Oral daily  pantoprazole    Tablet 40 milliGRAM(s) Oral before breakfast  sodium chloride 0.9% 1000 milliLiter(s) (100 mL/Hr) IV Continuous <Continuous>  sodium chloride 0.9%. 1000 milliLiter(s) (70 mL/Hr) IV Continuous <Continuous>    MEDICATIONS  (PRN):  LORazepam   Injectable 2 milliGRAM(s) IV Push every 2 hours PRN Symptom-triggered: 2 point increase in CIWA -Ar score and a total score of 7 or LESS      Vent settings (if applicable)      Vital Signs Last 24 Hrs  T(C): 36.7 (02 Jan 2020 08:30), Max: 36.8 (01 Jan 2020 16:30)  T(F): 98 (02 Jan 2020 08:30), Max: 98.2 (01 Jan 2020 16:30)  HR: 98 (02 Jan 2020 08:30) (90 - 108)  BP: 156/93 (02 Jan 2020 08:30) (129/83 - 156/93)  BP(mean): --  RR: 18 (02 Jan 2020 08:30) (18 - 22)  SpO2: 98% (02 Jan 2020 08:30) (97% - 100%)          01-01 @ 07:01  -  01-02 @ 07:00  --------------------------------------------------------  IN: 600 mL / OUT: 2100 mL / NET: -1500 mL          LABS:                        10.4   6.91  )-----------( 131      ( 02 Jan 2020 06:21 )             31.8     01-02    126<L>  |  85<L>  |  7   ----------------------------<  156<H>  4.0   |  25  |  0.41<L>    Ca    9.1      02 Jan 2020 06:21  Phos  3.0     01-02  Mg     1.4     01-02    TPro  6.4  /  Alb  3.8  /  TBili  1.1  /  DBili  x   /  AST  50<H>  /  ALT  45  /  AlkPhos  64  01-01          CAPILLARY BLOOD GLUCOSE      POCT Blood Glucose.: 132 mg/dL (31 Dec 2019 18:59)              CULTURES:        Physical Examination:  Awake and alert, anxious, mild tachypnea  HEENT: unremarkable  PULM: Clear to auscultation bilaterally, no significant sputum production  CVS: Regular rate and rhythm, no murmurs, rubs, or gallops  Abd:  soft, non tender  Extrem: No CCE    RADIOLOGY REVIEWED  CXR:    CT chest:

## 2020-01-02 NOTE — CONSULT NOTE ADULT - ASSESSMENT
54y female with COPD on home O2 3L, Breast CA s/p mastectomy, EtOH abuse, brain aneurysm s/p clip, RA, psoriatic arthritis presenting with sob along with some chest discomfort cp resolved but has had sob  drinks 5-6beers a day noticed with hyponatremia     1- hyponatremia   2- copd exacerbation   3- hypomagnesemia       hyponatremia in alcoholic with beer use and increase po fluid intake  one liter fluid restriction d/w pt   cont with proventil nebs  magnesium oxide 400 mg bid

## 2020-01-02 NOTE — PROGRESS NOTE ADULT - ASSESSMENT
COPD exacerbation- improving slowly  EtOOH use with concerns for withdrawal  anxiety    remains dyspneic which is likely a combination of significant COPD and anxiety- will lower solumedrol slighty to 20 q6 in case steroids are adding to agitation  continue nebs  O2  sats >90%  anxiolytics  clinical monitoring  pt wants to go home- I have discussed benefits of remaining on IV steroids with inpatient observation of status at this time  dvt prophylaxis  routine gi prophylaxis as necessary      Katrina Iyer MD  [phone]  Pulmonary

## 2020-01-02 NOTE — PROGRESS NOTE ADULT - ASSESSMENT
pt w/ hx copd / etoh abuse / wcopd exac and intoxication  mult admissions  ciwa protocol  iv steroids/taper steroids as per pulm   pulm eval noted  nebs   o2  dvt proph  cards f/u noted  dvt proph  gi proph   hypomagnesemia  supp mg prn  hyponatremia  fluid restrict  d/c fluids  renal eval   pt

## 2020-01-02 NOTE — PROGRESS NOTE ADULT - SUBJECTIVE AND OBJECTIVE BOX
CARDIOLOGY FOLLOW UP - Dr. Nj    CC no cp or sob        PHYSICAL EXAM:  T(C): 36.6 (01-02-20 @ 13:30), Max: 36.8 (01-01-20 @ 16:30)  HR: 103 (01-02-20 @ 13:30) (92 - 108)  BP: 167/90 (01-02-20 @ 13:30) (132/84 - 167/90)  RR: 18 (01-02-20 @ 13:30) (17 - 22)  SpO2: 100% (01-02-20 @ 13:30) (97% - 100%)  Wt(kg): --  I&O's Summary    01 Jan 2020 07:01  -  02 Jan 2020 07:00  --------------------------------------------------------  IN: 600 mL / OUT: 2100 mL / NET: -1500 mL    02 Jan 2020 07:01  -  02 Jan 2020 13:54  --------------------------------------------------------  IN: 300 mL / OUT: 1050 mL / NET: -750 mL        Appearance: Normal	  Cardiovascular: Normal S1 S2,RRR, No JVD, No murmurs  Respiratory: diminished   Gastrointestinal:  Soft, Non-tender, + BS	  Extremities: Normal range of motion, No clubbing, cyanosis or edema        MEDICATIONS  (STANDING):  albuterol/ipratropium for Nebulization 3 milliLiter(s) Nebulizer every 6 hours  buDESOnide    Inhalation Suspension 0.25 milliGRAM(s) Inhalation every 12 hours  cholecalciferol 1000 Unit(s) Oral daily  folic acid 1 milliGRAM(s) Oral daily  heparin  Injectable 5000 Unit(s) SubCutaneous every 12 hours  LORazepam     Tablet   Oral   LORazepam     Tablet 1 milliGRAM(s) Oral every 4 hours  magnesium sulfate  IVPB 1 Gram(s) IV Intermittent once  methylPREDNISolone sodium succinate Injectable 20 milliGRAM(s) IV Push every 6 hours  montelukast 10 milliGRAM(s) Oral at bedtime  multivitamin 1 Tablet(s) Oral daily  pantoprazole    Tablet 40 milliGRAM(s) Oral before breakfast      TELEMETRY: nsr	  - sinus tachycardia hr   ECG:  	  RADIOLOGY:   DIAGNOSTIC TESTING:  [ ] Echocardiogram:  [ ]  Catheterization:  [ ] Stress Test:    OTHER: 	    LABS:	 	    Troponin T, High Sensitivity Result: 13 ng/L [0 - 51] (12-31 @ 05:59)  Troponin T, High Sensitivity Result: 14 ng/L [0 - 51] (12-31 @ 04:52)                          10.4   6.91  )-----------( 131      ( 02 Jan 2020 06:21 )             31.8     01-02    126<L>  |  85<L>  |  7   ----------------------------<  156<H>  4.0   |  25  |  0.41<L>    Ca    9.1      02 Jan 2020 06:21  Phos  2.9     01-02  Mg     1.4     01-02    TPro  6.4  /  Alb  3.8  /  TBili  1.1  /  DBili  x   /  AST  50<H>  /  ALT  45  /  AlkPhos  64  01-01

## 2020-01-02 NOTE — PROGRESS NOTE ADULT - SUBJECTIVE AND OBJECTIVE BOX
CHIEF COMPLAINT:Patient is a 54y old  Female who presents with a chief complaint of sob (02 Jan 2020 08:49)    	        PAST MEDICAL & SURGICAL HISTORY:  Prophylactic measure  Breast cancer  Brain aneurysm: with clips  Psoriasis  RA (rheumatoid arthritis)  Psoriasis  Breast cancer, stage 3  History of modified radical mastectomy of both breasts  S/P bilateral mastectomy  Brain aneurysm: 1988 two clips          REVIEW OF SYSTEMS:    NECK: No pain or stiffness  RESPIRATORY: dec sob  CARDIOVASCULAR: No chest pain, palpitations, passing out,  GASTROINTESTINAL: No abdominal or epigastric pain. No nausea, vomiting, or hematemesis;   GENITOURINARY: No dysuria, frequency, hematuria  NEUROLOGICAL: No headaches,       Medications:  MEDICATIONS  (STANDING):  albuterol/ipratropium for Nebulization 3 milliLiter(s) Nebulizer every 6 hours  buDESOnide    Inhalation Suspension 0.25 milliGRAM(s) Inhalation every 12 hours  cholecalciferol 1000 Unit(s) Oral daily  folic acid 1 milliGRAM(s) Oral daily  heparin  Injectable 5000 Unit(s) SubCutaneous every 12 hours  LORazepam     Tablet   Oral   LORazepam     Tablet 1 milliGRAM(s) Oral every 4 hours  magnesium sulfate  IVPB 1 Gram(s) IV Intermittent once  methylPREDNISolone sodium succinate Injectable 20 milliGRAM(s) IV Push every 6 hours  montelukast 10 milliGRAM(s) Oral at bedtime  multivitamin 1 Tablet(s) Oral daily  pantoprazole    Tablet 40 milliGRAM(s) Oral before breakfast    MEDICATIONS  (PRN):  LORazepam   Injectable 2 milliGRAM(s) IV Push every 2 hours PRN Symptom-triggered: 2 point increase in CIWA -Ar score and a total score of 7 or LESS    	    PHYSICAL EXAM:  T(C): 36.7 (01-02-20 @ 11:30), Max: 36.8 (01-01-20 @ 16:30)  HR: 102 (01-02-20 @ 11:30) (90 - 108)  BP: 148/82 (01-02-20 @ 11:30) (129/83 - 156/93)  RR: 18 (01-02-20 @ 11:30) (17 - 22)  SpO2: 98% (01-02-20 @ 11:30) (97% - 100%)  Wt(kg): --  I&O's Summary    01 Jan 2020 07:01  -  02 Jan 2020 07:00  --------------------------------------------------------  IN: 600 mL / OUT: 2100 mL / NET: -1500 mL    02 Jan 2020 07:01  -  02 Jan 2020 11:59  --------------------------------------------------------  IN: 300 mL / OUT: 1050 mL / NET: -750 mL        Appearance: Normal	  HEENT:   Normal oral mucosa, PERRL, EOMI	  Lymphatic: No lymphadenopathy  Cardiovascular: Normal S1 S2, No JVD,   Respiratory: dec bs   Psychiatry: A & O  Gastrointestinal:  Soft, Non-tender, + BS	  	  Neurologic: Non-focal  Extremities: Normal range of motion, No clubbing, cyanosis   tr edema  Vascular: Peripheral pulses palpable  TELEMETRY: 	    ECG:  	  RADIOLOGY:  OTHER: 	  	  LABS:	 	    CARDIAC MARKERS:                                10.4   6.91  )-----------( 131      ( 02 Jan 2020 06:21 )             31.8     01-02    126<L>  |  85<L>  |  7   ----------------------------<  156<H>  4.0   |  25  |  0.41<L>    Ca    9.1      02 Jan 2020 06:21  Phos  2.9     01-02  Mg     1.4     01-02    TPro  6.4  /  Alb  3.8  /  TBili  1.1  /  DBili  x   /  AST  50<H>  /  ALT  45  /  AlkPhos  64  01-01    proBNP:   Lipid Profile:   HgA1c:   TSH:

## 2020-01-02 NOTE — PROGRESS NOTE ADULT - ASSESSMENT
54 year old female with PMH of stage 3 breast cancer on aromasin, RA, Psoriasis on Otezla, brain aneurysm, s/p clip 1988, right jugular DVT, Catheter related MSSA bacteremia 2015,  multiple admissions for ETOH abuse, COPD on oxygen presents here with dyspnea, ETOH withdrawal.     1.  Chronic respiratory failure with hypoxia.  -secondary to pulm disease, COPD/ emphysema   -cxr w/ clear lungs   -cv stable. no decomp chf on exam; no evidence of acs   -management per med/ pulm  -iv steroids   -recent echo with grossly normal LV function     2. Sinus tachycardia   - secondary to withdrawal, dehydration, anxiety, copd exacerbation  -no evidence of acs , asymptomatic  -recent echo with grossly normal lv function     3. med f/u, CIWA  protocol   pt. continues to drink despite multiple admissions  aware of risks of continued drinking       dvt ppx

## 2020-01-03 LAB
ALBUMIN SERPL ELPH-MCNC: 3.9 G/DL — SIGNIFICANT CHANGE UP (ref 3.3–5)
ALP SERPL-CCNC: 53 U/L — SIGNIFICANT CHANGE UP (ref 40–120)
ALT FLD-CCNC: 51 U/L — HIGH (ref 10–45)
ANION GAP SERPL CALC-SCNC: 12 MMOL/L — SIGNIFICANT CHANGE UP (ref 5–17)
ANION GAP SERPL CALC-SCNC: 15 MMOL/L — SIGNIFICANT CHANGE UP (ref 5–17)
APPEARANCE UR: ABNORMAL
AST SERPL-CCNC: 53 U/L — HIGH (ref 10–40)
BILIRUB SERPL-MCNC: 1.8 MG/DL — HIGH (ref 0.2–1.2)
BILIRUB UR-MCNC: NEGATIVE — SIGNIFICANT CHANGE UP
BUN SERPL-MCNC: 7 MG/DL — SIGNIFICANT CHANGE UP (ref 7–23)
BUN SERPL-MCNC: 8 MG/DL — SIGNIFICANT CHANGE UP (ref 7–23)
CALCIUM SERPL-MCNC: 9.1 MG/DL — SIGNIFICANT CHANGE UP (ref 8.4–10.5)
CALCIUM SERPL-MCNC: 9.3 MG/DL — SIGNIFICANT CHANGE UP (ref 8.4–10.5)
CHLORIDE SERPL-SCNC: 81 MMOL/L — LOW (ref 96–108)
CHLORIDE SERPL-SCNC: 84 MMOL/L — LOW (ref 96–108)
CHLORIDE UR-SCNC: <35 MMOL/L — SIGNIFICANT CHANGE UP
CO2 SERPL-SCNC: 25 MMOL/L — SIGNIFICANT CHANGE UP (ref 22–31)
CO2 SERPL-SCNC: 28 MMOL/L — SIGNIFICANT CHANGE UP (ref 22–31)
COLOR SPEC: YELLOW — SIGNIFICANT CHANGE UP
CREAT SERPL-MCNC: 0.4 MG/DL — LOW (ref 0.5–1.3)
CREAT SERPL-MCNC: 0.47 MG/DL — LOW (ref 0.5–1.3)
DIFF PNL FLD: NEGATIVE — SIGNIFICANT CHANGE UP
GLUCOSE BLDC GLUCOMTR-MCNC: 124 MG/DL — HIGH (ref 70–99)
GLUCOSE BLDC GLUCOMTR-MCNC: 124 MG/DL — HIGH (ref 70–99)
GLUCOSE BLDC GLUCOMTR-MCNC: 136 MG/DL — HIGH (ref 70–99)
GLUCOSE BLDC GLUCOMTR-MCNC: 147 MG/DL — HIGH (ref 70–99)
GLUCOSE SERPL-MCNC: 129 MG/DL — HIGH (ref 70–99)
GLUCOSE SERPL-MCNC: 141 MG/DL — HIGH (ref 70–99)
GLUCOSE UR QL: NEGATIVE — SIGNIFICANT CHANGE UP
HCT VFR BLD CALC: 30.4 % — LOW (ref 34.5–45)
HGB BLD-MCNC: 10.7 G/DL — LOW (ref 11.5–15.5)
KETONES UR-MCNC: ABNORMAL
LEUKOCYTE ESTERASE UR-ACNC: ABNORMAL
MAGNESIUM SERPL-MCNC: 1.6 MG/DL — SIGNIFICANT CHANGE UP (ref 1.6–2.6)
MCHC RBC-ENTMCNC: 35.2 GM/DL — SIGNIFICANT CHANGE UP (ref 32–36)
MCHC RBC-ENTMCNC: 35.2 PG — HIGH (ref 27–34)
MCV RBC AUTO: 100 FL — SIGNIFICANT CHANGE UP (ref 80–100)
NITRITE UR-MCNC: NEGATIVE — SIGNIFICANT CHANGE UP
NRBC # BLD: 0 /100 WBCS — SIGNIFICANT CHANGE UP (ref 0–0)
OSMOLALITY UR: 352 MOS/KG — SIGNIFICANT CHANGE UP (ref 300–900)
PH UR: 7.5 — SIGNIFICANT CHANGE UP (ref 5–8)
PHOSPHATE SERPL-MCNC: 3.5 MG/DL — SIGNIFICANT CHANGE UP (ref 2.5–4.5)
PLATELET # BLD AUTO: 158 K/UL — SIGNIFICANT CHANGE UP (ref 150–400)
POTASSIUM SERPL-MCNC: 3.7 MMOL/L — SIGNIFICANT CHANGE UP (ref 3.5–5.3)
POTASSIUM SERPL-MCNC: 4 MMOL/L — SIGNIFICANT CHANGE UP (ref 3.5–5.3)
POTASSIUM SERPL-SCNC: 3.7 MMOL/L — SIGNIFICANT CHANGE UP (ref 3.5–5.3)
POTASSIUM SERPL-SCNC: 4 MMOL/L — SIGNIFICANT CHANGE UP (ref 3.5–5.3)
POTASSIUM UR-SCNC: 12 MMOL/L — SIGNIFICANT CHANGE UP
PROT SERPL-MCNC: 6.2 G/DL — SIGNIFICANT CHANGE UP (ref 6–8.3)
PROT UR-MCNC: SIGNIFICANT CHANGE UP
RBC # BLD: 3.04 M/UL — LOW (ref 3.8–5.2)
RBC # FLD: 13.4 % — SIGNIFICANT CHANGE UP (ref 10.3–14.5)
SODIUM SERPL-SCNC: 121 MMOL/L — LOW (ref 135–145)
SODIUM SERPL-SCNC: 124 MMOL/L — LOW (ref 135–145)
SODIUM UR-SCNC: 45 MMOL/L — SIGNIFICANT CHANGE UP
SP GR SPEC: 1.01 — SIGNIFICANT CHANGE UP (ref 1.01–1.02)
UROBILINOGEN FLD QL: ABNORMAL
WBC # BLD: 7.79 K/UL — SIGNIFICANT CHANGE UP (ref 3.8–10.5)
WBC # FLD AUTO: 7.79 K/UL — SIGNIFICANT CHANGE UP (ref 3.8–10.5)

## 2020-01-03 RX ORDER — SODIUM CHLORIDE 9 MG/ML
1 INJECTION INTRAMUSCULAR; INTRAVENOUS; SUBCUTANEOUS THREE TIMES A DAY
Refills: 0 | Status: COMPLETED | OUTPATIENT
Start: 2020-01-03 | End: 2020-01-04

## 2020-01-03 RX ORDER — LANOLIN ALCOHOL/MO/W.PET/CERES
3 CREAM (GRAM) TOPICAL ONCE
Refills: 0 | Status: COMPLETED | OUTPATIENT
Start: 2020-01-03 | End: 2020-01-03

## 2020-01-03 RX ORDER — DIPHENHYDRAMINE HCL 50 MG
25 CAPSULE ORAL ONCE
Refills: 0 | Status: COMPLETED | OUTPATIENT
Start: 2020-01-03 | End: 2020-01-03

## 2020-01-03 RX ORDER — PETROLATUM,WHITE
1 JELLY (GRAM) TOPICAL ONCE
Refills: 0 | Status: COMPLETED | OUTPATIENT
Start: 2020-01-03 | End: 2020-01-03

## 2020-01-03 RX ORDER — MAGNESIUM OXIDE 400 MG ORAL TABLET 241.3 MG
400 TABLET ORAL
Refills: 0 | Status: DISCONTINUED | OUTPATIENT
Start: 2020-01-03 | End: 2020-01-07

## 2020-01-03 RX ADMIN — Medication 0.25 MILLIGRAM(S): at 17:54

## 2020-01-03 RX ADMIN — HEPARIN SODIUM 5000 UNIT(S): 5000 INJECTION INTRAVENOUS; SUBCUTANEOUS at 17:56

## 2020-01-03 RX ADMIN — Medication 0.5 MILLIGRAM(S): at 13:44

## 2020-01-03 RX ADMIN — Medication 3 MILLILITER(S): at 13:40

## 2020-01-03 RX ADMIN — Medication 3 MILLILITER(S): at 17:53

## 2020-01-03 RX ADMIN — Medication 1 MILLIGRAM(S): at 02:03

## 2020-01-03 RX ADMIN — Medication 1 MILLIGRAM(S): at 05:49

## 2020-01-03 RX ADMIN — Medication 25 MILLIGRAM(S): at 06:30

## 2020-01-03 RX ADMIN — Medication 1 MILLIGRAM(S): at 13:40

## 2020-01-03 RX ADMIN — Medication 1 MILLIGRAM(S): at 09:36

## 2020-01-03 RX ADMIN — Medication 1000 UNIT(S): at 13:39

## 2020-01-03 RX ADMIN — Medication 20 MILLIGRAM(S): at 13:42

## 2020-01-03 RX ADMIN — Medication 20 MILLIGRAM(S): at 22:01

## 2020-01-03 RX ADMIN — Medication 0.5 MILLIGRAM(S): at 17:54

## 2020-01-03 RX ADMIN — Medication 1 TABLET(S): at 13:42

## 2020-01-03 RX ADMIN — SODIUM CHLORIDE 1 GRAM(S): 9 INJECTION INTRAMUSCULAR; INTRAVENOUS; SUBCUTANEOUS at 22:01

## 2020-01-03 RX ADMIN — Medication 1 APPLICATION(S): at 06:36

## 2020-01-03 RX ADMIN — Medication 0.5 MILLIGRAM(S): at 22:01

## 2020-01-03 RX ADMIN — Medication 3 MILLILITER(S): at 23:06

## 2020-01-03 RX ADMIN — HEPARIN SODIUM 5000 UNIT(S): 5000 INJECTION INTRAVENOUS; SUBCUTANEOUS at 05:55

## 2020-01-03 RX ADMIN — Medication 3 MILLIGRAM(S): at 03:55

## 2020-01-03 RX ADMIN — MAGNESIUM OXIDE 400 MG ORAL TABLET 400 MILLIGRAM(S): 241.3 TABLET ORAL at 17:54

## 2020-01-03 RX ADMIN — PANTOPRAZOLE SODIUM 40 MILLIGRAM(S): 20 TABLET, DELAYED RELEASE ORAL at 06:00

## 2020-01-03 RX ADMIN — Medication 0.25 MILLIGRAM(S): at 04:01

## 2020-01-03 RX ADMIN — Medication 3 MILLILITER(S): at 05:49

## 2020-01-03 RX ADMIN — Medication 20 MILLIGRAM(S): at 05:55

## 2020-01-03 RX ADMIN — MONTELUKAST 10 MILLIGRAM(S): 4 TABLET, CHEWABLE ORAL at 22:01

## 2020-01-03 NOTE — PROGRESS NOTE ADULT - SUBJECTIVE AND OBJECTIVE BOX
[facility]   [phone]  RENAL FOLLOW UP NOTE  --------------------------------------------------------------------------------  Chief Complaint:    24 hour events/subjective:    seen states breathing is better today sob is improving   as per staff decrease fluid intake todya     PAST HISTORY  --------------------------------------------------------------------------------  No significant changes to PMH, PSH, FHx, SHx, unless otherwise noted    ALLERGIES & MEDICATIONS  --------------------------------------------------------------------------------  Allergies    amoxicillin (Anaphylaxis)  Levaquin (Other; Anaphylaxis)  Levaquin (Unknown)  penicillin (Anaphylaxis)  penicillins (Anaphylaxis)    Intolerances      Standing Inpatient Medications  albuterol/ipratropium for Nebulization 3 milliLiter(s) Nebulizer every 6 hours  buDESOnide    Inhalation Suspension 0.25 milliGRAM(s) Inhalation every 12 hours  cholecalciferol 1000 Unit(s) Oral daily  folic acid 1 milliGRAM(s) Oral daily  heparin  Injectable 5000 Unit(s) SubCutaneous every 12 hours  LORazepam     Tablet   Oral   LORazepam     Tablet 0.5 milliGRAM(s) Oral every 4 hours  magnesium oxide 400 milliGRAM(s) Oral two times a day with meals  methylPREDNISolone sodium succinate Injectable 20 milliGRAM(s) IV Push every 8 hours  montelukast 10 milliGRAM(s) Oral at bedtime  multivitamin 1 Tablet(s) Oral daily  pantoprazole    Tablet 40 milliGRAM(s) Oral before breakfast  sodium chloride 1 Gram(s) Oral three times a day    PRN Inpatient Medications  LORazepam   Injectable 2 milliGRAM(s) IV Push every 2 hours PRN      REVIEW OF SYSTEMS  --------------------------------------------------------------------------------    Gen: denies fevers/chills,  CVS: denies chest pain/palpitations  Resp: denies worsening SOB/Cough  GI: Denies N/V/Abd pain  : Denies dysuria    All other systems were reviewed and are negative, except as noted.    VITALS/PHYSICAL EXAM  --------------------------------------------------------------------------------  T(C): 36.7 (01-03-20 @ 12:58), Max: 36.9 (01-03-20 @ 00:02)  HR: 101 (01-03-20 @ 17:38) (91 - 120)  BP: 138/81 (01-03-20 @ 17:38) (124/75 - 164/82)  RR: 18 (01-03-20 @ 17:38) (17 - 18)  SpO2: 99% (01-03-20 @ 17:38) (96% - 100%)  Wt(kg): --        01-02-20 @ 07:01  -  01-03-20 @ 07:00  --------------------------------------------------------  IN: 1680 mL / OUT: 5050 mL / NET: -3370 mL    01-03-20 @ 07:01  -  01-03-20 @ 18:48  --------------------------------------------------------  IN: 700 mL / OUT: 0 mL / NET: 700 mL      Physical Exam:  	    Gen: morbidly obese   	no jvd  	Pulm: decrease bs  no rales or ronchi intermittent  wheezing  improving   	CV: RRR, S1S2; no rub  	Abd: +BS, soft, nontender/nondistended  	: No suprapubic tenderness  	UE: Warm, no cyanosis  no clubbing,  no edema  	LE: Warm, no cyanosis  no clubbing, no edema  	Neuro: alert and oriented. speech coherent   	Skin: Warm, no decrease skin turgor + scaly skin   	      LABS/STUDIES  --------------------------------------------------------------------------------              10.7   7.79  >-----------<  158      [01-03-20 @ 06:46]              30.4     124  |  84  |  8   ----------------------------<  129      [01-03-20 @ 10:54]  3.7   |  28  |  0.47        Ca     9.1     [01-03-20 @ 10:54]      Mg     1.6     [01-03-20 @ 06:46]      Phos  3.5     [01-03-20 @ 06:46]    TPro  6.2  /  Alb  3.9  /  TBili  1.8  /  DBili  x   /  AST  53  /  ALT  51  /  AlkPhos  53  [01-03-20 @ 06:46]          Creatinine Trend:  SCr 0.47 [01-03 @ 10:54]  SCr 0.40 [01-03 @ 06:46]  SCr 0.41 [01-02 @ 06:21]  SCr 0.39 [01-01 @ 06:16]  SCr 0.41 [01-01 @ 01:11]                  Iron 42, TIBC 196, %sat 21      [11-16-19 @ 10:30]  Ferritin 792      [11-16-19 @ 10:31]  HbA1c 5.4      [08-19-16 @ 07:12]

## 2020-01-03 NOTE — PROGRESS NOTE ADULT - SUBJECTIVE AND OBJECTIVE BOX
CHIEF COMPLAINT:Patient is a 54y old  Female who presents with a chief complaint of sob (02 Jan 2020 08:49)    	        PAST MEDICAL & SURGICAL HISTORY:  Prophylactic measure  Breast cancer  Brain aneurysm: with clips  Psoriasis  RA (rheumatoid arthritis)  Psoriasis  Breast cancer, stage 3  History of modified radical mastectomy of both breasts  S/P bilateral mastectomy  Brain aneurysm: 1988 two clips          REVIEW OF SYSTEMS:    NECK: No pain or stiffness  RESPIRATORY: dec sob  CARDIOVASCULAR: No chest pain, palpitations, passing out, dizziness,  GASTROINTESTINAL: No abdominal or epigastric pain. No nausea, vomiting, or hematemesis; No diarrhea or constipation. No melena or hematochezia.  GENITOURINARY: No dysuria, frequency, hematuria  NEUROLOGICAL: No headaches,     Medications:  MEDICATIONS  (STANDING):  albuterol/ipratropium for Nebulization 3 milliLiter(s) Nebulizer every 6 hours  buDESOnide    Inhalation Suspension 0.25 milliGRAM(s) Inhalation every 12 hours  cholecalciferol 1000 Unit(s) Oral daily  folic acid 1 milliGRAM(s) Oral daily  heparin  Injectable 5000 Unit(s) SubCutaneous every 12 hours  LORazepam     Tablet   Oral   LORazepam     Tablet 1 milliGRAM(s) Oral every 4 hours  LORazepam     Tablet 0.5 milliGRAM(s) Oral every 4 hours  magnesium oxide 400 milliGRAM(s) Oral two times a day with meals  methylPREDNISolone sodium succinate Injectable 20 milliGRAM(s) IV Push every 8 hours  montelukast 10 milliGRAM(s) Oral at bedtime  multivitamin 1 Tablet(s) Oral daily  pantoprazole    Tablet 40 milliGRAM(s) Oral before breakfast    MEDICATIONS  (PRN):  LORazepam   Injectable 2 milliGRAM(s) IV Push every 2 hours PRN Symptom-triggered: 2 point increase in CIWA -Ar score and a total score of 7 or LESS    	    PHYSICAL EXAM:  T(C): 36.7 (01-03-20 @ 03:57), Max: 36.9 (01-03-20 @ 00:02)  HR: 120 (01-03-20 @ 03:57) (96 - 120)  BP: 153/81 (01-03-20 @ 03:57) (146/82 - 167/90)  RR: 17 (01-03-20 @ 03:57) (17 - 18)  SpO2: 97% (01-03-20 @ 03:57) (97% - 100%)  Wt(kg): --  I&O's Summary    02 Jan 2020 07:01  -  03 Jan 2020 07:00  --------------------------------------------------------  IN: 1680 mL / OUT: 5050 mL / NET: -3370 mL        Appearance: Normal	  HEENT:   Normal oral mucosa, PERRL, EOMI	  Lymphatic: No lymphadenopathy  Cardiovascular: Normal S1 S2, No JVD,   Respiratory: dec bs   Psychiatry: A & O x 3, Mood & affect appropriate  Gastrointestinal:  Soft, Non-tender, + BS	  Neurologic: Non-focal  Extremities: dec rom  Vascular: Peripheral pulses palpable     TELEMETRY: 	    ECG:  	  RADIOLOGY:  OTHER: 	  	  LABS:	 	    CARDIAC MARKERS:                                10.7   7.79  )-----------( 158      ( 03 Jan 2020 06:46 )             30.4     01-03    121<L>  |  81<L>  |  7   ----------------------------<  141<H>  4.0   |  25  |  0.40<L>    Ca    9.3      03 Jan 2020 06:46  Phos  3.5     01-03  Mg     1.6     01-03    TPro  6.2  /  Alb  3.9  /  TBili  1.8<H>  /  DBili  x   /  AST  53<H>  /  ALT  51<H>  /  AlkPhos  53  01-03    proBNP:   Lipid Profile:   HgA1c:   TSH:

## 2020-01-03 NOTE — PROGRESS NOTE ADULT - ASSESSMENT
COPD exacerbation- improving slowly  EtOOH use with concerns for withdrawal  anxiety    remains dyspneic which is likely a combination of significant COPD and anxiety, EtoH effects  solumedrol 20 q6- d2- decrease tomorrow if stable.  may help slightly with anxiety  continue nebs  O2  sats >90%  anxiolytics  clinical monitoring  dvt prophylaxis  routine gi prophylaxis as necessary      Katrina Iyer MD  [phone]  Pulmonary

## 2020-01-03 NOTE — PROGRESS NOTE ADULT - SUBJECTIVE AND OBJECTIVE BOX
CARDIOLOGY FOLLOW UP - Dr. Nj    CC no cp/sob     PHYSICAL EXAM:  T(C): 36.6 (01-03-20 @ 10:11), Max: 36.9 (01-03-20 @ 00:02)  HR: 94 (01-03-20 @ 10:11) (94 - 120)  BP: 145/87 (01-03-20 @ 10:11) (145/87 - 167/90)  RR: 18 (01-03-20 @ 10:11) (17 - 18)  SpO2: 100% (01-03-20 @ 10:11) (97% - 100%)  Wt(kg): --  I&O's Summary    02 Jan 2020 07:01  -  03 Jan 2020 07:00  --------------------------------------------------------  IN: 1680 mL / OUT: 5050 mL / NET: -3370 mL        Appearance: Normal	  Cardiovascular: Normal S1 S2,RRR, No JVD, No murmurs  Respiratory: diminished   Gastrointestinal:  Soft, Non-tender, + BS	  Extremities: Normal range of motion, No clubbing, cyanosis or edema        MEDICATIONS  (STANDING):  albuterol/ipratropium for Nebulization 3 milliLiter(s) Nebulizer every 6 hours  buDESOnide    Inhalation Suspension 0.25 milliGRAM(s) Inhalation every 12 hours  cholecalciferol 1000 Unit(s) Oral daily  folic acid 1 milliGRAM(s) Oral daily  heparin  Injectable 5000 Unit(s) SubCutaneous every 12 hours  LORazepam     Tablet   Oral   LORazepam     Tablet 0.5 milliGRAM(s) Oral every 4 hours  magnesium oxide 400 milliGRAM(s) Oral two times a day with meals  methylPREDNISolone sodium succinate Injectable 20 milliGRAM(s) IV Push every 8 hours  montelukast 10 milliGRAM(s) Oral at bedtime  multivitamin 1 Tablet(s) Oral daily  pantoprazole    Tablet 40 milliGRAM(s) Oral before breakfast      TELEMETRY: NSR 90s     ECG:  	  RADIOLOGY:   DIAGNOSTIC TESTING:  [ ] Echocardiogram:  [ ]  Catheterization:  [ ] Stress Test:    OTHER: 	    LABS:	 	                                10.7   7.79  )-----------( 158      ( 03 Jan 2020 06:46 )             30.4     01-03    124<L>  |  84<L>  |  8   ----------------------------<  129<H>  3.7   |  28  |  0.47<L>    Ca    9.1      03 Jan 2020 10:54  Phos  3.5     01-03  Mg     1.6     01-03    TPro  6.2  /  Alb  3.9  /  TBili  1.8<H>  /  DBili  x   /  AST  53<H>  /  ALT  51<H>  /  AlkPhos  53  01-03

## 2020-01-03 NOTE — PROGRESS NOTE ADULT - ASSESSMENT
54y female with COPD on home O2 3L, Breast CA s/p mastectomy, EtOH abuse, brain aneurysm s/p clip, RA, psoriatic arthritis presenting with sob along with some chest discomfort cp resolved but has had sob  drinks 5-6beers a day noticed with hyponatremia     1- hyponatremia   2- copd exacerbation   3- hypomagnesemia       hyponatremia in alcoholic with beer use and increase po fluid intake  one liter fluid restriction to cont  for now na had decreased due to pt with increase po fluid intake   check urine lytes   cont with proventil nebs and solumedrol   magnesium oxide 400 mg bid  mag level improving

## 2020-01-03 NOTE — PROGRESS NOTE ADULT - ASSESSMENT
pt w/ hx copd / etoh abuse / wcopd exac and intoxication  mult admissions  ciwa protocol  iv steroids/taper steroids as per pulm   pulm eval noted  nebs   o2  dvt proph  cards f/u noted  dvt proph  gi proph   hypomagnesemia  supp mg prn  hyponatremia  fluid restrict  renal eval noted  f/u   recheck bmp   pt

## 2020-01-03 NOTE — PROGRESS NOTE ADULT - SUBJECTIVE AND OBJECTIVE BOX
Follow-up Pulm Progress Note    No new respiratory events overnight.  Denies increased SOB, chest pain, cough or mucus.    Medications:  MEDICATIONS  (STANDING):  albuterol/ipratropium for Nebulization 3 milliLiter(s) Nebulizer every 6 hours  buDESOnide    Inhalation Suspension 0.25 milliGRAM(s) Inhalation every 12 hours  cholecalciferol 1000 Unit(s) Oral daily  folic acid 1 milliGRAM(s) Oral daily  heparin  Injectable 5000 Unit(s) SubCutaneous every 12 hours  LORazepam     Tablet   Oral   LORazepam     Tablet 0.5 milliGRAM(s) Oral every 4 hours  magnesium oxide 400 milliGRAM(s) Oral two times a day with meals  methylPREDNISolone sodium succinate Injectable 20 milliGRAM(s) IV Push every 8 hours  montelukast 10 milliGRAM(s) Oral at bedtime  multivitamin 1 Tablet(s) Oral daily  pantoprazole    Tablet 40 milliGRAM(s) Oral before breakfast    MEDICATIONS  (PRN):  LORazepam   Injectable 2 milliGRAM(s) IV Push every 2 hours PRN Symptom-triggered: 2 point increase in CIWA -Ar score and a total score of 7 or LESS      Vent settings (if applicable)      Vital Signs Last 24 Hrs  T(C): 36.7 (03 Jan 2020 03:57), Max: 36.9 (03 Jan 2020 00:02)  T(F): 98 (03 Jan 2020 03:57), Max: 98.4 (03 Jan 2020 00:02)  HR: 120 (03 Jan 2020 03:57) (96 - 120)  BP: 153/81 (03 Jan 2020 03:57) (146/82 - 167/90)  BP(mean): --  RR: 17 (03 Jan 2020 03:57) (17 - 18)  SpO2: 97% (03 Jan 2020 03:57) (97% - 100%)          01-02 @ 07:01  -  01-03 @ 07:00  --------------------------------------------------------  IN: 1680 mL / OUT: 5050 mL / NET: -3370 mL          LABS:                        10.7   7.79  )-----------( 158      ( 03 Jan 2020 06:46 )             30.4     01-03    121<L>  |  81<L>  |  7   ----------------------------<  141<H>  4.0   |  25  |  0.40<L>    Ca    9.3      03 Jan 2020 06:46  Phos  3.5     01-03  Mg     1.6     01-03    TPro  6.2  /  Alb  3.9  /  TBili  1.8<H>  /  DBili  x   /  AST  53<H>  /  ALT  51<H>  /  AlkPhos  53  01-03          CAPILLARY BLOOD GLUCOSE      POCT Blood Glucose.: 147 mg/dL (03 Jan 2020 09:07)              CULTURES:        Physical Examination:  Awake and alert, generally comfortable  HEENT: unremarkable  PULM: Clear to auscultation bilaterally, no significant sputum production  CVS: Regular rate and rhythm, no murmurs, rubs, or gallops  Abd:  soft, non tender  Extrem: No CCE    RADIOLOGY REVIEWED  CXR:    CT chest:

## 2020-01-03 NOTE — PROGRESS NOTE ADULT - ASSESSMENT
54 year old female with PMH of stage 3 breast cancer on aromasin, RA, Psoriasis on Otezla, brain aneurysm, s/p clip 1988, right jugular DVT, Catheter related MSSA bacteremia 2015,  multiple admissions for ETOH abuse, COPD on oxygen presents here with dyspnea, ETOH withdrawal.     1.  Chronic respiratory failure with hypoxia.  -secondary to pulm disease, COPD/ emphysema   -cxr w/ clear lungs   -cv stable. no decomp chf on exam; no evidence of acs   -management per med/ pulm  -iv steroids   -recent echo with grossly normal LV function     2. Sinus tachycardia -improved   - secondary to withdrawal, dehydration, anxiety, copd exacerbation  -no evidence of acs , asymptomatic  -recent echo with grossly normal lv function     3. med f/u, CIWA  protocol   pt. continues to drink despite multiple admissions  aware of risks of continued drinking     dvt ppx

## 2020-01-04 LAB
ANION GAP SERPL CALC-SCNC: 18 MMOL/L — HIGH (ref 5–17)
BUN SERPL-MCNC: 12 MG/DL — SIGNIFICANT CHANGE UP (ref 7–23)
CALCIUM SERPL-MCNC: 8.8 MG/DL — SIGNIFICANT CHANGE UP (ref 8.4–10.5)
CHLORIDE SERPL-SCNC: 88 MMOL/L — LOW (ref 96–108)
CO2 SERPL-SCNC: 25 MMOL/L — SIGNIFICANT CHANGE UP (ref 22–31)
CREAT SERPL-MCNC: 0.55 MG/DL — SIGNIFICANT CHANGE UP (ref 0.5–1.3)
GLUCOSE BLDC GLUCOMTR-MCNC: 111 MG/DL — HIGH (ref 70–99)
GLUCOSE BLDC GLUCOMTR-MCNC: 144 MG/DL — HIGH (ref 70–99)
GLUCOSE BLDC GLUCOMTR-MCNC: 149 MG/DL — HIGH (ref 70–99)
GLUCOSE BLDC GLUCOMTR-MCNC: 151 MG/DL — HIGH (ref 70–99)
GLUCOSE SERPL-MCNC: 143 MG/DL — HIGH (ref 70–99)
HCT VFR BLD CALC: 32.8 % — LOW (ref 34.5–45)
HGB BLD-MCNC: 11 G/DL — LOW (ref 11.5–15.5)
MAGNESIUM SERPL-MCNC: 1.8 MG/DL — SIGNIFICANT CHANGE UP (ref 1.6–2.6)
MCHC RBC-ENTMCNC: 33.5 GM/DL — SIGNIFICANT CHANGE UP (ref 32–36)
MCHC RBC-ENTMCNC: 34.3 PG — HIGH (ref 27–34)
MCV RBC AUTO: 102.2 FL — HIGH (ref 80–100)
NRBC # BLD: 0 /100 WBCS — SIGNIFICANT CHANGE UP (ref 0–0)
PHOSPHATE SERPL-MCNC: 4.8 MG/DL — HIGH (ref 2.5–4.5)
PLATELET # BLD AUTO: 155 K/UL — SIGNIFICANT CHANGE UP (ref 150–400)
POTASSIUM SERPL-MCNC: 4 MMOL/L — SIGNIFICANT CHANGE UP (ref 3.5–5.3)
POTASSIUM SERPL-SCNC: 4 MMOL/L — SIGNIFICANT CHANGE UP (ref 3.5–5.3)
RBC # BLD: 3.21 M/UL — LOW (ref 3.8–5.2)
RBC # FLD: 13.4 % — SIGNIFICANT CHANGE UP (ref 10.3–14.5)
SODIUM SERPL-SCNC: 131 MMOL/L — LOW (ref 135–145)
WBC # BLD: 6.6 K/UL — SIGNIFICANT CHANGE UP (ref 3.8–10.5)
WBC # FLD AUTO: 6.6 K/UL — SIGNIFICANT CHANGE UP (ref 3.8–10.5)

## 2020-01-04 RX ORDER — ALPRAZOLAM 0.25 MG
0.25 TABLET ORAL ONCE
Refills: 0 | Status: DISCONTINUED | OUTPATIENT
Start: 2020-01-04 | End: 2020-01-05

## 2020-01-04 RX ADMIN — Medication 0.5 MILLIGRAM(S): at 22:03

## 2020-01-04 RX ADMIN — PANTOPRAZOLE SODIUM 40 MILLIGRAM(S): 20 TABLET, DELAYED RELEASE ORAL at 05:59

## 2020-01-04 RX ADMIN — Medication 0.5 MILLIGRAM(S): at 09:45

## 2020-01-04 RX ADMIN — Medication 1000 UNIT(S): at 12:35

## 2020-01-04 RX ADMIN — SODIUM CHLORIDE 1 GRAM(S): 9 INJECTION INTRAMUSCULAR; INTRAVENOUS; SUBCUTANEOUS at 05:59

## 2020-01-04 RX ADMIN — Medication 3 MILLILITER(S): at 17:14

## 2020-01-04 RX ADMIN — Medication 0.5 MILLIGRAM(S): at 04:26

## 2020-01-04 RX ADMIN — MONTELUKAST 10 MILLIGRAM(S): 4 TABLET, CHEWABLE ORAL at 22:03

## 2020-01-04 RX ADMIN — Medication 3 MILLILITER(S): at 05:59

## 2020-01-04 RX ADMIN — MAGNESIUM OXIDE 400 MG ORAL TABLET 400 MILLIGRAM(S): 241.3 TABLET ORAL at 17:15

## 2020-01-04 RX ADMIN — Medication 0.5 MILLIGRAM(S): at 01:33

## 2020-01-04 RX ADMIN — HEPARIN SODIUM 5000 UNIT(S): 5000 INJECTION INTRAVENOUS; SUBCUTANEOUS at 05:59

## 2020-01-04 RX ADMIN — Medication 0.25 MILLIGRAM(S): at 05:59

## 2020-01-04 RX ADMIN — Medication 2 MILLIGRAM(S): at 20:12

## 2020-01-04 RX ADMIN — Medication 1 TABLET(S): at 12:36

## 2020-01-04 RX ADMIN — Medication 20 MILLIGRAM(S): at 06:00

## 2020-01-04 RX ADMIN — Medication 1 MILLIGRAM(S): at 12:36

## 2020-01-04 RX ADMIN — MAGNESIUM OXIDE 400 MG ORAL TABLET 400 MILLIGRAM(S): 241.3 TABLET ORAL at 09:45

## 2020-01-04 RX ADMIN — SODIUM CHLORIDE 1 GRAM(S): 9 INJECTION INTRAMUSCULAR; INTRAVENOUS; SUBCUTANEOUS at 13:19

## 2020-01-04 RX ADMIN — Medication 3 MILLILITER(S): at 12:36

## 2020-01-04 RX ADMIN — Medication 0.25 MILLIGRAM(S): at 17:14

## 2020-01-04 RX ADMIN — HEPARIN SODIUM 5000 UNIT(S): 5000 INJECTION INTRAVENOUS; SUBCUTANEOUS at 17:14

## 2020-01-04 RX ADMIN — SODIUM CHLORIDE 1 GRAM(S): 9 INJECTION INTRAMUSCULAR; INTRAVENOUS; SUBCUTANEOUS at 22:03

## 2020-01-04 RX ADMIN — Medication 20 MILLIGRAM(S): at 17:13

## 2020-01-04 RX ADMIN — Medication 3 MILLILITER(S): at 23:23

## 2020-01-04 RX ADMIN — Medication 2 MILLIGRAM(S): at 15:41

## 2020-01-04 NOTE — PROGRESS NOTE ADULT - SUBJECTIVE AND OBJECTIVE BOX
CHIEF COMPLAINT:Patient is a 54y old  Female who presents with a chief complaint of sob (03 Jan 2020 10:04)    	        PAST MEDICAL & SURGICAL HISTORY:  Prophylactic measure  Breast cancer  Brain aneurysm: with clips  Psoriasis  RA (rheumatoid arthritis)  Psoriasis  Breast cancer, stage 3  History of modified radical mastectomy of both breasts  S/P bilateral mastectomy  Brain aneurysm: 1988 two clips          REVIEW OF SYSTEMS:  feels better  NECK: No pain or stiffness  RESPIRATORY: dec sob  CARDIOVASCULAR: No chest pain, palpitations, passing out,   GASTROINTESTINAL: No abdominal or epigastric pain. No nausea, vomiting, or hematemesis; No diarrhea or constipation. No melena or hematochezia.  GENITOURINARY: No dysuria, frequency, hematuria, or incontinence  NEUROLOGICAL: No headaches,     MUSCULOSKELETAL: No joint pain or swelling; No muscle, back, or extremity pain    Medications:  MEDICATIONS  (STANDING):  albuterol/ipratropium for Nebulization 3 milliLiter(s) Nebulizer every 6 hours  buDESOnide    Inhalation Suspension 0.25 milliGRAM(s) Inhalation every 12 hours  cholecalciferol 1000 Unit(s) Oral daily  folic acid 1 milliGRAM(s) Oral daily  heparin  Injectable 5000 Unit(s) SubCutaneous every 12 hours  LORazepam     Tablet   Oral   LORazepam     Tablet 0.5 milliGRAM(s) Oral every 12 hours  magnesium oxide 400 milliGRAM(s) Oral two times a day with meals  methylPREDNISolone sodium succinate Injectable 20 milliGRAM(s) IV Push every 12 hours  montelukast 10 milliGRAM(s) Oral at bedtime  multivitamin 1 Tablet(s) Oral daily  pantoprazole    Tablet 40 milliGRAM(s) Oral before breakfast  sodium chloride 1 Gram(s) Oral three times a day    MEDICATIONS  (PRN):  LORazepam   Injectable 2 milliGRAM(s) IV Push every 2 hours PRN Symptom-triggered: 2 point increase in CIWA -Ar score and a total score of 7 or LESS    	    PHYSICAL EXAM:  T(C): 37.2 (01-04-20 @ 09:50), Max: 37.2 (01-04-20 @ 01:23)  HR: 88 (01-04-20 @ 09:50) (76 - 119)  BP: 147/93 (01-04-20 @ 09:50) (118/78 - 151/83)  RR: 18 (01-04-20 @ 09:50) (18 - 18)  SpO2: 93% (01-04-20 @ 09:50) (93% - 99%)  Wt(kg): --  I&O's Summary    03 Jan 2020 07:01  -  04 Jan 2020 07:00  --------------------------------------------------------  IN: 1060 mL / OUT: 450 mL / NET: 610 mL    04 Jan 2020 07:01  -  04 Jan 2020 12:27  --------------------------------------------------------  IN: 0 mL / OUT: 0 mL / NET: 0 mL        Appearance: Normal	  HEENT:   Normal oral mucosa, PERRL, EOMI	  Lymphatic: No lymphadenopathy  Cardiovascular: Normal S1 S2, No JVD,   Respiratory:dec bs   Psychiatry: A & O x 3,   Gastrointestinal:  Soft, Non-tender, + BS	  Skin: No rashes, No ecchymoses, No cyanosis	  Neurologic: Non-focal  Extremities: dec rom  Vascular: Peripheral pulses palpable    TELEMETRY: 	    ECG:  	  RADIOLOGY:  OTHER: 	  	  LABS:	 	    CARDIAC MARKERS:                                11.0   6.60  )-----------( 155      ( 04 Jan 2020 07:06 )             32.8     01-04    131<L>  |  88<L>  |  12  ----------------------------<  143<H>  4.0   |  25  |  0.55    Ca    8.8      04 Jan 2020 07:06  Phos  4.8     01-04  Mg     1.8     01-04    TPro  6.2  /  Alb  3.9  /  TBili  1.8<H>  /  DBili  x   /  AST  53<H>  /  ALT  51<H>  /  AlkPhos  53  01-03    proBNP:   Lipid Profile:   HgA1c:   TSH:

## 2020-01-04 NOTE — PROGRESS NOTE ADULT - ASSESSMENT
pt w/ hx copd / etoh abuse / wcopd exac and intoxication  mult admissions  ciwa protocol  iv steroids/taper steroids as per pulm   pulm eval noted  nebs   o2  dvt proph  cards f/u noted  dvt proph  gi proph   hypomagnesemia  supp mg prn  hyponatremia improved  fluid restrict  renal eval noted  f/u   pt    d/c likely on monday

## 2020-01-04 NOTE — PROGRESS NOTE ADULT - SUBJECTIVE AND OBJECTIVE BOX
CC: no events, comfortable    TELEMETRY: nsr,sinus tach    PHYSICAL EXAM:    T(C): 36.9 (01-04-20 @ 05:52), Max: 37.2 (01-04-20 @ 01:23)  HR: 76 (01-04-20 @ 05:52) (76 - 119)  BP: 137/85 (01-04-20 @ 05:52) (118/78 - 151/83)  RR: 18 (01-04-20 @ 05:52) (18 - 18)  SpO2: 98% (01-04-20 @ 05:52) (96% - 100%)  Wt(kg): --  I&O's Summary    03 Jan 2020 07:01  -  04 Jan 2020 07:00  --------------------------------------------------------  IN: 1060 mL / OUT: 450 mL / NET: 610 mL        Appearance: Normal	  Cardiovascular: Normal S1 S2,RRR, No JVD, No murmurs  Respiratory: Lungs clear to auscultation	  Gastrointestinal:  Soft, Non-tender, + BS	  Extremities: Normal range of motion, No clubbing, cyanosis or edema  Vascular: Peripheral pulses palpable 2+ bilaterally     LABS:	 	                          11.0   6.60  )-----------( 155      ( 04 Jan 2020 07:06 )             32.8     01-04    131<L>  |  88<L>  |  12  ----------------------------<  143<H>  4.0   |  25  |  0.55    Ca    8.8      04 Jan 2020 07:06  Phos  4.8     01-04  Mg     1.8     01-04    TPro  6.2  /  Alb  3.9  /  TBili  1.8<H>  /  DBili  x   /  AST  53<H>  /  ALT  51<H>  /  AlkPhos  53  01-03          CARDIAC MARKERS:

## 2020-01-04 NOTE — PROGRESS NOTE ADULT - SUBJECTIVE AND OBJECTIVE BOX
Afebrile. O2 sat 93% on 2 liters nasal O2.  Feeling better.   Infrequent cough and sputum production. No wheezing or chest pain.  No shortness of breath at rest.    Vital Signs Last 24 Hrs  T(C): 37.2 (04 Jan 2020 09:50), Max: 37.2 (04 Jan 2020 01:23)  T(F): 98.9 (04 Jan 2020 09:50), Max: 98.9 (04 Jan 2020 01:23)  HR: 88 (04 Jan 2020 09:50) (76 - 119)  BP: 147/93 (04 Jan 2020 09:50) (118/78 - 151/83)  BP(mean): --  RR: 18 (04 Jan 2020 09:50) (18 - 18)  SpO2: 93% (04 Jan 2020 09:50) (93% - 99%)    Medications:  MEDICATIONS  (STANDING):  albuterol/ipratropium for Nebulization 3 milliLiter(s) Nebulizer every 6 hours  buDESOnide    Inhalation Suspension 0.25 milliGRAM(s) Inhalation every 12 hours  cholecalciferol 1000 Unit(s) Oral daily  folic acid 1 milliGRAM(s) Oral daily  heparin  Injectable 5000 Unit(s) SubCutaneous every 12 hours  LORazepam     Tablet   Oral   LORazepam     Tablet 0.5 milliGRAM(s) Oral every 12 hours  magnesium oxide 400 milliGRAM(s) Oral two times a day with meals  methylPREDNISolone sodium succinate Injectable 20 milliGRAM(s) IV Push every 12 hours  montelukast 10 milliGRAM(s) Oral at bedtime  multivitamin 1 Tablet(s) Oral daily  pantoprazole    Tablet 40 milliGRAM(s) Oral before breakfast  sodium chloride 1 Gram(s) Oral three times a day    MEDICATIONS  (PRN):  LORazepam   Injectable 2 milliGRAM(s) IV Push every 2 hours PRN Symptom-triggered: 2 point increase in CIWA -Ar score and a total score of 7 or LESS      Allergies    amoxicillin (Anaphylaxis)  Levaquin (Other; Anaphylaxis)  Levaquin (Unknown)  penicillin (Anaphylaxis)  penicillins (Anaphylaxis)    Intolerances        Physical Examination:  Pleasant  Neck: no JVD, LAD, accessory muscle use  PULM: Generalized decreased breath sounds bilaterally. No wheezes, rhonchi, or rales.  CVS: RR  Abdomen: nontender  Extremities: no edema    Plan:  1. Continue Duoneb/Budesonide nebulizer tx and Singulair.  2. Continue Solumedrol 20 mg IVP Q12 hrs for now.  3. On Protonix and SQ Heparin.  4. Judicious use of anxiolytics as per Medicine.    POC: Joss Brock M.D.  [phone]

## 2020-01-05 LAB
ANION GAP SERPL CALC-SCNC: 20 MMOL/L — HIGH (ref 5–17)
BUN SERPL-MCNC: 14 MG/DL — SIGNIFICANT CHANGE UP (ref 7–23)
CALCIUM SERPL-MCNC: 8.8 MG/DL — SIGNIFICANT CHANGE UP (ref 8.4–10.5)
CHLORIDE SERPL-SCNC: 91 MMOL/L — LOW (ref 96–108)
CO2 SERPL-SCNC: 24 MMOL/L — SIGNIFICANT CHANGE UP (ref 22–31)
CREAT SERPL-MCNC: 0.57 MG/DL — SIGNIFICANT CHANGE UP (ref 0.5–1.3)
GLUCOSE BLDC GLUCOMTR-MCNC: 100 MG/DL — HIGH (ref 70–99)
GLUCOSE BLDC GLUCOMTR-MCNC: 141 MG/DL — HIGH (ref 70–99)
GLUCOSE BLDC GLUCOMTR-MCNC: 147 MG/DL — HIGH (ref 70–99)
GLUCOSE BLDC GLUCOMTR-MCNC: 147 MG/DL — HIGH (ref 70–99)
GLUCOSE SERPL-MCNC: 138 MG/DL — HIGH (ref 70–99)
POTASSIUM SERPL-MCNC: 3.1 MMOL/L — LOW (ref 3.5–5.3)
POTASSIUM SERPL-SCNC: 3.1 MMOL/L — LOW (ref 3.5–5.3)
SODIUM SERPL-SCNC: 135 MMOL/L — SIGNIFICANT CHANGE UP (ref 135–145)

## 2020-01-05 RX ORDER — POTASSIUM CHLORIDE 20 MEQ
40 PACKET (EA) ORAL EVERY 4 HOURS
Refills: 0 | Status: COMPLETED | OUTPATIENT
Start: 2020-01-05 | End: 2020-01-05

## 2020-01-05 RX ORDER — ALPRAZOLAM 0.25 MG
0.25 TABLET ORAL ONCE
Refills: 0 | Status: DISCONTINUED | OUTPATIENT
Start: 2020-01-05 | End: 2020-01-05

## 2020-01-05 RX ORDER — ALPRAZOLAM 0.25 MG
0.25 TABLET ORAL EVERY 8 HOURS
Refills: 0 | Status: DISCONTINUED | OUTPATIENT
Start: 2020-01-05 | End: 2020-01-07

## 2020-01-05 RX ADMIN — Medication 0.5 MILLIGRAM(S): at 21:33

## 2020-01-05 RX ADMIN — Medication 3 MILLILITER(S): at 12:06

## 2020-01-05 RX ADMIN — Medication 1 MILLIGRAM(S): at 09:40

## 2020-01-05 RX ADMIN — Medication 0.25 MILLIGRAM(S): at 00:25

## 2020-01-05 RX ADMIN — MAGNESIUM OXIDE 400 MG ORAL TABLET 400 MILLIGRAM(S): 241.3 TABLET ORAL at 18:00

## 2020-01-05 RX ADMIN — HEPARIN SODIUM 5000 UNIT(S): 5000 INJECTION INTRAVENOUS; SUBCUTANEOUS at 05:16

## 2020-01-05 RX ADMIN — Medication 3 MILLILITER(S): at 05:17

## 2020-01-05 RX ADMIN — Medication 40 MILLIEQUIVALENT(S): at 18:03

## 2020-01-05 RX ADMIN — Medication 0.25 MILLIGRAM(S): at 05:17

## 2020-01-05 RX ADMIN — Medication 20 MILLIGRAM(S): at 18:01

## 2020-01-05 RX ADMIN — Medication 2 MILLIGRAM(S): at 16:14

## 2020-01-05 RX ADMIN — Medication 0.25 MILLIGRAM(S): at 19:35

## 2020-01-05 RX ADMIN — Medication 0.25 MILLIGRAM(S): at 12:05

## 2020-01-05 RX ADMIN — Medication 3 MILLILITER(S): at 18:01

## 2020-01-05 RX ADMIN — Medication 0.25 MILLIGRAM(S): at 18:01

## 2020-01-05 RX ADMIN — MONTELUKAST 10 MILLIGRAM(S): 4 TABLET, CHEWABLE ORAL at 21:32

## 2020-01-05 RX ADMIN — HEPARIN SODIUM 5000 UNIT(S): 5000 INJECTION INTRAVENOUS; SUBCUTANEOUS at 18:01

## 2020-01-05 RX ADMIN — MAGNESIUM OXIDE 400 MG ORAL TABLET 400 MILLIGRAM(S): 241.3 TABLET ORAL at 09:39

## 2020-01-05 RX ADMIN — Medication 0.25 MILLIGRAM(S): at 03:56

## 2020-01-05 RX ADMIN — Medication 1 TABLET(S): at 09:40

## 2020-01-05 RX ADMIN — Medication 2 MILLIGRAM(S): at 18:35

## 2020-01-05 RX ADMIN — Medication 20 MILLIGRAM(S): at 05:17

## 2020-01-05 RX ADMIN — PANTOPRAZOLE SODIUM 40 MILLIGRAM(S): 20 TABLET, DELAYED RELEASE ORAL at 05:16

## 2020-01-05 RX ADMIN — Medication 2 MILLIGRAM(S): at 01:53

## 2020-01-05 RX ADMIN — Medication 3 MILLILITER(S): at 23:19

## 2020-01-05 RX ADMIN — Medication 40 MILLIEQUIVALENT(S): at 21:32

## 2020-01-05 RX ADMIN — Medication 1000 UNIT(S): at 09:39

## 2020-01-05 RX ADMIN — Medication 2 MILLIGRAM(S): at 23:59

## 2020-01-05 RX ADMIN — Medication 0.5 MILLIGRAM(S): at 09:39

## 2020-01-05 NOTE — PROGRESS NOTE ADULT - SUBJECTIVE AND OBJECTIVE BOX
Afebrile. O2 sat 99% on 2 liters nasal O2.  Infrequent cough and sputum production. No wheezing or chest pain.  Mild shortness of breath at times.    Vital Signs Last 24 Hrs  T(C): 36.6 (05 Jan 2020 05:17), Max: 37.2 (04 Jan 2020 14:00)  T(F): 97.8 (05 Jan 2020 05:17), Max: 99 (04 Jan 2020 14:00)  HR: 93 (05 Jan 2020 05:17) (83 - 102)  BP: 136/88 (05 Jan 2020 05:17) (112/80 - 139/83)  BP(mean): --  RR: 18 (05 Jan 2020 05:17) (18 - 18)  SpO2: 99% (05 Jan 2020 05:17) (94% - 99%)    Medications:  MEDICATIONS  (STANDING):  albuterol/ipratropium for Nebulization 3 milliLiter(s) Nebulizer every 6 hours  buDESOnide    Inhalation Suspension 0.25 milliGRAM(s) Inhalation every 12 hours  cholecalciferol 1000 Unit(s) Oral daily  folic acid 1 milliGRAM(s) Oral daily  heparin  Injectable 5000 Unit(s) SubCutaneous every 12 hours  LORazepam     Tablet   Oral   LORazepam     Tablet 0.5 milliGRAM(s) Oral every 12 hours  magnesium oxide 400 milliGRAM(s) Oral two times a day with meals  methylPREDNISolone sodium succinate Injectable 20 milliGRAM(s) IV Push every 12 hours  montelukast 10 milliGRAM(s) Oral at bedtime  multivitamin 1 Tablet(s) Oral daily  pantoprazole    Tablet 40 milliGRAM(s) Oral before breakfast    MEDICATIONS  (PRN):  LORazepam   Injectable 2 milliGRAM(s) IV Push every 2 hours PRN Symptom-triggered: 2 point increase in CIWA -Ar score and a total score of 7 or LESS      Allergies    amoxicillin (Anaphylaxis)  Levaquin (Other; Anaphylaxis)  Levaquin (Unknown)  penicillin (Anaphylaxis)  penicillins (Anaphylaxis)    Intolerances        Physical Examination:  Pleasant  Neck: no JVD, LAD, accessory muscle use  PULM: Generalized decreased breath sounds bilaterally. No wheezes, rhonchi, or rales.  CVS: RR  Abdomen: nontender  Extremities: no edema    Plan:  1. Continue Duoneb/Budesonide nebulizer tx and Singulair.  2. Continue Solumedrol 20 mg IVP Q12 hrs. Will consider switch to Prednisone tomorrow.  3. On Protonix and SQ Heparin.  4. Judicious use of anxiolytics as per Medicine.    POC: Joss Brock M.D.  [phone]

## 2020-01-05 NOTE — PROGRESS NOTE ADULT - SUBJECTIVE AND OBJECTIVE BOX
CC: no events    TELEMETRY: nsr    PHYSICAL EXAM:    T(C): 36.6 (01-05-20 @ 05:17), Max: 37.2 (01-04-20 @ 09:50)  HR: 93 (01-05-20 @ 05:17) (83 - 102)  BP: 136/88 (01-05-20 @ 05:17) (112/80 - 147/93)  RR: 18 (01-05-20 @ 05:17) (18 - 18)  SpO2: 99% (01-05-20 @ 05:17) (93% - 99%)  Wt(kg): --  I&O's Summary    04 Jan 2020 07:01  -  05 Jan 2020 07:00  --------------------------------------------------------  IN: 540 mL / OUT: 750 mL / NET: -210 mL        Appearance: Normal	  Cardiovascular: Normal S1 S2,RRR, No JVD, No murmurs  Respiratory: Lungs clear to auscultation	  Gastrointestinal:  Soft, Non-tender, + BS	  Extremities: Normal range of motion, No clubbing, cyanosis or edema  Vascular: Peripheral pulses palpable 2+ bilaterally     LABS:	 	                          11.0   6.60  )-----------( 155      ( 04 Jan 2020 07:06 )             32.8     01-04    131<L>  |  88<L>  |  12  ----------------------------<  143<H>  4.0   |  25  |  0.55    Ca    8.8      04 Jan 2020 07:06  Phos  4.8     01-04  Mg     1.8     01-04            CARDIAC MARKERS:

## 2020-01-05 NOTE — PROVIDER CONTACT NOTE (OTHER) - ACTION/TREATMENT ORDERED:
Lorazepam 2mg iv push given as per ciwa protocol. NP assessed, vss, bubba contd.
Ativan 2mg. IVP prn ordered, Duoneb

## 2020-01-05 NOTE — PROGRESS NOTE ADULT - SUBJECTIVE AND OBJECTIVE BOX
CHIEF COMPLAINT:Patient is a 54y old  Female who presents with a chief complaint of sob (03 Jan 2020 10:04)    	        PAST MEDICAL & SURGICAL HISTORY:  Prophylactic measure  Breast cancer  Brain aneurysm: with clips  Psoriasis  RA (rheumatoid arthritis)  Psoriasis  Breast cancer, stage 3  History of modified radical mastectomy of both breasts  S/P bilateral mastectomy  Brain aneurysm: 1988 two clips          REVIEW OF SYSTEMS:  CONSTITUTIONAL: No fever, weight loss, or fatigue  EYES: No eye pain, visual disturbances, or discharge  NECK: No pain or stiffness  RESPIRATORY: No cough, wheezing, chills or hemoptysis; No Shortness of Breath  CARDIOVASCULAR: No chest pain, palpitations, passing out, dizziness, or leg swelling  GASTROINTESTINAL: No abdominal or epigastric pain. No nausea, vomiting, or hematemesis; No diarrhea or constipation. No melena or hematochezia.  GENITOURINARY: No dysuria, frequency, hematuria, or incontinence  NEUROLOGICAL: No headaches, memory loss, loss of strength, numbness, or tremors  SKIN: No itching, burning, rashes, or lesions   LYMPH Nodes: No enlarged glands  ENDOCRINE: No heat or cold intolerance; No hair loss  MUSCULOSKELETAL: No joint pain or swelling; No muscle, back, or extremity pain    Medications:  MEDICATIONS  (STANDING):  albuterol/ipratropium for Nebulization 3 milliLiter(s) Nebulizer every 6 hours  buDESOnide    Inhalation Suspension 0.25 milliGRAM(s) Inhalation every 12 hours  cholecalciferol 1000 Unit(s) Oral daily  folic acid 1 milliGRAM(s) Oral daily  heparin  Injectable 5000 Unit(s) SubCutaneous every 12 hours  LORazepam     Tablet   Oral   LORazepam     Tablet 0.5 milliGRAM(s) Oral every 12 hours  magnesium oxide 400 milliGRAM(s) Oral two times a day with meals  methylPREDNISolone sodium succinate Injectable 20 milliGRAM(s) IV Push every 12 hours  montelukast 10 milliGRAM(s) Oral at bedtime  multivitamin 1 Tablet(s) Oral daily  pantoprazole    Tablet 40 milliGRAM(s) Oral before breakfast    MEDICATIONS  (PRN):  LORazepam   Injectable 2 milliGRAM(s) IV Push every 2 hours PRN Symptom-triggered: 2 point increase in CIWA -Ar score and a total score of 7 or LESS    	    PHYSICAL EXAM:  T(C): 36.6 (01-05-20 @ 05:17), Max: 37.2 (01-04-20 @ 09:50)  HR: 93 (01-05-20 @ 05:17) (83 - 102)  BP: 136/88 (01-05-20 @ 05:17) (112/80 - 147/93)  RR: 18 (01-05-20 @ 05:17) (18 - 18)  SpO2: 99% (01-05-20 @ 05:17) (93% - 99%)  Wt(kg): --  I&O's Summary    04 Jan 2020 07:01  -  05 Jan 2020 07:00  --------------------------------------------------------  IN: 540 mL / OUT: 750 mL / NET: -210 mL        Appearance: Normal	  HEENT:   Normal oral mucosa, PERRL, EOMI	  Lymphatic: No lymphadenopathy  Cardiovascular: Normal S1 S2, No JVD, No murmurs, No edema  Respiratory: Lungs clear to auscultation	  Psychiatry: A & O x 3, Mood & affect appropriate  Gastrointestinal:  Soft, Non-tender, + BS	  Skin: No rashes, No ecchymoses, No cyanosis	  Neurologic: Non-focal  Extremities: Normal range of motion, No clubbing, cyanosis or edema  Vascular: Peripheral pulses palpable 2+ bilaterally    TELEMETRY: 	    ECG:  	  RADIOLOGY:  OTHER: 	  	  LABS:	 	    CARDIAC MARKERS:                                11.0   6.60  )-----------( 155      ( 04 Jan 2020 07:06 )             32.8     01-04    131<L>  |  88<L>  |  12  ----------------------------<  143<H>  4.0   |  25  |  0.55    Ca    8.8      04 Jan 2020 07:06  Phos  4.8     01-04  Mg     1.8     01-04      proBNP:   Lipid Profile:   HgA1c:   TSH: CHIEF COMPLAINT:Patient is a 54y old  Female who presents with a chief complaint of sob (03 Jan 2020 10:04)    	        PAST MEDICAL & SURGICAL HISTORY:  Prophylactic measure  Breast cancer  Brain aneurysm: with clips  Psoriasis  RA (rheumatoid arthritis)  Psoriasis  Breast cancer, stage 3  History of modified radical mastectomy of both breasts  S/P bilateral mastectomy  Brain aneurysm: 1988 two clips          REVIEW OF SYSTEMS:  CONSTITUTIONAL: No fever, weight loss, or fatigue  EYES: No eye pain, visual disturbances, or discharge  NECK: No pain or stiffness  RESPIRATORY: breathing better  CARDIOVASCULAR: No chest pain, palpitations, passing out, dizziness,   GASTROINTESTINAL: No abdominal or epigastric pain. No nausea, vomiting, or hematemesis; No diarrhea or constipation. No melena or hematochezia.  GENITOURINARY: No dysuria, frequency, hematuria, or incontinence  NEUROLOGICAL: No headaches, memory loss, loss of strength, numbness, or tremors    MUSCULOSKELETAL: No joint pain or swelling; No muscle, back, or extremity pain    Medications:  MEDICATIONS  (STANDING):  albuterol/ipratropium for Nebulization 3 milliLiter(s) Nebulizer every 6 hours  buDESOnide    Inhalation Suspension 0.25 milliGRAM(s) Inhalation every 12 hours  cholecalciferol 1000 Unit(s) Oral daily  folic acid 1 milliGRAM(s) Oral daily  heparin  Injectable 5000 Unit(s) SubCutaneous every 12 hours  LORazepam     Tablet   Oral   LORazepam     Tablet 0.5 milliGRAM(s) Oral every 12 hours  magnesium oxide 400 milliGRAM(s) Oral two times a day with meals  methylPREDNISolone sodium succinate Injectable 20 milliGRAM(s) IV Push every 12 hours  montelukast 10 milliGRAM(s) Oral at bedtime  multivitamin 1 Tablet(s) Oral daily  pantoprazole    Tablet 40 milliGRAM(s) Oral before breakfast    MEDICATIONS  (PRN):  LORazepam   Injectable 2 milliGRAM(s) IV Push every 2 hours PRN Symptom-triggered: 2 point increase in CIWA -Ar score and a total score of 7 or LESS    	    PHYSICAL EXAM:  T(C): 36.6 (01-05-20 @ 05:17), Max: 37.2 (01-04-20 @ 09:50)  HR: 93 (01-05-20 @ 05:17) (83 - 102)  BP: 136/88 (01-05-20 @ 05:17) (112/80 - 147/93)  RR: 18 (01-05-20 @ 05:17) (18 - 18)  SpO2: 99% (01-05-20 @ 05:17) (93% - 99%)  Wt(kg): --  I&O's Summary    04 Jan 2020 07:01  -  05 Jan 2020 07:00  --------------------------------------------------------  IN: 540 mL / OUT: 750 mL / NET: -210 mL        Appearance: Normal	  HEENT:   Normal oral mucosa, PERRL, EOMI	  Lymphatic: No lymphadenopathy  Cardiovascular: Normal S1 S2, No JVD  Respiratory: dec bs   Psychiatry: A & O x 3,   Gastrointestinal:  Soft, Non-tender, + BS	  Skin: No rashes, No ecchymoses, No cyanosis	  Neurologic: Non-focal  Extremities:dec rom    TELEMETRY: 	    ECG:  	  RADIOLOGY:  OTHER: 	  	  LABS:	 	    CARDIAC MARKERS:                                11.0   6.60  )-----------( 155      ( 04 Jan 2020 07:06 )             32.8     01-04    131<L>  |  88<L>  |  12  ----------------------------<  143<H>  4.0   |  25  |  0.55    Ca    8.8      04 Jan 2020 07:06  Phos  4.8     01-04  Mg     1.8     01-04      proBNP:   Lipid Profile:   HgA1c:   TSH:

## 2020-01-06 ENCOUNTER — TRANSCRIPTION ENCOUNTER (OUTPATIENT)
Age: 55
End: 2020-01-06

## 2020-01-06 LAB
ANION GAP SERPL CALC-SCNC: 11 MMOL/L — SIGNIFICANT CHANGE UP (ref 5–17)
ANION GAP SERPL CALC-SCNC: 12 MMOL/L — SIGNIFICANT CHANGE UP (ref 5–17)
BUN SERPL-MCNC: 11 MG/DL — SIGNIFICANT CHANGE UP (ref 7–23)
BUN SERPL-MCNC: 12 MG/DL — SIGNIFICANT CHANGE UP (ref 7–23)
CALCIUM SERPL-MCNC: 9.5 MG/DL — SIGNIFICANT CHANGE UP (ref 8.4–10.5)
CALCIUM SERPL-MCNC: 9.5 MG/DL — SIGNIFICANT CHANGE UP (ref 8.4–10.5)
CHLORIDE SERPL-SCNC: 87 MMOL/L — LOW (ref 96–108)
CHLORIDE SERPL-SCNC: 88 MMOL/L — LOW (ref 96–108)
CO2 SERPL-SCNC: 25 MMOL/L — SIGNIFICANT CHANGE UP (ref 22–31)
CO2 SERPL-SCNC: 26 MMOL/L — SIGNIFICANT CHANGE UP (ref 22–31)
CREAT SERPL-MCNC: 0.48 MG/DL — LOW (ref 0.5–1.3)
CREAT SERPL-MCNC: 0.55 MG/DL — SIGNIFICANT CHANGE UP (ref 0.5–1.3)
GLUCOSE BLDC GLUCOMTR-MCNC: 121 MG/DL — HIGH (ref 70–99)
GLUCOSE BLDC GLUCOMTR-MCNC: 153 MG/DL — HIGH (ref 70–99)
GLUCOSE BLDC GLUCOMTR-MCNC: 157 MG/DL — HIGH (ref 70–99)
GLUCOSE BLDC GLUCOMTR-MCNC: 90 MG/DL — SIGNIFICANT CHANGE UP (ref 70–99)
GLUCOSE SERPL-MCNC: 119 MG/DL — HIGH (ref 70–99)
GLUCOSE SERPL-MCNC: 160 MG/DL — HIGH (ref 70–99)
POTASSIUM SERPL-MCNC: 4.1 MMOL/L — SIGNIFICANT CHANGE UP (ref 3.5–5.3)
POTASSIUM SERPL-MCNC: 4.6 MMOL/L — SIGNIFICANT CHANGE UP (ref 3.5–5.3)
POTASSIUM SERPL-SCNC: 4.1 MMOL/L — SIGNIFICANT CHANGE UP (ref 3.5–5.3)
POTASSIUM SERPL-SCNC: 4.6 MMOL/L — SIGNIFICANT CHANGE UP (ref 3.5–5.3)
SODIUM SERPL-SCNC: 123 MMOL/L — LOW (ref 135–145)
SODIUM SERPL-SCNC: 126 MMOL/L — LOW (ref 135–145)

## 2020-01-06 RX ORDER — SODIUM CHLORIDE 9 MG/ML
2 INJECTION INTRAMUSCULAR; INTRAVENOUS; SUBCUTANEOUS ONCE
Refills: 0 | Status: COMPLETED | OUTPATIENT
Start: 2020-01-06 | End: 2020-01-07

## 2020-01-06 RX ORDER — LANOLIN ALCOHOL/MO/W.PET/CERES
5 CREAM (GRAM) TOPICAL ONCE
Refills: 0 | Status: COMPLETED | OUTPATIENT
Start: 2020-01-06 | End: 2020-01-06

## 2020-01-06 RX ADMIN — MONTELUKAST 10 MILLIGRAM(S): 4 TABLET, CHEWABLE ORAL at 20:12

## 2020-01-06 RX ADMIN — HEPARIN SODIUM 5000 UNIT(S): 5000 INJECTION INTRAVENOUS; SUBCUTANEOUS at 05:07

## 2020-01-06 RX ADMIN — Medication 20 MILLIGRAM(S): at 05:07

## 2020-01-06 RX ADMIN — HEPARIN SODIUM 5000 UNIT(S): 5000 INJECTION INTRAVENOUS; SUBCUTANEOUS at 17:52

## 2020-01-06 RX ADMIN — Medication 40 MILLIGRAM(S): at 17:52

## 2020-01-06 RX ADMIN — Medication 1 MILLIGRAM(S): at 10:07

## 2020-01-06 RX ADMIN — Medication 0.25 MILLIGRAM(S): at 20:12

## 2020-01-06 RX ADMIN — PANTOPRAZOLE SODIUM 40 MILLIGRAM(S): 20 TABLET, DELAYED RELEASE ORAL at 05:07

## 2020-01-06 RX ADMIN — Medication 1000 UNIT(S): at 10:06

## 2020-01-06 RX ADMIN — Medication 0.25 MILLIGRAM(S): at 05:08

## 2020-01-06 RX ADMIN — MAGNESIUM OXIDE 400 MG ORAL TABLET 400 MILLIGRAM(S): 241.3 TABLET ORAL at 17:52

## 2020-01-06 RX ADMIN — Medication 5 MILLIGRAM(S): at 21:27

## 2020-01-06 RX ADMIN — Medication 0.25 MILLIGRAM(S): at 01:11

## 2020-01-06 RX ADMIN — Medication 2 MILLIGRAM(S): at 16:27

## 2020-01-06 RX ADMIN — Medication 3 MILLILITER(S): at 14:09

## 2020-01-06 RX ADMIN — MAGNESIUM OXIDE 400 MG ORAL TABLET 400 MILLIGRAM(S): 241.3 TABLET ORAL at 10:06

## 2020-01-06 RX ADMIN — Medication 0.5 MILLIGRAM(S): at 10:07

## 2020-01-06 RX ADMIN — Medication 0.25 MILLIGRAM(S): at 17:51

## 2020-01-06 RX ADMIN — Medication 1 TABLET(S): at 10:06

## 2020-01-06 RX ADMIN — Medication 3 MILLILITER(S): at 05:08

## 2020-01-06 RX ADMIN — Medication 0.25 MILLIGRAM(S): at 10:54

## 2020-01-06 RX ADMIN — Medication 3 MILLILITER(S): at 17:52

## 2020-01-06 NOTE — DISCHARGE NOTE PROVIDER - HOSPITAL COURSE
To be done. 54y female with COPD on home O2 3L, Breast CA s/p mastectomy, EtOH abuse, brain aneurysm s/p clip, RA, psoriatic arthritis presenting with sob along with some chest discomfort cp resolved but has had sob  drinks 5-6beers a day noticed with hyponatremia .  She was also being treated during this admission for COPD Exacerbation and hypomagnesemia.  Renal and Pulmonary were called to consult.  Pt was placed on a 800 ml fluid restriction and magnesium was supplemented.  Magnesium and sodium  at discharge was corrected.  She also required bronchodilators and prednisone for her COPD.  Pt is hemodynamically stable for discharge to home today.  She will be discharged on Prednisone taper and follow up with Renal and Pulmonary outpatient.  Pt has been educated to refrain from drinking.  Pt and her  verbalize understanding of discharge plan and medication reconcililation.

## 2020-01-06 NOTE — PROGRESS NOTE ADULT - ASSESSMENT
ASSESSMENT:    chronic hypoxic/hypercapnic respiratory failure due to severe COPD/emphysema - stable respiratory status without bronchospasm    hyponatremia - chronic    chronic alcohol use without evidence of withdrawal symptoms    lower extremity swelling likely related to intermittent hypoxemia causing pulmonary artery hypoxic vasoconstriction -> resolved    breast cancer s/p bilateral mastectomy    rheumatoid and psoriatic arthritis on Otezla - immunocompromised host    brain aneurysm s/p clipping    PLAN/RECOMMENDATIONS:    oxygen supplementation to keep saturation greater than 92%  free water restriction  observe off antibiotics  discontinue IV steroids - prednisone taper as written  albuterol/atrovent nebs q6h  pulmicort 0.5mg nebs q12h  singulair  treating alcohol withdrawal  tMVI/folate  Otezla/lidex solution to scalp/hydrocortisone cream to face  Arimidex  DVT prophylaxis - SQ heparin  GI prophylaxis - protonix    Will follow with you. Plan of care discussed with the patient at bedside. No pulmonary objection to discharge.     Joss Franks MD  Pulmonary Medicine  [phone] ASSESSMENT:    chronic hypoxic/hypercapnic respiratory failure due to severe COPD/emphysema and obesity related restrictive lung disease - stable respiratory status without bronchospasm on her baseline oxygen supplementation of 3lpm via nasal canula    hyponatremia - chronic - improved    chronic alcohol use without evidence of withdrawal symptoms    lower extremity swelling likely related to intermittent hypoxemia causing pulmonary artery hypoxic vasoconstriction -> resolved    breast cancer s/p bilateral mastectomy    rheumatoid and psoriatic arthritis on Otezla - immunocompromised host    brain aneurysm s/p clipping    PLAN/RECOMMENDATIONS:    oxygen supplementation to keep saturation greater than 92%  free water restriction  observe off antibiotics  discontinue IV steroids - prednisone taper as written  albuterol/atrovent nebs q6h  pulmicort 0.5mg nebs q12h  singulair  treating alcohol withdrawal  MVI/folate  Otezla/lidex solution to scalp/hydrocortisone cream to face  Arimidex  DVT prophylaxis - SQ heparin  GI prophylaxis - protonix    Will follow with you. Plan of care discussed with the patient at bedside. No pulmonary objection to discharge.     Joss Franks MD  Pulmonary Medicine  [phone]

## 2020-01-06 NOTE — PROGRESS NOTE ADULT - SUBJECTIVE AND OBJECTIVE BOX
CHIEF COMPLAINT:Patient is a 54y old  Female who presents with a chief complaint of sob (03 Jan 2020 10:04)    	        PAST MEDICAL & SURGICAL HISTORY:  Prophylactic measure  Breast cancer  Brain aneurysm: with clips  Psoriasis  RA (rheumatoid arthritis)  Psoriasis  Breast cancer, stage 3  History of modified radical mastectomy of both breasts  S/P bilateral mastectomy  Brain aneurysm: 1988 two clips          REVIEW OF SYSTEMS:    EYES: No eye pain, visual disturbances, or discharge  NECK: No pain or stiffness  RESPIRATORY: breathing much better   CARDIOVASCULAR: No chest pain, palpitations, passing out, dizziness,  GASTROINTESTINAL: No abdominal or epigastric pain. No nausea, vomiting, or hematemesis;   GENITOURINARY: No dysuria, frequency, hematuria, or incontinence  NEUROLOGICAL: No headaches,  Medications:  MEDICATIONS  (STANDING):  albuterol/ipratropium for Nebulization 3 milliLiter(s) Nebulizer every 6 hours  buDESOnide    Inhalation Suspension 0.25 milliGRAM(s) Inhalation every 12 hours  cholecalciferol 1000 Unit(s) Oral daily  folic acid 1 milliGRAM(s) Oral daily  heparin  Injectable 5000 Unit(s) SubCutaneous every 12 hours  magnesium oxide 400 milliGRAM(s) Oral two times a day with meals  montelukast 10 milliGRAM(s) Oral at bedtime  multivitamin 1 Tablet(s) Oral daily  pantoprazole    Tablet 40 milliGRAM(s) Oral before breakfast  predniSONE   Tablet   Oral   predniSONE   Tablet 40 milliGRAM(s) Oral daily    MEDICATIONS  (PRN):  ALPRAZolam 0.25 milliGRAM(s) Oral every 8 hours PRN anxiety  LORazepam   Injectable 2 milliGRAM(s) IV Push every 2 hours PRN Symptom-triggered: 2 point increase in CIWA -Ar score and a total score of 7 or LESS    	    PHYSICAL EXAM:  T(C): 36.7 (01-06-20 @ 12:40), Max: 36.7 (01-06-20 @ 12:40)  HR: 88 (01-06-20 @ 12:40) (82 - 90)  BP: 133/77 (01-06-20 @ 12:40) (124/81 - 147/80)  RR: 19 (01-06-20 @ 12:40) (18 - 19)  SpO2: 99% (01-06-20 @ 12:40) (98% - 100%)  Wt(kg): --  I&O's Summary    05 Jan 2020 07:01  -  06 Jan 2020 07:00  --------------------------------------------------------  IN: 840 mL / OUT: 1800 mL / NET: -960 mL    06 Jan 2020 07:01  -  06 Jan 2020 14:27  --------------------------------------------------------  IN: 120 mL / OUT: 700 mL / NET: -580 mL        Appearance: Normal	  HEENT:   Normal oral mucosa, PERRL, EOMI	  Lymphatic: No lymphadenopathy  Cardiovascular: Normal S1 S2, No JVD,  Respiratory: Lungs clear to auscultation	  Psychiatry: A & O x 3  Gastrointestinal:  Soft, Non-tender, + BS	  Skin: No rashes, No ecchymoses, No cyanosis	  Neurologic: Non-focal  Extremities: dec rom     TELEMETRY: 	    ECG:  	  RADIOLOGY:  OTHER: 	  	  LABS:	 	    CARDIAC MARKERS:            01-06    126<L>  |  88<L>  |  11  ----------------------------<  160<H>  4.6   |  26  |  0.48<L>    Ca    9.5      06 Jan 2020 09:44      proBNP:   Lipid Profile:   HgA1c:   TSH:

## 2020-01-06 NOTE — DISCHARGE NOTE PROVIDER - NSDCMRMEDTOKEN_GEN_ALL_CORE_FT
Brovana 15 mcg/2 mL inhalation solution: 2 milliliter(s) inhaled 2 times a day  budesonide 0.5 mg/2 mL inhalation suspension: 2 milliliter(s) inhaled 2 times a day  cholecalciferol oral tablet: 1000 unit(s) orally once a day  exemestane 25 mg oral tablet: 1 tab(s) orally once a day  folic acid 1 mg oral tablet: 1 tab(s) orally once a day  montelukast 10 mg oral tablet: 1 tab(s) orally once a day (at bedtime)  Multiple Vitamins oral tablet: 1 tab(s) orally once a day  Otezla 30 mg oral tablet: 1 tab(s) orally 2 times a day  pantoprazole 40 mg oral delayed release tablet: 1 tab(s) orally once a day (before a meal)  predniSONE 10 mg oral tablet: 4 tab(s) orally daily x 2 days then 3 tabs po daily x 2 days; then 2 tabs po daily x 2 days then 1 tab po daily x 2 days then stop  Ventolin HFA 90 mcg/inh inhalation aerosol: 2 puff(s) inhaled every 6 hours, As Needed  Yupelri 175 mcg/3 mL inhalation solution: 3 milliliter(s) inhaled once a day Brovana 15 mcg/2 mL inhalation solution: 2 milliliter(s) inhaled 2 times a day  budesonide 0.5 mg/2 mL inhalation suspension: 2 milliliter(s) inhaled 2 times a day  cholecalciferol oral tablet: 1000 unit(s) orally once a day  exemestane 25 mg oral tablet: 1 tab(s) orally once a day  folic acid 1 mg oral tablet: 1 tab(s) orally once a day  montelukast 10 mg oral tablet: 1 tab(s) orally once a day (at bedtime)  Multiple Vitamins oral tablet: 1 tab(s) orally once a day  Otezla 30 mg oral tablet: 1 tab(s) orally 2 times a day  pantoprazole 40 mg oral delayed release tablet: 1 tab(s) orally once a day (before a meal)  predniSONE 10 mg oral tablet: 4 tab(s) orally daily x 2 days then 3 tabs po daily x 2 days; then 2 tabs po daily x 2 days then 1 tab po daily x 2 days then stop  Ventolin HFA 90 mcg/inh inhalation aerosol: 2 puff(s) inhaled every 6 hours, As Needed Brovana 15 mcg/2 mL inhalation solution: 2 milliliter(s) inhaled 2 times a day  budesonide 0.5 mg/2 mL inhalation suspension: 2 milliliter(s) inhaled 2 times a day  cholecalciferol oral tablet: 1000 unit(s) orally once a day  exemestane 25 mg oral tablet: 1 tab(s) orally once a day  folic acid 1 mg oral tablet: 1 tab(s) orally once a day  montelukast 10 mg oral tablet: 1 tab(s) orally once a day (at bedtime)  Multiple Vitamins oral tablet: 1 tab(s) orally once a day  Otezla 30 mg oral tablet: 1 tab(s) orally 2 times a day  pantoprazole 40 mg oral delayed release tablet: 1 tab(s) orally once a day (before a meal)  predniSONE 10 mg oral tablet: 4 tab(s) oral -qd x 2 days  3 tab(s) oral - qd x 2 days  2 tab(s) oral - qd x 2 days  1 tab(s) oral -qd x 2 days  Ventolin HFA 90 mcg/inh inhalation aerosol: 2 puff(s) inhaled every 6 hours, As Needed

## 2020-01-06 NOTE — PROGRESS NOTE ADULT - ASSESSMENT
54y female with COPD on home O2 3L, Breast CA s/p mastectomy, EtOH abuse, brain aneurysm s/p clip, RA, psoriatic arthritis presenting with sob along with some chest discomfort cp resolved but has had sob  drinks 5-6beers a day noticed with hyponatremia     1- hyponatremia   2- copd exacerbation   3- hypomagnesemia       hyponatremia in alcoholic with beer use and increase po fluid intake with siadh confounding   one liter fluid restriction to cont  for now na has decreased due to pt with increase po fluid intake   nacl 2 g po x 1   cont with proventil nebs and prednisone   magnesium oxide 400 mg bid

## 2020-01-06 NOTE — PROGRESS NOTE ADULT - SUBJECTIVE AND OBJECTIVE BOX
[facility]   [phone]  RENAL FOLLOW UP NOTE  --------------------------------------------------------------------------------  Chief Complaint:    24 hour events/subjective:    seen earlier. states is drinking increase amount of fluid again     PAST HISTORY  --------------------------------------------------------------------------------  No significant changes to PMH, PSH, FHx, SHx, unless otherwise noted    ALLERGIES & MEDICATIONS  --------------------------------------------------------------------------------  Allergies    amoxicillin (Anaphylaxis)  Levaquin (Other; Anaphylaxis)  Levaquin (Unknown)  penicillin (Anaphylaxis)  penicillins (Anaphylaxis)    Intolerances      Standing Inpatient Medications  albuterol/ipratropium for Nebulization 3 milliLiter(s) Nebulizer every 6 hours  buDESOnide    Inhalation Suspension 0.25 milliGRAM(s) Inhalation every 12 hours  cholecalciferol 1000 Unit(s) Oral daily  folic acid 1 milliGRAM(s) Oral daily  heparin  Injectable 5000 Unit(s) SubCutaneous every 12 hours  magnesium oxide 400 milliGRAM(s) Oral two times a day with meals  montelukast 10 milliGRAM(s) Oral at bedtime  multivitamin 1 Tablet(s) Oral daily  pantoprazole    Tablet 40 milliGRAM(s) Oral before breakfast  predniSONE   Tablet   Oral   predniSONE   Tablet 40 milliGRAM(s) Oral daily    PRN Inpatient Medications  ALPRAZolam 0.25 milliGRAM(s) Oral every 8 hours PRN  LORazepam   Injectable 2 milliGRAM(s) IV Push every 2 hours PRN      REVIEW OF SYSTEMS  --------------------------------------------------------------------------------    Gen: denies fevers/chills,  CVS: denies chest pain/palpitations  Resp: denies worsening SOB/Cough  GI: Denies N/V/Abd pain  : Denies dysuria    All other systems were reviewed and are negative, except as noted.    VITALS/PHYSICAL EXAM  --------------------------------------------------------------------------------  T(C): 36.7 (01-06-20 @ 20:00), Max: 36.7 (01-06-20 @ 12:40)  HR: 85 (01-06-20 @ 20:00) (82 - 90)  BP: 119/74 (01-06-20 @ 20:00) (119/74 - 147/80)  RR: 18 (01-06-20 @ 20:00) (18 - 19)  SpO2: 100% (01-06-20 @ 20:00) (99% - 100%)  Wt(kg): --        01-05-20 @ 07:01  -  01-06-20 @ 07:00  --------------------------------------------------------  IN: 840 mL / OUT: 1800 mL / NET: -960 mL    01-06-20 @ 07:01  -  01-06-20 @ 21:41  --------------------------------------------------------  IN: 440 mL / OUT: 700 mL / NET: -260 mL      Physical Exam:  	  Gen: morbidly obese   	no jvd  	Pulm: decrease bs  no rales or ronchi or wheezing   	CV: RRR, S1S2; no rub  	Abd: +BS, soft, nontender/nondistended  	: No suprapubic tenderness  	UE: Warm, no cyanosis  no clubbing,  no edema  	LE: Warm, no cyanosis  no clubbing, no edema  	Neuro: alert and oriented. speech coherent         LABS/STUDIES  --------------------------------------------------------------------------------    126  |  88  |  11  ----------------------------<  160      [01-06-20 @ 09:44]  4.6   |  26  |  0.48        Ca     9.5     [01-06-20 @ 09:44]            Creatinine Trend:  SCr 0.48 [01-06 @ 09:44]  SCr 0.55 [01-06 @ 06:07]  SCr 0.57 [01-05 @ 07:33]  SCr 0.55 [01-04 @ 07:06]  SCr 0.47 [01-03 @ 10:54]              Urinalysis - [01-03-20 @ 22:26]      Color Yellow / Appearance Slightly Turbid / SG 1.013 / pH 7.5      Gluc Negative / Ketone Small  / Bili Negative / Urobili 4 mg/dL       Blood Negative / Protein Trace / Leuk Est Large / Nitrite Negative      RBC 25 / WBC 7 / Hyaline 3 / Gran  / Sq Epi  / Non Sq Epi 16 / Bacteria Many    Urine Sodium 45      [01-03-20 @ 22:26]  Urine Potassium 12      [01-03-20 @ 22:26]  Urine Chloride <35      [01-03-20 @ 22:26]  Urine Osmolality 352      [01-03-20 @ 22:26]    Iron 42, TIBC 196, %sat 21      [11-16-19 @ 10:30]  Ferritin 792      [11-16-19 @ 10:31]  HbA1c 5.4      [08-19-16 @ 07:12]

## 2020-01-06 NOTE — PROGRESS NOTE ADULT - SUBJECTIVE AND OBJECTIVE BOX
[facility] - PROGRESS NOTE    CHIEF COMPLAINT: chronic hypoxic respiratory failure; COPD; emphysema; obesity;    INTERVAL HISTORY: eager to go home; no shortness of breath on her usual oxygen supplementation at rest; no cough, sputum production, chest congestion or wheeze; no fevers, chills or sweats; no chest pain/pressure or palpitations; no anxiety, tremors, diaphoresis, etc; out of bed an into the chair; improving hyponatremia;    REVIEW OF SYSTEMS:  Constitutional: As per interval history  HEENT: Within normal limits  CV: As per interval history  Resp: As per interval history  GI: Within normal limits   : Within normal limits  Musculoskeletal: Within normal limits  Skin: Within normal limits  Neurological: Within normal limits  Psychiatric: Within normal limits  Endocrine: Within normal limits  Hematologic/Lymphatic: Within normal limits  Allergic/Immunologic: Within normal limits    MEDICATIONS:     Pulmonary "  albuterol/ipratropium for Nebulization 3 milliLiter(s) Nebulizer every 6 hours  buDESOnide    Inhalation Suspension 0.25 milliGRAM(s) Inhalation every 12 hours  montelukast 10 milliGRAM(s) Oral at bedtime    Anti-microbials:    Cardiovascular:    Other:  cholecalciferol 1000 Unit(s) Oral daily  folic acid 1 milliGRAM(s) Oral daily  heparin  Injectable 5000 Unit(s) SubCutaneous every 12 hours  magnesium oxide 400 milliGRAM(s) Oral two times a day with meals  methylPREDNISolone sodium succinate Injectable 20 milliGRAM(s) IV Push every 12 hours  multivitamin 1 Tablet(s) Oral daily  pantoprazole    Tablet 40 milliGRAM(s) Oral before breakfast    MEDICATIONS  (PRN):  ALPRAZolam 0.25 milliGRAM(s) Oral every 8 hours PRN anxiety  LORazepam   Injectable 2 milliGRAM(s) IV Push every 2 hours PRN Symptom-triggered: 2 point increase in CIWA -Ar score and a total score of 7 or LESS    OBJECTIVE:    I&O's Detail    2020 07:01  -  2020 07:00  --------------------------------------------------------  IN:    Oral Fluid: 840 mL  Total IN: 840 mL    OUT:    Voided: 1800 mL  Total OUT: 1800 mL    Total NET: -960 mL      2020 07:01  -  2020 13:01  --------------------------------------------------------  IN:    Oral Fluid: 120 mL  Total IN: 120 mL    OUT:    Voided: 550 mL  Total OUT: 550 mL    Total NET: -430 mL    Daily Weight in k.5 (2020 00:09)    POCT Blood Glucose.: 157 mg/dL (2020 12:10)  POCT Blood Glucose.: 153 mg/dL (2020 08:30)  POCT Blood Glucose.: 147 mg/dL (2020 22:29)  POCT Blood Glucose.: 100 mg/dL (2020 17:43)      PHYSICAL EXAM:       ICU Vital Signs Last 24 Hrs  T(C): 36.7 (2020 12:40), Max: 36.7 (2020 12:40)  T(F): 98 (2020 12:40), Max: 98 (2020 12:40)  HR: 88 (2020 12:40) (82 - 90)  BP: 133/77 (2020 12:40) (124/81 - 147/80)  BP(mean): --  ABP: --  ABP(mean): --  RR: 19 (2020 12:40) (18 - 19)  SpO2: 99% (2020 12:40) (98% - 100%) on 3lpm nasal canula     General: Awake. Alert. Cooperative. No distress. Appears stated age. Obese.	  HEENT: Atraumatic. Normocephalic. Anicteric. Normal oral mucosa. PERRL. EOMI.  Neck: Supple. Trachea midline. Thyroid without enlargement/tenderness/nodules. No carotid bruit. No JVD.	  Cardiovascular: Regular rate and rhythm. S1 S2 normal. No murmurs, rubs or gallops.  Respiratory: Respirations unlabored. Clear to auscultation and percussion bilaterally. No curvature.  Abdomen: Soft. Non-tender. Non-distended. No organomegaly. No masses. Normal bowel sounds. Obese.  Extremities: Warm to touch. No clubbing or cyanosis. No pedal edema.  Pulses: 2+ peripheral pulses all extremities.	  Skin: Normal skin color. No rashes or lesions. No ecchymoses. No cyanosis. Warm to touch.  Lymph Nodes: Cervical, supraclavicular and axillary nodes normal  Neurological: Motor and sensory examination equal and normal. A and O x 3  Psychiatry: Appropriate mood and affect.    LABS:      CBC    WBC  6.60 <==, 7.79 <==, 6.91 <==, 2.92 <==, 5.78 <==    Hemoglobin  11.0 <<==, 10.7 <<==, 10.4 <<==, 10.5 <<==, 11.4 <<==    Hematocrit  32.8 <==, 30.4 <==, 31.8 <==, 31.0 <==, 34.7 <==    Platelets  155 <==, 158 <==, 131 <==, 139 <==, 186 <==      126<L>  |  88<L>  |  11  ----------------------------<  160<H>    01-06  4.6   |  26  |  0.48<L>      LYTES    sodium  126 <==, 123 <==, 135 <==, 131 <==, 124 <==, 121 <==, 126 <==    potassium   4.6 <==, 4.1 <==, 3.1 <==, 4.0 <==, 3.7 <==, 4.0 <==, 4.0 <==    chloride  88 <==, 87 <==, 91 <==, 88 <==, 84 <==, 81 <==, 85 <==    carbon dioxide  26 <==, 25 <==, 24 <==, 25 <==, 28 <==, 25 <==, 25 <==    =============================================================================================  RENAL FUNCTION:    Creatinine:   0.48  <<==, 0.55  <<==, 0.57  <<==, 0.55  <<==, 0.47  <<==, 0.40  <<==, 0.41  <<==    BUN:   11 <==, 12 <==, 14 <==, 12 <==, 8 <==, 7 <==, 7 <==    ============================================================================================    calcium   9.5 <==, 9.5 <==, 8.8 <==, 8.8 <==, 9.1 <==, 9.3 <==, 9.1 <==    phos   4.8 <==, 3.5 <==, 3.2 <==, 2.9 <==, 3.0 <==, 2.7 <==, 3.3 <==    mag   1.8 <==, 1.6 <==, 1.8 <==, 1.4 <==, 1.4 <==, 1.6 <==, 1.7 <==    ============================================================================================  LFTs    AST:   53 <== , 50 <== , 76 <==     ALT:  51  <== , 45  <== , 65  <==     AP:  53  <=, 64  <=, 75  <=    Bili:  1.8  <=, 1.1  <=, 0.4  <=    MICROBIOLOGY:       RADIOLOGY:  [x] Chest radiographs reviewed and interpreted by me      EXAM:  XR CHEST PORTABLE URGENT 1V                          PROCEDURE DATE:  2019      INTERPRETATION:  CLINICAL INFORMATION: Shortness of breath and acute chest pain.    TECHNIQUE: AP view of the chest.    COMPARISON: Chest radiograph from 2019    FINDINGS:    No focal pulmonary consolidation. No pleural effusion or pneumothorax. Mediastinal silhouette is normal. No acute osseous pathology.    IMPRESSION:     Clear lungs.    LIZETH RUIZ M.D., RADIOLOGY RESIDENT  This document has been electronically signed.  GABRIELLE ECHOLS M.D., ATTENDING RADIOLOGIST  This document has been electronically signed. Dec 31 2019  8:23AM  ---------------------------------------------------------------------------------------------------------------

## 2020-01-06 NOTE — DISCHARGE NOTE PROVIDER - NSDCCPCAREPLAN_GEN_ALL_CORE_FT
PRINCIPAL DISCHARGE DIAGNOSIS  Diagnosis: Withdrawal symptoms, alcohol  Assessment and Plan of Treatment: Resolved.   Must refrain from drinking alcohol.      SECONDARY DISCHARGE DIAGNOSES  Diagnosis: Hyponatremia  Assessment and Plan of Treatment: Fluid restriction to 800 cc per day.    Too much fluid will decrease your sodium level.   Follow up with PMD in 1 week.    Diagnosis: COPD, mild  Assessment and Plan of Treatment: Call your Health Care provider upon arrival home to make a follow up appointment within one week.  Take all inhalers as prescribed by your Health Care Provider.  Take steroids as prescribed by your Health Care Provider.  If your cough increases infrequency and severity and/or you have shortness of breath or increased shortness of breath call your Health Care Provider.  If you develop fever, chills, night sweats, malaise, and/or change in mental status call your Health care Provider.  Nutrition is very important.  Eat small frequent meals.  Increase your activity as tolerated.  Do not stay in bed all day

## 2020-01-06 NOTE — DISCHARGE NOTE PROVIDER - CARE PROVIDER_API CALL
Jacob Neely)  Internal Medicine  [address], [address]  [place] [address]  Phone: [phone]  Fax: [phone]  Follow Up Time: 1 week

## 2020-01-06 NOTE — PROGRESS NOTE ADULT - SUBJECTIVE AND OBJECTIVE BOX
CARDIOLOGY FOLLOW UP - Dr. Nj    CC no cp or sob       PHYSICAL EXAM:  T(C): 36.7 (01-06-20 @ 12:40), Max: 36.7 (01-06-20 @ 12:40)  HR: 88 (01-06-20 @ 12:40) (82 - 90)  BP: 133/77 (01-06-20 @ 12:40) (124/81 - 147/80)  RR: 19 (01-06-20 @ 12:40) (18 - 19)  SpO2: 99% (01-06-20 @ 12:40) (98% - 100%)  Wt(kg): --  I&O's Summary    05 Jan 2020 07:01  -  06 Jan 2020 07:00  --------------------------------------------------------  IN: 840 mL / OUT: 1800 mL / NET: -960 mL    06 Jan 2020 07:01  -  06 Jan 2020 12:48  --------------------------------------------------------  IN: 120 mL / OUT: 550 mL / NET: -430 mL        Appearance: Normal	  Cardiovascular: Normal S1 S2,RRR, No JVD, No murmurs  Respiratory: Lungs clear to auscultation	  Gastrointestinal:  Soft, Non-tender, + BS	  Extremities: Normal range of motion, No clubbing, cyanosis or edema        MEDICATIONS  (STANDING):  albuterol/ipratropium for Nebulization 3 milliLiter(s) Nebulizer every 6 hours  buDESOnide    Inhalation Suspension 0.25 milliGRAM(s) Inhalation every 12 hours  cholecalciferol 1000 Unit(s) Oral daily  folic acid 1 milliGRAM(s) Oral daily  heparin  Injectable 5000 Unit(s) SubCutaneous every 12 hours  magnesium oxide 400 milliGRAM(s) Oral two times a day with meals  methylPREDNISolone sodium succinate Injectable 20 milliGRAM(s) IV Push every 12 hours  montelukast 10 milliGRAM(s) Oral at bedtime  multivitamin 1 Tablet(s) Oral daily  pantoprazole    Tablet 40 milliGRAM(s) Oral before breakfast      TELEMETRY: nsr 	    ECG:  	  RADIOLOGY:   DIAGNOSTIC TESTING:  [ ] Echocardiogram:  [ ]  Catheterization:  [ ] Stress Test:    OTHER: 	    LABS:	 	    Troponin T, High Sensitivity Result: 13 ng/L [0 - 51] (12-31 @ 05:59)  Troponin T, High Sensitivity Result: 14 ng/L [0 - 51] (12-31 @ 04:52)      01-06    126<L>  |  88<L>  |  11  ----------------------------<  160<H>  4.6   |  26  |  0.48<L>    Ca    9.5      06 Jan 2020 09:44

## 2020-01-06 NOTE — PROGRESS NOTE ADULT - ASSESSMENT
pt w/ hx copd / etoh abuse / wcopd exac and intoxication  mult admissions  ciwa protocol  change steroids to po     pulm eval noted  nebs   o2  dvt proph  cards f/u noted  dvt proph  gi proph   hypomagnesemia  supp mg prn  hyponatremia   fluid restrict  renal eval noted  f/u   pt    d/c  tomorrow if sodium improves

## 2020-01-07 ENCOUNTER — TRANSCRIPTION ENCOUNTER (OUTPATIENT)
Age: 55
End: 2020-01-07

## 2020-01-07 VITALS
HEART RATE: 84 BPM | RESPIRATION RATE: 18 BRPM | TEMPERATURE: 98 F | DIASTOLIC BLOOD PRESSURE: 64 MMHG | SYSTOLIC BLOOD PRESSURE: 97 MMHG | OXYGEN SATURATION: 100 %

## 2020-01-07 LAB
ANION GAP SERPL CALC-SCNC: 14 MMOL/L — SIGNIFICANT CHANGE UP (ref 5–17)
BUN SERPL-MCNC: 14 MG/DL — SIGNIFICANT CHANGE UP (ref 7–23)
CALCIUM SERPL-MCNC: 9.7 MG/DL — SIGNIFICANT CHANGE UP (ref 8.4–10.5)
CHLORIDE SERPL-SCNC: 90 MMOL/L — LOW (ref 96–108)
CO2 SERPL-SCNC: 25 MMOL/L — SIGNIFICANT CHANGE UP (ref 22–31)
CREAT SERPL-MCNC: 0.52 MG/DL — SIGNIFICANT CHANGE UP (ref 0.5–1.3)
GLUCOSE BLDC GLUCOMTR-MCNC: 127 MG/DL — HIGH (ref 70–99)
GLUCOSE BLDC GLUCOMTR-MCNC: 135 MG/DL — HIGH (ref 70–99)
GLUCOSE SERPL-MCNC: 138 MG/DL — HIGH (ref 70–99)
POTASSIUM SERPL-MCNC: 4.2 MMOL/L — SIGNIFICANT CHANGE UP (ref 3.5–5.3)
POTASSIUM SERPL-SCNC: 4.2 MMOL/L — SIGNIFICANT CHANGE UP (ref 3.5–5.3)
SODIUM SERPL-SCNC: 129 MMOL/L — LOW (ref 135–145)

## 2020-01-07 PROCEDURE — 84132 ASSAY OF SERUM POTASSIUM: CPT

## 2020-01-07 PROCEDURE — 83605 ASSAY OF LACTIC ACID: CPT

## 2020-01-07 PROCEDURE — 84100 ASSAY OF PHOSPHORUS: CPT

## 2020-01-07 PROCEDURE — 82435 ASSAY OF BLOOD CHLORIDE: CPT

## 2020-01-07 PROCEDURE — 96375 TX/PRO/DX INJ NEW DRUG ADDON: CPT

## 2020-01-07 PROCEDURE — 80053 COMPREHEN METABOLIC PANEL: CPT

## 2020-01-07 PROCEDURE — 80307 DRUG TEST PRSMV CHEM ANLYZR: CPT

## 2020-01-07 PROCEDURE — 82436 ASSAY OF URINE CHLORIDE: CPT

## 2020-01-07 PROCEDURE — 83935 ASSAY OF URINE OSMOLALITY: CPT

## 2020-01-07 PROCEDURE — 84295 ASSAY OF SERUM SODIUM: CPT

## 2020-01-07 PROCEDURE — 82962 GLUCOSE BLOOD TEST: CPT

## 2020-01-07 PROCEDURE — 97162 PT EVAL MOD COMPLEX 30 MIN: CPT

## 2020-01-07 PROCEDURE — 80048 BASIC METABOLIC PNL TOTAL CA: CPT

## 2020-01-07 PROCEDURE — 81001 URINALYSIS AUTO W/SCOPE: CPT

## 2020-01-07 PROCEDURE — 93005 ELECTROCARDIOGRAM TRACING: CPT

## 2020-01-07 PROCEDURE — 99285 EMERGENCY DEPT VISIT HI MDM: CPT | Mod: 25

## 2020-01-07 PROCEDURE — 94640 AIRWAY INHALATION TREATMENT: CPT

## 2020-01-07 PROCEDURE — 84133 ASSAY OF URINE POTASSIUM: CPT

## 2020-01-07 PROCEDURE — 84300 ASSAY OF URINE SODIUM: CPT

## 2020-01-07 PROCEDURE — 99238 HOSP IP/OBS DSCHRG MGMT 30/<: CPT

## 2020-01-07 PROCEDURE — 82330 ASSAY OF CALCIUM: CPT

## 2020-01-07 PROCEDURE — 96374 THER/PROPH/DIAG INJ IV PUSH: CPT

## 2020-01-07 PROCEDURE — 85027 COMPLETE CBC AUTOMATED: CPT

## 2020-01-07 PROCEDURE — 85610 PROTHROMBIN TIME: CPT

## 2020-01-07 PROCEDURE — 82947 ASSAY GLUCOSE BLOOD QUANT: CPT

## 2020-01-07 PROCEDURE — 84484 ASSAY OF TROPONIN QUANT: CPT

## 2020-01-07 PROCEDURE — 70450 CT HEAD/BRAIN W/O DYE: CPT

## 2020-01-07 PROCEDURE — 83735 ASSAY OF MAGNESIUM: CPT

## 2020-01-07 PROCEDURE — 82803 BLOOD GASES ANY COMBINATION: CPT

## 2020-01-07 PROCEDURE — 71045 X-RAY EXAM CHEST 1 VIEW: CPT

## 2020-01-07 PROCEDURE — 85730 THROMBOPLASTIN TIME PARTIAL: CPT

## 2020-01-07 PROCEDURE — 97161 PT EVAL LOW COMPLEX 20 MIN: CPT

## 2020-01-07 PROCEDURE — 85014 HEMATOCRIT: CPT

## 2020-01-07 RX ORDER — REVEFENACIN 175 UG/3ML
3 SOLUTION RESPIRATORY (INHALATION)
Qty: 0 | Refills: 0 | DISCHARGE

## 2020-01-07 RX ORDER — BUDESONIDE, MICRONIZED 100 %
0.5 POWDER (GRAM) MISCELLANEOUS EVERY 12 HOURS
Refills: 0 | Status: DISCONTINUED | OUTPATIENT
Start: 2020-01-07 | End: 2020-01-07

## 2020-01-07 RX ADMIN — MAGNESIUM OXIDE 400 MG ORAL TABLET 400 MILLIGRAM(S): 241.3 TABLET ORAL at 10:00

## 2020-01-07 RX ADMIN — PANTOPRAZOLE SODIUM 40 MILLIGRAM(S): 20 TABLET, DELAYED RELEASE ORAL at 05:41

## 2020-01-07 RX ADMIN — Medication 40 MILLIGRAM(S): at 05:41

## 2020-01-07 RX ADMIN — Medication 3 MILLILITER(S): at 15:13

## 2020-01-07 RX ADMIN — Medication 1 MILLIGRAM(S): at 13:51

## 2020-01-07 RX ADMIN — Medication 1 TABLET(S): at 13:51

## 2020-01-07 RX ADMIN — Medication 0.25 MILLIGRAM(S): at 10:41

## 2020-01-07 RX ADMIN — SODIUM CHLORIDE 2 GRAM(S): 9 INJECTION INTRAMUSCULAR; INTRAVENOUS; SUBCUTANEOUS at 05:41

## 2020-01-07 RX ADMIN — Medication 3 MILLILITER(S): at 05:41

## 2020-01-07 RX ADMIN — Medication 1000 UNIT(S): at 13:51

## 2020-01-07 RX ADMIN — Medication 0.25 MILLIGRAM(S): at 05:41

## 2020-01-07 RX ADMIN — HEPARIN SODIUM 5000 UNIT(S): 5000 INJECTION INTRAVENOUS; SUBCUTANEOUS at 05:42

## 2020-01-07 NOTE — PROGRESS NOTE ADULT - ASSESSMENT
ASSESSMENT:    chronic hypoxic/hypercapnic respiratory failure due to severe COPD/emphysema and obesity related restrictive lung disease - stable respiratory status without bronchospasm on her baseline oxygen supplementation of 3lpm via nasal canula    hyponatremia - chronic - improved    chronic alcohol use without evidence of withdrawal symptoms    lower extremity swelling likely related to intermittent hypoxemia causing pulmonary artery hypoxic vasoconstriction -> resolved    breast cancer s/p bilateral mastectomy    rheumatoid and psoriatic arthritis on Otezla - immunocompromised host    brain aneurysm s/p clipping    PLAN/RECOMMENDATIONS:    oxygen supplementation to keep saturation greater than 92%  free water restriction - salt tabs as needed  observe off antibiotics  prednisone taper as written  albuterol/atrovent nebs q6h  pulmicort 0.5mg nebs q12h  singulair  MVI/folate  benzodiazepines as needed  Otezla/lidex solution to scalp/hydrocortisone cream to face  Arimidex  DVT prophylaxis - SQ heparin  GI prophylaxis - protonix    Will follow with you. Plan of care discussed with the patient at bedside. No pulmonary objection to discharge.     Joss Franks MD  Pulmonary Medicine  [phone]

## 2020-01-07 NOTE — DISCHARGE NOTE NURSING/CASE MANAGEMENT/SOCIAL WORK - NSSCTYPOFSERV_GEN_ALL_CORE
a nurse will contact you the day after discharge to set up a nursing and physical therapy evaluation

## 2020-01-07 NOTE — DISCHARGE NOTE NURSING/CASE MANAGEMENT/SOCIAL WORK - PATIENT PORTAL LINK FT
You can access the FollowMyHealth Patient Portal offered by [facility] by registering at the following website: [email or web address]. By joining [employer]’s FollowMyHealth portal, you will also be able to view your health information using other applications (apps) compatible with our system.

## 2020-01-07 NOTE — PROGRESS NOTE ADULT - ASSESSMENT
54y female with COPD on home O2 3L, Breast CA s/p mastectomy, EtOH abuse, brain aneurysm s/p clip, RA, psoriatic arthritis presenting with sob along with some chest discomfort cp resolved but has had sob  drinks 5-6beers a day noticed with hyponatremia     1- hyponatremia   2- copd exacerbation   3- hypomagnesemia       one liter fluid restriction to cont   na improving   nacl 2 g po x 1 was given   magnesium oxide 400 mg bid

## 2020-01-07 NOTE — PROGRESS NOTE ADULT - SUBJECTIVE AND OBJECTIVE BOX
CHIEF COMPLAINT:Patient is a 54y old  Female who presents with a chief complaint of shortness of breath (06 Jan 2020 19:16)    	        PAST MEDICAL & SURGICAL HISTORY:  Prophylactic measure  Breast cancer  Brain aneurysm: with clips  Psoriasis  RA (rheumatoid arthritis)  Psoriasis  Breast cancer, stage 3  History of modified radical mastectomy of both breasts  S/P bilateral mastectomy  Brain aneurysm: 1988 two clips          REVIEW OF SYSTEMS:  feels better  EYES: No eye pain, visual disturbances, or discharge  NECK: No pain or stiffness  RESPIRATORY: No cough, wheezing, chills or hemoptysis; No Shortness of Breath  CARDIOVASCULAR: No chest pain, palpitations, passing out, dizziness, or leg swelling  GASTROINTESTINAL: No abdominal or epigastric pain. No nausea, vomiting, or hematemesis; No diarrhea or constipation. No melena or hematochezia.  GENITOURINARY: No dysuria, frequency, hematuria, or incontinence  NEUROLOGICAL: No headaches,  Medications:  MEDICATIONS  (STANDING):  albuterol/ipratropium for Nebulization 3 milliLiter(s) Nebulizer every 6 hours  buDESOnide    Inhalation Suspension 0.5 milliGRAM(s) Inhalation every 12 hours  cholecalciferol 1000 Unit(s) Oral daily  folic acid 1 milliGRAM(s) Oral daily  heparin  Injectable 5000 Unit(s) SubCutaneous every 12 hours  magnesium oxide 400 milliGRAM(s) Oral two times a day with meals  montelukast 10 milliGRAM(s) Oral at bedtime  multivitamin 1 Tablet(s) Oral daily  pantoprazole    Tablet 40 milliGRAM(s) Oral before breakfast  predniSONE   Tablet   Oral   predniSONE   Tablet 40 milliGRAM(s) Oral daily    MEDICATIONS  (PRN):  ALPRAZolam 0.25 milliGRAM(s) Oral every 8 hours PRN anxiety  LORazepam   Injectable 2 milliGRAM(s) IV Push every 2 hours PRN Symptom-triggered: 2 point increase in CIWA -Ar score and a total score of 7 or LESS    	    PHYSICAL EXAM:  T(C): 36.9 (01-07-20 @ 12:01), Max: 36.9 (01-07-20 @ 12:01)  HR: 84 (01-07-20 @ 12:01) (84 - 93)  BP: 97/64 (01-07-20 @ 12:01) (97/64 - 129/79)  RR: 18 (01-07-20 @ 12:01) (18 - 18)  SpO2: 100% (01-07-20 @ 12:01) (94% - 100%)  Wt(kg): --  I&O's Summary    06 Jan 2020 07:01  -  07 Jan 2020 07:00  --------------------------------------------------------  IN: 440 mL / OUT: 1600 mL / NET: -1160 mL    07 Jan 2020 07:01  -  07 Jan 2020 14:29  --------------------------------------------------------  IN: 80 mL / OUT: 0 mL / NET: 80 mL        Appearance: Normal	  HEENT:   Normal oral mucosa, PERRL, EOMI	  Lymphatic: No lymphadenopathy  Cardiovascular: Normal S1 S2, No JVD,  Respiratory: dec bs   Psychiatry: A & O x 3,   Gastrointestinal:  Soft, Non-tender, + BS	  Neurologic: Non-focal  Extremities: dec rom   TELEMETRY: 	    ECG:  	  RADIOLOGY:  OTHER: 	  	  LABS:	 	    CARDIAC MARKERS:            01-07    129<L>  |  90<L>  |  14  ----------------------------<  138<H>  4.2   |  25  |  0.52    Ca    9.7      07 Jan 2020 10:11      proBNP:   Lipid Profile:   HgA1c:   TSH:

## 2020-01-07 NOTE — PROGRESS NOTE ADULT - SUBJECTIVE AND OBJECTIVE BOX
CARDIOLOGY FOLLOW UP - Dr. Nj    CC no cp/sob       PHYSICAL EXAM:  T(C): 36.8 (01-07-20 @ 05:39), Max: 36.8 (01-07-20 @ 05:39)  HR: 93 (01-07-20 @ 05:39) (85 - 93)  BP: 115/73 (01-07-20 @ 05:39) (115/73 - 133/77)  RR: 18 (01-07-20 @ 05:39) (18 - 19)  SpO2: 94% (01-07-20 @ 05:39) (94% - 100%)  Wt(kg): --  I&O's Summary    06 Jan 2020 07:01  -  07 Jan 2020 07:00  --------------------------------------------------------  IN: 440 mL / OUT: 1600 mL / NET: -1160 mL        Appearance: Normal	  Cardiovascular: Normal S1 S2,RRR, No JVD, No murmurs  Respiratory: Lungs clear to auscultation	  Gastrointestinal:  Soft, Non-tender, + BS	  Extremities: Normal range of motion, No clubbing, cyanosis or edema        MEDICATIONS  (STANDING):  albuterol/ipratropium for Nebulization 3 milliLiter(s) Nebulizer every 6 hours  buDESOnide    Inhalation Suspension 0.5 milliGRAM(s) Inhalation every 12 hours  cholecalciferol 1000 Unit(s) Oral daily  folic acid 1 milliGRAM(s) Oral daily  heparin  Injectable 5000 Unit(s) SubCutaneous every 12 hours  magnesium oxide 400 milliGRAM(s) Oral two times a day with meals  montelukast 10 milliGRAM(s) Oral at bedtime  multivitamin 1 Tablet(s) Oral daily  pantoprazole    Tablet 40 milliGRAM(s) Oral before breakfast  predniSONE   Tablet   Oral   predniSONE   Tablet 40 milliGRAM(s) Oral daily      TELEMETRY: 	    ECG:  	  RADIOLOGY:   DIAGNOSTIC TESTING:  [ ] Echocardiogram:  [ ]  Catheterization:  [ ] Stress Test:    OTHER: 	    LABS:	 	            01-07    129<L>  |  90<L>  |  14  ----------------------------<  138<H>  4.2   |  25  |  0.52    Ca    9.7      07 Jan 2020 10:11

## 2020-01-07 NOTE — PROGRESS NOTE ADULT - SUBJECTIVE AND OBJECTIVE BOX
[facility]   [phone]  RENAL FOLLOW UP NOTE  --------------------------------------------------------------------------------  Chief Complaint:    24 hour events/subjective:    seen earlier.   wants to go home   no sob   limiting fluid intake   states will refrain from alcohol as per pt     PAST HISTORY  --------------------------------------------------------------------------------  No significant changes to PMH, PSH, FHx, SHx, unless otherwise noted    ALLERGIES & MEDICATIONS  --------------------------------------------------------------------------------  Allergies    amoxicillin (Anaphylaxis)  Levaquin (Other; Anaphylaxis)  Levaquin (Unknown)  penicillin (Anaphylaxis)  penicillins (Anaphylaxis)    Intolerances      Standing Inpatient Medications  albuterol/ipratropium for Nebulization 3 milliLiter(s) Nebulizer every 6 hours  buDESOnide    Inhalation Suspension 0.5 milliGRAM(s) Inhalation every 12 hours  cholecalciferol 1000 Unit(s) Oral daily  folic acid 1 milliGRAM(s) Oral daily  heparin  Injectable 5000 Unit(s) SubCutaneous every 12 hours  magnesium oxide 400 milliGRAM(s) Oral two times a day with meals  montelukast 10 milliGRAM(s) Oral at bedtime  multivitamin 1 Tablet(s) Oral daily  pantoprazole    Tablet 40 milliGRAM(s) Oral before breakfast  predniSONE   Tablet   Oral   predniSONE   Tablet 40 milliGRAM(s) Oral daily    PRN Inpatient Medications  ALPRAZolam 0.25 milliGRAM(s) Oral every 8 hours PRN  LORazepam   Injectable 2 milliGRAM(s) IV Push every 2 hours PRN      REVIEW OF SYSTEMS  --------------------------------------------------------------------------------    Gen: denies fevers/chills,  CVS: denies chest pain/palpitations  Resp: denies SOB/Cough  GI: Denies N/V/Abd pain  : Denies dysuria    All other systems were reviewed and are negative, except as noted.    VITALS/PHYSICAL EXAM  --------------------------------------------------------------------------------  T(C): 36.9 (01-07-20 @ 12:01), Max: 36.9 (01-07-20 @ 12:01)  HR: 84 (01-07-20 @ 12:01) (84 - 93)  BP: 97/64 (01-07-20 @ 12:01) (97/64 - 115/73)  RR: 18 (01-07-20 @ 12:01) (18 - 18)  SpO2: 100% (01-07-20 @ 12:01) (94% - 100%)  Wt(kg): --        01-06-20 @ 07:01  -  01-07-20 @ 07:00  --------------------------------------------------------  IN: 440 mL / OUT: 1600 mL / NET: -1160 mL    01-07-20 @ 07:01  -  01-07-20 @ 20:28  --------------------------------------------------------  IN: 260 mL / OUT: 300 mL / NET: -40 mL      Physical Exam:  	  Gen: morbidly obese   	no jvd  	Pulm: decrease bs  no rales or ronchi or wheezing   	CV: RRR, S1S2; no rub  	Abd: +BS, soft, nontender/nondistended  	: No suprapubic tenderness  	UE: Warm, no cyanosis  no clubbing,  no edema  	LE: Warm, no cyanosis  no clubbing, no edema  	Neuro: alert and oriented. speech coherent     	  LABS/STUDIES  --------------------------------------------------------------------------------    129  |  90  |  14  ----------------------------<  138      [01-07-20 @ 10:11]  4.2   |  25  |  0.52        Ca     9.7     [01-07-20 @ 10:11]            Creatinine Trend:  SCr 0.52 [01-07 @ 10:11]  SCr 0.48 [01-06 @ 09:44]  SCr 0.55 [01-06 @ 06:07]  SCr 0.57 [01-05 @ 07:33]  SCr 0.55 [01-04 @ 07:06]              Urinalysis - [01-03-20 @ 22:26]      Color Yellow / Appearance Slightly Turbid / SG 1.013 / pH 7.5      Gluc Negative / Ketone Small  / Bili Negative / Urobili 4 mg/dL       Blood Negative / Protein Trace / Leuk Est Large / Nitrite Negative      RBC 25 / WBC 7 / Hyaline 3 / Gran  / Sq Epi  / Non Sq Epi 16 / Bacteria Many    Urine Sodium 45      [01-03-20 @ 22:26]  Urine Potassium 12      [01-03-20 @ 22:26]  Urine Chloride <35      [01-03-20 @ 22:26]  Urine Osmolality 352      [01-03-20 @ 22:26]    Iron 42, TIBC 196, %sat 21      [11-16-19 @ 10:30]  Ferritin 792      [11-16-19 @ 10:31]  HbA1c 5.4      [08-19-16 @ 07:12]

## 2020-01-07 NOTE — PROGRESS NOTE ADULT - SUBJECTIVE AND OBJECTIVE BOX
[facility] - PROGRESS NOTE    CHIEF COMPLAINT: chronic hypoxic respiratory failure; COPD; emphysema; obesity;    INTERVAL HISTORY: eager to go home; no shortness of breath on her usual oxygen supplementation at rest; no cough, sputum production, chest congestion or wheeze; no fevers, chills or sweats; no chest pain/pressure or palpitations; no anxiety, tremors, diaphoresis, etc; out of bed an into the chair; hyponatremia;    REVIEW OF SYSTEMS:  Constitutional: As per interval history  HEENT: Within normal limits  CV: As per interval history  Resp: As per interval history  GI: Within normal limits   : Within normal limits  Musculoskeletal: Within normal limits  Skin: Within normal limits  Neurological: Within normal limits  Psychiatric: Within normal limits  Endocrine: hyponatremia  Hematologic/Lymphatic: Within normal limits  Allergic/Immunologic: Within normal limits      MEDICATIONS:     Pulmonary "  albuterol/ipratropium for Nebulization 3 milliLiter(s) Nebulizer every 6 hours  buDESOnide    Inhalation Suspension 0.25 milliGRAM(s) Inhalation every 12 hours  montelukast 10 milliGRAM(s) Oral at bedtime    Anti-microbials:    Cardiovascular:    Other:  cholecalciferol 1000 Unit(s) Oral daily  folic acid 1 milliGRAM(s) Oral daily  heparin  Injectable 5000 Unit(s) SubCutaneous every 12 hours  magnesium oxide 400 milliGRAM(s) Oral two times a day with meals  multivitamin 1 Tablet(s) Oral daily  pantoprazole    Tablet 40 milliGRAM(s) Oral before breakfast  predniSONE   Tablet   Oral   predniSONE   Tablet 40 milliGRAM(s) Oral daily    MEDICATIONS  (PRN):  ALPRAZolam 0.25 milliGRAM(s) Oral every 8 hours PRN anxiety  LORazepam   Injectable 2 milliGRAM(s) IV Push every 2 hours PRN Symptom-triggered: 2 point increase in CIWA -Ar score and a total score of 7 or LESS        OBJECTIVE:    I&O's Detail    06 Jan 2020 07:01  -  07 Jan 2020 07:00  --------------------------------------------------------  IN:    Oral Fluid: 440 mL  Total IN: 440 mL    OUT:    Voided: 1600 mL  Total OUT: 1600 mL    Total NET: -1160 mL    POCT Blood Glucose.: 121 mg/dL (06 Jan 2020 21:01)  POCT Blood Glucose.: 90 mg/dL (06 Jan 2020 17:43)  POCT Blood Glucose.: 157 mg/dL (06 Jan 2020 12:10)      PHYSICAL EXAM:       ICU Vital Signs Last 24 Hrs  T(C): 36.8 (07 Jan 2020 05:39), Max: 36.8 (07 Jan 2020 05:39)  T(F): 98.2 (07 Jan 2020 05:39), Max: 98.2 (07 Jan 2020 05:39)  HR: 93 (07 Jan 2020 05:39) (84 - 93)  BP: 115/73 (07 Jan 2020 05:39) (115/73 - 133/77)  BP(mean): --  ABP: --  ABP(mean): --  RR: 18 (07 Jan 2020 05:39) (18 - 19)  SpO2: 94% (07 Jan 2020 05:39) (94% - 100%) on 3lpm nasal canula     General: Awake. Alert. Cooperative. No distress. Appears stated age. Obese.	  HEENT: Atraumatic. Normocephalic. Anicteric. Normal oral mucosa. PERRL. EOMI.  Neck: Supple. Trachea midline. Thyroid without enlargement/tenderness/nodules. No carotid bruit. No JVD.	  Cardiovascular: Regular rate and rhythm. S1 S2 normal. No murmurs, rubs or gallops.  Respiratory: Respirations unlabored. Clear to auscultation and percussion bilaterally. Decreased breath sounds throughout. No curvature.  Abdomen: Soft. Non-tender. Non-distended. No organomegaly. No masses. Normal bowel sounds. Obese.  Extremities: Warm to touch. No clubbing or cyanosis. No pedal edema.  Pulses: 2+ peripheral pulses all extremities.	  Skin: Normal skin color. No rashes or lesions. No ecchymoses. No cyanosis. Warm to touch.  Lymph Nodes: Cervical, supraclavicular and axillary nodes normal  Neurological: Motor and sensory examination equal and normal. A and O x 3  Psychiatry: Appropriate mood and affect.    LABS:      CBC    WBC  6.60 <==, 7.79 <==, 6.91 <==, 2.92 <==    Hemoglobin  11.0 <<==, 10.7 <<==, 10.4 <<==, 10.5 <<==    Hematocrit  32.8 <==, 30.4 <==, 31.8 <==, 31.0 <==    Platelets  155 <==, 158 <==, 131 <==, 139 <==      126<L>  |  88<L>  |  11  ----------------------------<  160<H>    01-06  4.6   |  26  |  0.48<L>      LYTES    sodium  126 <==, 123 <==, 135 <==, 131 <==, 124 <==, 121 <==, 126 <==    potassium   4.6 <==, 4.1 <==, 3.1 <==, 4.0 <==, 3.7 <==, 4.0 <==, 4.0 <==    chloride  88 <==, 87 <==, 91 <==, 88 <==, 84 <==, 81 <==, 85 <==    carbon dioxide  26 <==, 25 <==, 24 <==, 25 <==, 28 <==, 25 <==, 25 <==    =============================================================================================  RENAL FUNCTION:    Creatinine:   0.48  <<==, 0.55  <<==, 0.57  <<==, 0.55  <<==, 0.47  <<==, 0.40  <<==, 0.41  <<==    BUN:   11 <==, 12 <==, 14 <==, 12 <==, 8 <==, 7 <==, 7 <==    ============================================================================================    calcium   9.5 <==, 9.5 <==, 8.8 <==, 8.8 <==, 9.1 <==, 9.3 <==, 9.1 <==    phos   4.8 <==, 3.5 <==, 3.2 <==, 2.9 <==, 3.0 <==, 2.7 <==    mag   1.8 <==, 1.6 <==, 1.8 <==, 1.4 <==, 1.4 <==, 1.6 <==    ============================================================================================  LFTs    AST:   53 <== , 50 <==     ALT:  51  <== , 45  <==     AP:  53  <=, 64  <=    Bili:  1.8  <=, 1.1  <=      Patient name: FREDY CHOI  YOB: 1965   Age: 54 (F)   MR#: [id number]  Study Date: 10/7/2019  [facility]  Study quality: Technically difficult  Referring Physician: Canelo Bonds MD  BloodPressure: 122/80 mmHg  Height: 165 cm  Weight: 68 kg  BSA: 1.8 m2  ------------------------------------------------------------------------  PROCEDURE: Transthoracic echocardiogram with 2-D, M-Mode  and complete spectral and color flow Doppler. Verbal  consent was obtained for injection of  Ultrasonic Enhancing  Agent following a discussion of risks and benefits.  Following intravenous injection of Ultrasonic Enhancing  Agent , harmonic imaging was performed.  INDICATION: Shortness of breath (R06.02)  ------------------------------------------------------------------------  Observations:  Left Ventricle: Endocardium not well visualized; grossly  normal left ventricular systolic function. Endocardial  visualization enhanced with intravenous injection of  Ultrasonic Enhancing Agent (Definity). Normal left  ventricular internal dimensions and wall thicknesses.  Normal diastolic function  ------------------------------------------------------------------------  Conclusions:  1. Normal left ventricular internal dimensions and wall  thicknesses.  2. Endocardium not well visualized; grossly normal left  ventricular systolic function. Endocardial visualization  enhanced with intravenous injection of Ultrasonic Enhancing  Agent (Definity). Unable to perfrom strain imaging due to  limited clinical windows.  ------------------------------------------------------------------------  Confirmed on  10/7/2019 - 18:11:26 by Margaret Alvarez M.D.  ------------------------------------------------------------------------  ---------------------------------------------------------------------------------------------------------------    MICROBIOLOGY:       RADIOLOGY:  [x] Chest radiographs reviewed and interpreted by me    EXAM:  XR CHEST PORTABLE URGENT 1V                          PROCEDURE DATE:  12/31/2019      INTERPRETATION:  CLINICAL INFORMATION: Shortness of breath and acute chest pain.    TECHNIQUE: AP view of the chest.    COMPARISON: Chest radiograph from 12/12/2019    FINDINGS:    No focal pulmonary consolidation. No pleural effusion or pneumothorax. Mediastinal silhouette is normal. No acute osseous pathology.    IMPRESSION:     Clear lungs.    LIZETH RUIZ M.D., RADIOLOGY RESIDENT  This document has been electronically signed.  GABRIELLE ECHOLS M.D., ATTENDING RADIOLOGIST  This document has been electronically signed. Dec 31 2019  8:23AM  ---------------------------------------------------------------------------------------------------------------

## 2020-01-07 NOTE — PROGRESS NOTE ADULT - ASSESSMENT
pt w/ hx copd / etoh abuse / wcopd exac and intoxication  mult admissions    changed steroids to po     pulm eval noted  nebs   o2  dvt proph  cards f/u noted  dvt proph  gi proph   hypomagnesemia  supp mg prn  hyponatremia improved  fluid restrict  renal eval noted  f/u   pt    d/c if ok w/ renal

## 2020-01-28 ENCOUNTER — INPATIENT (INPATIENT)
Facility: HOSPITAL | Age: 55
LOS: 1 days | Discharge: HOME | DRG: 191 | End: 2020-01-30
Attending: INTERNAL MEDICINE | Admitting: INTERNAL MEDICINE
Payer: COMMERCIAL

## 2020-01-28 VITALS
RESPIRATION RATE: 18 BRPM | WEIGHT: 130.07 LBS | HEIGHT: 60 IN | SYSTOLIC BLOOD PRESSURE: 116 MMHG | TEMPERATURE: 98 F | OXYGEN SATURATION: 100 % | HEART RATE: 102 BPM | DIASTOLIC BLOOD PRESSURE: 74 MMHG

## 2020-01-28 DIAGNOSIS — Z90.10 ACQUIRED ABSENCE OF UNSPECIFIED BREAST AND NIPPLE: Chronic | ICD-10-CM

## 2020-01-28 DIAGNOSIS — Z90.13 ACQUIRED ABSENCE OF BILATERAL BREASTS AND NIPPLES: Chronic | ICD-10-CM

## 2020-01-28 LAB
ALBUMIN SERPL ELPH-MCNC: 4.1 G/DL — SIGNIFICANT CHANGE UP (ref 3.3–5)
ALP SERPL-CCNC: 75 U/L — SIGNIFICANT CHANGE UP (ref 40–120)
ALT FLD-CCNC: 25 U/L — SIGNIFICANT CHANGE UP (ref 10–45)
ANION GAP SERPL CALC-SCNC: 23 MMOL/L — HIGH (ref 5–17)
AST SERPL-CCNC: 50 U/L — HIGH (ref 10–40)
BASOPHILS # BLD AUTO: 0.02 K/UL — SIGNIFICANT CHANGE UP (ref 0–0.2)
BASOPHILS NFR BLD AUTO: 0.2 % — SIGNIFICANT CHANGE UP (ref 0–2)
BILIRUB SERPL-MCNC: 0.7 MG/DL — SIGNIFICANT CHANGE UP (ref 0.2–1.2)
BUN SERPL-MCNC: 5 MG/DL — LOW (ref 7–23)
CALCIUM SERPL-MCNC: 9.4 MG/DL — SIGNIFICANT CHANGE UP (ref 8.4–10.5)
CHLORIDE SERPL-SCNC: 93 MMOL/L — LOW (ref 96–108)
CO2 SERPL-SCNC: 23 MMOL/L — SIGNIFICANT CHANGE UP (ref 22–31)
CREAT SERPL-MCNC: 0.52 MG/DL — SIGNIFICANT CHANGE UP (ref 0.5–1.3)
EOSINOPHIL # BLD AUTO: 0.12 K/UL — SIGNIFICANT CHANGE UP (ref 0–0.5)
EOSINOPHIL NFR BLD AUTO: 1.4 % — SIGNIFICANT CHANGE UP (ref 0–6)
GAS PNL BLDV: SIGNIFICANT CHANGE UP
GLUCOSE SERPL-MCNC: 117 MG/DL — HIGH (ref 70–99)
HCG SERPL-ACNC: <2 MIU/ML — SIGNIFICANT CHANGE UP
HCT VFR BLD CALC: 40.5 % — SIGNIFICANT CHANGE UP (ref 34.5–45)
HGB BLD-MCNC: 13.2 G/DL — SIGNIFICANT CHANGE UP (ref 11.5–15.5)
IMM GRANULOCYTES NFR BLD AUTO: 0.7 % — SIGNIFICANT CHANGE UP (ref 0–1.5)
LYMPHOCYTES # BLD AUTO: 1.55 K/UL — SIGNIFICANT CHANGE UP (ref 1–3.3)
LYMPHOCYTES # BLD AUTO: 17.9 % — SIGNIFICANT CHANGE UP (ref 13–44)
MCHC RBC-ENTMCNC: 32.6 GM/DL — SIGNIFICANT CHANGE UP (ref 32–36)
MCHC RBC-ENTMCNC: 35 PG — HIGH (ref 27–34)
MCV RBC AUTO: 107.4 FL — HIGH (ref 80–100)
MONOCYTES # BLD AUTO: 0.78 K/UL — SIGNIFICANT CHANGE UP (ref 0–0.9)
MONOCYTES NFR BLD AUTO: 9 % — SIGNIFICANT CHANGE UP (ref 2–14)
NEUTROPHILS # BLD AUTO: 6.15 K/UL — SIGNIFICANT CHANGE UP (ref 1.8–7.4)
NEUTROPHILS NFR BLD AUTO: 70.8 % — SIGNIFICANT CHANGE UP (ref 43–77)
NRBC # BLD: 0 /100 WBCS — SIGNIFICANT CHANGE UP (ref 0–0)
NT-PROBNP SERPL-SCNC: 55 PG/ML — SIGNIFICANT CHANGE UP (ref 0–300)
PLATELET # BLD AUTO: 307 K/UL — SIGNIFICANT CHANGE UP (ref 150–400)
POTASSIUM SERPL-MCNC: 4.6 MMOL/L — SIGNIFICANT CHANGE UP (ref 3.5–5.3)
POTASSIUM SERPL-SCNC: 4.6 MMOL/L — SIGNIFICANT CHANGE UP (ref 3.5–5.3)
PROT SERPL-MCNC: 7.1 G/DL — SIGNIFICANT CHANGE UP (ref 6–8.3)
RBC # BLD: 3.77 M/UL — LOW (ref 3.8–5.2)
RBC # FLD: 13.6 % — SIGNIFICANT CHANGE UP (ref 10.3–14.5)
SODIUM SERPL-SCNC: 139 MMOL/L — SIGNIFICANT CHANGE UP (ref 135–145)
TROPONIN T, HIGH SENSITIVITY RESULT: 12 NG/L — SIGNIFICANT CHANGE UP (ref 0–51)
WBC # BLD: 8.68 K/UL — SIGNIFICANT CHANGE UP (ref 3.8–10.5)
WBC # FLD AUTO: 8.68 K/UL — SIGNIFICANT CHANGE UP (ref 3.8–10.5)

## 2020-01-28 PROCEDURE — 99285 EMERGENCY DEPT VISIT HI MDM: CPT

## 2020-01-28 PROCEDURE — 71045 X-RAY EXAM CHEST 1 VIEW: CPT | Mod: 26

## 2020-01-28 PROCEDURE — 93010 ELECTROCARDIOGRAM REPORT: CPT | Mod: NC

## 2020-01-28 RX ORDER — IPRATROPIUM/ALBUTEROL SULFATE 18-103MCG
3 AEROSOL WITH ADAPTER (GRAM) INHALATION ONCE
Refills: 0 | Status: COMPLETED | OUTPATIENT
Start: 2020-01-28 | End: 2020-01-28

## 2020-01-28 RX ORDER — SODIUM CHLORIDE 9 MG/ML
2000 INJECTION INTRAMUSCULAR; INTRAVENOUS; SUBCUTANEOUS ONCE
Refills: 0 | Status: COMPLETED | OUTPATIENT
Start: 2020-01-28 | End: 2020-01-28

## 2020-01-28 RX ADMIN — Medication 3 MILLILITER(S): at 22:17

## 2020-01-28 RX ADMIN — Medication 3 MILLILITER(S): at 22:28

## 2020-01-28 RX ADMIN — Medication 3 MILLILITER(S): at 22:18

## 2020-01-28 RX ADMIN — SODIUM CHLORIDE 2000 MILLILITER(S): 9 INJECTION INTRAMUSCULAR; INTRAVENOUS; SUBCUTANEOUS at 23:43

## 2020-01-28 RX ADMIN — Medication 0.5 MILLIGRAM(S): at 23:21

## 2020-01-28 RX ADMIN — Medication 125 MILLIGRAM(S): at 22:28

## 2020-01-28 NOTE — ED ADULT NURSE NOTE - OBJECTIVE STATEMENT
55y/o F coming into the ED c/o of SOB. Pt states that she has had worsening SOB for the past 3 days. Pt states that she was discharged from the ED a week ago for the same issue. As per EMS, pt was saturating 95% on her continuous 3L @ home. Pt states that she takes Duonebs Q4hr w/o any relief. Pt arrived on NRB 15% saturating 100% and now on NC 3L saturating 98%. Pt denies any CP/Fever/Chills/N/V/D. As per pt's , pt is "more out of it" than usual. Prior to arrival, pt hit her head on the wall, laceration noted on the top of her nose and a hematoma on the center of her forehead, no LOC, no blood thinners. Ls wheezing bilaterally. Pt states that her last drink was 2hours ago. CIWA performed. IV placed. Labs collected and sent. RVP collected and sent.

## 2020-01-28 NOTE — ED PROVIDER NOTE - CONSTITUTIONAL, MLM
normal... Obese female, awake, alert, oriented to person, place, time/situation, tachypneic, on 6L NC.

## 2020-01-28 NOTE — ED PROVIDER NOTE - ATTENDING CONTRIBUTION TO CARE
see MDM see MDM    I have reviewed the triage vital signs.    Const: obese female appearing older than stated age  Head: 1 cm abrasion on forehead central aspect, w/ mild surrounding erythema no purulence,   Eyes: no conjunctival injection and no scleral icterus  ENMT: Atraumatic external nose and ears, dry mucus membranes  Neck: Symmetric, trachea midline,  CVS: +S1/S2, dorsalis pedis/radial pulse 2+ bilaterally  RESP: wheeze w/ diminished airmovement bilaterally tachypneic  GI: Nontender/Nondistended, soft abdomen  MSK: Extremities w/o deformity or ttp   Neuro: GCS=15, motor in all 4 extremities equal, Sensation grossly intact   Psych: Awake, Alert, & Orientedx3;  Appropriate mood and affect, cooperative

## 2020-01-28 NOTE — ED PROVIDER NOTE - CLINICAL SUMMARY MEDICAL DECISION MAKING FREE TEXT BOX
Tiffani Covington MD 54 F w. pmh of stage 3 breast cancer on aromasin, RA, psoriasis on otezla, brain aneurysm s/p clip in 1988, R jugular DVT, catheter related MSSA bacteremia in 2015, hx of alcohol abuse, COPD on oxygen p/w dyspnea. Pt was brought in by medics on nonrebreather. pt w/ tachypnea ,   concern for PE, CHF, COPD exacerbation,   will obtain labs ekg and cxr w/ CTA and admit Tiffani Covington MD 54 F w. pmh of stage 3 breast cancer on aromasin, RA, psoriasis on otezla, brain aneurysm s/p clip in 1988, R jugular DVT, catheter related MSSA bacteremia in 2015, hx of alcohol abuse, COPD on oxygen p/w dyspnea. Pt was brought in by medics on nonrebreather. pt w/ tachypnea , low voltage EKG    concern for PE, CHF, COPD exacerbation,   will obtain labs ekg and cxr w/ CTA and admit

## 2020-01-28 NOTE — ED PROVIDER NOTE - ENMT, MLM
Airway patent, Nasal mucosa clear. Mouth with normal mucosa. Throat has no vesicles, no oropharyngeal exudates and uvula is midline. Airway patent, Nasal mucosa clear. Mouth with dry mucosa and chapped lips. Throat has no vesicles, no oropharyngeal exudates and uvula is midline.

## 2020-01-28 NOTE — ED PROVIDER NOTE - CARDIAC, MLM
Normal rate, regular rhythm.  Heart sounds S1, S2.  No murmurs, rubs or gallops. tachycardic Heart sounds S1, S2.  No murmurs, rubs or gallops.

## 2020-01-28 NOTE — ED PROVIDER NOTE - OBJECTIVE STATEMENT
Pt is a 55 y/o F with PMHx of COPD on home O2 3L, breast CA s/p mastectomy, EtOH abuse, brain aneurysm s/p clip, RA presenting with increasing SOB x 3 days and fall prior to presentation. She was most recently hospitalized in late Dec (discharged 1/7/20) for COPD exacerbation, discharged on zithromax to finish course. In the last three days, she has been experiencing increasing SOB, exacerbated by movement, alleviated with rest. This afternoon when getting up, she began feeling SOB again, felt dizzy, and fell forward, striking head. Pt does not recall LOC, but pt's spouse reports pt looked "out of it" afterwards. Denies any fevers, new cough, nasal congestion, HA, visual changes, N/V. Tried nebulizer treatments at home without much improvement.

## 2020-01-29 DIAGNOSIS — J44.1 CHRONIC OBSTRUCTIVE PULMONARY DISEASE WITH (ACUTE) EXACERBATION: ICD-10-CM

## 2020-01-29 LAB
ALBUMIN SERPL ELPH-MCNC: 4 G/DL — SIGNIFICANT CHANGE UP (ref 3.3–5)
ALP SERPL-CCNC: 67 U/L — SIGNIFICANT CHANGE UP (ref 40–120)
ALT FLD-CCNC: 18 U/L — SIGNIFICANT CHANGE UP (ref 10–45)
ANION GAP SERPL CALC-SCNC: 20 MMOL/L — HIGH (ref 5–17)
AST SERPL-CCNC: 35 U/L — SIGNIFICANT CHANGE UP (ref 10–40)
BASE EXCESS BLDV CALC-SCNC: -0.8 MMOL/L — SIGNIFICANT CHANGE UP (ref -2–2)
BILIRUB DIRECT SERPL-MCNC: 0.1 MG/DL — SIGNIFICANT CHANGE UP (ref 0–0.2)
BILIRUB INDIRECT FLD-MCNC: 0.4 MG/DL — SIGNIFICANT CHANGE UP (ref 0.2–1)
BILIRUB SERPL-MCNC: 0.5 MG/DL — SIGNIFICANT CHANGE UP (ref 0.2–1.2)
BUN SERPL-MCNC: 5 MG/DL — LOW (ref 7–23)
CA-I SERPL-SCNC: 1.08 MMOL/L — LOW (ref 1.12–1.3)
CALCIUM SERPL-MCNC: 8.6 MG/DL — SIGNIFICANT CHANGE UP (ref 8.4–10.5)
CHLORIDE BLDV-SCNC: 102 MMOL/L — SIGNIFICANT CHANGE UP (ref 96–108)
CHLORIDE SERPL-SCNC: 99 MMOL/L — SIGNIFICANT CHANGE UP (ref 96–108)
CO2 BLDV-SCNC: 25 MMOL/L — SIGNIFICANT CHANGE UP (ref 22–30)
CO2 SERPL-SCNC: 23 MMOL/L — SIGNIFICANT CHANGE UP (ref 22–31)
CREAT SERPL-MCNC: 0.45 MG/DL — LOW (ref 0.5–1.3)
GAS PNL BLDV: 143 MMOL/L — SIGNIFICANT CHANGE UP (ref 135–145)
GAS PNL BLDV: SIGNIFICANT CHANGE UP
GAS PNL BLDV: SIGNIFICANT CHANGE UP
GLUCOSE BLDV-MCNC: 145 MG/DL — HIGH (ref 70–99)
GLUCOSE SERPL-MCNC: 159 MG/DL — HIGH (ref 70–99)
HCO3 BLDV-SCNC: 24 MMOL/L — SIGNIFICANT CHANGE UP (ref 21–29)
HCT VFR BLDA CALC: 37 % — LOW (ref 39–50)
HGB BLD CALC-MCNC: 11.9 G/DL — SIGNIFICANT CHANGE UP (ref 11.5–15.5)
LACTATE BLDV-MCNC: 3.8 MMOL/L — HIGH (ref 0.7–2)
LACTATE BLDV-MCNC: 5.3 MMOL/L — CRITICAL HIGH (ref 0.7–2)
MAGNESIUM SERPL-MCNC: 1.4 MG/DL — LOW (ref 1.6–2.6)
PCO2 BLDV: 40 MMHG — SIGNIFICANT CHANGE UP (ref 35–50)
PH BLDV: 7.38 — SIGNIFICANT CHANGE UP (ref 7.35–7.45)
PHOSPHATE SERPL-MCNC: 3 MG/DL — SIGNIFICANT CHANGE UP (ref 2.5–4.5)
PO2 BLDV: 92 MMHG — HIGH (ref 25–45)
POTASSIUM BLDV-SCNC: 3.6 MMOL/L — SIGNIFICANT CHANGE UP (ref 3.5–5.3)
POTASSIUM SERPL-MCNC: 3.8 MMOL/L — SIGNIFICANT CHANGE UP (ref 3.5–5.3)
POTASSIUM SERPL-SCNC: 3.8 MMOL/L — SIGNIFICANT CHANGE UP (ref 3.5–5.3)
PROT SERPL-MCNC: 6.6 G/DL — SIGNIFICANT CHANGE UP (ref 6–8.3)
RAPID RVP RESULT: SIGNIFICANT CHANGE UP
SAO2 % BLDV: 96 % — HIGH (ref 67–88)
SODIUM SERPL-SCNC: 142 MMOL/L — SIGNIFICANT CHANGE UP (ref 135–145)
TROPONIN T, HIGH SENSITIVITY RESULT: 10 NG/L — SIGNIFICANT CHANGE UP (ref 0–51)

## 2020-01-29 PROCEDURE — 71275 CT ANGIOGRAPHY CHEST: CPT | Mod: 26

## 2020-01-29 PROCEDURE — 70450 CT HEAD/BRAIN W/O DYE: CPT | Mod: 26

## 2020-01-29 RX ORDER — EXEMESTANE 25 MG/1
25 TABLET, SUGAR COATED ORAL DAILY
Refills: 0 | Status: DISCONTINUED | OUTPATIENT
Start: 2020-01-29 | End: 2020-01-30

## 2020-01-29 RX ORDER — PANTOPRAZOLE SODIUM 20 MG/1
40 TABLET, DELAYED RELEASE ORAL
Refills: 0 | Status: DISCONTINUED | OUTPATIENT
Start: 2020-01-29 | End: 2020-01-30

## 2020-01-29 RX ORDER — ARFORMOTEROL TARTRATE 15 UG/2ML
2 SOLUTION RESPIRATORY (INHALATION)
Qty: 0 | Refills: 0 | DISCHARGE

## 2020-01-29 RX ORDER — ALBUTEROL 90 UG/1
2.5 AEROSOL, METERED ORAL ONCE
Refills: 0 | Status: COMPLETED | OUTPATIENT
Start: 2020-01-29 | End: 2020-01-29

## 2020-01-29 RX ORDER — BUDESONIDE, MICRONIZED 100 %
0.5 POWDER (GRAM) MISCELLANEOUS
Refills: 0 | Status: DISCONTINUED | OUTPATIENT
Start: 2020-01-29 | End: 2020-01-30

## 2020-01-29 RX ORDER — MONTELUKAST 4 MG/1
10 TABLET, CHEWABLE ORAL ONCE
Refills: 0 | Status: COMPLETED | OUTPATIENT
Start: 2020-01-29 | End: 2020-01-29

## 2020-01-29 RX ORDER — ALBUTEROL 90 UG/1
2 AEROSOL, METERED ORAL
Qty: 0 | Refills: 0 | DISCHARGE

## 2020-01-29 RX ORDER — ALPRAZOLAM 0.25 MG
0.25 TABLET ORAL ONCE
Refills: 0 | Status: DISCONTINUED | OUTPATIENT
Start: 2020-01-29 | End: 2020-01-29

## 2020-01-29 RX ORDER — HEPARIN SODIUM 5000 [USP'U]/ML
5000 INJECTION INTRAVENOUS; SUBCUTANEOUS EVERY 12 HOURS
Refills: 0 | Status: DISCONTINUED | OUTPATIENT
Start: 2020-01-29 | End: 2020-01-30

## 2020-01-29 RX ORDER — FOLIC ACID 0.8 MG
1 TABLET ORAL DAILY
Refills: 0 | Status: DISCONTINUED | OUTPATIENT
Start: 2020-01-29 | End: 2020-01-30

## 2020-01-29 RX ORDER — CHOLECALCIFEROL (VITAMIN D3) 125 MCG
1000 CAPSULE ORAL DAILY
Refills: 0 | Status: DISCONTINUED | OUTPATIENT
Start: 2020-01-29 | End: 2020-01-30

## 2020-01-29 RX ORDER — IPRATROPIUM/ALBUTEROL SULFATE 18-103MCG
3 AEROSOL WITH ADAPTER (GRAM) INHALATION EVERY 6 HOURS
Refills: 0 | Status: DISCONTINUED | OUTPATIENT
Start: 2020-01-29 | End: 2020-01-30

## 2020-01-29 RX ORDER — BUDESONIDE, MICRONIZED 100 %
2 POWDER (GRAM) MISCELLANEOUS
Qty: 0 | Refills: 0 | DISCHARGE

## 2020-01-29 RX ORDER — APREMILAST 10-20-30MG
1 KIT ORAL
Qty: 0 | Refills: 0 | DISCHARGE

## 2020-01-29 RX ADMIN — HEPARIN SODIUM 5000 UNIT(S): 5000 INJECTION INTRAVENOUS; SUBCUTANEOUS at 18:39

## 2020-01-29 RX ADMIN — EXEMESTANE 25 MILLIGRAM(S): 25 TABLET, SUGAR COATED ORAL at 13:12

## 2020-01-29 RX ADMIN — Medication 40 MILLIGRAM(S): at 22:22

## 2020-01-29 RX ADMIN — Medication 40 MILLIGRAM(S): at 13:14

## 2020-01-29 RX ADMIN — Medication 1000 UNIT(S): at 13:12

## 2020-01-29 RX ADMIN — Medication 2 MILLIGRAM(S): at 14:11

## 2020-01-29 RX ADMIN — PANTOPRAZOLE SODIUM 40 MILLIGRAM(S): 20 TABLET, DELAYED RELEASE ORAL at 08:52

## 2020-01-29 RX ADMIN — MONTELUKAST 10 MILLIGRAM(S): 4 TABLET, CHEWABLE ORAL at 08:52

## 2020-01-29 RX ADMIN — Medication 3 MILLILITER(S): at 13:12

## 2020-01-29 RX ADMIN — Medication 0.25 MILLIGRAM(S): at 05:07

## 2020-01-29 RX ADMIN — ALBUTEROL 2.5 MILLIGRAM(S): 90 AEROSOL, METERED ORAL at 02:09

## 2020-01-29 RX ADMIN — Medication 3 MILLILITER(S): at 08:52

## 2020-01-29 RX ADMIN — Medication 3 MILLILITER(S): at 18:39

## 2020-01-29 RX ADMIN — Medication 2 MILLIGRAM(S): at 20:54

## 2020-01-29 RX ADMIN — Medication 2 MILLIGRAM(S): at 17:03

## 2020-01-29 RX ADMIN — Medication 1 MILLIGRAM(S): at 13:12

## 2020-01-29 RX ADMIN — Medication 3 MILLILITER(S): at 05:07

## 2020-01-29 RX ADMIN — Medication 1 MILLIGRAM(S): at 02:08

## 2020-01-29 RX ADMIN — Medication 0.5 MILLIGRAM(S): at 21:19

## 2020-01-29 NOTE — H&P ADULT - ASSESSMENT
pt w/ hx copd/ alcohol abuse / breast ca.psoriasis / failure to thrive w/ sob/ anxiety   likely copd/ and inpnding dts   ciwa  steroids  pulm eval  nebs   dvt proph  c/w outpt meds  again emphasized need for abstinence of alcohol and to seek rehab  pt

## 2020-01-29 NOTE — CONSULT NOTE ADULT - SUBJECTIVE AND OBJECTIVE BOX
[facility] - [facility] - CONSULT NOTE    HPI: 54 year old gentlewoman, former smoker stopping several months ago and daily alcohol user, followed by Dr. Junior Mansfield of our practice for chronic hypoxic respiratory failure due to COPD/emphysema. She is basically bedbound but is able to walk to the bathroom with the assistance of her . She also has a history of breast cancer s/p bilateral mastectomies maintained on Aromasin, rheumatoid arthritis and psoriasis on Otezla, remote brain aneurysm requiring clipping and a right jugular vein DVT. She has had multiple recent admissions for alcohol withdrawal and COPD exacerbations. The patient returns to the hospital following a fall on her face yesterday followed by the onset of severe shortness of breath. She has no cough, sputum production, chest congestion or wheeze. No fevers, chills or sweats. No chest pain/pressure or palpitations. She describes several hours of dyspnea while in the ER treated with anxiolytics and bronchodilators with improvement. She is tremulous and tachycardic. Asked to evaluate.    PMHX:  Mediport related MSSA bactermia - 2015  Breast cancer  Brain aneurysm  Psoriasis  RA (rheumatoid arthritis)  Right jugular vein DVT    PSHX:  Bilateral mastectomy  Brain aneurysm clipping - 1988    FAMILY HISTORY:  mother - CHF    SOCIAL HISTORY:  former smoker - stopped ~ 6 months ago; daily alcohol use;     Pulmonary Medications:   albuterol/ipratropium for Nebulization 3 milliLiter(s) Nebulizer every 6 hours  buDESOnide    Inhalation Suspension 0.5 milliGRAM(s) Inhalation two times a day      Antimicrobials:      Cardiology:      Other:  cholecalciferol 1000 Unit(s) Oral daily  exemestane 25 milliGRAM(s) Oral daily  folic acid 1 milliGRAM(s) Oral daily  heparin  Injectable 5000 Unit(s) SubCutaneous every 12 hours  methylPREDNISolone sodium succinate Injectable 40 milliGRAM(s) IV Push every 8 hours  pantoprazole    Tablet 40 milliGRAM(s) Oral before breakfast      Prn:  MEDICATIONS  (PRN):  LORazepam   Injectable 2 milliGRAM(s) IV Push every 2 hours PRN CIWA-Ar score increase by 2 points and a total score of 7 or less      Allergies    amoxicillin (Anaphylaxis)  Levaquin (Other; Anaphylaxis)  Levaquin (Unknown)  penicillin (Anaphylaxis)  penicillins (Anaphylaxis)    HOME MEDICATIONS: see  H & P    REVIEW OF SYSTEMS:  Constitutional: As per HPI  HEENT: Within normal limits  CV: As per HPI  Resp: As per HPI  GI: Within normal limits   : Within normal limits  Musculoskeletal: Within normal limits  Skin: Within normal limits  Neurological: Within normal limits  Psychiatric: Within normal limits  Endocrine: Within normal limits  Hematologic/Lymphatic: Within normal limits  Allergic/Immunologic: Within normal limits    [x] All other systems negative    OBJECTIVE:    Daily Height in cm: 152.4 (28 Jan 2020 21:37)      PHYSICAL EXAM:  ICU Vital Signs Last 24 Hrs  T(C): 37 (29 Jan 2020 08:33), Max: 37.1 (28 Jan 2020 22:31)  T(F): 98.6 (29 Jan 2020 08:33), Max: 98.8 (29 Jan 2020 03:58)  HR: 109 (29 Jan 2020 08:33) (102 - 120)  BP: 126/73 (29 Jan 2020 08:33) (116/73 - 127/86)  BP(mean): --  ABP: --  ABP(mean): --  RR: 18 (29 Jan 2020 08:33) (14 - 21)  SpO2: 97% (29 Jan 2020 08:33) (94% - 100%) on 3lpm nasal canula (baseline)    General: Awake. Alert. Cooperative. Anxious. Appears stated age. Obese.  HEENT: Atraumatic. Normocephalic. Anicteric. Normal oral mucosa. PERRL. EOMI. Decreased psoriatic plaque on scalp.  Neck: Supple. Trachea midline. Thyroid without enlargement/tenderness/nodules. No carotid bruit. No JVD.	  Cardiovascular: Tachycardic rate and rhythm. S1 S2 normal. No murmurs, rubs or gallops.  Respiratory: Respirations unlabored. Decreased breath sounds throughout. No wheeze. No rales. No curvature.  Abdomen: Soft. Non-tender. Non-distended. No organomegaly. No masses. Normal bowel sounds. Obese.  Extremities: Warm to touch. No clubbing or cyanosis. No lower extremity edema.  Pulses: 2+ peripheral pulses all extremities.	  Skin: Venous stasis changes of both lower extremities. Multiple areas of skin hyperpigmentation at areas of prior plaque.  Lymph Nodes: Cervical, supraclavicular and axillary nodes normal  Neurological: Motor and sensory examination equal and normal. A and O x 3  Psychiatry: Anxious        LABS:                          13.2   8.68  )-----------( 307      ( 28 Jan 2020 22:45 )             40.5     CBC    WBC  8.68 <==    Hemoglobin  13.2 <<==    Hematocrit  40.5 <==    Platelets  307 <==      142  |  99  |  5<L>  ----------------------------<  159<H>    01-29  3.8   |  23  |  0.45<L>    LYTES    sodium  142 <==, 139 <==    potassium   3.8 <==, 4.6 <==    chloride  99 <==, 93 <==    carbon dioxide  23 <==, 23 <==    =============================================================================================  RENAL FUNCTION:    Creatinine:   0.45  <<==, 0.52  <<==    BUN:   5 <==, 5 <==    ============================================================================================    calcium   8.6 <==, 9.4 <==    phos   3.0 <==    mag   1.4 <==    ============================================================================================  LFTs    AST:   35 <== , 50 <==     ALT:  18  <== , 25  <==     AP:  67  <=, 75  <=    Bili:  0.5  <=, 0.7  <=      Venous Blood Gas:  01-29 @ 08:14  7.38/40/92/24/96  VBG Lactate: 3.8    Venous Blood Gas:  01-29 @ 01:57  --/--/--/--/--  VBG Lactate: 5.3    Venous Blood Gas:  01-28 @ 22:45  7.32/63/36/32/49  VBG Lactate: 4.5    Serum Pro-Brain Natriuretic Peptide: 55 pg/mL (01-28 @ 22:45)    CARDIAC MARKERS ( 29 Jan 2020 01:57 )  CPK x     /CKMB x     /CKMB Units x        troponin 10 ng/L    CARDIAC MARKERS ( 28 Jan 2020 22:45 )  CPK x     /CKMB x     /CKMB Units x        troponin 12 ng/L    Patient name: FREDY CHOI  YOB: 1965   Age: 54 (F)   MR#: [id number]  Study Date: 10/7/2019  [facility]  Study quality: Technically difficult  Referring Physician: Canelo Bonds MD  BloodPressure: 122/80 mmHg  Height: 165 cm  Weight: 68 kg  BSA: 1.8 m2  ------------------------------------------------------------------------  PROCEDURE: Transthoracic echocardiogram with 2-D, M-Mode  and complete spectral and color flow Doppler. Verbal  consent was obtained for injection of  Ultrasonic Enhancing  Agent following a discussion of risks and benefits.  Following intravenous injection of Ultrasonic Enhancing  Agent , harmonic imaging was performed.  INDICATION: Shortness of breath (R06.02)  ------------------------------------------------------------------------  Observations:  Left Ventricle: Endocardium not well visualized; grossly  normal left ventricular systolic function. Endocardial  visualization enhanced with intravenous injection of  Ultrasonic Enhancing Agent (Definity). Normal left  ventricular internal dimensions and wall thicknesses.  Normal diastolic function  ------------------------------------------------------------------------  Conclusions:  1. Normal left ventricular internal dimensions and wall  thicknesses.  2. Endocardium not well visualized; grossly normal left  ventricular systolic function. Endocardial visualization  enhanced with intravenous injection of Ultrasonic Enhancing  Agent (Definity). Unable to perform strain imaging due to  limited clinical windows.  ------------------------------------------------------------------------  Confirmed on  10/7/2019 - 18:11:26 by Margaret Alvarez M.D.  ------------------------------------------------------------------------  ---------------------------------------------------------------------------------------------------------------  MICROBIOLOGY:     Rapid Respiratory Viral Panel (01.28.20 @ 22:45)    Rapid RVP Result: NotDetec: This Respiratory Panel uses polymerase chain reaction (PCR) to detect for  adenovirus; coronavirus (HKU1, NL63, 229E, OC43); human metapneumovirus  (hMPV); human enterovirus/rhinovirus (Entero/RV); influenza A; influenza  A/H1; influenza A/H3; influenza A/H1-2009; influenza B; parainfluenza  viruses 1, 2, 3, 4; respiratory syncytial virus; Mycoplasma pneumoniae;  and Chlamydophila pneumoniae.    RADIOLOGY:  [x ] Chest radiographs reviewed and interpreted by me    EXAM:  XR CHEST PORTABLE URGENT 1V                          PROCEDURE DATE:  01/28/2020      INTERPRETATION:    CLINICAL INDICATION: Cough and respiratory distress.    TECHNIQUE: Portable frontal view of the chest.    COMPARISON: Frontal chest radiograph from 12/31/2019.    FINDINGS:     Cardiac silhouette is within normal limits.  Lungs are clear.  No pleural effusions or pneumothorax.  No acute osseous abnormality.      IMPRESSION:    Clear lungs.    MARYA CODY M.D., RADIOLOGY RESIDENT  This document has been electronically signed.  MIRI NORRIS M.D., ATTENDING RADIOLOGIST  This document has been electronically signed. Jan 29 2020 10:43AM  ---------------------------------------------------------------------------------------------------------------    EXAM:  CT ANGIO CHEST (W)AW IC                          PROCEDURE DATE:  01/29/2020      INTERPRETATION:  CLINICAL INFORMATION: Shortness of breath. History of malignancy.     COMPARISON: Chest CT from 12/11/2019.    PROCEDURE:   CT Angiography of the Chest.  79 ml of Omnipaque 350 was injected intravenously. 21 ml were discarded.  Sagittal and coronal reformats were performed as well as 3D (MIP) reconstructions.      FINDINGS:    LUNGS AND AIRWAYS: Patent central airways.  Severe emphysema. Right basilar atelectasis, somewhat improved from the prior exam. Dependent left basilar atelectasis.    PLEURA: No pleural effusion.    MEDIASTINUM AND CHESTER: Stable small mediastinal lymph nodes. No significant supraclavicular, axillary or hilar adenopathy.    VESSELS: Limited evaluation of some of the segmental and subsegmental arteries secondary to bolus timing and respiratory motion artifact. No pulmonary embolism to the level of the lobar pulmonary arteries.    HEART: Heart size is normal. No pericardial effusion.    CHEST WALL AND LOWER NECK: Within normal limits.    VISUALIZED UPPER ABDOMEN: Hepatic steatosis. Tiny hiatal hernia.    BONES: Within normal limits.    IMPRESSION:     Limited evaluation of some of the segmental and subsegmental arteries secondary to bolus timing and respiratory motion artifact. No pulmonary embolism to the level of the lobar pulmonary arteries. Stable chronic findings as above.    MARYA CODY M.D., RADIOLOGY RESIDENT  This document has been electronically signed.  KARLEE MADISON M.D., RADIOLOGIST  This document has been electronically signed. Jan 29 2020  1:06AM  ---------------------------------------------------------------------------------------------------------------      EXAM:  CT BRAIN                          PROCEDURE DATE:  01/29/2020      INTERPRETATION:  CLINICAL INFORMATION: Minor head trauma. Barney coma score greater than 13.    TECHNIQUE: Noncontrast axial CT images were acquired through the head. Two-dimensional sagittal and coronal reformats were generated.    COMPARISON STUDY: Head CT from 12/31/2019.    FINDINGS:     Status post right pterional craniotomy. Aneurysm clips identified in the right sylvian fissure which causes adjacent streakartifact, limiting evaluation. Chronic right inferior frontal and right temporal encephalomalacia and gliosis. There is no CT evidence of acute intracranial hemorrhage, extra-axial collection, or mass effect.     There is mild cerebral volume loss. There is mild patchy white matter hypoattenuation which is nonspecific in etiology but likely related to microvascular disease.    The visualized paranasal sinuses are clear. The mastoid air cells and middle ear cavities are clear.    The soft tissues of the scalp are unremarkable.     IMPRESSION:   Stable exam.  Right-sided pterional craniotomy with aneurysm clips in the right sylvian fissure, which causes streak artifact limiting regional evaluation. No gross evidence of acute intracranial hemorrhage or mass effect from edema. No calvarial fracture.    MARYA CODY M.D., RADIOLOGY RESIDENT  This document has been electronically signed.  KARLEE MADISON M.D., RADIOLOGIST  This document has been electronically signed. Jan 29 2020 12:57AM  ---------------------------------------------------------------------------------------------------------------

## 2020-01-29 NOTE — PATIENT PROFILE ADULT - NSPROPTRIGHTCAREGIVER_GEN_A_NUR
Nirmala Castle      DATE OF PROCEDURE: 8/23/2019    SURGEON: Dr. Lila Mcghee: Malu Valdovinos MD PGY 5    PREOPERATIVE DIAGNOSES:  Dysphagia (787.2)    POSTOPERATIVE DIAGNOSES: same, moderate gastritis, gastric ulcer, duodenal ulcer with adherent clot. OPERATION: Orfpbirt-[id number]-[id number] with PEG tube placement ([id number])    ANESTHESIA: LMAC    EBL: LESS THAN 250 CC    CONSENT AND INDICATIONS:  This is a 79y.o. year old male who is having dysphagia. The doctor has decided to have a PEG tube placed to help with feeding, hydration and oral medicines. I have discussed with the patient and/or the patient representative the indication, alternatives, and the possible risks and/or complications of the planned procedure and the anesthesia methods. The patient and/or patient representative appear to understand and agree to proceed. Complications: none      OPERATIONS: The patient was placed on the table and sedated via LMAC. The throat was numbed with Cetacaine spray. Bite block was placed. A lubricated scope was easily passed into the upper esophagus which looked normal. The distal esophagus looked normal. The scope was passed into the stomach and retroflexed. There was no hiatal hernia. The scope was passed down toward the pylorus. The antral mucosa all looked abnormal: with gastritis and superficial gastric uclers. There was also an ulcer of the first portion of the duodenum with adherent clot. A spot was found on the anterior stomach where the light shown through the  Abdominal wall and a finger could be seen pushing in. The skin was numbed with lidocaine, a 5 mm incision was made, the needle catheter was placed through the incision directly into the stomach without problems. The string was passed through the catheter into the stomach and was grasped with the snare and brought out through the mouth.  The PEG tube was attached to the string and declines

## 2020-01-29 NOTE — CONSULT NOTE ADULT - ASSESSMENT
ASSESSMENT:    chronic hypoxic respiratory failure due to severe COPD/emphysema admitted with dyspnea - the patient is not bronchospastic - there is no evidence of a large pulmonary embolus or pneumonia on CTA of the chest - I suspect that anxiety is playing a large role in her dyspnea by provoking hyperventilation resulting in dynamic hyperinflation of the lungs - the patient also has obesity related restrictive lung disease with bibasilar atelectasis    chronic alcohol use with evidence of withdrawal symptoms    breast cancer s/p bilateral mastectomy    rheumatoid and psoriatic arthritis on Otezla - immunocompromised host    brain aneurysm s/p clipping    PLAN/RECOMMENDATIONS:    oxygen supplementation to keep saturation greater than 92% (on baseline 3lpm nasal canula)  observe off antibiotics  observe off systemic steroids which worsen the patient's anxiety  albuterol/atrovent nebs q6h  pulmicort 0.5mg nebs q12h  singulair  watch for and treat alcohol withdrawal  thiamine/MVI/folate  Otezla/lidex solution to scalp/hydrocortisone cream to face  Arimidex  DVT prophylaxis - SQ heparin  GI prophylaxis - protonix  examestane    Thank you for the courtesy of this referral. Plan of care discussed with the patient at bedside and the ER PA.    Joss Franks MD, [facility]  [phone]  Pulmonary Medicine

## 2020-01-29 NOTE — ED ADULT NURSE REASSESSMENT NOTE - NS ED NURSE REASSESS COMMENT FT1
Pt to CTscan. Pt in no acute distress. Attempted to call  two times with no answer as per pt's request.

## 2020-01-29 NOTE — CHART NOTE - NSCHARTNOTEFT_GEN_A_CORE
Called by RN to see patient due to c/o of being Anxious and SOB.   Patient seen at bedside reports that she is extremely anxious and takes Xanax for Anxiety.   Receiving 2l NC with pulse ox 97%  Patient appear in no respiratory distress, able to speak in full sentences  Xanax 0.25 mg given for Anxiety  Duoneb x1 and PRN  Singulair 10 mg PO x1  VBG with lactate stat  F/u with Primary team in am .    Alfonso Nguyen, ANP-C  Department of Medicine  [id number]

## 2020-01-29 NOTE — H&P ADULT - NSHPLABSRESULTS_GEN_ALL_CORE
13.2   8.68  )-----------( 307      ( 28 Jan 2020 22:45 )             40.5       01-29    142  |  99  |  5<L>  ----------------------------<  159<H>  3.8   |  23  |  0.45<L>    Ca    8.6      29 Jan 2020 08:14  Phos  3.0     01-29  Mg     1.4     01-29    TPro  6.6  /  Alb  4.0  /  TBili  0.5  /  DBili  0.1  /  AST  35  /  ALT  18  /  AlkPhos  67  01-29                      Lactate Trend            CAPILLARY BLOOD GLUCOSE

## 2020-01-29 NOTE — ED ADULT NURSE REASSESSMENT NOTE - NS ED NURSE REASSESS COMMENT FT1
Pt stated she needed to be changed. Pt perineum cleansed with non rinse wipse. Clean and dry diaper, janel, and linens provided for pt. Pt placed in position of comfort. Pt educated on call bell system and provided call bell. Bed in lowest position, wheels locked, appropriate side rails raised.

## 2020-01-29 NOTE — H&P ADULT - HISTORY OF PRESENT ILLNESS
Pt is a 55 y/o Female  with PMHx of COPD on home O2 3L, breast CA s/p mastectomy, EtOH abuse, brain aneurysm s/p clip, RA presenting with increasing SOB  for a few day s and fall prior to presentation.   She was most recently hospitalized in late Dec (discharged 1/7/20) for COPD exacerbation, discharged on zithromax to finish course.   sob on exertion  no apparent loc w/ fall   Denies any fevers, new cough, nasal congestion, HA, visual changes, N/V.   pt anxious now  still drinking alcohol

## 2020-01-29 NOTE — PATIENT PROFILE ADULT - HOW PATIENT ADDRESSED, PROFILE
Em
direct patient care (not related to procedure)/documentation/additional history taking/interpretation of diagnostic studies/consultation with other physicians

## 2020-01-30 ENCOUNTER — TRANSCRIPTION ENCOUNTER (OUTPATIENT)
Age: 55
End: 2020-01-30

## 2020-01-30 VITALS
TEMPERATURE: 98 F | OXYGEN SATURATION: 100 % | RESPIRATION RATE: 18 BRPM | DIASTOLIC BLOOD PRESSURE: 74 MMHG | HEART RATE: 87 BPM | SYSTOLIC BLOOD PRESSURE: 128 MMHG

## 2020-01-30 LAB
ANION GAP SERPL CALC-SCNC: 16 MMOL/L — SIGNIFICANT CHANGE UP (ref 5–17)
BUN SERPL-MCNC: 14 MG/DL — SIGNIFICANT CHANGE UP (ref 7–23)
CALCIUM SERPL-MCNC: 9.1 MG/DL — SIGNIFICANT CHANGE UP (ref 8.4–10.5)
CHLORIDE SERPL-SCNC: 95 MMOL/L — LOW (ref 96–108)
CO2 SERPL-SCNC: 29 MMOL/L — SIGNIFICANT CHANGE UP (ref 22–31)
CREAT SERPL-MCNC: 0.47 MG/DL — LOW (ref 0.5–1.3)
GLUCOSE SERPL-MCNC: 147 MG/DL — HIGH (ref 70–99)
MAGNESIUM SERPL-MCNC: 1.6 MG/DL — SIGNIFICANT CHANGE UP (ref 1.6–2.6)
PHOSPHATE SERPL-MCNC: 3.1 MG/DL — SIGNIFICANT CHANGE UP (ref 2.5–4.5)
POTASSIUM SERPL-MCNC: 3.9 MMOL/L — SIGNIFICANT CHANGE UP (ref 3.5–5.3)
POTASSIUM SERPL-SCNC: 3.9 MMOL/L — SIGNIFICANT CHANGE UP (ref 3.5–5.3)
SODIUM SERPL-SCNC: 140 MMOL/L — SIGNIFICANT CHANGE UP (ref 135–145)

## 2020-01-30 PROCEDURE — 85027 COMPLETE CBC AUTOMATED: CPT

## 2020-01-30 PROCEDURE — 71045 X-RAY EXAM CHEST 1 VIEW: CPT

## 2020-01-30 PROCEDURE — 84484 ASSAY OF TROPONIN QUANT: CPT

## 2020-01-30 PROCEDURE — 87633 RESP VIRUS 12-25 TARGETS: CPT

## 2020-01-30 PROCEDURE — 94640 AIRWAY INHALATION TREATMENT: CPT

## 2020-01-30 PROCEDURE — 82330 ASSAY OF CALCIUM: CPT

## 2020-01-30 PROCEDURE — 85014 HEMATOCRIT: CPT

## 2020-01-30 PROCEDURE — 80053 COMPREHEN METABOLIC PANEL: CPT

## 2020-01-30 PROCEDURE — 84100 ASSAY OF PHOSPHORUS: CPT

## 2020-01-30 PROCEDURE — 96374 THER/PROPH/DIAG INJ IV PUSH: CPT | Mod: XU

## 2020-01-30 PROCEDURE — 83735 ASSAY OF MAGNESIUM: CPT

## 2020-01-30 PROCEDURE — 93005 ELECTROCARDIOGRAM TRACING: CPT

## 2020-01-30 PROCEDURE — 87581 M.PNEUMON DNA AMP PROBE: CPT

## 2020-01-30 PROCEDURE — 70450 CT HEAD/BRAIN W/O DYE: CPT

## 2020-01-30 PROCEDURE — 71275 CT ANGIOGRAPHY CHEST: CPT

## 2020-01-30 PROCEDURE — 84295 ASSAY OF SERUM SODIUM: CPT

## 2020-01-30 PROCEDURE — 83605 ASSAY OF LACTIC ACID: CPT

## 2020-01-30 PROCEDURE — 80048 BASIC METABOLIC PNL TOTAL CA: CPT

## 2020-01-30 PROCEDURE — 84132 ASSAY OF SERUM POTASSIUM: CPT

## 2020-01-30 PROCEDURE — 82803 BLOOD GASES ANY COMBINATION: CPT

## 2020-01-30 PROCEDURE — 83880 ASSAY OF NATRIURETIC PEPTIDE: CPT

## 2020-01-30 PROCEDURE — 99285 EMERGENCY DEPT VISIT HI MDM: CPT | Mod: 25

## 2020-01-30 PROCEDURE — 84702 CHORIONIC GONADOTROPIN TEST: CPT

## 2020-01-30 PROCEDURE — 87798 DETECT AGENT NOS DNA AMP: CPT

## 2020-01-30 PROCEDURE — 82435 ASSAY OF BLOOD CHLORIDE: CPT

## 2020-01-30 PROCEDURE — 82947 ASSAY GLUCOSE BLOOD QUANT: CPT

## 2020-01-30 PROCEDURE — 87486 CHLMYD PNEUM DNA AMP PROBE: CPT

## 2020-01-30 PROCEDURE — 82565 ASSAY OF CREATININE: CPT

## 2020-01-30 PROCEDURE — 96375 TX/PRO/DX INJ NEW DRUG ADDON: CPT | Mod: XU

## 2020-01-30 PROCEDURE — 80076 HEPATIC FUNCTION PANEL: CPT

## 2020-01-30 RX ORDER — ALPRAZOLAM 0.25 MG
1 TABLET ORAL
Qty: 0 | Refills: 0 | DISCHARGE

## 2020-01-30 RX ORDER — ALPRAZOLAM 0.25 MG
0.25 TABLET ORAL ONCE
Refills: 0 | Status: DISCONTINUED | OUTPATIENT
Start: 2020-01-30 | End: 2020-01-30

## 2020-01-30 RX ORDER — LANOLIN ALCOHOL/MO/W.PET/CERES
3 CREAM (GRAM) TOPICAL AT BEDTIME
Refills: 0 | Status: COMPLETED | OUTPATIENT
Start: 2020-01-30 | End: 2020-01-30

## 2020-01-30 RX ADMIN — Medication 3 MILLILITER(S): at 00:19

## 2020-01-30 RX ADMIN — Medication 0.5 MILLIGRAM(S): at 09:01

## 2020-01-30 RX ADMIN — Medication 3 MILLILITER(S): at 11:24

## 2020-01-30 RX ADMIN — EXEMESTANE 25 MILLIGRAM(S): 25 TABLET, SUGAR COATED ORAL at 11:24

## 2020-01-30 RX ADMIN — Medication 1000 UNIT(S): at 11:24

## 2020-01-30 RX ADMIN — Medication 3 MILLIGRAM(S): at 00:33

## 2020-01-30 RX ADMIN — Medication 1 MILLIGRAM(S): at 11:24

## 2020-01-30 RX ADMIN — PANTOPRAZOLE SODIUM 40 MILLIGRAM(S): 20 TABLET, DELAYED RELEASE ORAL at 06:26

## 2020-01-30 RX ADMIN — Medication 2 MILLIGRAM(S): at 10:17

## 2020-01-30 RX ADMIN — Medication 40 MILLIGRAM(S): at 06:26

## 2020-01-30 RX ADMIN — Medication 3 MILLILITER(S): at 06:26

## 2020-01-30 RX ADMIN — Medication 0.25 MILLIGRAM(S): at 14:33

## 2020-01-30 RX ADMIN — HEPARIN SODIUM 5000 UNIT(S): 5000 INJECTION INTRAVENOUS; SUBCUTANEOUS at 06:26

## 2020-01-30 NOTE — DISCHARGE NOTE PROVIDER - CARE PROVIDER_API CALL
Katrina Iyer)  Critical Care Medicine; Internal Medicine; Pulmonary Disease  [address] [place] [address]  Phone: [phone]  Fax: [phone]  Follow Up Time:

## 2020-01-30 NOTE — DISCHARGE NOTE NURSING/CASE MANAGEMENT/SOCIAL WORK - PATIENT PORTAL LINK FT
You can access the FollowMyHealth Patient Portal offered by [facility] by registering at the following website: [email or web address]. By joining [facility]’s FollowMyHealth portal, you will also be able to view your health information using other applications (apps) compatible with our system.

## 2020-01-30 NOTE — SBIRT NOTE ADULT - NSSBIRTBRIEFINTDET_GEN_A_CORE
Reviewed results with her and brief intervention provided. Patient reports no concerns with her drinking. Reports drinking three beers daily for years but one beer last Wednesday before her fall. Patient declined substance use disorder referrals.

## 2020-01-30 NOTE — PROGRESS NOTE ADULT - SUBJECTIVE AND OBJECTIVE BOX
CHIEF COMPLAINT:Patient is a 54y old  Female who presents with a chief complaint of copd (30 Jan 2020 08:50)    	        PAST MEDICAL & SURGICAL HISTORY:  Prophylactic measure  Breast cancer  Brain aneurysm: with clips  Psoriasis  RA (rheumatoid arthritis)  Psoriasis  Breast cancer, stage 3  History of modified radical mastectomy of both breasts  S/P bilateral mastectomy  Brain aneurysm: 1988 two clips          REVIEW OF SYSTEMS:  feels better  EYES: No eye pain, visual disturbances, or discharge  NECK: No pain or stiffness  RESPIRATORY: dec sob /   CARDIOVASCULAR: No chest pain, palpitations, passing out, dizziness,   GASTROINTESTINAL: No abdominal or epigastric pain. No nausea, vomiting, or hematemesis; No diarrhea or constipation. No melena or hematochezia.  GENITOURINARY: No dysuria, frequency, hematuria, or incontinence  NEUROLOGICAL: No headaches,     Medications:  MEDICATIONS  (STANDING):  albuterol/ipratropium for Nebulization 3 milliLiter(s) Nebulizer every 6 hours  buDESOnide    Inhalation Suspension 0.5 milliGRAM(s) Inhalation two times a day  cholecalciferol 1000 Unit(s) Oral daily  exemestane 25 milliGRAM(s) Oral daily  folic acid 1 milliGRAM(s) Oral daily  heparin  Injectable 5000 Unit(s) SubCutaneous every 12 hours  pantoprazole    Tablet 40 milliGRAM(s) Oral before breakfast    MEDICATIONS  (PRN):  LORazepam   Injectable 2 milliGRAM(s) IV Push every 2 hours PRN CIWA-Ar score increase by 2 points and a total score of 7 or less    	    PHYSICAL EXAM:  T(C): 36.7 (01-30-20 @ 05:34), Max: 37.1 (01-29-20 @ 19:52)  HR: 90 (01-30-20 @ 05:34) (90 - 103)  BP: 132/82 (01-30-20 @ 05:34) (128/78 - 137/86)  RR: 18 (01-30-20 @ 05:34) (17 - 18)  SpO2: 98% (01-30-20 @ 05:34) (96% - 98%)  Wt(kg): --  I&O's Summary    29 Jan 2020 07:01  -  30 Jan 2020 07:00  --------------------------------------------------------  IN: 650 mL / OUT: 0 mL / NET: 650 mL        Appearance: Normal	  HEENT:   Normal oral mucosa, PERRL, EOMI	  Lymphatic: No lymphadenopathy  Cardiovascular: Normal S1 S2, No JVD, No murmurs, No edema  Respiratory: Lungs clear to auscultation	  Psychiatry: A & O  Gastrointestinal:  Soft, Non-tender, + BS	  Skin: No rashes, No ecchymoses, No cyanosis	  Neurologic: Non-focal  Extremities: dec rom   TELEMETRY: 	    ECG:  	  RADIOLOGY:  OTHER: 	  	  LABS:	 	    CARDIAC MARKERS:                                13.2   8.68  )-----------( 307      ( 28 Jan 2020 22:45 )             40.5     01-30    140  |  95<L>  |  14  ----------------------------<  147<H>  3.9   |  29  |  0.47<L>    Ca    9.1      30 Jan 2020 06:49  Phos  3.1     01-30  Mg     1.6     01-30    TPro  6.6  /  Alb  4.0  /  TBili  0.5  /  DBili  0.1  /  AST  35  /  ALT  18  /  AlkPhos  67  01-29    proBNP:   Lipid Profile:   HgA1c:   TSH:

## 2020-01-30 NOTE — PROGRESS NOTE ADULT - ASSESSMENT
pt w/ hx copd/ alcohol abuse / breast ca.psoriasis / failure to thrive w/ sob/ anxiety   likely copd/ and inpnding dts   ciwa  steroids stopped   pulm ginger noted   nebs   dvt proph  c/w outpt meds  again emphasized need for abstinence of alcohol and to seek rehab  pt

## 2020-01-30 NOTE — PROGRESS NOTE ADULT - ASSESSMENT
ASSESSMENT:    chronic hypoxic/hypercapnic respiratory failure due to severe COPD/emphysema and obesity related restrictive lung disease - stable respiratory status without bronchospasm on her baseline oxygen supplementation of 3lpm via nasal canula    hyponatremia - chronic - improved    chronic alcohol use without evidence of withdrawal symptoms    lower extremity swelling likely related to intermittent hypoxemia causing pulmonary artery hypoxic vasoconstriction -> resolved    breast cancer s/p bilateral mastectomy    rheumatoid and psoriatic arthritis on Otezla - immunocompromised host    brain aneurysm s/p clipping    PLAN/RECOMMENDATIONS:    oxygen supplementation to keep saturation greater than 92%  free water restriction - salt tabs as needed  observe off antibiotics  prednisone taper as written  albuterol/atrovent nebs q6h  pulmicort 0.5mg nebs q12h  singulair  MVI/folate  benzodiazepines as needed  Otezla/lidex solution to scalp/hydrocortisone cream to face  Arimidex  DVT prophylaxis - SQ heparin  GI prophylaxis - protonix    Will follow with you  No pulmonary objection to discharge.     Katrina Iyer MD  Pulmonary Medicine  [phone]

## 2020-01-30 NOTE — DISCHARGE NOTE NURSING/CASE MANAGEMENT/SOCIAL WORK - NSDCPEWEB_GEN_ALL_CORE
[place] website --- [email or web address] website --- [email or web address]

## 2020-01-30 NOTE — DISCHARGE NOTE PROVIDER - HOSPITAL COURSE
55 y/o Female  with PMHx of COPD on home O2 3L, breast CA s/p mastectomy, EtOH abuse, brain aneurysm s/p clip, RA presenting with increasing SOB  for a few days.    chronic hypoxic respiratory failure due to severe COPD/emphysema admitted with dyspnea - the patient is not bronchospastic - there is no evidence of a large pulmonary embolus or pneumonia on CTA of the chest - anxiety is playing a large role in her dyspnea by provoking hyperventilation resulting in dynamic hyperinflation of the lungs - the patient also has obesity related restrictive lung disease with bibasilar atelectasis        chronic alcohol use with evidence of withdrawal symptoms, ciwa protocol, social work offered resources but pt declined.     oxygen supplementation to keep saturation greater than 92% (on baseline 3lpm nasal canula)    observe off antibiotics    observe off systemic steroids which worsen the patient's anxiety    pulmicort 0.5mg nebs q12h    singulair

## 2020-01-30 NOTE — DISCHARGE NOTE PROVIDER - NSDCCPCAREPLAN_GEN_ALL_CORE_FT
PRINCIPAL DISCHARGE DIAGNOSIS  Diagnosis: COPD exacerbation  Assessment and Plan of Treatment: Call your Health Care provider upon arrival home to make a follow up appointment within one week.  Take all inhalers as prescribed by your Health Care Provider.  Take steroids as prescribed by your Health Care Provider.  If your cough increases infrequency and severity and/or you have shortness of breath or increased shortness of breath call your Health Care Provider.  If you develop fever, chills, night sweats, malaise, and/or change in mental status call your Health care Provider.  Nutrition is very important.  Eat small frequent meals.  Increase your activity as tolerated.  Do not stay in bed all day      SECONDARY DISCHARGE DIAGNOSES  Diagnosis: ETOH abuse  Assessment and Plan of Treatment: Do not drink alcohol

## 2020-01-30 NOTE — PROGRESS NOTE ADULT - SUBJECTIVE AND OBJECTIVE BOX
Follow-up Pulm Progress Note    No new respiratory events overnight.  Denies increased SOB, chest pain, cough or mucus.    Medications:  MEDICATIONS  (STANDING):  albuterol/ipratropium for Nebulization 3 milliLiter(s) Nebulizer every 6 hours  buDESOnide    Inhalation Suspension 0.5 milliGRAM(s) Inhalation two times a day  cholecalciferol 1000 Unit(s) Oral daily  exemestane 25 milliGRAM(s) Oral daily  folic acid 1 milliGRAM(s) Oral daily  heparin  Injectable 5000 Unit(s) SubCutaneous every 12 hours  methylPREDNISolone sodium succinate Injectable 40 milliGRAM(s) IV Push every 8 hours  pantoprazole    Tablet 40 milliGRAM(s) Oral before breakfast    MEDICATIONS  (PRN):  LORazepam   Injectable 2 milliGRAM(s) IV Push every 2 hours PRN CIWA-Ar score increase by 2 points and a total score of 7 or less      Vent settings (if applicable)      Vital Signs Last 24 Hrs  T(C): 36.7 (30 Jan 2020 05:34), Max: 37.1 (29 Jan 2020 19:52)  T(F): 98 (30 Jan 2020 05:34), Max: 98.7 (29 Jan 2020 19:52)  HR: 90 (30 Jan 2020 05:34) (90 - 103)  BP: 132/82 (30 Jan 2020 05:34) (128/78 - 137/86)  BP(mean): --  RR: 18 (30 Jan 2020 05:34) (17 - 18)  SpO2: 98% (30 Jan 2020 05:34) (96% - 98%)          01-29 @ 07:01  -  01-30 @ 07:00  --------------------------------------------------------  IN: 650 mL / OUT: 0 mL / NET: 650 mL          LABS:                        13.2   8.68  )-----------( 307      ( 28 Jan 2020 22:45 )             40.5     01-30    140  |  95<L>  |  14  ----------------------------<  147<H>  3.9   |  29  |  0.47<L>    Ca    9.1      30 Jan 2020 06:49  Phos  3.1     01-30  Mg     1.6     01-30    TPro  6.6  /  Alb  4.0  /  TBili  0.5  /  DBili  0.1  /  AST  35  /  ALT  18  /  AlkPhos  67  01-29          CAPILLARY BLOOD GLUCOSE              Serum Pro-Brain Natriuretic Peptide: 55 pg/mL (01-28-20 @ 22:45)      CULTURES:        Physical Examination:  Awake and alert, generally comfortable  HEENT: unremarkable  PULM: Clear to auscultation bilaterally, no significant sputum production, decreased BS bilat  CVS: Regular rate and rhythm, no murmurs, rubs, or gallops  Abd:  soft, non tender  Extrem: No CCE    RADIOLOGY REVIEWED  CXR:    CT chest:

## 2020-01-30 NOTE — DISCHARGE NOTE PROVIDER - NSDCMRMEDTOKEN_GEN_ALL_CORE_FT
Brovana 15 mcg/2 mL inhalation solution: 2 milliliter(s) inhaled 2 times a day  budesonide 0.5 mg/2 mL inhalation suspension: 2 milliliter(s) inhaled 2 times a day  cholecalciferol oral tablet: 1000 unit(s) orally once a day  exemestane 25 mg oral tablet: 1 tab(s) orally once a day  folic acid 1 mg oral tablet: 1 tab(s) orally once a day  montelukast 10 mg oral tablet: 1 tab(s) orally once a day (at bedtime)  Multiple Vitamins oral tablet: 1 tab(s) orally once a day  Otezla 30 mg oral tablet: 1 tab(s) orally 2 times a day  pantoprazole 40 mg oral delayed release tablet: 1 tab(s) orally once a day (before a meal)  Ventolin HFA 90 mcg/inh inhalation aerosol: 2 puff(s) inhaled every 6 hours, As Needed  Xanax 0.25 mg oral tablet: 1 tab(s) orally 3 times a day, As Needed

## 2020-02-02 ENCOUNTER — EMERGENCY (EMERGENCY)
Facility: HOSPITAL | Age: 55
LOS: 1 days | Discharge: HOME | End: 2020-02-02
Attending: EMERGENCY MEDICINE | Admitting: EMERGENCY MEDICINE
Payer: COMMERCIAL

## 2020-02-02 VITALS
TEMPERATURE: 98 F | HEIGHT: 60 IN | RESPIRATION RATE: 20 BRPM | DIASTOLIC BLOOD PRESSURE: 71 MMHG | SYSTOLIC BLOOD PRESSURE: 144 MMHG | OXYGEN SATURATION: 97 % | HEART RATE: 91 BPM

## 2020-02-02 DIAGNOSIS — Z90.13 ACQUIRED ABSENCE OF BILATERAL BREASTS AND NIPPLES: Chronic | ICD-10-CM

## 2020-02-02 DIAGNOSIS — Z90.10 ACQUIRED ABSENCE OF UNSPECIFIED BREAST AND NIPPLE: Chronic | ICD-10-CM

## 2020-02-02 LAB
ALBUMIN SERPL ELPH-MCNC: 3.9 G/DL — SIGNIFICANT CHANGE UP (ref 3.3–5)
ALP SERPL-CCNC: 68 U/L — SIGNIFICANT CHANGE UP (ref 40–120)
ALT FLD-CCNC: 70 U/L — HIGH (ref 4–33)
ANION GAP SERPL CALC-SCNC: 21 MMO/L — HIGH (ref 7–14)
ANISOCYTOSIS BLD QL: SLIGHT — SIGNIFICANT CHANGE UP
APAP SERPL-MCNC: < 15 UG/ML — LOW (ref 15–25)
AST SERPL-CCNC: 84 U/L — HIGH (ref 4–32)
BASE EXCESS BLDV CALC-SCNC: 5.4 MMOL/L — SIGNIFICANT CHANGE UP
BASOPHILS # BLD AUTO: 0.02 K/UL — SIGNIFICANT CHANGE UP (ref 0–0.2)
BASOPHILS NFR BLD AUTO: 0.3 % — SIGNIFICANT CHANGE UP (ref 0–2)
BILIRUB SERPL-MCNC: 0.7 MG/DL — SIGNIFICANT CHANGE UP (ref 0.2–1.2)
BLOOD GAS VENOUS - CREATININE: 0.45 MG/DL — LOW (ref 0.5–1.3)
BUN SERPL-MCNC: 9 MG/DL — SIGNIFICANT CHANGE UP (ref 7–23)
CALCIUM SERPL-MCNC: 8.9 MG/DL — SIGNIFICANT CHANGE UP (ref 8.4–10.5)
CHLORIDE BLDV-SCNC: 105 MMOL/L — SIGNIFICANT CHANGE UP (ref 96–108)
CHLORIDE SERPL-SCNC: 97 MMOL/L — LOW (ref 98–107)
CO2 SERPL-SCNC: 28 MMOL/L — SIGNIFICANT CHANGE UP (ref 22–31)
CREAT SERPL-MCNC: 0.51 MG/DL — SIGNIFICANT CHANGE UP (ref 0.5–1.3)
EOSINOPHIL # BLD AUTO: 0.03 K/UL — SIGNIFICANT CHANGE UP (ref 0–0.5)
EOSINOPHIL NFR BLD AUTO: 0.5 % — SIGNIFICANT CHANGE UP (ref 0–6)
ETHANOL BLD-MCNC: 281 MG/DL — HIGH
GAS PNL BLDV: 144 MMOL/L — SIGNIFICANT CHANGE UP (ref 136–146)
GLUCOSE BLDV-MCNC: 91 MG/DL — SIGNIFICANT CHANGE UP (ref 70–99)
GLUCOSE SERPL-MCNC: 98 MG/DL — SIGNIFICANT CHANGE UP (ref 70–99)
HCO3 BLDV-SCNC: 28 MMOL/L — HIGH (ref 20–27)
HCT VFR BLD CALC: 40.3 % — SIGNIFICANT CHANGE UP (ref 34.5–45)
HCT VFR BLDV CALC: 39.1 % — SIGNIFICANT CHANGE UP (ref 34.5–45)
HGB BLD-MCNC: 13 G/DL — SIGNIFICANT CHANGE UP (ref 11.5–15.5)
HGB BLDV-MCNC: 12.7 G/DL — SIGNIFICANT CHANGE UP (ref 11.5–15.5)
IMM GRANULOCYTES NFR BLD AUTO: 1.2 % — SIGNIFICANT CHANGE UP (ref 0–1.5)
LACTATE BLDV-MCNC: 3 MMOL/L — HIGH (ref 0.5–2)
LYMPHOCYTES # BLD AUTO: 1.74 K/UL — SIGNIFICANT CHANGE UP (ref 1–3.3)
LYMPHOCYTES # BLD AUTO: 30.3 % — SIGNIFICANT CHANGE UP (ref 13–44)
MACROCYTES BLD QL: SIGNIFICANT CHANGE UP
MANUAL SMEAR VERIFICATION: YES — SIGNIFICANT CHANGE UP
MCHC RBC-ENTMCNC: 32.3 % — SIGNIFICANT CHANGE UP (ref 32–36)
MCHC RBC-ENTMCNC: 35 PG — HIGH (ref 27–34)
MCV RBC AUTO: 108.6 FL — HIGH (ref 80–100)
MONOCYTES # BLD AUTO: 0.68 K/UL — SIGNIFICANT CHANGE UP (ref 0–0.9)
MONOCYTES NFR BLD AUTO: 11.8 % — SIGNIFICANT CHANGE UP (ref 2–14)
NEUTROPHILS # BLD AUTO: 3.21 K/UL — SIGNIFICANT CHANGE UP (ref 1.8–7.4)
NEUTROPHILS NFR BLD AUTO: 55.9 % — SIGNIFICANT CHANGE UP (ref 43–77)
NRBC # FLD: 0 K/UL — SIGNIFICANT CHANGE UP (ref 0–0)
PCO2 BLDV: 52 MMHG — HIGH (ref 41–51)
PH BLDV: 7.39 PH — SIGNIFICANT CHANGE UP (ref 7.32–7.43)
PLATELET # BLD AUTO: 204 K/UL — SIGNIFICANT CHANGE UP (ref 150–400)
PLATELET COUNT - ESTIMATE: NORMAL — SIGNIFICANT CHANGE UP
PMV BLD: 9 FL — SIGNIFICANT CHANGE UP (ref 7–13)
PO2 BLDV: 54 MMHG — HIGH (ref 35–40)
POLYCHROMASIA BLD QL SMEAR: SLIGHT — SIGNIFICANT CHANGE UP
POTASSIUM BLDV-SCNC: 3.7 MMOL/L — SIGNIFICANT CHANGE UP (ref 3.4–4.5)
POTASSIUM SERPL-MCNC: 3.8 MMOL/L — SIGNIFICANT CHANGE UP (ref 3.5–5.3)
POTASSIUM SERPL-SCNC: 3.8 MMOL/L — SIGNIFICANT CHANGE UP (ref 3.5–5.3)
PROT SERPL-MCNC: 6.5 G/DL — SIGNIFICANT CHANGE UP (ref 6–8.3)
RBC # BLD: 3.71 M/UL — LOW (ref 3.8–5.2)
RBC # FLD: 13.9 % — SIGNIFICANT CHANGE UP (ref 10.3–14.5)
SALICYLATES SERPL-MCNC: < 5 MG/DL — LOW (ref 15–30)
SAO2 % BLDV: 82.3 % — SIGNIFICANT CHANGE UP (ref 60–85)
SODIUM SERPL-SCNC: 146 MMOL/L — HIGH (ref 135–145)
WBC # BLD: 5.75 K/UL — SIGNIFICANT CHANGE UP (ref 3.8–10.5)
WBC # FLD AUTO: 5.75 K/UL — SIGNIFICANT CHANGE UP (ref 3.8–10.5)

## 2020-02-02 PROCEDURE — 71045 X-RAY EXAM CHEST 1 VIEW: CPT | Mod: 26

## 2020-02-02 PROCEDURE — 99285 EMERGENCY DEPT VISIT HI MDM: CPT

## 2020-02-02 RX ORDER — ALPRAZOLAM 0.25 MG
0.25 TABLET ORAL ONCE
Refills: 0 | Status: DISCONTINUED | OUTPATIENT
Start: 2020-02-02 | End: 2020-02-02

## 2020-02-02 RX ORDER — IPRATROPIUM/ALBUTEROL SULFATE 18-103MCG
3 AEROSOL WITH ADAPTER (GRAM) INHALATION ONCE
Refills: 0 | Status: COMPLETED | OUTPATIENT
Start: 2020-02-02 | End: 2020-02-02

## 2020-02-02 RX ORDER — KETAMINE HYDROCHLORIDE 100 MG/ML
19 INJECTION INTRAMUSCULAR; INTRAVENOUS ONCE
Refills: 0 | Status: DISCONTINUED | OUTPATIENT
Start: 2020-02-02 | End: 2020-02-02

## 2020-02-02 RX ORDER — ALPRAZOLAM 0.25 MG
0.5 TABLET ORAL ONCE
Refills: 0 | Status: DISCONTINUED | OUTPATIENT
Start: 2020-02-02 | End: 2020-02-02

## 2020-02-02 RX ADMIN — Medication 50 MILLIGRAM(S): at 22:14

## 2020-02-02 RX ADMIN — Medication 3 MILLILITER(S): at 22:40

## 2020-02-02 RX ADMIN — Medication 0.25 MILLIGRAM(S): at 22:32

## 2020-02-02 RX ADMIN — Medication 3 MILLILITER(S): at 22:14

## 2020-02-02 NOTE — ED PROVIDER NOTE - OBJECTIVE STATEMENT
55 y/o Female  with PMHx of COPD on home O2 3L, breast CA s/p mastectomy, EtOH abuse, brain aneurysm s/p clip, RA presenting with increasing SOB for 1 day. Pt was dc'd from hospital on 1/30 after an admission for COPD exacerbation. On d/c paperwork it's noted that anxiety causes large role in her respiratory symptoms. States she takes xanax 3x daily, and "forgot" to take it tonight. Last drink was today, she does not remember when. Denies fevers/chills.

## 2020-02-02 NOTE — ED PROVIDER NOTE - ATTENDING CONTRIBUTION TO CARE
Seen and examined, pt. c/o shortness of breath, states has COPD and was recently admitted for SOB but was doing well after dc until now. Also states has anxiety and that anxiety makes her SOB and skipped her Xanax which she takes up to 3x daily. Denies inc. cough/fever, no exertional sx today. Sudden onset of dyspnea, no assoc. CP/N/V/diaphoresis. Pt. denies trauma, travel or leg c/o. No pleuritic sx. MMM, clear lungs, heart reg, abd soft, NT to palp, no edema, NT calves. Pt. speaks full sentences comfortably and asks for help with her breathing but episodically loudly yells that she cannot breathe, "I'm going to die," "Please help me," "Just let me go home and die." After multiple interviews and reassessments pt. seems to exhibit rapidly changing sx non-attributable to COPD exac., PE, or PNA. CXR, labs, if normal will attempt inc. anxiolytic tx.

## 2020-02-02 NOTE — ED PROVIDER NOTE - NS ED ATTENDING STATEMENT MOD
Alert and oriented, no focal deficits, no motor or sensory deficits.
I have personally seen and examined this patient.  I have fully participated in the care of this patient. I have reviewed all pertinent clinical information, including history, physical exam, plan and the Resident’s note and agree except as noted.

## 2020-02-02 NOTE — ED PROVIDER NOTE - CARE PLAN
Principal Discharge DX:	Shortness of breath Principal Discharge DX:	Shortness of breath  Secondary Diagnosis:	Anxiety

## 2020-02-02 NOTE — ED PROVIDER NOTE - NS ED ROS FT
REVIEW OF SYSTEMS:  General: +anxiety, no fever, no chills  HEENT: no headache, no vision changes  Cardiac: no chest pain, no palpitations  Respiratory: no cough, +shortness of breath  Gastrointestinal: no abdominal pain, no nausea, no vomiting, no diarrhea  Genitourinary: no hematuria, no dysuria, no urinary frequency  Extremities: no extremity swelling, no extremity pain  Neuro: no focal weakness, no numbness/tingling of the extremities, no decreased sensation  Heme: no easy bleeding, no easy bruising  Skin: no jaundice,  no rashes, no lesions  All other ROS as documented in HPI  -Salvatore Marrufo, PGY-2

## 2020-02-02 NOTE — ED ADULT NURSE REASSESSMENT NOTE - NS ED NURSE REASSESS COMMENT FT1
Pt refusing RVP; MD made aware. Pt yelling in room at states "I am here because I need a xanax." Will continue to monitor.

## 2020-02-02 NOTE — ED ADULT NURSE NOTE - CHIEF COMPLAINT QUOTE
Patient arrives by EMS with complaints of SOB x 1 year. patient was discharged two days ago from [facility] for COPD exacerbation, as per  patient has worsening SOB. Lung sounds clear on auscultation, on 3L NC, O2 sat 97%. Patient denies chest pain, fevers. Hx COPD, breast CA 2016, DVT, no longer on anticoagulants, psoriasis, anxiety.

## 2020-02-02 NOTE — ED PROVIDER NOTE - PATIENT PORTAL LINK FT
You can access the FollowMyHealth Patient Portal offered by [facility] by registering at the following website: [email or web address]. By joining [facility]’s FollowMyHealth portal, you will also be able to view your health information using other applications (apps) compatible with our system.

## 2020-02-02 NOTE — ED PROVIDER NOTE - PHYSICAL EXAMINATION
General: disheveled female  HEENT: Normocephalic and atraumatic, Trachea midline.   Cardiac: Normal S1 and S2 w/ RRR. No MRG.  Pulmonary: Poor air movement, mildly tachypneic    Abdominal: Soft, NTND  Neurologic: No focal sensory or motor deficits.  Musculoskeletal: No limited ROM.  Vascular: Warm and well perfused  Skin: Color appropriate for race.   Psychiatric: Agitated, asking for xanax repeatedly. No apparent risk to self or others.  Salvatore Marrufo M.D. PGY-2

## 2020-02-02 NOTE — ED ADULT NURSE NOTE - OBJECTIVE STATEMENT
55y/o female aaox3 and ambulatory w/ assist c/o SOB. Pt states PMHx of COPD. Pt states SOB has been worsening the last few days. Pt stats on 3L NC at baseline. Pt O2 sat at 99%. Pt denies chest pain, N+V, diarrhea, dizziness, palpitations. PT states she drinks everyday; last drink today "I don't remember when." Vital signs as noted. 22g in L hand; labs collected and sent as per MD order. Pt medicated as per MD order. Pt states "I am not changing into a gown until I get a xanax." Will continue to monitor.

## 2020-02-02 NOTE — ED PROVIDER NOTE - NSFOLLOWUPINSTRUCTIONS_ED_ALL_ED_FT
Cough    Coughing is a reflex that clears your throat and your airways. Coughing helps to heal and protect your lungs. It is normal to cough occasionally, but a cough that happens with other symptoms or lasts a long time may be a sign of a condition that needs treatment. Coughing may be caused by infections, asthma or COPD, smoking, postnasal drip, gastroesophageal reflux, as well as other medical conditions. Take medicines only as instructed by your health care provider. Avoid environments or triggers that causes you to cough at work or at home.    SEEK IMMEDIATE MEDICAL CARE IF YOU HAVE ANY OF THE FOLLOWING SYMPTOMS: coughing up blood, shortness of breath, rapid heart rate, chest pain, unexplained weight loss or night sweats.     Shortness of breath    Shortness of breath (dyspnea) means you have trouble breathing and could indicate a medical problem. Causes include lung disease, heart disease, low amount of red blood cells (anemia), poor physical fitness, being overweight, smoking, etc. Your health care provider today may not be able to find a cause for your shortness of breath after your exam. In this case, it is important to have a follow-up exam with your primary care physician as instructed. If medicines were prescribed, take them as directed for the full length of time directed. Refrain from tobacco products.    SEEK IMMEDIATE MEDICAL CARE IF YOU HAVE ANY OF THE FOLLOWING SYMPTOMS: worsening shortness of breath, chest pain, back pain, abdominal pain, fever, coughing up blood, lightheadedness/dizziness.

## 2020-02-02 NOTE — ED PROVIDER NOTE - CLINICAL SUMMARY MEDICAL DECISION MAKING FREE TEXT BOX
54F, COPD, anxiety p/w anxiety and difficulty breathing. Will obtain labs, CXR. May be viral (recent hospitalization) vs anxiety, vs withdrawal. dispo pending reassessment.

## 2020-02-02 NOTE — ED ADULT TRIAGE NOTE - CHIEF COMPLAINT QUOTE
Patient arrives by EMS with complaints of SOB x 1 year. patient was discharged two days ago from [facility] for COPD exacerbation, as per  patient has worsening SOB. Lung sounds clear on auscultation, on 3L NC, O2 sat 97%. Patient denies chest pain, fevers. Hx COPD, breast CA 2016, DVT, no longer on anticoagulants, psoriasis, anxiety.

## 2020-02-03 VITALS
HEART RATE: 89 BPM | OXYGEN SATURATION: 97 % | RESPIRATION RATE: 18 BRPM | SYSTOLIC BLOOD PRESSURE: 128 MMHG | DIASTOLIC BLOOD PRESSURE: 72 MMHG

## 2020-02-03 RX ORDER — IPRATROPIUM/ALBUTEROL SULFATE 18-103MCG
3 AEROSOL WITH ADAPTER (GRAM) INHALATION ONCE
Refills: 0 | Status: COMPLETED | OUTPATIENT
Start: 2020-02-03 | End: 2020-02-03

## 2020-02-03 RX ORDER — ALPRAZOLAM 0.25 MG
0.25 TABLET ORAL ONCE
Refills: 0 | Status: DISCONTINUED | OUTPATIENT
Start: 2020-02-03 | End: 2020-02-03

## 2020-02-03 RX ADMIN — Medication 3 MILLILITER(S): at 02:57

## 2020-02-03 RX ADMIN — Medication 50 MILLIGRAM(S): at 03:54

## 2020-02-03 RX ADMIN — Medication 40 MILLIGRAM(S): at 00:08

## 2020-02-03 RX ADMIN — KETAMINE HYDROCHLORIDE 19 MILLIGRAM(S): 100 INJECTION INTRAMUSCULAR; INTRAVENOUS at 01:41

## 2020-02-03 RX ADMIN — Medication 0.5 MILLIGRAM(S): at 00:08

## 2020-02-03 RX ADMIN — Medication 1 MILLIGRAM(S): at 02:57

## 2020-02-03 RX ADMIN — KETAMINE HYDROCHLORIDE 400 MILLIGRAM(S): 100 INJECTION INTRAMUSCULAR; INTRAVENOUS at 01:26

## 2020-02-03 NOTE — PHYSICAL THERAPY INITIAL EVALUATION ADULT - PERTINENT HX OF CURRENT PROBLEM, REHAB EVAL
54 F PMH stage 3 Bilateral Breast CA on Aromasin, RA, Psoriasis previously on Otezla, remote Brain Aneurysm s/p Clip in 1988,  Rt Jugular DVT, Catheter related MSSA Bacteremia from [place] in July 2015, ETOH abuse, COPD, p/w dypsnea, leg edema, rectal bleeding. 11/14 Chest CT: Severe emphysema. RLE Dop: Neg
English

## 2020-02-03 NOTE — ED ADULT NURSE REASSESSMENT NOTE - NS ED NURSE REASSESS COMMENT FT1
pt reports anxiety and difficulty breathing again. requesting duoneb. MD aware. medicated as ordered.

## 2020-02-03 NOTE — ED ADULT NURSE REASSESSMENT NOTE - NS ED NURSE REASSESS COMMENT FT1
Pt medicated as per MD order; pt states "whatever went through the IV helped me a bit." Awaiting other medication from pharmacy at this time. Pt repositioned for comfort. Will continue to monitor.

## 2020-02-03 NOTE — ED ADULT NURSE REASSESSMENT NOTE - NS ED NURSE REASSESS COMMENT FT1
MD Marrufo spoke with pt's ; pt's  states he will leave the door unlocked for pt's return home. Pt awaiting ambulette at this time. Will continue to monitor. MD Marrufo spoke with pt's ; pt's  states he will be home for pt's return home. Pt awaiting ambulette at this time. Will continue to monitor.

## 2020-02-05 ENCOUNTER — EMERGENCY (EMERGENCY)
Facility: HOSPITAL | Age: 55
LOS: 1 days | Discharge: HOME | End: 2020-02-05
Attending: EMERGENCY MEDICINE
Payer: COMMERCIAL

## 2020-02-05 VITALS
RESPIRATION RATE: 17 BRPM | WEIGHT: 139.99 LBS | DIASTOLIC BLOOD PRESSURE: 84 MMHG | OXYGEN SATURATION: 98 % | SYSTOLIC BLOOD PRESSURE: 134 MMHG | HEIGHT: 60 IN | TEMPERATURE: 98 F | HEART RATE: 87 BPM

## 2020-02-05 VITALS
RESPIRATION RATE: 18 BRPM | TEMPERATURE: 98 F | SYSTOLIC BLOOD PRESSURE: 100 MMHG | HEART RATE: 89 BPM | DIASTOLIC BLOOD PRESSURE: 67 MMHG | OXYGEN SATURATION: 98 %

## 2020-02-05 DIAGNOSIS — Z90.13 ACQUIRED ABSENCE OF BILATERAL BREASTS AND NIPPLES: Chronic | ICD-10-CM

## 2020-02-05 DIAGNOSIS — Z90.10 ACQUIRED ABSENCE OF UNSPECIFIED BREAST AND NIPPLE: Chronic | ICD-10-CM

## 2020-02-05 PROCEDURE — 99283 EMERGENCY DEPT VISIT LOW MDM: CPT

## 2020-02-05 PROCEDURE — 93010 ELECTROCARDIOGRAM REPORT: CPT | Mod: NC

## 2020-02-05 PROCEDURE — 93005 ELECTROCARDIOGRAM TRACING: CPT

## 2020-02-05 PROCEDURE — 99284 EMERGENCY DEPT VISIT MOD MDM: CPT

## 2020-02-05 RX ORDER — ALPRAZOLAM 0.25 MG
0.5 TABLET ORAL ONCE
Refills: 0 | Status: DISCONTINUED | OUTPATIENT
Start: 2020-02-05 | End: 2020-02-05

## 2020-02-05 RX ADMIN — Medication 0.5 MILLIGRAM(S): at 16:39

## 2020-02-05 NOTE — ED PROVIDER NOTE - PATIENT PORTAL LINK FT
You can access the FollowMyHealth Patient Portal offered by [facility] by registering at the following website: [email or web address]. By joining [facility]’s FollowMyHealth portal, you will also be able to view your health information using other applications (apps) compatible with our system.

## 2020-02-05 NOTE — ED PROVIDER NOTE - CONSTITUTIONAL, MLM
normal... Awake, alert, oriented to person, place, time/situation and in no apparent distress. Agitated but directable. Unkempt

## 2020-02-05 NOTE — ED PROVIDER NOTE - NSFOLLOWUPINSTRUCTIONS_ED_ALL_ED_FT
Thank you for visiting our Emergency Department, it has been a pleasure taking part in your healthcare.    Please follow up with your Primary Doctor in 2-3 days.

## 2020-02-05 NOTE — ED PROVIDER NOTE - ATTENDING CONTRIBUTION TO CARE
Katherine Perez MD - Attending Physician: I have personally seen and examined this patient. I have discussed the case with the ACP. I have reviewed all pertinent clinical information, including history, physical exam, plan and the ACP’s note and agree except as noted. See MDM

## 2020-02-05 NOTE — ED ADULT NURSE NOTE - OBJECTIVE STATEMENT
53 y/o female PMH COPD on 3L home O2 presents to ED via EMS from home with  at bedside c/o SOB x 1 day. Per EMS,  called their services earlier in the day but pt refused care at the time. Pt continued to scream "I can't breathe" so  called them back. Pt also states she drinks daily and today had "some scotch, 5-6 beers." No history of withdrawal per . Pt arrived A&O x 3, agitated and screaming at staff that she wants to go home. Pt currently on 3-4L O2 which she uses at home, saturating well. No respiratory distress, tachypnea noted. Per MD Perez, no reason to draw labs or admit pt as pt appears stable and is asking to go home.

## 2020-02-05 NOTE — ED ADULT NURSE NOTE - NSIMPLEMENTINTERV_GEN_ALL_ED
Implemented All Fall Risk Interventions:  [place] to call system. Call bell, personal items and telephone within reach. Instruct patient to call for assistance. Room bathroom lighting operational. Non-slip footwear when patient is off stretcher. Physically safe environment: no spills, clutter or unnecessary equipment. Stretcher in lowest position, wheels locked, appropriate side rails in place. Provide visual cue, wrist band, yellow gown, etc. Monitor gait and stability. Monitor for mental status changes and reorient to person, place, and time. Review medications for side effects contributing to fall risk. Reinforce activity limits and safety measures with patient and family.

## 2020-02-05 NOTE — ED PROVIDER NOTE - PROGRESS NOTE DETAILS
Katherine Perez MD - Attending Physician: Pt agitated. Wants to go home. Denies any current complaints. Lungs clear, sat wnl on home O2 settings, no tachypnea. Pt alert, oriented, directable. No medical necessity for admission.  did not bring home O2 or wheelchair with him. Will dc with transport

## 2020-02-05 NOTE — ED PROVIDER NOTE - OBJECTIVE STATEMENT
55 yo female PMHx stage III breast CA s/p mastectomy, RA, brain aneurysm s/p clip, COPD on 3L home O2, alcohol abuse, anxiety presents brought in by  for concern of shortness of breath. Per  pt has been in and out of hospital for similar complaints recently, most recently seen on 2/2/20 and discharged home after sxs improved w/ anxiety meds.  states symptoms have not worsened, just have not improved, at night feels pt is more short of breath. Saw her pulmonologist Dr. Mansfield 3 days ago and had unremarkable office visit per , no change in meds. Pt denies any complaints currently and wants to go home. Last drink reportedly this morning. Denies shortness of breath, chest pain, n/v/d, abdominal pain, weakness, fever/chills, cough, hemoptysis, recent travel, recent sick contacts.

## 2020-02-05 NOTE — ED ADULT NURSE REASSESSMENT NOTE - NS ED NURSE REASSESS COMMENT FT1
Pt's  states that non-emergent is not needed because he will go back to house 10 minutes away and  home O2 tank and wheelchair to bring pt home himself.  aware.

## 2020-02-05 NOTE — ED PROVIDER NOTE - CLINICAL SUMMARY MEDICAL DECISION MAKING FREE TEXT BOX
Katherine Perez MD - Attending Physician: Pt here for reported SOB. Chronic now, multiple admissions/ED visits for same. Largely anxiety component. Seen by Pulm 3 days ago. Here without wheezing/hypoxia/tachypnea. Agitated as she wants to go home. No medical indication for further stay. Dc home

## 2020-02-08 ENCOUNTER — EMERGENCY (EMERGENCY)
Facility: HOSPITAL | Age: 55
LOS: 1 days | Discharge: HOME | End: 2020-02-08
Attending: EMERGENCY MEDICINE
Payer: COMMERCIAL

## 2020-02-08 VITALS
HEART RATE: 90 BPM | WEIGHT: 149.91 LBS | RESPIRATION RATE: 16 BRPM | HEIGHT: 60 IN | OXYGEN SATURATION: 98 % | TEMPERATURE: 98 F | SYSTOLIC BLOOD PRESSURE: 126 MMHG | DIASTOLIC BLOOD PRESSURE: 70 MMHG

## 2020-02-08 VITALS
RESPIRATION RATE: 18 BRPM | SYSTOLIC BLOOD PRESSURE: 118 MMHG | HEART RATE: 97 BPM | DIASTOLIC BLOOD PRESSURE: 83 MMHG | TEMPERATURE: 98 F | OXYGEN SATURATION: 97 %

## 2020-02-08 DIAGNOSIS — Z90.13 ACQUIRED ABSENCE OF BILATERAL BREASTS AND NIPPLES: Chronic | ICD-10-CM

## 2020-02-08 DIAGNOSIS — Z90.10 ACQUIRED ABSENCE OF UNSPECIFIED BREAST AND NIPPLE: Chronic | ICD-10-CM

## 2020-02-08 LAB
ALBUMIN SERPL ELPH-MCNC: 4 G/DL — SIGNIFICANT CHANGE UP (ref 3.3–5)
ALP SERPL-CCNC: 105 U/L — SIGNIFICANT CHANGE UP (ref 40–120)
ALT FLD-CCNC: 134 U/L — HIGH (ref 10–45)
ANION GAP SERPL CALC-SCNC: 20 MMOL/L — HIGH (ref 5–17)
AST SERPL-CCNC: 173 U/L — HIGH (ref 10–40)
BASOPHILS # BLD AUTO: 0 K/UL — SIGNIFICANT CHANGE UP (ref 0–0.2)
BASOPHILS NFR BLD AUTO: 0 % — SIGNIFICANT CHANGE UP (ref 0–2)
BILIRUB SERPL-MCNC: 1 MG/DL — SIGNIFICANT CHANGE UP (ref 0.2–1.2)
BUN SERPL-MCNC: 10 MG/DL — SIGNIFICANT CHANGE UP (ref 7–23)
CALCIUM SERPL-MCNC: 8.4 MG/DL — SIGNIFICANT CHANGE UP (ref 8.4–10.5)
CHLORIDE SERPL-SCNC: 96 MMOL/L — SIGNIFICANT CHANGE UP (ref 96–108)
CO2 SERPL-SCNC: 24 MMOL/L — SIGNIFICANT CHANGE UP (ref 22–31)
CREAT SERPL-MCNC: 0.41 MG/DL — LOW (ref 0.5–1.3)
EOSINOPHIL # BLD AUTO: 0 K/UL — SIGNIFICANT CHANGE UP (ref 0–0.5)
EOSINOPHIL NFR BLD AUTO: 0 % — SIGNIFICANT CHANGE UP (ref 0–6)
GLUCOSE SERPL-MCNC: 110 MG/DL — HIGH (ref 70–99)
HCT VFR BLD CALC: 37.4 % — SIGNIFICANT CHANGE UP (ref 34.5–45)
HGB BLD-MCNC: 12.4 G/DL — SIGNIFICANT CHANGE UP (ref 11.5–15.5)
LYMPHOCYTES # BLD AUTO: 1.56 K/UL — SIGNIFICANT CHANGE UP (ref 1–3.3)
LYMPHOCYTES # BLD AUTO: 11.5 % — LOW (ref 13–44)
MCHC RBC-ENTMCNC: 33.2 GM/DL — SIGNIFICANT CHANGE UP (ref 32–36)
MCHC RBC-ENTMCNC: 35.9 PG — HIGH (ref 27–34)
MCV RBC AUTO: 108.4 FL — HIGH (ref 80–100)
MONOCYTES # BLD AUTO: 0.24 K/UL — SIGNIFICANT CHANGE UP (ref 0–0.9)
MONOCYTES NFR BLD AUTO: 1.8 % — LOW (ref 2–14)
NEUTROPHILS # BLD AUTO: 11.77 K/UL — HIGH (ref 1.8–7.4)
NEUTROPHILS NFR BLD AUTO: 86.7 % — HIGH (ref 43–77)
PLATELET # BLD AUTO: 135 K/UL — LOW (ref 150–400)
POTASSIUM SERPL-MCNC: 5.2 MMOL/L — SIGNIFICANT CHANGE UP (ref 3.5–5.3)
POTASSIUM SERPL-SCNC: 5.2 MMOL/L — SIGNIFICANT CHANGE UP (ref 3.5–5.3)
PROT SERPL-MCNC: 6.8 G/DL — SIGNIFICANT CHANGE UP (ref 6–8.3)
RBC # BLD: 3.45 M/UL — LOW (ref 3.8–5.2)
RBC # FLD: 14.1 % — SIGNIFICANT CHANGE UP (ref 10.3–14.5)
SODIUM SERPL-SCNC: 140 MMOL/L — SIGNIFICANT CHANGE UP (ref 135–145)
WBC # BLD: 13.57 K/UL — HIGH (ref 3.8–10.5)
WBC # FLD AUTO: 13.57 K/UL — HIGH (ref 3.8–10.5)

## 2020-02-08 PROCEDURE — 99283 EMERGENCY DEPT VISIT LOW MDM: CPT | Mod: 25

## 2020-02-08 PROCEDURE — 71045 X-RAY EXAM CHEST 1 VIEW: CPT

## 2020-02-08 PROCEDURE — 99284 EMERGENCY DEPT VISIT MOD MDM: CPT

## 2020-02-08 PROCEDURE — 85027 COMPLETE CBC AUTOMATED: CPT

## 2020-02-08 PROCEDURE — 71045 X-RAY EXAM CHEST 1 VIEW: CPT | Mod: 26

## 2020-02-08 PROCEDURE — 80053 COMPREHEN METABOLIC PANEL: CPT

## 2020-02-08 RX ORDER — AZITHROMYCIN 500 MG/1
1 TABLET, FILM COATED ORAL
Qty: 3 | Refills: 0
Start: 2020-02-08 | End: 2020-02-10

## 2020-02-08 RX ORDER — AZITHROMYCIN 500 MG/1
500 TABLET, FILM COATED ORAL ONCE
Refills: 0 | Status: COMPLETED | OUTPATIENT
Start: 2020-02-08 | End: 2020-02-08

## 2020-02-08 NOTE — ED ADULT NURSE NOTE - NSIMPLEMENTINTERV_GEN_ALL_ED
Implemented All Fall Risk Interventions:  [place] to call system. Call bell, personal items and telephone within reach. Instruct patient to call for assistance. Room bathroom lighting operational. Non-slip footwear when patient is off stretcher. Physically safe environment: no spills, clutter or unnecessary equipment. Stretcher in lowest position, wheels locked, appropriate side rails in place. Provide visual cue, wrist band, yellow gown, etc. Monitor gait and stability. Monitor for mental status changes and reorient to person, place, and time. Review medications for side effects contributing to fall risk. Reinforce activity limits and safety measures with patient and family.

## 2020-02-08 NOTE — ED PROVIDER NOTE - OBJECTIVE STATEMENT
55 yo female PMHx stage III breast CA s/p mastectomy, RA, brain aneurysm s/p clip, COPD on 3L home O2, alcohol abuse, anxiety presents brought in by  for concern of chills and productive cough since yesterday. no temperature and her baseline sob. Seen on 2/2/20 and 2/5/20 for the same complain and dc'd after neg cxr and workup. According to  she drank today as well.

## 2020-02-08 NOTE — ED PROVIDER NOTE - NS ED ROS FT
Constitutional: no fevers, +chills.  Eyes: no visual changes.  Ears: no ear drainage, no ear pain.  Nose: +nasal congestion.  Mouth/Throat: no sore throat.  Cardiovascular: no chest pain.  Respiratory: +shortness of breath, no wheezing, +cough  Gastrointestinal: no nausea, no vomiting, no diarrhea, no abdominal pain.  MSK: no flank pain, no back pain.  Genitourinary: no dysuria, no hematuria.  Skin: no rashes.  Neuro: no headache,   Psychiatric: no known mental health issues.

## 2020-02-08 NOTE — ED PROVIDER NOTE - ATTENDING CONTRIBUTION TO CARE
54F, stage III breast ca, s/p mastectomy, with RA, s/p bran aneurysm, copd on 3-4L home O2, with anxiety, biba from home with cough, sob. Pt states she has had cough over last few days and has had associated diff breathing, although pt states she is always sob. Pt currently denies sob. Per  also with chills and some increased tiredness over past few days. Pt denies fever, cp, palpitations. Denies abd pain. Denies n/v/d. States is coughing up yellow sputum.    PE: NAD, NCAT, MMM, Trachea midline, Normal conjunctiva, lungs with trace bibasilar rales, No nasal flaring, No retractions, No tachypnea, S1/S2 RRR, Normal perfusion, 2+ radial pulses bilat, Abdomen Soft, NTND, No rebound/guarding, No LE edema, No deformity of extremities, No rashes,  No focal motor or sensory deficits.     Pt with multiple visits over past few weeks for similar sx, most recently 3 days ago, was d/c'd home without any intervention. Per EMS,  was concerned because pt got into his alcohol today and he believed she was having increased shortness of breath. Pt denies increased sob at this time and is asking to be discharged. Of note, is currently on steroids (this is confirmed with ). Exam without wheeze, tachypnea, increased WOB. Consider pneumonia given cough and sputum production, check cxr. Check CBC eval for anemia, cmp eval for metabolic derangement. Will also check flu swab eval for influenza, though pt without fever, n/v, myalgias, lower suspicion of flu. Pt recently with neg cta for PE. Given baseline sob and reassuring lung exam no indication for nebs at this time. Re-eval. - Reji Steel MD

## 2020-02-08 NOTE — ED ADULT NURSE NOTE - OBJECTIVE STATEMENT
53 yo 53 yo f pmhx of breast ca, aneurysm, copd on 3L oxygen at home, presents to the ED via with c/o difficulty breathing. As per , pt's mental state has "deteriorated" for the past 4 days, also noted that today pt drank some of his scotch. States she has been more lethargic than usual, difficulty breathing, cough with mucus production, describes mucus as yellow, reports foul smelling urine. Pt is A&Ox3, on 5 oxygen in the ED sating 95%, noted a cough with sputum production, auscultated rhonchi b/l,  abd soft and distended, noted double mastectomy and cannot use the right arm because of previous lymph nodes as per . Pt denies headache, dizziness, chest pain, palpitations, abdominal pain, n/v/d,  fevers, chills, weakness at this time.

## 2020-02-08 NOTE — ED PROVIDER NOTE - PROGRESS NOTE DETAILS
Dr Marion: Pt refusing flu swab. Dr Marion: Pt also refusing UA. pt has capacity, she remains aaox3 with insight. She does not have difficulty breathing, no retractions, no wheezing. CXR unremarable but 3 days Zpak course sent to pharmacy. Pt already on Day 2 of prednisone course, 3 more days to go.  contacted and told to  the Zpak and continue to give the Steroids. It seems like more of social problem  as she has been drinking more and "annoying him. Dr Marion: Pt also refusing UA. pt has capacity, she remains aaox3 with insight. She does not have difficulty breathing, no retractions, no wheezing. CXR unremarable but 3 days Zpak course sent to pharmacy. Pt already on Day 2 of prednisone course, 3 more days to go.  contacted and told to  the Zpak and continue to give the Steroids. It seems like more of social problem  as she has been drinking more and "annoying him.  aware pt is being sent home and he will be there to received her. pt asking to be discharged home. breathing comfortably. no increased wob. cxr unremarkable. does have elevated wbc but is on steroids. pt asking to be discharged home. states she is feelin gimproved. breathing comfortably. no increased wob. cxr unremarkable. on re-auscultation trace rales bilat. does have elevated wbc but is on steroids, as precautionary measure will give azithromycin rx for possible pneumonia. return precautions discussed with patient who states she understands. she will follow up with her pulmonologist this week. - Reji Steel MD pt asking to be discharged home. states she is feeling improved. breathing comfortably. O2 sat 97% on 4L (pt states she typically uses 3-4L at home). no increased wob. cxr unremarkable. on re-auscultation remains trace rales bilat. does have elevated wbc but is on steroids, as precautionary measure will give azithromycin rx for possible pneumonia given her risk factors. pt understands her need to finish PO steroids to completion. return precautions discussed with patient who states she understands. she will follow up with her pulmonologist this week. as noted by dr varela, appears to be social issue contributing to ER visits as well ( states she is "annoying me" because pt keeps saying she wants to go to the hospital then immediately wants to leave when she gets there pt's  however is ok with her coming home at this time. - Reji Steel MD

## 2020-02-08 NOTE — ED PROVIDER NOTE - PHYSICAL EXAMINATION
GEN: Well appearing, well nourished, in no apparent distress.  HEAD: NCAT  HEENT: PERRL, Airway patent, EOMI, non-erythematous pharynx, no exudates, uvula midline, MMM, neck supple, no LAD, no JVD  LUNG: crackles at lung base, no adventitious sounds, no retractions, no nasal flaring  CV: RRR, no murmurs,   Abd: soft, NTND, no rebound or guarding, BS+ in all quadrants, no CVAT  MSK: WWP, Pulses 2+ in extremities, No edema   Neuro:  AAOx3, Ambulatory with stable gait. no tremors  Skin: Warm and dry, no evidence of rash  Psych: normal mood and affect

## 2020-02-08 NOTE — ED PROVIDER NOTE - PATIENT PORTAL LINK FT
You can access the FollowMyHealth Patient Portal offered by [facility] by registering at the following website: [email or web address]. By joining [email or web address]’s FollowMyHealth portal, you will also be able to view your health information using other applications (apps) compatible with our system.

## 2020-02-08 NOTE — ED PROVIDER NOTE - NSFOLLOWUPINSTRUCTIONS_ED_ALL_ED_FT
1) You were seen in the ER for cough. The patient has been informed of all concerning signs and symptoms to return to Emergency Department, the necessity to follow up with PMD/Clinic/follow up provided within 2-3 days was explained, and the patient reports understanding of above with capacity and insight. You can look at the discharge papers for some examples of specific signs and symptoms to look out for.  2) Please follow up with Pulmonologist within a week.   3) Please take Tylenol 650mg every 4-6 hours as needed for pain.  4) Please return to ED for any new or worsening symptoms.  5) Please do not drink alcohol or ingest any mind-altering substances and drive or make important decisions.   Please decrease your substance use/abuse. 1) You were seen in the ER for cough. The patient has been informed of all concerning signs and symptoms to return to Emergency Department, the necessity to follow up with PMD/Clinic/follow up provided within 2-3 days was explained, and the patient reports understanding of above with capacity and insight. You can look at the discharge papers for some examples of specific signs and symptoms to look out for.  2) Please follow up with Pulmonologist within a week. PLEASE COMPLETE 3 DAYS OF ANTIBIOTIC Z-AGUSTIN.  3) Please take Tylenol 650mg every 4-6 hours as needed for pain.  4) Please return to ED for any new or worsening symptoms.  5) Please do not drink alcohol or ingest any mind-altering substances and drive or make important decisions.   Please decrease your substance use/abuse.

## 2020-02-08 NOTE — ED PROVIDER NOTE - CLINICAL SUMMARY MEDICAL DECISION MAKING FREE TEXT BOX
55 yo female PMHx stage III breast CA s/p mastectomy, RA, brain aneurysm s/p clip, COPD on 3L home O2, alcohol abuse, anxiety presents brought in by  for concern of chills and productive cough since yesterday. cxr, flu swab, labs +/- abx.

## 2020-02-12 ENCOUNTER — EMERGENCY (EMERGENCY)
Facility: HOSPITAL | Age: 55
LOS: 1 days | Discharge: HOME | End: 2020-02-12
Attending: EMERGENCY MEDICINE
Payer: COMMERCIAL

## 2020-02-12 VITALS
DIASTOLIC BLOOD PRESSURE: 80 MMHG | HEIGHT: 60 IN | RESPIRATION RATE: 16 BRPM | TEMPERATURE: 98 F | OXYGEN SATURATION: 100 % | HEART RATE: 91 BPM | SYSTOLIC BLOOD PRESSURE: 115 MMHG

## 2020-02-12 DIAGNOSIS — Z90.10 ACQUIRED ABSENCE OF UNSPECIFIED BREAST AND NIPPLE: Chronic | ICD-10-CM

## 2020-02-12 DIAGNOSIS — Z90.13 ACQUIRED ABSENCE OF BILATERAL BREASTS AND NIPPLES: Chronic | ICD-10-CM

## 2020-02-12 PROCEDURE — 99285 EMERGENCY DEPT VISIT HI MDM: CPT | Mod: 25

## 2020-02-12 PROCEDURE — 71045 X-RAY EXAM CHEST 1 VIEW: CPT | Mod: 26

## 2020-02-12 PROCEDURE — 94640 AIRWAY INHALATION TREATMENT: CPT

## 2020-02-12 PROCEDURE — 99284 EMERGENCY DEPT VISIT MOD MDM: CPT

## 2020-02-12 PROCEDURE — 71045 X-RAY EXAM CHEST 1 VIEW: CPT

## 2020-02-12 RX ORDER — IPRATROPIUM/ALBUTEROL SULFATE 18-103MCG
3 AEROSOL WITH ADAPTER (GRAM) INHALATION ONCE
Refills: 0 | Status: COMPLETED | OUTPATIENT
Start: 2020-02-12 | End: 2020-02-12

## 2020-02-12 RX ADMIN — Medication 3 MILLILITER(S): at 02:13

## 2020-02-12 NOTE — ED PROVIDER NOTE - ATTENDING CONTRIBUTION TO CARE
MD Gomes:  patient seen and evaluated personally.   I agree with the History & Physical,  Impression & Plan other than what was detailed in my note.  MD Gomes    55 y/o f hx of copd on 3 liters 02, was in ed several times over the past few weeks, ems states  sent her into ed because she drank several beers. report that she was having worsening sob. this is a similar story to pt's recent presentations. She is yelling throuhout evaluation "send me home", she has no signs of resp distress while yelling. satting well on home 02, other vitals stable, pt is very ornery, obese, lungs are clear. no edema of lower extrem. pt does not appear clinically intoxicated and is oriented to person place and time. will check cxr and give neb treatment. re evaluated. likely dc home. MD Gomes:  patient seen and evaluated personally.   I agree with the History & Physical,  Impression & Plan other than what was detailed in my note.  MD Gomes    53 y/o f hx of copd on 3 liters 02, was in ed several times over the past few weeks, ems states  sent her into ed because she drank several beers. report that she was having worsening sob. this is a similar story to pt's recent presentations. She is yelling throuhout evaluation "send me home", she has no signs of resp distress while yelling. satting well on home 02, other vitals stable, pt is very ornery, obese, lungs are clear. no edema of lower extrem. pt apperas mildly intoxicated  and is oriented to person place and time. will check cxr and give neb treatment. re evaluated. likely dc home whe appearing sober

## 2020-02-12 NOTE — ED PROVIDER NOTE - PATIENT PORTAL LINK FT
You can access the FollowMyHealth Patient Portal offered by [facility] by registering at the following website: [email or web address]. By joining [employer]’s FollowMyHealth portal, you will also be able to view your health information using other applications (apps) compatible with our system.

## 2020-02-12 NOTE — ED PROVIDER NOTE - NSFOLLOWUPINSTRUCTIONS_ED_ALL_ED_FT
Follow up with your PCP in 24-48 hours.   May take Tylenol and Motrin as directed on the bottle for pain control.   Return to the ER if you develop any new or worsening symptoms such as chest pain, shortness of breath, numbness, weakness, abdominal pain, nausea, vomiting, or visual changes.

## 2020-02-12 NOTE — ED PROVIDER NOTE - CLINICAL SUMMARY MEDICAL DECISION MAKING FREE TEXT BOX
Alcohol intoxication. Also reports chronic SOB 2/2 COPD. Pt satting well on home 02. Not in resp distress. Will obtain XR, administer breathing treatment, and observe until clinically sober.

## 2020-02-12 NOTE — ED PROVIDER NOTE - NS ED ROS FT
ROS:  GENERAL: No fever, no chills  EYES: no change in vision  HEENT: no trouble swallowing, no trouble speaking  CARDIAC: no chest pain  PULMONARY: +SOB. no cough   GI: no abdominal pain, no nausea, no vomiting, no diarrhea, no constipation  : No dysuria, no frequency, no change in appearance, or odor of urine  SKIN: no rashes  NEURO: no headache, no weakness  MSK: No joint pain    Trino Madsen PGY2

## 2020-02-12 NOTE — ED PROVIDER NOTE - PHYSICAL EXAMINATION
Gen: intoxicated appearing, AAOx3, non-toxic  Head: NCAT  HEENT: EOMI, oral mucosa moist, normal conjunctiva  Lung: CTAB, no respiratory distress, no wheezes/rhonchi/rales B/L, speaking in full sentences  CV: RRR, no murmurs, rubs or gallops  Abd: soft, NTND, no guarding, no CVA tenderness  MSK: no visible deformities  Neuro: No focal sensory or motor deficits, normal CN exam   Skin: Warm, well perfused, no rash  Psych: normal affect.     ~Trino Madsen PGY2

## 2020-02-12 NOTE — ED PROVIDER NOTE - OBJECTIVE STATEMENT
54F with PMH including etoh abuse and COPD on home 02 (3L) p/w alcohol intoxication. Pt states she drank "several beers" and was screaming at her , so he called EMS. When they arrived she told them she was short of breath. On arrival states her SOB is chronic. Denies any other symptoms including fever, cough, chest pain, abd pain, N/V/D, SI/HI.

## 2020-02-12 NOTE — ED PROVIDER NOTE - PMH
Brain aneurysm  with clips  Breast cancer    Breast cancer, stage 3    Prophylactic measure    Psoriasis    Psoriasis    RA (rheumatoid arthritis)
[FreeTextEntry1] : SHON is a 7 month male with DiGeorge Syndrome, a perimembranous VSD, dilated aortic root, and aberrant right subclavian artery, bilateral club feet and a maternal history of methadone use who presents for follow-up. SHON's cardiac and genetic diagnoses were made in the fetal period and confirmed postnatally. SHON's mother reports that SHON is asymptomatic from a cardiac standpoint. He feeds ~6 oz of 24 kcal formula without tachypnea, cyanosis, or irritability. Additionally he eats baby food and although his weight gain has been slow, SHON has slowly been gaining weight. \par His mother has no specific concerns.

## 2020-02-12 NOTE — ED ADULT NURSE NOTE - OBJECTIVE STATEMENT
54 year old female with a PMH of COPD, ETOH, breast ca and brain aneurysm comes to the ED c/o "difficulty breathing." Per EMS, EMS dispatched to house by  because "wife had 12 beers." Patient currently on 3L O2 with a 98% O2 saturation. Patient endorses drinking 12 beers and scotch. Patient currently sitting in stretcher screaming stating, "I cant breathe" and acting agitated. Patient told to speak softly and quietly, patient refuses. MD Gomes and Tena seen in room for evaluation. Patient a/ox3, VSS, sensory/motor function intact, speaking coherently and in NAD at this time. Patient denies CP, f/c, n/v/d, dizziness/lightheadedness, numbness/tingling/weakness at this time.

## 2020-02-12 NOTE — ED PROVIDER NOTE - PROGRESS NOTE DETAILS
x ray unremarkable. spoke w/ . ok with pt being dc'd home but unable to be home as he is going to work. will dc pt when clinically sober . pt now clinically sober, asymptomatic

## 2020-04-02 ENCOUNTER — EMERGENCY (EMERGENCY)
Facility: HOSPITAL | Age: 55
LOS: 1 days | Discharge: HOME | End: 2020-04-02
Attending: STUDENT IN AN ORGANIZED HEALTH CARE EDUCATION/TRAINING PROGRAM
Payer: COMMERCIAL

## 2020-04-02 VITALS
TEMPERATURE: 100 F | SYSTOLIC BLOOD PRESSURE: 145 MMHG | DIASTOLIC BLOOD PRESSURE: 93 MMHG | HEART RATE: 96 BPM | OXYGEN SATURATION: 97 % | RESPIRATION RATE: 16 BRPM

## 2020-04-02 VITALS
RESPIRATION RATE: 24 BRPM | SYSTOLIC BLOOD PRESSURE: 143 MMHG | DIASTOLIC BLOOD PRESSURE: 112 MMHG | TEMPERATURE: 99 F | HEART RATE: 99 BPM | OXYGEN SATURATION: 99 %

## 2020-04-02 DIAGNOSIS — Z90.13 ACQUIRED ABSENCE OF BILATERAL BREASTS AND NIPPLES: Chronic | ICD-10-CM

## 2020-04-02 DIAGNOSIS — Z90.10 ACQUIRED ABSENCE OF UNSPECIFIED BREAST AND NIPPLE: Chronic | ICD-10-CM

## 2020-04-02 LAB
ALBUMIN SERPL ELPH-MCNC: 4.2 G/DL — SIGNIFICANT CHANGE UP (ref 3.3–5)
ALP SERPL-CCNC: 75 U/L — SIGNIFICANT CHANGE UP (ref 40–120)
ALT FLD-CCNC: 130 U/L — HIGH (ref 10–45)
ANION GAP SERPL CALC-SCNC: 22 MMOL/L — HIGH (ref 5–17)
AST SERPL-CCNC: 368 U/L — HIGH (ref 10–40)
BASOPHILS # BLD AUTO: 0.02 K/UL — SIGNIFICANT CHANGE UP (ref 0–0.2)
BASOPHILS NFR BLD AUTO: 0.4 % — SIGNIFICANT CHANGE UP (ref 0–2)
BILIRUB SERPL-MCNC: 0.9 MG/DL — SIGNIFICANT CHANGE UP (ref 0.2–1.2)
BUN SERPL-MCNC: 4 MG/DL — LOW (ref 7–23)
CALCIUM SERPL-MCNC: 9.5 MG/DL — SIGNIFICANT CHANGE UP (ref 8.4–10.5)
CHLORIDE SERPL-SCNC: 91 MMOL/L — LOW (ref 96–108)
CO2 SERPL-SCNC: 28 MMOL/L — SIGNIFICANT CHANGE UP (ref 22–31)
CREAT SERPL-MCNC: 0.44 MG/DL — LOW (ref 0.5–1.3)
CRP SERPL-MCNC: 2.69 MG/DL — HIGH (ref 0–0.4)
D DIMER BLD IA.RAPID-MCNC: 327 NG/ML DDU — HIGH
EOSINOPHIL # BLD AUTO: 0.08 K/UL — SIGNIFICANT CHANGE UP (ref 0–0.5)
EOSINOPHIL NFR BLD AUTO: 1.5 % — SIGNIFICANT CHANGE UP (ref 0–6)
ERYTHROCYTE [SEDIMENTATION RATE] IN BLOOD: 68 MM/HR — HIGH (ref 0–20)
FERRITIN SERPL-MCNC: 1144 NG/ML — HIGH (ref 15–150)
GLUCOSE SERPL-MCNC: 119 MG/DL — HIGH (ref 70–99)
HCT VFR BLD CALC: 36.6 % — SIGNIFICANT CHANGE UP (ref 34.5–45)
HGB BLD-MCNC: 11.9 G/DL — SIGNIFICANT CHANGE UP (ref 11.5–15.5)
IMM GRANULOCYTES NFR BLD AUTO: 0.6 % — SIGNIFICANT CHANGE UP (ref 0–1.5)
LYMPHOCYTES # BLD AUTO: 0.65 K/UL — LOW (ref 1–3.3)
LYMPHOCYTES # BLD AUTO: 12.1 % — LOW (ref 13–44)
MCHC RBC-ENTMCNC: 32.5 GM/DL — SIGNIFICANT CHANGE UP (ref 32–36)
MCHC RBC-ENTMCNC: 34.8 PG — HIGH (ref 27–34)
MCV RBC AUTO: 107 FL — HIGH (ref 80–100)
MONOCYTES # BLD AUTO: 0.39 K/UL — SIGNIFICANT CHANGE UP (ref 0–0.9)
MONOCYTES NFR BLD AUTO: 7.3 % — SIGNIFICANT CHANGE UP (ref 2–14)
NEUTROPHILS # BLD AUTO: 4.18 K/UL — SIGNIFICANT CHANGE UP (ref 1.8–7.4)
NEUTROPHILS NFR BLD AUTO: 78.1 % — HIGH (ref 43–77)
NRBC # BLD: 0 /100 WBCS — SIGNIFICANT CHANGE UP (ref 0–0)
PLATELET # BLD AUTO: 203 K/UL — SIGNIFICANT CHANGE UP (ref 150–400)
POTASSIUM SERPL-MCNC: 3.3 MMOL/L — LOW (ref 3.5–5.3)
POTASSIUM SERPL-SCNC: 3.3 MMOL/L — LOW (ref 3.5–5.3)
PROT SERPL-MCNC: 7.2 G/DL — SIGNIFICANT CHANGE UP (ref 6–8.3)
RBC # BLD: 3.42 M/UL — LOW (ref 3.8–5.2)
RBC # FLD: 12.5 % — SIGNIFICANT CHANGE UP (ref 10.3–14.5)
SARS-COV-2 RNA SPEC QL NAA+PROBE: SIGNIFICANT CHANGE UP
SODIUM SERPL-SCNC: 141 MMOL/L — SIGNIFICANT CHANGE UP (ref 135–145)
TROPONIN T, HIGH SENSITIVITY RESULT: 15 NG/L — SIGNIFICANT CHANGE UP (ref 0–51)
WBC # BLD: 5.35 K/UL — SIGNIFICANT CHANGE UP (ref 3.8–10.5)
WBC # FLD AUTO: 5.35 K/UL — SIGNIFICANT CHANGE UP (ref 3.8–10.5)

## 2020-04-02 PROCEDURE — 85027 COMPLETE CBC AUTOMATED: CPT

## 2020-04-02 PROCEDURE — 83880 ASSAY OF NATRIURETIC PEPTIDE: CPT

## 2020-04-02 PROCEDURE — 80053 COMPREHEN METABOLIC PANEL: CPT

## 2020-04-02 PROCEDURE — 99284 EMERGENCY DEPT VISIT MOD MDM: CPT | Mod: 25

## 2020-04-02 PROCEDURE — 71275 CT ANGIOGRAPHY CHEST: CPT | Mod: 26

## 2020-04-02 PROCEDURE — 82728 ASSAY OF FERRITIN: CPT

## 2020-04-02 PROCEDURE — 85652 RBC SED RATE AUTOMATED: CPT

## 2020-04-02 PROCEDURE — 93005 ELECTROCARDIOGRAM TRACING: CPT

## 2020-04-02 PROCEDURE — 99285 EMERGENCY DEPT VISIT HI MDM: CPT

## 2020-04-02 PROCEDURE — 71045 X-RAY EXAM CHEST 1 VIEW: CPT | Mod: 26

## 2020-04-02 PROCEDURE — 84484 ASSAY OF TROPONIN QUANT: CPT

## 2020-04-02 PROCEDURE — 85379 FIBRIN DEGRADATION QUANT: CPT

## 2020-04-02 PROCEDURE — 96374 THER/PROPH/DIAG INJ IV PUSH: CPT

## 2020-04-02 PROCEDURE — 85385 FIBRINOGEN ANTIGEN: CPT

## 2020-04-02 PROCEDURE — 71045 X-RAY EXAM CHEST 1 VIEW: CPT

## 2020-04-02 PROCEDURE — 82553 CREATINE MB FRACTION: CPT

## 2020-04-02 PROCEDURE — 86140 C-REACTIVE PROTEIN: CPT

## 2020-04-02 PROCEDURE — 94640 AIRWAY INHALATION TREATMENT: CPT

## 2020-04-02 PROCEDURE — 87635 SARS-COV-2 COVID-19 AMP PRB: CPT

## 2020-04-02 PROCEDURE — 71275 CT ANGIOGRAPHY CHEST: CPT

## 2020-04-02 PROCEDURE — 82550 ASSAY OF CK (CPK): CPT

## 2020-04-02 RX ORDER — ONDANSETRON 8 MG/1
4 TABLET, FILM COATED ORAL ONCE
Refills: 0 | Status: COMPLETED | OUTPATIENT
Start: 2020-04-02 | End: 2020-04-02

## 2020-04-02 RX ORDER — ALPRAZOLAM 0.25 MG
0.25 TABLET ORAL ONCE
Refills: 0 | Status: DISCONTINUED | OUTPATIENT
Start: 2020-04-02 | End: 2020-04-02

## 2020-04-02 RX ORDER — ACETAMINOPHEN 500 MG
650 TABLET ORAL ONCE
Refills: 0 | Status: COMPLETED | OUTPATIENT
Start: 2020-04-02 | End: 2020-04-02

## 2020-04-02 RX ORDER — ALBUTEROL 90 UG/1
2 AEROSOL, METERED ORAL ONCE
Refills: 0 | Status: COMPLETED | OUTPATIENT
Start: 2020-04-02 | End: 2020-04-02

## 2020-04-02 RX ADMIN — Medication 0.25 MILLIGRAM(S): at 05:40

## 2020-04-02 RX ADMIN — ALBUTEROL 2 PUFF(S): 90 AEROSOL, METERED ORAL at 08:25

## 2020-04-02 RX ADMIN — Medication 100 MILLIGRAM(S): at 11:21

## 2020-04-02 RX ADMIN — ONDANSETRON 4 MILLIGRAM(S): 8 TABLET, FILM COATED ORAL at 07:41

## 2020-04-02 RX ADMIN — ONDANSETRON 4 MILLIGRAM(S): 8 TABLET, FILM COATED ORAL at 05:40

## 2020-04-02 RX ADMIN — Medication 0.25 MILLIGRAM(S): at 08:44

## 2020-04-02 RX ADMIN — Medication 650 MILLIGRAM(S): at 08:08

## 2020-04-02 NOTE — ED PROVIDER NOTE - OBJECTIVE STATEMENT
Pt is a 55 y/o Female  with PMHx of COPD on home O2 3L, breast CA s/p mastectomy, EtOH abuse, brain aneurysm s/p clip, RA presents with worsening SOB, fever, cough, nausea, vomiting, and diarrhea. All of her symptoms started on Sunday and have progressively gotten worse. She reports that she cannot tolerate any PO intake and has been having bilious vomiting. She also reports to have watery diarrhea. She endorses severe anxiety and is asking for xanax repeatedly. She is a chronic alcohol drinker and her last drink was 4 days ago. She drinks 4-5 beers a day. She had withdrawal symptoms in the past but unclear if she had seizures or MICU admissions. No recent travel or sick contacts. She lives with her . Former smoker. No allergies to medications.

## 2020-04-02 NOTE — ED ADULT NURSE NOTE - OBJECTIVE STATEMENT
Patient is a 55  year old female complaining of  SOB and anxiety. Patient has history of copd, emphysema, right arm blood clot, anxiety. Patient is A&O x 4. Pt reports increased SOB, and anxiety. pt states she has a cough since Sunday and diarrhea, nausea and vomiting. pt is on 4l nc @ home. Denies complaints of chest pain, fevers, chills, headache, syncope, burning urination, blood in urine, blood in stool. Abd is soft, non tender, non distended. Skin is warm and dry. Color is consistent with ethnicity. Safety and comfort maintained. Will continue to monitor.

## 2020-04-02 NOTE — ED PROVIDER NOTE - NS ED ROS FT
REVIEW OF SYSTEMS:    CONSTITUTIONAL: +fever and chills  EYES/ENT: No visual changes;  No vertigo or throat pain   NECK: No pain or stiffness  RESPIRATORY: +cough and SOB  CARDIOVASCULAR: No chest pain or palpitations  GASTROINTESTINAL: +diarrhea  GENITOURINARY: No dysuria, frequency or hematuria  NEUROLOGICAL: No numbness or weakness  SKIN: No itching, burning, rashes, or lesions   All other review of systems is negative unless indicated above.

## 2020-04-02 NOTE — ED ADULT NURSE REASSESSMENT NOTE - REASSESS COMMUNICATION
Patient resting in bed.  Dry heaving in bed, no vomiting.  Patient was attempted to be calmed by the nurse.  Patient increasingly complaining of anxiety. MD at bedside.  Will continue to monitor.
Patient contniue to complain of anxiety and nausea.  MD aware.  Nurse is called to the bedside every 5-10 minutes.  Patient is being calmed and reassured but continues to be agitated.

## 2020-04-02 NOTE — ED PROVIDER NOTE - PMH
Brain aneurysm  with clips  Breast cancer    Breast cancer, stage 3    Prophylactic measure    Psoriasis    Psoriasis    RA (rheumatoid arthritis) Home

## 2020-04-02 NOTE — ED PROVIDER NOTE - PATIENT PORTAL LINK FT
You can access the FollowMyHealth Patient Portal offered by [facility] by registering at the following website: [email or web address]. By joining [employer]’s FollowMyHealth portal, you will also be able to view your health information using other applications (apps) compatible with our system.

## 2020-04-02 NOTE — ED PROVIDER NOTE - ATTENDING CONTRIBUTION TO CARE
Bhavna DO: patient seen and evaluated with the resident.  I was present for key portions of the History & Physical, and I agree with the Impression & Plan.  Bhavna DO: 53 yo female PMHx stage III breast CA s/p mastectomy, RA, brain aneurysm s/p clip, COPD on 4L home O2, alcohol abuse, anxiety BIBEMS for SOB. Pt. is extremely anxious, demanding xanax saying she can't breath and needs her xanax now, reports difficulty breathing since yesterday, also endorses diarrhea and vomiting last episodes were yesterday. Denies fever CP abd pain numbness/tingling.  Gen: Moderate distress  HEENT: Mucous membranes moist, pink conjunctivae, EOMI  CV: RRR  Resp: CTAB  GI: Abdomen soft, NT, ND.  Neuro: A&Ox3  Skin: No rashes   53yo F with frequent ED visits, extensive PMHx p/w SOB - not cooperating with vitals, demanding xanax, will give xanax .25, check vitals, labs cxr, ekg, monitor on tele and reassess.

## 2020-04-02 NOTE — ED PROVIDER NOTE - PHYSICAL EXAMINATION
GENERAL: in mild respiratory distress, drying heaving, anxious appearing   HEENT:  Conjunctiva and sclera clear  CHEST/LUNG: Decreased breath sound, no wheezing   HEART: Regular rate and rhythm; No murmurs, rubs, or gallops  ABDOMEN: Soft, Nontender, Nondistended; Bowel sounds present  EXTREMITIES:  2+ Peripheral Pulses, No clubbing, cyanosis, or edema  PSYCH: AAOx3  NEUROLOGY: non-focal  SKIN: No rashes or lesions

## 2020-04-02 NOTE — ED PROVIDER NOTE - CLINICAL SUMMARY MEDICAL DECISION MAKING FREE TEXT BOX
62yo F w/ multiple medical issues including COPD on home O2, frequent ED visits, presents with SOB, demanding xanax, extremely anxious, will administer xanax, get labs cxr ekg monitor closely on pulse ox and tele and reassess when more approachable.

## 2020-04-02 NOTE — ED PROVIDER NOTE - NSFOLLOWUPINSTRUCTIONS_ED_ALL_ED_FT
follow up with your primary care doctor within one week.  Return to the ED if you have any concerning symptoms.  Take your medications as previously prescribed.

## 2020-04-03 NOTE — ED POST DISCHARGE NOTE - DETAILS
Spoke to pt and her . Advised of labs which are commonly sent for pts suspicious of covid. Discussed swap neg so possible false neg or possible labs elevated for other reasons. pt feeling much improved overall. - Angela Isabel PA-C

## 2020-04-04 LAB — FIBRINOGEN AG PPP IA-MCNC: 491 MG/DL — HIGH

## 2020-10-02 NOTE — PROVIDER CONTACT NOTE (OTHER) - BACKGROUND
Adm for COPD exac., PMH breast ca, COPD, HTN, ETOH abuse. Current CIWA 6
Adm for COPD exac., PMH breast ca, COPD, HTN, ETOH abuse. Current CIWA 6
Home

## 2020-10-05 NOTE — PROGRESS NOTE ADULT - SUBJECTIVE AND OBJECTIVE BOX
CHIEF COMPLAINT:Patient is a 54y old  Female who presents with a chief complaint of shortness of breath (04 Oct 2019 09:57)    	        PAST MEDICAL & SURGICAL HISTORY:  Prophylactic measure  Breast cancer  Brain aneurysm: with clips  Psoriasis  RA (rheumatoid arthritis)  Psoriasis  Breast cancer, stage 3  History of modified radical mastectomy of both breasts  S/P bilateral mastectomy  Brain aneurysm: 1988 two clips          weak  EYES: No eye pain, visual disturbances, or discharge  NECK: No pain or stiffness  RESPIRATORY: sob /   CARDIOVASCULAR: No chest pain, palpitations, passing out, dizziness,   GASTROINTESTINAL: No abdominal or epigastric pain. No nausea, vomiting, or hematemesis; No diarrhea or constipation.  GENITOURINARY: No dysuria, frequency, hematuria, or incontinence  NEUROLOGICAL: No headaches,   Medications:  MEDICATIONS  (STANDING):  ALPRAZolam 0.25 milliGRAM(s) Oral every 8 hours  cholecalciferol 2000 Unit(s) Oral daily  docusate sodium 100 milliGRAM(s) Oral two times a day  folic acid 1 milliGRAM(s) Oral daily  levalbuterol Inhalation 0.31 milliGRAM(s) Inhalation every 6 hours  LORazepam   Injectable 1.5 milliGRAM(s) IV Push every 4 hours  LORazepam   Injectable   IV Push   LORazepam   Injectable 2 milliGRAM(s) IV Push every 4 hours  montelukast 10 milliGRAM(s) Oral daily  multivitamin 1 Tablet(s) Oral daily  multivitamin 1 Tablet(s) Oral daily  pantoprazole    Tablet 40 milliGRAM(s) Oral before breakfast  prochlorperazine   Tablet 10 milliGRAM(s) Oral daily  senna 2 Tablet(s) Oral at bedtime  sodium chloride 0.9%. 1000 milliLiter(s) (75 mL/Hr) IV Continuous <Continuous>  thiamine 100 milliGRAM(s) Oral daily  tiotropium 2.5 MICROgram(s)/olodaterol 2.5 MICROgram(s) (STIOLTO) Inhaler 2 Puff(s) Inhalation daily    MEDICATIONS  (PRN):    	    PHYSICAL EXAM:  T(C): 37 (10-04-19 @ 12:05), Max: 37.1 (10-04-19 @ 10:29)  HR: 108 (10-04-19 @ 12:05) (91 - 116)  BP: 114/74 (10-04-19 @ 12:05) (114/74 - 146/67)  RR: 29 (10-04-19 @ 12:05) (18 - 29)  SpO2: 98% (10-04-19 @ 12:05) (95% - 100%)  Wt(kg): --  I&O's Summary        HEENT:   Normal oral mucosa, PERRL, EOMI	  Lymphatic: No lymphadenopathy  Cardiovascular: Normal S1 S2, No JVD,  Respiratory: dec bs   Psychiatry: A & O   Gastrointestinal:  Soft, Non-tender, + BS	obese  Skin: No rashes, No ecchymoses, No cyanosis	  Neurologic: Non-focal  Extremities: edema   pvd    TELEMETRY: 	    ECG:  	  RADIOLOGY:  OTHER: 	  	  LABS:	 	    CARDIAC MARKERS:                                11.3   3.05  )-----------( 114      ( 04 Oct 2019 08:24 )             34.2     10-04    133<L>  |  91<L>  |  <4<L>  ----------------------------<  151<H>  4.2   |  17<L>  |  0.43<L>    Ca    7.9<L>      04 Oct 2019 05:57  Phos  3.4     10-04  Mg     2.0     10-04    TPro  6.7  /  Alb  4.2  /  TBili  1.0  /  DBili  x   /  AST  189<H>  /  ALT  129<H>  /  AlkPhos  58  10-04    proBNP: Serum Pro-Brain Natriuretic Peptide: 55 pg/mL (10-03 @ 21:22)    Lipid Profile:   HgA1c:   TSH: Hide Topical Anesthesia?: No Biopsy Method: Dermablade Notification Instructions: Patient will be notified of biopsy results. However, patient instructed to call the office if not contacted within 2 weeks. Hemostasis: Tevin's Additional Anesthesia Volume In Cc (Will Not Render If 0): 0 Cryotherapy Text: The wound bed was treated with cryotherapy after the biopsy was performed. Consent: Written consent was obtained and risks were reviewed including but not limited to scarring, infection, bleeding, scabbing, incomplete removal, nerve damage and allergy to anesthesia. Anesthesia Type: 1% lidocaine without epinephrine Detail Level: Detailed Electrodesiccation Text: The wound bed was treated with electrodesiccation after the biopsy was performed. Wound Care: Petrolatum Billing Type: Third-Party Bill Depth Of Biopsy: dermis Anesthesia Volume In Cc: 0.5 Type Of Destruction Used: Curettage Silver Nitrate Text: The wound bed was treated with silver nitrate after the biopsy was performed. Post-Care Instructions: I reviewed with the patient in detail post-care instructions. Patient is to keep the biopsy site dry overnight, and then apply bacitracin twice daily until healed. Patient may apply hydrogen peroxide soaks to remove any crusting. Dressing: bandage Curettage Text: The wound bed was treated with curettage after the biopsy was performed. Was A Bandage Applied: Yes Biopsy Type: H and E Electrodesiccation And Curettage Text: The wound bed was treated with electrodesiccation and curettage after the biopsy was performed. Information: Selecting Yes will display possible errors in your note based on the variables you have selected. This validation is only offered as a suggestion for you. PLEASE NOTE THAT THE VALIDATION TEXT WILL BE REMOVED WHEN YOU FINALIZE YOUR NOTE. IF YOU WANT TO FAX A PRELIMINARY NOTE YOU WILL NEED TO TOGGLE THIS TO 'NO' IF YOU DO NOT WANT IT IN YOUR FAXED NOTE.

## 2020-10-08 NOTE — PROGRESS NOTE BEHAVIORAL HEALTH - AFFECT QUALITY
10/8/2020    Chief Complaint   Patient presents with   • Follow-up     after l3-4 enoch from 9/17/20       HPI:  67 yo male with left hand pain. H/o Dupuytrens contracture. H/o partial fasciectomy palm 4th and 5th digits of left hand and with repeat fasciectomy and neuroplasty and continues to have pain and swelling. Reports left hand pain is becoming worse, he has followed up with ortho. Neck pain is overall stable.   s/p L3-L4 ENOCH with 50% improvement in left thigh weakness as well as back pain however he is still having left LE weakness. He has been doing left leg exercises daily at home with some improvement.  He continues to have pain with standing and walking.  He does have some relief from pain meds without significant side effects.     ROS:   A 10 point ROS reviewed and is negative except as noted above or in the HPI.     Current Medications:        Summary of your Discharge Medications          Accurate as of October 8, 2020 11:59 PM. Always use your most recent med list.            Take these Medications      Details   atorvastatin 40 MG tablet  Commonly known as: LIPITOR   Take 40 mg by mouth daily.     cetirizine 10 MG tablet  Commonly known as: ZyrTEC   Take 10 mg by mouth daily.     clindamycin 1 % topical solution  Commonly known as: CLEOCIN T   use thin film on affected area daily     doxepin 10 MG capsule  Commonly known as: SINEquan   Take 10 mg by mouth nightly as needed.     fenofibrate 145 MG tablet  Commonly known as: TRICOR      folic acid 1 MG tablet  Commonly known as: FOLATE   Take 1 tablet by mouth daily.     furosemide 20 MG tablet  Commonly known as: LASIX      gabapentin 300 MG capsule  Commonly known as: NEURONTIN   I po BID and II po qhs     HYDROcodone-acetaminophen  MG per tablet  Commonly known as: NORCO  Start taking on: November 27, 2020   Take 1 tablet by mouth every 6 hours as needed for Pain. Do not start before November 27, 2020.     levocetirizine 5 MG tablet  Commonly 
known as: XYZAL      lisinopril 5 MG tablet  Commonly known as: ZESTRIL   Take 5 mg by mouth daily.     metFORMIN 500 MG 24 hr tablet  Commonly known as: GLUCOPHAGE-XR   TAKE 2 TABLETS BY MOUTH WITH MORNING MEAL TAKE 2 TABLETS WITH EVENING MEAL     metoPROLOL succinate 100 MG 24 hr tablet  Commonly known as: TOPROL-XL   Take 100 mg by mouth daily.     montelukast 10 MG tablet  Commonly known as: SINGULAIR   Take 1 tablet by mouth daily.     mupirocin 2 % ointment  Commonly known as: BACTROBAN   Apply twice a day to skin wounds for up to 10 days.     potassium CHLORIDE 20 MEQ bo ER tablet  Commonly known as: KLOR-CON M   Take 20 mEq by mouth daily.     Pradaxa 150 MG Cap   Generic drug: dabigatran  TAKE 1 CAPSULE TWICE DAILY     silver sulfADIAZINE 1 % cream  Commonly known as: Silvadene   Apply topically daily. Apply to a thickness of 1/16 inch (\"nickel thick\").     triamcinolone 0.1 % cream  Commonly known as: ARISTOCORT   Apply to rash BID for 2 weeks            Relevant Past Medical History:  Past Medical History:   Diagnosis Date   • Atherosclerosis    • Atrial fibrillation (CMS/HCC)    • Depression    • Diabetes (CMS/HCC)    • Left hand pain     contracture with nerve damage   • Liver disease     fatty liver   • Lower back pain    • Mitral regurgitation        Past Surgical History:   Procedure Laterality Date   • Cabg, arterial, three     • Hand surgery Left    • Left atrial appendage closure     • Mitral valve repair     • Tooth extraction         Family History   Problem Relation Age of Onset   • Diabetes Mother    • Natural Causes Father    • COPD Brother        Social History     Socioeconomic History   • Marital status: Single     Spouse name: Not on file   • Number of children: Not on file   • Years of education: Not on file   • Highest education level: Not on file   Occupational History   • Not on file   Social Needs   • Financial resource strain: Not on file   • Food insecurity     Worry: Not on file 
    Inability: Not on file   • Transportation needs     Medical: Not on file     Non-medical: Not on file   Tobacco Use   • Smoking status: Former Smoker     Packs/day: 0.00   • Smokeless tobacco: Never Used   Substance and Sexual Activity   • Alcohol use: Yes     Alcohol/week: 2.0 standard drinks     Types: 2 Cans of beer per week     Comment: social   • Drug use: Never   • Sexual activity: Yes   Lifestyle   • Physical activity     Days per week: Not on file     Minutes per session: Not on file   • Stress: Not on file   Relationships   • Social connections     Talks on phone: Not on file     Gets together: Not on file     Attends [employer] service: Not on file     Active member of club or organization: Not on file     Attends meetings of clubs or organizations: Not on file     Relationship status: Not on file   • Intimate partner violence     Fear of current or ex partner: Not on file     Emotionally abused: Not on file     Physically abused: Not on file     Forced sexual activity: Not on file   Other Topics Concern   • Not on file   Social History Narrative   • Not on file       ALLERGIES:   Allergen Reactions   • Seasonal Other (See Comments)            Examination:   Blood pressure (!) 145/58, pulse 80, temperature 97.4 °F (36.3 °C), height 5' 10\" (1.778 m), weight 91.6 kg (202 lb).  Weight    10/08/20 0908   Weight: 91.6 kg (202 lb)     Constitutional: He is oriented to person, place, and time. He appears well-developed and well-nourished.   HENT:   Head: Normocephalic and atraumatic.   Eyes: Conjunctivae and EOM are normal.   Neck: Normal range of motion. Neck supple.   Pulmonary/Chest: Effort normal. Musculoskeletal:     Left hand: well healed incisions   + allodynia, + Swelling  decreased ROM, 5th digit palmar contracture  Cervical spine: no spinous process TTP, + TTP b/l paraspinal processes, + pain and restricted ROM in flexion, extension and b/l lateral rotation, neg spurling maneuver.   Lumbar spine: + 
TTP L3-L4 spinous processes and left L3-L4 paraspinal process, + pain and restricted ROM in flexion, extension and left side bending, + SLR  No SI jt TTP, neg patricks test  Neurological: He is alert and oriented to person, place, and time.  He displays no atrophy. No cranial nerve deficit. abnormal gait  + 4/5 strength in  left quadricep  Psychiatric: He has a normal mood and affect. Thought content normal.   Nursing note and vitals reviewed.       Assessment/Plan:  Problem List Items Addressed This Visit        Musculoskeletal    DDD (degenerative disc disease), cervical    DDD (degenerative disc disease), lumbar - Primary    Relevant Orders    SERVICE TO NEUROLOGICAL SURGERY    Lumbar radiculopathy    Relevant Orders    SERVICE TO NEUROLOGICAL SURGERY          1.  Pt has had 3 LESI's recently but still continues to have left leg weakness- will refer to neurosurgery  2. Refill norco 10/325 1/2-I po q 6hrs and gabapentin 300mg I po BID and II po qhs  3.  Again recommended PT- He would like to hold off on formal PT but continues to do leg strengthening exercises at home.   4. F/u after neurosurgeon visit    All patient's questions during today's visit were answered.  [place] today.  No aberrant behavior was noted.     Peyton Saucedo PA-C                
Anxious

## 2020-10-08 NOTE — ED PROVIDER NOTE - PROGRESS NOTE DETAILS
Hi Dr. Whitney,     I was just reviewing my old labs, and my iron was low prior.  I'm wondering if there is still time to add that test to the labs that I just had drawn if that would be appropriate.       Thank you,     Lu   Pt given benzos and ketamine for relaxation. Is currently sleeping. Non-tachypneic, breathing comfortably on 3L n/c (home O2) Pt now awake. Still breathing comfortably on home O2 and there is minimal concern for respiratory compromise. Steroids sent to pharmacy. Will d/c home.   Salvatore Marrufo M.D. PGY-2 Pt now awake. Still breathing comfortably on home O2 and there is minimal concern for respiratory compromise. Steroids sent to pharmacy. Pt is in room speaking loudly with no signs of respiratory distress, speaking in full sentences. Will d/c home.   Salvatore Marrufo M.D. PGY-2 Spoke to Pts . Will arrange to be home at [facility] to receive pt from ER. Transportation arranged

## 2021-01-13 NOTE — ED PROVIDER NOTE - ST/T WAVE
no acute ST elevations/depressions Spiral Flap Text: The defect edges were debeveled with a #15 scalpel blade.  Given the location of the defect, shape of the defect and the proximity to free margins a spiral flap was deemed most appropriate.  Using a sterile surgical marker, an appropriate rotation flap was drawn incorporating the defect and placing the expected incisions within the relaxed skin tension lines where possible. The area thus outlined was incised deep to adipose tissue with a #15 scalpel blade.  The skin margins were undermined to an appropriate distance in all directions utilizing iris scissors.

## 2021-04-23 NOTE — DISCHARGE NOTE PROVIDER - NSDCCPCAREPLAN_GEN_ALL_CORE_FT
Yes PRINCIPAL DISCHARGE DIAGNOSIS  Diagnosis: SOB (shortness of breath)  Assessment and Plan of Treatment: copd exacerbation  seen by pulmonary doctor  continue medication as prescribed      SECONDARY DISCHARGE DIAGNOSES  Diagnosis: Alcohol abuse  Assessment and Plan of Treatment: seen by   follow up rehab    Diagnosis: Transaminitis  Assessment and Plan of Treatment: monitor liver function with improvement    Diagnosis: Psoriasis  Assessment and Plan of Treatment: seen by rheumatology/resume otezla as outpatient    Diagnosis: Breast cancer  Assessment and Plan of Treatment: seen by oncology  continue current medication    Diagnosis: Hyponatremia  Assessment and Plan of Treatment: improved PRINCIPAL DISCHARGE DIAGNOSIS  Diagnosis: SOB (shortness of breath)  Assessment and Plan of Treatment: copd exacerbation  seen by pulmonary doctor  continue medication as prescribed      SECONDARY DISCHARGE DIAGNOSES  Diagnosis: Alcohol abuse  Assessment and Plan of Treatment: seen by   follow up    Diagnosis: Transaminitis  Assessment and Plan of Treatment: monitor liver function with improvement    Diagnosis: Psoriasis  Assessment and Plan of Treatment: seen by rheumatology/resume otezla as outpatient    Diagnosis: Breast cancer  Assessment and Plan of Treatment: seen by oncology  continue current medication    Diagnosis: Hyponatremia  Assessment and Plan of Treatment: improved PRINCIPAL DISCHARGE DIAGNOSIS  Diagnosis: SOB (shortness of breath)  Assessment and Plan of Treatment: copd exacerbation  seen by pulmonary doctor  continue medication as prescribed  follow up with your pulmonary doctor in 1 week      SECONDARY DISCHARGE DIAGNOSES  Diagnosis: Alcohol abuse  Assessment and Plan of Treatment: seen by   education provided  follow up    Diagnosis: COPD exacerbation  Assessment and Plan of Treatment: you were seen by pulmonary doctor   continue medication as prescribed; complete steroid tapering dose    Diagnosis: Current smoker  Assessment and Plan of Treatment: smoking cessation education  continue medication as prescribed    Diagnosis: Transaminitis  Assessment and Plan of Treatment: monitor liver function with improvement    Diagnosis: Psoriasis  Assessment and Plan of Treatment: seen by rheumatology/resume otezla as outpatient    Diagnosis: Breast cancer  Assessment and Plan of Treatment: seen by oncology  continue current medication  follow up with your oncologist    Diagnosis: Hyponatremia  Assessment and Plan of Treatment: improved

## 2021-04-30 NOTE — DISCHARGE NOTE PROVIDER - NSDCHOSPICE_GEN_A_CORE
Spoke to patient regarding abnormal result. Instructed patient on medication to take, how long and when to have breath test done. Patient gave a verbal understanding with no further questions.    No

## 2021-07-20 NOTE — ED ADULT NURSE NOTE - NSSEPSISNEWALTERMENTAL_ED_A_ED
No No pronator drift   5/5 strength of bilateral upper extremities   4/5 strength with bilateral hip flexion, otherwise 5/5 strength of bilateral lower extremities

## 2021-07-22 NOTE — PROGRESS NOTE ADULT - SUBJECTIVE AND OBJECTIVE BOX
Medicare Wellness  Personal Prevention Plan of Service     Date of Office Visit:  2021  Encounter Provider:  Umberto White MD  Place of Service:  [facility]  Patient Name: Flori Tubbs  :  1933    As part of the Medicare Wellness portion of your visit today, we are providing you with this personalized preventive plan of services (PPPS). This plan is based upon recommendations of the [place] Preventive Services Task Force (USPSTF) and the Advisory Committee on Immunization Practices (ACIP).    This lists the preventive care services that should be considered, and provides dates of when you are due. Items listed as completed are up-to-date and do not require any further intervention.    Health Maintenance   Topic Date Due   • DXA SCAN  Never done   • Pneumococcal Vaccine 65+ (1 of 2 - PPSV23) Never done   • TDAP/TD VACCINES (1 - Tdap) Never done   • ZOSTER VACCINE (1 of 2) Never done   • INFLUENZA VACCINE  10/01/2021   • ANNUAL WELLNESS VISIT  2022   • COVID-19 Vaccine  Completed       Orders Placed This Encounter   Procedures   • DEXA Bone Density Axial     Order Specific Question:   Reason for Exam:     Answer:   postmenopausal       Return in about 3 months (around 10/22/2021) for Recheck.          Advance Directive    Advance directives are legal documents that let you make choices ahead of time about your health care and medical treatment in case you become unable to communicate for yourself. Advance directives are a way for you to make known your wishes to family, friends, and health care providers. This can let others know about your end-of-life care if you become unable to communicate.  Discussing and writing advance directives should happen over time rather than all at once. Advance directives can be changed depending on your situation and what you want, even after you have signed the advance directives.  There are different types of advance  CARDIOLOGY FOLLOW UP NOTE - DR. HANDY    Subjective:    no chest pain, sob, palpitations    PHYSICAL EXAM:  T(C): 37.3 (12-14-19 @ 12:03), Max: 37.3 (12-13-19 @ 20:19)  HR: 92 (12-14-19 @ 12:03) (89 - 118)  BP: 118/77 (12-14-19 @ 12:03) (118/77 - 156/73)  RR: 18 (12-14-19 @ 12:03) (14 - 18)  SpO2: 99% (12-14-19 @ 12:03) (88% - 100%)  Wt(kg): --  I&O's Summary    13 Dec 2019 07:01  -  14 Dec 2019 07:00  --------------------------------------------------------  IN: 2400 mL / OUT: 1150 mL / NET: 1250 mL      Daily     Daily     Appearance: Normal	  Cardiovascular: Normal S1 S2,RRR, No JVD, No murmurs  Respiratory: Lungs clear to auscultation	  Gastrointestinal:  Soft, Non-tender, + BS	  Extremities: Normal range of motion, No clubbing, cyanosis or edema      Home Medications:  ALPRAZolam 0.25 mg oral tablet: 1 tab(s) orally 4 times a day (11 Dec 2019 17:29)  Brovana 15 mcg/2 mL inhalation solution: 2 milliliter(s) inhaled 2 times a day (11 Dec 2019 17:29)  budesonide 0.5 mg/2 mL inhalation suspension: 2 milliliter(s) inhaled 2 times a day (11 Dec 2019 17:29)  exemestane 25 mg oral tablet: 1 tab(s) orally once a day (11 Dec 2019 17:29)  folic acid 1 mg oral tablet: 1 tab(s) orally once a day (11 Dec 2019 17:29)  montelukast 10 mg oral tablet: 1 tab(s) orally once a day (11 Dec 2019 17:29)  Multiple Vitamins oral tablet: 1 tab(s) orally once a day (11 Dec 2019 17:29)  Otezla 30 mg oral tablet: 1 tab(s) orally 2 times a day (11 Dec 2019 17:29)  prochlorperazine 10 mg oral tablet: 1 tab(s) orally once a day    NOTE: pharmacy dispensed 1 tab every 6 hours as needed back in June 2019. (11 Dec 2019 17:29)  Ventolin HFA 90 mcg/inh inhalation aerosol: 2 puff(s) inhaled every 6 hours, As Needed (11 Dec 2019 17:29)  Vitamin D3 2000 intl units oral tablet: 1 tab(s) orally once a day (11 Dec 2019 17:29)  Yupelri 175 mcg/3 mL inhalation solution: 3 milliliter(s) inhaled once a day    NOTE: unable to verify with secondary source. (11 Dec 2019 17:29)      MEDICATIONS  (STANDING):  albuterol/ipratropium for Nebulization 3 milliLiter(s) Nebulizer <User Schedule>  buDESOnide    Inhalation Suspension 0.5 milliGRAM(s) Inhalation every 12 hours  cholecalciferol 1000 Unit(s) Oral daily  exemestane 25 milliGRAM(s) Oral daily  folic acid 1 milliGRAM(s) Oral daily  heparin  Injectable 5000 Unit(s) SubCutaneous every 12 hours  LORazepam     Tablet 1 milliGRAM(s) Oral every 4 hours  LORazepam     Tablet 0.5 milliGRAM(s) Oral every 4 hours  LORazepam     Tablet   Oral   montelukast 10 milliGRAM(s) Oral at bedtime  multivitamin 1 Tablet(s) Oral daily  pantoprazole    Tablet 40 milliGRAM(s) Oral before breakfast  predniSONE   Tablet 50 milliGRAM(s) Oral daily      TELEMETRY: 	    ECG:  	  RADIOLOGY:   DIAGNOSTIC TESTING:  [ ] Echocardiogram:  [ ] Catheterization:  [ ] Stress Test:    OTHER: 	    LABS:	 	    CARDIAC MARKERS:  Troponin T, High Sensitivity Result: 17 ng/L (12-11 @ 15:41)  Troponin T, High Sensitivity Result: 18 ng/L (12-11 @ 12:43)            12-14    129<L>  |  85<L>  |  6<L>  ----------------------------<  89  3.9   |  29  |  0.53    Ca    8.9      14 Dec 2019 11:06      proBNP:     Lipid Profile:   HgA1c:     Creatinine, Serum: 0.53 mg/dL (12-14-19 @ 11:06)  Creatinine, Serum: 0.41 mg/dL (12-12-19 @ 06:17)  Creatinine, Serum: 0.41 mg/dL (12-11-19 @ 15:41)  Creatinine, Serum: 0.41 mg/dL (12-11-19 @ 12:43) directives, such as:  · Medical power of .  · Living will.  · Do not resuscitate (DNR) or do not attempt resuscitation (DNAR) order.  Health care proxy and medical power of   A health care proxy is also called a health care agent. This is a person who is appointed to make medical decisions for you in cases where you are unable to make the decisions yourself. Generally, people choose someone they know well and trust to represent their preferences. Make sure to ask this person for an agreement to act as your proxy. A proxy may have to exercise judgment in the event of a medical decision for which your wishes are not known.  A medical power of  is a legal document that names your health care proxy. Depending on the laws in your state, after the document is written, it may also need to be:  · Signed.  · Notarized.  · Dated.  · Copied.  · Witnessed.  · Incorporated into your medical record.  You may also want to appoint someone to manage your money in a situation in which you are unable to do so. This is called a durable power of  for finances. It is a separate legal document from the durable power of  for health care. You may choose the same person or someone different from your health care proxy to act as your agent in money matters.  If you do not appoint a proxy, or if there is a concern that the proxy is not acting in your best interests, a court may appoint a guardian to act on your behalf.  Living will  A living will is a set of instructions that state your wishes about medical care when you cannot express them yourself. Health care providers should keep a copy of your living will in your medical record. You may want to give a copy to family members or friends. To alert caregivers in case of an emergency, you can place a card in your wallet to let them know that you have a living will and where they can find it. A living will is used if you become:  · Terminally  ill.  · Disabled.  · Unable to communicate or make decisions.  Items to consider in your living will include:  · To use or not to use life-support equipment, such as dialysis machines and breathing machines (ventilators).  · A DNR or DNAR order. This tells health care providers not to use cardiopulmonary resuscitation (CPR) if breathing or heartbeat stops.  · To use or not to use tube feeding.  · To be given or not to be given food and fluids.  · Comfort (palliative) care when the goal becomes comfort rather than a cure.  · Donation of organs and tissues.  A living will does not give instructions for distributing your money and property if you should pass away.  DNR or DNAR  A DNR or DNAR order is a request not to have CPR in the event that your heart stops beating or you stop breathing. If a DNR or DNAR order has not been made and shared, a health care provider will try to help any patient whose heart has stopped or who has stopped breathing. If you plan to have surgery, talk with your health care provider about how your DNR or DNAR order will be followed if problems occur.  What if I do not have an advance directive?  If you do not have an advance directive, some states assign family decision makers to act on your behalf based on how closely you are related to them. Each state has its own laws about advance directives. You may want to check with your health care provider, , or state representative about the laws in your state.  Summary  · Advance directives are the legal documents that allow you to make choices ahead of time about your health care and medical treatment in case you become unable to tell others about your care.  · The process of discussing and writing advance directives should happen over time. You can change the advance directives, even after you have signed them.  · Advance directives include DNR or DNAR orders, living geiger, and designating an agent as your medical power of  .  This information is not intended to replace advice given to you by your health care provider. Make sure you discuss any questions you have with your health care provider.  Document Revised: 01/28/2021 Document Reviewed: 07/16/2020  Elsevier Patient Education © 2021 [employer]      Fall Prevention in the Home, Adult  Falls can cause injuries. They can happen to people of all ages. There are many things you can do to make your home safe and to help prevent falls. Ask for help when making these changes, if needed.  What actions can I take to prevent falls?  General Instructions  · Use good lighting in all rooms. Replace any light bulbs that burn out.  · Turn on the lights when you go into a dark area. Use night-lights.  · Keep items that you use often in easy-to-reach places. Lower the shelves around your home if necessary.  · Set up your furniture so you have a clear path. Avoid moving your furniture around.  · Do not have throw rugs and other things on the floor that can make you trip.  · Avoid walking on wet floors.  · If any of your floors are uneven, fix them.  · Add color or contrast paint or tape to clearly tangela and help you see:  ? Any grab bars or handrails.  ? First and last steps of stairways.  ? Where the edge of each step is.  · If you use a stepladder:  ? Make sure that it is fully opened. Do not climb a closed stepladder.  ? Make sure that both sides of the stepladder are locked into place.  ? Ask someone to hold the stepladder for you while you use it.  · If there are any pets around you, be aware of where they are.  What can I do in the bathroom?         · Keep the floor dry. Clean up any water that spills onto the floor as soon as it happens.  · Remove soap buildup in the tub or shower regularly.  · Use non-skid mats or decals on the floor of the tub or shower.  · Attach bath mats securely with double-sided, non-slip rug tape.  · If you need to sit down in the shower, use a plastic,  non-slip stool.  · Install grab bars by the toilet and in the tub and shower. Do not use towel bars as grab bars.  What can I do in the bedroom?  · Make sure that you have a light by your bed that is easy to reach.  · Do not use any sheets or blankets that are too big for your bed. They should not hang down onto the floor.  · Have a firm chair that has side arms. You can use this for support while you get dressed.  What can I do in the kitchen?  · Clean up any spills right away.  · If you need to reach something above you, use a strong step stool that has a grab bar.  · Keep electrical cords out of the way.  · Do not use floor polish or wax that makes floors slippery. If you must use wax, use non-skid floor wax.  What can I do with my stairs?  · Do not leave any items on the stairs.  · Make sure that you have a light switch at the top of the stairs and the bottom of the stairs. If you do not have them, ask someone to add them for you.  · Make sure that there are handrails on both sides of the stairs, and use them. Fix handrails that are broken or loose. Make sure that handrails are as long as the stairways.  · Install non-slip stair treads on all stairs in your home.  · Avoid having throw rugs at the top or bottom of the stairs. If you do have throw rugs, attach them to the floor with carpet tape.  · Choose a carpet that does not hide the edge of the steps on the stairway.  · Check any carpeting to make sure that it is firmly attached to the stairs. Fix any carpet that is loose or worn.  What can I do on the outside of my home?  · Use bright outdoor lighting.  · Regularly fix the edges of walkways and driveways and fix any cracks.  · Remove anything that might make you trip as you walk through a door, such as a raised step or threshold.  · Trim any bushes or trees on the path to your home.  · Regularly check to see if handrails are loose or broken. Make sure that both sides of any steps have handrails.  · Install  guardrails along the edges of any raised decks and porches.  · Clear walking paths of anything that might make someone trip, such as tools or rocks.  · Have any leaves, snow, or ice cleared regularly.  · Use sand or salt on walking paths during winter.  · Clean up any spills in your garage right away. This includes grease or oil spills.  What other actions can I take?  · Wear shoes that:  ? Have a low heel. Do not wear high heels.  ? Have rubber bottoms.  ? Are comfortable and fit you well.  ? Are closed at the toe. Do not wear open-toe sandals.  · Use tools that help you move around (mobility aids) if they are needed. These include:  ? Canes.  ? Walkers.  ? Scooters.  ? Crutches.  · Review your medicines with your doctor. Some medicines can make you feel dizzy. This can increase your chance of falling.  Ask your doctor what other things you can do to help prevent falls.  Where to find more information  · Centers for Disease Control and Prevention, STEADI: https://cdc.gov  · National [place] on Aging: https://hw0obpw.nehal.nih.gov  Contact a doctor if:  · You are afraid of falling at home.  · You feel weak, drowsy, or dizzy at home.  · You fall at home.  Summary  · There are many simple things that you can do to make your home safe and to help prevent falls.  · Ways to make your home safe include removing tripping hazards and installing grab bars in the bathroom.  · Ask for help when making these changes in your home.  This information is not intended to replace advice given to you by your health care provider. Make sure you discuss any questions you have with your health care provider.  Document Revised: 04/09/2020 Document Reviewed: 08/02/2018  [employer] Patient Education © 2021 [employer]    Please check with your insurance and get Shingrix vaccination series at your local pharmacy  Please check with your insurance and get Adacel Tdap at your local pharmacy.

## 2021-08-18 NOTE — DISCHARGE NOTE NURSING/CASE MANAGEMENT/SOCIAL WORK - NSDCPEFALRISK_GEN_ALL_CORE
Focus note:  Patient was seen in the mail the bedside with RN.  Chart reviewed.  Looks comfortable on exam and denies any complaints.  Limited historian.  Urinary retention noted, started on Flomax.  Consult Urology.  Continue current medical management.  Continue telemetry.  Will follow.   Patient information on fall and injury prevention

## 2021-12-08 NOTE — ED PROVIDER NOTE - DURATION
Chief Complaint   Patient presents with    Follow Up Chronic Condition    Neck Pain     1. \"Have you been to the ER, urgent care clinic since your last visit? Hospitalized since your last visit? \" No    2. \"Have you seen or consulted any other health care providers outside of the [address] [place] [address] since your last visit? \" No     3. For patients aged 39-70: Has the patient had a colonoscopy / FIT/ Cologuard? NA based on age or sex     If the patient is female:    3. For patients aged 41-77: Has the patient had a mammogram within the past 2 years? NA based on age or sex    11. For patients aged 21-65: Has the patient had a pap smear?  Yes, HM satisfied with blue hyperlink day(s)/3 days

## 2022-01-31 NOTE — ED ADULT NURSE NOTE - NS ED NURSE RECORD ANOTHER VITAL SIGN
Patients needs to schedule her emg into April [place]. The order expires in march. She is asking for it to be extended. Writer advised caller of callback within 24-72 hours.    Patient Name: Renée Ocampo  Caller Name: self  Name of Facility: [facility]  Callback Number: [phone]  Best Availability: anytime  Can A Detailed Message Be left? yes  Fax Number: na  Additional Info: na  Did you confirm the message with the caller?: yes    Thank you,  Sita Johnson     Yes

## 2022-04-29 NOTE — BEHAVIORAL HEALTH ASSESSMENT NOTE - NS ED [facility] [employer] MEDICAL RECORD REVIEWED YES RECORDS
If his pump is broken, there is no basal rate. If he is taking long acting insulin, he takes 24 units daily  When he gets his pump set up again, he is to use 1 unit per hour   
Patients wife called back about this. She is not sure what to set his basal rate at. Please advise.  
Pt wife (Jenniffer) called stated she spoke to on call Dr Cuellar last night pt Tandem t-slim pump has messed up pt is having high blood sugars can't seem to get under 300. Started yesterday. Looks like Dr Cuellar sent in Humalog kwikpen 50 units per day carb ratio and semglee  
So right now, he is not using his pump correct?  He should use 5 units of log every 3 hours as a shot until bg is under 200.   
Spoke with pt and let him know. Pt verbalized understanding.  
Spoke with pt's wife and she states that pt is still running high this morning. States that his blood sugar at 5am was 370 and she did a 4 unit correction dose of Humalog. At 7am pt hadbreakfast and ate 75 g of carbs and did a 9 unit correction dose for the food and then a 2 unit correction for the carbs. Pt wife states that his dexcom is reading 364 so she gave an additional 5 units correction of humalog and the dexcom is showing that the blood sugar is still trending up. She also said he took the Semglee at night for his basal.     Please advise. Pt has a dexcom but not sure if he has data sharing on due to having a tandem pump. Would you like me to see if we can share it to review his data?  
Hospital chart

## 2022-06-17 NOTE — H&P ADULT - SKIN/BREAST
Please see POC note for Eval results and tx performed on 6/20/2022    MADAN Pina/SOPHIA    
negative

## 2022-07-30 NOTE — DIETITIAN INITIAL EVALUATION ADULT. - NS AS NUTRI INTERV ED CONTENT
DASH diet; heart healthy food choices; imporatance of  high quality food choices/Purpose of the nutrition education/Nutrition relationship to health/disease 31-Jul-2022 02:45

## 2022-08-04 NOTE — DISCHARGE NOTE NURSING/CASE MANAGEMENT/SOCIAL WORK - COMPLETE THE FOLLOWING IF THE PATIENT REFUSES THE INFLUENZA VACCINE:
Physical Therapy        Physical Therapy Cancel Note      DATE: 2022    NAME: Mirna Drummond  MRN: [id number]   : 1947      Patient not seen this date for Physical Therapy due to:    Testing: stress test, PT will check back as time allows.       Electronically signed by Errol Castaneda PT on 2022 at 8:43 AM Risks/benefits discussed with patient/surrogate

## 2022-11-17 NOTE — PHYSICAL THERAPY INITIAL EVALUATION ADULT - IMPAIRED TRANSFERS: SIT/STAND, REHAB EVAL
Plan to discharge at start of shift. Pt had diarrhea blow out and mom requested to stay the night. Team notified and plan to stay and monitor. Bolus given x1. Pt eating and drinking with encouragement. Mom present at bedside and attentive to pts needs. Hourly rounding complete   impaired balance/decreased strength

## 2022-11-23 NOTE — ED ADULT TRIAGE NOTE - AS TEMP SITE
Per [facility] at pt pharmacy, [phone]. Gernic Pradaxa is not available and pt insurance will not cover brand name. [facility] said that pt insurance will cover Eliquis or Xarelto.
Pt notified of the issue with the medication and that a new Rx has been sent per DR Oshea's note.
oral

## 2022-12-05 NOTE — PATIENT PROFILE ADULT - ANY SIGNIFICANT CHANGE IN ABILITY TO PERFORM THE FOLLOWING ADL SINCE THE ONSET OF PRESENT ILLNESS?
Health Maintenance Due   Topic Date Due   • Hepatitis B Vaccine (1 of 3 - 3-dose series) Never done   • Pneumococcal Vaccine 0-64 (1 - PCV) Never done   • COVID-19 Vaccine (3 - Booster for Moderna series) 09/09/2021   • Colorectal Cancer Screen-  Never done   • Depression Screening  03/15/2023       Patient is due for topics as listed above but is not proceeding with Immunization(s) COVID-19, Hep B and Pneumococcal at this time.      no

## 2023-01-01 NOTE — H&P ADULT - NSHPPHYSICALEXAM_GEN_ALL_CORE
PHYSICAL EXAM:  GENERAL: NAD, chronically ill appearing, unkempt/disheveled, poor hygiene overall  HEAD:  Atraumatic, Normocephalic  EYES: EOMI, PERRLA, +injected conjunctiva b/l with crusting and some drainage b/l. visual acuity grossly intact. no pain on eom  ENMT: No tonsillar erythema, exudates, or enlargement; dry mucous membranes, poor dentition, No lesions  NECK: Supple, No JVD, Normal thyroid  CHEST/LUNG: poor air entry b/l, b/l upper field exp wheezing, b/l prolonged exp phase. RR of 28 on my exam.   HEART: Regular rate and rhythm; No murmurs, rubs, or gallops, no sig Le edema.   ABDOMEN: Soft, mild TTP over RUQ/RLQ, Nondistended; Bowel sounds present, no rebound/guarding  EXTREMITIES:  2+ Peripheral Pulses, No clubbing, cyanosis, rom intact  LYMPH: No lymphadenopathy noted, no lymphangitis  SKIN: +scattered red raised lesions of psoriasis over b/l arms, +lesions of feet with white lace like scale overlaying it.  b/l fingernail onychomycosis.   NERVOUS SYSTEM:  Alert & Oriented X3, Good concentration; Motor Strength 5/5 B/L upper and lower extremities
.

## 2023-01-12 NOTE — DISCHARGE NOTE PROVIDER - NSDCCPCAREPLAN_GEN_ALL_CORE_FT
PRINCIPAL DISCHARGE DIAGNOSIS  Diagnosis: COPD exacerbation  Assessment and Plan of Treatment:       SECONDARY DISCHARGE DIAGNOSES  Diagnosis: ETOH abuse  Assessment and Plan of Treatment:     Diagnosis: CAP (community acquired pneumonia)  Assessment and Plan of Treatment: PRINCIPAL DISCHARGE DIAGNOSIS  Diagnosis: COPD exacerbation  Assessment and Plan of Treatment: Call your Health Care provider upon arrival home to make a follow up appointment within one week.  Take all inhalers as prescribed by your Health Care Provider.  Take steroids as prescribed by your Health Care Provider.  If your cough increases infrequency and severity and/or you have shortness of breath or increased shortness of breath call your Health Care Provider.  If you develop fever, chills, night sweats, malaise, and/or change in mental status call your Health care Provider.  Nutrition is very important.  Eat small frequent meals.  Increase your activity as tolerated.  Do not stay in bed all day  F/U with Dr Mansfield in 1 week. Continue prednisone tapering as recommended.        SECONDARY DISCHARGE DIAGNOSES  Diagnosis: Hyponatremia  Assessment and Plan of Treatment: Your blood sodium level was very low, now improved. Maintain 1 L fluid restriction as recommended by kidney doctor    Diagnosis: ETOH abuse  Assessment and Plan of Treatment: with withdrawal. S/P ativan tapering. Please make appointment with alcohol rehab place. PRINCIPAL DISCHARGE DIAGNOSIS  Diagnosis: COPD exacerbation  Assessment and Plan of Treatment: Call your Health Care provider upon arrival home to make a follow up appointment within one week.  Take all inhalers as prescribed by your Health Care Provider.  Take steroids as prescribed by your Health Care Provider.  If your cough increases infrequency and severity and/or you have shortness of breath or increased shortness of breath call your Health Care Provider.  If you develop fever, chills, night sweats, malaise, and/or change in mental status call your Health care Provider.  Nutrition is very important.  Eat small frequent meals.  Increase your activity as tolerated.  Do not stay in bed all day  F/U with Dr Mansfield in 1 week. Continue prednisone tapering as recommended.        SECONDARY DISCHARGE DIAGNOSES  Diagnosis: Hyponatremia  Assessment and Plan of Treatment: Your blood sodium level was very low, now improved. Maintain 1 L fluid restriction as recommended by kidney doctor. Repeat blood work early next week, make an appointment with your PMD    Diagnosis: ETOH abuse  Assessment and Plan of Treatment: with withdrawal. S/P ativan tapering. Please make appointment with alcohol rehab place. PRINCIPAL DISCHARGE DIAGNOSIS  Diagnosis: COPD exacerbation  Assessment and Plan of Treatment: Call your Health Care provider upon arrival home to make a follow up appointment within one week.  Take all inhalers as prescribed by your Health Care Provider.  Take steroids as prescribed by your Health Care Provider.  If your cough increases infrequency and severity and/or you have shortness of breath or increased shortness of breath call your Health Care Provider.  If you develop fever, chills, night sweats, malaise, and/or change in mental status call your Health care Provider.  Nutrition is very important.  Eat small frequent meals.  Increase your activity as tolerated.  Do not stay in bed all day  F/U with Dr Mansfield in 1 week. Continue prednisone tapering as recommended.        SECONDARY DISCHARGE DIAGNOSES  Diagnosis: Hyponatremia  Assessment and Plan of Treatment: Your blood sodium level was very low, now improved. Maintain 1 L fluid restriction as recommended by kidney doctor. Repeat blood work early next week, make an appointment with your PMD    Diagnosis: ETOH abuse  Assessment and Plan of Treatment: with withdrawal. S/P ativan tapering. Please make appointment with alcohol rehab place. You may follow up with out patient addiction recovery services at [facility]. [phone] PRINCIPAL DISCHARGE DIAGNOSIS  Diagnosis: COPD exacerbation  Assessment and Plan of Treatment: Call your Health Care provider upon arrival home to make a follow up appointment within one week.  Take all inhalers as prescribed by your Health Care Provider.  Take steroids as prescribed by your Health Care Provider.  If your cough increases infrequency and severity and/or you have shortness of breath or increased shortness of breath call your Health Care Provider.  If you develop fever, chills, night sweats, malaise, and/or change in mental status call your Health care Provider.  Nutrition is very important.  Eat small frequent meals.  Increase your activity as tolerated.  Do not stay in bed all day  F/U with Dr Mansfield in 1 week. Continue prednisone tapering as recommended.        SECONDARY DISCHARGE DIAGNOSES  Diagnosis: Transaminitis  Assessment and Plan of Treatment: your liver enzymes were elevated, likely secondary to alcohol abuse.  Abdominal US showing no abnormal findings    Diagnosis: Hyponatremia  Assessment and Plan of Treatment: Your blood sodium level was very low, now improved. Maintain 1 L fluid restriction as recommended by kidney doctor. Repeat blood work early next week, make an appointment with your PMD    Diagnosis: ETOH abuse  Assessment and Plan of Treatment: with withdrawal. S/P ativan tapering. Please make appointment with alcohol rehab place. You may follow up with out patient addiction recovery services at [facility]. [phone] Spironolactone Counseling: Patient advised regarding risks of diarrhea, abdominal pain, hyperkalemia, birth defects (for female patients), liver toxicity and renal toxicity. The patient may need blood work to monitor liver and kidney function and potassium levels while on therapy. The patient verbalized understanding of the proper use and possible adverse effects of spironolactone.  All of the patient's questions and concerns were addressed.

## 2023-03-21 NOTE — PATIENT PROFILE ADULT - PUBLIC BENEFITS
Epidural Block    Patient location during procedure: OB  Start time: 3/21/2023 1:08 AM  Reason for block: at surgeon's request and primary anesthetic  Staffing  Performed: Anesthesiologist   Anesthesiologist: Vlad Danielson MD  Preanesthetic Checklist  Completed: patient identified, IV checked, site marked, risks and benefits discussed, surgical consent, monitors and equipment checked, pre-op evaluation and timeout performed  Epidural  Patient position: sitting  Prep: Betadine  Patient monitoring: frequent blood pressure checks  Approach: midline  Location: lumbar  Injection technique: KATYA saline  Needle  Needle type: Tuohy   Needle gauge: 18 G  Catheter type: side hole  Catheter size: 20 G  Catheter securement method: clear occlusive dressing  Test dose: negative  Assessment  Sensory level: T10  Number of attempts: 1negative aspiration for CSF, negative aspiration for heme and no paresthesia on injection  patient tolerated the procedure well with no immediate complications
no

## 2023-07-05 NOTE — ED ADULT NURSE NOTE - NS ED NURSE DC INFO COMPLEXITY
Simple: Patient demonstrates quick and easy understanding Prednisone Pregnancy And Lactation Text: This medication is Pregnancy Category C and it isn't know if it is safe during pregnancy. This medication is excreted in breast milk.

## 2023-10-02 NOTE — ED ADULT TRIAGE NOTE - HEIGHT IN FEET
"Krystina Mcgillirving Bellamy was seen and treated in our emergency department on 10/2/2023.  She may return to work on 10/04/2023.       If you have any questions or concerns, please don't hesitate to call.      Sang Matthews Jr., FNP"
5

## 2023-10-05 NOTE — ED PROVIDER NOTE - CARDIOVASCULAR [-], MLM
PDMP reviewed; no aberrant behavior identified, prescription authorized.     no chest pain/no palpitations

## 2023-10-27 NOTE — PROGRESS NOTE ADULT - SUBJECTIVE AND OBJECTIVE BOX
Follow-up Pulm Progress Note    No new respiratory events overnight.  Denies SOB/CP.   92% on 3L NC    Medications:  MEDICATIONS  (STANDING):  ALBUTerol/ipratropium for Nebulization 3 milliLiter(s) Nebulizer every 6 hours  ALPRAZolam 0.25 milliGRAM(s) Oral two times a day  buDESOnide 160 MICROgram(s)/formoterol 4.5 MICROgram(s) Inhaler 2 Puff(s) Inhalation two times a day  cholecalciferol 2000 Unit(s) Oral daily  docusate sodium 100 milliGRAM(s) Oral two times a day  exemestane 25 milliGRAM(s) Oral daily  fluocinonide 0.05% Solution 1 Application(s) Topical every 12 hours  folic acid 1 milliGRAM(s) Oral daily  heparin  Injectable 5000 Unit(s) SubCutaneous every 12 hours  montelukast 10 milliGRAM(s) Oral daily  multivitamin 1 Tablet(s) Oral daily  nicotine - 21 mG/24Hr(s) Patch 1 patch Transdermal daily  pantoprazole    Tablet 40 milliGRAM(s) Oral before breakfast  polyethylene glycol 3350 17 Gram(s) Oral daily  predniSONE   Tablet 40 milliGRAM(s) Oral daily  predniSONE   Tablet   Oral   prochlorperazine   Tablet 10 milliGRAM(s) Oral daily  senna 2 Tablet(s) Oral at bedtime  triamcinolone 0.1% Ointment 1 Application(s) Topical every 12 hours    MEDICATIONS  (PRN):          Vital Signs Last 24 Hrs  T(C): 36.8 (27 Mar 2019 12:02), Max: 36.8 (27 Mar 2019 12:02)  T(F): 98.3 (27 Mar 2019 12:02), Max: 98.3 (27 Mar 2019 12:02)  HR: 82 (27 Mar 2019 12:02) (82 - 89)  BP: 131/85 (27 Mar 2019 12:02) (131/85 - 143/89)  BP(mean): --  RR: 20 (27 Mar 2019 12:02) (18 - 20)  SpO2: 92% (27 Mar 2019 12:02) (86% - 97%) on 3L NC          03-26 @ 07:01  -  03-27 @ 07:00  --------------------------------------------------------  IN: 960 mL / OUT: 1050 mL / NET: -90 mL              Physical Examination:  PULM: Decreased BS bilaterally   CVS: S1, S2 heard    RADIOLOGY REVIEWED  CXR: Clear lungs history of covid June 2021, flu like symptoms and treated with monoclonal antibodies  denies any current cold or flu like symptoms, including fever, cough, sinus congestion, body aches or chills   history of seizure February 2021, cause unknown but patient believes was caused after the loss of a child, her mother and an aunt. Had 1 other seizure in June 2021 when she had covid and has had none since then. Last visit was telehealth 6 weeks ago.

## 2023-11-24 NOTE — PHYSICAL THERAPY INITIAL EVALUATION ADULT - NS ASR RISK AREAS PT EVAL
Per Estephanie Pharmacist,  kit for Dexcom G7 is needed. Pt has already picked up the sensor. Just awaiting a script for  kit.    fall

## 2024-04-19 NOTE — ED ADULT NURSE NOTE - ISOLATION PROVIDED EDUCATION
Consent: Verbal consent received from patient for cryotherapy. Detail Level: Zone Render Post-Care Instructions In Note?: yes Total Number Of Aks Treated: 55 Post-Care Instructions: I reviewed with the patient in detail post-care instructions. Patient is to wear sunprotection, and avoid picking at any of the treated lesions. Pt may apply Vaseline to crusted or scabbing areas. Number Of Freeze-Thaw Cycles: 3 freeze-thaw cycles Render In Bullet Format When Appropriate: No Duration Of Freeze Thaw-Cycle (Seconds): 0 Spray Paint Text: The liquid nitrogen was applied to the skin utilizing a spray paint frosting technique. Medical Necessity Information: It is in your best interest to select a reason for this procedure from the list below. All of these items fulfill various CMS LCD requirements except the new and changing color options. Post-Care Instructions: Verbally reviewed wound care with patient. Total Number Of Lesions Treated: 1 Medical Necessity Clause: This procedure was medically necessary because the lesions that were treated were: Patient

## 2024-04-29 NOTE — ED ADULT NURSE NOTE - NS ED NURSE DISCH DISPOSITION
RUR: 16%    CM opened case for assessment of D/C planning needs.  CM reviewed chart. CM completed assessment with pt at bedside. Pt is alert and oriented throughout encounter. CM introduced self/role, verified demographics, and discussed discharge planning.    Patient daughter at bedside. Patient gave permission for CM to continue with assessment.   Patient is staying with daughter Lilia.  She provides assistance with cooking, cleaning.  Patient is independent with ADLS and IADLS. No DME. Per daughter patient went to outpatient pt/ot for one visit and patient was discharge from services was told he was at baseline.     Patient has 2 appointment coming up with [facility]UR[facility] 5/2 and 5/16 CM encourage family to keep both appointment daughter wanted to know if she had to canceled the appointments since patient was admitted CM inform not sure of discharge but do not canceled appointment and CM will attempt to call office.    CM attempt to call office for neuro appointments was not able to speak with anyone.    Suresh MORALES       04/29/24 154   Service Assessment   Patient Orientation Alert and Oriented   Cognition Alert   History Provided By Patient;Child/Family  (daughter)   Primary Caregiver Self   Support Systems Children   Patient's Healthcare Decision Maker is: Patient Declined (Legal Next of Kin Remains as Decision Maker)   PCP Verified by CM Yes   Last Visit to PCP Within last 3 months   Prior Functional Level Independent in ADLs/IADLs   Current Functional Level Independent in ADLs/IADLs   Can patient return to prior living arrangement Yes   Ability to make needs known: Good   Family able to assist with home care needs: Yes   Would you like for me to discuss the discharge plan with any other family members/significant others, and if so, who? No   Financial Resources Medicare   CM/SW Referral Difficulty coping/adjusting to illness;Disease Management Education   Social/Functional History   Lives With  Discharged

## 2024-05-20 NOTE — PATIENT PROFILE ADULT - NSPROREFERSVCHOMECANCER_GEN_A_NUR
Patient is requesting the following labs to be ordered  -T4  -T3  -T3 Reverse  -Vitamin B12  -Folate  -Estradiol  -Testosterone  -Progesterone  -Mercury  -Iodine    She states she used to have thyroid problems and wants a full panel.     She states in the past she has had very high vitamin B12 levels    She states she used to be on hormones and is requesting above hormone labs      She states in the past she has had high Mercury levels and low Iodine levels     Please advise.   No call back needed if orders placed    no

## 2024-06-03 NOTE — CONSULT NOTE ADULT - SUBJECTIVE AND OBJECTIVE BOX
HISTORY AND PHYSICAL      Crissy Spencer is a 54 year old female.    Chief Complaint   Patient presents with    Annual Exam     Here for her annual exam and is fasting for labs. C/o palpitations, hot flashes, dark coarse hairs on her face, fatigue and \"just not feeling herself\"    Refill Request     Refill on Valium to pharmacy on file.    Nicotine Dependence     Would like to discuss options for quitting smoking       HPI  HPI  Patient is here for an annual physical.  She is having problems with hot flashes and palpitations at night  and feels sharp pains in her breasts at times.  She is wanting to quit smoking and had success with chantix before but it didn't help when she used it again.  Past Medical History:   Diagnosis Date    Cervical spondylosis     Smoker     Thoracic disc herniation     T2       Past Surgical History:   Procedure Laterality Date    Hysterectomy  2001       Current Outpatient Medications   Medication Sig Dispense Refill    buPROPion XL (WELLBUTRIN XL) 150 MG 24 hr tablet Take 1 tablet by mouth daily. 30 tablet 1    DULoxetine (CYMBALTA) 30 MG capsule Take 1 capsule by mouth daily. 60 capsule 3    diazePAM (VALIUM) 5 MG tablet TAKE 1 TABLET BY MOUTH TWICE DAILY AND AT NIGHT prn anxiety 90 tablet 2    meloxicam (MOBIC) 15 MG tablet Take 1 tablet by mouth once daily 90 tablet 0    estradiol (ESTRACE) 1 MG tablet Take 1 tablet by mouth once daily 30 tablet 5     No current facility-administered medications for this visit.       ALLERGIES:   Allergen Reactions    Diclofenac-Misoprostol Other (See Comments)     facial swelling    Naproxen SWELLING       Family History   Problem Relation Age of Onset    Diabetes Maternal Grandmother        Social History     Socioeconomic History    Marital status: Single     Spouse name: Not on file    Number of children: Not on file    Years of education: Not on file    Highest education level: Not on file   Occupational History    Occupation:    Tobacco  Use    Smoking status: Every Day     Current packs/day: 0.50     Types: Cigarettes    Smokeless tobacco: Never   Vaping Use    Vaping status: never used   Substance and Sexual Activity    Alcohol use: Yes     Alcohol/week: 2.0 standard drinks of alcohol     Types: 2 Shots of liquor per week    Drug use: No    Sexual activity: Not on file   Other Topics Concern     Service Not Asked    Blood Transfusions Not Asked    Caffeine Concern Yes     Comment: 2 cups of coffee per day    Occupational Exposure Not Asked    Hobby Hazards Not Asked    Sleep Concern Not Asked    Stress Concern Not Asked    Weight Concern Not Asked    Special Diet Not Asked    Back Care Not Asked    Exercise No    Bike Helmet Not Asked    Seat Belt Not Asked    Self-Exams Not Asked   Social History Narrative    Not on file     Social Determinants of Health     Financial Resource Strain: Not on file   Food Insecurity: Not on file   Transportation Needs: Not on file   Physical Activity: Not on file   Stress: Not on file   Social Connections: Not on file   Interpersonal Safety: Not on file       Review of Systems   Constitutional:  Positive for fatigue.   Respiratory:  Positive for shortness of breath. Negative for cough.    Cardiovascular:  Positive for chest pain and palpitations.   Gastrointestinal:  Negative for abdominal distention and abdominal pain.   Genitourinary:  Negative for difficulty urinating and dysuria.   Musculoskeletal:  Positive for arthralgias. Negative for gait problem.   Neurological:  Positive for dizziness and headaches.   Psychiatric/Behavioral:  Positive for dysphoric mood and sleep disturbance. The patient is nervous/anxious.        Visit Vitals  /72   Pulse 70   Temp 97.7 °F (36.5 °C) (Temporal)   Resp 18   Ht 5' 3\" (1.6 m)   Wt 68.5 kg (151 lb)   LMP  (LMP Unknown)   SpO2 98%   BMI 26.75 kg/m²       Physical Exam  Vitals and nursing note reviewed.   Constitutional:       Appearance: She is normal weight.    Cardiovascular:      Rate and Rhythm: Normal rate and regular rhythm.      Heart sounds: Normal heart sounds. No murmur heard.  Pulmonary:      Effort: Pulmonary effort is normal.      Breath sounds: Normal breath sounds. No rales.   Abdominal:      General: Abdomen is flat.      Palpations: Abdomen is soft.      Tenderness: There is no abdominal tenderness.   Musculoskeletal:      Cervical back: Neck supple.      Right lower leg: No edema.      Left lower leg: No edema.   Neurological:      General: No focal deficit present.      Mental Status: She is alert and oriented to person, place, and time.      Sensory: No sensory deficit.      Motor: No weakness.      Gait: Gait normal.         Problem List Items Addressed This Visit          Cardiac and Vasculature    Mixed hyperlipidemia       Genitourinary and Reproductive    Menopausal and perimenopausal disorder       Mental Health    Generalized anxiety disorder    Major depressive disorder, single episode, moderate  (CMD)       Tobacco    Tobacco dependence     Other Visit Diagnoses       Annual physical exam    -  Primary    Relevant Orders    CBC with Automated Differential    Comprehensive Metabolic Panel    Lipid Panel Without Reflex    Thyroid Stimulating Hormone    Controlled substance agreement signed        Relevant Orders    Pain Management Profile    Pain Management Profile    Screening for colon cancer        Relevant Orders    Occult Blood - iFOB (aka FIT)        IMPRESSION/PLAN:  1. Annual physical exam  Doing fair.  Feeling anxious with lots of menopausal and mood problems.  She wants to quit cigs.  She will do stool Fit and has a f/u Mammogram and US in 6M's.  She had a hysterectomy in 2001.  - CBC with Automated Differential  - Comprehensive Metabolic Panel  - Lipid Panel Without Reflex  - Thyroid Stimulating Hormone    2. Generalized anxiety disorder  Refilling dizzepam and controlled substnace agreement and urine drug screen done.    3. Major  depressive disorder, single episode, moderate  (CMD)  Will cut back duloxetine to 30 mg per day and add bupropion at 150 to help with quitting cigs.    4. Menopausal and perimenopausal disorder  On estrace.  She has had a hysterectomy.    5. Tobacco dependence  Wants to quit and will try bupropion.  Start at 150 and may increase to 300 after 4W. Will recheck then.    6. Controlled substance agreement signed  Done and urine drug screen ordered.  - Pain Management Profile; Future  - Pain Management Profile    7. Mixed hyperlipidemia  Pt went off crestor.  Will check levels.    8. Screening for colon cancer  She wants to do in lieu of colonoscopy.  - Occult Blood - iFOB (aka FIT); Future    Chief Complaint:  Patient is a 54y old  Female who presents with a chief complaint of copd exacerbation, etoh intoxication (24 Mar 2019 08:26)      HPI:  Asked to evaluate this 54 yr o F with Hx/o COPD, Breast Ca BRCA +, Psoriasis who presented w COPD exacerbation and worsening of her LFT,s. Patient with a well known history of ETOH abuse             Currently resting comfortable no abd pain no n/v      Allergies:  amoxicillin (Anaphylaxis)  Levaquin (Other; Anaphylaxis)  Levaquin (Unknown)  penicillin (Anaphylaxis)  penicillins (Anaphylaxis)      Medications:  ALBUTerol/ipratropium for Nebulization 3 milliLiter(s) Nebulizer every 6 hours  buDESOnide 160 MICROgram(s)/formoterol 4.5 MICROgram(s) Inhaler 2 Puff(s) Inhalation two times a day  cholecalciferol 2000 Unit(s) Oral daily  docusate sodium 100 milliGRAM(s) Oral two times a day  exemestane 25 milliGRAM(s) Oral daily  fluocinonide 0.05% Solution 1 Application(s) Topical every 12 hours  folic acid 1 milliGRAM(s) Oral daily  heparin  Injectable 5000 Unit(s) SubCutaneous every 12 hours  LORazepam   Injectable 2 milliGRAM(s) IV Push every 1 hour PRN  montelukast 10 milliGRAM(s) Oral daily  multivitamin 1 Tablet(s) Oral daily  nicotine - 21 mG/24Hr(s) Patch 1 patch Transdermal daily  polyethylene glycol 3350 17 Gram(s) Oral daily  predniSONE   Tablet 30 milliGRAM(s) Oral two times a day  prochlorperazine   Tablet 10 milliGRAM(s) Oral daily  senna 2 Tablet(s) Oral at bedtime  triamcinolone 0.1% Ointment 1 Application(s) Topical every 12 hours      PMHX/PSHX:  Prophylactic measure  Breast cancer  Brain aneurysm  Psoriasis  RA (rheumatoid arthritis)  Psoriasis  Breast cancer, stage 3  History of modified radical mastectomy of both breasts  S/P bilateral mastectomy  Brain aneurysm  No significant past surgical history      Family history:  No pertinent family history in first degree relatives  No pertinent family history in first degree relatives      Social History:     ROS:     General:  No wt loss, fevers, chills, night sweats, fatigue,   Eyes:  Good vision, no reported pain  ENT:  No sore throat, pain, runny nose, dysphagia  CV:  No pain, palpitations, hypo/hypertension  Resp:  No  cough, tachypnea,   GI:  No pain, No nausea, No vomiting, No diarrhea, No constipation, No weight loss, No fever,   :  No pain, bleeding, incontinence, nocturia  Muscle:  No pain, weakness  Neuro:  No weakness, tingling, memory problems  Psych:  No fatigue, insomnia, mood problems, depression  Endocrine:  No polyuria, polydipsia, cold/heat intolerance  Skin:  No  tattoos, scars, edema      PHYSICAL EXAM:   Vital Signs:  Vital Signs Last 24 Hrs  T(C): 36.6 (24 Mar 2019 06:12), Max: 37.1 (23 Mar 2019 11:46)  T(F): 97.9 (24 Mar 2019 06:12), Max: 98.7 (23 Mar 2019 11:46)  HR: 77 (24 Mar 2019 06:12) (77 - 95)  BP: 128/82 (24 Mar 2019 06:12) (123/80 - 136/88)  BP(mean): --  RR: 17 (24 Mar 2019 06:12) (17 - 18)  SpO2: 96% (24 Mar 2019 06:12) (93% - 96%)  Daily     Daily     GENERAL:  Appears well-nourished, no distress  HEENT:  NC/AT,  conjunctivae clear and pink, sclera -anicteric  CHEST:   apically some wheezing   HEART:  Regular rhythm, S1, S2, no abdominal bruit, no edema  ABDOMEN:  Soft, non-tender, non-distended, normal bowel sounds,    EXTEREMITIES:  no cyanosis,clubbing or edema  SKIN:  scaly lesion on her face and feet  NEURO:  Alert, oriented, no asterixis, no encephalopathy    LABS:    03-24    134<L>  |  94<L>  |  22  ----------------------------<  110<H>  4.3   |  26  |  0.57    Ca    9.5      24 Mar 2019 07:26    TPro  6.3  /  Alb  3.5  /  TBili  1.3<H>  /  DBili  x   /  AST  171<H>  /  ALT  214<H>  /  AlkPhos  67  03-23    LIVER FUNCTIONS - ( 23 Mar 2019 07:44 )  Alb: 3.5 g/dL / Pro: 6.3 g/dL / ALK PHOS: 67 U/L / ALT: 214 U/L / AST: 171 U/L / GGT: x                   Imaging:

## 2024-08-05 NOTE — ED ADULT TRIAGE NOTE - TEMPERATURE IN FAHRENHEIT (DEGREES F)
Abdomen , soft, nontender, nondistended , no guarding or rigidity , no masses palpable , normal bowel sounds Liver and Spleen , no hepatosplenomegaly Rectal deferred 98.6

## 2024-08-07 NOTE — ED ADULT NURSE NOTE - CAS ELECT INFOMATION PROVIDED
----- Message from Aroldo Mcgee sent at 8/6/2024 12:28 PM CDT -----  Let her know her gastric polyps were ok  ----- Message -----  From: Lab, Background User  Sent: 8/2/2024   6:25 PM CDT  To: Aroldo Mcgee, DO   DC instructions

## 2024-08-20 NOTE — PROGRESS NOTE ADULT - ATTENDING COMMENTS
Agree with above NP note.  cv stable   cont current tx
Agree with above NP note.  cv stable   cont current tx
Agree with above NP note.  cv stable  cont current tx  recent echo with normal lv function
Agree with above NP note.  cv stable  cont current tx  recent echo with normal lv function
Detail Level: Zone
Detail Level: Simple

## 2024-10-15 NOTE — ED ADULT NURSE NOTE - BREATHING INTERVENTIONS
Upcoming appt with Dr. Johns cancelled, as plan is to repeat testing in Dec/shankar.  This was confirmed by Sarah Jernigan PA-C.  Yaritza Bull RN on 10/15/2024 at 12:47 PM    
Oxygen/100 nonrebreather on arrival

## 2025-04-23 NOTE — PROGRESS NOTE ADULT - ASSESSMENT
54 Female w/ PMH stage 3 Bilateral Breast CA on Aromasin, RA, Psoriasis previously on Otezla, remote Brain Aneurysm s/p Clip in 1988,  Rt Jugular DVT, Catheter related MSSA Bacteremia from [place] in July 2015, ETOH abuse, COPD, p/w dypsnea, f/w COPD exacerbation, alcohol intoxication with  risk for withdrawal, hypovolemic hyponatremia in setting of poor PO intake and dehydration, and b/l conjunctivitis presumed bacterial in nature.     Problem: COPD exacerbation  po prednisone w/ taper as per pulm   -c/w pulm meds   pulm f/unoted    -CTA reviewed, no PE  -cigarette cessation education provided.  -sup O2 prn to keep o2 sats 88-92%.       Problem: Bacterial conjunctivitis  s/p drops    Problem: Hyponatremia  stable        Problem: Alcohol intoxication  etoh abuse     mental status better   -thiamine, folate, MVI  -social work  -fall precautions, aspiration precautions    : Alcoholic hepatitis  -etoh cessation education provided      lfts higher  check abd kishor  gi eval noted       : Psoriasis  rheum eval noted  derm noted      Problem: Breast cancer  Assessment and Plan: c/w examestane  heme/onc eval noted     Problem: Thrombocytopenia stable  -monitor counts for now  stable   rehab planning 54 Female w/ PMH stage 3 Bilateral Breast CA on Aromasin, RA, Psoriasis previously on Otezla, remote Brain Aneurysm s/p Clip in 1988,  Rt Jugular DVT, Catheter related MSSA Bacteremia from [place] in July 2015, ETOH abuse, COPD, p/w dypsnea, f/w COPD exacerbation, alcohol intoxication with  risk for withdrawal, hypovolemic hyponatremia in setting of poor PO intake and dehydration, and b/l conjunctivitis presumed bacterial in nature.     Problem: COPD exacerbation  po prednisone w/ taper as per pulm   -c/w pulm meds   pulm f/unoted    -CTA reviewed, no PE  -cigarette cessation education provided.  -sup O2 prn to keep o2 sats 88-92%.       Problem: Bacterial conjunctivitis  s/p drops    Problem: Hyponatremia  stable        Problem: Alcohol intoxication  etoh abuse     mental status better   -thiamine, folate, MVI  -social work  -fall precautions, aspiration precautions    : Alcoholic hepatitis  -etoh cessation education provided      lfts higher  check abd joseo  gi eval noted       : Psoriasis  rheum eval noted  derm noted      Problem: Breast cancer  Assessment and Plan: c/w examestane  heme/onc eval noted     Problem: Thrombocytopenia stable  -monitor counts for now  stable   rehab planning     dr reis to cover me   2/26 to 2/28 1 Principal Discharge DX:	Tension headache

## 2025-05-12 NOTE — ED PROVIDER NOTE - ADMIT DISPOSITION PRESENT ON ADMISSION SEPSIS
[FreeTextEntry1] : EXAMINATION OF THE CERVICAL SPINE AND UPPER EXTREMITIES: Reflexes revealed 2+ and symmetrical  Manual muscle testing mild weakness with right shoulder flexion and abduction otherwise grossly intact Sensory examination revealed sensation was intact.  The Spurling Cervical Compression Test was negative  No scapular winging was noted.   Range of motion testing was performed with the use of a goniometer.  On range of motion testing, flexion was to 45 degrees (normal - 45), extension was to 40 degrees  (normal - 55), right rotation was to 70 degrees (normal - 70), left rotation was to 65 degrees (normal - 70), right lateral bending was to 30 degrees (normal - 40), and left lateral bending was to 35 degrees (normal - 40).   On palpation, of the cervical spine: Decreased tenderness and spasm involving the left cervical paraspinal parascapular musculature 
No